# Patient Record
Sex: FEMALE | Race: WHITE | NOT HISPANIC OR LATINO
[De-identification: names, ages, dates, MRNs, and addresses within clinical notes are randomized per-mention and may not be internally consistent; named-entity substitution may affect disease eponyms.]

---

## 2017-03-20 ENCOUNTER — APPOINTMENT (OUTPATIENT)
Dept: RHEUMATOLOGY | Facility: CLINIC | Age: 72
End: 2017-03-20

## 2017-03-20 VITALS
SYSTOLIC BLOOD PRESSURE: 145 MMHG | HEART RATE: 90 BPM | TEMPERATURE: 98 F | OXYGEN SATURATION: 95 % | DIASTOLIC BLOOD PRESSURE: 72 MMHG | HEIGHT: 66 IN | WEIGHT: 161 LBS | BODY MASS INDEX: 25.88 KG/M2

## 2017-03-20 DIAGNOSIS — S22.089A UNSPECIFIED FRACTURE OF T11-T12 VERTEBRA, INITIAL ENCOUNTER FOR CLOSED FRACTURE: ICD-10-CM

## 2017-03-20 RX ORDER — BIOTIN 10 MG
TABLET ORAL
Refills: 0 | Status: ACTIVE | COMMUNITY

## 2017-03-20 RX ORDER — DENOSUMAB 60 MG/ML
60 INJECTION SUBCUTANEOUS
Qty: 1 | Refills: 0 | Status: COMPLETED | OUTPATIENT
Start: 2017-03-20

## 2017-03-20 RX ADMIN — DENOSUMAB 0 MG/ML: 60 INJECTION SUBCUTANEOUS at 00:00

## 2017-05-13 ENCOUNTER — INPATIENT (INPATIENT)
Facility: HOSPITAL | Age: 72
LOS: 3 days | Discharge: NURSING, REHAB OR LONG-TERM CARE | DRG: 493 | End: 2017-05-17
Attending: ORTHOPAEDIC SURGERY | Admitting: ORTHOPAEDIC SURGERY
Payer: MEDICARE

## 2017-05-13 VITALS
HEART RATE: 70 BPM | RESPIRATION RATE: 15 BRPM | TEMPERATURE: 99 F | DIASTOLIC BLOOD PRESSURE: 50 MMHG | OXYGEN SATURATION: 98 % | SYSTOLIC BLOOD PRESSURE: 137 MMHG | WEIGHT: 160.06 LBS | HEIGHT: 67 IN

## 2017-05-13 DIAGNOSIS — R19.7 DIARRHEA, UNSPECIFIED: ICD-10-CM

## 2017-05-13 DIAGNOSIS — I77.6 ARTERITIS, UNSPECIFIED: ICD-10-CM

## 2017-05-13 DIAGNOSIS — S82.891A OTHER FRACTURE OF RIGHT LOWER LEG, INITIAL ENCOUNTER FOR CLOSED FRACTURE: ICD-10-CM

## 2017-05-13 DIAGNOSIS — R55 SYNCOPE AND COLLAPSE: ICD-10-CM

## 2017-05-13 LAB
ALBUMIN SERPL ELPH-MCNC: 3.7 G/DL — SIGNIFICANT CHANGE UP (ref 3.3–5)
ALP SERPL-CCNC: 48 U/L — SIGNIFICANT CHANGE UP (ref 30–120)
ALT FLD-CCNC: 28 U/L DA — SIGNIFICANT CHANGE UP (ref 10–60)
ANION GAP SERPL CALC-SCNC: 8 MMOL/L — SIGNIFICANT CHANGE UP (ref 5–17)
APPEARANCE UR: CLEAR — SIGNIFICANT CHANGE UP
APTT BLD: 24.6 SEC — LOW (ref 27.5–37.4)
AST SERPL-CCNC: 24 U/L — SIGNIFICANT CHANGE UP (ref 10–40)
BASOPHILS # BLD AUTO: 0.2 K/UL — SIGNIFICANT CHANGE UP (ref 0–0.2)
BASOPHILS NFR BLD AUTO: 1.6 % — SIGNIFICANT CHANGE UP (ref 0–2)
BILIRUB SERPL-MCNC: 1 MG/DL — SIGNIFICANT CHANGE UP (ref 0.2–1.2)
BILIRUB UR-MCNC: NEGATIVE — SIGNIFICANT CHANGE UP
BUN SERPL-MCNC: 18 MG/DL — SIGNIFICANT CHANGE UP (ref 7–23)
CALCIUM SERPL-MCNC: 8.6 MG/DL — SIGNIFICANT CHANGE UP (ref 8.4–10.5)
CHLORIDE SERPL-SCNC: 102 MMOL/L — SIGNIFICANT CHANGE UP (ref 96–108)
CO2 SERPL-SCNC: 28 MMOL/L — SIGNIFICANT CHANGE UP (ref 22–31)
COLOR SPEC: YELLOW — SIGNIFICANT CHANGE UP
CREAT SERPL-MCNC: 0.85 MG/DL — SIGNIFICANT CHANGE UP (ref 0.5–1.3)
DIFF PNL FLD: ABNORMAL
EOSINOPHIL # BLD AUTO: 0 K/UL — SIGNIFICANT CHANGE UP (ref 0–0.5)
EOSINOPHIL NFR BLD AUTO: 0.4 % — SIGNIFICANT CHANGE UP (ref 0–6)
GLUCOSE SERPL-MCNC: 100 MG/DL — HIGH (ref 70–99)
GLUCOSE UR QL: NEGATIVE MG/DL — SIGNIFICANT CHANGE UP
HCT VFR BLD CALC: 43 % — SIGNIFICANT CHANGE UP (ref 34.5–45)
HGB BLD-MCNC: 13.9 G/DL — SIGNIFICANT CHANGE UP (ref 11.5–15.5)
INR BLD: 1.08 RATIO — SIGNIFICANT CHANGE UP (ref 0.88–1.16)
KETONES UR-MCNC: ABNORMAL
LEUKOCYTE ESTERASE UR-ACNC: ABNORMAL
LYMPHOCYTES # BLD AUTO: 0.7 K/UL — LOW (ref 1–3.3)
LYMPHOCYTES # BLD AUTO: 6.4 % — LOW (ref 13–44)
MCHC RBC-ENTMCNC: 28.5 PG — SIGNIFICANT CHANGE UP (ref 27–34)
MCHC RBC-ENTMCNC: 32.3 GM/DL — SIGNIFICANT CHANGE UP (ref 32–36)
MCV RBC AUTO: 88 FL — SIGNIFICANT CHANGE UP (ref 80–100)
MONOCYTES # BLD AUTO: 1.5 K/UL — HIGH (ref 0–0.9)
MONOCYTES NFR BLD AUTO: 13.1 % — SIGNIFICANT CHANGE UP (ref 2–14)
NEUTROPHILS # BLD AUTO: 8.8 K/UL — HIGH (ref 1.8–7.4)
NEUTROPHILS NFR BLD AUTO: 78.6 % — HIGH (ref 43–77)
NITRITE UR-MCNC: POSITIVE
PH UR: 6 — SIGNIFICANT CHANGE UP (ref 5–8)
PLATELET # BLD AUTO: 189 K/UL — SIGNIFICANT CHANGE UP (ref 150–400)
POTASSIUM SERPL-MCNC: 3.7 MMOL/L — SIGNIFICANT CHANGE UP (ref 3.5–5.3)
POTASSIUM SERPL-SCNC: 3.7 MMOL/L — SIGNIFICANT CHANGE UP (ref 3.5–5.3)
PROT SERPL-MCNC: 7.1 G/DL — SIGNIFICANT CHANGE UP (ref 6–8.3)
PROT UR-MCNC: 30 MG/DL
PROTHROM AB SERPL-ACNC: 11.8 SEC — SIGNIFICANT CHANGE UP (ref 9.8–12.7)
RBC # BLD: 4.89 M/UL — SIGNIFICANT CHANGE UP (ref 3.8–5.2)
RBC # FLD: 15.2 % — HIGH (ref 10.3–14.5)
SODIUM SERPL-SCNC: 138 MMOL/L — SIGNIFICANT CHANGE UP (ref 135–145)
SP GR SPEC: 1.02 — SIGNIFICANT CHANGE UP (ref 1.01–1.02)
TROPONIN I SERPL-MCNC: 0.01 NG/ML — LOW (ref 0.02–0.06)
TROPONIN I SERPL-MCNC: 0.01 NG/ML — LOW (ref 0.02–0.06)
UROBILINOGEN FLD QL: NEGATIVE MG/DL — SIGNIFICANT CHANGE UP
WBC # BLD: 11.2 K/UL — HIGH (ref 3.8–10.5)
WBC # FLD AUTO: 11.2 K/UL — HIGH (ref 3.8–10.5)

## 2017-05-13 PROCEDURE — 70450 CT HEAD/BRAIN W/O DYE: CPT | Mod: 26

## 2017-05-13 PROCEDURE — 73562 X-RAY EXAM OF KNEE 3: CPT | Mod: 26,RT

## 2017-05-13 PROCEDURE — 99284 EMERGENCY DEPT VISIT MOD MDM: CPT

## 2017-05-13 PROCEDURE — 73610 X-RAY EXAM OF ANKLE: CPT | Mod: 26,RT

## 2017-05-13 PROCEDURE — 71010: CPT | Mod: 26

## 2017-05-13 PROCEDURE — 73590 X-RAY EXAM OF LOWER LEG: CPT | Mod: 26,RT

## 2017-05-13 PROCEDURE — 93970 EXTREMITY STUDY: CPT | Mod: 26

## 2017-05-13 PROCEDURE — 99222 1ST HOSP IP/OBS MODERATE 55: CPT

## 2017-05-13 PROCEDURE — 72170 X-RAY EXAM OF PELVIS: CPT | Mod: 26

## 2017-05-13 PROCEDURE — 93306 TTE W/DOPPLER COMPLETE: CPT | Mod: 26

## 2017-05-13 PROCEDURE — 93010 ELECTROCARDIOGRAM REPORT: CPT

## 2017-05-13 RX ORDER — HYDROMORPHONE HYDROCHLORIDE 2 MG/ML
2 INJECTION INTRAMUSCULAR; INTRAVENOUS; SUBCUTANEOUS ONCE
Qty: 0 | Refills: 0 | Status: DISCONTINUED | OUTPATIENT
Start: 2017-05-13 | End: 2017-05-13

## 2017-05-13 RX ORDER — ONDANSETRON 8 MG/1
4 TABLET, FILM COATED ORAL EVERY 6 HOURS
Qty: 0 | Refills: 0 | Status: DISCONTINUED | OUTPATIENT
Start: 2017-05-13 | End: 2017-05-14

## 2017-05-13 RX ORDER — SENNA PLUS 8.6 MG/1
2 TABLET ORAL AT BEDTIME
Qty: 0 | Refills: 0 | Status: DISCONTINUED | OUTPATIENT
Start: 2017-05-13 | End: 2017-05-14

## 2017-05-13 RX ORDER — ATORVASTATIN CALCIUM 80 MG/1
20 TABLET, FILM COATED ORAL AT BEDTIME
Qty: 0 | Refills: 0 | Status: DISCONTINUED | OUTPATIENT
Start: 2017-05-13 | End: 2017-05-14

## 2017-05-13 RX ORDER — OXYCODONE HYDROCHLORIDE 5 MG/1
5 TABLET ORAL EVERY 4 HOURS
Qty: 0 | Refills: 0 | Status: DISCONTINUED | OUTPATIENT
Start: 2017-05-13 | End: 2017-05-14

## 2017-05-13 RX ORDER — SODIUM CHLORIDE 9 MG/ML
3 INJECTION INTRAMUSCULAR; INTRAVENOUS; SUBCUTANEOUS ONCE
Qty: 0 | Refills: 0 | Status: COMPLETED | OUTPATIENT
Start: 2017-05-13 | End: 2017-05-13

## 2017-05-13 RX ORDER — HYDROMORPHONE HYDROCHLORIDE 2 MG/ML
0.5 INJECTION INTRAMUSCULAR; INTRAVENOUS; SUBCUTANEOUS EVERY 6 HOURS
Qty: 0 | Refills: 0 | Status: DISCONTINUED | OUTPATIENT
Start: 2017-05-13 | End: 2017-05-14

## 2017-05-13 RX ORDER — ACETAMINOPHEN 500 MG
650 TABLET ORAL EVERY 6 HOURS
Qty: 0 | Refills: 0 | Status: DISCONTINUED | OUTPATIENT
Start: 2017-05-13 | End: 2017-05-14

## 2017-05-13 RX ORDER — MORPHINE SULFATE 50 MG/1
2 CAPSULE, EXTENDED RELEASE ORAL ONCE
Qty: 0 | Refills: 0 | Status: DISCONTINUED | OUTPATIENT
Start: 2017-05-13 | End: 2017-05-13

## 2017-05-13 RX ORDER — SODIUM CHLORIDE 9 MG/ML
1000 INJECTION INTRAMUSCULAR; INTRAVENOUS; SUBCUTANEOUS
Qty: 0 | Refills: 0 | Status: DISCONTINUED | OUTPATIENT
Start: 2017-05-13 | End: 2017-05-14

## 2017-05-13 RX ORDER — PANTOPRAZOLE SODIUM 20 MG/1
40 TABLET, DELAYED RELEASE ORAL
Qty: 0 | Refills: 0 | Status: DISCONTINUED | OUTPATIENT
Start: 2017-05-13 | End: 2017-05-14

## 2017-05-13 RX ORDER — OXYBUTYNIN CHLORIDE 5 MG
5 TABLET ORAL
Qty: 0 | Refills: 0 | Status: DISCONTINUED | OUTPATIENT
Start: 2017-05-13 | End: 2017-05-14

## 2017-05-13 RX ORDER — DOCUSATE SODIUM 100 MG
100 CAPSULE ORAL THREE TIMES A DAY
Qty: 0 | Refills: 0 | Status: DISCONTINUED | OUTPATIENT
Start: 2017-05-13 | End: 2017-05-14

## 2017-05-13 RX ADMIN — OXYCODONE HYDROCHLORIDE 5 MILLIGRAM(S): 5 TABLET ORAL at 21:55

## 2017-05-13 RX ADMIN — MORPHINE SULFATE 2 MILLIGRAM(S): 50 CAPSULE, EXTENDED RELEASE ORAL at 13:38

## 2017-05-13 RX ADMIN — HYDROMORPHONE HYDROCHLORIDE 2 MILLIGRAM(S): 2 INJECTION INTRAMUSCULAR; INTRAVENOUS; SUBCUTANEOUS at 14:20

## 2017-05-13 RX ADMIN — OXYCODONE HYDROCHLORIDE 5 MILLIGRAM(S): 5 TABLET ORAL at 22:25

## 2017-05-13 RX ADMIN — MORPHINE SULFATE 2 MILLIGRAM(S): 50 CAPSULE, EXTENDED RELEASE ORAL at 14:20

## 2017-05-13 RX ADMIN — SODIUM CHLORIDE 3 MILLILITER(S): 9 INJECTION INTRAMUSCULAR; INTRAVENOUS; SUBCUTANEOUS at 17:00

## 2017-05-13 RX ADMIN — Medication 5 MILLIGRAM(S): at 20:24

## 2017-05-13 RX ADMIN — OXYCODONE HYDROCHLORIDE 5 MILLIGRAM(S): 5 TABLET ORAL at 17:47

## 2017-05-13 RX ADMIN — HYDROMORPHONE HYDROCHLORIDE 2 MILLIGRAM(S): 2 INJECTION INTRAMUSCULAR; INTRAVENOUS; SUBCUTANEOUS at 14:32

## 2017-05-13 RX ADMIN — ATORVASTATIN CALCIUM 20 MILLIGRAM(S): 80 TABLET, FILM COATED ORAL at 21:55

## 2017-05-13 RX ADMIN — OXYCODONE HYDROCHLORIDE 5 MILLIGRAM(S): 5 TABLET ORAL at 18:20

## 2017-05-13 NOTE — H&P ADULT - ASSESSMENT
71 year old female with  right ankle fracture dislocation in the setting of PMH of seizure, CVA and recent GI illness.

## 2017-05-13 NOTE — H&P ADULT - HISTORY OF PRESENT ILLNESS
71 year old female fell in the shower but does not know how.  Denies dizziness, headache or LOC.  Denies CP or SOB.   PMH of seizure, CVA.   Immediately after fall she was incontinent of stool.  Transported to [place] ED where xray showed fracture dislocation of the right ankle.  This was reduced in the ED and a slpint was applied.

## 2017-05-13 NOTE — ED PROVIDER NOTE - OBJECTIVE STATEMENT
70 y/o F pt with hx of osteoporosis, lymphoma, CVA  presents to ED c/o right ankle pain s/p slip and fall in the shower. Per EMS positive deformity and placed in  splint. She normally ambulates on her own prior to this fall. Pt states she's on a small dose of prednisone everyday. No blood thinners. Denies LOC. Denies back pain, neck pain, headache, blurry vision. no further complaints at this time.  Orthopedic: Dr. Novoa

## 2017-05-13 NOTE — CONSULT NOTE ADULT - PROBLEM SELECTOR RECOMMENDATION 2
???dehydration.  EKG shows NSR, trop negative X1.  Will obtain TTE, carotid doppler US, Head CT scan, serial trop, and neurology consult.  Continue with IVF for hydration ???dehydration.  EKG shows NSR, trop negative X1.  Will obtain TTE, Head CT scan, serial trop, and neurology consult.  Continue with IVF for hydration

## 2017-05-13 NOTE — H&P ADULT - COMMENTS
Constitutional-no headache, fatigue or dizziness, no night sweats or loss of appetite  HEENT-no neck pain, no change in vision or hearing, no nose bleed, no sore throat  C-V- no chest pain, palpitations or difficulty breathing  Pulmonary-no difficulty breathing, cough, or wheeze  GI- No heartburn, no constipation, no dark/tarry stool, no vomitting, no diarrhea  G/U- No urinary frequency, no dysuria  Skin- No rash, no new hair loss   Neurologic-No double vision, no headache,no dizziness, no new weakness,  no new numbness  Psych- No halucinations, no recurrent bad thoughts no excess irritability  Endocrine- No frequent urination/ extreme thirsrt, no new intolerance to heat and cold  Hemato;ogic-No excess bruising or bleeding, no new unexplained lumps/swollen areas  Musculoskeletal-See HPI Constitutional-no headache,  or dizziness, no night sweats or loss of appetite  HEENT-no neck pain, no change in vision or hearing, no nose bleed, no sore throat  C-V- no chest pain, palpitations or difficulty breathing  Pulmonary-no difficulty breathing, cough, or wheeze  GI- Recent GI symptoms after travel to [place], Diarrhea starting on Wednesday.  Took imodium with some relief.  G/U- Normally has some urinary frequency, no dysuria  Skin- No rash, no new hair loss   Neurologic-Bilateral finger weakness , bilateral foot numbness (s/p chemo/CVA) No double vision, no headache, no dizziness, no new weakness,  no new numbness  Psych- No hallucinations, no recurrent bad thoughts no excess irritability  Endocrine- No extreme thirst, no new intolerance to heat and cold  Hemato;ogic-No excess bruising or bleeding, no new unexplained lumps/swollen areas  Musculoskeletal-See HPI

## 2017-05-13 NOTE — ED ADULT NURSE NOTE - INTEGUMENTARY WDL
Color consistent with ethnicity/race, warm, dry intact, resilient. Swelling noted over the right ankle area

## 2017-05-13 NOTE — PATIENT PROFILE ADULT. - PMH
CVA (cerebral vascular accident)    Fracture  lumbar spine,  healed with conservative treatment  Lymphoma    Neuropathy  bilateral feet  Osteoporosis    Seizure

## 2017-05-13 NOTE — ED PROVIDER NOTE - SKIN, MLM
Skin normal color for race, warm, dry and intact. No evidence of rash. ecchymosis noted to right ankle

## 2017-05-13 NOTE — PROGRESS NOTE ADULT - PROBLEM SELECTOR PLAN 1
-S/P reduction in ER, ankle currently in splint  -frequent neurovascular checks  -pain control  -ORIF when "medically stable," with orthopedics  -follow up LE doppler and TTE results

## 2017-05-13 NOTE — PROGRESS NOTE ADULT - SUBJECTIVE AND OBJECTIVE BOX
Patient is a 71y old  Female who presents with a chief complaint of Right Ankle Pain after fall (13 May 2017 18:51)      BRIEF HOSPITAL COURSE:   71F w/ PMH of lymphoma, "ministrokes/seizures secondary to possible vasculitis.",  on prednisone presented to Er after slip/fall in shower leading to right ankle fracture. Fracture was reduced and splinted in ER, evaluated by ortho, and ORIF planned when "patient medically stable."  -patient noted to have recent travel to [place] and upon return had perssitent diarrhea.  -Lower ext. dopplers were performed, resutls pending  -Patient currently having bedside TTE performed.         PAST MEDICAL & SURGICAL HISTORY:  Neuropathy: bilateral feet  Fracture: lumbar spine,  healed with conservative treatment  Seizure  Osteoporosis  Lymphoma  CVA (cerebral vascular accident)  No significant past surgical history  No significant past surgical history      Review of Systems:  CONSTITUTIONAL: No fever, chills, or fatigue  EYES: No eye pain, visual disturbances, or discharge  ENMT:  No difficulty hearing, tinnitus, vertigo; No sinus or throat pain  NECK: No pain or stiffness  RESPIRATORY: No cough, wheezing, chills or hemoptysis; No shortness of breath  CARDIOVASCULAR: No chest pain, palpitations, dizziness, or leg swelling  GASTROINTESTINAL: No abdominal or epigastric pain. No nausea, vomiting, or hematemesis; No diarrhea or constipation. No melena or hematochezia.  GENITOURINARY: No dysuria, frequency, hematuria, or incontinence  NEUROLOGICAL: No headaches, memory loss, loss of strength, numbness, or tremors  SKIN: No itching, burning, rashes, or lesions   MUSCULOSKELETAL: (+) right ankle joint pain andswelling (post reduction for Fx); No muscle, back, or extremity pain  PSYCHIATRIC: No depression, anxiety, mood swings, or difficulty sleeping      Medications:        acetaminophen   Tablet. 650milliGRAM(s) Oral every 6 hours PRN  ondansetron Injectable 4milliGRAM(s) IV Push every 6 hours PRN  oxyCODONE IR 5milliGRAM(s) Oral every 4 hours PRN  HYDROmorphone  Injectable 0.5milliGRAM(s) IV Push every 6 hours PRN        senna 2Tablet(s) Oral at bedtime PRN  docusate sodium 100milliGRAM(s) Oral three times a day  pantoprazole    Tablet 40milliGRAM(s) Oral before breakfast    oxybutynin 5milliGRAM(s) Oral two times a day    predniSONE   Tablet 5milliGRAM(s) Oral every other day  atorvastatin 20milliGRAM(s) Oral at bedtime    sodium chloride 0.9%. 1000milliLiter(s) IV Continuous <Continuous>                ICU Vital Signs Last 24 Hrs  T(C): 37.5, Max: 37.5 (05-13 @ 18:39)  T(F): 99.5, Max: 99.5 (05-13 @ 18:39)  HR: 91 (70 - 91)  BP: 131/56 (125/65 - 137/50)  BP(mean): 78 (78 - 78)  ABP: --  ABP(mean): --  RR: 18 (15 - 18)  SpO2: 93% (93% - 98%)          I&O's Detail    I & Os for current day (as of 13 May 2017 19:59)  =============================================  IN:    sodium chloride 0.9%.: 240 ml    Oral Fluid: 120 ml    Total IN: 360 ml  ---------------------------------------------  OUT:    Total OUT: 0 ml  ---------------------------------------------  Total NET: 360 ml        LABS:                        13.9   11.2  )-----------( 189      ( 13 May 2017 15:37 )             43.0     05-13    138  |  102  |  18  ----------------------------<  100<H>  3.7   |  28  |  0.85    Ca    8.6      13 May 2017 15:37    TPro  7.1  /  Alb  3.7  /  TBili  1.0  /  DBili  x   /  AST  24  /  ALT  28  /  AlkPhos  48  05-13      CARDIAC MARKERS ( 13 May 2017 19:19 )  .005 ng/mL / x     / x     / x     / x          CAPILLARY BLOOD GLUCOSE    PT/INR - ( 13 May 2017 15:37 )   PT: 11.8 sec;   INR: 1.08 ratio         PTT - ( 13 May 2017 15:37 )  PTT:24.6 sec    CULTURES:      Physical Examination:    General: No acute distress.  Alert, oriented, interactive, nonfocal    HEENT: Pupils equal, reactive to light.  Symmetric.    PULM: Clear to auscultation bilaterally, no significant sputum production    CVS: Regular rate and rhythm, no murmurs, rubs, or gallops    ABD: Soft, nondistended, nontender, normoactive bowel sounds, no masses    EXT: R ankle in splint, warm and well perfused, can move all toes, pulses palpable    SKIN: Warm and well perfused, no rashes noted.        CRITICAL CARE TIME SPENT: 45 minutes

## 2017-05-13 NOTE — ED ADULT NURSE NOTE - OBJECTIVE STATEMENT
Pt BIBA from home awake and oriented x4 compalining of pain right ankle. Pt reported was taking a shower and slipped/fell on the floor. Deformity, swelling and pain noted over the  right ankle. Immobilization/splint initiated by the EMS crew.

## 2017-05-13 NOTE — CONSULT NOTE ADULT - ATTENDING COMMENTS
Plan of care was discussed with patient in great details, All questions were answered to their satisfaction.  Seems to understand, and in agreement

## 2017-05-13 NOTE — ED PROVIDER NOTE - NS ED MD SCRIBE ATTENDING SCRIBE SECTIONS
HISTORY OF PRESENT ILLNESS/PHYSICAL EXAM/VITAL SIGNS( Pullset)/REVIEW OF SYSTEMS/DISPOSITION/PAST MEDICAL/SURGICAL/SOCIAL HISTORY

## 2017-05-13 NOTE — ED PROVIDER NOTE - MUSCULOSKELETAL, MLM
good pulse right lower extremity, positive deformity right ankle good DP  right lower extremity, positive deformity right ankle

## 2017-05-13 NOTE — H&P ADULT - PMH
CVA (cerebral vascular accident)    Lymphoma    Osteoporosis CVA (cerebral vascular accident)    Fracture  lumbar spine,  healed with conservative treatment  Lymphoma    Neuropathy  bilateral feet  Osteoporosis    Seizure

## 2017-05-14 DIAGNOSIS — R56.9 UNSPECIFIED CONVULSIONS: ICD-10-CM

## 2017-05-14 LAB
ANION GAP SERPL CALC-SCNC: 20 MMOL/L — HIGH (ref 5–17)
BUN SERPL-MCNC: 12 MG/DL — SIGNIFICANT CHANGE UP (ref 7–23)
CALCIUM SERPL-MCNC: 7.4 MG/DL — LOW (ref 8.4–10.5)
CHLORIDE SERPL-SCNC: 100 MMOL/L — SIGNIFICANT CHANGE UP (ref 96–108)
CO2 SERPL-SCNC: 15 MMOL/L — LOW (ref 22–31)
CREAT SERPL-MCNC: 0.8 MG/DL — SIGNIFICANT CHANGE UP (ref 0.5–1.3)
GLUCOSE SERPL-MCNC: 128 MG/DL — HIGH (ref 70–99)
HCT VFR BLD CALC: 36.7 % — SIGNIFICANT CHANGE UP (ref 34.5–45)
HGB BLD-MCNC: 11.5 G/DL — SIGNIFICANT CHANGE UP (ref 11.5–15.5)
MCHC RBC-ENTMCNC: 28.3 PG — SIGNIFICANT CHANGE UP (ref 27–34)
MCHC RBC-ENTMCNC: 31.4 GM/DL — LOW (ref 32–36)
MCV RBC AUTO: 90.2 FL — SIGNIFICANT CHANGE UP (ref 80–100)
PLATELET # BLD AUTO: 178 K/UL — SIGNIFICANT CHANGE UP (ref 150–400)
POTASSIUM SERPL-MCNC: 4 MMOL/L — SIGNIFICANT CHANGE UP (ref 3.5–5.3)
POTASSIUM SERPL-SCNC: 4 MMOL/L — SIGNIFICANT CHANGE UP (ref 3.5–5.3)
RBC # BLD: 4.07 M/UL — SIGNIFICANT CHANGE UP (ref 3.8–5.2)
RBC # FLD: 15 % — HIGH (ref 10.3–14.5)
SODIUM SERPL-SCNC: 135 MMOL/L — SIGNIFICANT CHANGE UP (ref 135–145)
TROPONIN I SERPL-MCNC: 0.04 NG/ML — SIGNIFICANT CHANGE UP (ref 0.02–0.06)
WBC # BLD: 8.4 K/UL — SIGNIFICANT CHANGE UP (ref 3.8–10.5)
WBC # FLD AUTO: 8.4 K/UL — SIGNIFICANT CHANGE UP (ref 3.8–10.5)

## 2017-05-14 PROCEDURE — 99232 SBSQ HOSP IP/OBS MODERATE 35: CPT

## 2017-05-14 RX ORDER — HYDROMORPHONE HYDROCHLORIDE 2 MG/ML
0.5 INJECTION INTRAMUSCULAR; INTRAVENOUS; SUBCUTANEOUS EVERY 6 HOURS
Qty: 0 | Refills: 0 | Status: DISCONTINUED | OUTPATIENT
Start: 2017-05-14 | End: 2017-05-17

## 2017-05-14 RX ORDER — CEFTRIAXONE 500 MG/1
INJECTION, POWDER, FOR SOLUTION INTRAMUSCULAR; INTRAVENOUS
Qty: 0 | Refills: 0 | Status: DISCONTINUED | OUTPATIENT
Start: 2017-05-14 | End: 2017-05-14

## 2017-05-14 RX ORDER — ATORVASTATIN CALCIUM 80 MG/1
20 TABLET, FILM COATED ORAL AT BEDTIME
Qty: 0 | Refills: 0 | Status: DISCONTINUED | OUTPATIENT
Start: 2017-05-14 | End: 2017-05-17

## 2017-05-14 RX ORDER — TRAMADOL HYDROCHLORIDE 50 MG/1
50 TABLET ORAL EVERY 4 HOURS
Qty: 0 | Refills: 0 | Status: DISCONTINUED | OUTPATIENT
Start: 2017-05-14 | End: 2017-05-14

## 2017-05-14 RX ORDER — CEFAZOLIN SODIUM 1 G
2000 VIAL (EA) INJECTION EVERY 8 HOURS
Qty: 0 | Refills: 0 | Status: COMPLETED | OUTPATIENT
Start: 2017-05-14 | End: 2017-05-15

## 2017-05-14 RX ORDER — SODIUM CHLORIDE 9 MG/ML
1000 INJECTION, SOLUTION INTRAVENOUS
Qty: 0 | Refills: 0 | Status: DISCONTINUED | OUTPATIENT
Start: 2017-05-14 | End: 2017-05-15

## 2017-05-14 RX ORDER — HYDROMORPHONE HYDROCHLORIDE 2 MG/ML
0.5 INJECTION INTRAMUSCULAR; INTRAVENOUS; SUBCUTANEOUS
Qty: 0 | Refills: 0 | Status: DISCONTINUED | OUTPATIENT
Start: 2017-05-14 | End: 2017-05-15

## 2017-05-14 RX ORDER — CEFTRIAXONE 500 MG/1
1 INJECTION, POWDER, FOR SOLUTION INTRAMUSCULAR; INTRAVENOUS EVERY 24 HOURS
Qty: 0 | Refills: 0 | Status: CANCELLED | OUTPATIENT
Start: 2017-05-15 | End: 2017-05-14

## 2017-05-14 RX ORDER — OXYCODONE HYDROCHLORIDE 5 MG/1
5 TABLET ORAL EVERY 4 HOURS
Qty: 0 | Refills: 0 | Status: DISCONTINUED | OUTPATIENT
Start: 2017-05-14 | End: 2017-05-17

## 2017-05-14 RX ORDER — HEPARIN SODIUM 5000 [USP'U]/ML
5000 INJECTION INTRAVENOUS; SUBCUTANEOUS EVERY 12 HOURS
Qty: 0 | Refills: 0 | Status: DISCONTINUED | OUTPATIENT
Start: 2017-05-14 | End: 2017-05-17

## 2017-05-14 RX ORDER — CEFAZOLIN SODIUM 1 G
2000 VIAL (EA) INJECTION ONCE
Qty: 0 | Refills: 0 | Status: DISCONTINUED | OUTPATIENT
Start: 2017-05-14 | End: 2017-05-14

## 2017-05-14 RX ORDER — OXYCODONE HYDROCHLORIDE 5 MG/1
10 TABLET ORAL EVERY 4 HOURS
Qty: 0 | Refills: 0 | Status: DISCONTINUED | OUTPATIENT
Start: 2017-05-14 | End: 2017-05-17

## 2017-05-14 RX ORDER — CEFTRIAXONE 500 MG/1
1 INJECTION, POWDER, FOR SOLUTION INTRAMUSCULAR; INTRAVENOUS ONCE
Qty: 0 | Refills: 0 | Status: COMPLETED | OUTPATIENT
Start: 2017-05-14 | End: 2017-05-14

## 2017-05-14 RX ORDER — SOLIFENACIN SUCCINATE 10 MG/1
10 TABLET ORAL DAILY
Qty: 0 | Refills: 0 | Status: DISCONTINUED | OUTPATIENT
Start: 2017-05-14 | End: 2017-05-14

## 2017-05-14 RX ORDER — SOLIFENACIN SUCCINATE 10 MG/1
10 TABLET ORAL DAILY
Qty: 0 | Refills: 0 | Status: DISCONTINUED | OUTPATIENT
Start: 2017-05-14 | End: 2017-05-17

## 2017-05-14 RX ORDER — SODIUM CHLORIDE 9 MG/ML
1000 INJECTION, SOLUTION INTRAVENOUS
Qty: 0 | Refills: 0 | Status: DISCONTINUED | OUTPATIENT
Start: 2017-05-14 | End: 2017-05-14

## 2017-05-14 RX ORDER — HYDROCORTISONE 20 MG
50 TABLET ORAL EVERY 8 HOURS
Qty: 0 | Refills: 0 | Status: DISCONTINUED | OUTPATIENT
Start: 2017-05-14 | End: 2017-05-14

## 2017-05-14 RX ORDER — OXYBUTYNIN CHLORIDE 5 MG
10 TABLET ORAL
Qty: 0 | Refills: 0 | Status: DISCONTINUED | OUTPATIENT
Start: 2017-05-14 | End: 2017-05-14

## 2017-05-14 RX ORDER — HYDROMORPHONE HYDROCHLORIDE 2 MG/ML
0.5 INJECTION INTRAMUSCULAR; INTRAVENOUS; SUBCUTANEOUS
Qty: 0 | Refills: 0 | Status: DISCONTINUED | OUTPATIENT
Start: 2017-05-14 | End: 2017-05-14

## 2017-05-14 RX ORDER — CEFTRIAXONE 500 MG/1
1 INJECTION, POWDER, FOR SOLUTION INTRAMUSCULAR; INTRAVENOUS ONCE
Qty: 0 | Refills: 0 | Status: DISCONTINUED | OUTPATIENT
Start: 2017-05-14 | End: 2017-05-14

## 2017-05-14 RX ORDER — SERTRALINE 25 MG/1
50 TABLET, FILM COATED ORAL DAILY
Qty: 0 | Refills: 0 | Status: DISCONTINUED | OUTPATIENT
Start: 2017-05-14 | End: 2017-05-17

## 2017-05-14 RX ADMIN — Medication 100 MILLIGRAM(S): at 22:05

## 2017-05-14 RX ADMIN — TRAMADOL HYDROCHLORIDE 50 MILLIGRAM(S): 50 TABLET ORAL at 09:50

## 2017-05-14 RX ADMIN — OXYCODONE HYDROCHLORIDE 5 MILLIGRAM(S): 5 TABLET ORAL at 07:10

## 2017-05-14 RX ADMIN — OXYCODONE HYDROCHLORIDE 5 MILLIGRAM(S): 5 TABLET ORAL at 06:36

## 2017-05-14 RX ADMIN — HYDROMORPHONE HYDROCHLORIDE 0.5 MILLIGRAM(S): 2 INJECTION INTRAMUSCULAR; INTRAVENOUS; SUBCUTANEOUS at 18:17

## 2017-05-14 RX ADMIN — CEFTRIAXONE 100 GRAM(S): 500 INJECTION, POWDER, FOR SOLUTION INTRAMUSCULAR; INTRAVENOUS at 09:29

## 2017-05-14 RX ADMIN — Medication 50 MILLIGRAM(S): at 12:32

## 2017-05-14 RX ADMIN — TRAMADOL HYDROCHLORIDE 50 MILLIGRAM(S): 50 TABLET ORAL at 09:29

## 2017-05-14 RX ADMIN — ATORVASTATIN CALCIUM 20 MILLIGRAM(S): 80 TABLET, FILM COATED ORAL at 22:05

## 2017-05-14 RX ADMIN — Medication 5 MILLIGRAM(S): at 06:36

## 2017-05-14 RX ADMIN — PANTOPRAZOLE SODIUM 40 MILLIGRAM(S): 20 TABLET, DELAYED RELEASE ORAL at 06:36

## 2017-05-14 RX ADMIN — HYDROMORPHONE HYDROCHLORIDE 0.5 MILLIGRAM(S): 2 INJECTION INTRAMUSCULAR; INTRAVENOUS; SUBCUTANEOUS at 17:54

## 2017-05-14 RX ADMIN — SODIUM CHLORIDE 75 MILLILITER(S): 9 INJECTION, SOLUTION INTRAVENOUS at 17:41

## 2017-05-14 RX ADMIN — Medication 200 MILLIGRAM(S): at 17:44

## 2017-05-14 RX ADMIN — SERTRALINE 50 MILLIGRAM(S): 25 TABLET, FILM COATED ORAL at 22:21

## 2017-05-14 RX ADMIN — SODIUM CHLORIDE 80 MILLILITER(S): 9 INJECTION INTRAMUSCULAR; INTRAVENOUS; SUBCUTANEOUS at 06:37

## 2017-05-14 NOTE — CONSULT NOTE ADULT - ASSESSMENT
71 year old female with h/o CVA, Vasulitis? h/o seizures - last seizure 4 yrs ago - Not on any anti seizure meds, fell in the shower has right ankle.  No signs of new CVA.  No seizure reported.  Didn't hit head.
Patient is 70 yo female presenting with
The patient is a 71 year old female with a history of lymphoma s/p chemotherapy, CVA, seizures, GERD who is admitted with right ankle fracture.    Plan:  - Intermittent PVCs on telemetry; no arrhythmias to suggest etiology of pre-syncope/syncope  - Echocardiogram reviewed; normal LV systolic function, mild/mod aortic stenosis, mild pulmonary hypertension  - Continue atorvastatin 20 mg daily  - Not on aspirin due to ?prior ICH  - The patient is at low/intermediate risk for cardiac events for an intermediate risk surgery. There are no active cardiac issues. The patient is optimized to proceed for surgery without additional cardiac testing.

## 2017-05-14 NOTE — PROGRESS NOTE ADULT - SUBJECTIVE AND OBJECTIVE BOX
Patient seen and examined.  Consult note dictated.  Right ankle fracture indicated for ORIF.      Pending medical and neuro clearance.  Surgery planned for early afternoon if cleared.    Will Keep NPO

## 2017-05-14 NOTE — CONSULT NOTE ADULT - PROBLEM SELECTOR RECOMMENDATION 2
Likely vasovagal.  Pt was having diarrhea for last few days.  Volume depletion likely caused transient cerebral hypoperfusion.

## 2017-05-14 NOTE — CONSULT NOTE ADULT - CONSULT REASON
H/o Vasculitis. H/o Seizures.  S/p Fall. Clearance for Right Ankle Fracture surgery.
Medical consult
Pre-operative evaluation

## 2017-05-14 NOTE — DIETITIAN INITIAL EVALUATION ADULT. - OTHER INFO
The patient is a 71 year old female with a history of lymphoma s/p chemotherapy, CVA, seizures, GERD who presents after a fall in shower.   In ED, found to have right ankle fracture. NPO for possible OR today, R ankle fracture indicated for ORIF.  Pt denies difficulty chewing or swallowing at this time (hx CVA).  NKA to food. Pt didn't offer any food preferences at this time.  Denies recent weight changes.

## 2017-05-14 NOTE — CONSULT NOTE ADULT - PROBLEM SELECTOR RECOMMENDATION 9
Type is unknown.  Being followed by a neurologist Dr. Verduzco.  On Prednisone 5 mg daily.  No changes in pt's neurological condition.
Continue with pain management, and Further care as per ortho

## 2017-05-14 NOTE — CONSULT NOTE ADULT - PROBLEM SELECTOR RECOMMENDATION 3
Last seizure was 4 yrs ago.  Not on anti seizure meds.
Will obtains stool study, and continue with hydration

## 2017-05-14 NOTE — CONSULT NOTE ADULT - SUBJECTIVE AND OBJECTIVE BOX
Patient is a 71y old  Female who presents with a chief complaint of Right Ankle Pain after fall (13 May 2017 18:51)      HPI:  71 year old female fell in the shower.  Denies dizziness, headache or LOC.  Didn't hit her head. Denies CP or SOB.   PMH of seizure, CVA.   Immediately after fall she was incontinent of stool.  Transported to [place] ED where x ray showed fracture dislocation of the right ankle.  This was reduced in the ED and a slpint was applied. (13 May 2017 15:50).  Last seizure was 4 yrs ago. Now, not on anti seizure meds.   is at bedside.    PAST MEDICAL & SURGICAL HISTORY:  Neuropathy: bilateral feet  Fracture: lumbar spine,  healed with conservative treatment  Seizure, not on any meds. Last seizure 4 yrs ago.  Osteoporosis  Lymphoma  CVA (cerebral vascular accident)  No significant past surgical history    MEDICATIONS  (STANDING):  docusate sodium 100milliGRAM(s) Oral three times a day  pantoprazole    Tablet 40milliGRAM(s) Oral before breakfast  predniSONE   Tablet 5milliGRAM(s) Oral every other day  atorvastatin 20milliGRAM(s) Oral at bedtime  sodium chloride 0.9%. 1000milliLiter(s) IV Continuous <Continuous>  hydrocortisone  Injectable 50milliGRAM(s) IV Push every 8 hours  cefTRIAXone   IVPB  IV Intermittent     MEDICATIONS  (PRN):  acetaminophen   Tablet. 650milliGRAM(s) Oral every 6 hours PRN Mild Pain (1 - 3)  ondansetron Injectable 4milliGRAM(s) IV Push every 6 hours PRN Nausea  senna 2Tablet(s) Oral at bedtime PRN Constipation  HYDROmorphone  Injectable 0.5milliGRAM(s) IV Push every 6 hours PRN Severe Pain (7 - 10)  traMADol 50milliGRAM(s) Oral every 4 hours PRN Moderate Pain (4 - 6)      Allergies - No Known Allergies    SOCIAL HISTORY: No h/o Smoking.     FAMILY HISTORY: Asked, N/C.    REVIEW OF SYSTEMS:    CONSTITUTIONAL: No fever  EYES: No eye pain,   ENMT:  No sinus or throat pain  NECK: No pain or stiffness  RESPIRATORY: No cough, No hemoptysis; No shortness of breath  CARDIOVASCULAR: No acute chest pain, palpitations,  or leg swelling  GASTROINTESTINAL: No abdominal pain. No nausea, vomiting, or hematemesis;  No melena or hematochezia.  GENITOURINARY: No  hematuria, or incontinence  MUSCULOSKELETAL: Right ankle Fracture.  SKIN: No itching, rashes, or lesions   LYMPH NODES: No enlarged glands  NEUROLOGICAL: NH/o CVA. Vasculitis? Seizures - last seizure 4 yrs ago. Not on Anti seizure meds.  PSYCHIATRIC: No depression, anxiety, mood swings, or difficulty sleeping  ENDOCRINE: No heat or cold intolerance;   HEME/LYMPH: No easy bruising, or bleeding gums  Allergy/Immunology. No medication allergy. No seasonal allergies.    PHYSICAL EXAM:  Vital Signs Last 24 Hrs  T(F): 99.6  HR: 79  BP: 128/50  RR: 23  Wt(kg): --    GENERAL: NAD, well-groomed, well-developed  HEAD:  Atraumatic, Normocephalic  EYES: EOMI, PERRLA, conjunctiva and sclera clear  NECK: Supple, No JVD, thyroid non-palpable    On Neurological Examination:    Mental Status - Pt is alert, awake, oriented X3. Higher functions are intact. Follows commands well and able to answer questions appropriately.    Speech -  Normal. Pt has no aphasia.    Cranial Nerves - Pupils 3 mm equal and reactive to light, extraocular eye movements intact. Pt has no visual field deficit.  Pt has no facial asymmetry. Tongue - is in midline.    Motor Exam - 4 plus/5 all over, No drift. No shaking or tremors.  Muscle tone - is normal all over. Moves all extremities equally. No asymmetry is seen.      Sensory Exam - Pin prick, temperature, joint position and vibration are intact on either side. Pt withdraws all extremities equally on stimulation. No asymmetry seen. No complaints of tingling, numbness.    Gait - Right ankle Fx. Gait is not tested.    Deep tendon Reflexes - 2 plus all over.    Coordination - Fine finger movements are normal on both sides. Finger to nose is also normal on both sides.       Romberg - Negative.    Neck Supple -  Yes.    LABS:                        11.5   8.4   )-----------( 178      ( 14 May 2017 05:46 )             36.7     05-14    135  |  100  |  12  ----------------------------<  128<H>  4.0   |  15<L>  |  0.80    Ca    7.4<L>      14 May 2017 05:46    TPro  7.1  /  Alb  3.7  /  TBili  1.0  /  DBili  x   /  AST  24  /  ALT  28  /  AlkPhos  48  05-13    PT/INR - ( 13 May 2017 15:37 )   PT: 11.8 sec;   INR: 1.08 ratio         PTT - ( 13 May 2017 15:37 )  PTT:24.6 sec  Urinalysis Basic - ( 13 May 2017 20:41 )    Color: Yellow / Appearance: Clear / S.020 / pH: x  Gluc: x / Ketone: Small  / Bili: Negative / Urobili: Negative mg/dL   Blood: x / Protein: 30 mg/dL / Nitrite: Positive   Leuk Esterase: Moderate / RBC: 6-10 /HPF / WBC 11-25   Sq Epi: x / Non Sq Epi: Few / Bacteria: Many      RADIOLOGY & ADDITIONAL STUDIES:    EXAM:  CT BRAIN                            PROCEDURE DATE:  2017        INTERPRETATION:  Brain CT    Indication: Fall with headache    Technique: Axial images were obtained, along with reformatted coronal and   sagittal images.    Comparison:  CT of 2010    FINDINGS:    There is no intracranial hemorrhage, abnormal extra-axial fluid   collection, mass effect, midline shift or large acute cortical infarct.    There is an old right frontal focal infarct. There is an old lacunar   infarct in the left basal ganglia. There are age appropriate involutional   changes with commensurate dilatation of the CSF spaces.  There are   subcortical and periventricular white matter lucencies likely   representing microvascular ischemicdisease.    The mastoid air cells and visualized paranasal sinuses are well aerated.  There is a right anterior frontal craniotomy.    IMPRESSION:    No intracranial hemorrhage, mass effect or large acute cortical infarct.        KHALIF WILLAMS M.D, ATTENDING RADIOLOGIST  This document has been electronically signed. May 13 2017  6:33PM      EXAM:  ANKLE RIGHT (MINIMUM 3 VIEWS)                            PROCEDURE DATE:  2017        INTERPRETATION:  Clinical information: Post reduction right ankle   fracture.    3 views of the right ankle.    Comparison: 2017, 1:34 PM.    There is an overlying cast obstructing fine bone detail.    There are fractures of the distal fibula, medial malleolus and posterior   malleolus.  There has been interval reduction at the tibiotalar articulation.    Impression:    Findings as discussed above.      YUDELKA SANTOS M.D., ATTENDING RADIOLOGIST  This document has been electronically signed. May 13 2017  4:27PM
History of Present Illness: The patient is a 71 year old female with a history of lymphoma s/p chemotherapy, CVA, seizures, GERD who presents after a fall in shower. She states her right leg gave out. She felt dizzy at the time. Unclear if she lost consciousness. She lost her stool. She has been feeling weak with diarrhea since recent trip to [place]. Denies recent chest pain or shortness of breath. In ED, found to have right ankle fracture.    Past Medical/Surgical History:  lymphoma s/p chemotherapy, CVA, seizures, GERD     Medications:  MEDICATIONS  (STANDING):  docusate sodium 100milliGRAM(s) Oral three times a day  pantoprazole    Tablet 40milliGRAM(s) Oral before breakfast  predniSONE   Tablet 5milliGRAM(s) Oral every other day  atorvastatin 20milliGRAM(s) Oral at bedtime  sodium chloride 0.9%. 1000milliLiter(s) IV Continuous <Continuous>  hydrocortisone  Injectable 50milliGRAM(s) IV Push every 8 hours  cefTRIAXone   IVPB  IV Intermittent     MEDICATIONS  (PRN):  acetaminophen   Tablet. 650milliGRAM(s) Oral every 6 hours PRN Mild Pain (1 - 3)  ondansetron Injectable 4milliGRAM(s) IV Push every 6 hours PRN Nausea  senna 2Tablet(s) Oral at bedtime PRN Constipation  HYDROmorphone  Injectable 0.5milliGRAM(s) IV Push every 6 hours PRN Severe Pain (7 - 10)  traMADol 50milliGRAM(s) Oral every 4 hours PRN Moderate Pain (4 - 6)      Family History: Non-contributory family history of premature cardiovascular atherosclerotic disease    Social History: No tobacco, alcohol or drug use    Review of Systems:  General: No fevers, chills, weight loss or gain  Skin: No rashes, color changes  Cardiovascular: No chest pain, orthopnea  Respiratory: No shortness of breath, cough  Gastrointestinal: No nausea, abdominal pain  Genitourinary: No incontinence, pain with urination  Musculoskeletal: No pain, swelling, decreased range of motion  Neurological: No headache, weakness  Psychiatric: No depression, anxiety  Endocrine: No weight loss or gain, increased thirst  All other systems are comprehensively negative.    Physical Exam:  Vitals:        Vital Signs Last 24 Hrs  T(C): 37.6, Max: 37.6 (05-14 @ 08:00)  T(F): 99.6, Max: 99.6 (05-14 @ 08:00)  HR: 89 (70 - 91)  BP: 124/50 (118/39 - 137/50)  BP(mean): 70 (61 - 78)  RR: 21 (15 - 24)  SpO2: 91% (91% - 98%)  General: NAD  Neck: No JVD, no carotid bruit  Lungs: CTAB  CV: RRR, nl S1/S2, 1/6 systolic murmur  Abdomen: S/NT/ND, +BS  Extremities: No LE edema, no cyanosis    Labs:                        11.5   8.4   )-----------( 178      ( 14 May 2017 05:46 )             36.7     05-14    135  |  100  |  12  ----------------------------<  128<H>  4.0   |  15<L>  |  0.80    Ca    7.4<L>      14 May 2017 05:46    TPro  7.1  /  Alb  3.7  /  TBili  1.0  /  DBili  x   /  AST  24  /  ALT  28  /  AlkPhos  48  05-13    CARDIAC MARKERS ( 14 May 2017 05:46 )  .041 ng/mL / x     / x     / x     / x      CARDIAC MARKERS ( 13 May 2017 20:13 )  .013 ng/mL / x     / x     / x     / x      CARDIAC MARKERS ( 13 May 2017 19:19 )  .005 ng/mL / x     / x     / x     / x          PT/INR - ( 13 May 2017 15:37 )   PT: 11.8 sec;   INR: 1.08 ratio         PTT - ( 13 May 2017 15:37 )  PTT:24.6 sec    ECG: NSR, normal axis, no ST abnormality
Patient is 72 yo female with hx of CVA, Seizure, and possible vasculitis presenting with near syncopal episodes.  patient reports that she was taking a shower, when she felt weak, and fell to the ground.  +involuntary BM.  Patient reports that came back from [place] on Tuesday and has been having diarrhea since then.  Patient denies any headache, dizziness, weakness, chest pain, SOB, palpitation, abdominal pain, N/V, fever, chills, involuntary limb movement, or LOC            Constitutional: No fever, fatigue or weight loss.  Skin: No rash.  Eyes: No recent vision problems or eye pain.  ENT: No congestion, ear pain, or sore throat.  Endocrine: No thyroid problems.  Cardiovascular: No chest pain or palpation.  Respiratory: No cough, shortness of breath, congestion, or wheezing.  Gastrointestinal: No abdominal pain, nausea, vomiting, or diarrhea.  Genitourinary: No dysuria.  Musculoskeletal: No joint swelling.  Neurologic: No headache.        PMHX/PSHX  HLD  seizure  lymphoma  GERD  ?Vasculitis with brain lesions  Depression/anxiety  Compression fracture of T1    Medication  Lipitor PO daily  Zoloft 50 mg po daily  prednisone 5 mg po Q48hrs  Vesicare 5mg po daily  dexilant po daily      Allergy NKDA    SHX lives with , denies any ETOH/Tob/illicit drug usage        T 98.7  p70  /50  RR 15   SPO2 98 on RA    PHYSICAL EXAM-  GENERAL: NAD, well-groomed, well-developed  HEAD:  Atraumatic, Normocephalic  EYES: EOMI, PERRLA, conjunctiva and sclera clear  NECK: Supple, No JVD, Normal thyroid  NERVOUS SYSTEM:  Alert & Oriented X3, Motor Strength 5/5 B/L upper and lower extremities; DTRs 2+ intact and symmetric  CHEST/LUNG: Clear to percussion bilaterally; No rales, rhonchi, wheezing, or rubs  HEART: Regular rate and rhythm; No murmurs, rubs, or gallops  ABDOMEN: Soft, Nontender, Nondistended; hyperactive BS  EXTREMITIES:  2+ Peripheral Pulses, No clubbing, cyanosis, or edema  SKIN: No rashes or lesions                                13.9   11.2  )-----------( 189      ( 13 May 2017 15:37 )             43.0     05-13    138  |  102  |  18  ----------------------------<  100<H>  3.7   |  28  |  0.85    Ca    8.6      13 May 2017 15:37    TPro  7.1  /  Alb  3.7  /  TBili  1.0  /  DBili  x   /  AST  24  /  ALT  28  /  AlkPhos  48  05-13            PT/INR - ( 13 May 2017 15:37 )   PT: 11.8 sec;   INR: 1.08 ratio         PTT - ( 13 May 2017 15:37 )  PTT:24.6 sec

## 2017-05-14 NOTE — PROGRESS NOTE ADULT - SUBJECTIVE AND OBJECTIVE BOX
Patient reports diarrhea resolved.  Feels better.  Offers no other complaints      Constitutional: No fever, fatigue or weight loss.  Skin: No rash.  Eyes: No recent vision problems or eye pain.  ENT: No congestion, ear pain, or sore throat.  Endocrine: No thyroid problems.  Cardiovascular: No chest pain or palpation.  Respiratory: No cough, shortness of breath, congestion, or wheezing.  Gastrointestinal: No abdominal pain, nausea, vomiting, or diarrhea.  Genitourinary: No dysuria.  Musculoskeletal: No joint swelling.  Neurologic: No headache.        MEDICATIONS  (STANDING):  docusate sodium 100milliGRAM(s) Oral three times a day  pantoprazole    Tablet 40milliGRAM(s) Oral before breakfast  predniSONE   Tablet 5milliGRAM(s) Oral every other day  oxybutynin 5milliGRAM(s) Oral two times a day  atorvastatin 20milliGRAM(s) Oral at bedtime  sodium chloride 0.9%. 1000milliLiter(s) IV Continuous <Continuous>  cefTRIAXone   IVPB 1Gram(s) IV Intermittent once  cefTRIAXone   IVPB  IV Intermittent     MEDICATIONS  (PRN):  acetaminophen   Tablet. 650milliGRAM(s) Oral every 6 hours PRN Mild Pain (1 - 3)  ondansetron Injectable 4milliGRAM(s) IV Push every 6 hours PRN Nausea  senna 2Tablet(s) Oral at bedtime PRN Constipation  HYDROmorphone  Injectable 0.5milliGRAM(s) IV Push every 6 hours PRN Severe Pain (7 - 10)  traMADol 50milliGRAM(s) Oral every 4 hours PRN Moderate Pain (4 - 6)      T 99.6  P 89  /50  RR 21  SPO2 91 % RA    PHYSICAL EXAM-  GENERAL: NAD, well-groomed, well-developed  HEAD:  Atraumatic, Normocephalic  EYES: EOMI, PERRLA, conjunctiva and sclera clear  NECK: Supple, No JVD, Normal thyroid  NERVOUS SYSTEM:  Alert & Oriented X3, Motor Strength 5/5 B/L upper and lower extremities  CHEST/LUNG: Clear to percussion bilaterally; No rales, rhonchi, wheezing, or rubs  HEART: Regular rate and rhythm; No murmurs, rubs, or gallops  ABDOMEN: Soft, Nontender, Nondistended; Bowel sounds present  SKIN: No rashes or lesions

## 2017-05-14 NOTE — CONSULT NOTE ADULT - PROBLEM SELECTOR RECOMMENDATION 4
Pt is Cleared for Right ankle surgery, neurologically.  Pt is high risk secondary to her multiple PMHx, but doesn't have any immediate contraindications.  I d/w Ortho  - Dr. Tellez.

## 2017-05-14 NOTE — PROGRESS NOTE ADULT - PROBLEM SELECTOR PLAN 1
Continue with pain management, and further care as per ortho.  Needs cardiology, and neurology clearance

## 2017-05-15 ENCOUNTER — TRANSCRIPTION ENCOUNTER (OUTPATIENT)
Age: 72
End: 2017-05-15

## 2017-05-15 LAB
ANION GAP SERPL CALC-SCNC: 3 MMOL/L — LOW (ref 5–17)
BASOPHILS # BLD AUTO: 0.1 K/UL — SIGNIFICANT CHANGE UP (ref 0–0.2)
BASOPHILS NFR BLD AUTO: 0.7 % — SIGNIFICANT CHANGE UP (ref 0–2)
BUN SERPL-MCNC: 8 MG/DL — SIGNIFICANT CHANGE UP (ref 7–23)
CALCIUM SERPL-MCNC: 7.7 MG/DL — LOW (ref 8.4–10.5)
CHLORIDE SERPL-SCNC: 106 MMOL/L — SIGNIFICANT CHANGE UP (ref 96–108)
CO2 SERPL-SCNC: 30 MMOL/L — SIGNIFICANT CHANGE UP (ref 22–31)
CREAT SERPL-MCNC: 0.76 MG/DL — SIGNIFICANT CHANGE UP (ref 0.5–1.3)
EOSINOPHIL # BLD AUTO: 0 K/UL — SIGNIFICANT CHANGE UP (ref 0–0.5)
EOSINOPHIL NFR BLD AUTO: 0 % — SIGNIFICANT CHANGE UP (ref 0–6)
GLUCOSE SERPL-MCNC: 146 MG/DL — HIGH (ref 70–99)
HCT VFR BLD CALC: 31.9 % — LOW (ref 34.5–45)
HGB BLD-MCNC: 10.9 G/DL — LOW (ref 11.5–15.5)
LYMPHOCYTES # BLD AUTO: 0.6 K/UL — LOW (ref 1–3.3)
LYMPHOCYTES # BLD AUTO: 7.2 % — LOW (ref 13–44)
MCHC RBC-ENTMCNC: 30.3 PG — SIGNIFICANT CHANGE UP (ref 27–34)
MCHC RBC-ENTMCNC: 34.3 GM/DL — SIGNIFICANT CHANGE UP (ref 32–36)
MCV RBC AUTO: 88.3 FL — SIGNIFICANT CHANGE UP (ref 80–100)
MONOCYTES # BLD AUTO: 1.1 K/UL — HIGH (ref 0–0.9)
MONOCYTES NFR BLD AUTO: 14.1 % — HIGH (ref 2–14)
NEUTROPHILS # BLD AUTO: 6 K/UL — SIGNIFICANT CHANGE UP (ref 1.8–7.4)
NEUTROPHILS NFR BLD AUTO: 78 % — HIGH (ref 43–77)
PLATELET # BLD AUTO: 176 K/UL — SIGNIFICANT CHANGE UP (ref 150–400)
POTASSIUM SERPL-MCNC: 3.7 MMOL/L — SIGNIFICANT CHANGE UP (ref 3.5–5.3)
POTASSIUM SERPL-SCNC: 3.7 MMOL/L — SIGNIFICANT CHANGE UP (ref 3.5–5.3)
RBC # BLD: 3.61 M/UL — LOW (ref 3.8–5.2)
RBC # FLD: 15.2 % — HIGH (ref 10.3–14.5)
SODIUM SERPL-SCNC: 139 MMOL/L — SIGNIFICANT CHANGE UP (ref 135–145)
WBC # BLD: 7.7 K/UL — SIGNIFICANT CHANGE UP (ref 3.8–10.5)
WBC # FLD AUTO: 7.7 K/UL — SIGNIFICANT CHANGE UP (ref 3.8–10.5)

## 2017-05-15 PROCEDURE — 99232 SBSQ HOSP IP/OBS MODERATE 35: CPT

## 2017-05-15 RX ORDER — CEFTRIAXONE 500 MG/1
1 INJECTION, POWDER, FOR SOLUTION INTRAMUSCULAR; INTRAVENOUS EVERY 24 HOURS
Qty: 0 | Refills: 0 | Status: DISCONTINUED | OUTPATIENT
Start: 2017-05-15 | End: 2017-05-17

## 2017-05-15 RX ORDER — LACTOBACILLUS ACIDOPHILUS 100MM CELL
1 CAPSULE ORAL
Qty: 0 | Refills: 0 | Status: DISCONTINUED | OUTPATIENT
Start: 2017-05-15 | End: 2017-05-17

## 2017-05-15 RX ORDER — PANTOPRAZOLE SODIUM 20 MG/1
40 TABLET, DELAYED RELEASE ORAL
Qty: 0 | Refills: 0 | Status: DISCONTINUED | OUTPATIENT
Start: 2017-05-15 | End: 2017-05-17

## 2017-05-15 RX ADMIN — OXYCODONE HYDROCHLORIDE 5 MILLIGRAM(S): 5 TABLET ORAL at 06:48

## 2017-05-15 RX ADMIN — OXYCODONE HYDROCHLORIDE 5 MILLIGRAM(S): 5 TABLET ORAL at 11:14

## 2017-05-15 RX ADMIN — SOLIFENACIN SUCCINATE 10 MILLIGRAM(S): 10 TABLET ORAL at 11:15

## 2017-05-15 RX ADMIN — ATORVASTATIN CALCIUM 20 MILLIGRAM(S): 80 TABLET, FILM COATED ORAL at 21:37

## 2017-05-15 RX ADMIN — SERTRALINE 50 MILLIGRAM(S): 25 TABLET, FILM COATED ORAL at 11:09

## 2017-05-15 RX ADMIN — CEFTRIAXONE 100 GRAM(S): 500 INJECTION, POWDER, FOR SOLUTION INTRAMUSCULAR; INTRAVENOUS at 21:37

## 2017-05-15 RX ADMIN — OXYCODONE HYDROCHLORIDE 5 MILLIGRAM(S): 5 TABLET ORAL at 16:43

## 2017-05-15 RX ADMIN — Medication 30 MILLILITER(S): at 18:17

## 2017-05-15 RX ADMIN — Medication 100 MILLIGRAM(S): at 05:56

## 2017-05-15 RX ADMIN — OXYCODONE HYDROCHLORIDE 5 MILLIGRAM(S): 5 TABLET ORAL at 17:13

## 2017-05-15 RX ADMIN — HEPARIN SODIUM 5000 UNIT(S): 5000 INJECTION INTRAVENOUS; SUBCUTANEOUS at 05:56

## 2017-05-15 RX ADMIN — Medication 5 MILLIGRAM(S): at 11:09

## 2017-05-15 RX ADMIN — PANTOPRAZOLE SODIUM 40 MILLIGRAM(S): 20 TABLET, DELAYED RELEASE ORAL at 13:42

## 2017-05-15 RX ADMIN — OXYCODONE HYDROCHLORIDE 5 MILLIGRAM(S): 5 TABLET ORAL at 11:44

## 2017-05-15 RX ADMIN — OXYCODONE HYDROCHLORIDE 5 MILLIGRAM(S): 5 TABLET ORAL at 07:18

## 2017-05-15 RX ADMIN — HEPARIN SODIUM 5000 UNIT(S): 5000 INJECTION INTRAVENOUS; SUBCUTANEOUS at 17:16

## 2017-05-15 NOTE — OCCUPATIONAL THERAPY INITIAL EVALUATION ADULT - TRANSFER SAFETY CONCERNS NOTED: BED/CHAIR, REHAB EVAL
losing balance/decreased safety awareness/decreased balance during turns/inability to maintain weight-bearing restrictions w/o assist

## 2017-05-15 NOTE — PROGRESS NOTE ADULT - SUBJECTIVE AND OBJECTIVE BOX
Pain well controlled.  Offers no other complaints      Constitutional: No fever, fatigue or weight loss.  Skin: No rash.  Eyes: No recent vision problems or eye pain.  ENT: No congestion, ear pain, or sore throat.  Endocrine: No thyroid problems.  Cardiovascular: No chest pain or palpation.  Respiratory: No cough, shortness of breath, congestion, or wheezing.  Gastrointestinal: No abdominal pain, nausea, vomiting, or diarrhea.  Genitourinary: No dysuria.  Musculoskeletal: No joint swelling.  Neurologic: No headache.    MEDICATIONS  (STANDING):  heparin  Injectable 5000Unit(s) SubCutaneous every 12 hours  atorvastatin 20milliGRAM(s) Oral at bedtime  sertraline 50milliGRAM(s) Oral daily  predniSONE   Tablet 5milliGRAM(s) Oral every other day  solifenacin 10milliGRAM(s) Oral daily  pantoprazole    Tablet 40milliGRAM(s) Oral before breakfast    MEDICATIONS  (PRN):  oxyCODONE IR 10milliGRAM(s) Oral every 4 hours PRN Moderate Pain  oxyCODONE IR 5milliGRAM(s) Oral every 4 hours PRN Mild Pain  HYDROmorphone  IVPB 0.5milliGRAM(s) IV Intermittent every 6 hours PRN Moderate Pain      ICU Vital Signs Last 24 Hrs  T(C): 36.8, Max: 37.7 (05-14 @ 12:00)  T(F): 98.3, Max: 99.8 (05-14 @ 12:00)  HR: 79 (71 - 95)  BP: 131/68 (106/66 - 147/49)  BP(mean): 68 (68 - 68)  ABP: --  ABP(mean): --  RR: 16 (16 - 22)  SpO2: 95% (91% - 98%)    PHYSICAL EXAM-  GENERAL: NAD  HEAD:  Atraumatic, Normocephalic  EYES: EOMI, PERRLA, conjunctiva and sclera clear  NECK: Supple, No JVD, Normal thyroid  NERVOUS SYSTEM:  Alert & Oriented X3, Motor Strength 5/5 B/L upper and lower extremities  CHEST/LUNG: Clear to percussion bilaterally; No rales, rhonchi, wheezing, or rubs  HEART: Regular rate and rhythm; No murmurs, rubs, or gallops  ABDOMEN: Soft, Nontender, Nondistended; Bowel sounds present  SKIN: No rashes or lesions                            10.9   7.7   )-----------( 176      ( 15 May 2017 06:54 )             31.9     05-15    139  |  106  |  8   ----------------------------<  146<H>  3.7   |  30  |  0.76    Ca    7.7<L>      15 May 2017 06:54    TPro  7.1  /  Alb  3.7  /  TBili  1.0  /  DBili  x   /  AST  24  /  ALT  28  /  AlkPhos  48  05-13    CARDIAC MARKERS ( 14 May 2017 05:46 )  .041 ng/mL / x     / x     / x     / x      CARDIAC MARKERS ( 13 May 2017 20:13 )  .013 ng/mL / x     / x     / x     / x      CARDIAC MARKERS ( 13 May 2017 19:19 )  .005 ng/mL / x     / x     / x     / x            Urinalysis Basic - ( 13 May 2017 20:41 )    Color: Yellow / Appearance: Clear / S.020 / pH: x  Gluc: x / Ketone: Small  / Bili: Negative / Urobili: Negative mg/dL   Blood: x / Protein: 30 mg/dL / Nitrite: Positive   Leuk Esterase: Moderate / RBC: 6-10 /HPF / WBC 11-25   Sq Epi: x / Non Sq Epi: Few / Bacteria: Many      PT/INR - ( 13 May 2017 15:37 )   PT: 11.8 sec;   INR: 1.08 ratio         PTT - ( 13 May 2017 15:37 )  PTT:24.6 sec

## 2017-05-15 NOTE — PROGRESS NOTE ADULT - SUBJECTIVE AND OBJECTIVE BOX
Chief Complaint: Ankle fracture    Interval Events: Underwent ankle surgery yesterday.    Review of Systems:  General: No fevers, chills, weight loss or gain  Skin: No rashes, color changes  Cardiovascular: No chest pain, orthopnea  Respiratory: No shortness of breath, cough  Gastrointestinal: No nausea, abdominal pain  Genitourinary: No incontinence, pain with urination  Musculoskeletal: No pain, swelling, decreased range of motion  Neurological: No headache, weakness  Psychiatric: No depression, anxiety  Endocrine: No weight loss or gain, increased thirst  All other systems are comprehensively negative.    Physical Exam:  Vital Signs Last 24 Hrs  T(C): 36.8, Max: 37.7 (05-14 @ 12:00)  T(F): 98.3, Max: 99.8 (05-14 @ 12:00)  HR: 79 (71 - 95)  BP: 131/68 (106/66 - 147/49)  BP(mean): 68 (68 - 74)  RR: 16 (16 - 23)  SpO2: 95% (91% - 98%)  General: NAD  Neck: No JVD, no carotid bruit  Lungs: CTAB  CV: RRR, nl S1/S2, no M/R/G  Abdomen: S/NT/ND, +BS  Extremities: No LE edema, no cyanosis    Labs:                        10.9   7.7   )-----------( 176      ( 15 May 2017 06:54 )             31.9     05-15    139  |  106  |  8   ----------------------------<  146<H>  3.7   |  30  |  0.76    Ca    7.7<L>      15 May 2017 06:54    TPro  7.1  /  Alb  3.7  /  TBili  1.0  /  DBili  x   /  AST  24  /  ALT  28  /  AlkPhos  48  05-13    CARDIAC MARKERS ( 14 May 2017 05:46 )  .041 ng/mL / x     / x     / x     / x      CARDIAC MARKERS ( 13 May 2017 20:13 )  .013 ng/mL / x     / x     / x     / x      CARDIAC MARKERS ( 13 May 2017 19:19 )  .005 ng/mL / x     / x     / x     / x          PT/INR - ( 13 May 2017 15:37 )   PT: 11.8 sec;   INR: 1.08 ratio         PTT - ( 13 May 2017 15:37 )  PTT:24.6 sec

## 2017-05-15 NOTE — OCCUPATIONAL THERAPY INITIAL EVALUATION ADULT - ADDITIONAL COMMENTS
Pt lives in a private house with 3 platform steps to enter and a flight to bedroom and full bathroom. + stall shower. Pt owns a RTS, SAC, RW, shower chair

## 2017-05-15 NOTE — ANESTHESIA FOLLOW-UP NOTE - NSEVALATION_GEN_ALL_CORE
No apparent complications or complaints reguarding anesthesia care at this time/All questions were answered

## 2017-05-15 NOTE — PHYSICAL THERAPY INITIAL EVALUATION ADULT - ADDITIONAL COMMENTS
Lives with spouse private house 3 platform steps to enter, 1 flight stairs inside with HR up to bedroom. Owns 2 x RW, raised toilet seat, shower chair.

## 2017-05-15 NOTE — PROGRESS NOTE ADULT - SUBJECTIVE AND OBJECTIVE BOX
Post Op Day # _1_    SUBJECTIVE    71 y.o. Female s/p _right ankle open reduction internal fixation _________  Patient is alert and comfortable.  Pain well controlled on current pain regimen.  Denies chest pain, shortness of breath, nausea, vomiting.  No complaints at this time.    OBJECTIVE    Vital Signs Last 24 Hrs  T(C): 36.8, Max: 37.7 (05-14 @ 12:00)  T(F): 98.3, Max: 99.8 (05-14 @ 12:00)  HR: 79 (71 - 95)  BP: 131/68 (106/66 - 147/49)  BP(mean): 68 (68 - 74)  RR: 16 (16 - 23)  SpO2: 95% (91% - 98%)  I&O's Summary    I & Os for current day (as of 15 May 2017 09:09)  =============================================  IN: 2312 ml / OUT: 150 ml / NET: 2162 ml      PHYSICAL EXAM    Right  lower extremity splint/dressing C/D/I  Toes warm, pink, and mobile  Capillary refill less than 3 sec.  Sensation grossly intact to light touch distally  2 finger breathes able to fit globally in cast/splint  wiggle toes , DP pulse appreciated  no holmon no cord on left  LABS                          10.9<L>  7.7   )-----------( 176      ( 15 May 2017 06:54 )             31.9<L>  15 May 2017 06:54                        11.5   8.4   )-----------( 178      ( 14 May 2017 05:46 )             36.7   14 May 2017 05:46    15 May 2017 06:54    139    |  106    |  8      ----------------------------<  146<H>  3.7     |  30     |  0.76   14 May 2017 05:46    135    |  100    |  12     ----------------------------<  128<H>  4.0     |  15<L>  |  0.80     Ca    7.7<L>      15 May 2017 06:54  Ca    7.4<L>      14 May 2017 05:46    TPro  7.1    /  Alb  3.7    /  TBili  1.0    /  DBili  x      /  AST  24     /  ALT  28     /  AlkPhos  48     13 May 2017 15:37      ASSESSMENT AND PLAN  - Orthopedically stable  - Pain management as needed  - DVT prophylaxis: Heparin  - Ice/ Elevation  - PT/OT: Non weight bearing on operative leg  - Encourage incentive spirometry  - Disposition: Home      Subacute Rehabilitation       Acute Rehabilition

## 2017-05-15 NOTE — PHYSICAL THERAPY INITIAL EVALUATION ADULT - ACTIVE RANGE OF MOTION EXAMINATION, REHAB EVAL
Right LE ankle immobilized, hip and knee WNL/LLE Active ROM was WNL (within normal limits)/machelle. upper extremity Active ROM was WNL (within normal limits)/deficits as listed below

## 2017-05-15 NOTE — OCCUPATIONAL THERAPY INITIAL EVALUATION ADULT - NS ASR FOLLOW COMMAND OT EVAL
100% of the time/Pt educated regarding fall prevention in hospital and recommendation to use call bell/ask for assistance with all ADL's/transfers etc.

## 2017-05-15 NOTE — PHYSICAL THERAPY INITIAL EVALUATION ADULT - GAIT TRAINING, PT EVAL
Ambulates 25 feet with RW NWB right LE with min asssist x 2 in 2-3 days Ambulates 25 feet with RW NWB right LE with min assist x 2 in 2-3 days

## 2017-05-15 NOTE — OCCUPATIONAL THERAPY INITIAL EVALUATION ADULT - TRANSFER SAFETY CONCERNS NOTED: SIT/STAND, REHAB EVAL
decreased safety awareness/decreased balance during turns/inability to maintain weight-bearing restrictions w/o assist/losing balance

## 2017-05-15 NOTE — PROGRESS NOTE ADULT - SUBJECTIVE AND OBJECTIVE BOX
Neurology Follow up note    VINCENT ENGLAND 71y Female    HPI:  71 year old female fell in the shower but does not know how.  Denies dizziness, headache or LOC.  Denies CP or SOB.   PMH of seizure, CVA.   Immediately after fall she was incontinent of stool.  Transported to [facility] ED where xray showed fracture dislocation of the right ankle.  This was reduced in the ED and a slpint was applied. (13 May 2017 15:50).      Interval History -    Patient is seen, chart was reviewed and case was discussed with the treatment team.  Sitting on chair bed comfortably.   S/p Right ankle sx yesterday.    Vital Signs Last 24 Hrs  T(C): 36.8, Max: 37.6 (05-14 @ 17:25)  T(F): 98.2, Max: 99.6 (05-14 @ 17:25)  HR: 63 (63 - 95)  BP: 114/66 (106/66 - 147/49)  BP(mean): --  RR: 16 (16 - 20)  SpO2: 91% (91% - 98%)    REVIEW OF SYSTEMS:    CONSTITUTIONAL: No fever  EYES: No eye pain,   ENMT:  No sinus or throat pain  NECK: No pain or stiffness  RESPIRATORY: No cough, No hemoptysis; No shortness of breath  CARDIOVASCULAR: No acute chest pain, palpitations,  or leg swelling  GASTROINTESTINAL: No abdominal pain. No nausea, vomiting, or hematemesis;  No melena or hematochezia.  GENITOURINARY: No  hematuria, or incontinence  MUSCULOSKELETAL: Right ankle Fracture.  SKIN: No itching, rashes, or lesions   LYMPH NODES: No enlarged glands  NEUROLOGICAL: NH/o CVA. Vasculitis? Seizures - last seizure 4 yrs ago. Not on Anti seizure meds.  PSYCHIATRIC: No depression, anxiety, mood swings, or difficulty sleeping  ENDOCRINE: No heat or cold intolerance;   HEME/LYMPH: No easy bruising, or bleeding gums  Allergy/Immunology. No medication allergy. No seasonal allergies.    PHYSICAL EXAM:  Vital Signs Last 24 Hrs  T(F): 99.6  HR: 79  BP: 128/50  RR: 23  Wt(kg): --    GENERAL: NAD, well-groomed, well-developed  HEAD:  Atraumatic, Normocephalic  EYES: EOMI, PERRLA, conjunctiva and sclera clear  NECK: Supple, No JVD, thyroid non-palpable    On Neurological Examination:    Mental Status - Pt is alert, awake, oriented X3. Higher functions are intact. Follows commands well and able to answer questions appropriately.    Speech -  Normal. Pt has no aphasia.    Cranial Nerves - Pupils 3 mm equal and reactive to light, extraocular eye movements intact. Pt has no visual field deficit.  Pt has no facial asymmetry. Tongue - is in midline.    Motor Exam - 4 plus/5 all over, No drift. No shaking or tremors.  Muscle tone - is normal all over. Moves all extremities equally. No asymmetry is seen.      Sensory Exam - Pin prick, temperature, joint position and vibration are intact on either side. Pt withdraws all extremities equally on stimulation. No asymmetry seen. No complaints of tingling, numbness.    Gait - Right ankle Fx s/p Sx. Plaster is in place. Gait is not tested.    Deep tendon Reflexes - 2 plus all over.    Coordination - Fine finger movements are normal on both sides. Finger to nose is also normal on both sides.       Romberg - Negative.    Neck Supple -  Yes.    MEDICATIONS    heparin  Injectable 5000Unit(s) SubCutaneous every 12 hours  oxyCODONE IR 10milliGRAM(s) Oral every 4 hours PRN  oxyCODONE IR 5milliGRAM(s) Oral every 4 hours PRN  HYDROmorphone  IVPB 0.5milliGRAM(s) IV Intermittent every 6 hours PRN  atorvastatin 20milliGRAM(s) Oral at bedtime  sertraline 50milliGRAM(s) Oral daily  predniSONE   Tablet 5milliGRAM(s) Oral every other day  solifenacin 10milliGRAM(s) Oral daily  pantoprazole    Tablet 40milliGRAM(s) Oral before breakfast      Allergies    No Known Allergies          LABS:    CBC Full  -  ( 15 May 2017 06:54 )  WBC Count : 7.7 K/uL  Hemoglobin : 10.9 g/dL  Hematocrit : 31.9 %  Platelet Count - Automated : 176 K/uL  Mean Cell Volume : 88.3 fl  Mean Cell Hemoglobin : 30.3 pg  Mean Cell Hemoglobin Concentration : 34.3 gm/dL  Auto Neutrophil # : 6.0 K/uL  Auto Lymphocyte # : 0.6 K/uL  Auto Monocyte # : 1.1 K/uL  Auto Eosinophil # : 0.0 K/uL  Auto Basophil # : 0.1 K/uL  Auto Neutrophil % : 78.0 %  Auto Lymphocyte % : 7.2 %  Auto Monocyte % : 14.1 %  Auto Eosinophil % : 0.0 %  Auto Basophil % : 0.7 %    Urinalysis Basic - ( 13 May 2017 20:41 )    Color: Yellow / Appearance: Clear / S.020 / pH: x  Gluc: x / Ketone: Small  / Bili: Negative / Urobili: Negative mg/dL   Blood: x / Protein: 30 mg/dL / Nitrite: Positive   Leuk Esterase: Moderate / RBC: 6-10 /HPF / WBC 11-25   Sq Epi: x / Non Sq Epi: Few / Bacteria: Many      05-15    139  |  106  |  8   ----------------------------<  146<H>  3.7   |  30  |  0.76    Ca    7.7<L>      15 May 2017 06:54    TPro  7.1  /  Alb  3.7  /  TBili  1.0  /  DBili  x   /  AST  24  /  ALT  28  /  AlkPhos  48  -      ASSESSMENT AND PLAN:          Advanced care planning was discussed with family. Pt is full code.  Pain is accessed and addressed. Pt has no pain currently.  Pt was screened for signs of clinical depression. No signs of depression.  Risk of falls accessed. Fall prevention and plan of care is in place.  Pt is screened for tobacco and alcohol use. Counseling was done for smoking and alcohol cessation. Pt doesn't smoke or drink.  Use of narcotic pain meds was discussed. Pt is advised to use narcotic meds wisely and to refrain from over using them.

## 2017-05-15 NOTE — PROGRESS NOTE ADULT - SUBJECTIVE AND OBJECTIVE BOX
PAtient seen and examined. pain in control.  Planning for rehab tomorrow.    NAD  RLE: cast in place  Able to wiggle toes  SILT      A/P 71F s/p trimalleolar ankle fracture ORIF  -non weight bearing RLE  -PT/OT  -pain control  -Discharge planning

## 2017-05-16 ENCOUNTER — TRANSCRIPTION ENCOUNTER (OUTPATIENT)
Age: 72
End: 2017-05-16

## 2017-05-16 DIAGNOSIS — N39.0 URINARY TRACT INFECTION, SITE NOT SPECIFIED: ICD-10-CM

## 2017-05-16 LAB
-  AMIKACIN: SIGNIFICANT CHANGE UP
-  AMPICILLIN/SULBACTAM: SIGNIFICANT CHANGE UP
-  AMPICILLIN: SIGNIFICANT CHANGE UP
-  AZTREONAM: SIGNIFICANT CHANGE UP
-  CEFAZOLIN: SIGNIFICANT CHANGE UP
-  CEFEPIME: SIGNIFICANT CHANGE UP
-  CEFOXITIN: SIGNIFICANT CHANGE UP
-  CEFTAZIDIME: SIGNIFICANT CHANGE UP
-  CEFTRIAXONE: SIGNIFICANT CHANGE UP
-  CIPROFLOXACIN: SIGNIFICANT CHANGE UP
-  ERTAPENEM: SIGNIFICANT CHANGE UP
-  GENTAMICIN: SIGNIFICANT CHANGE UP
-  IMIPENEM: SIGNIFICANT CHANGE UP
-  LEVOFLOXACIN: SIGNIFICANT CHANGE UP
-  MEROPENEM: SIGNIFICANT CHANGE UP
-  NITROFURANTOIN: SIGNIFICANT CHANGE UP
-  PIPERACILLIN/TAZOBACTAM: SIGNIFICANT CHANGE UP
-  TOBRAMYCIN: SIGNIFICANT CHANGE UP
-  TRIMETHOPRIM/SULFAMETHOXAZOLE: SIGNIFICANT CHANGE UP
ANION GAP SERPL CALC-SCNC: 6 MMOL/L — SIGNIFICANT CHANGE UP (ref 5–17)
BUN SERPL-MCNC: 14 MG/DL — SIGNIFICANT CHANGE UP (ref 7–23)
C DIFF BY PCR RESULT: SIGNIFICANT CHANGE UP
C DIFF TOX GENS STL QL NAA+PROBE: SIGNIFICANT CHANGE UP
CALCIUM SERPL-MCNC: 8 MG/DL — LOW (ref 8.4–10.5)
CHLORIDE SERPL-SCNC: 103 MMOL/L — SIGNIFICANT CHANGE UP (ref 96–108)
CO2 SERPL-SCNC: 32 MMOL/L — HIGH (ref 22–31)
CREAT SERPL-MCNC: 0.67 MG/DL — SIGNIFICANT CHANGE UP (ref 0.5–1.3)
CULTURE RESULTS: SIGNIFICANT CHANGE UP
CULTURE RESULTS: SIGNIFICANT CHANGE UP
GLUCOSE SERPL-MCNC: 99 MG/DL — SIGNIFICANT CHANGE UP (ref 70–99)
HCT VFR BLD CALC: 35.3 % — SIGNIFICANT CHANGE UP (ref 34.5–45)
HGB BLD-MCNC: 11.3 G/DL — LOW (ref 11.5–15.5)
MCHC RBC-ENTMCNC: 28.7 PG — SIGNIFICANT CHANGE UP (ref 27–34)
MCHC RBC-ENTMCNC: 32 GM/DL — SIGNIFICANT CHANGE UP (ref 32–36)
MCV RBC AUTO: 89.9 FL — SIGNIFICANT CHANGE UP (ref 80–100)
METHOD TYPE: SIGNIFICANT CHANGE UP
ORGANISM # SPEC MICROSCOPIC CNT: SIGNIFICANT CHANGE UP
ORGANISM # SPEC MICROSCOPIC CNT: SIGNIFICANT CHANGE UP
PLATELET # BLD AUTO: 225 K/UL — SIGNIFICANT CHANGE UP (ref 150–400)
POTASSIUM SERPL-MCNC: 3.3 MMOL/L — LOW (ref 3.5–5.3)
POTASSIUM SERPL-SCNC: 3.3 MMOL/L — LOW (ref 3.5–5.3)
RBC # BLD: 3.93 M/UL — SIGNIFICANT CHANGE UP (ref 3.8–5.2)
RBC # FLD: 14.4 % — SIGNIFICANT CHANGE UP (ref 10.3–14.5)
SODIUM SERPL-SCNC: 141 MMOL/L — SIGNIFICANT CHANGE UP (ref 135–145)
SPECIMEN SOURCE: SIGNIFICANT CHANGE UP
SPECIMEN SOURCE: SIGNIFICANT CHANGE UP
WBC # BLD: 7.8 K/UL — SIGNIFICANT CHANGE UP (ref 3.8–10.5)
WBC # FLD AUTO: 7.8 K/UL — SIGNIFICANT CHANGE UP (ref 3.8–10.5)

## 2017-05-16 PROCEDURE — 99232 SBSQ HOSP IP/OBS MODERATE 35: CPT

## 2017-05-16 RX ORDER — LOPERAMIDE HCL 2 MG
1 TABLET ORAL EVERY 8 HOURS
Qty: 0 | Refills: 0 | Status: DISCONTINUED | OUTPATIENT
Start: 2017-05-16 | End: 2017-05-16

## 2017-05-16 RX ORDER — LACTOBACILLUS ACIDOPHILUS 100MM CELL
1 CAPSULE ORAL
Qty: 0 | Refills: 0 | COMMUNITY
Start: 2017-05-16

## 2017-05-16 RX ORDER — OXYCODONE HYDROCHLORIDE 5 MG/1
1 TABLET ORAL
Qty: 0 | Refills: 0 | COMMUNITY
Start: 2017-05-16

## 2017-05-16 RX ORDER — HEPARIN SODIUM 5000 [USP'U]/ML
5000 INJECTION INTRAVENOUS; SUBCUTANEOUS
Qty: 0 | Refills: 0 | COMMUNITY
Start: 2017-05-16

## 2017-05-16 RX ORDER — POTASSIUM CHLORIDE 20 MEQ
20 PACKET (EA) ORAL EVERY 4 HOURS
Qty: 0 | Refills: 0 | Status: DISCONTINUED | OUTPATIENT
Start: 2017-05-16 | End: 2017-05-16

## 2017-05-16 RX ORDER — POTASSIUM CHLORIDE 20 MEQ
40 PACKET (EA) ORAL ONCE
Qty: 0 | Refills: 0 | Status: COMPLETED | OUTPATIENT
Start: 2017-05-16 | End: 2017-05-16

## 2017-05-16 RX ADMIN — OXYCODONE HYDROCHLORIDE 5 MILLIGRAM(S): 5 TABLET ORAL at 19:15

## 2017-05-16 RX ADMIN — SERTRALINE 50 MILLIGRAM(S): 25 TABLET, FILM COATED ORAL at 11:52

## 2017-05-16 RX ADMIN — Medication 40 MILLIEQUIVALENT(S): at 11:53

## 2017-05-16 RX ADMIN — SOLIFENACIN SUCCINATE 10 MILLIGRAM(S): 10 TABLET ORAL at 11:53

## 2017-05-16 RX ADMIN — Medication 1 TABLET(S): at 08:40

## 2017-05-16 RX ADMIN — PANTOPRAZOLE SODIUM 40 MILLIGRAM(S): 20 TABLET, DELAYED RELEASE ORAL at 06:44

## 2017-05-16 RX ADMIN — HEPARIN SODIUM 5000 UNIT(S): 5000 INJECTION INTRAVENOUS; SUBCUTANEOUS at 17:54

## 2017-05-16 RX ADMIN — ATORVASTATIN CALCIUM 20 MILLIGRAM(S): 80 TABLET, FILM COATED ORAL at 21:18

## 2017-05-16 RX ADMIN — CEFTRIAXONE 100 GRAM(S): 500 INJECTION, POWDER, FOR SOLUTION INTRAMUSCULAR; INTRAVENOUS at 21:18

## 2017-05-16 RX ADMIN — OXYCODONE HYDROCHLORIDE 5 MILLIGRAM(S): 5 TABLET ORAL at 14:20

## 2017-05-16 RX ADMIN — HEPARIN SODIUM 5000 UNIT(S): 5000 INJECTION INTRAVENOUS; SUBCUTANEOUS at 06:44

## 2017-05-16 RX ADMIN — OXYCODONE HYDROCHLORIDE 5 MILLIGRAM(S): 5 TABLET ORAL at 13:50

## 2017-05-16 RX ADMIN — Medication 1 TABLET(S): at 11:52

## 2017-05-16 RX ADMIN — OXYCODONE HYDROCHLORIDE 5 MILLIGRAM(S): 5 TABLET ORAL at 18:41

## 2017-05-16 RX ADMIN — Medication 1 TABLET(S): at 17:54

## 2017-05-16 RX ADMIN — OXYCODONE HYDROCHLORIDE 5 MILLIGRAM(S): 5 TABLET ORAL at 06:53

## 2017-05-16 RX ADMIN — OXYCODONE HYDROCHLORIDE 5 MILLIGRAM(S): 5 TABLET ORAL at 07:23

## 2017-05-16 NOTE — DISCHARGE NOTE ADULT - PATIENT PORTAL LINK FT
“You can access the Follow[employer] Patient Portal, offered by [facility], by registering with the following website: [email or web address]”

## 2017-05-16 NOTE — DISCHARGE NOTE ADULT - MEDICATION SUMMARY - MEDICATIONS TO TAKE
I will START or STAY ON the medications listed below when I get home from the hospital:    predniSONE 5 mg oral delayed release tablet  -- 1 tab(s) by mouth every other day  -- Indication: For Vasculitis    oxyCODONE 5 mg oral tablet  -- 1 tab(s) by mouth every 4 hours, As needed, Mild Pain  -- Indication: For Ankle fracture, right, closed, initial encounter    oxyCODONE 10 mg oral tablet  -- 1 tab(s) by mouth every 4 hours, As needed, Moderate Pain  -- Indication: For Ankle fracture, right, closed, initial encounter    heparin  -- 5000 milligram(s) subcutaneous 2 times a day  -- Indication: For Dvt proph    Zoloft 50 mg oral tablet  -- 1 tab(s) by mouth once a day (at bedtime)  -- Indication: For Anxiety    Lipitor 20 mg oral tablet  -- 1 tab(s) by mouth once a day  -- Indication: For hld    Ceftin 500 mg oral tablet  -- 1 tab(s) by mouth every 12 hours  -- Indication: For UTI (urinary tract infection)    lactobacillus acidophilus oral capsule  -- 1 tab(s) by mouth 3 times a day  -- Indication: For UTI (urinary tract infection)    Dexilant 30 mg oral delayed release capsule  -- 1 cap(s) by mouth once a day  -- Indication: For gerd    VESIcare 10 mg oral tablet  -- 1 tab(s) by mouth once a day  -- Indication: For Overactive bladder    biotin  -- 1 tab(s) by mouth once a day  -- Indication: For Vit    Vitamin D3 5000 intl units oral capsule  -- 1 cap(s) by mouth once a day  -- Indication: For Vit

## 2017-05-16 NOTE — DISCHARGE NOTE ADULT - CARE PROVIDER_API CALL
Michael Tellez (MD), Surgery  [address] [place] [address]  Phone: [phone]  Fax: [phone]

## 2017-05-16 NOTE — DISCHARGE NOTE ADULT - PLAN OF CARE
continue with pain medications, and PT dash diet continue with po antibiotic Right ankle ORIF NON weight bearing RLE until seen in the office  Keep Cast clean and dry  Elevation   Wigle toes throughout the day

## 2017-05-16 NOTE — PROGRESS NOTE ADULT - SUBJECTIVE AND OBJECTIVE BOX
Pain well controlled.  Diarrhea noticed X1 episode with loose stool afebrile.  Offers no other complaints      Constitutional: No fever, fatigue or weight loss.  Skin: No rash.  Eyes: No recent vision problems or eye pain.  ENT: No congestion, ear pain, or sore throat.  Endocrine: No thyroid problems.  Cardiovascular: No chest pain or palpation.  Respiratory: No cough, shortness of breath, congestion, or wheezing.  Gastrointestinal: No abdominal pain, nausea, vomiting  Genitourinary: No dysuria.  Musculoskeletal: No joint swelling.  Neurologic: No headache.      Vital Signs Last 24 Hrs  T(C): 36.8, Max: 37 (05-15 @ 17:47)  T(F): 98.3, Max: 98.6 (05-15 @ 17:47)  HR: 74 (72 - 82)  BP: 122/62 (105/70 - 130/68)  BP(mean): --  RR: 16 (16 - 17)  SpO2: 97% (90% - 97%)        PHYSICAL EXAM-  GENERAL: NAD, well-groomed, well-developed  HEAD:  Atraumatic, Normocephalic  NECK: Supple, No JVD, Normal thyroid  NERVOUS SYSTEM:  Alert & Oriented X3, Motor Strength 5/5 B/L upper and lower extremities; DTRs 2+ intact and symmetric  CHEST/LUNG: Clear to percussion bilaterally; No rales, rhonchi, wheezing, or rubs  HEART: Regular rate and rhythm; No murmurs, rubs, or gallops  ABDOMEN: Soft, Nontender, Nondistended; Bowel sounds present                              10.9   7.7   )-----------( 176      ( 15 May 2017 06:54 )             31.9     05-16    141  |  103  |  14  ----------------------------<  99  3.3<L>   |  32<H>  |  0.67    Ca    8.0<L>      16 May 2017 07:08              MEDICATIONS  (STANDING):  heparin  Injectable 5000Unit(s) SubCutaneous every 12 hours  atorvastatin 20milliGRAM(s) Oral at bedtime  sertraline 50milliGRAM(s) Oral daily  predniSONE   Tablet 5milliGRAM(s) Oral every other day  solifenacin 10milliGRAM(s) Oral daily  pantoprazole    Tablet 40milliGRAM(s) Oral before breakfast  cefTRIAXone   IVPB 1Gram(s) IV Intermittent every 24 hours  lactobacillus acidophilus 1Tablet(s) Oral three times a day with meals  potassium chloride   Solution 40milliEquivalent(s) Oral once    MEDICATIONS  (PRN):  oxyCODONE IR 10milliGRAM(s) Oral every 4 hours PRN Moderate Pain  oxyCODONE IR 5milliGRAM(s) Oral every 4 hours PRN Mild Pain  HYDROmorphone  IVPB 0.5milliGRAM(s) IV Intermittent every 6 hours PRN Moderate Pain  aluminum hydroxide/magnesium hydroxide/simethicone Suspension 30milliLiter(s) Oral every 4 hours PRN Dyspepsia

## 2017-05-16 NOTE — PROGRESS NOTE ADULT - SUBJECTIVE AND OBJECTIVE BOX
05-16    141  |  103  |  14  ----------------------------<  99  3.3<L>   |  32<H>  |  0.67    Ca    8.0<L>      16 May 2017 07:08    Chief Complaint: Ankle fracture    Interval Events: Diarrhea overnight.    Review of Systems:  General: No fevers, chills, weight loss or gain  Skin: No rashes, color changes  Cardiovascular: No chest pain, orthopnea  Respiratory: No shortness of breath, cough  Gastrointestinal: No nausea, abdominal pain  Genitourinary: No incontinence, pain with urination  Musculoskeletal: No pain, swelling, decreased range of motion  Neurological: No headache, weakness  Psychiatric: No depression, anxiety  Endocrine: No weight loss or gain, increased thirst  All other systems are comprehensively negative.    Physical Exam:  Vital Signs Last 24 Hrs  T(C): 36.7, Max: 37 (05-15 @ 17:47)  T(F): 98.1, Max: 98.6 (05-15 @ 17:47)  HR: 72 (63 - 82)  BP: 130/68 (105/70 - 130/68)  BP(mean): --  RR: 16 (16 - 17)  SpO2: 91% (90% - 95%)  General: NAD  Neck: No JVD, no carotid bruit  Lungs: CTAB  CV: RRR, nl S1/S2, no M/R/G  Abdomen: S/NT/ND, +BS  Extremities: No LE edema, no cyanosis    Labs:

## 2017-05-16 NOTE — DISCHARGE NOTE ADULT - HOSPITAL COURSE
Patient is 72 yo female with hx of Seizure, CVA, and vasculitis presenting with   1. Ankle fracture, right, closed, initial encounter.  Continue with pain management, DVT proph, and wound care as per ortho.  PT/OT.   2. Near syncope.  Plan: Probably due to vasovagal, and dehydration.  Resolved with hydration.  3. Diarrhea.  Plan: Stool culture negative.  C-diff pending.   4. UTI (urinary tract infection).  UC shows Ecoli.  Continue with Rocephin pending final culture results.     Hospital course and discharge planning were discussed with patient in great details.  All questions were answered.  Seems to understand, and in agreement Patient is 72 yo female with hx of Seizure, CVA, and vasculitis presenting with   1. Ankle fracture, right, closed, initial encounter.  Continue with pain management, DVT proph, and wound care as per ortho.  PT/OT.   2. Near syncope.  Plan: Probably due to vasovagal, and dehydration.  Resolved with hydration.  3. Diarrhea.  Plan: Stool culture negative.  C-diff pending.   4. UTI (urinary tract infection).  UC shows Ecoli.  Will switch patient to ceftin    Hospital course and discharge planning were discussed with patient in great details.  All questions were answered.  Seems to understand, and in agreement

## 2017-05-16 NOTE — PROGRESS NOTE ADULT - SUBJECTIVE AND OBJECTIVE BOX
POST OPERATIVE DAY #: 2    71y Female  s/p    Right    ankle fracture ORIF    SUBJECTIVE: Patient seen and examined at bedside.     Pain:  well controlled        Pain scale:2   /10  Denies CP, SOB, N/V, weakness, numbness   c/o having watery stools couple of time last night and this morning, no severe abdominal pain, passing gas    OBJECTIVE:     Vital Signs Last 24 Hrs  T(C): 36.8, Max: 37 (05-15 @ 17:47)  T(F): 98.3, Max: 98.6 (05-15 @ 17:47)  HR: 74 (72 - 82)  BP: 122/62 (105/70 - 130/68)  BP(mean): --  RR: 16 (16 - 17)  SpO2: 97% (90% - 97%)    Affected extremity: RLE         Cast : clean/dry/intact                         Sensation: intact to light touch          Motor exam:  moving toes well         warm, well-perfused; capillary refill < 3 seconds         negative calf tenderness B/L LE  LABS:                        10.9   7.7   )-----------( 176      ( 15 May 2017 06:54 )             31.9     05-16    141  |  103  |  14  ----------------------------<  99  3.3<L>   |  32<H>  |  0.67    Ca    8.0<L>      16 May 2017 07:08          MEDICATIONS:  Infection prophylaxis:  cefTRIAXone   IVPB 1Gram(s) IV Intermittent every 24 hours     Pain management:  oxyCODONE IR 10milliGRAM(s) Oral every 4 hours PRN  oxyCODONE IR 5milliGRAM(s) Oral every 4 hours PRN  HYDROmorphone  IVPB 0.5milliGRAM(s) IV Intermittent every 6 hours PRN  sertraline 50milliGRAM(s) Oral daily    DVT prophylaxis:   heparin  Injectable 5000Unit(s) SubCutaneous every 12 hours      RADIOLOGY & ADDITIONAL STUDIES:    ASSESSMENT AND PLAN:     - Analgesic pain control  - DVT prophylaxis: SQ Heparin SCDs       - Antibiotics:  CTX for UTI  - PT/OT: Weight Bearing Status:  Non weght bearing, OOBTC       -  Incentive spirometry  - RICE diet/ drink a lot of fluids   - Hospitalist is following  -  Follow up labs  - Hypokalemia: PO supplement   -  Disposition:  Subacute Rehab

## 2017-05-16 NOTE — DISCHARGE NOTE ADULT - CARE PLAN
Principal Discharge DX:	Ankle fracture, right, closed, initial encounter  Goal:	continue with pain medications, and PT  Instructions for follow-up, activity and diet:	dash diet  Secondary Diagnosis:	UTI (urinary tract infection)  Goal:	continue with po antibiotic Principal Discharge DX:	Ankle fracture, right, closed, initial encounter  Goal:	continue with pain medications, and PT  Instructions for follow-up, activity and diet:	dash diet  Secondary Diagnosis:	UTI (urinary tract infection)  Goal:	continue with po antibiotic  Goal:	Right ankle ORIF  Instructions for follow-up, activity and diet:	NON weight bearing RLE until seen in the office  Keep Cast clean and dry  Elevation   Wigle toes throughout the day

## 2017-05-16 NOTE — PROGRESS NOTE ADULT - SUBJECTIVE AND OBJECTIVE BOX
Neurology Follow up note    VINCENT ENGLAND 71y Female    HPI:  71 year old female fell in the shower but does not know how.  Denies dizziness, headache or LOC.  Denies CP or SOB.   PMH of seizure, CVA.   Immediately after fall she was incontinent of stool.  Transported to [facility] ED where xray showed fracture dislocation of the right ankle.  This was reduced in the ED and a slpint was applied. (13 May 2017 15:50)    Interval History -    Patient is seen, chart was reviewed and case was discussed with the treatment team.  Pt is not in any distress.   Lying on bed comfortably.     is at bedside.    Vital Signs Last 24 Hrs  T(C): 36.9, Max: 37 (05-15 @ 17:47)  T(F): 98.4, Max: 98.6 (05-15 @ 17:47)  HR: 90 (72 - 90)  BP: 128/58 (105/70 - 130/68)  BP(mean): --  RR: 16 (16 - 17)  SpO2: 98% (90% - 98%)    REVIEW OF SYSTEMS:    CONSTITUTIONAL: No fever  EYES: No eye pain,   ENMT:  No sinus or throat pain  NECK: No pain or stiffness  RESPIRATORY: No cough, No hemoptysis; No shortness of breath  CARDIOVASCULAR: No acute chest pain, palpitations,  or leg swelling  GASTROINTESTINAL: No abdominal pain. No nausea, vomiting, or hematemesis;  No melena or hematochezia.  GENITOURINARY: No  hematuria, or incontinence  MUSCULOSKELETAL: Right ankle Fracture.  SKIN: No itching, rashes, or lesions   LYMPH NODES: No enlarged glands  NEUROLOGICAL: NH/o CVA. Vasculitis? Seizures - last seizure 4 yrs ago. Not on Anti seizure meds.  PSYCHIATRIC: No depression, anxiety, mood swings, or difficulty sleeping  ENDOCRINE: No heat or cold intolerance;   HEME/LYMPH: No easy bruising, or bleeding gums  Allergy/Immunology. No medication allergy. No seasonal allergies.    PHYSICAL EXAM:  Vital Signs Last 24 Hrs  T(F): 99.6  HR: 79  BP: 128/50  RR: 23  Wt(kg): --    GENERAL: NAD, well-groomed, well-developed  HEAD:  Atraumatic, Normocephalic  EYES: EOMI, PERRLA, conjunctiva and sclera clear  NECK: Supple, No JVD, thyroid non-palpable    On Neurological Examination:    Mental Status - Pt is alert, awake, oriented X3. Higher functions are intact. Follows commands well and able to answer questions appropriately.    Speech -  Normal. Pt has no aphasia.    Cranial Nerves - Pupils 3 mm equal and reactive to light, extraocular eye movements intact. Pt has no visual field deficit.  Pt has no facial asymmetry. Tongue - is in midline.    Motor Exam - 4 plus/5 all over, No drift. No shaking or tremors.  Muscle tone - is normal all over. Moves all extremities equally. No asymmetry is seen.      Sensory Exam - Pin prick, temperature, joint position and vibration are intact on either side. Pt withdraws all extremities equally on stimulation. No asymmetry seen. No complaints of tingling, numbness.    Gait - Right ankle Fx s/p Sx. Plaster is in place. Gait is not tested.    Deep tendon Reflexes - 2 plus all over.    Coordination - Fine finger movements are normal on both sides. Finger to nose is also normal on both sides.       Neck Supple -  Yes.    MEDICATIONS    heparin  Injectable 5000Unit(s) SubCutaneous every 12 hours  oxyCODONE IR 10milliGRAM(s) Oral every 4 hours PRN  oxyCODONE IR 5milliGRAM(s) Oral every 4 hours PRN  HYDROmorphone  IVPB 0.5milliGRAM(s) IV Intermittent every 6 hours PRN  atorvastatin 20milliGRAM(s) Oral at bedtime  sertraline 50milliGRAM(s) Oral daily  predniSONE   Tablet 5milliGRAM(s) Oral every other day  solifenacin 10milliGRAM(s) Oral daily  pantoprazole    Tablet 40milliGRAM(s) Oral before breakfast  aluminum hydroxide/magnesium hydroxide/simethicone Suspension 30milliLiter(s) Oral every 4 hours PRN  cefTRIAXone   IVPB 1Gram(s) IV Intermittent every 24 hours  lactobacillus acidophilus 1Tablet(s) Oral three times a day with meals    Allergies    No Known Allergies      LABS:    CBC Full  -  ( 15 May 2017 06:54 )  WBC Count : 7.7 K/uL  Hemoglobin : 10.9 g/dL  Hematocrit : 31.9 %  Platelet Count - Automated : 176 K/uL  Mean Cell Volume : 88.3 fl  Mean Cell Hemoglobin : 30.3 pg  Mean Cell Hemoglobin Concentration : 34.3 gm/dL  Auto Neutrophil # : 6.0 K/uL  Auto Lymphocyte # : 0.6 K/uL  Auto Monocyte # : 1.1 K/uL  Auto Eosinophil # : 0.0 K/uL  Auto Basophil # : 0.1 K/uL  Auto Neutrophil % : 78.0 %  Auto Lymphocyte % : 7.2 %  Auto Monocyte % : 14.1 %  Auto Eosinophil % : 0.0 %  Auto Basophil % : 0.7 %      05-16    141  |  103  |  14  ----------------------------<  99  3.3<L>   |  32<H>  |  0.67    Ca    8.0<L>      16 May 2017 07:08

## 2017-05-17 VITALS
RESPIRATION RATE: 18 BRPM | DIASTOLIC BLOOD PRESSURE: 63 MMHG | TEMPERATURE: 98 F | OXYGEN SATURATION: 97 % | HEART RATE: 75 BPM | SYSTOLIC BLOOD PRESSURE: 104 MMHG

## 2017-05-17 LAB
ANION GAP SERPL CALC-SCNC: 8 MMOL/L — SIGNIFICANT CHANGE UP (ref 5–17)
BUN SERPL-MCNC: 13 MG/DL — SIGNIFICANT CHANGE UP (ref 7–23)
CALCIUM SERPL-MCNC: 7.9 MG/DL — LOW (ref 8.4–10.5)
CHLORIDE SERPL-SCNC: 100 MMOL/L — SIGNIFICANT CHANGE UP (ref 96–108)
CO2 SERPL-SCNC: 30 MMOL/L — SIGNIFICANT CHANGE UP (ref 22–31)
CREAT SERPL-MCNC: 0.68 MG/DL — SIGNIFICANT CHANGE UP (ref 0.5–1.3)
CULTURE RESULTS: SIGNIFICANT CHANGE UP
GLUCOSE SERPL-MCNC: 99 MG/DL — SIGNIFICANT CHANGE UP (ref 70–99)
HCT VFR BLD CALC: 35.8 % — SIGNIFICANT CHANGE UP (ref 34.5–45)
HGB BLD-MCNC: 11.9 G/DL — SIGNIFICANT CHANGE UP (ref 11.5–15.5)
MCHC RBC-ENTMCNC: 29.2 PG — SIGNIFICANT CHANGE UP (ref 27–34)
MCHC RBC-ENTMCNC: 33.4 GM/DL — SIGNIFICANT CHANGE UP (ref 32–36)
MCV RBC AUTO: 87.6 FL — SIGNIFICANT CHANGE UP (ref 80–100)
PLATELET # BLD AUTO: 255 K/UL — SIGNIFICANT CHANGE UP (ref 150–400)
POTASSIUM SERPL-MCNC: 3.7 MMOL/L — SIGNIFICANT CHANGE UP (ref 3.5–5.3)
POTASSIUM SERPL-SCNC: 3.7 MMOL/L — SIGNIFICANT CHANGE UP (ref 3.5–5.3)
RBC # BLD: 4.08 M/UL — SIGNIFICANT CHANGE UP (ref 3.8–5.2)
RBC # FLD: 14.9 % — HIGH (ref 10.3–14.5)
SODIUM SERPL-SCNC: 138 MMOL/L — SIGNIFICANT CHANGE UP (ref 135–145)
SPECIMEN SOURCE: SIGNIFICANT CHANGE UP
WBC # BLD: 9.3 K/UL — SIGNIFICANT CHANGE UP (ref 3.8–10.5)
WBC # FLD AUTO: 9.3 K/UL — SIGNIFICANT CHANGE UP (ref 3.8–10.5)

## 2017-05-17 PROCEDURE — C1713: CPT

## 2017-05-17 PROCEDURE — 93306 TTE W/DOPPLER COMPLETE: CPT

## 2017-05-17 PROCEDURE — 84484 ASSAY OF TROPONIN QUANT: CPT

## 2017-05-17 PROCEDURE — 97110 THERAPEUTIC EXERCISES: CPT

## 2017-05-17 PROCEDURE — 73590 X-RAY EXAM OF LOWER LEG: CPT

## 2017-05-17 PROCEDURE — 87086 URINE CULTURE/COLONY COUNT: CPT

## 2017-05-17 PROCEDURE — 85730 THROMBOPLASTIN TIME PARTIAL: CPT

## 2017-05-17 PROCEDURE — 86901 BLOOD TYPING SEROLOGIC RH(D): CPT

## 2017-05-17 PROCEDURE — 87046 STOOL CULTR AEROBIC BACT EA: CPT

## 2017-05-17 PROCEDURE — 70450 CT HEAD/BRAIN W/O DYE: CPT

## 2017-05-17 PROCEDURE — 85027 COMPLETE CBC AUTOMATED: CPT

## 2017-05-17 PROCEDURE — 99285 EMERGENCY DEPT VISIT HI MDM: CPT | Mod: 25

## 2017-05-17 PROCEDURE — 97535 SELF CARE MNGMENT TRAINING: CPT

## 2017-05-17 PROCEDURE — 85610 PROTHROMBIN TIME: CPT

## 2017-05-17 PROCEDURE — 93970 EXTREMITY STUDY: CPT

## 2017-05-17 PROCEDURE — 97161 PT EVAL LOW COMPLEX 20 MIN: CPT

## 2017-05-17 PROCEDURE — 76000 FLUOROSCOPY <1 HR PHYS/QHP: CPT

## 2017-05-17 PROCEDURE — 87186 SC STD MICRODIL/AGAR DIL: CPT

## 2017-05-17 PROCEDURE — 93005 ELECTROCARDIOGRAM TRACING: CPT

## 2017-05-17 PROCEDURE — 94664 DEMO&/EVAL PT USE INHALER: CPT

## 2017-05-17 PROCEDURE — 96374 THER/PROPH/DIAG INJ IV PUSH: CPT

## 2017-05-17 PROCEDURE — 73562 X-RAY EXAM OF KNEE 3: CPT

## 2017-05-17 PROCEDURE — 86850 RBC ANTIBODY SCREEN: CPT

## 2017-05-17 PROCEDURE — 81001 URINALYSIS AUTO W/SCOPE: CPT

## 2017-05-17 PROCEDURE — 87045 FECES CULTURE AEROBIC BACT: CPT

## 2017-05-17 PROCEDURE — 73610 X-RAY EXAM OF ANKLE: CPT

## 2017-05-17 PROCEDURE — 87177 OVA AND PARASITES SMEARS: CPT

## 2017-05-17 PROCEDURE — 97116 GAIT TRAINING THERAPY: CPT

## 2017-05-17 PROCEDURE — 96375 TX/PRO/DX INJ NEW DRUG ADDON: CPT

## 2017-05-17 PROCEDURE — 80048 BASIC METABOLIC PNL TOTAL CA: CPT

## 2017-05-17 PROCEDURE — 97166 OT EVAL MOD COMPLEX 45 MIN: CPT

## 2017-05-17 PROCEDURE — 99239 HOSP IP/OBS DSCHRG MGMT >30: CPT

## 2017-05-17 PROCEDURE — 87493 C DIFF AMPLIFIED PROBE: CPT

## 2017-05-17 PROCEDURE — C1889: CPT

## 2017-05-17 PROCEDURE — 71045 X-RAY EXAM CHEST 1 VIEW: CPT

## 2017-05-17 PROCEDURE — 86900 BLOOD TYPING SEROLOGIC ABO: CPT

## 2017-05-17 PROCEDURE — 80053 COMPREHEN METABOLIC PANEL: CPT

## 2017-05-17 PROCEDURE — 72170 X-RAY EXAM OF PELVIS: CPT

## 2017-05-17 RX ADMIN — HEPARIN SODIUM 5000 UNIT(S): 5000 INJECTION INTRAVENOUS; SUBCUTANEOUS at 05:50

## 2017-05-17 RX ADMIN — PANTOPRAZOLE SODIUM 40 MILLIGRAM(S): 20 TABLET, DELAYED RELEASE ORAL at 05:50

## 2017-05-17 RX ADMIN — OXYCODONE HYDROCHLORIDE 5 MILLIGRAM(S): 5 TABLET ORAL at 05:50

## 2017-05-17 RX ADMIN — OXYCODONE HYDROCHLORIDE 5 MILLIGRAM(S): 5 TABLET ORAL at 05:20

## 2017-05-17 RX ADMIN — Medication 30 MILLILITER(S): at 15:40

## 2017-05-17 RX ADMIN — SERTRALINE 50 MILLIGRAM(S): 25 TABLET, FILM COATED ORAL at 12:10

## 2017-05-17 RX ADMIN — Medication 1 TABLET(S): at 12:10

## 2017-05-17 RX ADMIN — Medication 5 MILLIGRAM(S): at 12:10

## 2017-05-17 RX ADMIN — SOLIFENACIN SUCCINATE 10 MILLIGRAM(S): 10 TABLET ORAL at 12:11

## 2017-05-17 RX ADMIN — Medication 1 TABLET(S): at 08:52

## 2017-05-17 NOTE — PROGRESS NOTE ADULT - PROBLEM SELECTOR PLAN 4
UC shows Ecoli.  Will switch to ceftin
UC shows Ecoli.  Continue with Rocephin pending final culture results

## 2017-05-17 NOTE — PROGRESS NOTE ADULT - PROBLEM SELECTOR PROBLEM 2
Seizure
Seizure
Near syncope
Seizure
Diarrhea, unspecified type

## 2017-05-17 NOTE — PROGRESS NOTE ADULT - PROBLEM SELECTOR PLAN 2
Last seizure was 4 yrs ago.  Not on anti seizure meds.  No seizure reported.  Observation.
Last seizure was 4 yrs ago.  Not on anti seizure meds.  No seizure reported.  Observation.
Last seizure was 4 yrs ago.  Not on anti seizure meds.  Observation.
Probably due to vasovagal, and dehydration.  Resolved with hydration
Probably due to vasovagal, and dehydration.  Resolved with hydration
-no abdominal pain, no vomitting  -slightly elevated WBC but is on prednisone  -stool study/cultures  -is tolerating PO diet, empty dinner tray at bedside, will continue with low rate IVF to compensate for diarrhea loss  -GI consult
Probably due to vasovagal, and dehydration.  Resolved with hydration
probably due to vasovagal, and dehydration in the setting of diarrhea.   TTE Shows NL EF.  Doppler US of LE negative.  EKG shows NSR.  Trop negative X3.  Continue with IVF

## 2017-05-17 NOTE — PROGRESS NOTE ADULT - PROBLEM SELECTOR PLAN 3
s/p Surgery.  Pain is in control with meds.  Continue PT, as per ortho.  Fall precautions.
s/p Surgery.  Pain is in control with meds.  Pt is NON weight wearing on right leg for 6 week.  Fall precautions.  Neurologically pt could be DC to rehab.
Stool culture negative.  C-diff negative.  Resolved
Stool culture negative.  C-diff pending.
s/p Surgery.  Ortho is following.  Pain meds as per ortho and primary team.
per patient  she had "ministrokes and seziures" was diagnosed with possible vasculitis and is on low dose prednisone "for the for seeable future."  -will continue home dose prednisone
Resolved
resolved

## 2017-05-17 NOTE — PROGRESS NOTE ADULT - ASSESSMENT
71 year old female with h/o PMH of seizure, CVA. came to ED s/p Fal, found to have fracture of the right ankle.  S/p Right Ankle Surgery.
71F w/ R ankle fracture S/P slip/fall in shower. Reduced in ER, pending ORIF with orthopedics
Patient is 70 yo female presenting with
Patient is 70 yo female presenting with
Patient is 72 yo female presenting with
Patient is 72 yo female presenting with
The patient is a 71 year old female with a history of lymphoma s/p chemotherapy, CVA, seizures, GERD who is admitted with right ankle fracture.    Plan:  - Echocardiogram reviewed; normal LV systolic function, mild/mod aortic stenosis, mild pulmonary hypertension  - Continue atorvastatin 20 mg daily  - Not on aspirin due to ?prior ICH  - On ceftriaxone for UTI  - PT  - Discharge planning
The patient is a 71 year old female with a history of lymphoma s/p chemotherapy, CVA, seizures, GERD who is admitted with right ankle fracture.    Plan:  - Echocardiogram reviewed; normal LV systolic function, mild/mod aortic stenosis, mild pulmonary hypertension  - Continue atorvastatin 20 mg daily  - Not on aspirin due to ?prior ICH  - Pain control  - PT  - Discharge planning
The patient is a 71 year old female with a history of lymphoma s/p chemotherapy, CVA, seizures, GERD who is admitted with right ankle fracture.    Plan:  - Intermittent PVCs on telemetry; no arrhythmias to suggest etiology of pre-syncope/syncope  - Echocardiogram reviewed; normal LV systolic function, mild/mod aortic stenosis, mild pulmonary hypertension  - Continue atorvastatin 20 mg daily  - Not on aspirin due to ?prior ICH  - Pain control  - PT
71 year old female with h/o PMH of seizure, CVA. came to ED s/p Fal, found to have fracture of the right ankle.  S/p Right Ankle Surgery.

## 2017-05-17 NOTE — PROGRESS NOTE ADULT - SUBJECTIVE AND OBJECTIVE BOX
Neurology Follow up note    VINCENT ENGLAND 71y Female    HPI:  71 year old female fell in the shower but does not know how.  Denies dizziness, headache or LOC.  Denies CP or SOB.   PMH of seizure, CVA.   Immediately after fall she was incontinent of stool.  Transported to [place] ED where xray showed fracture dislocation of the right ankle.  This was reduced in the ED and a slpint was applied. (13 May 2017 15:50)    Interval History -    Patient is seen, chart was reviewed and case was discussed with the treatment team.  Pt is not in any distress.   Pt is s/p Surgery.   is at bedside.    Vital Signs Last 24 Hrs  T(C): 36.8, Max: 37.1 (05-17 @ 05:22)  T(F): 98.3, Max: 98.8 (05-17 @ 05:22)  HR: 75 (71 - 92)  BP: 104/63 (104/63 - 136/75)  BP(mean): 124 (124 - 124)  RR: 18 (16 - 18)  SpO2: 97% (92% - 97%)    Exam    Pt is AAOX3.  Higher functions are intact.  No facial asymmetry.  Moves all extremities well. Right leg Cast is in place.  Non weight wearing hence Gait couldn't be tested.     MEDICATIONS    heparin  Injectable 5000Unit(s) SubCutaneous every 12 hours  oxyCODONE IR 10milliGRAM(s) Oral every 4 hours PRN  oxyCODONE IR 5milliGRAM(s) Oral every 4 hours PRN  HYDROmorphone  IVPB 0.5milliGRAM(s) IV Intermittent every 6 hours PRN  atorvastatin 20milliGRAM(s) Oral at bedtime  sertraline 50milliGRAM(s) Oral daily  predniSONE   Tablet 5milliGRAM(s) Oral every other day  solifenacin 10milliGRAM(s) Oral daily  pantoprazole    Tablet 40milliGRAM(s) Oral before breakfast  aluminum hydroxide/magnesium hydroxide/simethicone Suspension 30milliLiter(s) Oral every 4 hours PRN  cefTRIAXone   IVPB 1Gram(s) IV Intermittent every 24 hours  lactobacillus acidophilus 1Tablet(s) Oral three times a day with meals      Allergies    No Known Allergies    LABS:    CBC Full  -  ( 17 May 2017 07:41 )  WBC Count : 9.3 K/uL  Hemoglobin : 11.9 g/dL  Hematocrit : 35.8 %  Platelet Count - Automated : 255 K/uL  Mean Cell Volume : 87.6 fl  Mean Cell Hemoglobin : 29.2 pg  Mean Cell Hemoglobin Concentration : 33.4 gm/dL  Auto Neutrophil # : x  Auto Lymphocyte # : x  Auto Monocyte # : x  Auto Eosinophil # : x  Auto Basophil # : x  Auto Neutrophil % : x  Auto Lymphocyte % : x  Auto Monocyte % : x  Auto Eosinophil % : x  Auto Basophil % : x      05-17    138  |  100  |  13  ----------------------------<  99  3.7   |  30  |  0.68    Ca    7.9<L>      17 May 2017 07:41

## 2017-05-17 NOTE — PROGRESS NOTE ADULT - SUBJECTIVE AND OBJECTIVE BOX
Chief Complaint: Ankle fracture    Interval Events: No events overnight.    Review of Systems:  General: No fevers, chills, weight loss or gain  Skin: No rashes, color changes  Cardiovascular: No chest pain, orthopnea  Respiratory: No shortness of breath, cough  Gastrointestinal: No nausea, abdominal pain  Genitourinary: No incontinence, pain with urination  Musculoskeletal: No pain, swelling, decreased range of motion  Neurological: No headache, weakness  Psychiatric: No depression, anxiety  Endocrine: No weight loss or gain, increased thirst  All other systems are comprehensively negative.    Physical Exam:  Vital Signs Last 24 Hrs  T(C): 36.3, Max: 37.1 (05-17 @ 05:22)  T(F): 97.3, Max: 98.8 (05-17 @ 05:22)  HR: 92 (71 - 92)  BP: 136/75 (107/46 - 136/75)  BP(mean): 124 (124 - 124)  RR: 18 (16 - 18)  SpO2: 97% (92% - 98%)  General: NAD  Neck: No JVD, no carotid bruit  Lungs: CTAB  CV: RRR, nl S1/S2, no M/R/G  Abdomen: S/NT/ND, +BS  Extremities: No LE edema, no cyanosis    Labs:             05-17    138  |  100  |  13  ----------------------------<  99  3.7   |  30  |  0.68    Ca    7.9<L>      17 May 2017 07:41                          11.9   9.3   )-----------( 255      ( 17 May 2017 07:41 )             35.8

## 2017-05-17 NOTE — PROGRESS NOTE ADULT - PROVIDER SPECIALTY LIST ADULT
Cardiology
Critical Care
Hospitalist
Neurology
Orthopedics
Hospitalist
Orthopedics

## 2017-05-17 NOTE — PROGRESS NOTE ADULT - SUBJECTIVE AND OBJECTIVE BOX
Feels ok.  Pain well controlled.  Diarrhea resolved.  Offers no other complaints      Constitutional: No fever, fatigue or weight loss.  Skin: No rash.  Eyes: No recent vision problems or eye pain.  ENT: No congestion, ear pain, or sore throat.  Endocrine: No thyroid problems.  Cardiovascular: No chest pain or palpation.  Respiratory: No cough, shortness of breath, congestion, or wheezing.  Gastrointestinal: No abdominal pain, nausea, vomiting, or diarrhea.  Genitourinary: No dysuria.  Musculoskeletal: No joint swelling.  Neurologic: No headache.      Vital Signs Last 24 Hrs  T(C): 36.3, Max: 37.1 (05-17 @ 05:22)  T(F): 97.3, Max: 98.8 (05-17 @ 05:22)  HR: 92 (71 - 92)  BP: 136/75 (107/46 - 136/75)  BP(mean): 124 (124 - 124)  RR: 18 (16 - 18)  SpO2: 97% (92% - 98%)      PHYSICAL EXAM-  GENERAL: NAD  HEAD:  Atraumatic, Normocephalic  EYES: EOMI, PERRLA, conjunctiva and sclera clear  NECK: Supple, No JVD, Normal thyroid  NERVOUS SYSTEM:  Alert & Oriented X3, Motor Strength 5/5 B/L upper and lower extremities  CHEST/LUNG: Clear to percussion bilaterally; No rales, rhonchi, wheezing, or rubs  HEART: Regular rate and rhythm; No murmurs, rubs, or gallops  ABDOMEN: Soft, Nontender, Nondistended; Bowel sounds present                              11.9   9.3   )-----------( 255      ( 17 May 2017 07:41 )             35.8     05-17    138  |  100  |  13  ----------------------------<  99  3.7   |  30  |  0.68    Ca    7.9<L>      17 May 2017 07:41              MEDICATIONS  (STANDING):  heparin  Injectable 5000Unit(s) SubCutaneous every 12 hours  atorvastatin 20milliGRAM(s) Oral at bedtime  sertraline 50milliGRAM(s) Oral daily  predniSONE   Tablet 5milliGRAM(s) Oral every other day  solifenacin 10milliGRAM(s) Oral daily  pantoprazole    Tablet 40milliGRAM(s) Oral before breakfast  cefTRIAXone   IVPB 1Gram(s) IV Intermittent every 24 hours  lactobacillus acidophilus 1Tablet(s) Oral three times a day with meals    MEDICATIONS  (PRN):  oxyCODONE IR 10milliGRAM(s) Oral every 4 hours PRN Moderate Pain  oxyCODONE IR 5milliGRAM(s) Oral every 4 hours PRN Mild Pain  HYDROmorphone  IVPB 0.5milliGRAM(s) IV Intermittent every 6 hours PRN Moderate Pain  aluminum hydroxide/magnesium hydroxide/simethicone Suspension 30milliLiter(s) Oral every 4 hours PRN Dyspepsia

## 2017-05-17 NOTE — PROGRESS NOTE ADULT - PROBLEM SELECTOR PROBLEM 1
Vasculitis
Vasculitis
Ankle fracture, right, closed, initial encounter
Ankle fracture, right, closed, initial encounter
Vasculitis
Ankle fracture, right, closed, initial encounter

## 2017-05-17 NOTE — PROGRESS NOTE ADULT - SUBJECTIVE AND OBJECTIVE BOX
POST OPERATIVE DAY #: 3    71y Female  s/p   Right  ankle ORIF                     SUBJECTIVE: Patient seen and examined at bedside.     Pain:  well controlled     Pain scale:  1 /10  Denies CP, SOB, N/V/D, weakness, numbness   No new complains     OBJECTIVE:     Vital Signs Last 24 Hrs  T(C): 36.3, Max: 37.1 (05-17 @ 05:22)  T(F): 97.3, Max: 98.8 (05-17 @ 05:22)  HR: 92 (71 - 92)  BP: 136/75 (107/46 - 136/75)  BP(mean): 124 (124 - 124)  RR: 18 (16 - 18)  SpO2: 97% (92% - 97%)    Affected extremity: RLE         Dressing: cast intact, C/D                 Sensation: intact to light touch          Motor exam: 5  / 5 Tibialis Anterior/Gastrocnemius-Soleus, EHL/FHL         warm, well-perfused; capillary refill < 3 seconds         negative calf tenderness B/L LE    LABS:                        11.9   9.3   )-----------( 255      ( 17 May 2017 07:41 )             35.8     05-17    138  |  100  |  13  ----------------------------<  99  3.7   |  30  |  0.68    Ca    7.9<L>      17 May 2017 07:41          MEDICATIONS:  Infection prophylaxis:  cefTRIAXone   IVPB 1Gram(s) IV Intermittent every 24 hours    Pain management:  oxyCODONE IR 10milliGRAM(s) Oral every 4 hours PRN  oxyCODONE IR 5milliGRAM(s) Oral every 4 hours PRN  HYDROmorphone  IVPB 0.5milliGRAM(s) IV Intermittent every 6 hours PRN  sertraline 50milliGRAM(s) Oral daily    DVT prophylaxis:   heparin  Injectable 5000Unit(s) SubCutaneous every 12 hours      RADIOLOGY & ADDITIONAL STUDIES:    ASSESSMENT AND PLAN:     - Analgesic pain control  - DVT prophylaxis: SQ heparin, SCDs       - Antibiotics:  CTX 1g -UTI  - HO prophylaxis: Celebrex 200mg twice a day x 21 days   - PT/OT: Weight Bearing Status:  Non Weight bearing RLE  -  Incentive spirometry  - Advance diet as tolerated  - Hospitalist is following  -  Follow up labs  -  Disposition:   Subacute Rehab POST OPERATIVE DAY #: 3    71y Female  s/p   Right  ankle ORIF                     SUBJECTIVE: Patient seen and examined at bedside.     Pain:  well controlled     Pain scale:  1 /10  Denies CP, SOB, N/V/D, weakness, numbness   No new complains     OBJECTIVE:     Vital Signs Last 24 Hrs  T(C): 36.3, Max: 37.1 (05-17 @ 05:22)  T(F): 97.3, Max: 98.8 (05-17 @ 05:22)  HR: 92 (71 - 92)  BP: 136/75 (107/46 - 136/75)  BP(mean): 124 (124 - 124)  RR: 18 (16 - 18)  SpO2: 97% (92% - 97%)    Affected extremity: RLE         Dressing: cast intact, C/D                 Sensation: intact to light touch          Motor exam: 5  / 5 Tibialis Anterior/Gastrocnemius-Soleus, EHL/FHL         warm, well-perfused; capillary refill < 3 seconds         negative calf tenderness B/L LE    LABS:                        11.9   9.3   )-----------( 255      ( 17 May 2017 07:41 )             35.8     05-17    138  |  100  |  13  ----------------------------<  99  3.7   |  30  |  0.68    Ca    7.9<L>      17 May 2017 07:41          MEDICATIONS:  Infection prophylaxis:  cefTRIAXone   IVPB 1Gram(s) IV Intermittent every 24 hours    Pain management:  oxyCODONE IR 10milliGRAM(s) Oral every 4 hours PRN  oxyCODONE IR 5milliGRAM(s) Oral every 4 hours PRN  HYDROmorphone  IVPB 0.5milliGRAM(s) IV Intermittent every 6 hours PRN  sertraline 50milliGRAM(s) Oral daily    DVT prophylaxis:   heparin  Injectable 5000Unit(s) SubCutaneous every 12 hours      RADIOLOGY & ADDITIONAL STUDIES:    ASSESSMENT AND PLAN:     - Analgesic pain control  - DVT prophylaxis: SQ heparin, SCDs       - Antibiotics:  CTX 1g -UTI  - HO prophylaxis: Celebrex 200mg twice a day x 21 days   - PT/OT: Weight Bearing Status:  Non Weight bearing RLE  -  Incentive spirometry  - Advance diet as tolerated  - Hospitalist is following  -  Follow up labs  -  Disposition:   Subacute Rehab   today with orthopedic follow up

## 2017-05-22 ENCOUNTER — APPOINTMENT (OUTPATIENT)
Dept: RHEUMATOLOGY | Facility: CLINIC | Age: 72
End: 2017-05-22

## 2017-06-01 ENCOUNTER — OTHER (OUTPATIENT)
Age: 72
End: 2017-06-01

## 2017-09-05 PROBLEM — C85.90 NON-HODGKIN LYMPHOMA, UNSPECIFIED, UNSPECIFIED SITE: Chronic | Status: ACTIVE | Noted: 2017-05-13

## 2017-09-05 PROBLEM — I63.9 CEREBRAL INFARCTION, UNSPECIFIED: Chronic | Status: ACTIVE | Noted: 2017-05-13

## 2017-09-05 PROBLEM — M81.0 AGE-RELATED OSTEOPOROSIS WITHOUT CURRENT PATHOLOGICAL FRACTURE: Chronic | Status: ACTIVE | Noted: 2017-05-13

## 2017-09-07 ENCOUNTER — APPOINTMENT (OUTPATIENT)
Dept: RHEUMATOLOGY | Facility: CLINIC | Age: 72
End: 2017-09-07
Payer: MEDICARE

## 2017-09-07 ENCOUNTER — APPOINTMENT (OUTPATIENT)
Dept: ENDOCRINOLOGY | Facility: CLINIC | Age: 72
End: 2017-09-07
Payer: MEDICARE

## 2017-09-07 VITALS
DIASTOLIC BLOOD PRESSURE: 77 MMHG | BODY MASS INDEX: 25.55 KG/M2 | SYSTOLIC BLOOD PRESSURE: 130 MMHG | RESPIRATION RATE: 16 BRPM | OXYGEN SATURATION: 98 % | HEART RATE: 88 BPM | HEIGHT: 66 IN | WEIGHT: 159 LBS | TEMPERATURE: 98 F

## 2017-09-07 DIAGNOSIS — M89.8X9 OTHER SPECIFIED DISORDERS OF BONE, UNSPECIFIED SITE: ICD-10-CM

## 2017-09-07 PROCEDURE — 77080 DXA BONE DENSITY AXIAL: CPT

## 2017-09-07 PROCEDURE — 96372 THER/PROPH/DIAG INJ SC/IM: CPT

## 2017-09-07 PROCEDURE — 99213 OFFICE O/P EST LOW 20 MIN: CPT | Mod: 25

## 2017-09-07 RX ORDER — DENOSUMAB 60 MG/ML
60 INJECTION SUBCUTANEOUS
Qty: 0 | Refills: 0 | Status: COMPLETED | OUTPATIENT
Start: 2017-09-07

## 2017-09-07 RX ADMIN — DENOSUMAB 0 MG/ML: 60 INJECTION SUBCUTANEOUS at 00:00

## 2017-09-14 ENCOUNTER — OTHER (OUTPATIENT)
Age: 72
End: 2017-09-14

## 2017-09-28 ENCOUNTER — OTHER (OUTPATIENT)
Age: 72
End: 2017-09-28

## 2017-10-03 ENCOUNTER — OTHER (OUTPATIENT)
Age: 72
End: 2017-10-03

## 2018-01-17 ENCOUNTER — INPATIENT (INPATIENT)
Facility: HOSPITAL | Age: 73
LOS: 4 days | Discharge: HOME | DRG: 176 | End: 2018-01-22
Attending: HOSPITALIST | Admitting: HOSPITALIST
Payer: MEDICARE

## 2018-01-17 VITALS
SYSTOLIC BLOOD PRESSURE: 116 MMHG | RESPIRATION RATE: 22 BRPM | DIASTOLIC BLOOD PRESSURE: 80 MMHG | HEART RATE: 80 BPM | OXYGEN SATURATION: 97 %

## 2018-01-17 DIAGNOSIS — I26.99 OTHER PULMONARY EMBOLISM WITHOUT ACUTE COR PULMONALE: ICD-10-CM

## 2018-01-17 LAB
ALBUMIN SERPL ELPH-MCNC: 4.1 G/DL — SIGNIFICANT CHANGE UP (ref 3.3–5)
ALP SERPL-CCNC: 64 U/L — SIGNIFICANT CHANGE UP (ref 40–120)
ALT FLD-CCNC: 25 U/L RC — SIGNIFICANT CHANGE UP (ref 10–45)
ANION GAP SERPL CALC-SCNC: 16 MMOL/L — SIGNIFICANT CHANGE UP (ref 5–17)
AST SERPL-CCNC: 30 U/L — SIGNIFICANT CHANGE UP (ref 10–40)
BILIRUB SERPL-MCNC: 0.9 MG/DL — SIGNIFICANT CHANGE UP (ref 0.2–1.2)
BUN SERPL-MCNC: 18 MG/DL — SIGNIFICANT CHANGE UP (ref 7–23)
CALCIUM SERPL-MCNC: 9.6 MG/DL — SIGNIFICANT CHANGE UP (ref 8.4–10.5)
CHLORIDE SERPL-SCNC: 98 MMOL/L — SIGNIFICANT CHANGE UP (ref 96–108)
CK MB CFR SERPL CALC: 1.5 NG/ML — SIGNIFICANT CHANGE UP (ref 0–3.8)
CK SERPL-CCNC: 99 U/L — SIGNIFICANT CHANGE UP (ref 25–170)
CO2 SERPL-SCNC: 23 MMOL/L — SIGNIFICANT CHANGE UP (ref 22–31)
CREAT SERPL-MCNC: 0.87 MG/DL — SIGNIFICANT CHANGE UP (ref 0.5–1.3)
D DIMER BLD IA.RAPID-MCNC: 510 NG/ML DDU — HIGH
GAS PNL BLDV: SIGNIFICANT CHANGE UP
GLUCOSE SERPL-MCNC: 116 MG/DL — HIGH (ref 70–99)
HCT VFR BLD CALC: 37.4 % — SIGNIFICANT CHANGE UP (ref 34.5–45)
HGB BLD-MCNC: 13 G/DL — SIGNIFICANT CHANGE UP (ref 11.5–15.5)
MAGNESIUM SERPL-MCNC: 1.9 MG/DL — SIGNIFICANT CHANGE UP (ref 1.6–2.6)
MCHC RBC-ENTMCNC: 29.5 PG — SIGNIFICANT CHANGE UP (ref 27–34)
MCHC RBC-ENTMCNC: 34.6 GM/DL — SIGNIFICANT CHANGE UP (ref 32–36)
MCV RBC AUTO: 85.3 FL — SIGNIFICANT CHANGE UP (ref 80–100)
PHOSPHATE SERPL-MCNC: 3 MG/DL — SIGNIFICANT CHANGE UP (ref 2.5–4.5)
PLATELET # BLD AUTO: 187 K/UL — SIGNIFICANT CHANGE UP (ref 150–400)
POTASSIUM SERPL-MCNC: 4.7 MMOL/L — SIGNIFICANT CHANGE UP (ref 3.5–5.3)
POTASSIUM SERPL-SCNC: 4.7 MMOL/L — SIGNIFICANT CHANGE UP (ref 3.5–5.3)
PROT SERPL-MCNC: 7.3 G/DL — SIGNIFICANT CHANGE UP (ref 6–8.3)
RAPID RVP RESULT: SIGNIFICANT CHANGE UP
RBC # BLD: 4.39 M/UL — SIGNIFICANT CHANGE UP (ref 3.8–5.2)
RBC # FLD: 15.3 % — HIGH (ref 10.3–14.5)
SODIUM SERPL-SCNC: 137 MMOL/L — SIGNIFICANT CHANGE UP (ref 135–145)
TROPONIN T SERPL-MCNC: <0.01 NG/ML — SIGNIFICANT CHANGE UP (ref 0–0.06)
WBC # BLD: 17.5 K/UL — HIGH (ref 3.8–10.5)
WBC # FLD AUTO: 17.5 K/UL — HIGH (ref 3.8–10.5)

## 2018-01-17 PROCEDURE — 99291 CRITICAL CARE FIRST HOUR: CPT | Mod: 25

## 2018-01-17 PROCEDURE — 93010 ELECTROCARDIOGRAM REPORT: CPT

## 2018-01-17 PROCEDURE — 99223 1ST HOSP IP/OBS HIGH 75: CPT | Mod: GC

## 2018-01-17 PROCEDURE — 71275 CT ANGIOGRAPHY CHEST: CPT | Mod: 26

## 2018-01-17 RX ORDER — HEPARIN SODIUM 5000 [USP'U]/ML
6000 INJECTION INTRAVENOUS; SUBCUTANEOUS EVERY 6 HOURS
Qty: 0 | Refills: 0 | Status: DISCONTINUED | OUTPATIENT
Start: 2018-01-17 | End: 2018-01-18

## 2018-01-17 RX ORDER — HEPARIN SODIUM 5000 [USP'U]/ML
6000 INJECTION INTRAVENOUS; SUBCUTANEOUS EVERY 6 HOURS
Qty: 0 | Refills: 0 | Status: DISCONTINUED | OUTPATIENT
Start: 2018-01-17 | End: 2018-01-17

## 2018-01-17 RX ORDER — HEPARIN SODIUM 5000 [USP'U]/ML
INJECTION INTRAVENOUS; SUBCUTANEOUS
Qty: 25000 | Refills: 0 | Status: DISCONTINUED | OUTPATIENT
Start: 2018-01-17 | End: 2018-01-18

## 2018-01-17 RX ORDER — HEPARIN SODIUM 5000 [USP'U]/ML
6000 INJECTION INTRAVENOUS; SUBCUTANEOUS ONCE
Qty: 0 | Refills: 0 | Status: DISCONTINUED | OUTPATIENT
Start: 2018-01-17 | End: 2018-01-17

## 2018-01-17 RX ORDER — HEPARIN SODIUM 5000 [USP'U]/ML
3000 INJECTION INTRAVENOUS; SUBCUTANEOUS EVERY 6 HOURS
Qty: 0 | Refills: 0 | Status: DISCONTINUED | OUTPATIENT
Start: 2018-01-17 | End: 2018-01-17

## 2018-01-17 RX ORDER — HEPARIN SODIUM 5000 [USP'U]/ML
6000 INJECTION INTRAVENOUS; SUBCUTANEOUS ONCE
Qty: 0 | Refills: 0 | Status: COMPLETED | OUTPATIENT
Start: 2018-01-17 | End: 2018-01-17

## 2018-01-17 RX ORDER — HEPARIN SODIUM 5000 [USP'U]/ML
3000 INJECTION INTRAVENOUS; SUBCUTANEOUS EVERY 6 HOURS
Qty: 0 | Refills: 0 | Status: DISCONTINUED | OUTPATIENT
Start: 2018-01-17 | End: 2018-01-18

## 2018-01-17 RX ORDER — ACETAMINOPHEN 500 MG
1000 TABLET ORAL ONCE
Qty: 0 | Refills: 0 | Status: COMPLETED | OUTPATIENT
Start: 2018-01-17 | End: 2018-01-17

## 2018-01-17 RX ADMIN — Medication 1000 MILLIGRAM(S): at 21:22

## 2018-01-17 RX ADMIN — HEPARIN SODIUM 1300 UNIT(S)/HR: 5000 INJECTION INTRAVENOUS; SUBCUTANEOUS at 21:13

## 2018-01-17 RX ADMIN — Medication 400 MILLIGRAM(S): at 20:52

## 2018-01-17 RX ADMIN — HEPARIN SODIUM 6000 UNIT(S): 5000 INJECTION INTRAVENOUS; SUBCUTANEOUS at 21:13

## 2018-01-17 NOTE — ED PROVIDER NOTE - OBJECTIVE STATEMENT
73yo F PMH hodgkins lymphoma in remission, HLD, overactive bladder, GERD, prior CVA a/w bleed and seizure, prior DVT in setting of lymphoma who presents with 4 hours of band-like b/l chest pain now transitioned into localized sharp left sided chest pain worse with inspiration. Pain not associated with exertion. Patient initially thought pain was GERD and took nexium without relief. Patient went to urgent care, had EKG done and was told to come to ED. She reports pain is currently better s/p 4 baby aspirin but not completely resolved. No headache, fever, SOB, palpitations, nausea, vomiting, abdominal pain, dysuria, weakness or numbness. No recent immobilization, no LE swelling.

## 2018-01-17 NOTE — CONSULT NOTE ADULT - SUBJECTIVE AND OBJECTIVE BOX
73 yo F with h/o Hodgkin's lymphoma s/p chemo (in remission), DVT in setting of lymphom(no longer on A/C), CVA c/b ICH (in 2012) and seizure d/o, ?vasculitis (on chronic Prednisone), HLD, GERD, and R ankle fracture s/p ORIF (5/2017) presents with shooting L-sided chest pain that started earlier today. CP worsens with inspiration but nonpositional and nonexertional. Not associated with F/C or N/V. Pt reports that she was having bad reflux-type symptoms throughout the day and took several anti-reflux medications without any relief. Pt then developed the L-sided CP and sought treatment for it at an urgent care center but was referred to the ED instead. Pt also endorses 2 weeks of SOB with exertion. Denies any headaches, lightheadedness/dizziness, URI symptoms, diarrhea, constipation, melena, hematochezia, urinary symptoms, sick contacts, or recent travel.    In the ED, VS: T 97.9, -136/80s, HR 80-86, RR 22, O2 Sat 95-97% RA. Pt got a CTA chest that showed b/l PEs: left main pulmonary artery embolus extending into the left lower lobar   and lingular branches and additional right-sided pulmonary emboli within the right lower and right middle lobar pulmonary arteries. CTA chest also showed possible R heart strain with dilated RV and flattening of interventricular septum. 1st set of Arvin negative and pro-BNP elevated to 409. CHIEF COMPLAINT: PERC consult for b/l PEs    HPI:  71 yo F with h/o Hodgkin's lymphoma s/p chemo (in remission), DVT in setting of lymphom(no longer on A/C), CVA c/b ICH (in 2012) and seizure d/o, ?vasculitis (on chronic Prednisone), HLD, GERD, and R ankle fracture s/p ORIF (5/2017) presents with shooting L-sided chest pain that started earlier today. CP worsens with inspiration but nonpositional and nonexertional. Not associated with F/C or N/V. Pt reports that she was having bad reflux-type symptoms throughout the day and took several anti-reflux medications, including Dexilant, without any relief. Pt then developed the L-sided CP and sought treatment for it at an urgent care center but was referred to the ED instead. Pt also endorses 2 weeks of SOB with exertion. Denies any headaches, lightheadedness/dizziness, URI symptoms, diarrhea, constipation, melena, hematochezia, urinary symptoms, sick contacts, or recent travel.    In the ED, VS: T 97.9, -136/80s, HR 80-86, RR 22, O2 Sat 95-97% RA. Pt got a CTA chest that showed b/l PEs: left main pulmonary artery embolus extending into the left lower lobar   and lingular branches and additional right-sided pulmonary emboli within the right lower and right middle lobar pulmonary arteries. CTA chest also showed possible R heart strain with dilated RV and flattening of interventricular septum. 1st set of Arvin negative and pro-BNP elevated to 409. Pt started on heparin gtt. MICU consulted for PERC.      PAST MEDICAL & SURGICAL HISTORY:  Neuropathy: bilateral feet  Fracture: lumbar spine,  healed with conservative treatment  Seizure  Osteoporosis  Lymphoma  CVA (cerebral vascular accident)  No significant past surgical history      FAMILY HISTORY:      SOCIAL HISTORY:  Smoking: [X] Never Smoked [ ] Former Smoker (__ packs x ___ years) [ ] Current Smoker  (__ packs x ___ years)  Substance Use: [X] Never Used [ ] Used ____  EtOH Use: Denies  Marital Status: [ ] Single [X]  [ ]  [ ]   Sexual History:   Occupation:  Recent Travel:  Country of Birth:  Advance Directives:    Allergies    No Known Allergies    Intolerances        HOME MEDICATIONS:  Prednisone 5mg daily  Vesicare  Dexilant  Zoloft  Atorvastatin  Vitamin D    REVIEW OF SYSTEMS:  Constitutional: [ ] fevers [ ] chills [ ] weight loss [ ] weight gain  HEENT: [ ] dry eyes [ ] eye irritation [ ] postnasal drip [ ] nasal congestion  CV: [X] chest pain [ ] orthopnea [ ] palpitations [ ] murmur  Resp: [ ] cough [X] shortness of breath [X] dyspnea [ ] wheezing [ ] sputum [ ] hemoptysis  GI: [ ] nausea [ ] vomiting [ ] diarrhea [ ] constipation [ ] abd pain [ ] dysphagia   : [ ] dysuria [ ] nocturia [ ] hematuria [ ] increased urinary frequency  Musculoskeletal: [ ] back pain [ ] myalgias [ ] arthralgias [ ] fracture  Skin: [ ] rash [ ] itch  Neurological: [ ] headache [ ] dizziness [ ] syncope [ ] weakness [ ] numbness  Psychiatric: [ ] anxiety [ ] depression  Endocrine: [ ] diabetes [ ] thyroid problem  Hematologic/Lymphatic: [ ] anemia [ ] bleeding problem  Allergic/Immunologic: [ ] itchy eyes [ ] nasal discharge [ ] hives [ ] angioedema  [X] All other systems negative  [ ] Unable to assess ROS because ________    OBJECTIVE:  ICU Vital Signs Last 24 Hrs  T(C): 36.6 (17 Jan 2018 18:06), Max: 36.6 (17 Jan 2018 18:06)  T(F): 97.9 (17 Jan 2018 18:06), Max: 97.9 (17 Jan 2018 18:06)  HR: 86 (17 Jan 2018 18:06) (80 - 86)  BP: 136/80 (17 Jan 2018 18:06) (116/80 - 136/80)  BP(mean): --  ABP: --  ABP(mean): --  RR: 22 (17 Jan 2018 17:53) (22 - 22)  SpO2: 95% (17 Jan 2018 18:06) (95% - 97%)        CAPILLARY BLOOD GLUCOSE        PHYSICAL EXAM:  General: NAD, on room air  HEENT: NCAT, PERRL, EOMI, throat clear  Lymph Nodes: No LAD  Neck: Supple, No JVD  Respiratory: CTAB, good inspiratory effort  Cardiovascular: RRR, Nl S1 and S2, No murmurs or rubs  Abdomen: BS+, soft, NT/ND  Extremities: No LE edema b/l  Skin: Warm and dry, No rashes  Neurological: A&Ox3, sensation and motor strength intact in all extremities  Psychiatry: Calm, cooperative    LINES:     HOSPITAL MEDICATIONS:  MEDICATIONS  (STANDING):  heparin  Infusion.  Unit(s)/Hr (13 mL/Hr) IV Continuous <Continuous>    MEDICATIONS  (PRN):  heparin  Injectable 6000 Unit(s) IV Push every 6 hours PRN For aPTT less than 40  heparin  Injectable 3000 Unit(s) IV Push every 6 hours PRN For aPTT between 40 - 57      LABS:                        13.0   17.5  )-----------( 187      ( 17 Jan 2018 18:40 )             37.4     Hgb Trend: 13.0<--  01-17    137  |  98  |  18  ----------------------------<  116<H>  4.7   |  23  |  0.87    Ca    9.6      17 Jan 2018 18:40  Phos  3.0     01-17  Mg     1.9     01-17    TPro  7.3  /  Alb  4.1  /  TBili  0.9  /  DBili  x   /  AST  30  /  ALT  25  /  AlkPhos  64  01-17    Creatinine Trend: 0.87<--  PTT - ( 17 Jan 2018 18:40 )  PTT:20.5 sec          MICROBIOLOGY:     RADIOLOGY:  [X] Reviewed and interpreted by me  CT Angio Chest w/ IV Cont (01.17.18 @ 20:08)   IMPRESSION:  Left main pulmonary artery embolus extending into the left lower lobar   and lingular branches. Additional right-sided pulmonary emboli within the   right lower and right middle lobar pulmonary arteries.    Dilated right ventricle with straightening of the intraventricular septum   which can be seen in setting of right heart strain, further evaluation   with echocardiogram is recommended.      EKG: Sinus rhythm at 84 bpm, no acute changes CHIEF COMPLAINT: PERC consult for b/l PEs    HPI:  73 yo F with h/o Hodgkin's lymphoma s/p chemo (in remission), DVT in setting of lymphom(no longer on A/C), CVA c/b ICH (in 2012) and seizure d/o, ?vasculitis (on chronic Prednisone), HLD, GERD, and R ankle fracture s/p ORIF (5/2017) presents with shooting L-sided chest pain that started earlier today. CP worsens with inspiration but nonpositional and nonexertional. Not associated with F/C or N/V. Pt reports that she was having bad reflux-type symptoms throughout the day and took several anti-reflux medications, including Dexilant, without any relief. Pt then developed the L-sided CP and sought treatment for it at an urgent care center but was referred to the ED instead. Pt also endorses 2 weeks of SOB with exertion. Denies any headaches, lightheadedness/dizziness, URI symptoms, diarrhea, constipation, melena, hematochezia, urinary symptoms, sick contacts, recent travel, or prolonged immobilization. Has been working out with a physical therapy .    In the ED, VS: T 97.9, -136/80s, HR 80-86, RR 22, O2 Sat 95-97% RA. Pt got a CTA chest that showed b/l PEs: left main pulmonary artery embolus extending into the left lower lobar   and lingular branches and additional right-sided pulmonary emboli within the right lower and right middle lobar pulmonary arteries. CTA chest also showed possible R heart strain with dilated RV and flattening of interventricular septum. 1st set of Arvin negative and pro-BNP elevated to 409. Pt started on heparin gtt. MICU consulted for PERC.      PAST MEDICAL & SURGICAL HISTORY:  Neuropathy: bilateral feet  Fracture: lumbar spine,  healed with conservative treatment  Seizure  Osteoporosis  Lymphoma  CVA (cerebral vascular accident)  No significant past surgical history      FAMILY HISTORY:      SOCIAL HISTORY:  Smoking: [X] Never Smoked [ ] Former Smoker (__ packs x ___ years) [ ] Current Smoker  (__ packs x ___ years)  Substance Use: [X] Never Used [ ] Used ____  EtOH Use: Denies  Marital Status: [ ] Single [X]  [ ]  [ ]   Sexual History:   Occupation:  Recent Travel:  Country of Birth:  Advance Directives:    Allergies    No Known Allergies    Intolerances        HOME MEDICATIONS:  Prednisone 5mg daily  Vesicare  Dexilant  Zoloft  Atorvastatin  Vitamin D    REVIEW OF SYSTEMS:  Constitutional: [ ] fevers [ ] chills [ ] weight loss [ ] weight gain  HEENT: [ ] dry eyes [ ] eye irritation [ ] postnasal drip [ ] nasal congestion  CV: [X] chest pain [ ] orthopnea [ ] palpitations [ ] murmur  Resp: [ ] cough [X] shortness of breath [X] dyspnea [ ] wheezing [ ] sputum [ ] hemoptysis  GI: [ ] nausea [ ] vomiting [ ] diarrhea [ ] constipation [ ] abd pain [ ] dysphagia   : [ ] dysuria [ ] nocturia [ ] hematuria [ ] increased urinary frequency  Musculoskeletal: [ ] back pain [ ] myalgias [ ] arthralgias [ ] fracture  Skin: [ ] rash [ ] itch  Neurological: [ ] headache [ ] dizziness [ ] syncope [ ] weakness [ ] numbness  Psychiatric: [ ] anxiety [ ] depression  Endocrine: [ ] diabetes [ ] thyroid problem  Hematologic/Lymphatic: [ ] anemia [ ] bleeding problem  Allergic/Immunologic: [ ] itchy eyes [ ] nasal discharge [ ] hives [ ] angioedema  [X] All other systems negative  [ ] Unable to assess ROS because ________    OBJECTIVE:  ICU Vital Signs Last 24 Hrs  T(C): 36.6 (17 Jan 2018 18:06), Max: 36.6 (17 Jan 2018 18:06)  T(F): 97.9 (17 Jan 2018 18:06), Max: 97.9 (17 Jan 2018 18:06)  HR: 86 (17 Jan 2018 18:06) (80 - 86)  BP: 136/80 (17 Jan 2018 18:06) (116/80 - 136/80)  BP(mean): --  ABP: --  ABP(mean): --  RR: 22 (17 Jan 2018 17:53) (22 - 22)  SpO2: 95% (17 Jan 2018 18:06) (95% - 97%)        CAPILLARY BLOOD GLUCOSE        PHYSICAL EXAM:  General: NAD, on room air  HEENT: NCAT, PERRL, EOMI, throat clear  Lymph Nodes: No LAD  Neck: Supple, No JVD  Respiratory: CTAB, good inspiratory effort  Cardiovascular: RRR, Nl S1 and S2, No murmurs or rubs  Abdomen: BS+, soft, NT/ND  Extremities: No LE edema b/l  Skin: Warm and dry, No rashes  Neurological: A&Ox3, sensation and motor strength intact in all extremities  Psychiatry: Calm, cooperative    LINES:     HOSPITAL MEDICATIONS:  MEDICATIONS  (STANDING):  heparin  Infusion.  Unit(s)/Hr (13 mL/Hr) IV Continuous <Continuous>    MEDICATIONS  (PRN):  heparin  Injectable 6000 Unit(s) IV Push every 6 hours PRN For aPTT less than 40  heparin  Injectable 3000 Unit(s) IV Push every 6 hours PRN For aPTT between 40 - 57      LABS:                        13.0   17.5  )-----------( 187      ( 17 Jan 2018 18:40 )             37.4     Hgb Trend: 13.0<--  01-17    137  |  98  |  18  ----------------------------<  116<H>  4.7   |  23  |  0.87    Ca    9.6      17 Jan 2018 18:40  Phos  3.0     01-17  Mg     1.9     01-17    TPro  7.3  /  Alb  4.1  /  TBili  0.9  /  DBili  x   /  AST  30  /  ALT  25  /  AlkPhos  64  01-17    Creatinine Trend: 0.87<--  PTT - ( 17 Jan 2018 18:40 )  PTT:20.5 sec          MICROBIOLOGY:     RADIOLOGY:  [X] Reviewed and interpreted by me  CT Angio Chest w/ IV Cont (01.17.18 @ 20:08)   IMPRESSION:  Left main pulmonary artery embolus extending into the left lower lobar   and lingular branches. Additional right-sided pulmonary emboli within the   right lower and right middle lobar pulmonary arteries.    Dilated right ventricle with straightening of the intraventricular septum   which can be seen in setting of right heart strain, further evaluation   with echocardiogram is recommended.      EKG: Sinus rhythm at 84 bpm, no acute changes

## 2018-01-17 NOTE — ED PROVIDER NOTE - NS ED ROS FT
REVIEW OF SYSTEMS:  CONSTITUTIONAL: No fever, weight loss, or fatigue  EYES: No eye pain, visual disturbances, or discharge  ENMT:  No difficulty hearing, tinnitus, vertigo; No sinus or throat pain  NECK: No pain or stiffness  BREASTS: No pain, masses, or nipple discharge  RESPIRATORY: No cough, wheezing, chills or hemoptysis; No shortness of breath  CARDIOVASCULAR: +Chest pain; No palpitations, dizziness, or leg swelling  GASTROINTESTINAL: No abdominal or epigastric pain. No nausea, vomiting, or hematemesis; No diarrhea or constipation. No melena or hematochezia.  GENITOURINARY: No dysuria, frequency, hematuria, or incontinence  NEUROLOGICAL: No headaches, memory loss, loss of strength, numbness, or tremors  SKIN: No itching, burning, rashes, or lesions   LYMPH NODES: No enlarged glands  ENDOCRINE: No heat or cold intolerance; No hair loss  MUSCULOSKELETAL: No joint pain or swelling; No muscle, back, or extremity pain  PSYCHIATRIC: No depression, anxiety, mood swings, or difficulty sleeping  HEME/LYMPH: No easy bruising, or bleeding gums  ALLERY AND IMMUNOLOGIC: No hives or eczema

## 2018-01-17 NOTE — ED PROVIDER NOTE - MEDICAL DECISION MAKING DETAILS
Attending MD Manzo: 72F with h/o lymphoma in past presenting with chest pain, mostly pleuritic. Ddx includes PE, pna, viral pleurisy, less likely ACS. Will obtain CXR, d-dimer (mod risk for PE by capri), cardiac biomarkers and re-eval

## 2018-01-17 NOTE — CONSULT NOTE ADULT - ASSESSMENT
71 yo F with h/o Hodgkin's lymphoma s/p chemo (in remission), DVT in setting of lymphom(no longer on A/C), CVA c/b ICH (in 2012) and seizure d/o, ?cerebral vasculitis (on chronic Prednisone), HLD, GERD, and R ankle fracture s/p ORIF (5/2017) presented with shooting L-sided chest pain x 1 day, found to have b/l PEs, including in left main pulmonary artery, with possible R heart strain. MICU consulted for PERC.    #Neuro  -AAOx3. No active issues.  -History of seizure d/o and ?cerebral vasculitis. Not on any AEDs currently for seizures. On Prednisone 5mg daily for ?vasculitis.   -Seizure precautions. C/w Prednisone 5mg daily.  -Monitor for acute changes in mental status.    #CV  -EKG showing normal sinus rhythm w/ no acute changes or evidence of RBBB.  -Possible RV strain noted on POCUS and CTA chest, unclear if acute or chronic. Pt with h/o DVT and ?vasculitis. Pt might have chronic thromboembolism causing RV strain  -Cardiac enzymes negative x1. Pro-BNP mildly elevated.  -Trend cardiac enzymes/pro-BNP and obtain formal TTE.  -Obtain LE dopplers.    #Pulm  -B/l PEs, including in left main pulmonary artery, seen on CTA chest w/ possible RV strain noted on POCUS and CTA chest  -Pt started on Heparin gtt, c/w nomogram.   -Pt not in any respiratory distress and breathing comfortably - satting well on RA. Supplemental O2 PRN.  -Pt not a candidate for tPA at this time given negative cardiac enzymes, mild elevation in pro-BNP, hemodynamic stability, and history of ICH.   -Recommend trending cardiac enzymes and pro-BNP, and monitor for signs and symptoms of RV failure.  -Obtain LE dopplers. Consider vascular surgery consult for IVC filter if dopplers positive for LE DVT.  -Obtain formal TTE.    #GI  -No active issues. Pt denies recent history of melena or hematochezia   -Trend CBC and coags.  -Monitor for signs of active bleeding.    #Renal  -No active issues  -Monitor Cr and UOP    #Endo  -No active issues    #Heme/Onc  -History of Hodgkin's lymphoma s/p chemo (in remission)  -Previous DVT in setting of lymphoma (no longer on A/C)  -C/w Heparin gtt for b/l PEs    #DVT ppx:  -Heparin gtt    #Dispo  -Pt not a MICU candidate at this time as pt is hemodyanmically stable, breathing comfortably on RA w/ no signs of   respiratory distress. Cardiac enzymes x1 negative and pro-BNP mildly elevated.   -Pt is also not a candidate for tPA at this time. Recommend c/w heparin gtt and consider vascular consult for IVC filter if with LE DVT.    Dinah Lawson PGY-3 71 yo F with h/o Hodgkin's lymphoma s/p chemo (in remission), DVT in setting of lymphom(no longer on A/C), CVA c/b ICH (in 2012) and seizure d/o, ?cerebral vasculitis (on chronic Prednisone), HLD, GERD, and R ankle fracture s/p ORIF (5/2017) presented with shooting L-sided chest pain x 1 day, found to have b/l PEs, including in left main pulmonary artery, with possible R heart strain. MICU consulted for PERC.    #Neuro  -AAOx3. No active issues.  -History of seizure d/o and ?cerebral vasculitis. Not on any AEDs currently for seizures. On Prednisone 5mg daily for ?vasculitis.   -Seizure precautions. C/w Prednisone 5mg daily.  -Monitor for acute changes in mental status.    #CV  -EKG showing normal sinus rhythm w/ no acute changes or evidence of RBBB.  -Possible RV strain noted on POCUS and CTA chest, unclear if acute or chronic. Pt with h/o DVT and ?vasculitis. Pt might have chronic thromboembolism causing RV strain  -Cardiac enzymes negative x1. Pro-BNP mildly elevated.  -Trend cardiac enzymes/pro-BNP and obtain formal TTE.  -Obtain LE dopplers.    #Pulm  -B/l PEs, including in left main pulmonary artery, seen on CTA chest w/ possible RV strain noted on POCUS and CTA chest  -Pt started on Heparin gtt, c/w nomogram.   -Pt not in any respiratory distress and breathing comfortably - satting well on RA. Supplemental O2 PRN.  -Pt not a candidate for tPA at this time given negative cardiac enzymes, mild elevation in pro-BNP, hemodynamic stability, and history of ICH.   -Recommend trending cardiac enzymes and pro-BNP, and monitor for signs and symptoms of RV failure.  -Obtain LE dopplers. Consider vascular surgery consult for IVC filter.  -Obtain formal TTE.    #GI  -No active issues. Pt denies recent history of melena or hematochezia   -Trend CBC and coags.  -Monitor for signs of active bleeding.    #Renal  -No active issues  -Monitor Cr and UOP    #Endo  -No active issues    #Heme/Onc  -History of Hodgkin's lymphoma s/p chemo (in remission)  -Previous DVT in setting of lymphoma (no longer on A/C)  -C/w Heparin gtt for b/l PEs, will likely need to be on lifelong A/C pending risks/benefits/alternatives discussion with pt.  -Will need age-appropriate cancer screening and additional imaging to evaluate for cancer recurrence.    #DVT ppx:  -Heparin gtt    #Dispo  -Pt not a MICU candidate at this time as pt is hemodyanmically stable, breathing comfortably on RA w/ no signs of   respiratory distress. Cardiac enzymes x1 negative and pro-BNP mildly elevated.   -Pt is also not a candidate for tPA at this time. Recommend c/w heparin gtt and consider vascular consult for IVC filter.    Dinah Lawson PGY-3  [phone]

## 2018-01-17 NOTE — ED ADULT NURSE NOTE - OBJECTIVE STATEMENT
72 year old female a/ox3 ambulatory family at bedside presenting to ed with sudden onset of non radiating left sided chest wall pain today.  patient with pmh of lymphoma since 2011, in remission, verbalizing dyspnea on exertion, generalized malaise and overall weakness x couple of days "I just didn't feel right inside". today at 12pm she noted left sided chest wall pain that became severe after a couple of hours, along with worsening dyspnea on exertion today. denies fever/chills pulse ox on room air >95% respirations even unlabored no sob/dyspnea no change in bowel/bladder skin dry warm intact lopez equally

## 2018-01-17 NOTE — ED PROVIDER NOTE - ATTENDING CONTRIBUTION TO CARE
Attending MD Manzo:  I personally have seen and examined this patient.  Resident note reviewed and agree on plan of care and except where noted.  See HPI, PE, and MDM for details.

## 2018-01-18 ENCOUNTER — TRANSCRIPTION ENCOUNTER (OUTPATIENT)
Age: 73
End: 2018-01-18

## 2018-01-18 DIAGNOSIS — D89.89 OTHER SPECIFIED DISORDERS INVOLVING THE IMMUNE MECHANISM, NOT ELSEWHERE CLASSIFIED: ICD-10-CM

## 2018-01-18 DIAGNOSIS — N32.81 OVERACTIVE BLADDER: ICD-10-CM

## 2018-01-18 DIAGNOSIS — R56.9 UNSPECIFIED CONVULSIONS: ICD-10-CM

## 2018-01-18 DIAGNOSIS — Z29.9 ENCOUNTER FOR PROPHYLACTIC MEASURES, UNSPECIFIED: ICD-10-CM

## 2018-01-18 DIAGNOSIS — F32.9 MAJOR DEPRESSIVE DISORDER, SINGLE EPISODE, UNSPECIFIED: ICD-10-CM

## 2018-01-18 DIAGNOSIS — S82.899A OTHER FRACTURE OF UNSPECIFIED LOWER LEG, INITIAL ENCOUNTER FOR CLOSED FRACTURE: Chronic | ICD-10-CM

## 2018-01-18 DIAGNOSIS — I26.99 OTHER PULMONARY EMBOLISM WITHOUT ACUTE COR PULMONALE: ICD-10-CM

## 2018-01-18 DIAGNOSIS — I63.9 CEREBRAL INFARCTION, UNSPECIFIED: ICD-10-CM

## 2018-01-18 DIAGNOSIS — R63.8 OTHER SYMPTOMS AND SIGNS CONCERNING FOOD AND FLUID INTAKE: ICD-10-CM

## 2018-01-18 DIAGNOSIS — I77.6 ARTERITIS, UNSPECIFIED: ICD-10-CM

## 2018-01-18 DIAGNOSIS — D72.829 ELEVATED WHITE BLOOD CELL COUNT, UNSPECIFIED: ICD-10-CM

## 2018-01-18 DIAGNOSIS — K21.9 GASTRO-ESOPHAGEAL REFLUX DISEASE WITHOUT ESOPHAGITIS: ICD-10-CM

## 2018-01-18 LAB
ANION GAP SERPL CALC-SCNC: 15 MMOL/L — SIGNIFICANT CHANGE UP (ref 5–17)
APTT BLD: 154.5 SEC — CRITICAL HIGH (ref 27.5–37.4)
APTT BLD: 68.5 SEC — HIGH (ref 27.5–37.4)
APTT BLD: 89.4 SEC — HIGH (ref 27.5–37.4)
BASOPHILS # BLD AUTO: 0.01 K/UL — SIGNIFICANT CHANGE UP (ref 0–0.2)
BASOPHILS NFR BLD AUTO: 0.1 % — SIGNIFICANT CHANGE UP (ref 0–2)
BUN SERPL-MCNC: 16 MG/DL — SIGNIFICANT CHANGE UP (ref 7–23)
CALCIUM SERPL-MCNC: 8.6 MG/DL — SIGNIFICANT CHANGE UP (ref 8.4–10.5)
CHLORIDE SERPL-SCNC: 102 MMOL/L — SIGNIFICANT CHANGE UP (ref 96–108)
CK MB CFR SERPL CALC: 1.4 NG/ML — SIGNIFICANT CHANGE UP (ref 0–3.8)
CK SERPL-CCNC: 52 U/L — SIGNIFICANT CHANGE UP (ref 25–170)
CO2 SERPL-SCNC: 24 MMOL/L — SIGNIFICANT CHANGE UP (ref 22–31)
CREAT SERPL-MCNC: 0.79 MG/DL — SIGNIFICANT CHANGE UP (ref 0.5–1.3)
EOSINOPHIL # BLD AUTO: 0.1 K/UL — SIGNIFICANT CHANGE UP (ref 0–0.5)
EOSINOPHIL NFR BLD AUTO: 1 % — SIGNIFICANT CHANGE UP (ref 0–6)
GLUCOSE SERPL-MCNC: 112 MG/DL — HIGH (ref 70–99)
HCT VFR BLD CALC: 32 % — LOW (ref 34.5–45)
HCT VFR BLD CALC: 33.8 % — LOW (ref 34.5–45)
HCT VFR BLD CALC: 36 % — SIGNIFICANT CHANGE UP (ref 34.5–45)
HGB BLD-MCNC: 10.2 G/DL — LOW (ref 11.5–15.5)
HGB BLD-MCNC: 11.8 G/DL — SIGNIFICANT CHANGE UP (ref 11.5–15.5)
HGB BLD-MCNC: 12.1 G/DL — SIGNIFICANT CHANGE UP (ref 11.5–15.5)
IMM GRANULOCYTES NFR BLD AUTO: 0.5 % — SIGNIFICANT CHANGE UP (ref 0–1.5)
INR BLD: 1.3 RATIO — HIGH (ref 0.88–1.16)
LYMPHOCYTES # BLD AUTO: 1.29 K/UL — SIGNIFICANT CHANGE UP (ref 1–3.3)
LYMPHOCYTES # BLD AUTO: 13.3 % — SIGNIFICANT CHANGE UP (ref 13–44)
MAGNESIUM SERPL-MCNC: 1.8 MG/DL — SIGNIFICANT CHANGE UP (ref 1.6–2.6)
MCHC RBC-ENTMCNC: 27.3 PG — SIGNIFICANT CHANGE UP (ref 27–34)
MCHC RBC-ENTMCNC: 29.1 PG — SIGNIFICANT CHANGE UP (ref 27–34)
MCHC RBC-ENTMCNC: 29.9 PG — SIGNIFICANT CHANGE UP (ref 27–34)
MCHC RBC-ENTMCNC: 31.9 GM/DL — LOW (ref 32–36)
MCHC RBC-ENTMCNC: 33.6 GM/DL — SIGNIFICANT CHANGE UP (ref 32–36)
MCHC RBC-ENTMCNC: 34.9 GM/DL — SIGNIFICANT CHANGE UP (ref 32–36)
MCV RBC AUTO: 85.7 FL — SIGNIFICANT CHANGE UP (ref 80–100)
MCV RBC AUTO: 85.8 FL — SIGNIFICANT CHANGE UP (ref 80–100)
MCV RBC AUTO: 86.7 FL — SIGNIFICANT CHANGE UP (ref 80–100)
MONOCYTES # BLD AUTO: 1.3 K/UL — HIGH (ref 0–0.9)
MONOCYTES NFR BLD AUTO: 13.4 % — SIGNIFICANT CHANGE UP (ref 2–14)
NEUTROPHILS # BLD AUTO: 6.96 K/UL — SIGNIFICANT CHANGE UP (ref 1.8–7.4)
NEUTROPHILS NFR BLD AUTO: 71.7 % — SIGNIFICANT CHANGE UP (ref 43–77)
PHOSPHATE SERPL-MCNC: 3.4 MG/DL — SIGNIFICANT CHANGE UP (ref 2.5–4.5)
PLATELET # BLD AUTO: 174 K/UL — SIGNIFICANT CHANGE UP (ref 150–400)
PLATELET # BLD AUTO: 175 K/UL — SIGNIFICANT CHANGE UP (ref 150–400)
PLATELET # BLD AUTO: 195 K/UL — SIGNIFICANT CHANGE UP (ref 150–400)
POTASSIUM SERPL-MCNC: 3.6 MMOL/L — SIGNIFICANT CHANGE UP (ref 3.5–5.3)
POTASSIUM SERPL-SCNC: 3.6 MMOL/L — SIGNIFICANT CHANGE UP (ref 3.5–5.3)
PROTHROM AB SERPL-ACNC: 14.8 SEC — HIGH (ref 10–13.1)
RBC # BLD: 3.73 M/UL — LOW (ref 3.8–5.2)
RBC # BLD: 3.94 M/UL — SIGNIFICANT CHANGE UP (ref 3.8–5.2)
RBC # BLD: 4.15 M/UL — SIGNIFICANT CHANGE UP (ref 3.8–5.2)
RBC # FLD: 15.2 % — HIGH (ref 10.3–14.5)
RBC # FLD: 15.2 % — HIGH (ref 10.3–14.5)
RBC # FLD: 16.7 % — HIGH (ref 10.3–14.5)
SODIUM SERPL-SCNC: 141 MMOL/L — SIGNIFICANT CHANGE UP (ref 135–145)
TROPONIN T SERPL-MCNC: <0.01 NG/ML — SIGNIFICANT CHANGE UP (ref 0–0.06)
WBC # BLD: 12.7 K/UL — HIGH (ref 3.8–10.5)
WBC # BLD: 13.2 K/UL — HIGH (ref 3.8–10.5)
WBC # BLD: 9.71 K/UL — SIGNIFICANT CHANGE UP (ref 3.8–10.5)
WBC # FLD AUTO: 12.7 K/UL — HIGH (ref 3.8–10.5)
WBC # FLD AUTO: 13.2 K/UL — HIGH (ref 3.8–10.5)
WBC # FLD AUTO: 9.71 K/UL — SIGNIFICANT CHANGE UP (ref 3.8–10.5)

## 2018-01-18 PROCEDURE — 93970 EXTREMITY STUDY: CPT | Mod: 26

## 2018-01-18 PROCEDURE — 99223 1ST HOSP IP/OBS HIGH 75: CPT | Mod: GC

## 2018-01-18 PROCEDURE — 12345: CPT | Mod: GC,NC

## 2018-01-18 RX ORDER — CHOLECALCIFEROL (VITAMIN D3) 125 MCG
1000 CAPSULE ORAL DAILY
Qty: 0 | Refills: 0 | Status: DISCONTINUED | OUTPATIENT
Start: 2018-01-18 | End: 2018-01-22

## 2018-01-18 RX ORDER — SENNA PLUS 8.6 MG/1
2 TABLET ORAL AT BEDTIME
Qty: 0 | Refills: 0 | Status: DISCONTINUED | OUTPATIENT
Start: 2018-01-18 | End: 2018-01-22

## 2018-01-18 RX ORDER — ATORVASTATIN CALCIUM 80 MG/1
20 TABLET, FILM COATED ORAL AT BEDTIME
Qty: 0 | Refills: 0 | Status: DISCONTINUED | OUTPATIENT
Start: 2018-01-18 | End: 2018-01-22

## 2018-01-18 RX ORDER — ENOXAPARIN SODIUM 100 MG/ML
75 INJECTION SUBCUTANEOUS EVERY 12 HOURS
Qty: 0 | Refills: 0 | Status: DISCONTINUED | OUTPATIENT
Start: 2018-01-18 | End: 2018-01-18

## 2018-01-18 RX ORDER — DEXLANSOPRAZOLE 30 MG/1
1 CAPSULE, DELAYED RELEASE ORAL
Qty: 0 | Refills: 0 | COMMUNITY

## 2018-01-18 RX ORDER — DEXLANSOPRAZOLE 30 MG/1
30 CAPSULE, DELAYED RELEASE ORAL DAILY
Qty: 0 | Refills: 0 | Status: DISCONTINUED | OUTPATIENT
Start: 2018-01-18 | End: 2018-01-22

## 2018-01-18 RX ORDER — ENOXAPARIN SODIUM 100 MG/ML
70 INJECTION SUBCUTANEOUS EVERY 12 HOURS
Qty: 0 | Refills: 0 | Status: DISCONTINUED | OUTPATIENT
Start: 2018-01-18 | End: 2018-01-22

## 2018-01-18 RX ORDER — FLUTICASONE PROPIONATE 50 MCG
1 SPRAY, SUSPENSION NASAL DAILY
Qty: 0 | Refills: 0 | Status: DISCONTINUED | OUTPATIENT
Start: 2018-01-18 | End: 2018-01-22

## 2018-01-18 RX ORDER — OXYCODONE HYDROCHLORIDE 5 MG/1
5 TABLET ORAL EVERY 6 HOURS
Qty: 0 | Refills: 0 | Status: DISCONTINUED | OUTPATIENT
Start: 2018-01-18 | End: 2018-01-22

## 2018-01-18 RX ORDER — SODIUM CHLORIDE 9 MG/ML
500 INJECTION INTRAMUSCULAR; INTRAVENOUS; SUBCUTANEOUS ONCE
Qty: 0 | Refills: 0 | Status: COMPLETED | OUTPATIENT
Start: 2018-01-18 | End: 2018-01-18

## 2018-01-18 RX ORDER — ACETAMINOPHEN 500 MG
650 TABLET ORAL EVERY 6 HOURS
Qty: 0 | Refills: 0 | Status: DISCONTINUED | OUTPATIENT
Start: 2018-01-18 | End: 2018-01-22

## 2018-01-18 RX ORDER — CEFOXITIN 1 G/1
1 INJECTION, POWDER, FOR SOLUTION INTRAVENOUS
Qty: 0 | Refills: 0 | COMMUNITY

## 2018-01-18 RX ORDER — OXYCODONE HYDROCHLORIDE 5 MG/1
10 TABLET ORAL EVERY 6 HOURS
Qty: 0 | Refills: 0 | Status: DISCONTINUED | OUTPATIENT
Start: 2018-01-18 | End: 2018-01-22

## 2018-01-18 RX ORDER — SERTRALINE 25 MG/1
1 TABLET, FILM COATED ORAL
Qty: 0 | Refills: 0 | COMMUNITY

## 2018-01-18 RX ORDER — SERTRALINE 25 MG/1
50 TABLET, FILM COATED ORAL DAILY
Qty: 0 | Refills: 0 | Status: DISCONTINUED | OUTPATIENT
Start: 2018-01-18 | End: 2018-01-22

## 2018-01-18 RX ORDER — ATORVASTATIN CALCIUM 80 MG/1
1 TABLET, FILM COATED ORAL
Qty: 0 | Refills: 0 | COMMUNITY

## 2018-01-18 RX ORDER — DOCUSATE SODIUM 100 MG
100 CAPSULE ORAL THREE TIMES A DAY
Qty: 0 | Refills: 0 | Status: DISCONTINUED | OUTPATIENT
Start: 2018-01-18 | End: 2018-01-20

## 2018-01-18 RX ORDER — SOLIFENACIN SUCCINATE 10 MG/1
10 TABLET ORAL DAILY
Qty: 0 | Refills: 0 | Status: DISCONTINUED | OUTPATIENT
Start: 2018-01-18 | End: 2018-01-22

## 2018-01-18 RX ADMIN — Medication 100 MILLIGRAM(S): at 16:45

## 2018-01-18 RX ADMIN — Medication 100 MILLIGRAM(S): at 21:06

## 2018-01-18 RX ADMIN — ENOXAPARIN SODIUM 70 MILLIGRAM(S): 100 INJECTION SUBCUTANEOUS at 16:45

## 2018-01-18 RX ADMIN — SERTRALINE 50 MILLIGRAM(S): 25 TABLET, FILM COATED ORAL at 21:06

## 2018-01-18 RX ADMIN — OXYCODONE HYDROCHLORIDE 5 MILLIGRAM(S): 5 TABLET ORAL at 16:59

## 2018-01-18 RX ADMIN — SODIUM CHLORIDE 100 MILLILITER(S): 9 INJECTION INTRAMUSCULAR; INTRAVENOUS; SUBCUTANEOUS at 05:38

## 2018-01-18 RX ADMIN — HEPARIN SODIUM 0 UNIT(S)/HR: 5000 INJECTION INTRAVENOUS; SUBCUTANEOUS at 12:50

## 2018-01-18 RX ADMIN — HEPARIN SODIUM 0 UNIT(S)/HR: 5000 INJECTION INTRAVENOUS; SUBCUTANEOUS at 03:54

## 2018-01-18 RX ADMIN — OXYCODONE HYDROCHLORIDE 5 MILLIGRAM(S): 5 TABLET ORAL at 03:55

## 2018-01-18 RX ADMIN — OXYCODONE HYDROCHLORIDE 5 MILLIGRAM(S): 5 TABLET ORAL at 17:30

## 2018-01-18 RX ADMIN — Medication 650 MILLIGRAM(S): at 01:28

## 2018-01-18 RX ADMIN — Medication 1000 UNIT(S): at 16:45

## 2018-01-18 RX ADMIN — OXYCODONE HYDROCHLORIDE 5 MILLIGRAM(S): 5 TABLET ORAL at 02:59

## 2018-01-18 RX ADMIN — Medication 1 SPRAY(S): at 16:46

## 2018-01-18 RX ADMIN — OXYCODONE HYDROCHLORIDE 5 MILLIGRAM(S): 5 TABLET ORAL at 13:24

## 2018-01-18 RX ADMIN — ATORVASTATIN CALCIUM 20 MILLIGRAM(S): 80 TABLET, FILM COATED ORAL at 21:06

## 2018-01-18 RX ADMIN — OXYCODONE HYDROCHLORIDE 5 MILLIGRAM(S): 5 TABLET ORAL at 08:56

## 2018-01-18 RX ADMIN — Medication 100 MILLIGRAM(S): at 05:38

## 2018-01-18 RX ADMIN — Medication 5 MILLIGRAM(S): at 16:45

## 2018-01-18 RX ADMIN — Medication 650 MILLIGRAM(S): at 00:21

## 2018-01-18 NOTE — H&P ADULT - NSHPREVIEWOFSYSTEMS_GEN_ALL_CORE
General: recent fatigue, no chills, fevers  HEENT: denies changes in vision, hearing, rhinorrhea, tussis. mild dysphagia from CVA but not currently on any diet  CV: no palpitations  Pulm: mild SOB at rest, worsened after talking a lot  GI: denies abdominal pain, N/V, diarrhea. Has some constipation, last BM yesterday  : denies dysuria/hematuria  Skin: denies new lesions, rashes, swelling  Neuro/psych: chronic loss of sensation in dorsum of hands, loss of proprioception  Heme/lymph: denies new bruises/bleeds, lymphadenopathy

## 2018-01-18 NOTE — DISCHARGE NOTE ADULT - PATIENT PORTAL LINK FT
“You can access the [facility] Patient Portal, offered by [facility], by registering with the following website: [email or web address]”

## 2018-01-18 NOTE — DISCHARGE NOTE ADULT - CARE PROVIDERS DIRECT ADDRESSES
,DirectAddress_Unknown,DirectAddress_Unknown,DirectAddress_Unknown ,DirectAddress_Unknown,DirectAddress_Unknown,DirectAddress_Unknown,[email or web address] ,DirectAddress_Unknown,DirectAddress_Unknown,DirectAddress_Unknown,[email or web address]

## 2018-01-18 NOTE — PROVIDER CONTACT NOTE (CRITICAL VALUE NOTIFICATION) - ASSESSMENT
Pt A+Ox4, VSS, denies CP; no c/o pain, discomfort, or distress. IVL in place & WDL. Pt reoriented to unit, instructed to call for all needs, stretcher in lowest position. Will continue to monitor.

## 2018-01-18 NOTE — PROGRESS NOTE ADULT - PROBLEM SELECTOR PLAN 3
- Suspect 2/2 chronic steroid use.  - Check CBC with diff in AM.  - Patient without localizing signs of infection, continue to monitor clinical status. - Suspect 2/2 chronic steroid use improving.  - Check CBC with diff in AM.  - Patient without localizing signs of infection, continue to monitor clinical status.

## 2018-01-18 NOTE — H&P ADULT - PROBLEM SELECTOR PROBLEM 4
Nutrition, metabolism, and development symptoms Overactive bladder GERD (gastroesophageal reflux disease) Seizure

## 2018-01-18 NOTE — PROGRESS NOTE ADULT - PROBLEM SELECTOR PLAN 1
- Submassive PE in the setting of known malignancy.  - Continue heparin gtt.  - TTE to evaluate R heart strain.  - LE duplex.  - Arvin negative x2.  Mildly elevated BNP  - Monitor vitals carefully.  Vitals q4h.  - Patient will require outpatient w/u / appropriate cancer screening.  - Consider Vascular consult for IVC filter.  - Consider further imaging to evaluate for malignancy.  - Cardiology called by ED, appreciate involvement. - Submassive PE in the setting of known malignancy.  - Continue heparin gtt.  - TTE to evaluate R heart strain.  - LE duplex.  - Arvin negative x2.  Mildly elevated BNP  - Monitor vitals carefully.  Vitals q4h.  - Patient will require outpatient w/u / appropriate cancer screening.  - Consider further imaging to evaluate for malignancy.  - Cardiology called by ED, appreciate involvement. - Submassive PE in the setting of h/o malignancy  - Will switch to lovenox in the context of possible malignancy  - TTE to evaluate R heart strain.  - LE duplex.  - Arvin negative x2.  Mildly elevated BNP  - Monitor vitals carefully.  Vitals q4h.  - Patient will require outpatient w/u / appropriate cancer screening.  - Consider further imaging to evaluate for malignancy.  - Cardiology called by ED, appreciate involvement.

## 2018-01-18 NOTE — H&P ADULT - PROBLEM SELECTOR PLAN 5
- On therapeutic AC.    Dispo: pending transition to PO / injectable AC.    Pat Bruno MD  PGY-2 | Internal Medicine  [phone] / [phone] - Regular diet. - Patient will ask  to bring home vesicare. - Patient will ask  to bring home dexlansoprazole.

## 2018-01-18 NOTE — H&P ADULT - NSHPPHYSICALEXAM_GEN_ALL_CORE
T(C): 36.6 (01-17-18 @ 18:06), Max: 36.6 (01-17-18 @ 18:06)  HR: 77 (01-17-18 @ 23:15) (77 - 86)  BP: 110/66 (01-17-18 @ 23:15) (110/66 - 136/80)  RR: 18 (01-17-18 @ 23:15) (18 - 22)  SpO2: 96% (01-17-18 @ 23:15) (95% - 97%)    Gen:  HENT:  Lymph:  Eye:  CV:  Pulm:  Abd:  Skin:  Ext:  Neuro:  Psych: T(C): 36.6 (01-17-18 @ 18:06), Max: 36.6 (01-17-18 @ 18:06)  HR: 77 (01-17-18 @ 23:15) (77 - 86)  BP: 110/66 (01-17-18 @ 23:15) (110/66 - 136/80)  RR: 18 (01-17-18 @ 23:15) (18 - 22)  SpO2: 96% (01-17-18 @ 23:15) (95% - 97%)    Gen: awake, pleasant  HENT: neck soft / supple, MMM  Lymph: no LAD noted in neck  Eye: pupils dilated but reactive  CV: normal rate, regular rhythm  Pulm: CTAB no w/r/r auscultated  Abd: +BS, soft, NT, ND  Skin: warm, dry  Ext: no LE edema  Neuro: answering questions appropriately, following commands appropriately, recent and remote memory intact  Psych: normal mood / affect

## 2018-01-18 NOTE — H&P ADULT - PROBLEM SELECTOR PLAN 4
- Regular diet. - Patient will ask  to bring home vesicare. - Patient will ask  to bring home dexlansoprazole. - Pt with CVA in 2012 c/b microhemorrhages and seizure d/o.  - Patient reports being taken off AEDs "a long time ago."  - Seizure precautions.

## 2018-01-18 NOTE — DISCHARGE NOTE ADULT - PROVIDER TOKENS
FREE:[LAST:[Hermelinda],FIRST:[Naif],PHONE:[phone]],FAX:[(   )    -],ADDRESS:[[address] [place] [address]]],FREE:[LAST:[Eyal],FIRST:[Ha],PHONE:[phone]],FAX:[(   )    -],ADDRESS:[[address] [place] [address]]],TOKEN:'[id number] TOKEN:'[id number]',FREE:[LAST:[Hermelinda],FIRST:[Naif],PHONE:[phone]:[(   )    -],ADDRESS:[[address] [place] [address]]],FREE:[LAST:[Eyal],FIRST:[Ha],PHONE:[phone]],FAX:[(   )    -],ADDRESS:[[address] [place] [address]]],TOKEN:'[id number] TOKEN:'[id number]',FREE:[LAST:[Hermelinda],FIRST:[Naif],PHONE:[phone]],FAX:[(   )    -],ADDRESS:[[address] [place] [address]]],FREE:[LAST:[Eyal],FIRST:[Ha],PHONE:[phone]],FAX:[(   )    -],ADDRESS:[[address] [place] [address]]],TOKEN:'[id number]'

## 2018-01-18 NOTE — PROGRESS NOTE ADULT - ATTENDING COMMENTS
After conversation with outpatient neurologist (unclear whether patient had suffered from hemorrhagic stroke or infectious disease with hemorrhagic component, extensive work-up in the past, he does not feel there's a need to avoid anticoagulation given acute diagnoses and is in agreement with choice of anticoagulant in the setting of history of malignancy, will also follow up with pt's outpatient oncologist, and will continue home Prednisone), will transition patient to therapeutic dose of Lovenox for treatment of acute DVT and PE; pending TTE, patient remains hemodynamically stable, though with persistent pleuritic chest pain for which we will continue to provide analgesia.

## 2018-01-18 NOTE — DISCHARGE NOTE ADULT - OTHER SIGNIFICANT FINDINGS
CT ANGIO 1/17/18  IMPRESSION:  Left main pulmonary artery embolus extending into the left lower lobar   and lingular branches. Additional right-sided pulmonary emboli within the   right lower and right middle lobar pulmonary arteries.    Dilated right ventricle with straightening of the intraventricular septum   which can be seen in setting of right heart strain, further evaluation   with echocardiogram is recommended.    VA DUPLEX 1/18/18  IMPRESSION:   Acute nonocclusive DVT of the left common femoral vein.    CT ABDOMEN/PELVIS WITH IV CONTRAST 1/20/18  IMPRESSION: Mild endometrium thickening. Pelvic ultrasound is recommended   in this postmenopausal female for further evaluation. CT ANGIO 1/17/18  IMPRESSION:  Left main pulmonary artery embolus extending into the left lower lobar   and lingular branches. Additional right-sided pulmonary emboli within the   right lower and right middle lobar pulmonary arteries.    Dilated right ventricle with straightening of the intraventricular septum   which can be seen in setting of right heart strain, further evaluation   with echocardiogram is recommended.    VA DUPLEX 1/18/18  IMPRESSION:   Acute nonocclusive DVT of the left common femoral vein.    CT ABDOMEN/PELVIS WITH IV CONTRAST 1/20/18  IMPRESSION: Mild endometrium thickening. Pelvic ultrasound is recommended   in this postmenopausal female for further evaluation.    Echo:  Conclusions:  1. Mild mitral regurgitation.  2. Peak transaortic valve gradient equals 23 mm Hg, mean  transaortic valve gradient equals 14 mm Hg, aortic valve  velocity time integral equals 53 cm, consistent with mild  aortic stenosis. Mild aortic regurgitation.  3. Increased relative wall thickness with normal left  ventricular mass index, consistent with concentric left  ventricular remodeling.  4. Normal left ventricular systolic function. No segmental  wall motion abnormalities.  5. Reversal of the E-A  waves of the mitral inflow pattern  is consistent with diastolic LV dysfunction.  6. Normal right ventricular size and function.  7. Estimated pulmonary artery systolic pressure equals 46  mm Hg, assuming right atrial pressure equals 8 mm Hg,  consistent with mild pulmonary pressures.

## 2018-01-18 NOTE — H&P ADULT - NSHPLABSRESULTS_GEN_ALL_CORE
.  Labs reviewed personally.                          13.0   17.5  )-----------( 187      ( 17 Jan 2018 18:40 )             37.4     Hgb Trend: 13.0<--  01-17    137  |  98  |  18  ----------------------------<  116<H>  4.7   |  23  |  0.87    Ca    9.6      17 Jan 2018 18:40  Phos  3.0     01-17  Mg     1.9     01-17    TPro  7.3  /  Alb  4.1  /  TBili  0.9  /  DBili  x   /  AST  30  /  ALT  25  /  AlkPhos  64  01-17    Creatinine Trend: 0.87<--  PTT - ( 17 Jan 2018 18:40 )  PTT:20.5 sec      Imaging reviewed personally.  CTA chest L main PE extending into the L lower lobar and lingular branches, R-sided PEs, and a dilated RV with straightening of the IV septum concerning for R heart strain. .  Labs reviewed personally.                          13.0   17.5  )-----------( 187      ( 17 Jan 2018 18:40 )             37.4     Hgb Trend: 13.0<--  01-17    137  |  98  |  18  ----------------------------<  116<H>  4.7   |  23  |  0.87    Ca    9.6      17 Jan 2018 18:40  Phos  3.0     01-17  Mg     1.9     01-17    TPro  7.3  /  Alb  4.1  /  TBili  0.9  /  DBili  x   /  AST  30  /  ALT  25  /  AlkPhos  64  01-17    Creatinine Trend: 0.87<--  PTT - ( 17 Jan 2018 18:40 )  PTT:20.5 sec      Imaging reviewed personally.  CTA chest L main PE extending into the L lower lobar and lingular branches, R-sided PEs, and a dilated RV with straightening of the IV septum concerning for R heart strain.        EKG reviewed personally.  NSR, no S1Q3T3, HR 84, QTc 427, no ARIELLA/D noted Labs, tracing, imaging reviewed personally.                        13.0   17.5  )-----------( 187      ( 17 Jan 2018 18:40 )             37.4     Hgb Trend: 13.0<--  01-17    137  |  98  |  18  ----------------------------<  116<H>  4.7   |  23  |  0.87    Ca    9.6      17 Jan 2018 18:40  Phos  3.0     01-17  Mg     1.9     01-17    TPro  7.3  /  Alb  4.1  /  TBili  0.9  /  DBili  x   /  AST  30  /  ALT  25  /  AlkPhos  64  01-17    Creatinine Trend: 0.87<--  PTT - ( 17 Jan 2018 18:40 )  PTT:20.5 sec    Imaging reviewed personally.  CTA chest L main PE extending into the L lower lobar and lingular branches, R-sided PEs, and a dilated RV with straightening of the IV septum concerning for R heart strain.    EKG reviewed personally.  NSR, no S1Q3T3, HR 84, QTc 427, no ARIELLA/D noted

## 2018-01-18 NOTE — DISCHARGE NOTE ADULT - CARE PROVIDER_API CALL
Naif Shen  [address] [place] [address]  Phone: [phone]  Fax: (   )    -    Ha Birch  [address] [place] [address]  Phone: [phone]  Fax: (   )    -    Ubaldo Navarro (DO), Internal Medicine  [address] [place] [address]  Phone: [phone]  Fax: [phone] Last, Ubaldo RODRIGUEZ), Internal Medicine  [address] [place] [address]  Phone: [phone]  Fax: [phone]    Naif Shen  [address]  [place] [address]  Phone: [phone]  Fax: (   )    -    Ha Birch  [address] [place] [address]  Phone: [phone]  Fax: (   )    -    Yumi Thompson), Internal Medicine; Rheumatology  [address] [address]  Phone: [phone]  Fax: [phone] Last, Ubaldo SMITH (DO), Internal Medicine  [address] [place] [address]  Phone: [phone]  Fax: [phone]    Naif Shen  [address] [place] [address]  Phone: [phone]  Fax: (   )    -    Ha Birch  [address] [place] [address]  Phone: [phone]  Fax: (   )    -    Al Sparks (DO), Internal Medicine; Nuclear Cardiology  [address] [place] [address]  Phone: [phone]  Fax: [phone]

## 2018-01-18 NOTE — H&P ADULT - PROBLEM SELECTOR PLAN 9
- On therapeutic AC.    Dispo: pending transition to PO / injectable AC.    Pat Bruno MD  PGY-2 | Internal Medicine  [phone] / [phone]

## 2018-01-18 NOTE — H&P ADULT - PROBLEM SELECTOR PROBLEM 5
Need for prophylactic measure Nutrition, metabolism, and development symptoms Overactive bladder GERD (gastroesophageal reflux disease)

## 2018-01-18 NOTE — PROGRESS NOTE ADULT - PROBLEM SELECTOR PLAN 8
- DVT ppx- active therapeutic AC  - Diet- regular  - Dispo- pending transition to PO / injectable AC.    Pat Bruno MD  PGY-2 | Internal Medicine  [phone] / [phone] - DVT ppx- active therapeutic AC  - Diet- regular  - Dispo- pending transition to PO / injectable AC. - Continue sertraline.

## 2018-01-18 NOTE — H&P ADULT - PROBLEM SELECTOR PLAN 3
- Pantoprazole in place of home dexlansoprazole. - Patient will ask  to bring home dexlansoprazole. - Pt with CVA in 2012 c/b microhemorrhages and seizure d/o.  - Patient reports being taken off AEDs "a long time ago."  - Seizure precautions. - Suspect 2/2 chronic steroid use.  - Check CBC with diff in AM.  - Patient without localizing signs of infection, continue to monitor clinical status.

## 2018-01-18 NOTE — PROGRESS NOTE ADULT - PROBLEM SELECTOR PLAN 9
- DVT ppx- active therapeutic AC  - Diet- regular  - Dispo- pending transition to PO / injectable AC.

## 2018-01-18 NOTE — DISCHARGE NOTE ADULT - SECONDARY DIAGNOSIS.
CVA (cerebral vascular accident) Depression Overactive bladder GERD (gastroesophageal reflux disease) Conjunctivitis, acute

## 2018-01-18 NOTE — H&P ADULT - ASSESSMENT
72F h/o Hodgkin's lymphoma s/p chemo (in remission), pelvic DVT in the setting of lymphoma, CVA c/b microhemorrhagic infarcts (2012) while on AC, seizure d/o, ?vasculitis (on chronic prednisone), HLD, GERD here for chest pain found to have bilateral PEs. 72F h/o Hodgkin's lymphoma s/p chemo (in remission), pelvic DVT in the setting of lymphoma, CVA c/b microhemorrhagic infarcts (2012) while on AC, seizure d/o, unspecified autoimmune condition (on chronic prednisone), HLD, GERD here for chest pain found to have bilateral PEs. 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

## 2018-01-18 NOTE — DISCHARGE NOTE ADULT - ADDITIONAL INSTRUCTIONS
Given your last visit to gyn was over a year ago. It is recommended that you follow up with your GYN or a GYN within our health system. You have a known thickening of your endometrium in your uterus. Our house GYN physicians have recommended endometrial sampling and pelvic ultrasound to be preformed performed as an outpatient. Call [phone] to set up an appointment with a gynecologist within our health system.

## 2018-01-18 NOTE — PROGRESS NOTE ADULT - PROBLEM SELECTOR PLAN 5
- Patient will ask  to bring home dexlansoprazole. - Pt with CVA in 2012 c/b microhemorrhages and seizure d/o.  - Patient reports being taken off AEDs "a long time ago."  - Seizure precautions.

## 2018-01-18 NOTE — DISCHARGE NOTE ADULT - PLAN OF CARE
Please follow up with your primary care physician within 1-2 weeks You came in for chest pain and difficulty breathing. You were found to have blood clots in your lungs as well as your left leg. You were treated with a blood thinner called lovenox, which you had been on before for a clot in your pelvis. It is very important that you continue to take this medication as prescribed so the clot does not get bigger. Please follow up with your primary care doctor as well as your oncologist for cancer screening. Please let your doctors know that you had a scan of your lungs and belly while hospitalized You came in for chest pain and difficulty breathing. You were found to have blood clots in your lungs as well as your left leg. You were treated with a blood thinner called lovenox, which you had been on before for a clot in your pelvis. It is very important that you continue to take this medication as prescribed so the clot does not get bigger. Please follow up with your primary care doctor as well as your oncologist for cancer screening. Please let your doctors know that you had a scan of your lungs and belly while hospitalized. You were discharged on the blood thinner Eliquis. Please take this medication 10mG twice daily for 7 days. Afterwards, continue with 5mG twice a day. Also please follow up with the cardiology clinic. Contact information to set up an appointment is listed below. Please continue to take your antibiotic eye drops for the next 3 days to complete Please continue to take your antibiotic eye drops for the next 3 days to complete a 5 day course Continue with your atorvastatin   Please follow up with your neurologist within 1 week of discharge. You came in for chest pain and difficulty breathing. You were found to have blood clots in your lungs as well as your left leg. You were treated with a blood thinner called lovenox, which you had been on before for a clot in your pelvis. It is very important that you continue to take this medication as prescribed so the clot does not get bigger. Please follow up with your primary care doctor as well as your oncologist for cancer screening. Please let your doctors know that you had a scan of your lungs and belly while hospitalized. You were discharged on the blood thinner Eliquis. Please take this medication 10mG twice daily for 7 days. Afterwards, continue with 5mG twice a day. Also please follow up with the cardiology clinic. Contact information for cardiologist Dr. Al Sparks is listed below. Please continue with your home medication. Follow up with your primary care physician within 1-2 weeks of discharge. Please continue with your vesicare. Follow up with your primary care physician within 1-2 weeks of discharge. Please follow up with cardiology within 1 week of discharge

## 2018-01-18 NOTE — H&P ADULT - HISTORY OF PRESENT ILLNESS
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    The patient reports __    In the ED, Tmax 97.9F, HR 80-86, -136/80-80, SpO2 95-97% RA.  WBC 17.5, Hgb 13.0, plt 187, Na 137, K 4.7, Cr 0.87, Arvin negative x1, .  D-dimer 510.  RVP negative.  CTA chest performed, which showed L main PE extending into the L lower lobar and lingular branches, R-sided PEs, and a dilated RV with straightening of the IV septum concerning for R heart strain.  Patient given acetaminophen IV x1, heparin gtt.  Patient evaluated by MICU, not accepted. 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    The patient reports __    In the ED, Tmax 97.9F, HR 80-86, -136/80-80, SpO2 95-97% RA.  WBC 17.5, Hgb 13.0, plt 187, Na 137, K 4.7, Cr 0.87, Arvin negative x1, .  D-dimer 510.  RVP negative.  CTA chest performed, which showed L main PE extending into the L lower lobar and lingular branches, R-sided PEs, and a dilated RV with straightening of the IV septum concerning for R heart strain.  Patient given acetaminophen IV x1, heparin gtt.  Patient evaluated by MICU, not accepted. 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    For the past two weeks, the patient has been feeling "off" and for the past 5 days she has felt increasingly short of breath to where going up a flight of stairs makes her feel SOB.  Today, she felt a band of chest pain similar to when her GERD acts up.  She took dexlansoprazole, ranitidine and rolaid but it did not get better.  Then, the pain migrated to the left side of her chest and became sharp, 8-9/10, non-radiating pain when she inspires.  She went to urgent care where the pain increased and an ambulance was called to take her to the ED.  The patient denies palpitations, nausea, vomiting, fever, chills, runny nose or recent sick contacts. She denies recent travel, plane rides or car rides.     In the ED, Tmax 97.9F, HR 80-86, -136/80-80, SpO2 95-97% RA.  WBC 17.5, Hgb 13.0, plt 187, Na 137, K 4.7, Cr 0.87, Arvin negative x1, .  D-dimer 510.  RVP negative.  CTA chest performed, which showed L main PE extending into the L lower lobar and lingular branches, R-sided PEs, and a dilated RV with straightening of the IV septum concerning for R heart strain.  Patient given acetaminophen IV x1, heparin gtt.  Patient evaluated by MICU, not accepted.    Currently, the patient feels occasional pleuritic chest pain and mild SOB. 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    For the past two weeks, the patient has been feeling "off" and for the past 5 days she has felt increasingly short of breath to where going up a flight of stairs makes her feel SOB.  Today, she felt a band of chest pain similar to when her GERD acts up.  She took dexlansoprazole, ranitidine and rolaid but it did not get better.  Then, the pain migrated to the left side of her chest and became sharp, 8-9/10, non-radiating pain when she inspires.  She went to urgent care where the pain increased and an ambulance was called to take her to the ED.  The patient denies palpitations, nausea, vomiting, fever, chills, runny nose, or recent sick contacts. She denies recent travel, plane rides, or car rides.     In the ED, Tmax 97.9F, HR 80-86, -136/80-80, SpO2 95-97% RA.  WBC 17.5, Hgb 13.0, plt 187, Na 137, K 4.7, Cr 0.87, Arvin negative x1, .  D-dimer 510.  RVP negative.  CTA chest performed, which showed L main PE extending into the L lower lobar and lingular branches, R-sided PEs, and a dilated RV with straightening of the IV septum concerning for R heart strain.  Patient given acetaminophen IV x1, started on heparin gtt.  Patient evaluated by MICU, not accepted.    Currently, the patient feels occasional pleuritic chest pain and mild SOB. 72F h/o Hodgkin's lymphoma s/p chemo (in remission), pelvic DVT in the setting of lymphoma, microhemorrhagic CVA (2012) 2/2 unspecified autoimmune condition c/b seizure d/o, osteoporosis 2/2 chronic prednisone use, HLD, GERD here for chest pain.    For the past two weeks, the patient has been feeling "off" and for the past 5 days she has felt increasingly short of breath to where going up a flight of stairs makes her feel SOB.  Today, she felt a band of chest pain similar to when her GERD acts up.  She took dexlansoprazole, ranitidine and rolaid but it did not get better.  Then, the pain migrated to the left side of her chest and became sharp, 8-9/10, non-radiating pain when she inspires.  She went to urgent care where the pain increased and an ambulance was called to take her to the ED.  The patient denies palpitations, nausea, vomiting, fever, chills, runny nose, or recent sick contacts. She denies recent travel, plane rides, or car rides.     In the ED, Tmax 97.9F, HR 80-86, -136/80-80, SpO2 95-97% RA.  WBC 17.5, Hgb 13.0, plt 187, Na 137, K 4.7, Cr 0.87, Arvin negative x1, .  D-dimer 510.  RVP negative.  CTA chest performed, which showed L main PE extending into the L lower lobar and lingular branches, R-sided PEs, and a dilated RV with straightening of the IV septum concerning for R heart strain.  Patient given acetaminophen IV x1, started on heparin gtt.  Patient evaluated by MICU, not accepted.    Currently, the patient feels occasional pleuritic chest pain and mild SOB.

## 2018-01-18 NOTE — PROGRESS NOTE ADULT - PROBLEM SELECTOR PLAN 4
- Pt with CVA in 2012 c/b microhemorrhages and seizure d/o.  - Patient reports being taken off AEDs "a long time ago."  - Seizure precautions. - physical exam c/w cerebellar lesion. unchanged from baseline per patient  - f/u CTH non con.

## 2018-01-18 NOTE — PROGRESS NOTE ADULT - SUBJECTIVE AND OBJECTIVE BOX
Patient is a 72y old  Female who presents with a chief complaint of chest pain (18 Jan 2018 01:43)      SUBJECTIVE / OVERNIGHT EVENTS:      ROS:  Gen: No fever, chills, pain  Resp: No cough, SOB  CV: No chest pain, palpitations  GI: No nausea, vomiting, abdominal pain, diarrhea, constipation, melena, hematcohezia  MSK: No arthralgia, weakness, parasthesia  Skin: No rash  ROS negative unless otherwise noted    MEDICATIONS  (STANDING):  atorvastatin 20 milliGRAM(s) Oral at bedtime  cholecalciferol 1000 Unit(s) Oral daily  docusate sodium 100 milliGRAM(s) Oral three times a day  fluticasone propionate 50 MICROgram(s)/spray Nasal Spray 1 Spray(s) Both Nostrils daily  heparin  Infusion.  Unit(s)/Hr (13 mL/Hr) IV Continuous <Continuous>  predniSONE   Tablet 5 milliGRAM(s) Oral every other day  senna 2 Tablet(s) Oral at bedtime  sertraline 50 milliGRAM(s) Oral daily    MEDICATIONS  (PRN):  acetaminophen   Tablet. 650 milliGRAM(s) Oral every 6 hours PRN Mild Pain (1 - 3)  heparin  Injectable 6000 Unit(s) IV Push every 6 hours PRN For aPTT less than 40  heparin  Injectable 3000 Unit(s) IV Push every 6 hours PRN For aPTT between 40 - 57  oxyCODONE    IR 5 milliGRAM(s) Oral every 6 hours PRN Moderate Pain (4 - 6)  oxyCODONE    IR 10 milliGRAM(s) Oral every 6 hours PRN Severe Pain (7 - 10)      CAPILLARY BLOOD GLUCOSE          PHYSICAL EXAM:  T(C): 36.6 (01-18-18 @ 07:09), Max: 36.6 (01-17-18 @ 18:06)  HR: 64 (01-18-18 @ 07:09) (64 - 86)  BP: 119/42 (01-18-18 @ 07:09) (110/66 - 136/80)  RR: 18 (01-18-18 @ 07:09) (18 - 22)  SpO2: 100% (01-18-18 @ 07:09) (95% - 100%)  Wt(kg): --  Daily     Daily   I&O's Summary      GENERAL: NAD, well-developed  HEAD:  Atraumatic, Normocephalic  EYES: EOMI, PERRLA, conjunctiva and sclera clear  NECK: Supple, No JVD  CHEST/LUNG: Clear to auscultation bilaterally; No wheezes, rales or rhonchi   HEART: Regular rate and rhythm; No murmurs, rubs, or gallops  ABDOMEN: Soft, Nontender, Nondistended; Bowel sounds present  EXTREMITIES:  2+ Peripheral Pulses, No clubbing, cyanosis, or edema  PSYCH: AAOx3  NEUROLOGY: non-focal  SKIN: No rashes or lesions    LABS:                        11.8   12.7  )-----------( 175      ( 18 Jan 2018 03:17 )             33.8     01-18    141  |  102  |  16  ----------------------------<  112<H>  3.6   |  24  |  0.79    Ca    8.6      18 Jan 2018 05:38  Phos  3.4     01-18  Mg     1.8     01-18    TPro  7.3  /  Alb  4.1  /  TBili  0.9  /  DBili  x   /  AST  30  /  ALT  25  /  AlkPhos  64  01-17    PT/INR - ( 18 Jan 2018 03:17 )   PT: 15.5 sec;   INR: 1.42 ratio         PTT - ( 18 Jan 2018 03:17 )  PTT:154.5 sec  CARDIAC MARKERS ( 18 Jan 2018 05:38 )  x     / <0.01 ng/mL / 52 U/L / x     / 1.4 ng/mL  CARDIAC MARKERS ( 17 Jan 2018 18:40 )  x     / <0.01 ng/mL / 99 U/L / x     / 1.5 ng/mL            RADIOLOGY & ADDITIONAL TESTS:  < from: CT Angio Chest w/ IV Cont (01.17.18 @ 20:08) >  IMPRESSION:  Left main pulmonary artery embolus extending into the left lower lobar   and lingular branches. Additional right-sided pulmonary emboli within the   right lower and right middle lobar pulmonary arteries.    Dilated right ventricle with straightening of the intraventricular septum   which can be seen in setting of right heart strain, further evaluation   with echocardiogram is recommended.    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    For the past two weeks, the patient has been feeling "off" and for the past 5 days she has felt increasingly short of breath to where going up a flight of stairs makes her feel SOB.  Today, she felt a band of chest pain similar to when her GERD acts up.  She took dexlansoprazole, ranitidine and rolaid but it did not get better.  Then, the pain migrated to the left side of her chest and became sharp, 8-9/10, non-radiating pain when she inspires.  She went to urgent care where the pain increased and an ambulance was called to take her to the ED.  The patient denies palpitations, nausea, vomiting, fever, chills, runny nose, or recent sick contacts. She denies recent travel, plane rides, or car rides.     In the ED, Tmax 97.9F, HR 80-86, -136/80-80, SpO2 95-97% RA.  WBC 17.5, Hgb 13.0, plt 187, Na 137, K 4.7, Cr 0.87, Arvin negative x2, .  D-dimer 510.  RVP negative.  CTA chest performed, which showed L main PE extending into the L lower lobar and lingular branches, R-sided PEs, and a dilated RV with straightening of the IV septum concerning for R heart strain.  Patient given acetaminophen IV x1, started on heparin gtt.  Patient evaluated by MICU, not accepted.    Spoke to neurologist over the phone who stated that patient was worked up extensively for CVA in 2012. It was thought to be an encephalitis with a hemorrhagic component. However, patient had workup which included- CSF studies, vasculitis, autoimmune diseases, brain biopsy. He would prefer to keep her on prednisone and does not have an objection to lovenox.     Currently, the patient feels occasional pleuritic chest pain. Also complaining of dry mouth. States that this dryness is new. Denies any eye dryness or difficulty swallowing.      ROS:  Gen: No fever, chills, pain  Resp: No cough, SOB  CV: No chest pain, palpitations  GI: No nausea, vomiting, abdominal pain, diarrhea, constipation, melena, hematochezia  : hematuria, h/o miscarriages.   MSK: No arthralgia, weakness, paresthesia, morning stiffness   Skin: No rash  ROS negative unless otherwise noted    MEDICATIONS  (STANDING):  atorvastatin 20 milliGRAM(s) Oral at bedtime  cholecalciferol 1000 Unit(s) Oral daily  docusate sodium 100 milliGRAM(s) Oral three times a day  fluticasone propionate 50 MICROgram(s)/spray Nasal Spray 1 Spray(s) Both Nostrils daily  heparin  Infusion.  Unit(s)/Hr (13 mL/Hr) IV Continuous <Continuous>  predniSONE   Tablet 5 milliGRAM(s) Oral every other day  senna 2 Tablet(s) Oral at bedtime  sertraline 50 milliGRAM(s) Oral daily    MEDICATIONS  (PRN):  acetaminophen   Tablet. 650 milliGRAM(s) Oral every 6 hours PRN Mild Pain (1 - 3)  heparin  Injectable 6000 Unit(s) IV Push every 6 hours PRN For aPTT less than 40  heparin  Injectable 3000 Unit(s) IV Push every 6 hours PRN For aPTT between 40 - 57  oxyCODONE    IR 5 milliGRAM(s) Oral every 6 hours PRN Moderate Pain (4 - 6)  oxyCODONE    IR 10 milliGRAM(s) Oral every 6 hours PRN Severe Pain (7 - 10)      CAPILLARY BLOOD GLUCOSE          PHYSICAL EXAM:  T(C): 36.6 (01-18-18 @ 07:09), Max: 36.6 (01-17-18 @ 18:06)  HR: 64 (01-18-18 @ 07:09) (64 - 86)  BP: 119/42 (01-18-18 @ 07:09) (110/66 - 136/80)  RR: 18 (01-18-18 @ 07:09) (18 - 22)  SpO2: 100% (01-18-18 @ 07:09) (95% - 100%)  Wt(kg): --  Daily     Daily   I&O's Summary      GENERAL: NAD, well-developed  HEAD:  Atraumatic, Normocephalic  EYES: EOMI, PERRLA, conjunctiva and sclera clear  NECK: Supple, No JVD  CHEST/LUNG: Clear to auscultation bilaterally; No wheezes, rales or rhonchi   HEART: Regular rate and rhythm; No murmurs, rubs, or gallops  ABDOMEN: Soft, Nontender, Nondistended; Bowel sounds present  EXTREMITIES:  2+ Peripheral Pulses, No clubbing, cyanosis, or edema  PSYCH: AAOx3  NEUROLOGY:   - Motor: CN2-12 intact, 5/5 strength in UE and LE  - Sensation: grossly intact  - Mild dysdiadokinesia of left hand, mild difficulty with FNF with left hand, able to perform heel knee shin   - Observed patient ambulating.   SKIN: No rashes or lesions    LABS:                        11.8   12.7  )-----------( 175      ( 18 Jan 2018 03:17 )             33.8     01-18    141  |  102  |  16  ----------------------------<  112<H>  3.6   |  24  |  0.79    Ca    8.6      18 Jan 2018 05:38  Phos  3.4     01-18  Mg     1.8     01-18    TPro  7.3  /  Alb  4.1  /  TBili  0.9  /  DBili  x   /  AST  30  /  ALT  25  /  AlkPhos  64  01-17    PT/INR - ( 18 Jan 2018 03:17 )   PT: 15.5 sec;   INR: 1.42 ratio         PTT - ( 18 Jan 2018 03:17 )  PTT:154.5 sec  CARDIAC MARKERS ( 18 Jan 2018 05:38 )  x     / <0.01 ng/mL / 52 U/L / x     / 1.4 ng/mL  CARDIAC MARKERS ( 17 Jan 2018 18:40 )  x     / <0.01 ng/mL / 99 U/L / x     / 1.5 ng/mL            RADIOLOGY & ADDITIONAL TESTS:  < from: CT Angio Chest w/ IV Cont (01.17.18 @ 20:08) >  IMPRESSION:  Left main pulmonary artery embolus extending into the left lower lobar   and lingular branches. Additional right-sided pulmonary emboli within the   right lower and right middle lobar pulmonary arteries.    Dilated right ventricle with straightening of the intraventricular septum   which can be seen in setting of right heart strain, further evaluation   with echocardiogram is recommended.    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    For the past two weeks, the patient has been feeling "off" and for the past 5 days she has felt increasingly short of breath to where going up a flight of stairs makes her feel SOB.  Today, she felt a band of chest pain similar to when her GERD acts up.  She took dexlansoprazole, ranitidine and rolaid but it did not get better.  Then, the pain migrated to the left side of her chest and became sharp, 8-9/10, non-radiating pain when she inspires.  She went to urgent care where the pain increased and an ambulance was called to take her to the ED.  The patient denies palpitations, nausea, vomiting, fever, chills, runny nose, or recent sick contacts. She denies recent travel, plane rides, or car rides.     In the ED, Tmax 97.9F, HR 80-86, -136/80-80, SpO2 95-97% RA.  WBC 17.5, Hgb 13.0, plt 187, Na 137, K 4.7, Cr 0.87, Arvin negative x2, .  D-dimer 510.  RVP negative.  CTA chest performed, which showed L main PE extending into the L lower lobar and lingular branches, R-sided PEs, and a dilated RV with straightening of the IV septum concerning for R heart strain.  Patient given acetaminophen IV x1, started on heparin gtt.  Patient evaluated by MICU, not accepted.    Spoke to neurologist over the phone who stated that patient was worked up extensively for CVA in 2012. It was thought to be an encephalitis with a hemorrhagic component. However, patient had workup which included- CSF studies, vasculitis, autoimmune diseases, brain biopsy. He would prefer to keep her on prednisone and does not have an objection to lovenox.     Currently, the patient feels occasional pleuritic chest pain. Also complaining of dry mouth. States that this dryness is new. Denies any eye dryness or difficulty swallowing.      ROS:  Gen: No fever, chills, pain  Resp: No cough, SOB  CV: No chest pain, palpitations  GI: No nausea, vomiting, abdominal pain, diarrhea, constipation, melena, hematochezia  : hematuria, h/o miscarriages.   MSK: No arthralgia, weakness, paresthesia, morning stiffness   Skin: No rash  ROS negative unless otherwise noted    MEDICATIONS  (STANDING):  atorvastatin 20 milliGRAM(s) Oral at bedtime  cholecalciferol 1000 Unit(s) Oral daily  docusate sodium 100 milliGRAM(s) Oral three times a day  fluticasone propionate 50 MICROgram(s)/spray Nasal Spray 1 Spray(s) Both Nostrils daily  heparin  Infusion.  Unit(s)/Hr (13 mL/Hr) IV Continuous <Continuous>  predniSONE   Tablet 5 milliGRAM(s) Oral every other day  senna 2 Tablet(s) Oral at bedtime  sertraline 50 milliGRAM(s) Oral daily    MEDICATIONS  (PRN):  acetaminophen   Tablet. 650 milliGRAM(s) Oral every 6 hours PRN Mild Pain (1 - 3)  heparin  Injectable 6000 Unit(s) IV Push every 6 hours PRN For aPTT less than 40  heparin  Injectable 3000 Unit(s) IV Push every 6 hours PRN For aPTT between 40 - 57  oxyCODONE    IR 5 milliGRAM(s) Oral every 6 hours PRN Moderate Pain (4 - 6)  oxyCODONE    IR 10 milliGRAM(s) Oral every 6 hours PRN Severe Pain (7 - 10)      CAPILLARY BLOOD GLUCOSE          PHYSICAL EXAM:  T(C): 36.6 (01-18-18 @ 07:09), Max: 36.6 (01-17-18 @ 18:06)  HR: 64 (01-18-18 @ 07:09) (64 - 86)  BP: 119/42 (01-18-18 @ 07:09) (110/66 - 136/80)  RR: 18 (01-18-18 @ 07:09) (18 - 22)  SpO2: 100% (01-18-18 @ 07:09) (95% - 100%)  Wt(kg): --  Daily     Daily   I&O's Summary      GENERAL: NAD, well-developed  HEAD:  Atraumatic, Normocephalic  EYES: EOMI, PERRLA, conjunctiva and sclera clear  NECK: Supple, No JVD  CHEST/LUNG: Clear to auscultation bilaterally; No wheezes, rales or rhonchi   HEART: Regular rate and rhythm; No murmurs, rubs, or gallops  ABDOMEN: Soft, Nontender, Nondistended; Bowel sounds present  EXTREMITIES:  2+ Peripheral Pulses, No clubbing, cyanosis, or edema  PSYCH: AAOx3  NEUROLOGY:   - Motor: CN2-12 intact, 5/5 strength in UE and LE  - Sensation: grossly intact  - Mild dysdiadokinesia of left hand, mild dymetria left hand, able to perform heel knee shin   - Observed patient ambulating.   SKIN: No rashes or lesions    LABS:                        11.8   12.7  )-----------( 175      ( 18 Jan 2018 03:17 )             33.8     01-18    141  |  102  |  16  ----------------------------<  112<H>  3.6   |  24  |  0.79    Ca    8.6      18 Jan 2018 05:38  Phos  3.4     01-18  Mg     1.8     01-18    TPro  7.3  /  Alb  4.1  /  TBili  0.9  /  DBili  x   /  AST  30  /  ALT  25  /  AlkPhos  64  01-17    PT/INR - ( 18 Jan 2018 03:17 )   PT: 15.5 sec;   INR: 1.42 ratio         PTT - ( 18 Jan 2018 03:17 )  PTT:154.5 sec  CARDIAC MARKERS ( 18 Jan 2018 05:38 )  x     / <0.01 ng/mL / 52 U/L / x     / 1.4 ng/mL  CARDIAC MARKERS ( 17 Jan 2018 18:40 )  x     / <0.01 ng/mL / 99 U/L / x     / 1.5 ng/mL            RADIOLOGY & ADDITIONAL TESTS:  < from: CT Angio Chest w/ IV Cont (01.17.18 @ 20:08) >  IMPRESSION:  Left main pulmonary artery embolus extending into the left lower lobar   and lingular branches. Additional right-sided pulmonary emboli within the   right lower and right middle lobar pulmonary arteries.    Dilated right ventricle with straightening of the intraventricular septum   which can be seen in setting of right heart strain, further evaluation   with echocardiogram is recommended.    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    For the past two weeks, the patient has been feeling "off" and for the past 5 days she has felt increasingly short of breath to where going up a flight of stairs makes her feel SOB.  Today, she felt a band of chest pain similar to when her GERD acts up.  She took dexlansoprazole, ranitidine and rolaid but it did not get better.  Then, the pain migrated to the left side of her chest and became sharp, 8-9/10, non-radiating pain when she inspires.  She went to urgent care where the pain increased and an ambulance was called to take her to the ED.  The patient denies palpitations, nausea, vomiting, fever, chills, runny nose, or recent sick contacts. She denies recent travel, plane rides, or car rides.     In the ED, Tmax 97.9F, HR 80-86, -136/80-80, SpO2 95-97% RA.  WBC 17.5, Hgb 13.0, plt 187, Na 137, K 4.7, Cr 0.87, Arvin negative x2, .  D-dimer 510.  RVP negative.  CTA chest performed, which showed L main PE extending into the L lower lobar and lingular branches, R-sided PEs, and a dilated RV with straightening of the IV septum concerning for R heart strain.  Patient given acetaminophen IV x1, started on heparin gtt.  Patient evaluated by MICU, not accepted.    Spoke to neurologist over the phone who stated that patient was worked up extensively for CVA in 2012. It was thought to be an encephalitis with a hemorrhagic component. However, patient had workup which included- CSF studies, vasculitis, autoimmune diseases, brain biopsy. He would prefer to keep her on prednisone and does not have an objection to lovenox.     Currently, the patient feels occasional pleuritic chest pain. Also complaining of dry mouth. States that this dryness is new. Denies any eye dryness or difficulty swallowing.    ROS:  Gen: No fever, chills, pain  Resp: No cough, SOB  CV: No chest pain, palpitations  GI: No nausea, vomiting, abdominal pain, diarrhea, constipation, melena, hematochezia  : hematuria, h/o miscarriages.   MSK: No arthralgia, weakness, paresthesia, morning stiffness   Skin: No rash  ROS negative unless otherwise noted    MEDICATIONS  (STANDING):  atorvastatin 20 milliGRAM(s) Oral at bedtime  cholecalciferol 1000 Unit(s) Oral daily  docusate sodium 100 milliGRAM(s) Oral three times a day  fluticasone propionate 50 MICROgram(s)/spray Nasal Spray 1 Spray(s) Both Nostrils daily  heparin  Infusion.  Unit(s)/Hr (13 mL/Hr) IV Continuous <Continuous>  predniSONE   Tablet 5 milliGRAM(s) Oral every other day  senna 2 Tablet(s) Oral at bedtime  sertraline 50 milliGRAM(s) Oral daily    MEDICATIONS  (PRN):  acetaminophen   Tablet. 650 milliGRAM(s) Oral every 6 hours PRN Mild Pain (1 - 3)  heparin  Injectable 6000 Unit(s) IV Push every 6 hours PRN For aPTT less than 40  heparin  Injectable 3000 Unit(s) IV Push every 6 hours PRN For aPTT between 40 - 57  oxyCODONE    IR 5 milliGRAM(s) Oral every 6 hours PRN Moderate Pain (4 - 6)  oxyCODONE    IR 10 milliGRAM(s) Oral every 6 hours PRN Severe Pain (7 - 10)    PHYSICAL EXAM:  T(C): 36.6 (01-18-18 @ 07:09), Max: 36.6 (01-17-18 @ 18:06)  HR: 64 (01-18-18 @ 07:09) (64 - 86)  BP: 119/42 (01-18-18 @ 07:09) (110/66 - 136/80)  RR: 18 (01-18-18 @ 07:09) (18 - 22)  SpO2: 100% (01-18-18 @ 07:09) (95% - 100%)    GENERAL: NAD, well-developed  HEAD:  Atraumatic, Normocephalic  EYES: EOMI, PERRLA, conjunctiva and sclera clear  NECK: Supple, No JVD  CHEST/LUNG: Clear to auscultation bilaterally; No wheezes, rales or rhonchi   HEART: Regular rate and rhythm; No murmurs, rubs, or gallops  ABDOMEN: Soft, Nontender, Nondistended; Bowel sounds present  EXTREMITIES:  2+ Peripheral Pulses, No clubbing, cyanosis, or edema  PSYCH: AAOx3  NEUROLOGY:   - Motor: CN2-12 intact, 5/5 strength in UE and LE  - Sensation: grossly intact  - Mild dysdiadokinesia of left hand, mild dymetria left hand, able to perform heel knee shin   - Observed patient ambulating.   SKIN: No rashes or lesions    LABS:                      11.8   12.7  )-----------( 175      ( 18 Jan 2018 03:17 )             33.8     01-18    141  |  102  |  16  ----------------------------<  112<H>  3.6   |  24  |  0.79    Ca    8.6      18 Jan 2018 05:38  Phos  3.4     01-18  Mg     1.8     01-18    TPro  7.3  /  Alb  4.1  /  TBili  0.9  /  DBili  x   /  AST  30  /  ALT  25  /  AlkPhos  64  01-17    PT/INR - ( 18 Jan 2018 03:17 )   PT: 15.5 sec;   INR: 1.42 ratio    PTT - ( 18 Jan 2018 03:17 )  PTT:154.5 sec    CARDIAC MARKERS ( 18 Jan 2018 05:38 )  x     / <0.01 ng/mL / 52 U/L / x     / 1.4 ng/mL  CARDIAC MARKERS ( 17 Jan 2018 18:40 )  x     / <0.01 ng/mL / 99 U/L / x     / 1.5 ng/mL    RADIOLOGY & ADDITIONAL TESTS (Imaging Personally Reviewed):  < from: CT Angio Chest w/ IV Cont (01.17.18 @ 20:08) >  IMPRESSION:  Left main pulmonary artery embolus extending into the left lower lobar   and lingular branches. Additional right-sided pulmonary emboli within the   right lower and right middle lobar pulmonary arteries.    Dilated right ventricle with straightening of the intraventricular septum   which can be seen in setting of right heart strain, further evaluation   with echocardiogram is recommended.    < end of copied text >    Consultant(s) Notes Reviewed: MICU  Care Discussed with Consultants/Other Providers: Outpatient neurologist, and outpatient oncologist    Oumou Sahni MD  PGY1 - Internal Medicine  [facility] [phone] /[place]ELI [id number]

## 2018-01-18 NOTE — DISCHARGE NOTE ADULT - MEDICATION SUMMARY - MEDICATIONS TO TAKE
I will START or STAY ON the medications listed below when I get home from the hospital:    predniSONE 5 mg oral delayed release tablet  -- 1 tab(s) by mouth every other day  -- Indication: For Autoimmune disease    acetaminophen 325 mg oral tablet  -- 2 tab(s) by mouth every 6 hours, As needed, Mild Pain (1 - 3)  -- Indication: For Pain    Eliquis 5 mg oral tablet  -- Take 2 tabs by mouth 2 times daily for 7 days, then take 1 tab(s) by mouth 2 times a day thereafter.   -- Check with your doctor before becoming pregnant.  It is very important that you take or use this exactly as directed.  Do not skip doses or discontinue unless directed by your doctor.  Obtain medical advice before taking any non-prescription drugs as some may affect the action of this medication.    -- Indication: For Other pulmonary embolism without acute cor pulmonale    sertraline 50 mg oral tablet  -- 1 tab(s) by mouth once a day  -- Indication: For Depression    atorvastatin 20 mg oral tablet  -- 1 tab(s) by mouth once a day  -- Indication: For CVA (cerebral vascular accident)    ferrous gluconate 325 mg (36 mg elemental iron) oral tablet  -- 1 tab(s) by mouth once a day   -- May discolor urine or feces.    -- Indication: For Need for prophylactic measure    MiraLax oral powder for reconstitution  -- 17 gram(s) by mouth once a day   -- Dilute this medication with liquid before administration.  It is very important that you take or use this exactly as directed.  Do not skip doses or discontinue unless directed by your doctor.    -- Indication: For Need for prophylactic measure    fluticasone 50 mcg/inh nasal spray  -- 1 spray(s) into nose once a day  -- Indication: For Need for prophylactic measure    polymyxin B-trimethoprim 10,000 units-1 mg/mL ophthalmic solution  -- 2 drop(s) to each affected eye 4 times a day for 3 more days  -- Indication: For Conjunctivitis, acute    dexlansoprazole 30 mg oral delayed release capsule  -- 1 cap(s) by mouth once a day  -- Indication: For GERD (gastroesophageal reflux disease)    VESIcare 10 mg oral tablet  -- 1 tab(s) by mouth once a day  -- Indication: For Overactive bladder    Vitamin D3 5000 intl units oral capsule  -- 1 cap(s) by mouth once a day  -- Indication: For Need for prophylactic measure

## 2018-01-18 NOTE — H&P ADULT - NSHPSOCIALHISTORY_GEN_ALL_CORE
never smoker, denies EtOH use, denies illicit drug use  lives with , 1 flight of stairs in house, independent of all ADLs, balance issues, retired schoolteacher

## 2018-01-18 NOTE — DISCHARGE NOTE ADULT - HOSPITAL COURSE
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 For the past two weeks, the patient has been feeling "off" and for the past 5 days she has felt increasingly short of breath to where going up a flight of stairs makes her feel SOB.  Day of admission, she felt a band of chest pain similar to when her GERD acts up.  She took dexlansoprazole, ranitidine and rolaid but it did not get better.  Then, the pain migrated to the left side of her chest and became sharp, 8-9/10, non-radiating pain when she inspires.  She went to urgent care where the pain increased and an ambulance was called to take her to the ED.  The patient denies palpitations, nausea, vomiting, fever, chills, runny nose, or recent sick contacts. She denies recent travel, plane rides, or car rides.     In the ED, afebrile and hemodynamically stable.  WBC 17.5, Hgb 13.0, plt 187, Na 137, K 4.7, Cr 0.87, Arvin negative x1, .  D-dimer 510.  RVP negative.  CTA chest performed, which showed L main PE extending into the L lower lobar and lingular branches, R-sided PEs, and a dilated RV with straightening of the IV septum concerning for R heart strain.  Patient given acetaminophen IV x1, started on heparin gtt.  Patient evaluated by MICU, not accepted. Outpatient neurologist was contacted, and patient was switched to therapeutic lovenox. Of note, neurologist stated that the patient had unspecified encephalitis with hemorrhagic component s/p extensive workup for autoimmune diseases which included CSF studies, and brain bx.     During the hospitalization, patient was also found to have a LLE femoral DVT. This prompted imaging evaluation for malignancy. Patient underwent CT abd/pelv with IV contrast which showed mild endometrial thickening. Ms. 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 MICU rejected given HD stability. Cardiology consulted. On floor CT ab/p pursued w/ hx of malignancy, demonstrated endometrial thickening; this is chronic since MRI 2013. Patient knows she needs sampling, refused TVUS in house: risks discussed with her. Gynecology evaluated and also discussed their impression that she needs TVUS, patient prefers to evaluate this as an outpatient. Highly adherent, has outpatient gyn and onc who she will follow with. Transitioned heparin drip to lovenox initially, then to eliquis: had been on lovenox in the past and found it terrible, felt strongly about taking oral anticoagulants. All questions re: AC, further evaluation were addressed prior to d/c; she will follow up w/ cardiology, gyn and primary oncologist. D/liliam in stable condition with resolution of pleuritic CP with instructions as above.     40 minutes spent on discharge planning.

## 2018-01-18 NOTE — PROGRESS NOTE ADULT - PROBLEM SELECTOR PLAN 2
- Continue prednisone 5mg q other day.  - Due to known hemorrhagic strokes 2/2 this unspecified condition, will continue neuro checks q6h. - Continue prednisone 5mg q other day.  - Due to known hemorrhagic strokes 2/2 this unspecified condition, will continue neuro checks q6h.  - rheumatic ROS neg.   - follows with Dr. Olsen

## 2018-01-18 NOTE — H&P ADULT - PROBLEM SELECTOR PLAN 8
- On therapeutic AC.    Dispo: pending transition to PO / injectable AC.    Pat Bruno MD  PGY-2 | Internal Medicine  [phone] / [phone] - Regular diet.

## 2018-01-18 NOTE — H&P ADULT - PROBLEM SELECTOR PLAN 2
- Continue prednisone 5mg q day. - Continue prednisone 5mg q other day. - Continue prednisone 5mg q other day.  - Due to known hemorrhagic strokes 2/2 this unspecified condition, will continue neuro checks q6h.

## 2018-01-18 NOTE — H&P ADULT - PROBLEM SELECTOR PLAN 7
- On therapeutic AC.    Dispo: pending transition to PO / injectable AC.    Pat Bruno MD  PGY-2 | Internal Medicine  [phone] / [phone] - Regular diet. - Continue sertraline.

## 2018-01-18 NOTE — DISCHARGE NOTE ADULT - CARE PLAN
Principal Discharge DX:	Pulmonary emboli  Goal:	Please follow up with your primary care physician within 1-2 weeks  Assessment and plan of treatment:	You came in for chest pain and difficulty breathing. You were found to have blood clots in your lungs as well as your left leg. You were treated with a blood thinner called lovenox, which you had been on before for a clot in your pelvis. It is very important that you continue to take this medication as prescribed so the clot does not get bigger. Please follow up with your primary care doctor as well as your oncologist for cancer screening. Please let your doctors know that you had a scan of your lungs and belly while hospitalized Principal Discharge DX:	Pulmonary emboli  Goal:	Please follow up with your primary care physician within 1-2 weeks  Assessment and plan of treatment:	You came in for chest pain and difficulty breathing. You were found to have blood clots in your lungs as well as your left leg. You were treated with a blood thinner called lovenox, which you had been on before for a clot in your pelvis. It is very important that you continue to take this medication as prescribed so the clot does not get bigger. Please follow up with your primary care doctor as well as your oncologist for cancer screening. Please let your doctors know that you had a scan of your lungs and belly while hospitalized. You were discharged on the blood thinner Eliquis. Please take this medication 10mG twice daily for 7 days. Afterwards, continue with 5mG twice a day. Also please follow up with the cardiology clinic. Contact information to set up an appointment is listed below.  Secondary Diagnosis:	Conjunctivitis, acute  Assessment and plan of treatment:	Please continue to take your antibiotic eye drops for the next 3 days to complete  Secondary Diagnosis:	CVA (cerebral vascular accident)  Secondary Diagnosis:	Depression  Secondary Diagnosis:	Overactive bladder  Secondary Diagnosis:	GERD (gastroesophageal reflux disease) Principal Discharge DX:	Pulmonary emboli  Goal:	Please follow up with your primary care physician within 1-2 weeks  Assessment and plan of treatment:	You came in for chest pain and difficulty breathing. You were found to have blood clots in your lungs as well as your left leg. You were treated with a blood thinner called lovenox, which you had been on before for a clot in your pelvis. It is very important that you continue to take this medication as prescribed so the clot does not get bigger. Please follow up with your primary care doctor as well as your oncologist for cancer screening. Please let your doctors know that you had a scan of your lungs and belly while hospitalized. You were discharged on the blood thinner Eliquis. Please take this medication 10mG twice daily for 7 days. Afterwards, continue with 5mG twice a day. Also please follow up with the cardiology clinic. Contact information to set up an appointment is listed below.  Secondary Diagnosis:	Conjunctivitis, acute  Assessment and plan of treatment:	Please continue to take your antibiotic eye drops for the next 3 days to complete a 5 day course  Secondary Diagnosis:	CVA (cerebral vascular accident)  Assessment and plan of treatment:	Continue with your atorvastatin   Please follow up with your neurologist within 1 week of discharge.  Secondary Diagnosis:	Depression  Secondary Diagnosis:	Overactive bladder  Secondary Diagnosis:	GERD (gastroesophageal reflux disease) Principal Discharge DX:	Pulmonary emboli  Goal:	Please follow up with your primary care physician within 1-2 weeks  Assessment and plan of treatment:	You came in for chest pain and difficulty breathing. You were found to have blood clots in your lungs as well as your left leg. You were treated with a blood thinner called lovenox, which you had been on before for a clot in your pelvis. It is very important that you continue to take this medication as prescribed so the clot does not get bigger. Please follow up with your primary care doctor as well as your oncologist for cancer screening. Please let your doctors know that you had a scan of your lungs and belly while hospitalized. You were discharged on the blood thinner Eliquis. Please take this medication 10mG twice daily for 7 days. Afterwards, continue with 5mG twice a day. Also please follow up with the cardiology clinic. Contact information for cardiologist Dr. Al Sparks is listed below.  Secondary Diagnosis:	Conjunctivitis, acute  Assessment and plan of treatment:	Please continue to take your antibiotic eye drops for the next 3 days to complete a 5 day course  Secondary Diagnosis:	CVA (cerebral vascular accident)  Assessment and plan of treatment:	Continue with your atorvastatin   Please follow up with your neurologist within 1 week of discharge.  Secondary Diagnosis:	Depression  Assessment and plan of treatment:	Please continue with your home medication. Follow up with your primary care physician within 1-2 weeks of discharge.  Secondary Diagnosis:	Overactive bladder  Assessment and plan of treatment:	Please continue with your vesicare. Follow up with your primary care physician within 1-2 weeks of discharge.  Secondary Diagnosis:	GERD (gastroesophageal reflux disease)  Assessment and plan of treatment:	Please continue with your home medication. Follow up with your primary care physician within 1-2 weeks of discharge. Principal Discharge DX:	Pulmonary emboli  Goal:	Please follow up with cardiology within 1 week of discharge  Assessment and plan of treatment:	You came in for chest pain and difficulty breathing. You were found to have blood clots in your lungs as well as your left leg. You were treated with a blood thinner called lovenox, which you had been on before for a clot in your pelvis. It is very important that you continue to take this medication as prescribed so the clot does not get bigger. Please follow up with your primary care doctor as well as your oncologist for cancer screening. Please let your doctors know that you had a scan of your lungs and belly while hospitalized. You were discharged on the blood thinner Eliquis. Please take this medication 10mG twice daily for 7 days. Afterwards, continue with 5mG twice a day. Also please follow up with the cardiology clinic. Contact information for cardiologist Dr. Al Sparks is listed below.  Secondary Diagnosis:	Conjunctivitis, acute  Assessment and plan of treatment:	Please continue to take your antibiotic eye drops for the next 3 days to complete a 5 day course  Secondary Diagnosis:	CVA (cerebral vascular accident)  Assessment and plan of treatment:	Continue with your atorvastatin   Please follow up with your neurologist within 1 week of discharge.  Secondary Diagnosis:	Depression  Assessment and plan of treatment:	Please continue with your home medication. Follow up with your primary care physician within 1-2 weeks of discharge.  Secondary Diagnosis:	Overactive bladder  Assessment and plan of treatment:	Please continue with your vesicare. Follow up with your primary care physician within 1-2 weeks of discharge.  Secondary Diagnosis:	GERD (gastroesophageal reflux disease)  Assessment and plan of treatment:	Please continue with your home medication. Follow up with your primary care physician within 1-2 weeks of discharge.

## 2018-01-18 NOTE — PROGRESS NOTE ADULT - ASSESSMENT
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 Clinically stable. 72F h/o Hodgkin's lymphoma (dx 2011 s/p RCHOP, in remission), pelvic DVT ( 2/2 lymphoma, tx w/6 months lovenox), microhemorrhagic CVA (2012) 2/2 possible autoimmune condition c/b seizure d/o, osteoporosis 2/2 chronic prednisone use, HLD, GERD presents with L lateral chest pain. Found to have bilateral submassive PEs with concern for R heart strain on CTA. Clinically stable.

## 2018-01-18 NOTE — H&P ADULT - FAMILY HISTORY
Father  Still living? Unknown  Family history of coronary artery disease in father, Age at diagnosis: Age Unknown     Mother  Still living? Unknown  Family history of coronary artery disease in father, Age at diagnosis: Age Unknown

## 2018-01-18 NOTE — H&P ADULT - PROBLEM SELECTOR PLAN 1
- Submassive PE.  ?provoked  - Continue heparin gtt.  - TTE to evaluate R heart strain.  - Arvin negative x1.  - Monitor BP carefully. - Submassive PE in the setting of known malignancy.  - Continue heparin gtt.  - TTE to evaluate R heart strain.  - Arvin negative x1.  - Monitor vitals carefully.  Vitals q4h. - Submassive PE in the setting of known malignancy.  - Continue heparin gtt.  - TTE to evaluate R heart strain.  - LE duplex.  - Arvin negative x1.  Continue to trend.  - Monitor vitals carefully.  Vitals q4h.  - Patient will require outpatient w/u / appropriate cancer screening.  - Consider Vascular consult for IVC filter.  - Consider further imaging to evaluate for malignancy. - Submassive PE in the setting of known malignancy.  - Continue heparin gtt.  - TTE to evaluate R heart strain.  - LE duplex.  - Arvin negative x1.  Continue to trend.  - Monitor vitals carefully.  Vitals q4h.  - Patient will require outpatient w/u / appropriate cancer screening.  - Consider Vascular consult for IVC filter.  - Consider further imaging to evaluate for malignancy.  - Cardiology called by ED, appreciate involvement.

## 2018-01-18 NOTE — H&P ADULT - PROBLEM SELECTOR PLAN 6
- On therapeutic AC.    Dispo: pending transition to PO / injectable AC.    Pat Bruno MD  PGY-2 | Internal Medicine  [phone] / [phone] - Regular diet. - Patient will ask  to bring home vesicare.

## 2018-01-19 LAB
HCT VFR BLD CALC: 33.9 % — LOW (ref 34.5–45)
HGB BLD-MCNC: 10.7 G/DL — LOW (ref 11.5–15.5)
MCHC RBC-ENTMCNC: 27.2 PG — SIGNIFICANT CHANGE UP (ref 27–34)
MCHC RBC-ENTMCNC: 31.6 GM/DL — LOW (ref 32–36)
MCV RBC AUTO: 86.3 FL — SIGNIFICANT CHANGE UP (ref 80–100)
PLATELET # BLD AUTO: 197 K/UL — SIGNIFICANT CHANGE UP (ref 150–400)
RBC # BLD: 3.93 M/UL — SIGNIFICANT CHANGE UP (ref 3.8–5.2)
RBC # FLD: 16.9 % — HIGH (ref 10.3–14.5)
WBC # BLD: 12.45 K/UL — HIGH (ref 3.8–10.5)
WBC # FLD AUTO: 12.45 K/UL — HIGH (ref 3.8–10.5)

## 2018-01-19 PROCEDURE — 99233 SBSQ HOSP IP/OBS HIGH 50: CPT | Mod: GC

## 2018-01-19 RX ADMIN — Medication 1000 UNIT(S): at 12:08

## 2018-01-19 RX ADMIN — Medication 100 MILLIGRAM(S): at 21:05

## 2018-01-19 RX ADMIN — ATORVASTATIN CALCIUM 20 MILLIGRAM(S): 80 TABLET, FILM COATED ORAL at 21:05

## 2018-01-19 RX ADMIN — ENOXAPARIN SODIUM 70 MILLIGRAM(S): 100 INJECTION SUBCUTANEOUS at 15:57

## 2018-01-19 RX ADMIN — Medication 100 MILLIGRAM(S): at 04:40

## 2018-01-19 RX ADMIN — SERTRALINE 50 MILLIGRAM(S): 25 TABLET, FILM COATED ORAL at 21:05

## 2018-01-19 RX ADMIN — ENOXAPARIN SODIUM 70 MILLIGRAM(S): 100 INJECTION SUBCUTANEOUS at 04:40

## 2018-01-19 RX ADMIN — OXYCODONE HYDROCHLORIDE 5 MILLIGRAM(S): 5 TABLET ORAL at 12:09

## 2018-01-19 RX ADMIN — Medication 1 DROP(S): at 23:26

## 2018-01-19 RX ADMIN — Medication 100 MILLIGRAM(S): at 15:57

## 2018-01-19 RX ADMIN — OXYCODONE HYDROCHLORIDE 5 MILLIGRAM(S): 5 TABLET ORAL at 12:40

## 2018-01-19 RX ADMIN — SENNA PLUS 2 TABLET(S): 8.6 TABLET ORAL at 21:05

## 2018-01-19 RX ADMIN — Medication 1 DROP(S): at 00:14

## 2018-01-19 RX ADMIN — Medication 1 SPRAY(S): at 12:08

## 2018-01-19 NOTE — PROGRESS NOTE ADULT - SUBJECTIVE AND OBJECTIVE BOX
Patient is a 72y old  Female who presents with a chief complaint of chest pain (18 Jan 2018 13:08)      SUBJECTIVE / OVERNIGHT EVENTS:      ROS:  Gen: No fever, chills, pain  Resp: No cough, SOB  CV: No chest pain, palpitations  GI: No nausea, vomiting, abdominal pain, diarrhea, constipation, melena, hematcohezia  MSK: No arthralgia, weakness, parasthesia  Skin: No rash  ROS negative unless otherwise noted    MEDICATIONS  (STANDING):  atorvastatin 20 milliGRAM(s) Oral at bedtime  cholecalciferol 1000 Unit(s) Oral daily  dexlansoprazole DR 30 milliGRAM(s) Oral daily  docusate sodium 100 milliGRAM(s) Oral three times a day  enoxaparin Injectable 70 milliGRAM(s) SubCutaneous every 12 hours  fluticasone propionate 50 MICROgram(s)/spray Nasal Spray 1 Spray(s) Both Nostrils daily  predniSONE   Tablet 5 milliGRAM(s) Oral every other day  senna 2 Tablet(s) Oral at bedtime  sertraline 50 milliGRAM(s) Oral daily  solifenacin 10 milliGRAM(s) Oral daily    MEDICATIONS  (PRN):  acetaminophen   Tablet. 650 milliGRAM(s) Oral every 6 hours PRN Mild Pain (1 - 3)  artificial  tears Solution 1 Drop(s) Right EYE daily PRN Dry Eyes  oxyCODONE    IR 5 milliGRAM(s) Oral every 6 hours PRN Moderate Pain (4 - 6)  oxyCODONE    IR 10 milliGRAM(s) Oral every 6 hours PRN Severe Pain (7 - 10)      CAPILLARY BLOOD GLUCOSE          PHYSICAL EXAM:  T(C): 36.9 (01-19-18 @ 14:25), Max: 37.6 (01-19-18 @ 00:16)  HR: 75 (01-19-18 @ 14:25) (74 - 85)  BP: 121/76 (01-19-18 @ 14:25) (110/67 - 126/60)  RR: 17 (01-19-18 @ 14:25) (17 - 19)  SpO2: 98% (01-19-18 @ 14:25) (96% - 99%)  Wt(kg): --  Daily     Daily   I&O's Summary      GENERAL: NAD, well-developed  HEAD:  Atraumatic, Normocephalic  EYES: EOMI, PERRLA, conjunctiva and sclera clear  NECK: Supple, No JVD  CHEST/LUNG: Clear to auscultation bilaterally; No wheezes, rales or rhonchi   HEART: Regular rate and rhythm; No murmurs, rubs, or gallops  ABDOMEN: Soft, Nontender, Nondistended; Bowel sounds present  EXTREMITIES:  2+ Peripheral Pulses, No clubbing, cyanosis, or edema  PSYCH: AAOx3  NEUROLOGY: non-focal  SKIN: No rashes or lesions    LABS:                        10.7   12.45 )-----------( 197      ( 19 Jan 2018 07:33 )             33.9     01-18    141  |  102  |  16  ----------------------------<  112<H>  3.6   |  24  |  0.79    Ca    8.6      18 Jan 2018 05:38  Phos  3.4     01-18  Mg     1.8     01-18    TPro  7.3  /  Alb  4.1  /  TBili  0.9  /  DBili  x   /  AST  30  /  ALT  25  /  AlkPhos  64  01-17    PT/INR - ( 18 Jan 2018 07:56 )   PT: 14.8 sec;   INR: 1.30 ratio    PTT - ( 18 Jan 2018 12:19 )  PTT:89.4 sec    CARDIAC MARKERS ( 18 Jan 2018 05:38 )  x     / <0.01 ng/mL / 52 U/L / x     / 1.4 ng/mL  CARDIAC MARKERS ( 17 Jan 2018 18:40 )  x     / <0.01 ng/mL / 99 U/L / x     / 1.5 ng/mL            RADIOLOGY & ADDITIONAL TESTS:    Imaging Personally Reviewed:  Consultant(s) Notes Reviewed  Care Discussed with Consultants/Other Providers    Oumou Sahni MD  PGY1 - Internal Medicine  [facility] [phone] /ARELI [id number] Patient is a 72y old  Female who presents with a chief complaint of chest pain (18 Jan 2018 13:08)      SUBJECTIVE / OVERNIGHT EVENTS: No overnight events. No new weakness or parasthesia . Able to ambulate to the bathroom and perform self care.      ROS:  Gen: No fever, chills, pain  Resp: No cough, SOB  CV: No chest pain, palpitations  GI: No nausea, vomiting, abdominal pain, diarrhea, constipation, melena, hematcohezia  MSK: No arthralgia  Skin: No rash  ROS negative unless otherwise noted    MEDICATIONS  (STANDING):  atorvastatin 20 milliGRAM(s) Oral at bedtime  cholecalciferol 1000 Unit(s) Oral daily  dexlansoprazole DR 30 milliGRAM(s) Oral daily  docusate sodium 100 milliGRAM(s) Oral three times a day  enoxaparin Injectable 70 milliGRAM(s) SubCutaneous every 12 hours  fluticasone propionate 50 MICROgram(s)/spray Nasal Spray 1 Spray(s) Both Nostrils daily  predniSONE   Tablet 5 milliGRAM(s) Oral every other day  senna 2 Tablet(s) Oral at bedtime  sertraline 50 milliGRAM(s) Oral daily  solifenacin 10 milliGRAM(s) Oral daily    MEDICATIONS  (PRN):  acetaminophen   Tablet. 650 milliGRAM(s) Oral every 6 hours PRN Mild Pain (1 - 3)  artificial  tears Solution 1 Drop(s) Right EYE daily PRN Dry Eyes  oxyCODONE    IR 5 milliGRAM(s) Oral every 6 hours PRN Moderate Pain (4 - 6)  oxyCODONE    IR 10 milliGRAM(s) Oral every 6 hours PRN Severe Pain (7 - 10)      CAPILLARY BLOOD GLUCOSE          PHYSICAL EXAM:  T(C): 36.9 (01-19-18 @ 14:25), Max: 37.6 (01-19-18 @ 00:16)  HR: 75 (01-19-18 @ 14:25) (74 - 85)  BP: 121/76 (01-19-18 @ 14:25) (110/67 - 126/60)  RR: 17 (01-19-18 @ 14:25) (17 - 19)  SpO2: 98% (01-19-18 @ 14:25) (96% - 99%)  Wt(kg): --  Daily     Daily   I&O's Summary      GENERAL: NAD, well-developed  HEAD:  Atraumatic, Normocephalic  EYES: EOMI, PERRLA, conjunctiva and sclera clear  NECK: Supple, No JVD  CHEST/LUNG: Clear to auscultation bilaterally; No wheezes, rales or rhonchi   HEART: Regular rate and rhythm; No murmurs, rubs, or gallops  ABDOMEN: Soft, Nontender, Nondistended; Bowel sounds present  EXTREMITIES:  2+ Peripheral Pulses, No clubbing, cyanosis, or edema  PSYCH: AAOx3  NEUROLOGY: non-focal  SKIN: No rashes or lesions    LABS:                        10.7   12.45 )-----------( 197      ( 19 Jan 2018 07:33 )             33.9     WBC Count: 12.45 K/uL (01-19 @ 07:33)  WBC Count: 13.2 K/uL (01-18 @ 12:19)  WBC Count: 9.71 K/uL (01-18 @ 07:57)  WBC Count: 12.7 K/uL (01-18 @ 03:17)  WBC Count: 17.5 K/uL (01-17 @ 18:40)    01-18    141  |  102  |  16  ----------------------------<  112<H>  3.6   |  24  |  0.79    Ca    8.6      18 Jan 2018 05:38  Phos  3.4     01-18  Mg     1.8     01-18    TPro  7.3  /  Alb  4.1  /  TBili  0.9  /  DBili  x   /  AST  30  /  ALT  25  /  AlkPhos  64  01-17    PT/INR - ( 18 Jan 2018 07:56 )   PT: 14.8 sec;   INR: 1.30 ratio    PTT - ( 18 Jan 2018 12:19 )  PTT:89.4 sec    CARDIAC MARKERS ( 18 Jan 2018 05:38 )  x     / <0.01 ng/mL / 52 U/L / x     / 1.4 ng/mL  CARDIAC MARKERS ( 17 Jan 2018 18:40 )  x     / <0.01 ng/mL / 99 U/L / x     / 1.5 ng/mL            RADIOLOGY & ADDITIONAL TESTS:  < from: [facility] Duplex Lower Ext Vein Scan, Bilat (01.18.18 @ 10:32) >  IMPRESSION:     Acute nonocclusive DVT of the left common femoral vein.    < end of copied text >    Imaging Personally Reviewed:  Consultant(s) Notes Reviewed  Care Discussed with Consultants/Other Providers    Oumou Sahni MD  PGY1 - Internal Medicine  [facility] [phone] /ARELI [id number]

## 2018-01-19 NOTE — PROGRESS NOTE ADULT - PROBLEM SELECTOR PLAN 4
- physical exam c/w cerebellar lesion. unchanged from baseline per patient  - f/u CTH non con. - physical exam c/w cerebellar lesion. unchanged from baseline per patient  - no clinical indication for CTH at this time.

## 2018-01-19 NOTE — PROGRESS NOTE ADULT - PROBLEM SELECTOR PLAN 1
- Submassive PE in the setting of h/o malignancy  - Will switch to lovenox in the context of possible malignancy  - TTE to evaluate R heart strain.  - LE duplex.  - Arvin negative x2.  Mildly elevated BNP  - Monitor vitals carefully.  Vitals q4h.  - Patient will require outpatient w/u / appropriate cancer screening.  - Consider further imaging to evaluate for malignancy.  - Cardiology called by ED, appreciate involvement. - Submassive PE in the setting of h/o malignancy  - c/w lovenox 70 SQ q12 in the context of possible malignancy  - TTE to evaluate R heart strain.  - LE duplex.  - Arvin negative x2.  Mildly elevated BNP  - Monitor vitals carefully.  Vitals q4h.  - Patient will require outpatient w/u / appropriate cancer screening.  - Consider further imaging to evaluate for malignancy.  - Cardiology called by ED, appreciate involvement.  - Patient also found to have new DVT in L femoral vein. - Submassive PE in the setting of h/o malignancy  - c/w lovenox 70 SQ q12 in the context of possible malignancy  - TTE to evaluate R heart strain.  - Arvin negative x2.  Mildly elevated BNP  - Monitor vitals carefully.  Vitals q4h.  - Patient will require outpatient w/u / appropriate cancer screening.  - Consider further imaging to evaluate for malignancy.  - Cardiology called by ED, appreciate involvement.  - Patient also found to have new DVT in L femoral vein.

## 2018-01-19 NOTE — PROGRESS NOTE ADULT - ASSESSMENT
72F h/o Hodgkin's lymphoma (dx 2011 s/p RCHOP, in remission), pelvic DVT ( 2/2 lymphoma, tx w/6 months lovenox), microhemorrhagic CVA (2012) 2/2 possible autoimmune condition c/b seizure d/o, osteoporosis 2/2 chronic prednisone use, HLD, GERD presents with L lateral chest pain. Found to have bilateral submassive PEs with concern for R heart strain on CTA. Clinically stable.

## 2018-01-19 NOTE — PROGRESS NOTE ADULT - PROBLEM SELECTOR PLAN 9
- DVT ppx- active therapeutic AC  - Diet- regular  - Dispo- pending transition to PO / injectable AC. - DVT ppx- active therapeutic AC  - Diet- regular  - Dispo- pending TTE

## 2018-01-19 NOTE — PROGRESS NOTE ADULT - PROBLEM SELECTOR PLAN 5
- Pt with CVA in 2012 c/b microhemorrhages and seizure d/o.  - Patient reports being taken off AEDs "a long time ago."  - Seizure precautions.

## 2018-01-19 NOTE — PROGRESS NOTE ADULT - PROBLEM SELECTOR PLAN 2
- Continue prednisone 5mg q other day.  - Due to known hemorrhagic strokes 2/2 this unspecified condition, will continue neuro checks q6h.  - rheumatic ROS neg.   - follows with Dr. Olsen - Continue prednisone 5mg q other day.  - c/b hemorrhagic stroke 2/2 this unspecified condition, will continue neuro checks q6h.  - rheumatic ROS neg.   - follows with Dr. Olsen

## 2018-01-19 NOTE — PROGRESS NOTE ADULT - PROBLEM SELECTOR PLAN 3
- Suspect 2/2 chronic steroid use improving.  - Check CBC with diff in AM.  - Patient without localizing signs of infection, continue to monitor clinical status. - Suspect 2/2 chronic steroid use improving.  - Patient without localizing signs of infection, continue to monitor clinical status. - Suspect 2/2 chronic steroid use. Stable  - Patient without localizing signs of infection, continue to monitor clinical status.

## 2018-01-20 LAB
FERRITIN SERPL-MCNC: 170 NG/ML — HIGH (ref 15–150)
HCT VFR BLD CALC: 30.6 % — LOW (ref 34.5–45)
HGB BLD-MCNC: 10.3 G/DL — LOW (ref 11.5–15.5)
IRON SATN MFR SERPL: 15 UG/DL — LOW (ref 30–160)
IRON SATN MFR SERPL: 6 % — LOW (ref 14–50)
MCHC RBC-ENTMCNC: 29.4 PG — SIGNIFICANT CHANGE UP (ref 27–34)
MCHC RBC-ENTMCNC: 33.8 GM/DL — SIGNIFICANT CHANGE UP (ref 32–36)
MCV RBC AUTO: 87 FL — SIGNIFICANT CHANGE UP (ref 80–100)
PLATELET # BLD AUTO: 203 K/UL — SIGNIFICANT CHANGE UP (ref 150–400)
RBC # BLD: 3.51 M/UL — LOW (ref 3.8–5.2)
RBC # BLD: 3.71 M/UL — LOW (ref 3.8–5.2)
RBC # FLD: 15.2 % — HIGH (ref 10.3–14.5)
RETICS #: 44.4 K/UL — SIGNIFICANT CHANGE UP (ref 25–125)
RETICS/RBC NFR: 1.2 % — SIGNIFICANT CHANGE UP (ref 0.5–2.5)
TIBC SERPL-MCNC: 253 UG/DL — SIGNIFICANT CHANGE UP (ref 220–430)
UIBC SERPL-MCNC: 238 UG/DL — SIGNIFICANT CHANGE UP (ref 110–370)
WBC # BLD: 10.9 K/UL — HIGH (ref 3.8–10.5)
WBC # FLD AUTO: 10.9 K/UL — HIGH (ref 3.8–10.5)

## 2018-01-20 PROCEDURE — 99223 1ST HOSP IP/OBS HIGH 75: CPT

## 2018-01-20 PROCEDURE — 99233 SBSQ HOSP IP/OBS HIGH 50: CPT | Mod: GC

## 2018-01-20 PROCEDURE — 74177 CT ABD & PELVIS W/CONTRAST: CPT | Mod: 26

## 2018-01-20 RX ORDER — POLYMYXIN B SULF/TRIMETHOPRIM 10000-1/ML
2 DROPS OPHTHALMIC (EYE)
Qty: 0 | Refills: 0 | Status: DISCONTINUED | OUTPATIENT
Start: 2018-01-20 | End: 2018-01-22

## 2018-01-20 RX ORDER — POLYETHYLENE GLYCOL 3350 17 G/17G
17 POWDER, FOR SOLUTION ORAL ONCE
Qty: 0 | Refills: 0 | Status: COMPLETED | OUTPATIENT
Start: 2018-01-20 | End: 2018-01-20

## 2018-01-20 RX ORDER — DOCUSATE SODIUM 100 MG
300 CAPSULE ORAL DAILY
Qty: 0 | Refills: 0 | Status: DISCONTINUED | OUTPATIENT
Start: 2018-01-20 | End: 2018-01-22

## 2018-01-20 RX ADMIN — Medication 2 DROP(S): at 23:21

## 2018-01-20 RX ADMIN — DEXLANSOPRAZOLE 30 MILLIGRAM(S): 30 CAPSULE, DELAYED RELEASE ORAL at 12:22

## 2018-01-20 RX ADMIN — ENOXAPARIN SODIUM 70 MILLIGRAM(S): 100 INJECTION SUBCUTANEOUS at 05:14

## 2018-01-20 RX ADMIN — SENNA PLUS 2 TABLET(S): 8.6 TABLET ORAL at 21:10

## 2018-01-20 RX ADMIN — ENOXAPARIN SODIUM 70 MILLIGRAM(S): 100 INJECTION SUBCUTANEOUS at 17:09

## 2018-01-20 RX ADMIN — SERTRALINE 50 MILLIGRAM(S): 25 TABLET, FILM COATED ORAL at 12:15

## 2018-01-20 RX ADMIN — Medication 5 MILLIGRAM(S): at 12:22

## 2018-01-20 RX ADMIN — ATORVASTATIN CALCIUM 20 MILLIGRAM(S): 80 TABLET, FILM COATED ORAL at 21:10

## 2018-01-20 RX ADMIN — Medication 1 DROP(S): at 05:35

## 2018-01-20 RX ADMIN — Medication 1 SPRAY(S): at 12:19

## 2018-01-20 RX ADMIN — Medication 300 MILLIGRAM(S): at 12:16

## 2018-01-20 RX ADMIN — POLYETHYLENE GLYCOL 3350 17 GRAM(S): 17 POWDER, FOR SOLUTION ORAL at 12:16

## 2018-01-20 RX ADMIN — Medication 1000 UNIT(S): at 12:16

## 2018-01-20 RX ADMIN — SOLIFENACIN SUCCINATE 10 MILLIGRAM(S): 10 TABLET ORAL at 12:22

## 2018-01-20 RX ADMIN — Medication 100 MILLIGRAM(S): at 05:14

## 2018-01-20 NOTE — PROGRESS NOTE ADULT - PROBLEM SELECTOR PLAN 3
- Suspect 2/2 chronic steroid use. Downtrending  - Patient without localizing signs of infection, continue to monitor clinical status.

## 2018-01-20 NOTE — PROGRESS NOTE ADULT - ASSESSMENT
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 Found to have bilateral submassive PEs with concern for R heart strain on CTA. Clinically stable.

## 2018-01-20 NOTE — PROGRESS NOTE ADULT - PROBLEM SELECTOR PLAN 4
- physical exam c/w cerebellar lesion. unchanged from baseline per patient  - no clinical indication for CTH at this time.

## 2018-01-20 NOTE — PROGRESS NOTE ADULT - SUBJECTIVE AND OBJECTIVE BOX
Patient is a 72y old  Female who presents with a chief complaint of chest pain (2018 13:08)      SUBJECTIVE / OVERNIGHT EVENTS: No overnight events. Patient continues to have intermitted pleuritic chest pain. Patient complaining of constipation.    Tele: sinus 60- 80.    ROS:  Gen: No fever, chills  Resp: No cough, SOB  CV: No palpitations  GI: No nausea, vomiting, abdominal pain, diarrhea,  melena, hematochezia  MSK: No arthralgia, weakness, paresthesia  Skin: No rash  ROS negative unless otherwise noted    MEDICATIONS  (STANDING):  atorvastatin 20 milliGRAM(s) Oral at bedtime  cholecalciferol 1000 Unit(s) Oral daily  dexlansoprazole DR 30 milliGRAM(s) Oral daily  docusate sodium 300 milliGRAM(s) Oral daily  enoxaparin Injectable 70 milliGRAM(s) SubCutaneous every 12 hours  fluticasone propionate 50 MICROgram(s)/spray Nasal Spray 1 Spray(s) Both Nostrils daily  predniSONE   Tablet 5 milliGRAM(s) Oral every other day  senna 2 Tablet(s) Oral at bedtime  sertraline 50 milliGRAM(s) Oral daily  solifenacin 10 milliGRAM(s) Oral daily    MEDICATIONS  (PRN):  acetaminophen   Tablet. 650 milliGRAM(s) Oral every 6 hours PRN Mild Pain (1 - 3)  artificial  tears Solution 1 Drop(s) Both EYES two times a day PRN Dry Eyes  oxyCODONE    IR 5 milliGRAM(s) Oral every 6 hours PRN Moderate Pain (4 - 6)  oxyCODONE    IR 10 milliGRAM(s) Oral every 6 hours PRN Severe Pain (7 - 10)  polyethylene glycol 3350 17 Gram(s) Oral once PRN Constipation      CAPILLARY BLOOD GLUCOSE          PHYSICAL EXAM:  T(C): 37.1 (18 @ 08:25), Max: 37.6 (18 @ 00:10)  HR: 73 (18 @ 08:25) (70 - 80)  BP: 99/61 (18 @ 08:25) (99/61 - 121/76)  RR: 18 (18 @ 08:25) (17 - 18)  SpO2: 91% (18 @ 08:25) (91% - 98%)  Wt(kg): --  Daily Height in cm: 170.18 (2018 15:40)    Daily Weight in k.8 (2018 04:38)  I&O's Summary    2018 07:01  -  2018 07:00  --------------------------------------------------------  IN: 720 mL / OUT: 0 mL / NET: 720 mL        GENERAL: NAD, well-developed  HEAD:  Atraumatic, Normocephalic  EYES: EOMI, PERRLA, conjunctiva and sclera clear  NECK: Supple, No JVD  CHEST/LUNG: Clear to auscultation bilaterally; No wheezes, rales or rhonchi   HEART: Regular rate and rhythm; No murmurs, rubs, or gallops  ABDOMEN: Soft, Nontender, Nondistended; Bowel sounds present  EXTREMITIES:  2+ Peripheral Pulses, No clubbing, cyanosis, or edema  PSYCH: AAOx3  NEUROLOGY: non-focal  SKIN: No rashes or lesions    LABS:                        10.3   10.9  )-----------( 203      ( 2018 07:28 )             30.6     WBC Count: 10.9 K/uL ( @ 07:28)  WBC Count: 12.45 K/uL ( @ 07:33)  WBC Count: 13.2 K/uL ( @ 12:19)  WBC Count: 9.71 K/uL ( @ 07:57)  WBC Count: 12.7 K/uL ( @ 03:17)        PTT - ( 2018 12:19 )  PTT:89.4 sec            RADIOLOGY & ADDITIONAL TESTS:    Imaging Personally Reviewed:  Consultant(s) Notes Reviewed  Care Discussed with Consultants/Other Providers    Oumou Sahni MD  PGY1 - Internal Medicine  [facility] [phone] /[place] [id number]

## 2018-01-20 NOTE — CONSULT NOTE ADULT - SUBJECTIVE AND OBJECTIVE BOX
Date of Admission: 1/20/18     Patient is a 72y old  Female who presents with a chief complaint of chest pain (18 Jan 2018 13:08)      HISTORY OF PRESENT ILLNESS:     73 yo woman w/Hodgkin's lymphoma s/p chemo (in remission), pelvic DVT in the setting of lymphoma, microhemorrhagic CVA (2012) 2/2 unspecified autoimmune condition c/b seizure d/o, osteoporosis 2/2 chronic prednisone use, HLD, GERD presented with CP/SOB on 1/17 found to have bilateral PE prompting cardiology consult.             Allergies    No Known Allergies    MEDICATIONS:    Home Meds:   Atorvastatin 20mg qd  Dexlasoprazole  Prednisone 5mg qd  Sertaline 50mg qd  Vesicare 10mg qd  Vitamin D     Inpatient Meds:  enoxaparin Injectable 70 milliGRAM(s) SubCutaneous every 12 hours  acetaminophen   Tablet. 650 milliGRAM(s) Oral every 6 hours PRN  oxyCODONE    IR 5 milliGRAM(s) Oral every 6 hours PRN  oxyCODONE    IR 10 milliGRAM(s) Oral every 6 hours PRN  sertraline 50 milliGRAM(s) Oral daily    dexlansoprazole DR 30 milliGRAM(s) Oral daily  docusate sodium 300 milliGRAM(s) Oral daily  senna 2 Tablet(s) Oral at bedtime    atorvastatin 20 milliGRAM(s) Oral at bedtime  predniSONE   Tablet 5 milliGRAM(s) Oral every other day    artificial  tears Solution 1 Drop(s) Both EYES two times a day PRN  cholecalciferol 1000 Unit(s) Oral daily  fluticasone propionate 50 MICROgram(s)/spray Nasal Spray 1 Spray(s) Both Nostrils daily  solifenacin 10 milliGRAM(s) Oral daily      PAST MEDICAL & SURGICAL HISTORY:  Neuropathy: bilateral feet  Fracture: lumbar spine,  healed with conservative treatment  Seizure  Osteoporosis  Lymphoma  CVA (cerebral vascular accident)  Fracture, ankle: Right, s/p metal plate      FAMILY HISTORY:  Family history of coronary artery disease in father (Father, Mother): both parents s/p CABG      SOCIAL HISTORY:    [ ] Non-smoker      REVIEW OF SYSTEMS:    CONSTITUTIONAL: No weakness, fevers or chills  EYES/ENT: No visual changes;  No dysphagia  NECK: No pain or stiffness  RESPIRATORY: No cough, wheezing, hemoptysis; No shortness of breath  CARDIOVASCULAR: No chest pain or palpitations; No lower extremity edema  GASTROINTESTINAL: No abdominal or epigastric pain. No nausea, vomiting, or hematemesis; No diarrhea or constipation. No melena or hematochezia.  BACK: No back pain  GENITOURINARY: No dysuria, frequency or hematuria  NEUROLOGICAL: No numbness or weakness  SKIN: No itching, burning, rashes, or lesions   All other review of systems is negative unless indicated above.  PHYSICAL EXAM:  T(C): 37 (01-20-18 @ 12:25), Max: 37.6 (01-20-18 @ 00:10)  HR: 81 (01-20-18 @ 12:25) (70 - 81)  BP: 118/66 (01-20-18 @ 12:25) (99/61 - 118/66)  RR: 18 (01-20-18 @ 12:25) (18 - 18)  SpO2: 91% (01-20-18 @ 12:25) (91% - 98%)  Wt(kg): --  I&O's Summary    19 Jan 2018 07:01  -  20 Jan 2018 07:00  --------------------------------------------------------  IN: 720 mL / OUT: 0 mL / NET: 720 mL    20 Jan 2018 07:01  -  20 Jan 2018 15:04  --------------------------------------------------------  IN: 420 mL / OUT: 0 mL / NET: 420 mL        Appearance: Normal	  HEENT:   Normal oral mucosa, PERRL, EOMI	  Lymphatic: No lymphadenopathy  Cardiovascular: Normal S1 S2, No JVD, No murmurs, No edema  Respiratory: Lungs clear to auscultation	  Psychiatry: A & O x 3, Mood & affect appropriate  Gastrointestinal:  Soft, Non-tender, + BS	  Skin: No rashes, No ecchymoses, No cyanosis	  Neurologic: Non-focal  Extremities: Normal range of motion, No clubbing, cyanosis or edema  Vascular: Peripheral pulses palpable 2+ bilaterally      LABS:	 	  CBC Full  -  ( 20 Jan 2018 07:28 )  WBC Count : 10.9 K/uL  Hemoglobin : 10.3 g/dL  Hematocrit : 30.6 %  Platelet Count - Automated : 203 K/uL  Mean Cell Volume : 87.0 fl  Mean Cell Hemoglobin : 29.4 pg  Mean Cell Hemoglobin Concentration : 33.8 gm/dL    proBNP: 409     CARDIAC MARKERS: Trop negative x2; BNP     TELEMETRY: 	      ECG:  	    RADIOLOGY:    CT Angio Chest w/ IV Cont (01.17.18 @ 20:08) >IMPRESSION:  Left main pulmonary artery embolus extending into the left lower lobar   and lingular branches. Additional right-sided pulmonary emboli within the   right lower and right middle lobar pulmonary arteries.    Dilated right ventricle with straightening of the intraventricular septum   which can be seen in setting of right heart strain, further evaluation   with echocardiogram is recommended.    1/18/18 Bilateral LE Doppler-- Acute nonocclusive DVT of the left common femoral vein.  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 She reports feeling slightly improved from presentation still with pleuritic chest pain ranging from 3 to 6/10. No SOB at rest. She hasn't been very mobile since hospitalization but reports that minimal activity prior to presentation elicited dyspnea. No recent travel or immobilization. She last took Lovenox SC while in rehab in May post ankle surgery for 8wks.     Allergies    No Known Allergies    MEDICATIONS:    Home Meds:   Atorvastatin 20mg qd  Dexlasoprazole  Prednisone 5mg qd  Sertaline 50mg qd  Vesicare 10mg qd  Vitamin D     Inpatient Meds:  enoxaparin Injectable 70 milliGRAM(s) SubCutaneous every 12 hours  acetaminophen   Tablet. 650 milliGRAM(s) Oral every 6 hours PRN  oxyCODONE    IR 5 milliGRAM(s) Oral every 6 hours PRN  oxyCODONE    IR 10 milliGRAM(s) Oral every 6 hours PRN  sertraline 50 milliGRAM(s) Oral daily    dexlansoprazole DR 30 milliGRAM(s) Oral daily  docusate sodium 300 milliGRAM(s) Oral daily  senna 2 Tablet(s) Oral at bedtime    atorvastatin 20 milliGRAM(s) Oral at bedtime  predniSONE   Tablet 5 milliGRAM(s) Oral every other day    artificial  tears Solution 1 Drop(s) Both EYES two times a day PRN  cholecalciferol 1000 Unit(s) Oral daily  fluticasone propionate 50 MICROgram(s)/spray Nasal Spray 1 Spray(s) Both Nostrils daily  solifenacin 10 milliGRAM(s) Oral daily      PAST MEDICAL & SURGICAL HISTORY:  Neuropathy: bilateral feet  Fracture: lumbar spine,  healed with conservative treatment  Seizure  Osteoporosis  Lymphoma  CVA (cerebral vascular accident)  Fracture, ankle: Right, s/p metal plate      FAMILY HISTORY:  Family history of coronary artery disease in father (Father, Mother): both parents s/p CABG      SOCIAL HISTORY:    [ ] Non-smoker      REVIEW OF SYSTEMS:    CONSTITUTIONAL: No weakness, fevers or chills  EYES/ENT: No visual changes;  No dysphagia  NECK: No pain or stiffness  RESPIRATORY: No cough, wheezing, hemoptysis; No shortness of breath  CARDIOVASCULAR: No chest pain or palpitations; No lower extremity edema  GASTROINTESTINAL: No abdominal or epigastric pain. No nausea, vomiting, or hematemesis; No diarrhea or constipation. No melena or hematochezia.  BACK: No back pain  GENITOURINARY: No dysuria, frequency or hematuria  NEUROLOGICAL: No numbness or weakness  SKIN: No itching, burning, rashes, or lesions   All other review of systems is negative unless indicated above.      PHYSICAL EXAM:  T(C): 37 (01-20-18 @ 12:25), Max: 37.6 (01-20-18 @ 00:10)  HR: 81 (01-20-18 @ 12:25) (70 - 81)  BP: 118/66 (01-20-18 @ 12:25) (99/61 - 118/66)  RR: 18 (01-20-18 @ 12:25) (18 - 18)  SpO2: 91% (01-20-18 @ 12:25) (91% - 98%)  Wt(kg): --  I&O's Summary    19 Jan 2018 07:01  -  20 Jan 2018 07:00  --------------------------------------------------------  IN: 720 mL / OUT: 0 mL / NET: 720 mL    20 Jan 2018 07:01  -  20 Jan 2018 15:04  --------------------------------------------------------  IN: 420 mL / OUT: 0 mL / NET: 420 mL        Appearance: Normal, well appearing sitting up comfortably on RA, in no acute distress, conversant pleasant 	  HEENT:   Normal oral mucosa, PERRL, EOMI	  Lymphatic: No lymphadenopathy  Cardiovascular: Normal S1 S2, No JVD, No murmurs, No edema  Respiratory: Lungs clear to auscultation	  Psychiatry: A & O x 3, Mood & affect appropriate  Gastrointestinal:  Soft, Non-tender, + BS	  Skin: No rashes, No ecchymoses, No cyanosis	  Neurologic: Non-focal  Extremities: Normal range of motion, No clubbing, cyanosis or edema  Vascular: Peripheral pulses palpable 2+ bilaterally    LABS:	 	  CBC Full  -  ( 20 Jan 2018 07:28 )  WBC Count : 10.9 K/uL  Hemoglobin : 10.3 g/dL  Hematocrit : 30.6 %  Platelet Count - Automated : 203 K/uL  Mean Cell Volume : 87.0 fl  Mean Cell Hemoglobin : 29.4 pg  Mean Cell Hemoglobin Concentration : 33.8 gm/dL    proBNP: 409     CARDIAC MARKERS: Trop negative x2; BNP     TELEMETRY: 	SR       ECG:  	    RADIOLOGY:    CT Angio Chest w/ IV Cont (01.17.18 @ 20:08) >IMPRESSION:  Left main pulmonary artery embolus extending into the left lower lobar   and lingular branches. Additional right-sided pulmonary emboli within the   right lower and right middle lobar pulmonary arteries.    Dilated right ventricle with straightening of the intraventricular septum   which can be seen in setting of right heart strain, further evaluation   with echocardiogram is recommended.    1/18/18 Bilateral LE Doppler-- Acute nonocclusive DVT of the left common femoral vein.  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 She reports feeling slightly improved from presentation still with pleuritic chest pain ranging from 3 to 6/10. No SOB at rest. She hasn't been very mobile since hospitalization but reports that minimal activity prior to presentation elicited dyspnea. No recent travel or immobilization. She last took Lovenox SC while in rehab in May post ankle surgery for 8wks.     Allergies    No Known Allergies    MEDICATIONS:    Home Meds:   Atorvastatin 20mg qd  Dexlasoprazole  Prednisone 5mg qd  Sertaline 50mg qd  Vesicare 10mg qd  Vitamin D     Inpatient Meds:  enoxaparin Injectable 70 milliGRAM(s) SubCutaneous every 12 hours  acetaminophen   Tablet. 650 milliGRAM(s) Oral every 6 hours PRN  oxyCODONE    IR 5 milliGRAM(s) Oral every 6 hours PRN  oxyCODONE    IR 10 milliGRAM(s) Oral every 6 hours PRN  sertraline 50 milliGRAM(s) Oral daily    dexlansoprazole DR 30 milliGRAM(s) Oral daily  docusate sodium 300 milliGRAM(s) Oral daily  senna 2 Tablet(s) Oral at bedtime    atorvastatin 20 milliGRAM(s) Oral at bedtime  predniSONE   Tablet 5 milliGRAM(s) Oral every other day    artificial  tears Solution 1 Drop(s) Both EYES two times a day PRN  cholecalciferol 1000 Unit(s) Oral daily  fluticasone propionate 50 MICROgram(s)/spray Nasal Spray 1 Spray(s) Both Nostrils daily  solifenacin 10 milliGRAM(s) Oral daily      PAST MEDICAL & SURGICAL HISTORY:  Neuropathy: bilateral feet  Fracture: lumbar spine,  healed with conservative treatment  Seizure  Osteoporosis  Lymphoma  CVA (cerebral vascular accident)  Fracture, ankle: Right, s/p metal plate      FAMILY HISTORY:  Family history of coronary artery disease in father (Father, Mother): both parents s/p CABG      SOCIAL HISTORY:    [ ] Non-smoker      REVIEW OF SYSTEMS:    CONSTITUTIONAL: No weakness, fevers or chills  EYES/ENT: No visual changes;  No dysphagia  NECK: No pain or stiffness  RESPIRATORY: No cough, wheezing, hemoptysis; No shortness of breath  CARDIOVASCULAR: No chest pain or palpitations; No lower extremity edema  GASTROINTESTINAL: No abdominal or epigastric pain. No nausea, vomiting, or hematemesis; No diarrhea or constipation. No melena or hematochezia.  BACK: No back pain  GENITOURINARY: No dysuria, frequency or hematuria  NEUROLOGICAL: No numbness or weakness  SKIN: No itching, burning, rashes, or lesions   All other review of systems is negative unless indicated above.      PHYSICAL EXAM:  T(C): 37 (01-20-18 @ 12:25), Max: 37.6 (01-20-18 @ 00:10)  HR: 81 (01-20-18 @ 12:25) (70 - 81)  BP: 118/66 (01-20-18 @ 12:25) (99/61 - 118/66)  RR: 18 (01-20-18 @ 12:25) (18 - 18)  SpO2: 91% (01-20-18 @ 12:25) (91% - 98%)  Wt(kg): --  I&O's Summary    19 Jan 2018 07:01  -  20 Jan 2018 07:00  --------------------------------------------------------  IN: 720 mL / OUT: 0 mL / NET: 720 mL    20 Jan 2018 07:01  -  20 Jan 2018 15:04  --------------------------------------------------------  IN: 420 mL / OUT: 0 mL / NET: 420 mL        Appearance: Normal, well appearing sitting up comfortably on RA, in no acute distress, conversant pleasant 	  HEENT:   Normal oral mucosa, PERRL, EOMI	  Lymphatic: No lymphadenopathy  Cardiovascular: Normal S1 S2, No JVD, No murmurs, No edema  Respiratory: Lungs clear to auscultation	  Psychiatry: A & O x 3, Mood & affect appropriate  Gastrointestinal:  Soft, Non-tender, + BS	  Skin: No rashes, No ecchymoses, No cyanosis	  Neurologic: Non-focal  Extremities: Normal range of motion, No clubbing, cyanosis or edema  Vascular: Peripheral pulses palpable 2+ bilaterally    LABS:	 	  CBC Full  -  ( 20 Jan 2018 07:28 )  WBC Count : 10.9 K/uL  Hemoglobin : 10.3 g/dL  Hematocrit : 30.6 %  Platelet Count - Automated : 203 K/uL  Mean Cell Volume : 87.0 fl  Mean Cell Hemoglobin : 29.4 pg  Mean Cell Hemoglobin Concentration : 33.8 gm/dL    proBNP: 409     CARDIAC MARKERS: Trop negative x2; BNP     TELEMETRY: 	SR 70-90s      ECG:  	NSR HR 84 no acute ischemic changes     RADIOLOGY:    CT Angio Chest w/ IV Cont (01.17.18 @ 20:08) >IMPRESSION:  Left main pulmonary artery embolus extending into the left lower lobar   and lingular branches. Additional right-sided pulmonary emboli within the   right lower and right middle lobar pulmonary arteries.    Dilated right ventricle with straightening of the intraventricular septum   which can be seen in setting of right heart strain, further evaluation   with echocardiogram is recommended.    1/18/18 Bilateral LE Doppler-- Acute nonocclusive DVT of the left common femoral vein.      	  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     #Bilateral Pulmonary Embolism; PESI Score 101 Intermediate Risk PE (Trops negative; BNP mildly elevated 404) Hemodynamically stable on RA; No recent travel, immobilization or med changes. Patient likely will require life long AC given unprovoked DVT/PE.   - c/w Lovenox BID dosing  - Recommend stool guiac and non urgent CT Abd/Pelvis to eval for underlying malignancy as potential etiology; Pt reports lost colo approx 5 yrs ago with polyps.   - LE doppler with non occlusive DVT L. common femoral vein.     Will d/w Dr. Sparks and cardiology to follow     ANDRE Harrison   [id number]

## 2018-01-20 NOTE — PROGRESS NOTE ADULT - ATTENDING COMMENTS
Agree w/ above with the following additions: discussed case w/ Dr. Sparks from cardiology: we're pending TTE to eval RV strain w/ PE given CT findings and slight BNP elevation. Called echo lab myself without answer. On lovenox though hopefully transitioning to oral AC prior to d/c. HD stable, well appearing w/ no more SOB and improved pleuritic CP. Will evaluate anemia (10s from 13 on admission) and order CT ab/p to r/o recurrence of malignancy w/ proximal clot and hx of lymphoma. Agree w/ above with the following additions: discussed case w/ Dr. Sparks from cardiology: we're pending TTE to eval RV strain w/ PE given CT findings and slight BNP elevation. Called echo lab myself without answer. On lovenox though hopefully transitioning to oral AC prior to d/c. HD stable, well appearing w/ no more SOB and improved pleuritic CP- no evidence of MSK pain on exam. Will evaluate anemia (10s from 13 on admission); no e/o GIB, HD stable- suspect hgb 13 on admission was hemoconcentrated though will continue to monitor w/ daily CBC. Will order CT ch/ab/p to r/o recurrence of malignancy w/ proximal clot and hx of lymphoma: per pt she was set to get surveillance CT scan this spring.

## 2018-01-20 NOTE — CHART NOTE - NSCHARTNOTEFT_GEN_A_CORE
Called to bedside by RN to evaluate pts eye.       pt states that starting this morning, she noticed her eyes where "sticky" and "difficult" to open. Also endorses "gunk" on both eye lids. Denies any night sweats/chills or fevers. No changes in vision. +slight pain in right eye. Denies any trauma to eyes, photophobia, nausea or headache.     As I examined patient I noticed yellow globular discharge from bilateral eyes right >left. Both dried and wet discharge noted around eye lids. PERRLA, EOMI. Conjunctiva slightly inflamed right >left. ?stye on right lower eye lid.       I have suspicion for bacterial conjunctivitis and will treat as so. Will initiate Trimethoprim/polymyxin ophthalmic solution 2x drops into b/l eyes, four times a day for 5 days. Will also recommend warm compress to right eye stye.     Leigh ANNE   PGY-1   Night Float pager [phone]

## 2018-01-20 NOTE — PROGRESS NOTE ADULT - PROBLEM SELECTOR PLAN 2
- Continue prednisone 5mg q other day.  - c/b hemorrhagic stroke 2/2 this unspecified condition, will continue neuro checks q6h.  - rheumatic ROS neg.   - follows with Dr. Olsen

## 2018-01-20 NOTE — PROGRESS NOTE ADULT - PROBLEM SELECTOR PLAN 1
- Submassive PE in the setting of h/o malignancy  - c/w lovenox 70 SQ q12 in the context of possible malignancy  - TTE to evaluate R heart strain.  - Arvin negative x2.  Mildly elevated BNP  - Monitor vitals carefully.  Vitals q4h.  - Patient will require outpatient w/u / appropriate cancer screening.  - Consider further imaging to evaluate for malignancy.  - Cardiology called by ED, appreciate involvement.  - Patient also found to have new DVT in L femoral vein.

## 2018-01-21 LAB
HCT VFR BLD CALC: 32 % — LOW (ref 34.5–45)
HGB BLD-MCNC: 10.8 G/DL — LOW (ref 11.5–15.5)
MCHC RBC-ENTMCNC: 29.4 PG — SIGNIFICANT CHANGE UP (ref 27–34)
MCHC RBC-ENTMCNC: 33.7 GM/DL — SIGNIFICANT CHANGE UP (ref 32–36)
MCV RBC AUTO: 87.2 FL — SIGNIFICANT CHANGE UP (ref 80–100)
PLATELET # BLD AUTO: 230 K/UL — SIGNIFICANT CHANGE UP (ref 150–400)
RBC # BLD: 3.67 M/UL — LOW (ref 3.8–5.2)
RBC # FLD: 15.4 % — HIGH (ref 10.3–14.5)
WBC # BLD: 10 K/UL — SIGNIFICANT CHANGE UP (ref 3.8–10.5)
WBC # FLD AUTO: 10 K/UL — SIGNIFICANT CHANGE UP (ref 3.8–10.5)

## 2018-01-21 PROCEDURE — 99233 SBSQ HOSP IP/OBS HIGH 50: CPT | Mod: GC

## 2018-01-21 PROCEDURE — 99233 SBSQ HOSP IP/OBS HIGH 50: CPT

## 2018-01-21 RX ORDER — FERROUS SULFATE 325(65) MG
325 TABLET ORAL DAILY
Qty: 0 | Refills: 0 | Status: DISCONTINUED | OUTPATIENT
Start: 2018-01-21 | End: 2018-01-22

## 2018-01-21 RX ADMIN — DEXLANSOPRAZOLE 30 MILLIGRAM(S): 30 CAPSULE, DELAYED RELEASE ORAL at 12:09

## 2018-01-21 RX ADMIN — ENOXAPARIN SODIUM 70 MILLIGRAM(S): 100 INJECTION SUBCUTANEOUS at 17:26

## 2018-01-21 RX ADMIN — Medication 2 DROP(S): at 12:08

## 2018-01-21 RX ADMIN — Medication 2 DROP(S): at 17:26

## 2018-01-21 RX ADMIN — Medication 1000 UNIT(S): at 12:08

## 2018-01-21 RX ADMIN — Medication 2 DROP(S): at 06:30

## 2018-01-21 RX ADMIN — Medication 1 SPRAY(S): at 12:08

## 2018-01-21 RX ADMIN — Medication 2 DROP(S): at 23:06

## 2018-01-21 RX ADMIN — ENOXAPARIN SODIUM 70 MILLIGRAM(S): 100 INJECTION SUBCUTANEOUS at 06:31

## 2018-01-21 RX ADMIN — ATORVASTATIN CALCIUM 20 MILLIGRAM(S): 80 TABLET, FILM COATED ORAL at 21:28

## 2018-01-21 RX ADMIN — SOLIFENACIN SUCCINATE 10 MILLIGRAM(S): 10 TABLET ORAL at 12:09

## 2018-01-21 RX ADMIN — Medication 300 MILLIGRAM(S): at 12:08

## 2018-01-21 RX ADMIN — SERTRALINE 50 MILLIGRAM(S): 25 TABLET, FILM COATED ORAL at 12:08

## 2018-01-21 NOTE — PROGRESS NOTE ADULT - PROBLEM SELECTOR PLAN 2
- Continue prednisone 5mg q other day.  - per neurology, patient had episode of encephalitis with conversion to hemorrhage 2/2 this unspecified condition, will continue neuro checks q6h.  - rheumatic ROS neg.   - follows with Dr. Olsen

## 2018-01-21 NOTE — PROVIDER CONTACT NOTE (OTHER) - ASSESSMENT
Patient A/O X 4 both eyelids  redness an whitish discharge noted attributing it from the eye mask cover that she used while in ER

## 2018-01-21 NOTE — PROGRESS NOTE ADULT - SUBJECTIVE AND OBJECTIVE BOX
24H hour events:   No acute events overnight. No tachycardia, dyspnea. Appears comfortable in no distress  MEDICATIONS:  enoxaparin Injectable 70 milliGRAM(s) SubCutaneous every 12 hours        acetaminophen   Tablet. 650 milliGRAM(s) Oral every 6 hours PRN  oxyCODONE    IR 5 milliGRAM(s) Oral every 6 hours PRN  oxyCODONE    IR 10 milliGRAM(s) Oral every 6 hours PRN  sertraline 50 milliGRAM(s) Oral daily    dexlansoprazole DR 30 milliGRAM(s) Oral daily  docusate sodium 300 milliGRAM(s) Oral daily  senna 2 Tablet(s) Oral at bedtime    atorvastatin 20 milliGRAM(s) Oral at bedtime  predniSONE   Tablet 5 milliGRAM(s) Oral every other day    artificial  tears Solution 1 Drop(s) Both EYES two times a day PRN  cholecalciferol 1000 Unit(s) Oral daily  fluticasone propionate 50 MICROgram(s)/spray Nasal Spray 1 Spray(s) Both Nostrils daily  solifenacin 10 milliGRAM(s) Oral daily  trimethoprim/polymyxin Solution 2 Drop(s) Both EYES four times a day      REVIEW OF SYSTEMS:  CONSTITUTIONAL: no fevers or chills  EYES/ENT: No visual changes; No vertigo or throat pain  NECK: No pain or stiffness  RESPIRATORY: No cough, wheezing  CARDIOVASCULAR: No chest pain or palpitations  GASTOINTESTINAL: No abdominal or epigastric pain. No nausea, vomiting. No diarrhea. No melena  NEUROLOGICAL: No numbness or weakness  SKIN: No itching, burning, rashes or lesions  All other review of systems is negative unless indicated above    PHYSICAL EXAM:  T(C): 36.9 (01-21-18 @ 04:26), Max: 37.8 (01-20-18 @ 16:30)  HR: 70 (01-21-18 @ 04:26) (70 - 81)  BP: 101/58 (01-21-18 @ 04:26) (99/61 - 118/66)  RR: 18 (01-21-18 @ 04:26) (18 - 18)  SpO2: 92% (01-21-18 @ 04:26) (91% - 92%)  Wt(kg): --  I&O's Summary    20 Jan 2018 07:01  -  21 Jan 2018 07:00  --------------------------------------------------------  IN: 1020 mL / OUT: 0 mL / NET: 1020 mL        Appearance: Normal	  HEENT:   Normal oral mucosa, PERRL, EOMI	  Lymphatic: No lymphadenopathy  Cardiovascular: Normal S1 S2, No JVD, No murmurs, No edema  Respiratory: Lungs clear to auscultation	  Psychiatry: A & O x 3, Mood & affect appropriate  Gastrointestinal:  Soft, Non-tender, + BS	  Skin: No rashes, No ecchymoses, No cyanosis	  Neurologic: Non-focal  Extremities: Normal range of motion, No clubbing, cyanosis or edema  Vascular: Peripheral pulses palpable 2+ bilaterally        LABS:	 	    CBC Full  -  ( 21 Jan 2018 06:37 )  WBC Count : 10.0 K/uL  Hemoglobin : 10.8 g/dL  Hematocrit : 32.0 %  Platelet Count - Automated : 230 K/uL  Mean Cell Volume : 87.2 fl  Mean Cell Hemoglobin : 29.4 pg  Mean Cell Hemoglobin Concentration : 33.7 gm/dL  Auto Neutrophil # : x  Auto Lymphocyte # : x  Auto Monocyte # : x  Auto Eosinophil # : x  Auto Basophil # : x  Auto Neutrophil % : x  Auto Lymphocyte % : x  Auto Monocyte % : x  Auto Eosinophil % : x  Auto Basophil % : x            proBNP: Serum Pro-Brain Natriuretic Peptide: 409 pg/mL (01-17 @ 18:40)    TELEMETRY: 	    	    PREVIOUS DIAGNOSTIC TESTING:    [ ] Echocardiogram:  < from: US Transthoracic Echocardiogram w/Doppler Complete (05.13.17 @ 20:21) >  1. hyperdynamic left ventricular systolic function with diastolic   dysfunction  2. mild calcific aortic stenosis. Mild aortic regurgitation  3. mild mitral regurgitation.  4. Mild to moderate tricuspid regurgitation with moderate pulmonary   hypertension 48 mmHg.    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     #Bilateral Pulmonary Embolism;  - (Trops negative; BNP mildly elevated 404) Hemodynamically stable on RA- c/w Lovenox BID dosing  - Recommend stool guiac and non urgent CT Abd/Pelvis to eval for underlying malignancy as potential etiology; Pt reports lost colo approx 5 yrs ago with polyps.   - LE doppler with non occlusive DVT L. common femoral vein.     John Stephen [id number]

## 2018-01-21 NOTE — PROGRESS NOTE ADULT - ATTENDING COMMENTS
Await results of CT A/P  Continue with anticoagulation for now with Lovenox  2d Echo  Guaiac    If no malignancy then consider changing to Eliquis 10mg BID x 7 days, then 5mg BID thereafter.    Thanks    Al Sparks  [id number]

## 2018-01-21 NOTE — PROGRESS NOTE ADULT - PROBLEM SELECTOR PLAN 5
- Pt with CVA in 2012 c/b microhemorrhages (2/2 encephalitis per neurologist) and seizure d/o.  - Patient reports being taken off AEDs "a long time ago."  - Seizure precautions.

## 2018-01-21 NOTE — PROVIDER CONTACT NOTE (OTHER) - ACTION/TREATMENT ORDERED:
Patient was seen and evaluated @ bedside ordered Polymixin eyedrops for both eyes 4 x day , warm compress to R side of face

## 2018-01-21 NOTE — PROGRESS NOTE ADULT - PROBLEM SELECTOR PLAN 1
- Submassive PE in the setting of h/o malignancy with patient also found to have new DVT in L femoral vein.  - c/w lovenox 70 SQ q12 in the context of possible malignancy  - TTE pending to evaluate R heart strain.  - Arvin negative x2.  Mildly elevated BNP  - Monitor vitals carefully.  Vitals q4h.  - Cardiology consult appreciated - awaiting echo and CT A/P   - Patient with CT A/P done to evaluate for reoccurrence of malignancy, awaiting official read. If negative, may consider change to Eliquis.   - f/u stool guaiac

## 2018-01-21 NOTE — PROGRESS NOTE ADULT - ASSESSMENT
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

## 2018-01-21 NOTE — PROGRESS NOTE ADULT - ATTENDING COMMENTS
Agree w/ above w/ following additions: endometrial thickening on CT ab/p, will get TVUS and ask GYN for further input re: inpatient evaluation of suspected malignancy w/ DVT. Minimal data on NOACs for malignancy though patient used lovenox in the past and found it awful: in the interest of adherence and QOL, irrespective of malignancy, would consider d/c on eliquis. Cont lovenox for now, will d/w patient risk and benefits of AC after we have more data re: malignancy. Awaiting echo. Agree w/ above w/ following additions: endometrial thickening on CT ab/p w/ hx of tamoxifen, will ask GYN for further input re: inpatient evaluation of suspected malignancy w/ DVT. Minimal data on NOACs for malignancy though patient used lovenox in the past and found it awful: in the interest of adherence and QOL, irrespective of malignancy, would consider d/c on eliquis. Cont lovenox for now, will d/w patient risk and benefits of AC after we have more data re: malignancy. Awaiting echo.    Addendum: Patient refusing TVUS, says she has multiple images of abdomen/pelvis of late. She will give primary team phone numbers of her outpatient docs tmrw, 1/22, so we could get outside records and speak with them regarding further eval. She doesn't want any further eval without their blessing. If this finding is chronic and outpatient providers are aware and she has close follow up this could presumably be done as outpatient if she prefers. Still appreciate gyn input.

## 2018-01-22 VITALS
DIASTOLIC BLOOD PRESSURE: 67 MMHG | TEMPERATURE: 98 F | HEART RATE: 70 BPM | RESPIRATION RATE: 17 BRPM | OXYGEN SATURATION: 92 % | SYSTOLIC BLOOD PRESSURE: 107 MMHG

## 2018-01-22 DIAGNOSIS — H10.30 UNSPECIFIED ACUTE CONJUNCTIVITIS, UNSPECIFIED EYE: ICD-10-CM

## 2018-01-22 LAB
ANION GAP SERPL CALC-SCNC: 16 MMOL/L — SIGNIFICANT CHANGE UP (ref 5–17)
BUN SERPL-MCNC: 16 MG/DL — SIGNIFICANT CHANGE UP (ref 7–23)
CALCIUM SERPL-MCNC: 8.8 MG/DL — SIGNIFICANT CHANGE UP (ref 8.4–10.5)
CHLORIDE SERPL-SCNC: 98 MMOL/L — SIGNIFICANT CHANGE UP (ref 96–108)
CO2 SERPL-SCNC: 22 MMOL/L — SIGNIFICANT CHANGE UP (ref 22–31)
CREAT SERPL-MCNC: 0.82 MG/DL — SIGNIFICANT CHANGE UP (ref 0.5–1.3)
GLUCOSE SERPL-MCNC: 90 MG/DL — SIGNIFICANT CHANGE UP (ref 70–99)
HCT VFR BLD CALC: 33.1 % — LOW (ref 34.5–45)
HGB BLD-MCNC: 10.9 G/DL — LOW (ref 11.5–15.5)
MCHC RBC-ENTMCNC: 28.7 PG — SIGNIFICANT CHANGE UP (ref 27–34)
MCHC RBC-ENTMCNC: 32.9 GM/DL — SIGNIFICANT CHANGE UP (ref 32–36)
MCV RBC AUTO: 87.2 FL — SIGNIFICANT CHANGE UP (ref 80–100)
PLATELET # BLD AUTO: 282 K/UL — SIGNIFICANT CHANGE UP (ref 150–400)
POTASSIUM SERPL-MCNC: 4 MMOL/L — SIGNIFICANT CHANGE UP (ref 3.5–5.3)
POTASSIUM SERPL-SCNC: 4 MMOL/L — SIGNIFICANT CHANGE UP (ref 3.5–5.3)
RBC # BLD: 3.8 M/UL — SIGNIFICANT CHANGE UP (ref 3.8–5.2)
RBC # FLD: 15.1 % — HIGH (ref 10.3–14.5)
SODIUM SERPL-SCNC: 136 MMOL/L — SIGNIFICANT CHANGE UP (ref 135–145)
WBC # BLD: 8.6 K/UL — SIGNIFICANT CHANGE UP (ref 3.8–10.5)
WBC # FLD AUTO: 8.6 K/UL — SIGNIFICANT CHANGE UP (ref 3.8–10.5)

## 2018-01-22 PROCEDURE — 83550 IRON BINDING TEST: CPT

## 2018-01-22 PROCEDURE — 93970 EXTREMITY STUDY: CPT

## 2018-01-22 PROCEDURE — 85014 HEMATOCRIT: CPT

## 2018-01-22 PROCEDURE — 82330 ASSAY OF CALCIUM: CPT

## 2018-01-22 PROCEDURE — 93306 TTE W/DOPPLER COMPLETE: CPT | Mod: 26

## 2018-01-22 PROCEDURE — 94640 AIRWAY INHALATION TREATMENT: CPT

## 2018-01-22 PROCEDURE — 85027 COMPLETE CBC AUTOMATED: CPT

## 2018-01-22 PROCEDURE — 83605 ASSAY OF LACTIC ACID: CPT

## 2018-01-22 PROCEDURE — 82565 ASSAY OF CREATININE: CPT

## 2018-01-22 PROCEDURE — 87581 M.PNEUMON DNA AMP PROBE: CPT

## 2018-01-22 PROCEDURE — 85610 PROTHROMBIN TIME: CPT

## 2018-01-22 PROCEDURE — 82550 ASSAY OF CK (CPK): CPT

## 2018-01-22 PROCEDURE — 87633 RESP VIRUS 12-25 TARGETS: CPT

## 2018-01-22 PROCEDURE — 82947 ASSAY GLUCOSE BLOOD QUANT: CPT

## 2018-01-22 PROCEDURE — 85045 AUTOMATED RETICULOCYTE COUNT: CPT

## 2018-01-22 PROCEDURE — 82553 CREATINE MB FRACTION: CPT

## 2018-01-22 PROCEDURE — 82803 BLOOD GASES ANY COMBINATION: CPT

## 2018-01-22 PROCEDURE — 87798 DETECT AGENT NOS DNA AMP: CPT

## 2018-01-22 PROCEDURE — 80053 COMPREHEN METABOLIC PANEL: CPT

## 2018-01-22 PROCEDURE — 84484 ASSAY OF TROPONIN QUANT: CPT

## 2018-01-22 PROCEDURE — 93005 ELECTROCARDIOGRAM TRACING: CPT

## 2018-01-22 PROCEDURE — 85379 FIBRIN DEGRADATION QUANT: CPT

## 2018-01-22 PROCEDURE — 93306 TTE W/DOPPLER COMPLETE: CPT

## 2018-01-22 PROCEDURE — 83880 ASSAY OF NATRIURETIC PEPTIDE: CPT

## 2018-01-22 PROCEDURE — 96375 TX/PRO/DX INJ NEW DRUG ADDON: CPT | Mod: XU

## 2018-01-22 PROCEDURE — 99285 EMERGENCY DEPT VISIT HI MDM: CPT | Mod: 25

## 2018-01-22 PROCEDURE — 87486 CHLMYD PNEUM DNA AMP PROBE: CPT

## 2018-01-22 PROCEDURE — 71275 CT ANGIOGRAPHY CHEST: CPT

## 2018-01-22 PROCEDURE — 99232 SBSQ HOSP IP/OBS MODERATE 35: CPT

## 2018-01-22 PROCEDURE — 82435 ASSAY OF BLOOD CHLORIDE: CPT

## 2018-01-22 PROCEDURE — 84295 ASSAY OF SERUM SODIUM: CPT

## 2018-01-22 PROCEDURE — 80048 BASIC METABOLIC PNL TOTAL CA: CPT

## 2018-01-22 PROCEDURE — 84100 ASSAY OF PHOSPHORUS: CPT

## 2018-01-22 PROCEDURE — 85730 THROMBOPLASTIN TIME PARTIAL: CPT

## 2018-01-22 PROCEDURE — 99239 HOSP IP/OBS DSCHRG MGMT >30: CPT

## 2018-01-22 PROCEDURE — 96374 THER/PROPH/DIAG INJ IV PUSH: CPT | Mod: XU

## 2018-01-22 PROCEDURE — 83735 ASSAY OF MAGNESIUM: CPT

## 2018-01-22 PROCEDURE — 84132 ASSAY OF SERUM POTASSIUM: CPT

## 2018-01-22 PROCEDURE — 82728 ASSAY OF FERRITIN: CPT

## 2018-01-22 PROCEDURE — 74177 CT ABD & PELVIS W/CONTRAST: CPT

## 2018-01-22 RX ORDER — APIXABAN 2.5 MG/1
10 TABLET, FILM COATED ORAL ONCE
Qty: 0 | Refills: 0 | Status: COMPLETED | OUTPATIENT
Start: 2018-01-22 | End: 2018-01-22

## 2018-01-22 RX ORDER — APIXABAN 2.5 MG/1
1 TABLET, FILM COATED ORAL
Qty: 74 | Refills: 0
Start: 2018-01-22 | End: 2018-02-20

## 2018-01-22 RX ORDER — ACETAMINOPHEN 500 MG
2 TABLET ORAL
Qty: 0 | Refills: 0 | DISCHARGE
Start: 2018-01-22

## 2018-01-22 RX ORDER — FERROUS GLUCONATE 100 %
1 POWDER (GRAM) MISCELLANEOUS
Qty: 30 | Refills: 0
Start: 2018-01-22 | End: 2018-02-20

## 2018-01-22 RX ORDER — POLYMYXIN B SULF/TRIMETHOPRIM 10000-1/ML
2 DROPS OPHTHALMIC (EYE)
Qty: 5 | Refills: 0
Start: 2018-01-22 | End: 2018-01-24

## 2018-01-22 RX ORDER — METOPROLOL TARTRATE 50 MG
25 TABLET ORAL DAILY
Qty: 0 | Refills: 0 | Status: DISCONTINUED | OUTPATIENT
Start: 2018-01-22 | End: 2018-01-22

## 2018-01-22 RX ORDER — POLYETHYLENE GLYCOL 3350 17 G/17G
17 POWDER, FOR SOLUTION ORAL
Qty: 510 | Refills: 0
Start: 2018-01-22 | End: 2018-02-20

## 2018-01-22 RX ORDER — PANTOPRAZOLE SODIUM 20 MG/1
40 TABLET, DELAYED RELEASE ORAL ONCE
Qty: 0 | Refills: 0 | Status: COMPLETED | OUTPATIENT
Start: 2018-01-22 | End: 2018-01-22

## 2018-01-22 RX ADMIN — Medication 1 SPRAY(S): at 09:47

## 2018-01-22 RX ADMIN — Medication 2 DROP(S): at 17:42

## 2018-01-22 RX ADMIN — Medication 300 MILLIGRAM(S): at 09:47

## 2018-01-22 RX ADMIN — Medication 2 DROP(S): at 13:28

## 2018-01-22 RX ADMIN — APIXABAN 10 MILLIGRAM(S): 2.5 TABLET, FILM COATED ORAL at 17:42

## 2018-01-22 RX ADMIN — ENOXAPARIN SODIUM 70 MILLIGRAM(S): 100 INJECTION SUBCUTANEOUS at 06:13

## 2018-01-22 RX ADMIN — Medication 2 DROP(S): at 06:13

## 2018-01-22 RX ADMIN — SOLIFENACIN SUCCINATE 10 MILLIGRAM(S): 10 TABLET ORAL at 09:49

## 2018-01-22 RX ADMIN — Medication 5 MILLIGRAM(S): at 09:47

## 2018-01-22 RX ADMIN — Medication 1000 UNIT(S): at 09:47

## 2018-01-22 RX ADMIN — DEXLANSOPRAZOLE 30 MILLIGRAM(S): 30 CAPSULE, DELAYED RELEASE ORAL at 09:47

## 2018-01-22 RX ADMIN — PANTOPRAZOLE SODIUM 40 MILLIGRAM(S): 20 TABLET, DELAYED RELEASE ORAL at 04:23

## 2018-01-22 NOTE — PROGRESS NOTE ADULT - PROVIDER SPECIALTY LIST ADULT
Cardiology
Cardiology
Internal Medicine

## 2018-01-22 NOTE — PROGRESS NOTE ADULT - SUBJECTIVE AND OBJECTIVE BOX
Patient is a 72y old  Female who presents with a chief complaint of chest pain (18 Jan 2018 13:08)      SUBJECTIVE / OVERNIGHT EVENTS:  No events overnight.   Denies Nausea, vomiting, chest pain, cough, dyspena, abdomen pain, constipation, diarrhea,rash, fatigue, headache      MEDICATIONS  (STANDING):  atorvastatin 20 milliGRAM(s) Oral at bedtime  cholecalciferol 1000 Unit(s) Oral daily  dexlansoprazole DR 30 milliGRAM(s) Oral daily  docusate sodium 300 milliGRAM(s) Oral daily  enoxaparin Injectable 70 milliGRAM(s) SubCutaneous every 12 hours  ferrous    sulfate 325 milliGRAM(s) Oral daily  fluticasone propionate 50 MICROgram(s)/spray Nasal Spray 1 Spray(s) Both Nostrils daily  predniSONE   Tablet 5 milliGRAM(s) Oral every other day  senna 2 Tablet(s) Oral at bedtime  sertraline 50 milliGRAM(s) Oral daily  solifenacin 10 milliGRAM(s) Oral daily  trimethoprim/polymyxin Solution 2 Drop(s) Both EYES four times a day    MEDICATIONS  (PRN):  acetaminophen   Tablet. 650 milliGRAM(s) Oral every 6 hours PRN Mild Pain (1 - 3)  artificial  tears Solution 1 Drop(s) Both EYES two times a day PRN Dry Eyes  oxyCODONE    IR 5 milliGRAM(s) Oral every 6 hours PRN Moderate Pain (4 - 6)  oxyCODONE    IR 10 milliGRAM(s) Oral every 6 hours PRN Severe Pain (7 - 10)      T(C): 37.4 (01-22-18 @ 03:59), Max: 37.4 (01-22-18 @ 03:59)  HR: 68 (01-22-18 @ 03:59) (68 - 71)  BP: 104/64 (01-22-18 @ 03:59) (104/64 - 114/75)  RR: 18 (01-22-18 @ 03:59) (18 - 18)  SpO2: 96% (01-22-18 @ 03:59) (94% - 96%)    CAPILLARY BLOOD GLUCOSE        I&O's Summary    21 Jan 2018 07:01  -  22 Jan 2018 07:00  --------------------------------------------------------  IN: 960 mL / OUT: 0 mL / NET: 960 mL        PHYSICAL EXAM:  GENERAL: NAD, well-developed  HEAD:  Atraumatic, Normocephalic  EYES: EOMI,  conjunctiva and sclera clear  NECK: Supple  CHEST/LUNG: Clear to auscultation bilaterally; No wheeze  HEART: Regular rate and rhythm; No murmurs, rubs, or gallops  ABDOMEN: Soft, Nontender, Nondistended; Bowel sounds present  EXTREMITIES:  No clubbing, cyanosis, or edema  PSYCH: AAOx3  NEUROLOGY: non-focal  SKIN: No rashes or lesions    LABS:                        10.9   8.6   )-----------( 282      ( 22 Jan 2018 07:06 )             33.1     01-22    136  |  98  |  16  ----------------------------<  90  4.0   |  22  |  0.82    Ca    8.8      22 Jan 2018 07:06                RADIOLOGY & ADDITIONAL TESTS:    Imaging Personally Reviewed:     Consultant(s) Notes Reviewed:     Care Discussed with Consultants/Other Providers:     BRADY WAGNER MD  PGY 1  INTERNAL MEDICINE   Long pager:[phone]  Short pager: [phone]

## 2018-01-22 NOTE — PROGRESS NOTE ADULT - ASSESSMENT
Assessment    1.	Bilateral pulmonary emboli, with right heart strain on CT scan  2.	Acute DVT in L common femoral vein  3.	Mild endometrial thickening  4.	Hodgkin's lymphoma, s/p chemotherapy, presently in remission  5.	Hx of prior pelvic DVT in the setting of lymphoma  6.	Hx of microhemorrhagic CVA    Plan    1.	F/u TTE to further assess RV strain and RV hemodynamics.  2.	Endometrial thickening is chronic according to patient. She has had mild endometrial thickening secondary to prior Tamoxifen use. Await Ob/Gyn evaluation.  3.	Continue Lovenox 70mg SQ BID for now. If no further procedures planned for this hospitalization, transition to Eliquis starting tonight as follows:  ·	Eliquis 10mg BID x 7 days  ·	Eliquis 5mg BID thereafter  4.	At present, patient has normal BP, no hypoxia and no tachycardia. Tolerating A/C without bleeding. No indication for IVC filter or catheter-directed thrombolysis at this time.  5.	OOB/ambulate. Monitor sats and HR while ambulating.      Naveed Humphries MD  [phone]

## 2018-01-22 NOTE — PROGRESS NOTE ADULT - ATTENDING COMMENTS
Await TTE results. Will discuss case with outpatient oncologist to see if endometrial thickening is chronic finding that's being followed and worked up as outpatient. If not we're happy to do w/u here. Hgb stable in 10s, pt w/ known KARISSA, initial reading likely hemoconcentrated: HD stable w/ no clinical or laboratory evidence of bleeding, retics are down w/ stable KARISSA: transition from ferrous sulfate to ferrous gluconate. Cont eye ointment for conjunctivitis. Dispo pending discussion w/ outpatient providers and echo results.

## 2018-01-22 NOTE — CONSULT NOTE ADULT - ASSESSMENT
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 CT a/p significant for endometrial thickening.

## 2018-01-22 NOTE — PROGRESS NOTE ADULT - PROBLEM SELECTOR PLAN 1
- Submassive PE in the setting of h/o malignancy with patient also found to have new DVT in L femoral vein.  - c/w lovenox 70 SQ q12 in the context of possible malignancy  - TTE pending to evaluate R heart strain.  - Arvin negative x2.  Mildly elevated BNP  - Monitor vitals carefully.  Vitals q4h.  - Cardiology consult appreciated - awaiting echo and CT A/P   - Patient with CT A/P done to evaluate for reoccurrence of malignancy shows Mild endometrium thickening. Pelvic ultrasound is recommended   in this postmenopausal female for further evaluation.  - f/u stool guaiac

## 2018-01-22 NOTE — PROGRESS NOTE ADULT - PROBLEM SELECTOR PROBLEM 9
Need for prophylactic measure
Depression
Need for prophylactic measure

## 2018-01-22 NOTE — CONSULT NOTE ADULT - SUBJECTIVE AND OBJECTIVE BOX
Gyn Consult note    72yoF  h/o Hodgkin's lymphoma (dx  s/p RCHOP, now in remission) c/b pelvic DVT in the setting of lymphoma, microhemorrhagic CVA () 2/2 unspecified autoimmune condition c/b seizure d/o, osteoporosis 2/2 chronic prednisone use, HLD, GERD presented with chest pain and SOB, found to have endometrial thickening on CT scan.     For the past two weeks, the patient has been feeling "off" and for the past 5 days she has felt increasingly short of breath to where going up a flight of stairs makes her feel SOB.  Today, she felt a band of chest pain similar to when her GERD acts up.  She took dexlansoprazole, ranitidine and rolaid but it did not get better.  Then, the pain migrated to the left side of her chest and became sharp, 8-9/10, non-radiating pain when she inspires.  She went to urgent care where the pain increased and an ambulance was called to take her to the ED.  The patient denies palpitations, nausea, vomiting, fever, chills, runny nose, or recent sick contacts. She denies recent travel, plane rides, or car rides.     In the ED, Tmax 97.9F, HR 80-86, -136/80-80, SpO2 95-97% RA.  WBC 17.5, Hgb 13.0, plt 187, Na 137, K 4.7, Cr 0.87, Arvin negative x1, .  D-dimer 510.  RVP negative.  CTA chest performed, which showed L main PE extending into the L lower lobar and lingular branches, R-sided PEs, and a dilated RV with straightening of the IV septum concerning for R heart strain.  Patient given acetaminophen IV x1, started on heparin gtt.  Patient evaluated by MICU, not accepted. Ppatient feels occasional pleuritic chest pain and mild SOB. CTAP done to look for recurrence of malignancy, only remarkable for thickened endometrium, negative for lymphadenopathy.     She denies vaginal bleeding, pelvic pain, abnormal discharge, abdominal bloating, recent weight gain or loss. +constipation. +leak of urine. Denies blood in urine or stool. Reports h/o tamoxifen use from [id number] for "atypical cells on breast biopsy" but denies diagnosis of breast cancer. She reports a long standing history of thickened endometrium, was told before probably from tamoxifen use. Never had endometrial biopsy. Last gyn visit 1-2 yrs ago, prior Gyn Dr Vasyl Meier retired. Denies h/o ovarian, endometrial, or uterine cancers. Follows with Dr Birch ([facility])      OB/GYN HISTORY:   Doyle:  Parity:  Term:  :  MAB/TAB/Ectopic:  Living:    Last Menstrual Period    Name of GYN Physician:  Date of Last Pap:  History of Abnormal Pap:  Date of Last Mammogram:  Date of Last Colonoscopy:  Current OCP use:     PAST MEDICAL & SURGICAL HISTORY:  Neuropathy: bilateral feet  Fracture: lumbar spine,  healed with conservative treatment  Seizure  Osteoporosis  Lymphoma  CVA (cerebral vascular accident)  Fracture, ankle: Right, s/p metal plate      REVIEW OF SYSTEMS      General:	    Skin/Breast:  	  Ophthalmologic:  	  ENMT:	    Respiratory and Thorax:  	  Cardiovascular:	    Gastrointestinal:	    Genitourinary:	    Musculoskeletal:	    Neurological:	    Psychiatric:	    Hematology/Lymphatics:	    Endocrine:	    Allergic/Immunologic:	    MEDICATIONS  (STANDING):  atorvastatin 20 milliGRAM(s) Oral at bedtime  cholecalciferol 1000 Unit(s) Oral daily  dexlansoprazole DR 30 milliGRAM(s) Oral daily  docusate sodium 300 milliGRAM(s) Oral daily  enoxaparin Injectable 70 milliGRAM(s) SubCutaneous every 12 hours  ferrous    sulfate 325 milliGRAM(s) Oral daily  fluticasone propionate 50 MICROgram(s)/spray Nasal Spray 1 Spray(s) Both Nostrils daily  predniSONE   Tablet 5 milliGRAM(s) Oral every other day  senna 2 Tablet(s) Oral at bedtime  sertraline 50 milliGRAM(s) Oral daily  solifenacin 10 milliGRAM(s) Oral daily  trimethoprim/polymyxin Solution 2 Drop(s) Both EYES four times a day    MEDICATIONS  (PRN):  acetaminophen   Tablet. 650 milliGRAM(s) Oral every 6 hours PRN Mild Pain (1 - 3)  artificial  tears Solution 1 Drop(s) Both EYES two times a day PRN Dry Eyes  oxyCODONE    IR 5 milliGRAM(s) Oral every 6 hours PRN Moderate Pain (4 - 6)  oxyCODONE    IR 10 milliGRAM(s) Oral every 6 hours PRN Severe Pain (7 - 10)      Allergies    No Known Allergies    Intolerances        SOCIAL HISTORY:    FAMILY HISTORY:  Family history of coronary artery disease in father (Father, Mother): both parents s/p CABG      Vital Signs Last 24 Hrs  T(C): 36.8 (2018 13:59), Max: 37.4 (2018 03:59)  T(F): 98.3 (2018 13:59), Max: 99.3 (2018 03:59)  HR: 70 (2018 13:59) (68 - 71)  BP: 107/67 (2018 13:59) (104/64 - 107/67)  BP(mean): --  RR: 17 (2018 13:59) (17 - 18)  SpO2: 92% (2018 13:59) (92% - 96%)    PHYSICAL EXAM:      Constitutional: alert and oriented x 3    Breasts: no tenderness, no nodules    Respiratory: clear    Cardiovascular: regular rate and rhythm    Gastrointestinal: soft, non tender, + bowel sounds. No hepatosplenomegaly, no palpable masses    Genitourinary: NEFG  Cervix: closed/ long, no CMT  Uterus: normal size, non tender  Adnexa: non tender, no palpable masses    Rectal:     Extremities:    Neurological:    Skin:    Lymph Nodes:        LABS:                        10.9   8.6   )-----------( 282      ( 2018 07:06 )             33.1     -    136  |  98  |  16  ----------------------------<  90  4.0   |  22  |  0.82    Ca    8.8      2018 07:06            RADIOLOGY & ADDITIONAL STUDIES: Gyn Consult note    72yoF  h/o Hodgkin's lymphoma (dx  s/p RCHOP, now in remission) c/b pelvic DVT in the setting of lymphoma, microhemorrhagic CVA () 2/2 unspecified autoimmune condition c/b seizure d/o, osteoporosis 2/2 chronic prednisone use, HLD, GERD presented with chest pain and SOB, found to have endometrial thickening on CT scan.     For the past two weeks, the patient has been feeling "off" and for the past 5 days she has felt increasingly short of breath to where going up a flight of stairs makes her feel SOB.  Today, she felt a band of chest pain similar to when her GERD acts up.  She took dexlansoprazole, ranitidine and rolaid but it did not get better.  Then, the pain migrated to the left side of her chest and became sharp, 8-9/10, non-radiating pain when she inspires.  She went to urgent care where the pain increased and an ambulance was called to take her to the ED.  The patient denies palpitations, nausea, vomiting, fever, chills, runny nose, or recent sick contacts. She denies recent travel, plane rides, or car rides.     In the ED, Tmax 97.9F, HR 80-86, -136/80-80, SpO2 95-97% RA.  WBC 17.5, Hgb 13.0, plt 187, Na 137, K 4.7, Cr 0.87, Arvin negative x1, .  D-dimer 510.  RVP negative.  CTA chest performed, which showed L main PE extending into the L lower lobar and lingular branches, R-sided PEs, and a dilated RV with straightening of the IV septum concerning for R heart strain.  Patient given acetaminophen IV x1, started on heparin gtt.  Patient evaluated by MICU, not accepted. Ppatient feels occasional pleuritic chest pain and mild SOB. CTAP done to look for recurrence of malignancy, only remarkable for thickened endometrium, negative for lymphadenopathy.     She denies any postmenopausal vaginal bleeding, pelvic pain, abnormal discharge, abdominal bloating, recent weight gain or loss. +constipation. +leak of urine. Denies blood in urine or stool. Reports h/o tamoxifen use from [id number] for "atypical cells on breast biopsy" but denies diagnosis of breast cancer. She reports a long standing history of thickened endometrium, was told before probably from tamoxifen use. Never had endometrial biopsy. Last gyn visit 1-2 yrs ago, prior Gyn Dr Vasyl Meier retired. Denies h/o ovarian, endometrial, or uterine cancers. Follows with Dr Birch ([facility])      OB/GYN HISTORY:   Last Menstrual Period: in 50s    full term, uncomplicated    full term, uncomplicated     Name of GYN Physician: Dr Vasyl Meier (retired), no one since  Date of Last Pap:   History of Abnormal Pap:  Date of Last Mammogram:  Date of Last Colonoscopy:  Current OCP use:      PAST MEDICAL & SURGICAL HISTORY:  Neuropathy: bilateral feet  Fracture: lumbar spine,  healed with conservative treatment  Seizure  Osteoporosis  Lymphoma  CVA (cerebral vascular accident)  Fracture, ankle: Right, s/p metal plate      REVIEW OF SYSTEMS      General:	    Skin/Breast:  	  Ophthalmologic:  	  ENMT:	    Respiratory and Thorax:  	  Cardiovascular:	    Gastrointestinal:	    Genitourinary:	    Musculoskeletal:	    Neurological:	    Psychiatric:	    Hematology/Lymphatics:	    Endocrine:	    Allergic/Immunologic:	    MEDICATIONS  (STANDING):  atorvastatin 20 milliGRAM(s) Oral at bedtime  cholecalciferol 1000 Unit(s) Oral daily  dexlansoprazole DR 30 milliGRAM(s) Oral daily  docusate sodium 300 milliGRAM(s) Oral daily  enoxaparin Injectable 70 milliGRAM(s) SubCutaneous every 12 hours  ferrous    sulfate 325 milliGRAM(s) Oral daily  fluticasone propionate 50 MICROgram(s)/spray Nasal Spray 1 Spray(s) Both Nostrils daily  predniSONE   Tablet 5 milliGRAM(s) Oral every other day  senna 2 Tablet(s) Oral at bedtime  sertraline 50 milliGRAM(s) Oral daily  solifenacin 10 milliGRAM(s) Oral daily  trimethoprim/polymyxin Solution 2 Drop(s) Both EYES four times a day    MEDICATIONS  (PRN):  acetaminophen   Tablet. 650 milliGRAM(s) Oral every 6 hours PRN Mild Pain (1 - 3)  artificial  tears Solution 1 Drop(s) Both EYES two times a day PRN Dry Eyes  oxyCODONE    IR 5 milliGRAM(s) Oral every 6 hours PRN Moderate Pain (4 - 6)  oxyCODONE    IR 10 milliGRAM(s) Oral every 6 hours PRN Severe Pain (7 - 10)      Allergies    No Known Allergies    Intolerances        SOCIAL HISTORY:    FAMILY HISTORY:  Family history of coronary artery disease in father (Father, Mother): both parents s/p CABG      Vital Signs Last 24 Hrs  T(C): 36.8 (2018 13:59), Max: 37.4 (2018 03:59)  T(F): 98.3 (2018 13:59), Max: 99.3 (2018 03:59)  HR: 70 (2018 13:59) (68 - 71)  BP: 107/67 (2018 13:59) (104/64 - 107/67)  BP(mean): --  RR: 17 (2018 13:59) (17 - 18)  SpO2: 92% (2018 13:59) (92% - 96%)    PHYSICAL EXAM:      Constitutional: alert and oriented x 3    Breasts: no tenderness, no nodules    Respiratory: clear    Cardiovascular: regular rate and rhythm    Gastrointestinal: soft, non tender, + bowel sounds. No hepatosplenomegaly, no palpable masses    Genitourinary: NEFG  Cervix: closed/ long, no CMT  Uterus: normal size, non tender  Adnexa: non tender, no palpable masses    Rectal:     Extremities:    Neurological:    Skin:    Lymph Nodes:        LABS:                        10.9   8.6   )-----------( 282      ( 2018 07:06 )             33.1     -    136  |  98  |  16  ----------------------------<  90  4.0   |  22  |  0.82    Ca    8.8      2018 07:06            RADIOLOGY & ADDITIONAL STUDIES:

## 2018-01-22 NOTE — PROGRESS NOTE ADULT - ASSESSMENT
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 CT a/p significant for endometrial thickening.

## 2018-01-22 NOTE — CONSULT NOTE ADULT - ATTENDING COMMENTS
Agree with above  Cross sectional imaging r/o malignancy CT a/p with IV and PO contrast  Continue Lovenox for now  Stool guaiac    Thanks    Bridger   [id number]
Pt seen and findings on imaging discussed with her. According to the patient she was on tamoxifen from 2003 to 2008. She has been aware of her endometrial thickening since 2011. She reports that her gynecologist was following it, but never did a biopsy. She was told that since she was on tamoxifen, her endometrium would be thick. Pt was counseled regarding recommendations for endometrial sampling and that tamoxifen can increase  atypical changes in the endometrium. The patient previously declined pelvic ultrasound. Discussed importance of obtaining pelvic ultrasound to better evaluate the endometrium.     Pt is being discharged by the primary team today. She can have endometrial sampling and pelvic ultrasound performed as an outpatient. Last visit to gyn was over a year ago. Pt provided with names and numbers for nearby GYN. Pt also has her own list at home of GYN providers nearby.     Recommendations were reviewed at the end of the conversation, and pt voiced understanding.     PACHECO Loyd
This is a 73 yo F with history as detailed prior including   Hodgkin's lymphoma s/p chemo (in remission), DVT in setting of lymphoma(no longer on A/C), CVA c/b ICH (in 2012) and seizure d/o, ?vasculitis (on chronic Prednisone), HLD, GERD, and R ankle fracture s/p ORIF (5/2017)  who presents with shooting L-sided chest pain that started earlier today. CP worsens with inspiration but non positional and nonexertional. Work now significant for  b/l PEs: left main pulmonary artery embolus extending into the left lower lobar and lingular branches and additional right-sided pulmonary emboli within the right lower and right middle lobar pulmonary arteries. CTA chest also showed possible R heart strain with dilated RV and flattening of interventricular septum. 1st set of Arvin negative and pro-BNP elevated to 409.   1. Chest pain/ chest pressure- possible due to clot burden/ pulmonary infarct or pleuritic-pain management, incentive spirometer   EKG with normal sinus rhythm w/ no acute changes or evidence of RBBB, continue tele monitoring and serial CE   -Possible RV strain noted on POCUS and CTA chest, unclear if acute or chronic. with h/o  prior DVT and ?vasculitis as well as lymphoma   -Obtain LE dopplers. TTE   2. B/L PE with large clot burden- Patient on no FIO2 support with sats> 95 %, no increase WOB, hemodynamically stable, seizure disorder, prior hemorrhagic CVA, CE negative, mildly elevated pro-BNP- no benefit for tPA at this juncture, IV heparin, type and screen , all age appropriate cancer screening, f/u with patient's oncologist regarding status of malignancy +/- need for any surveillance imaging . LE duplex if positive IR for possible IVC filter  No need for MICU at this juncture care and treatment as detailed above unless otherwise stated. Will continue to follow, Tele monitoring. Seizure d/o, fall and seizure precaution, neuro checks Q 3 hours. Case discussed  extensively with patient and  at the bedside, staff, team and specialist on board. Feel free to reconsult if any change in patient's clinical status

## 2018-01-22 NOTE — PROGRESS NOTE ADULT - SUBJECTIVE AND OBJECTIVE BOX
PAGER:  [phone]               [facility] [id number]              EMAIL [email or web address]   OFFICE [phone]                              ********VASCULAR MEDICINE & CARDIOLOGY PROGRESS NOTE********                          INTERVAL HISTORY:  No acute events overnight.      MEDICATIONS:  enoxaparin Injectable 70 milliGRAM(s) SubCutaneous every 12 hours  acetaminophen   Tablet. 650 milliGRAM(s) Oral every 6 hours PRN  oxyCODONE    IR 5 milliGRAM(s) Oral every 6 hours PRN  oxyCODONE    IR 10 milliGRAM(s) Oral every 6 hours PRN  sertraline 50 milliGRAM(s) Oral daily  dexlansoprazole DR 30 milliGRAM(s) Oral daily  docusate sodium 300 milliGRAM(s) Oral daily  senna 2 Tablet(s) Oral at bedtime  atorvastatin 20 milliGRAM(s) Oral at bedtime  predniSONE   Tablet 5 milliGRAM(s) Oral every other day  artificial  tears Solution 1 Drop(s) Both EYES two times a day PRN  cholecalciferol 1000 Unit(s) Oral daily  ferrous    sulfate 325 milliGRAM(s) Oral daily  fluticasone propionate 50 MICROgram(s)/spray Nasal Spray 1 Spray(s) Both Nostrils daily  solifenacin 10 milliGRAM(s) Oral daily  trimethoprim/polymyxin Solution 2 Drop(s) Both EYES four times a day      REVIEW OF SYSTEMS:  CONSTITUTIONAL: No fever, weight loss, or fatigue  EYES: No eye pain, visual disturbances, or discharge  ENMT:  No difficulty hearing, tinnitus, vertigo; No sinus or throat pain  NECK: No pain or stiffness  RESPIRATORY: No cough, wheezing, chills or hemoptysis; No Shortness of Breath  CARDIOVASCULAR: No chest pain, palpitations, passing out, dizziness, or leg swelling  GASTROINTESTINAL: No abdominal or epigastric pain. No nausea, vomiting, or hematemesis; No diarrhea or constipation. No melena or hematochezia.  GENITOURINARY: No dysuria, frequency, hematuria, or incontinence  NEUROLOGICAL: No headaches, memory loss, loss of strength, numbness, or tremors  SKIN: No itching, burning, rashes, or lesions   LYMPH Nodes: No enlarged glands  ENDOCRINE: No heat or cold intolerance; No hair loss  MUSCULOSKELETAL: No joint pain or swelling; No muscle, back, or extremity pain  PSYCHIATRIC: No depression, anxiety, mood swings, or difficulty sleeping  HEME/LYMPH: No easy bruising, or bleeding gums  ALLERY AND IMMUNOLOGIC: No hives or eczema	    [X] All others negative	  [ ] Unable to obtain    PHYSICAL EXAM:  T(C): 37.4 (01-22-18 @ 03:59), Max: 37.4 (01-22-18 @ 03:59)  HR: 68 (01-22-18 @ 03:59) (68 - 71)  BP: 104/64 (01-22-18 @ 03:59) (104/64 - 114/75)  RR: 18 (01-22-18 @ 03:59) (18 - 18)  SpO2: 96% (01-22-18 @ 03:59) (94% - 96%)  Wt(kg): --  I&O's Summary    21 Jan 2018 07:01  -  22 Jan 2018 07:00  --------------------------------------------------------  IN: 960 mL / OUT: 0 mL / NET: 960 mL    22 Jan 2018 07:01  -  22 Jan 2018 11:32  --------------------------------------------------------  IN: 240 mL / OUT: 0 mL / NET: 240 mL        Appearance: Normal	  HEENT:   Normal oral mucosa, PERRL, EOMI	  Lymphatic: No lymphadenopathy  Cardiovascular: Normal S1 S2, No JVD, No murmurs, No edema  Respiratory: Lungs clear to auscultation	  Psychiatry: A & O x 3, Mood & affect appropriate  Gastrointestinal:  Soft, Non-tender, + BS	  Skin: No rashes, No ecchymoses, No cyanosis	  Neurologic: Non-focal  Extremities: Normal range of motion, No clubbing, cyanosis or edema  Vascular: Peripheral pulses palpable 2+ bilaterally      LABS:	 	    CBC Full  -  ( 22 Jan 2018 07:06 )  WBC Count : 8.6 K/uL  Hemoglobin : 10.9 g/dL  Hematocrit : 33.1 %  Platelet Count - Automated : 282 K/uL  Mean Cell Volume : 87.2 fl  Mean Cell Hemoglobin : 28.7 pg  Mean Cell Hemoglobin Concentration : 32.9 gm/dL  Auto Neutrophil # : x  Auto Lymphocyte # : x  Auto Monocyte # : x  Auto Eosinophil # : x  Auto Basophil # : x  Auto Neutrophil % : x  Auto Lymphocyte % : x  Auto Monocyte % : x  Auto Eosinophil % : x  Auto Basophil % : x    01-22    136  |  98  |  16  ----------------------------<  90  4.0   |  22  |  0.82    Ca    8.8      22 Jan 2018 07:06    CT Abdomen/Pelvis:  IMPRESSION: Mild endometrium thickening. Pelvic ultrasound is recommended   in this postmenopausal female for further evaluation.

## 2018-01-29 ENCOUNTER — TRANSCRIPTION ENCOUNTER (OUTPATIENT)
Age: 73
End: 2018-01-29

## 2018-01-31 ENCOUNTER — APPOINTMENT (OUTPATIENT)
Dept: CARDIOLOGY | Facility: CLINIC | Age: 73
End: 2018-01-31
Payer: MEDICARE

## 2018-01-31 VITALS
SYSTOLIC BLOOD PRESSURE: 120 MMHG | DIASTOLIC BLOOD PRESSURE: 70 MMHG | HEIGHT: 66 IN | HEART RATE: 72 BPM | WEIGHT: 159 LBS | OXYGEN SATURATION: 98 % | BODY MASS INDEX: 25.55 KG/M2

## 2018-01-31 DIAGNOSIS — R09.89 OTHER SPECIFIED SYMPTOMS AND SIGNS INVOLVING THE CIRCULATORY AND RESPIRATORY SYSTEMS: ICD-10-CM

## 2018-01-31 PROCEDURE — 99215 OFFICE O/P EST HI 40 MIN: CPT

## 2018-02-03 ENCOUNTER — APPOINTMENT (OUTPATIENT)
Dept: ULTRASOUND IMAGING | Facility: CLINIC | Age: 73
End: 2018-02-03
Payer: MEDICARE

## 2018-02-03 ENCOUNTER — OUTPATIENT (OUTPATIENT)
Dept: OUTPATIENT SERVICES | Facility: HOSPITAL | Age: 73
LOS: 1 days | End: 2018-02-03
Payer: MEDICARE

## 2018-02-03 DIAGNOSIS — S82.899A OTHER FRACTURE OF UNSPECIFIED LOWER LEG, INITIAL ENCOUNTER FOR CLOSED FRACTURE: Chronic | ICD-10-CM

## 2018-02-03 DIAGNOSIS — Z00.8 ENCOUNTER FOR OTHER GENERAL EXAMINATION: ICD-10-CM

## 2018-02-03 PROCEDURE — 93880 EXTRACRANIAL BILAT STUDY: CPT

## 2018-02-03 PROCEDURE — 93880 EXTRACRANIAL BILAT STUDY: CPT | Mod: 26

## 2018-02-05 LAB
25(OH)D3 SERPL-MCNC: 44 NG/ML
ALBUMIN SERPL ELPH-MCNC: 4.1 G/DL
ALP BLD-CCNC: 72 U/L
ALT SERPL-CCNC: 21 U/L
ANION GAP SERPL CALC-SCNC: 14 MMOL/L
APPEARANCE: CLEAR
AST SERPL-CCNC: 24 U/L
BASOPHILS # BLD AUTO: 0.03 K/UL
BASOPHILS NFR BLD AUTO: 0.3 %
BILIRUB SERPL-MCNC: 0.3 MG/DL
BILIRUBIN URINE: NEGATIVE
BLOOD URINE: ABNORMAL
BUN SERPL-MCNC: 23 MG/DL
CALCIUM SERPL-MCNC: 9.7 MG/DL
CHLORIDE SERPL-SCNC: 101 MMOL/L
CHOLEST SERPL-MCNC: 159 MG/DL
CHOLEST/HDLC SERPL: 4.2 RATIO
CO2 SERPL-SCNC: 25 MMOL/L
COLOR: YELLOW
CREAT SERPL-MCNC: 0.83 MG/DL
EOSINOPHIL # BLD AUTO: 0.17 K/UL
EOSINOPHIL NFR BLD AUTO: 1.8 %
GLUCOSE QUALITATIVE U: NEGATIVE MG/DL
GLUCOSE SERPL-MCNC: 94 MG/DL
HBA1C MFR BLD HPLC: 5.8 %
HCT VFR BLD CALC: 40.6 %
HDLC SERPL-MCNC: 38 MG/DL
HGB BLD-MCNC: 12 G/DL
IMM GRANULOCYTES NFR BLD AUTO: 0.6 %
KETONES URINE: NEGATIVE
LDLC SERPL CALC-MCNC: 94 MG/DL
LEUKOCYTE ESTERASE URINE: ABNORMAL
LYMPHOCYTES # BLD AUTO: 1.48 K/UL
LYMPHOCYTES NFR BLD AUTO: 15.9 %
MAN DIFF?: NORMAL
MCHC RBC-ENTMCNC: 26.8 PG
MCHC RBC-ENTMCNC: 29.6 GM/DL
MCV RBC AUTO: 90.8 FL
MONOCYTES # BLD AUTO: 0.7 K/UL
MONOCYTES NFR BLD AUTO: 7.5 %
NEUTROPHILS # BLD AUTO: 6.84 K/UL
NEUTROPHILS NFR BLD AUTO: 73.9 %
NITRITE URINE: POSITIVE
PH URINE: 5
PLATELET # BLD AUTO: 387 K/UL
POTASSIUM SERPL-SCNC: 4.8 MMOL/L
PROT SERPL-MCNC: 7.3 G/DL
PROTEIN URINE: NEGATIVE MG/DL
RBC # BLD: 4.47 M/UL
RBC # FLD: 17.5 %
SODIUM SERPL-SCNC: 140 MMOL/L
SPECIFIC GRAVITY URINE: 1.02
TRIGL SERPL-MCNC: 135 MG/DL
UROBILINOGEN URINE: NEGATIVE MG/DL
WBC # FLD AUTO: 9.28 K/UL

## 2018-02-21 ENCOUNTER — RESULT REVIEW (OUTPATIENT)
Age: 73
End: 2018-02-21

## 2018-02-28 ENCOUNTER — APPOINTMENT (OUTPATIENT)
Dept: CARDIOLOGY | Facility: CLINIC | Age: 73
End: 2018-02-28
Payer: MEDICARE

## 2018-02-28 VITALS
BODY MASS INDEX: 25.39 KG/M2 | WEIGHT: 158 LBS | SYSTOLIC BLOOD PRESSURE: 110 MMHG | OXYGEN SATURATION: 98 % | HEIGHT: 66 IN | DIASTOLIC BLOOD PRESSURE: 70 MMHG | HEART RATE: 70 BPM

## 2018-02-28 PROCEDURE — 99215 OFFICE O/P EST HI 40 MIN: CPT

## 2018-03-05 ENCOUNTER — APPOINTMENT (OUTPATIENT)
Dept: RHEUMATOLOGY | Facility: CLINIC | Age: 73
End: 2018-03-05
Payer: MEDICARE

## 2018-03-05 VITALS
DIASTOLIC BLOOD PRESSURE: 77 MMHG | BODY MASS INDEX: 25.71 KG/M2 | OXYGEN SATURATION: 98 % | TEMPERATURE: 98.3 F | WEIGHT: 160 LBS | HEIGHT: 66 IN | HEART RATE: 77 BPM | SYSTOLIC BLOOD PRESSURE: 144 MMHG

## 2018-03-05 PROCEDURE — 96372 THER/PROPH/DIAG INJ SC/IM: CPT

## 2018-03-05 PROCEDURE — 99213 OFFICE O/P EST LOW 20 MIN: CPT | Mod: 25

## 2018-03-05 RX ORDER — DENOSUMAB 60 MG/ML
60 INJECTION SUBCUTANEOUS
Qty: 0 | Refills: 0 | Status: COMPLETED | OUTPATIENT
Start: 2018-03-05

## 2018-03-05 RX ADMIN — DENOSUMAB 0 MG/ML: 60 INJECTION SUBCUTANEOUS at 00:00

## 2018-05-23 ENCOUNTER — APPOINTMENT (OUTPATIENT)
Dept: CARDIOLOGY | Facility: CLINIC | Age: 73
End: 2018-05-23
Payer: MEDICARE

## 2018-05-23 VITALS
HEIGHT: 66 IN | DIASTOLIC BLOOD PRESSURE: 80 MMHG | OXYGEN SATURATION: 98 % | WEIGHT: 158.8 LBS | HEART RATE: 73 BPM | SYSTOLIC BLOOD PRESSURE: 120 MMHG | BODY MASS INDEX: 25.52 KG/M2

## 2018-05-23 PROCEDURE — 99215 OFFICE O/P EST HI 40 MIN: CPT

## 2018-07-16 ENCOUNTER — OUTPATIENT (OUTPATIENT)
Dept: OUTPATIENT SERVICES | Facility: HOSPITAL | Age: 73
LOS: 1 days | End: 2018-07-16
Payer: MEDICARE

## 2018-07-16 ENCOUNTER — APPOINTMENT (OUTPATIENT)
Dept: ULTRASOUND IMAGING | Facility: CLINIC | Age: 73
End: 2018-07-16
Payer: MEDICARE

## 2018-07-16 DIAGNOSIS — S82.899A OTHER FRACTURE OF UNSPECIFIED LOWER LEG, INITIAL ENCOUNTER FOR CLOSED FRACTURE: Chronic | ICD-10-CM

## 2018-07-16 DIAGNOSIS — Z00.8 ENCOUNTER FOR OTHER GENERAL EXAMINATION: ICD-10-CM

## 2018-07-16 PROBLEM — T14.8 OTHER INJURY OF UNSPECIFIED BODY REGION: Chronic | Status: ACTIVE | Noted: 2017-05-13

## 2018-07-16 PROBLEM — R56.9 UNSPECIFIED CONVULSIONS: Chronic | Status: ACTIVE | Noted: 2017-05-13

## 2018-07-16 PROBLEM — G62.9 POLYNEUROPATHY, UNSPECIFIED: Chronic | Status: ACTIVE | Noted: 2017-05-13

## 2018-07-16 PROCEDURE — 93970 EXTREMITY STUDY: CPT

## 2018-07-16 PROCEDURE — 93970 EXTREMITY STUDY: CPT | Mod: 26

## 2018-07-25 ENCOUNTER — APPOINTMENT (OUTPATIENT)
Dept: CARDIOLOGY | Facility: CLINIC | Age: 73
End: 2018-07-25
Payer: MEDICARE

## 2018-07-25 VITALS
HEART RATE: 71 BPM | DIASTOLIC BLOOD PRESSURE: 60 MMHG | SYSTOLIC BLOOD PRESSURE: 125 MMHG | HEIGHT: 66 IN | OXYGEN SATURATION: 93 % | WEIGHT: 162 LBS | BODY MASS INDEX: 26.03 KG/M2

## 2018-07-25 PROCEDURE — 99214 OFFICE O/P EST MOD 30 MIN: CPT

## 2018-07-25 RX ORDER — APIXABAN 5 MG/1
5 TABLET, FILM COATED ORAL
Qty: 180 | Refills: 3 | Status: DISCONTINUED | COMMUNITY
Start: 2018-01-31 | End: 2018-07-25

## 2018-09-17 ENCOUNTER — APPOINTMENT (OUTPATIENT)
Dept: RHEUMATOLOGY | Facility: CLINIC | Age: 73
End: 2018-09-17
Payer: MEDICARE

## 2018-09-17 VITALS
HEIGHT: 66 IN | BODY MASS INDEX: 25.71 KG/M2 | TEMPERATURE: 98.2 F | OXYGEN SATURATION: 98 % | DIASTOLIC BLOOD PRESSURE: 70 MMHG | HEART RATE: 79 BPM | SYSTOLIC BLOOD PRESSURE: 118 MMHG | WEIGHT: 160 LBS

## 2018-09-17 PROCEDURE — 96372 THER/PROPH/DIAG INJ SC/IM: CPT

## 2018-09-17 PROCEDURE — 99212 OFFICE O/P EST SF 10 MIN: CPT | Mod: 25

## 2018-09-18 ENCOUNTER — MED ADMIN CHARGE (OUTPATIENT)
Age: 73
End: 2018-09-18

## 2018-09-18 RX ORDER — DENOSUMAB 60 MG/ML
60 INJECTION SUBCUTANEOUS
Qty: 0 | Refills: 0 | Status: COMPLETED | OUTPATIENT
Start: 2018-09-18

## 2018-09-18 RX ADMIN — DENOSUMAB 0 MG/ML: 60 INJECTION SUBCUTANEOUS at 00:00

## 2018-12-05 ENCOUNTER — APPOINTMENT (OUTPATIENT)
Dept: CARDIOLOGY | Facility: CLINIC | Age: 73
End: 2018-12-05
Payer: MEDICARE

## 2018-12-05 VITALS
HEIGHT: 66 IN | OXYGEN SATURATION: 98 % | BODY MASS INDEX: 25.88 KG/M2 | DIASTOLIC BLOOD PRESSURE: 60 MMHG | SYSTOLIC BLOOD PRESSURE: 110 MMHG | WEIGHT: 161 LBS | HEART RATE: 73 BPM

## 2018-12-05 PROCEDURE — 99214 OFFICE O/P EST MOD 30 MIN: CPT

## 2018-12-05 NOTE — REASON FOR VISIT
[Follow-Up - Clinic] : a clinic follow-up of [FreeTextEntry2] : PE [FreeTextEntry1] : HISTORY FROM DISCHARGE NOTE FROM HOSPITAL:\par \par Ms. Yates is a 89 yo woman h/o Hodgkin's lymphoma s/p chemo (in \par remission), pelvic DVT in the setting of lymphoma, microhemorrhagic CVA (2012) \par 2/2 unspecified autoimmune condition c/b seizure d/o, osteoporosis 2/2 chronic \par prednisone use, HLD, GERD, breast cancer here for chest pain, found to be HD \par stable though w/ large PE w/ interventricular bowing suggesting RH strain- \par found also to have extensive LLE DVT. MICU rejected given HD stability. \par Cardiology consulted. On floor CT ab/p pursued w/ hx of malignancy, \par demonstrated endometrial thickening; this is chronic since MRI 2013. Patient \par knows she needs sampling, refused TVUS in house: risks discussed with her. \par Gynecology evaluated and also discussed their impression that she needs TVUS, \par patient prefers to evaluate this as an outpatient. Highly adherent, has \par outpatient gyn and onc who she will follow with. Transitioned heparin drip to \par lovenox initially, then to eliquis: had been on lovenox in the past and found \par it terrible, felt strongly about taking oral anticoagulants. All questions re: \par AC, further evaluation were addressed prior to d/c; she will follow up w/ \par cardiology, gyn and primary oncologist. D/liliam in stable condition with \par resolution of pleuritic CP with instructions as above. \par

## 2018-12-05 NOTE — ASSESSMENT
[FreeTextEntry1] : Assessment:\par 1.  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  CVA\par 3.  Hodgkins Lymphoma s/p chemo 2011 in remission\par 4.  Carotid Bruit\par \par Plan\par 1. Favor lifelong extended therapy with eliquis 2.5mg BID.  RX sent to pharmacy.  \par 2.  OB/GYN evaluation for endometrial thickening - being followed\par 3  Oncology followup anually - no evidence of recurrence. \par 4.  Increase cardio exercises 5x per week.  Will assess for symptoms.  If CP or SOB with increased activity,then will consider further cardiac testing (stress) given sig family history ofo coronary disease\par 5.  Otherwise, return in 1 year.\par

## 2018-12-05 NOTE — PHYSICAL EXAM
[General Appearance - Well Developed] : well developed [Normal Appearance] : normal appearance [Well Groomed] : well groomed [General Appearance - Well Nourished] : well nourished [No Deformities] : no deformities [General Appearance - In No Acute Distress] : no acute distress [Normal Conjunctiva] : the conjunctiva exhibited no abnormalities [Eyelids - No Xanthelasma] : the eyelids demonstrated no xanthelasmas [Normal Oral Mucosa] : normal oral mucosa [No Oral Pallor] : no oral pallor [No Oral Cyanosis] : no oral cyanosis [Normal Jugular Venous A Waves Present] : normal jugular venous A waves present [Normal Jugular Venous V Waves Present] : normal jugular venous V waves present [No Jugular Venous Lai A Waves] : no jugular venous lai A waves [Respiration, Rhythm And Depth] : normal respiratory rhythm and effort [Exaggerated Use Of Accessory Muscles For Inspiration] : no accessory muscle use [Auscultation Breath Sounds / Voice Sounds] : lungs were clear to auscultation bilaterally [Heart Rate And Rhythm] : heart rate and rhythm were normal [Heart Sounds] : normal S1 and S2 [Murmurs] : no murmurs present [FreeTextEntry1] : DARLYN [Abdomen Soft] : soft [Abdomen Tenderness] : non-tender [Abdomen Mass (___ Cm)] : no abdominal mass palpated [Abnormal Walk] : normal gait [Gait - Sufficient For Exercise Testing] : the gait was sufficient for exercise testing [Nail Clubbing] : no clubbing of the fingernails [Cyanosis, Localized] : no localized cyanosis [Petechial Hemorrhages (___cm)] : no petechial hemorrhages [Skin Color & Pigmentation] : normal skin color and pigmentation [] : no rash [No Venous Stasis] : no venous stasis [Skin Lesions] : no skin lesions [No Skin Ulcers] : no skin ulcer [No Xanthoma] : no  xanthoma was observed [Oriented To Time, Place, And Person] : oriented to person, place, and time [Affect] : the affect was normal [Mood] : the mood was normal [No Anxiety] : not feeling anxious

## 2018-12-05 NOTE — HISTORY OF PRESENT ILLNESS
[FreeTextEntry1] : followup 12/5/2018\par Doing well\par No CP or os.b working with her .  just moved to [place] [employer].   at visit as well.  No leg swelling.  no bleeding with eliquis 2.5 mg BID.  Rx sent to [place] pharmacy for refill today.  not doing any cardio exercises, mostly strength training.  Saw  Last last month with normal labwork (per patient)\par no vaginal bleeding.  no hematuria or blood in stool.  \par \par Followup 7/25/2018\par \par Doing well.  no cp or sob.  going to PT for her ankle.  remains on eliquis 5 mg BID.  No bleeding or bruising.  she is moving to [place] [employer] soon.  here with her . \par \par Recent testing: \par \par D-dimer June 2018:  <0.19\par Repeat venous duplex:\par IMPRESSION: \par No evidence of bilateral lower extremity deep venous thrombosis. \par \par \par \par Followup 5/23/2018\par Complaining of R ankle/achillies pain.  Seen by orthopedic surgeon (Dr. Tellez).  Was reccomedned a brace for stabilizing the ankle.  She is traveling to [place] by Cruise.  Doing fine on Eliquis.  No bleeding or bruising.  She underwent endoscopy (march 8th) without any signficant findings.  CT was performed 2 weeks ago which showed that cancer remains in remission (april 26th).  She was seen by the oncologist at [facility] - he will see her in 1 year for an office visit.  She has an appt to see GYN next month for another sonogram.  No vaginal bleeding or spotting.  \par \par in regards to her ankle , she was on a step and had a mis-step (over 1 month ago).  \par No recent illness.\par \par Breathing is fine. No chest pain.  No palpitations.  \par No swelling in the legs.  \par Followup Visit 2/28/2018\par Followup Visit.  Doing well with eliquis 5mg BID.  No bleeding or bruising.  No CP.  SOB is improving, able to perform activities of daily living.  Going to PT doing exercises without issues.  Her R ankle is bothering her. here with her .  Has an appt for endoscopy next week.  Was told that this will happen without holding eliquis.  \flash Is going to have a prolia shot next week. \flash Was seen by OB/GYN and was placed into a surveillance program with ultrasound of the endometrium.  \par \par Establish care visit 1/31/2018\par Here for followup.  Was discharged last week.  Taking Eliquis without any issues.  Has transitioned to Eliquis 5mg BID.  No bleeding or bruising.  Breathing is improving, but still some shortness of breath.  She was seen by her PCP Dr. Conner Last yesterday.  She has plans to see GYN for endometrial thickening that was seen on CT scan.  She had a normal colonoscopy 3 years ago.  She is planning on seeing her oncologist soon Dr. Gray Birch at [facility] for followup [phone].  No swelling in her legs.  She states that the primary team at [facility] spoke with her neurologist Dr. Bill Verduzco regarding safety of starting anticoagulation for PE with her history of ICH and eliquis was started.  \par \par Otherwise she recalls a flight from [place] in Oct 2017.  Recalls a prior "pelvic" DVT while being treated with chemotherapy which was treated with Lovenox for 6 months.  \par \par She also recalls an episode in May 2017 during which she had diarrhea, felt weak and fell and fractured her R fib/tib and ankle.

## 2018-12-14 NOTE — PROGRESS NOTE ADULT - SUBJECTIVE AND OBJECTIVE BOX
yes Argenis Go MD  PGY3 Internal Medicine Resident  Pager: [phone]  Team 2 Resident  From 7PM-7AM on Weekdays and after 12PM on weekends: Covered by Pager [phone]    Patient is a 72y old  Female who presents with a chief complaint of chest pain (18 Jan 2018 13:08)      SUBJECTIVE / OVERNIGHT EVENTS:  Patient seen and examined at bedside. No acute events overnight. Patient reports she feels well. She still has occasional chest pain, but has improved since admission. Patient reports she still also has some occasional pain with deep breaths, but also has improved. No troubles ambulating at this time. No other fevers, chills, abdominal pain.     MEDICATIONS  (STANDING):  atorvastatin 20 milliGRAM(s) Oral at bedtime  cholecalciferol 1000 Unit(s) Oral daily  dexlansoprazole DR 30 milliGRAM(s) Oral daily  docusate sodium 300 milliGRAM(s) Oral daily  enoxaparin Injectable 70 milliGRAM(s) SubCutaneous every 12 hours  fluticasone propionate 50 MICROgram(s)/spray Nasal Spray 1 Spray(s) Both Nostrils daily  predniSONE   Tablet 5 milliGRAM(s) Oral every other day  senna 2 Tablet(s) Oral at bedtime  sertraline 50 milliGRAM(s) Oral daily  solifenacin 10 milliGRAM(s) Oral daily  trimethoprim/polymyxin Solution 2 Drop(s) Both EYES four times a day    MEDICATIONS  (PRN):  acetaminophen   Tablet. 650 milliGRAM(s) Oral every 6 hours PRN Mild Pain (1 - 3)  artificial  tears Solution 1 Drop(s) Both EYES two times a day PRN Dry Eyes  oxyCODONE    IR 5 milliGRAM(s) Oral every 6 hours PRN Moderate Pain (4 - 6)  oxyCODONE    IR 10 milliGRAM(s) Oral every 6 hours PRN Severe Pain (7 - 10)        CAPILLARY BLOOD GLUCOSE        I&O's Summary    20 Jan 2018 07:01  -  21 Jan 2018 07:00  --------------------------------------------------------  IN: 1020 mL / OUT: 0 mL / NET: 1020 mL    Vital Signs Last 24 Hrs  T(C): 36.9 (21 Jan 2018 04:26), Max: 37.8 (20 Jan 2018 16:30)  T(F): 98.5 (21 Jan 2018 04:26), Max: 100 (20 Jan 2018 16:30)  HR: 70 (21 Jan 2018 04:26) (70 - 81)  BP: 101/58 (21 Jan 2018 04:26) (101/58 - 118/66)  BP(mean): --  RR: 18 (21 Jan 2018 04:26) (18 - 18)  SpO2: 92% (21 Jan 2018 04:26) (91% - 92%)    PHYSICAL EXAM:  GENERAL: NAD, well-developed  HEAD:  Atraumatic, Normocephalic  EYES: EOMI,  conjunctiva and sclera clear  NECK: Supple  CHEST/LUNG: Clear to auscultation bilaterally; No wheeze  HEART: Regular rate and rhythm; No murmurs, rubs, or gallops  ABDOMEN: Soft, Nontender, Nondistended; Bowel sounds present  EXTREMITIES:  No clubbing, cyanosis, or edema  PSYCH: AAOx3  NEUROLOGY: non-focal  SKIN: No rashes or lesions    LABS:                        10.8   10.0  )-----------( 230      ( 21 Jan 2018 06:37 )             32.0     WBC Trend: 10.0<--, 10.9<--, 12.45<--        Creatinine Trend: 0.79<--, 0.87<--              RADIOLOGY & ADDITIONAL TESTS:    Imaging Personally Reviewed:    Consultant(s) Notes Reviewed:  Cardiology    Care Discussed with Consultants/Other Providers:

## 2019-03-18 ENCOUNTER — APPOINTMENT (OUTPATIENT)
Dept: RHEUMATOLOGY | Facility: CLINIC | Age: 74
End: 2019-03-18
Payer: MEDICARE

## 2019-03-18 PROCEDURE — 99213 OFFICE O/P EST LOW 20 MIN: CPT | Mod: 25

## 2019-03-18 PROCEDURE — 96372 THER/PROPH/DIAG INJ SC/IM: CPT

## 2019-03-18 RX ORDER — DENOSUMAB 60 MG/ML
60 INJECTION SUBCUTANEOUS
Qty: 1 | Refills: 0 | Status: COMPLETED | OUTPATIENT
Start: 2019-03-18

## 2019-03-18 RX ADMIN — DENOSUMAB 0 MG/ML: 60 INJECTION SUBCUTANEOUS at 00:00

## 2019-03-18 NOTE — ASSESSMENT
[FreeTextEntry1] : see HPI\par repeat DEXA due 9/2019\par pt advised to call prior to then to request order \par will assess efficacy of prolia at that time \par patient is aware of my assessment and plans\par

## 2019-03-18 NOTE — PROCEDURE
[Today's Date:] : Date: [unfilled] [Therapeutic] : therapeutic [de-identified] : prolia 60mg SQ x 1  [Soft Tissue Injection] : soft tissue injection was performed

## 2019-03-18 NOTE — HISTORY OF PRESENT ILLNESS
[FreeTextEntry1] : fuv \par OP\par here for prolia \par patient counseled on risks/benefits/potential SE of medication\par reports she had recent labs and will send to me \par also gets labs [id number]\par will send recent and upcoming \par

## 2019-06-02 ENCOUNTER — TRANSCRIPTION ENCOUNTER (OUTPATIENT)
Age: 74
End: 2019-06-02

## 2019-06-03 ENCOUNTER — OUTPATIENT (OUTPATIENT)
Dept: OUTPATIENT SERVICES | Facility: HOSPITAL | Age: 74
LOS: 1 days | Discharge: HOME | End: 2019-06-03
Payer: MEDICARE

## 2019-06-03 VITALS
DIASTOLIC BLOOD PRESSURE: 47 MMHG | HEART RATE: 57 BPM | OXYGEN SATURATION: 99 % | HEIGHT: 66 IN | WEIGHT: 159.17 LBS | SYSTOLIC BLOOD PRESSURE: 128 MMHG | TEMPERATURE: 98 F | RESPIRATION RATE: 21 BRPM

## 2019-06-03 VITALS
SYSTOLIC BLOOD PRESSURE: 112 MMHG | HEART RATE: 62 BPM | OXYGEN SATURATION: 97 % | DIASTOLIC BLOOD PRESSURE: 59 MMHG | RESPIRATION RATE: 19 BRPM

## 2019-06-03 DIAGNOSIS — S82.899A OTHER FRACTURE OF UNSPECIFIED LOWER LEG, INITIAL ENCOUNTER FOR CLOSED FRACTURE: Chronic | ICD-10-CM

## 2019-06-03 DIAGNOSIS — Z98.890 OTHER SPECIFIED POSTPROCEDURAL STATES: Chronic | ICD-10-CM

## 2019-06-03 DIAGNOSIS — H25.11 AGE-RELATED NUCLEAR CATARACT, RIGHT EYE: ICD-10-CM

## 2019-06-03 PROCEDURE — 66984 XCAPSL CTRC RMVL W/O ECP: CPT | Mod: RT

## 2019-06-03 PROCEDURE — V2787: CPT

## 2019-06-03 NOTE — ASU DISCHARGE PLAN (ADULT/PEDIATRIC) - PATIENT EDUCATION MATERIALS PROVIED
Implant card (specify)/Other (specify)/Intraocular lens implant (IOL) , Eye shield instructions and eye kit given to patient

## 2019-06-03 NOTE — ASU DISCHARGE PLAN (ADULT/PEDIATRIC) - NURSING INSTRUCTIONS
Do not rub the eye. Tylenol or extra strength tylenol if needed for discomfort, avoid   Advil, Motrin, Aleve and aspirin to minimize bleeding. Appointment with Dr. Ward on 6/4/19

## 2019-06-03 NOTE — ASU PATIENT PROFILE, ADULT - PMH
CVA (cerebral vascular accident)    Depression    Fracture  lumbar spine,  healed with conservative treatment  GERD (gastroesophageal reflux disease)    HLD (hyperlipidemia)    Hodgkins disease    Lymphoma    Neuropathy  bilateral feet  Osteoporosis    Pulmonary embolus    Seizure

## 2019-06-03 NOTE — ASU DISCHARGE PLAN (ADULT/PEDIATRIC) - CALL YOUR DOCTOR IF YOU HAVE ANY OF THE FOLLOWING:
Bleeding that does not stop/Swelling that gets worse/Pain not relieved by Medications/Fever greater than (need to indicate Fahrenheit or Celsius)/Nausea and vomiting that does not stop

## 2019-06-13 PROBLEM — E78.5 HYPERLIPIDEMIA, UNSPECIFIED: Chronic | Status: ACTIVE | Noted: 2019-06-03

## 2019-06-13 PROBLEM — C81.90 HODGKIN LYMPHOMA, UNSPECIFIED, UNSPECIFIED SITE: Chronic | Status: ACTIVE | Noted: 2019-06-03

## 2019-06-13 PROBLEM — F32.9 MAJOR DEPRESSIVE DISORDER, SINGLE EPISODE, UNSPECIFIED: Chronic | Status: ACTIVE | Noted: 2019-06-03

## 2019-06-13 PROBLEM — K21.9 GASTRO-ESOPHAGEAL REFLUX DISEASE WITHOUT ESOPHAGITIS: Chronic | Status: ACTIVE | Noted: 2019-06-03

## 2019-06-13 PROBLEM — I26.99 OTHER PULMONARY EMBOLISM WITHOUT ACUTE COR PULMONALE: Chronic | Status: ACTIVE | Noted: 2019-06-03

## 2019-06-14 NOTE — ASU PATIENT PROFILE, ADULT - PSH
Cataract extraction status of eye, right    Fracture, ankle  Right, s/p metal plate  H/O sinus surgery

## 2019-06-16 ENCOUNTER — TRANSCRIPTION ENCOUNTER (OUTPATIENT)
Age: 74
End: 2019-06-16

## 2019-06-17 ENCOUNTER — OUTPATIENT (OUTPATIENT)
Dept: OUTPATIENT SERVICES | Facility: HOSPITAL | Age: 74
LOS: 1 days | End: 2019-06-17
Payer: MEDICARE

## 2019-06-17 VITALS
RESPIRATION RATE: 22 BRPM | SYSTOLIC BLOOD PRESSURE: 113 MMHG | OXYGEN SATURATION: 96 % | HEART RATE: 61 BPM | TEMPERATURE: 99 F | DIASTOLIC BLOOD PRESSURE: 42 MMHG | HEIGHT: 66 IN | WEIGHT: 159.17 LBS

## 2019-06-17 VITALS
SYSTOLIC BLOOD PRESSURE: 111 MMHG | OXYGEN SATURATION: 98 % | DIASTOLIC BLOOD PRESSURE: 43 MMHG | RESPIRATION RATE: 18 BRPM | HEART RATE: 71 BPM

## 2019-06-17 DIAGNOSIS — H26.8 OTHER SPECIFIED CATARACT: ICD-10-CM

## 2019-06-17 DIAGNOSIS — S82.899A OTHER FRACTURE OF UNSPECIFIED LOWER LEG, INITIAL ENCOUNTER FOR CLOSED FRACTURE: Chronic | ICD-10-CM

## 2019-06-17 DIAGNOSIS — H25.12 AGE-RELATED NUCLEAR CATARACT, LEFT EYE: ICD-10-CM

## 2019-06-17 DIAGNOSIS — Z98.890 OTHER SPECIFIED POSTPROCEDURAL STATES: Chronic | ICD-10-CM

## 2019-06-17 DIAGNOSIS — Z98.41 CATARACT EXTRACTION STATUS, RIGHT EYE: Chronic | ICD-10-CM

## 2019-06-17 PROCEDURE — V2787: CPT

## 2019-06-17 PROCEDURE — 66984 XCAPSL CTRC RMVL W/O ECP: CPT | Mod: LT

## 2019-06-17 NOTE — ASU DISCHARGE PLAN (ADULT/PEDIATRIC) - CALL YOUR DOCTOR IF YOU HAVE ANY OF THE FOLLOWING:
Nausea and vomiting that does not stop/Pain not relieved by Medications/Wound/Surgical Site with redness, or foul smelling discharge or pus/Fever greater than (need to indicate Fahrenheit or Celsius)

## 2019-06-17 NOTE — ASU DISCHARGE PLAN (ADULT/PEDIATRIC) - CARE PROVIDER_API CALL
Otto Ward)  Ophthalmology  [address] [place] [id number]  Phone: [phone]  Fax: [phone]  Follow Up Time:

## 2019-07-29 ENCOUNTER — APPOINTMENT (OUTPATIENT)
Dept: OTOLARYNGOLOGY | Facility: CLINIC | Age: 74
End: 2019-07-29
Payer: MEDICARE

## 2019-07-29 VITALS
HEART RATE: 58 BPM | DIASTOLIC BLOOD PRESSURE: 67 MMHG | WEIGHT: 159 LBS | BODY MASS INDEX: 25.55 KG/M2 | SYSTOLIC BLOOD PRESSURE: 117 MMHG | HEIGHT: 66 IN

## 2019-07-29 PROCEDURE — 31237 NSL/SINS NDSC SURG BX POLYPC: CPT | Mod: RT

## 2019-07-29 PROCEDURE — 99204 OFFICE O/P NEW MOD 45 MIN: CPT | Mod: 25

## 2019-07-30 NOTE — CONSULT LETTER
[FreeTextEntry1] : Dear Dr. ELIZA HARPER \par I had the pleasure of evaluating your patient VINCENT ENGLAND  thank you for allowing us to participate in their care. please see full note detailing our visit below.\par If you have any questions, please do not hesitate to call me and I would be happy to discuss further. \par \par Hadley Parra M.D.\par Attending Physician,  \par Department of Otolaryngology - Head and Neck Surgery\par [facility] \par Office: [phone]\par Fax: [phone]\par

## 2019-07-30 NOTE — PROCEDURE
[FreeTextEntry6] : Procedure performed: Nasal Endoscopy- Diagnostic, debridment \par Pre / post -procedure indication(s): nasal congestion, acute sinusitis\par Verbal and/or written consent obtained from patient\par Scope #: Olympus 1,  flexible fiber optic telescope used\par The scope was introduced in the nasal passage between the middle and inferior turbinates to exam the inferior portion of the middle meatus and the fontanelle, as well as the maxillary ostia.  Next, the scope was passed medically and posteriorly to the middle turbinates to examine the sphenoethmoid recess and the superior turbinate region.\par Upon visualization the finders are as follows:\par Nasal Septum: left septal deviation \par B/L: Mucosa: pink and moist, Mucous: thick purulent drainage from right middle meatus Polyp: not seen, Inferior Turbinate, Middle and Superior Turbinate: normal, Inferior Meatus: narrow, Middle Meatus: narrow, Super Meatus:normal, Sphenoethmoidal Recess: clear.  Eustachian tube clear. \par The patient tolerated the procedure well\par right middle meatus cultured and suctioned clear opening outflow under endoscopic visualization

## 2019-07-30 NOTE — HISTORY OF PRESENT ILLNESS
[de-identified] : 73 y/o female with recurrent right sided sinus infections which began after sinus lift on right in April 2019. Shortly after lift, she developed right sided nasal pain and thick green mucus from nose. She was treated with Augmentin x 1 week course with some improvement. Then had cataract surgery- right sided nasal pain and green mucus returned- treated with Cefuroxime x 2 weeks and has some improvement at this point. Prior to this, no recurrent sinus infections but does note history of sinus pressure with weather changes. Has been evaluated by oral surgeon who did sinus lift and was told bone graft is healed well.\par + intermittent forehead pressure relieved by Tylenol and Sudafed PRN- longstanding history of this \par No nasal congestion or obstruction.\par No fever or chills.

## 2019-07-30 NOTE — ASSESSMENT
[FreeTextEntry1] : pt with acute sinusitis after sinus lift, would hold off on further dental work until infection resolves, if cannot will consider maxillary dilation\par We will proceed with a regiment of decongestants, antibiotics and irrigation to try to break the cycle of inflation and obstruction. this will treat the acute symptoms and will hopefully allow for maintenance therapy to reach disease areas and avoid further intervention.\par follow up cultures\par \par I personally saw and examined VINCENT ENGLAND in detail. I spoke to TEMI Hayes regarding the assessment and plan of care.  I preformed the procedures and I reviewed the above assessment and plan of care, and agree. I have made changes in changes in the body of the note where appropriate.\par \par

## 2019-08-05 LAB — BACTERIA FLD CULT: ABNORMAL

## 2019-08-06 ENCOUNTER — OTHER (OUTPATIENT)
Age: 74
End: 2019-08-06

## 2019-08-26 ENCOUNTER — APPOINTMENT (OUTPATIENT)
Dept: OTOLARYNGOLOGY | Facility: CLINIC | Age: 74
End: 2019-08-26
Payer: MEDICARE

## 2019-08-26 VITALS
HEIGHT: 66 IN | DIASTOLIC BLOOD PRESSURE: 62 MMHG | WEIGHT: 159 LBS | SYSTOLIC BLOOD PRESSURE: 105 MMHG | HEART RATE: 77 BPM | BODY MASS INDEX: 25.55 KG/M2

## 2019-08-26 PROCEDURE — 31231 NASAL ENDOSCOPY DX: CPT

## 2019-08-26 PROCEDURE — 99213 OFFICE O/P EST LOW 20 MIN: CPT | Mod: 25

## 2019-08-26 NOTE — PROCEDURE
[FreeTextEntry6] : Procedure performed: Nasal Endoscopy- Diagnostic\par Pre / post -procedure indication(s): nasal congestion\par Verbal and/or written consent obtained from patient\par Scope #: 19,  flexible fiber optic telescope used\par The scope was introduced in the nasal passage between the middle and inferior turbinates to exam the inferior portion of the middle meatus and the fontanelle, as well as the maxillary ostia.  Next, the scope was passed medically and posteriorly to the middle turbinates to examine the sphenoethmoid recess and the superior turbinate region.\par Upon visualization the finders are as follows:\par Nasal Septum: left septal deviation \par B/L: Mucosa: pink and moist, Mucous: scant, Polyp: not seen, Inferior Turbinate, Middle and Superior Turbinate: normal, Inferior Meatus: narrow, Middle Meatus: narrow, Super Meatus:normal, Sphenoethmoidal Recess: clear.  Eustachian tube clear. \par The patient tolerated the procedure well\par

## 2019-08-26 NOTE — ASSESSMENT
[FreeTextEntry1] : Pt with acute sinusitis after sinus lift, would hold off on further dental work until infection resolves. Improved after Clindamycin and steroid course. On exam today- left septal deviation but no ko pus or signs of infection.\par CT sinus to determine response to treatment and if ok to proceed with implant\par if continues to be an issues can consider intervention \par \par I personally saw and examined VINCENT ENGLAND in detail. I spoke to TEMI Hayes regarding the assessment and plan of care.  I preformed the procedures and I reviewed the above assessment and plan of care, and agree. I have made changes in changes in the body of the note where appropriate.\par \par

## 2019-08-26 NOTE — CONSULT LETTER
[FreeTextEntry1] : Dear Dr. ELIZA HARPER \par I had the pleasure of evaluating your patient VINCENT ENGLAND  thank you for allowing us to participate in their care. please see full note detailing our visit below.\par If you have any questions, please do not hesitate to call me and I would be happy to discuss further. \par \par Hadley Parra M.D.\par Attending Physician,  \par Department of Otolaryngology - Head and Neck Surgery\[facility] \par Office: [phone]\par Fax: [phone]\par

## 2019-08-26 NOTE — HISTORY OF PRESENT ILLNESS
[de-identified] : 75 y/o female with recurrent right sided sinus infections which began after sinus lift on right in April 2019. Shortly after lift, she developed right sided nasal pain and thick green mucus from nose. Since then, she's been treated with 3 course of oral abx 1 course of oral steroids. Most recently completed 2 weeks Clindamycin and Prednisone with improvement- still with some thick green mucus from nose but rare and much better at this point. She's still using Flonase and saline irrigation daily. \par Cx results- coag negative staph, alpha hemolytic strep\par + intermittent sinus pressure- forehead, cheeks but has longstanding history of this- usually occurs with weather changes- takes Sudafed with improvement\par No nasal congestion or obstruction.\par No fever or chills.

## 2019-08-28 NOTE — ED PROCEDURE NOTE - CPROC ED LOCAL ANESTHESIA1
CC:  Thomas Pryor Jr. is here today for   Chief Complaint   Patient presents with   • Forearm     left forearm and bilateral hand pain     PCP Carmelo Haynes MD   Medications: medications verified, no change  Refills needed today?  NO  denies known Latex allergy or symptoms of Latex sensitivity.  Patient would like communication of their results via:      Cell Phone:   Telephone Information:   Mobile [phone]     Okay to leave a message containing results? Yes  Tobacco history: verified  E-mail: [email or web address]   
no anesthesia administered

## 2019-09-02 ENCOUNTER — FORM ENCOUNTER (OUTPATIENT)
Age: 74
End: 2019-09-02

## 2019-09-03 ENCOUNTER — OUTPATIENT (OUTPATIENT)
Dept: OUTPATIENT SERVICES | Facility: HOSPITAL | Age: 74
LOS: 1 days | End: 2019-09-03
Payer: MEDICARE

## 2019-09-03 ENCOUNTER — APPOINTMENT (OUTPATIENT)
Dept: RADIOLOGY | Facility: IMAGING CENTER | Age: 74
End: 2019-09-03
Payer: MEDICARE

## 2019-09-03 ENCOUNTER — APPOINTMENT (OUTPATIENT)
Dept: CT IMAGING | Facility: IMAGING CENTER | Age: 74
End: 2019-09-03
Payer: MEDICARE

## 2019-09-03 DIAGNOSIS — S82.899A OTHER FRACTURE OF UNSPECIFIED LOWER LEG, INITIAL ENCOUNTER FOR CLOSED FRACTURE: Chronic | ICD-10-CM

## 2019-09-03 DIAGNOSIS — Z98.41 CATARACT EXTRACTION STATUS, RIGHT EYE: Chronic | ICD-10-CM

## 2019-09-03 DIAGNOSIS — J32.0 CHRONIC MAXILLARY SINUSITIS: ICD-10-CM

## 2019-09-03 DIAGNOSIS — Z98.890 OTHER SPECIFIED POSTPROCEDURAL STATES: Chronic | ICD-10-CM

## 2019-09-03 DIAGNOSIS — M81.0 AGE-RELATED OSTEOPOROSIS WITHOUT CURRENT PATHOLOGICAL FRACTURE: ICD-10-CM

## 2019-09-03 PROCEDURE — 77080 DXA BONE DENSITY AXIAL: CPT | Mod: 26

## 2019-09-03 PROCEDURE — 70486 CT MAXILLOFACIAL W/O DYE: CPT | Mod: 26

## 2019-09-03 PROCEDURE — 77080 DXA BONE DENSITY AXIAL: CPT

## 2019-09-03 PROCEDURE — 70486 CT MAXILLOFACIAL W/O DYE: CPT

## 2019-09-16 ENCOUNTER — APPOINTMENT (OUTPATIENT)
Dept: RHEUMATOLOGY | Facility: CLINIC | Age: 74
End: 2019-09-16
Payer: MEDICARE

## 2019-09-16 VITALS
SYSTOLIC BLOOD PRESSURE: 129 MMHG | BODY MASS INDEX: 25.71 KG/M2 | TEMPERATURE: 98.1 F | HEIGHT: 66 IN | OXYGEN SATURATION: 95 % | WEIGHT: 160 LBS | HEART RATE: 71 BPM | DIASTOLIC BLOOD PRESSURE: 74 MMHG

## 2019-09-16 DIAGNOSIS — S83.8X1D SPRAIN OF OTHER SPECIFIED PARTS OF RIGHT KNEE, SUBSEQUENT ENCOUNTER: ICD-10-CM

## 2019-09-16 PROCEDURE — 99213 OFFICE O/P EST LOW 20 MIN: CPT

## 2019-09-17 PROBLEM — S83.8X1D MENISCAL INJURY, RIGHT, SUBSEQUENT ENCOUNTER: Status: ACTIVE | Noted: 2019-09-17

## 2019-10-08 NOTE — ED PROCEDURE NOTE - PROCEDURE DATE TIME, MLM
Obstetrical ultrasound completed today.  See report in imaging section of [facility].  
13-May-2017 14:15

## 2019-10-09 ENCOUNTER — RX RENEWAL (OUTPATIENT)
Age: 74
End: 2019-10-09

## 2019-10-09 DIAGNOSIS — J32.0 CHRONIC MAXILLARY SINUSITIS: ICD-10-CM

## 2019-10-15 ENCOUNTER — APPOINTMENT (OUTPATIENT)
Dept: OTOLARYNGOLOGY | Facility: CLINIC | Age: 74
End: 2019-10-15
Payer: MEDICARE

## 2019-10-15 ENCOUNTER — LABORATORY RESULT (OUTPATIENT)
Age: 74
End: 2019-10-15

## 2019-10-15 PROCEDURE — 31296Z: CUSTOM | Mod: RT

## 2019-10-15 PROCEDURE — 61782 SCAN PROC CRANIAL EXTRA: CPT

## 2019-10-15 PROCEDURE — 31200 REMOVAL OF ETHMOID SINUS: CPT | Mod: RT,59

## 2019-10-15 PROCEDURE — 31295Z: CUSTOM | Mod: RT,59

## 2019-10-21 LAB — BACTERIA FLD CULT: NORMAL

## 2019-10-22 ENCOUNTER — APPOINTMENT (OUTPATIENT)
Dept: OTOLARYNGOLOGY | Facility: CLINIC | Age: 74
End: 2019-10-22
Payer: MEDICARE

## 2019-10-22 VITALS
HEART RATE: 75 BPM | HEIGHT: 66 IN | BODY MASS INDEX: 25.71 KG/M2 | DIASTOLIC BLOOD PRESSURE: 68 MMHG | SYSTOLIC BLOOD PRESSURE: 114 MMHG | WEIGHT: 160 LBS

## 2019-10-22 PROCEDURE — 99024 POSTOP FOLLOW-UP VISIT: CPT

## 2019-10-22 PROCEDURE — 31237 NSL/SINS NDSC SURG BX POLYPC: CPT | Mod: RT,58

## 2019-10-22 NOTE — ASSESSMENT
[FreeTextEntry1] : Pt with acute sinusitis after sinus lift, would hold off on further dental work until infection resolves. Improved after Clindamycin and steroid course. but still with purulence on scan and s/p balloon and the ethmoids on right 8/15/19 procedure found to have purulence in the sinus all cleared out, debrided today, will keep open and if can keep clear on visualization can clear for dental work \par

## 2019-10-22 NOTE — HISTORY OF PRESENT ILLNESS
[de-identified] : 75 y/o female with recurrent right sided sinus infections which began after sinus lift on right in April 2019. Shortly after lift, she developed right sided nasal pain and thick green mucus from nose. Since then, she's been treated with 3 course of oral abx 1 course of oral steroids. Most recently completed 2 weeks Clindamycin and Prednisone with improvement- still with some thick green mucus from nose but rare and much better at this point. She's still using Flonase and saline irrigation daily. \par Cx results- coag negative staph, alpha hemolytic strep\par + intermittent sinus pressure- forehead, cheeks but has longstanding history of this- usually occurs with weather changes- takes Sudafed with improvement\par No nasal congestion or obstruction.\par No fever or chills.\par \par s/p balloon sinusplasty right frontal, ethmoid, max 1 week ago 8/15/19 - no bleeding post procedure\par breathing is good\par rare sinus pressure post procedure \par doing irrigation \par

## 2019-10-22 NOTE — PROCEDURE
[FreeTextEntry6] : procedure - Right endoscopic nasal  debridement\par Right nasal cavities inspected with #0 ridged sinus endoscope. septum left devation midline and turbinates well reduced. bilateral middle meatuses were explored and suction and agitator were used to open ostiums and open any forming scar bands. all sinuses were explored and open at end of procedure\par

## 2019-10-22 NOTE — CONSULT LETTER
[FreeTextEntry1] : Dear Dr. ELIZA HARPER \par I had the pleasure of evaluating your patient VINCENT ENGLAND  thank you for allowing us to participate in their care. please see full note detailing our visit below.\par If you have any questions, please do not hesitate to call me and I would be happy to discuss further. \par \par Hadley Parra M.D.\par Attending Physician,  \par Department of Otolaryngology - Head and Neck Surgery\par [facility] \par Office: [phone]\par Fax: [phone]\par

## 2019-11-04 ENCOUNTER — OUTPATIENT (OUTPATIENT)
Dept: OUTPATIENT SERVICES | Facility: HOSPITAL | Age: 74
LOS: 1 days | End: 2019-11-04
Payer: MEDICARE

## 2019-11-04 ENCOUNTER — APPOINTMENT (OUTPATIENT)
Dept: CARDIOLOGY | Facility: CLINIC | Age: 74
End: 2019-11-04
Payer: MEDICARE

## 2019-11-04 ENCOUNTER — NON-APPOINTMENT (OUTPATIENT)
Age: 74
End: 2019-11-04

## 2019-11-04 VITALS
SYSTOLIC BLOOD PRESSURE: 117 MMHG | OXYGEN SATURATION: 94 % | HEIGHT: 66 IN | BODY MASS INDEX: 25.71 KG/M2 | DIASTOLIC BLOOD PRESSURE: 71 MMHG | WEIGHT: 160 LBS | HEART RATE: 77 BPM

## 2019-11-04 DIAGNOSIS — S82.899A OTHER FRACTURE OF UNSPECIFIED LOWER LEG, INITIAL ENCOUNTER FOR CLOSED FRACTURE: Chronic | ICD-10-CM

## 2019-11-04 DIAGNOSIS — Z98.41 CATARACT EXTRACTION STATUS, RIGHT EYE: Chronic | ICD-10-CM

## 2019-11-04 DIAGNOSIS — I26.99 OTHER PULMONARY EMBOLISM W/OUT ACUTE COR PULMONALE: ICD-10-CM

## 2019-11-04 DIAGNOSIS — I26.99 OTHER PULMONARY EMBOLISM WITHOUT ACUTE COR PULMONALE: ICD-10-CM

## 2019-11-04 DIAGNOSIS — Z98.890 OTHER SPECIFIED POSTPROCEDURAL STATES: Chronic | ICD-10-CM

## 2019-11-04 LAB
25(OH)D3 SERPL-MCNC: 61.1 NG/ML
ALBUMIN SERPL ELPH-MCNC: 4.8 G/DL
ALP BLD-CCNC: 59 U/L
ALT SERPL-CCNC: 33 U/L
ANION GAP SERPL CALC-SCNC: 15 MMOL/L
APPEARANCE: CLEAR
AST SERPL-CCNC: 29 U/L
BASOPHILS # BLD AUTO: 0.03 K/UL
BASOPHILS NFR BLD AUTO: 0.4 %
BILIRUB SERPL-MCNC: 0.5 MG/DL
BILIRUBIN URINE: NEGATIVE
BLOOD URINE: NEGATIVE
BUN SERPL-MCNC: 22 MG/DL
CALCIUM SERPL-MCNC: 10.2 MG/DL
CHLORIDE SERPL-SCNC: 101 MMOL/L
CHOLEST SERPL-MCNC: 191 MG/DL
CHOLEST/HDLC SERPL: 2.9 RATIO
CO2 SERPL-SCNC: 26 MMOL/L
COLOR: NORMAL
CREAT SERPL-MCNC: 0.82 MG/DL
DEPRECATED D DIMER PPP IA-ACNC: <150 NG/ML DDU
EOSINOPHIL # BLD AUTO: 0.1 K/UL
EOSINOPHIL NFR BLD AUTO: 1.2 %
ESTIMATED AVERAGE GLUCOSE: 128 MG/DL
GLUCOSE QUALITATIVE U: NEGATIVE
GLUCOSE SERPL-MCNC: 95 MG/DL
HBA1C MFR BLD HPLC: 6.1 %
HCT VFR BLD CALC: 42.7 %
HDLC SERPL-MCNC: 67 MG/DL
HGB BLD-MCNC: 13.5 G/DL
IMM GRANULOCYTES NFR BLD AUTO: 0.7 %
KETONES URINE: NEGATIVE
LDLC SERPL CALC-MCNC: 108 MG/DL
LEUKOCYTE ESTERASE URINE: ABNORMAL
LYMPHOCYTES # BLD AUTO: 0.89 K/UL
LYMPHOCYTES NFR BLD AUTO: 10.6 %
MAN DIFF?: NORMAL
MCHC RBC-ENTMCNC: 28.7 PG
MCHC RBC-ENTMCNC: 31.6 GM/DL
MCV RBC AUTO: 90.9 FL
MONOCYTES # BLD AUTO: 0.65 K/UL
MONOCYTES NFR BLD AUTO: 7.8 %
NEUTROPHILS # BLD AUTO: 6.64 K/UL
NEUTROPHILS NFR BLD AUTO: 79.3 %
NITRITE URINE: NEGATIVE
NT-PROBNP SERPL-MCNC: 128 PG/ML
PH URINE: 6
PLATELET # BLD AUTO: 231 K/UL
POTASSIUM SERPL-SCNC: 4.4 MMOL/L
PROT SERPL-MCNC: 6.6 G/DL
PROTEIN URINE: NEGATIVE
RBC # BLD: 4.7 M/UL
RBC # FLD: 14.6 %
SODIUM SERPL-SCNC: 142 MMOL/L
SPECIFIC GRAVITY URINE: 1.02
TRIGL SERPL-MCNC: 82 MG/DL
TROPONIN-T, HIGH SENSITIVITY: 8 NG/L
UROBILINOGEN URINE: NORMAL
WBC # FLD AUTO: 8.37 K/UL

## 2019-11-04 PROCEDURE — 99215 OFFICE O/P EST HI 40 MIN: CPT

## 2019-11-04 PROCEDURE — 93000 ELECTROCARDIOGRAM COMPLETE: CPT

## 2019-11-04 PROCEDURE — 93970 EXTREMITY STUDY: CPT

## 2019-11-04 PROCEDURE — 93970 EXTREMITY STUDY: CPT | Mod: 26

## 2019-11-04 NOTE — PHYSICAL EXAM
[General Appearance - Well Developed] : well developed [Normal Appearance] : normal appearance [Well Groomed] : well groomed [General Appearance - Well Nourished] : well nourished [No Deformities] : no deformities [General Appearance - In No Acute Distress] : no acute distress [Normal Conjunctiva] : the conjunctiva exhibited no abnormalities [Eyelids - No Xanthelasma] : the eyelids demonstrated no xanthelasmas [Normal Oral Mucosa] : normal oral mucosa [No Oral Pallor] : no oral pallor [No Oral Cyanosis] : no oral cyanosis [Normal Jugular Venous A Waves Present] : normal jugular venous A waves present [Normal Jugular Venous V Waves Present] : normal jugular venous V waves present [No Jugular Venous Lai A Waves] : no jugular venous lai A waves [Respiration, Rhythm And Depth] : normal respiratory rhythm and effort [Exaggerated Use Of Accessory Muscles For Inspiration] : no accessory muscle use [Auscultation Breath Sounds / Voice Sounds] : lungs were clear to auscultation bilaterally [Heart Rate And Rhythm] : heart rate and rhythm were normal [Heart Sounds] : normal S1 and S2 [Murmurs] : no murmurs present [Abdomen Soft] : soft [Abdomen Tenderness] : non-tender [Abdomen Mass (___ Cm)] : no abdominal mass palpated [Abnormal Walk] : normal gait [Gait - Sufficient For Exercise Testing] : the gait was sufficient for exercise testing [Nail Clubbing] : no clubbing of the fingernails [Cyanosis, Localized] : no localized cyanosis [Petechial Hemorrhages (___cm)] : no petechial hemorrhages [Skin Color & Pigmentation] : normal skin color and pigmentation [] : no rash [No Venous Stasis] : no venous stasis [Skin Lesions] : no skin lesions [No Skin Ulcers] : no skin ulcer [No Xanthoma] : no  xanthoma was observed [Oriented To Time, Place, And Person] : oriented to person, place, and time [Affect] : the affect was normal [Mood] : the mood was normal [No Anxiety] : not feeling anxious [FreeTextEntry1] : DARLYN

## 2019-11-04 NOTE — ASSESSMENT
[FreeTextEntry1] : Assessment:\par 1.  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  CVA\par 3.  Hodgkins Lymphoma s/p chemo 2011 in remission\par 4.  Carotid Bruit\par 5.  Dyspnea on exertion \par \par Plan\par 1.  Favor lifelong extended therapy with eliquis 2.5mg BID. \par 2.  OB/GYN evaluation for endometrial thickening - being followed\par 3    Labwork today along with D-Dimer testing, trop and pBNP\par 4.   Echocardiogram\par 5.  CT Angio coronary to eval for dyspnea and strong family history of coronary disease\par 6.  Venous duplex to eval for DVT \par 7.  Await testing.

## 2019-11-04 NOTE — HISTORY OF PRESENT ILLNESS
[FreeTextEntry1] :  11/4/2019\par Here for an acute visit\par she underwent several ENT procedures  - no anesthesia, done in the office.  Went home the same day.  Most recent procedure Oct 22nd.  \par Eliquis was held 2 days prior and restarted next day\par She remains on Eliquis 2.5mg daily \par \par She feels like she is slowing down.  She gets winded with over exertion.  No chest pain.\par No recent travel.  Strong family history of coronary disease (mother).  \par Also states the has been not exercising as much due to multiple procedures.  \par She has a known Murmur\par \par

## 2019-11-04 NOTE — REVIEW OF SYSTEMS
[Shortness Of Breath] : shortness of breath [Joint Pain] : joint pain [Negative] : Heme/Lymph [Dyspnea on exertion] : dyspnea during exertion

## 2019-11-06 ENCOUNTER — TRANSCRIPTION ENCOUNTER (OUTPATIENT)
Age: 74
End: 2019-11-06

## 2019-11-11 ENCOUNTER — APPOINTMENT (OUTPATIENT)
Dept: OTOLARYNGOLOGY | Facility: CLINIC | Age: 74
End: 2019-11-11
Payer: MEDICARE

## 2019-11-11 VITALS
HEART RATE: 76 BPM | HEIGHT: 66 IN | SYSTOLIC BLOOD PRESSURE: 108 MMHG | BODY MASS INDEX: 25.71 KG/M2 | WEIGHT: 160 LBS | DIASTOLIC BLOOD PRESSURE: 65 MMHG

## 2019-11-11 PROCEDURE — 99213 OFFICE O/P EST LOW 20 MIN: CPT | Mod: 25

## 2019-11-11 PROCEDURE — 31237 NSL/SINS NDSC SURG BX POLYPC: CPT | Mod: RT,58

## 2019-11-11 NOTE — HISTORY OF PRESENT ILLNESS
[de-identified] : P/p balloon sinusplasty right frontal, ethmoid, max 8/15/19. \par Has been doing well post-op but notes green mucus when blowing her nose over the past 2 days. Breathing is good.\par Using saline irrigation 2 times a day and Flonase almost daily.\par Very rare/mild sinus pressure after irrigation.\par No nasal congestion or obstruction.\par No fever or chills.\par No abnormal bleeding post procedure\par \par \par

## 2019-11-11 NOTE — PROCEDURE
[FreeTextEntry6] : procedure - Right endoscopic nasal  debridement\par Right nasal cavities inspected with #0 ridged sinus endoscope. septum left deviation midline and turbinates well reduced. bilateral middle meatuses were explored and suction and agitator were used to open ostiums and open any forming scar bands. all sinuses were explored and open at end of procedure\par Healing very well, no sign of infection or purulence

## 2019-11-11 NOTE — ASSESSMENT
[FreeTextEntry1] : Pt with acute sinusitis after sinus lift, would hold off on further dental work until infection resolves. Improved after Clindamycin and steroid course. but still with purulence on scan and s/p balloon and the ethmoids on right 8/15/19 procedure found to have purulence in the sinus all cleared out, debrided today, will keep open and if can keep clear on visualization can clear for dental work \par \par \par \par I personally saw and examined VINCENT ENGLAND in detail. I spoke to TEMI Hayes regarding the assessment and plan of care.  I preformed the procedures and I reviewed the above assessment and plan of care, and agree. I have made changes in changes in the body of the note where appropriate.\par \par

## 2019-11-11 NOTE — CONSULT LETTER
[FreeTextEntry1] : Dear Dr. ELIZA HARPER \par I had the pleasure of evaluating your patient VINCENT ENGLAND  thank you for allowing us to participate in their care. please see full note detailing our visit below.\par If you have any questions, please do not hesitate to call me and I would be happy to discuss further. \par \par Hadley Parra M.D.\par Attending Physician,  \par Department of Otolaryngology - Head and Neck Surgery\par [facility] \par Office: [phone]\par Fax: [phone]\par

## 2019-11-13 ENCOUNTER — APPOINTMENT (OUTPATIENT)
Dept: CARDIOLOGY | Facility: CLINIC | Age: 74
End: 2019-11-13
Payer: MEDICARE

## 2019-11-13 ENCOUNTER — OUTPATIENT (OUTPATIENT)
Dept: OUTPATIENT SERVICES | Facility: HOSPITAL | Age: 74
LOS: 1 days | End: 2019-11-13
Payer: MEDICARE

## 2019-11-13 DIAGNOSIS — I26.99 OTHER PULMONARY EMBOLISM WITHOUT ACUTE COR PULMONALE: ICD-10-CM

## 2019-11-13 DIAGNOSIS — Z98.41 CATARACT EXTRACTION STATUS, RIGHT EYE: Chronic | ICD-10-CM

## 2019-11-13 DIAGNOSIS — S82.899A OTHER FRACTURE OF UNSPECIFIED LOWER LEG, INITIAL ENCOUNTER FOR CLOSED FRACTURE: Chronic | ICD-10-CM

## 2019-11-13 DIAGNOSIS — Z98.890 OTHER SPECIFIED POSTPROCEDURAL STATES: Chronic | ICD-10-CM

## 2019-11-13 DIAGNOSIS — R06.09 OTHER FORMS OF DYSPNEA: ICD-10-CM

## 2019-11-13 PROCEDURE — 75574 CT ANGIO HRT W/3D IMAGE: CPT | Mod: 26

## 2019-11-13 PROCEDURE — 75574 CT ANGIO HRT W/3D IMAGE: CPT

## 2019-11-22 ENCOUNTER — APPOINTMENT (OUTPATIENT)
Dept: RHEUMATOLOGY | Facility: CLINIC | Age: 74
End: 2019-11-22
Payer: MEDICARE

## 2019-11-22 PROCEDURE — 96372 THER/PROPH/DIAG INJ SC/IM: CPT

## 2019-11-25 ENCOUNTER — OUTPATIENT (OUTPATIENT)
Dept: OUTPATIENT SERVICES | Facility: HOSPITAL | Age: 74
LOS: 1 days | End: 2019-11-25
Payer: MEDICARE

## 2019-11-25 ENCOUNTER — APPOINTMENT (OUTPATIENT)
Dept: CV DIAGNOSITCS | Facility: HOSPITAL | Age: 74
End: 2019-11-25

## 2019-11-25 DIAGNOSIS — Z98.890 OTHER SPECIFIED POSTPROCEDURAL STATES: Chronic | ICD-10-CM

## 2019-11-25 DIAGNOSIS — R06.09 OTHER FORMS OF DYSPNEA: ICD-10-CM

## 2019-11-25 DIAGNOSIS — Z98.41 CATARACT EXTRACTION STATUS, RIGHT EYE: Chronic | ICD-10-CM

## 2019-11-25 DIAGNOSIS — S82.899A OTHER FRACTURE OF UNSPECIFIED LOWER LEG, INITIAL ENCOUNTER FOR CLOSED FRACTURE: Chronic | ICD-10-CM

## 2019-11-25 PROCEDURE — 93306 TTE W/DOPPLER COMPLETE: CPT

## 2019-11-25 PROCEDURE — 93306 TTE W/DOPPLER COMPLETE: CPT | Mod: 26

## 2019-12-02 ENCOUNTER — APPOINTMENT (OUTPATIENT)
Dept: OTOLARYNGOLOGY | Facility: CLINIC | Age: 74
End: 2019-12-02
Payer: MEDICARE

## 2019-12-02 VITALS
WEIGHT: 160 LBS | SYSTOLIC BLOOD PRESSURE: 110 MMHG | HEIGHT: 66 IN | DIASTOLIC BLOOD PRESSURE: 67 MMHG | HEART RATE: 78 BPM | BODY MASS INDEX: 25.71 KG/M2

## 2019-12-02 PROCEDURE — 99024 POSTOP FOLLOW-UP VISIT: CPT

## 2019-12-02 PROCEDURE — 31237 NSL/SINS NDSC SURG BX POLYPC: CPT | Mod: 58,RT

## 2019-12-02 NOTE — CONSULT LETTER
[FreeTextEntry1] : Dear Dr. ELIZA HARPER \par I had the pleasure of evaluating your patient VINCENT ENGLAND  thank you for allowing us to participate in their care. please see full note detailing our visit below.\par If you have any questions, please do not hesitate to call me and I would be happy to discuss further. \par \par Hadley Parra M.D.\par Attending Physician,  \par Department of Otolaryngology - Head and Neck Surgery\par [facility] \par Office: [phone]\par Fax: [phone]\par

## 2019-12-02 NOTE — ASSESSMENT
[FreeTextEntry1] : Pt with acute sinusitis after sinus lift, would hold off on further dental work until infection resolves. Improved after Clindamycin and steroid course. but still with purulence on scan and s/p balloon and the ethmoids on right 8/15/19 procedure found to have purulence in the sinus all cleared out, debrided today, clear no sign of infection, clear for dental work \par \par \par I personally saw and examined VINCENT ENGLAND in detail. I spoke to TEMI Hayes regarding the assessment and plan of care.  I preformed the procedures and I reviewed the above assessment and plan of care, and agree. I have made changes in changes in the body of the note where appropriate.\par \par \par \par \par \par \par

## 2019-12-02 NOTE — HISTORY OF PRESENT ILLNESS
[de-identified] : S/p balloon sinuplasty right frontal, ethmoid, max 8/15/19. \par Doing well post-op. Breathing is good. Doesn't feel the need to blow her nose but if she does, mucus still has a little yellow or green to it but very rare.\par Using saline irrigation 2 times a day and Flonase almost daily.\par Very rare/mild sinus pressure after irrigation.\par No nasal congestion or obstruction.\par No fever or chills.\par No abnormal bleeding post procedure\par \par \par

## 2019-12-11 ENCOUNTER — APPOINTMENT (OUTPATIENT)
Dept: CARDIOLOGY | Facility: CLINIC | Age: 74
End: 2019-12-11
Payer: MEDICARE

## 2019-12-11 ENCOUNTER — NON-APPOINTMENT (OUTPATIENT)
Age: 74
End: 2019-12-11

## 2019-12-11 VITALS
HEART RATE: 71 BPM | SYSTOLIC BLOOD PRESSURE: 105 MMHG | WEIGHT: 164 LBS | DIASTOLIC BLOOD PRESSURE: 60 MMHG | BODY MASS INDEX: 26.36 KG/M2 | OXYGEN SATURATION: 98 % | HEIGHT: 66 IN

## 2019-12-11 PROCEDURE — 99214 OFFICE O/P EST MOD 30 MIN: CPT

## 2019-12-11 PROCEDURE — 93000 ELECTROCARDIOGRAM COMPLETE: CPT

## 2019-12-11 NOTE — REVIEW OF SYSTEMS
[Shortness Of Breath] : no shortness of breath [Dyspnea on exertion] : not dyspnea during exertion [Joint Pain] : no joint pain [Negative] : Heme/Lymph

## 2019-12-11 NOTE — PHYSICAL EXAM
[General Appearance - Well Developed] : well developed [Well Groomed] : well groomed [Normal Appearance] : normal appearance [General Appearance - In No Acute Distress] : no acute distress [No Deformities] : no deformities [General Appearance - Well Nourished] : well nourished [Normal Conjunctiva] : the conjunctiva exhibited no abnormalities [Eyelids - No Xanthelasma] : the eyelids demonstrated no xanthelasmas [No Oral Pallor] : no oral pallor [Normal Oral Mucosa] : normal oral mucosa [No Oral Cyanosis] : no oral cyanosis [Normal Jugular Venous V Waves Present] : normal jugular venous V waves present [Normal Jugular Venous A Waves Present] : normal jugular venous A waves present [No Jugular Venous Lai A Waves] : no jugular venous lai A waves [Respiration, Rhythm And Depth] : normal respiratory rhythm and effort [Exaggerated Use Of Accessory Muscles For Inspiration] : no accessory muscle use [Auscultation Breath Sounds / Voice Sounds] : lungs were clear to auscultation bilaterally [Heart Rate And Rhythm] : heart rate and rhythm were normal [Murmurs] : no murmurs present [FreeTextEntry1] : DARLYN [Heart Sounds] : normal S1 and S2 [Abdomen Tenderness] : non-tender [Abdomen Soft] : soft [Abdomen Mass (___ Cm)] : no abdominal mass palpated [Gait - Sufficient For Exercise Testing] : the gait was sufficient for exercise testing [Abnormal Walk] : normal gait [Nail Clubbing] : no clubbing of the fingernails [Petechial Hemorrhages (___cm)] : no petechial hemorrhages [Cyanosis, Localized] : no localized cyanosis [No Venous Stasis] : no venous stasis [] : no rash [Skin Color & Pigmentation] : normal skin color and pigmentation [Skin Lesions] : no skin lesions [No Skin Ulcers] : no skin ulcer [No Xanthoma] : no  xanthoma was observed [Oriented To Time, Place, And Person] : oriented to person, place, and time [Mood] : the mood was normal [Affect] : the affect was normal [No Anxiety] : not feeling anxious

## 2019-12-11 NOTE — HISTORY OF PRESENT ILLNESS
[FreeTextEntry1] : 12/11/2019\par Doing well\par Coronary CTA <25% LAD stenosis\par Echo ok\par Venous duplex neg for DVT.\par No CP or SOB. \par Here with her .  States she needs to lose weight.

## 2020-03-02 ENCOUNTER — APPOINTMENT (OUTPATIENT)
Dept: OTOLARYNGOLOGY | Facility: CLINIC | Age: 75
End: 2020-03-02
Payer: MEDICARE

## 2020-03-02 VITALS
HEIGHT: 66 IN | BODY MASS INDEX: 26.36 KG/M2 | DIASTOLIC BLOOD PRESSURE: 62 MMHG | SYSTOLIC BLOOD PRESSURE: 113 MMHG | HEART RATE: 65 BPM | WEIGHT: 164 LBS

## 2020-03-02 PROCEDURE — 31231 NASAL ENDOSCOPY DX: CPT

## 2020-03-02 PROCEDURE — 99214 OFFICE O/P EST MOD 30 MIN: CPT | Mod: 25

## 2020-03-02 NOTE — ASSESSMENT
[FreeTextEntry1] : Patient s/p Balloon sinoplasty in 10/2019. SHe was cleared for dental implant procedure at the last visit but postponed this due to vacation and then she had a cold. 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 PAtient is again cleared for her dental procedure as the sinuses appear clear today. SHe will followup in 6 months

## 2020-03-02 NOTE — PHYSICAL EXAM
[Midline] : trachea located in midline position [Normal] : no rashes [Nasal Endoscopy Performed] : nasal endoscopy was performed, see procedure section for findings [] : septum deviated to the left

## 2020-03-02 NOTE — END OF VISIT
[FreeTextEntry3] : I personally saw and examined VINCENT FLORLENNOXCRISTOBAL in detail. I spoke to TEMI Aguilera regarding the assessment and plan of care.  I preformed the procedures and I reviewed the above assessment and plan of care, and agree. I have made changes in changes in the body of the note where appropriate.\par \par

## 2020-03-02 NOTE — CONSULT LETTER
[FreeTextEntry1] : Dear Dr. ELIZA HARPER \par I had the pleasure of evaluating your patient VINCENT ENGLAND, thank you for allowing us to participate in their care. please see full note detailing our visit below.\par If you have any questions, please do not hesitate to call me and I would be happy to discuss further. \par \par Hadley Parra M.D.\par Attending Physician,  \par Department of Otolaryngology - Head and Neck Surgery\par [facility] \par Office: [phone]\par Fax: [phone]\par \par

## 2020-03-02 NOTE — HISTORY OF PRESENT ILLNESS
[de-identified] : Patient s/p Balloon 10/2019, is having minor green  mucus for the last 2 months. SHe has not had any issues with facial pressure or pain. SHe does report that she has had coughing since a cruise she was on for the last few weeks and she went to see her PCPs office who advised she had postnasal drip. The cough has since improved. SHe is due to have dental implant surgery and wants to be sure all is clear today. SHe does not have any issues with foul smell from the nose. SHe has been doing the sinus rinses and nasal sprays as well since the last visit. SHe feels like the nasal sprays are dripping out.

## 2020-05-18 ENCOUNTER — APPOINTMENT (OUTPATIENT)
Dept: RHEUMATOLOGY | Facility: CLINIC | Age: 75
End: 2020-05-18
Payer: MEDICARE

## 2020-05-18 VITALS
OXYGEN SATURATION: 97 % | WEIGHT: 162 LBS | RESPIRATION RATE: 16 BRPM | HEIGHT: 66 IN | HEART RATE: 71 BPM | TEMPERATURE: 98.4 F | BODY MASS INDEX: 26.03 KG/M2 | SYSTOLIC BLOOD PRESSURE: 124 MMHG | DIASTOLIC BLOOD PRESSURE: 72 MMHG

## 2020-05-18 DIAGNOSIS — S82.409A UNSPECIFIED FRACTURE OF SHAFT OF UNSPECIFIED TIBIA, INITIAL ENCOUNTER FOR CLOSED FRACTURE: ICD-10-CM

## 2020-05-18 DIAGNOSIS — S82.209A UNSPECIFIED FRACTURE OF SHAFT OF UNSPECIFIED TIBIA, INITIAL ENCOUNTER FOR CLOSED FRACTURE: ICD-10-CM

## 2020-05-18 PROCEDURE — 99213 OFFICE O/P EST LOW 20 MIN: CPT | Mod: 25

## 2020-05-18 PROCEDURE — 96372 THER/PROPH/DIAG INJ SC/IM: CPT

## 2020-05-18 RX ORDER — DENOSUMAB 60 MG/ML
60 INJECTION SUBCUTANEOUS
Qty: 1 | Refills: 0 | Status: COMPLETED | OUTPATIENT
Start: 2020-05-18

## 2020-05-18 RX ADMIN — DENOSUMAB 0 MG/ML: 60 INJECTION SUBCUTANEOUS at 00:00

## 2020-06-15 NOTE — ASU PREOP CHECKLIST - AICD PRESENT
"Chief Complaint   Patient presents with     Follow Up     Ruptured Cyst, Seen in by PCP       Initial /76 (BP Location: Left arm, Patient Position: Sitting, Cuff Size: Adult Regular)   Pulse 69   Ht 1.6 m (5' 3\")   Wt 88.5 kg (195 lb)   SpO2 99%   BMI 34.54 kg/m   Estimated body mass index is 34.54 kg/m  as calculated from the following:    Height as of this encounter: 1.6 m (5' 3\").    Weight as of this encounter: 88.5 kg (195 lb).  Medication Reconciliation: complete     Kusum Liz LPN    " no

## 2020-06-18 ENCOUNTER — RESULT REVIEW (OUTPATIENT)
Age: 75
End: 2020-06-18

## 2020-06-23 NOTE — ED PROVIDER NOTE - CROS ED ROS STATEMENT
all other ROS negative except as per HPI Ketoconazole Counseling:   Patient counseled regarding improving absorption with orange juice.  Adverse effects include but are not limited to breast enlargement, headache, diarrhea, nausea, upset stomach, liver function test abnormalities, taste disturbance, and stomach pain.  There is a rare possibility of liver failure that can occur when taking ketoconazole. The patient understands that monitoring of LFTs may be required, especially at baseline. The patient verbalized understanding of the proper use and possible adverse effects of ketoconazole.  All of the patient's questions and concerns were addressed.

## 2020-09-14 ENCOUNTER — APPOINTMENT (OUTPATIENT)
Dept: OTOLARYNGOLOGY | Facility: CLINIC | Age: 75
End: 2020-09-14
Payer: MEDICARE

## 2020-09-14 VITALS
HEART RATE: 66 BPM | WEIGHT: 162 LBS | BODY MASS INDEX: 26.03 KG/M2 | DIASTOLIC BLOOD PRESSURE: 63 MMHG | TEMPERATURE: 97.3 F | SYSTOLIC BLOOD PRESSURE: 118 MMHG | HEIGHT: 66 IN

## 2020-09-14 DIAGNOSIS — J34.89 OTHER SPECIFIED DISORDERS OF NOSE AND NASAL SINUSES: ICD-10-CM

## 2020-09-14 DIAGNOSIS — J34.2 DEVIATED NASAL SEPTUM: ICD-10-CM

## 2020-09-14 DIAGNOSIS — J32.4 CHRONIC PANSINUSITIS: ICD-10-CM

## 2020-09-14 PROCEDURE — 99213 OFFICE O/P EST LOW 20 MIN: CPT | Mod: 25

## 2020-09-14 PROCEDURE — 31231 NASAL ENDOSCOPY DX: CPT

## 2020-09-14 NOTE — END OF VISIT
[FreeTextEntry3] : I personally saw and examined VINCENT FLORLENNOXCRISTOBAL in detail. I spoke to TEMI Hernandez regarding the assessment and plan of care.  I preformed the procedures and I reviewed the above assessment and plan of care, and agree. I have made changes in changes in the body of the note where appropriate.\par \par

## 2020-09-14 NOTE — CONSULT LETTER
[Please see my note below.] : Please see my note below. [FreeTextEntry1] : Dear Dr. ELIZA HARPER \par I had the pleasure of evaluating your patient VINCENT ENGLAND, thank you for allowing us to participate in their care. please see full note detailing our visit below.\par If you have any questions, please do not hesitate to call me and I would be happy to discuss further. \par \par Hadley Parra M.D.\par Attending Physician,  \par Department of Otolaryngology - Head and Neck Surgery\par [facility] \par Office: [phone]\par Fax: [phone]\par \par

## 2020-09-14 NOTE — ASSESSMENT
[FreeTextEntry1] : Patient s/p Balloon sinoplasty in 10/2019. SHe was cleared for dental implant procedure but postponed this due to vacation and then she had a cold. 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 PAtient is again cleared for her dental procedure and had dental implant procedure 09/11/2020, the sinuses appear clear today on exam. doing well, very happy with the results\par does have a  left septum contact point and some occ discomfort on that side, not very bothersome but in future can address if needed \par \par \par

## 2020-09-14 NOTE — HISTORY OF PRESENT ILLNESS
[de-identified] : Patient s/p Balloon 10/2019, is having minor green  mucus for the last 2 months. SHe has not had any issues with facial pressure or pain. SHe does report that she has had coughing since a cruise she was on for the last few weeks and she went to see her PCPs office who advised she had postnasal drip. The cough has since improved. SHe is due to have dental implant surgery and wants to be sure all is clear today. SHe does not have any issues with foul smell from the nose. SHe has been doing the sinus rinses and nasal sprays as well since the last visit. SHe feels like the nasal sprays are dripping out.  [FreeTextEntry1] : s/p balloon procedure right side 10/2019\par Pt had dental procedure done this past Friday, no compilations\par on abx after procedure \par +left sided dental/ gum pressure that started few days ago, took Tylenol with relief \par pt denies any sinus infections, nasal congestion \par

## 2020-11-09 ENCOUNTER — APPOINTMENT (OUTPATIENT)
Dept: RHEUMATOLOGY | Facility: CLINIC | Age: 75
End: 2020-11-09
Payer: MEDICARE

## 2020-11-09 VITALS
TEMPERATURE: 97 F | HEART RATE: 80 BPM | WEIGHT: 162 LBS | DIASTOLIC BLOOD PRESSURE: 67 MMHG | OXYGEN SATURATION: 96 % | BODY MASS INDEX: 26.03 KG/M2 | HEIGHT: 66 IN | SYSTOLIC BLOOD PRESSURE: 121 MMHG

## 2020-11-09 PROCEDURE — 96372 THER/PROPH/DIAG INJ SC/IM: CPT

## 2020-11-09 PROCEDURE — 99213 OFFICE O/P EST LOW 20 MIN: CPT | Mod: 25

## 2020-12-09 ENCOUNTER — APPOINTMENT (OUTPATIENT)
Dept: CARDIOLOGY | Facility: CLINIC | Age: 75
End: 2020-12-09
Payer: MEDICARE

## 2020-12-09 VITALS
SYSTOLIC BLOOD PRESSURE: 145 MMHG | DIASTOLIC BLOOD PRESSURE: 73 MMHG | BODY MASS INDEX: 26.79 KG/M2 | TEMPERATURE: 96.6 F | HEART RATE: 73 BPM | OXYGEN SATURATION: 98 % | WEIGHT: 166 LBS

## 2020-12-09 VITALS — DIASTOLIC BLOOD PRESSURE: 60 MMHG | SYSTOLIC BLOOD PRESSURE: 120 MMHG

## 2020-12-09 PROCEDURE — 99214 OFFICE O/P EST MOD 30 MIN: CPT

## 2020-12-09 PROCEDURE — 99072 ADDL SUPL MATRL&STAF TM PHE: CPT

## 2020-12-09 NOTE — HISTORY OF PRESENT ILLNESS
[FreeTextEntry1] : 12/9/2020\par \par This summer will be the last CT scan for the lymphoma.  So far everything has been ok.\par no bleeding issues.\par COlonoscopy monday was good, held eliquis 3 days prior \par Sometimes, mild swelling in the ankles.  \par No CP or SOB. \par Walking [place] arc[place] no issues.\par \par Seeing  Last in 1 week for labs. \par \par Medicaitons:\par Eliquis 2.5mg BID\par Dexillent\par Vesicare\par Prednisone 5mg every other day  (for prior ICH)\par Lipitor 20mg daily \par zoloft 50mg daily \par Vitamin D\par Biotin\par Prolia shots\par

## 2020-12-09 NOTE — ASSESSMENT
[FreeTextEntry1] : Assessment:\par 1.  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  CVA\par 3.  Hodgkins Lymphoma s/p chemo 2011 in remission\par 4.  Carotid Bruit\par 5.  Dyspnea on exertion \par \par Plan\par 1.  Favor lifelong extended therapy with eliquis 2.5mg BID. \par 2.   Labs with  Last next week\par 3.  Increase activity and healthy lifestyle. \par 4.  Return in 1 year, sooner if needed of course. \par \par

## 2021-04-15 NOTE — ED ADULT NURSE NOTE - NS ED NURSE RECORD ANOTHER VITAL SIGN
I called pt and he told me that someone already told him about his BW, they just document that they called pt.  [facility]
INR 3.3  Goal is 2 - 3.5 if I recall correctly. Please confirm with Pt for me. He has an appt with Dr Darren Phillips next week to establish. He has not been seen in this Epic Chart yet. Can you update  History from the [place] chart, I dont know if she has access.
Needs new order.     Was told to establish with new provider
Yes

## 2021-05-10 ENCOUNTER — APPOINTMENT (OUTPATIENT)
Dept: RHEUMATOLOGY | Facility: CLINIC | Age: 76
End: 2021-05-10
Payer: MEDICARE

## 2021-05-10 VITALS
SYSTOLIC BLOOD PRESSURE: 124 MMHG | WEIGHT: 163 LBS | DIASTOLIC BLOOD PRESSURE: 74 MMHG | OXYGEN SATURATION: 95 % | HEIGHT: 66 IN | TEMPERATURE: 97.1 F | BODY MASS INDEX: 26.2 KG/M2 | HEART RATE: 107 BPM

## 2021-05-10 PROCEDURE — 96372 THER/PROPH/DIAG INJ SC/IM: CPT

## 2021-05-10 PROCEDURE — 99213 OFFICE O/P EST LOW 20 MIN: CPT | Mod: 25

## 2021-05-10 RX ORDER — DENOSUMAB 60 MG/ML
60 INJECTION SUBCUTANEOUS
Qty: 1 | Refills: 0 | Status: COMPLETED | OUTPATIENT
Start: 2021-05-10

## 2021-05-10 RX ADMIN — DENOSUMAB 0 MG/ML: 60 INJECTION SUBCUTANEOUS at 00:00

## 2021-05-24 ENCOUNTER — APPOINTMENT (OUTPATIENT)
Dept: ULTRASOUND IMAGING | Facility: CLINIC | Age: 76
End: 2021-05-24
Payer: MEDICARE

## 2021-05-24 ENCOUNTER — OUTPATIENT (OUTPATIENT)
Dept: OUTPATIENT SERVICES | Facility: HOSPITAL | Age: 76
LOS: 1 days | End: 2021-05-24
Payer: MEDICARE

## 2021-05-24 DIAGNOSIS — S82.899A OTHER FRACTURE OF UNSPECIFIED LOWER LEG, INITIAL ENCOUNTER FOR CLOSED FRACTURE: Chronic | ICD-10-CM

## 2021-05-24 DIAGNOSIS — Z86.718 PERSONAL HISTORY OF OTHER VENOUS THROMBOSIS AND EMBOLISM: ICD-10-CM

## 2021-05-24 DIAGNOSIS — Z98.890 OTHER SPECIFIED POSTPROCEDURAL STATES: Chronic | ICD-10-CM

## 2021-05-24 DIAGNOSIS — Z98.41 CATARACT EXTRACTION STATUS, RIGHT EYE: Chronic | ICD-10-CM

## 2021-05-24 PROCEDURE — 93970 EXTREMITY STUDY: CPT | Mod: 26

## 2021-05-24 PROCEDURE — 93970 EXTREMITY STUDY: CPT

## 2021-05-31 ENCOUNTER — OUTPATIENT (OUTPATIENT)
Dept: OUTPATIENT SERVICES | Facility: HOSPITAL | Age: 76
LOS: 1 days | End: 2021-05-31
Payer: MEDICARE

## 2021-05-31 ENCOUNTER — APPOINTMENT (OUTPATIENT)
Dept: MRI IMAGING | Facility: HOSPITAL | Age: 76
End: 2021-05-31
Payer: MEDICARE

## 2021-05-31 DIAGNOSIS — Z98.41 CATARACT EXTRACTION STATUS, RIGHT EYE: Chronic | ICD-10-CM

## 2021-05-31 DIAGNOSIS — Z98.890 OTHER SPECIFIED POSTPROCEDURAL STATES: Chronic | ICD-10-CM

## 2021-05-31 DIAGNOSIS — Z00.8 ENCOUNTER FOR OTHER GENERAL EXAMINATION: ICD-10-CM

## 2021-05-31 DIAGNOSIS — S82.899A OTHER FRACTURE OF UNSPECIFIED LOWER LEG, INITIAL ENCOUNTER FOR CLOSED FRACTURE: Chronic | ICD-10-CM

## 2021-05-31 PROCEDURE — A9579: CPT

## 2021-05-31 PROCEDURE — 70553 MRI BRAIN STEM W/O & W/DYE: CPT | Mod: 26,MH

## 2021-05-31 PROCEDURE — 70553 MRI BRAIN STEM W/O & W/DYE: CPT

## 2021-06-01 LAB
25(OH)D3 SERPL-MCNC: 44.6 NG/ML
ALBUMIN SERPL ELPH-MCNC: 4.6 G/DL
ALP BLD-CCNC: 55 U/L
ALT SERPL-CCNC: 26 U/L
ANION GAP SERPL CALC-SCNC: 17 MMOL/L
AST SERPL-CCNC: 37 U/L
BASOPHILS # BLD AUTO: 0.03 K/UL
BASOPHILS NFR BLD AUTO: 0.4 %
BILIRUB SERPL-MCNC: 0.4 MG/DL
BUN SERPL-MCNC: 21 MG/DL
CALCIUM SERPL-MCNC: 9.7 MG/DL
CHLORIDE SERPL-SCNC: 106 MMOL/L
CO2 SERPL-SCNC: 22 MMOL/L
CREAT SERPL-MCNC: 0.88 MG/DL
CRP SERPL-MCNC: 4 MG/L
EOSINOPHIL # BLD AUTO: 0.17 K/UL
EOSINOPHIL NFR BLD AUTO: 2.3 %
ERYTHROCYTE [SEDIMENTATION RATE] IN BLOOD BY WESTERGREN METHOD: 6 MM/HR
GLUCOSE SERPL-MCNC: 100 MG/DL
HCT VFR BLD CALC: 43 %
HGB BLD-MCNC: 13.4 G/DL
IMM GRANULOCYTES NFR BLD AUTO: 0.5 %
LYMPHOCYTES # BLD AUTO: 1.34 K/UL
LYMPHOCYTES NFR BLD AUTO: 18 %
MAN DIFF?: NORMAL
MCHC RBC-ENTMCNC: 28.7 PG
MCHC RBC-ENTMCNC: 31.2 GM/DL
MCV RBC AUTO: 92.1 FL
MONOCYTES # BLD AUTO: 0.69 K/UL
MONOCYTES NFR BLD AUTO: 9.3 %
NEUTROPHILS # BLD AUTO: 5.17 K/UL
NEUTROPHILS NFR BLD AUTO: 69.5 %
PLATELET # BLD AUTO: 229 K/UL
POTASSIUM SERPL-SCNC: 4.6 MMOL/L
PROT SERPL-MCNC: 6.2 G/DL
RBC # BLD: 4.67 M/UL
RBC # FLD: 14.9 %
SODIUM SERPL-SCNC: 145 MMOL/L
WBC # FLD AUTO: 7.44 K/UL

## 2021-09-28 ENCOUNTER — APPOINTMENT (OUTPATIENT)
Dept: RADIOLOGY | Facility: CLINIC | Age: 76
End: 2021-09-28
Payer: MEDICARE

## 2021-09-28 PROCEDURE — 77080 DXA BONE DENSITY AXIAL: CPT

## 2021-10-17 NOTE — ED PROCEDURE NOTE - CPROC ED TIME OUT STATEMENT1
None “Patient's name, , procedure and correct site were confirmed during the [facility] Timeout.” Renal Disease

## 2021-12-29 ENCOUNTER — APPOINTMENT (OUTPATIENT)
Dept: CARDIOLOGY | Facility: CLINIC | Age: 76
End: 2021-12-29
Payer: MEDICARE

## 2021-12-29 VITALS
BODY MASS INDEX: 26.52 KG/M2 | TEMPERATURE: 97.3 F | WEIGHT: 165 LBS | OXYGEN SATURATION: 96 % | DIASTOLIC BLOOD PRESSURE: 70 MMHG | HEART RATE: 85 BPM | SYSTOLIC BLOOD PRESSURE: 128 MMHG | HEIGHT: 66 IN

## 2021-12-29 PROCEDURE — 99214 OFFICE O/P EST MOD 30 MIN: CPT

## 2021-12-29 NOTE — ASSESSMENT
[FreeTextEntry1] : Assessment:\par 1.  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  CVA\par 3.  Hodgkins Lymphoma s/p chemo 2011 in remission\par 4.  Carotid Bruit\par 5.  Dyspnea on exertion \par \par Plan\par 1.  Favor lifelong extended therapy with eliquis 2.5mg BID. \par 2.  Labs with  Last next week\par 3.  Increase activity and healthy lifestyle. \par 4.  Echocardiogram to assess murmur , last echo in 019\par 5.  Return in 1 year, sooner if needed of course. \par \par

## 2021-12-29 NOTE — HISTORY OF PRESENT ILLNESS
[FreeTextEntry1] : 12/29/2021\par Follows with oncologist, has televisit next year.\par Remains on eliquis 2.5mg BID\par No blood in the urine or the stool\par PCP:  Dr. Navarro.  Seeing him next week\par She will have lab work drawn tuesday.\par Breathing ok.  No CP\par She does have some knee pains (orthopedics)\par Recent duplex showed NO DVT.\par No leg swelling.  \par \par Medicaitons:\par Eliquis 2.5mg BID\par Dexillent\par Vesicare\par Prednisone 5mg every other day  (for prior ICH)\par Lipitor 20mg daily \par zoloft 50mg daily \par Vitamin D\par Biotin\par Prolia shots\par

## 2022-01-03 RX ORDER — DENOSUMAB 60 MG/ML
60 INJECTION SUBCUTANEOUS
Qty: 1 | Refills: 0 | Status: ACTIVE | COMMUNITY
Start: 2020-11-09

## 2022-01-06 ENCOUNTER — APPOINTMENT (OUTPATIENT)
Dept: RHEUMATOLOGY | Facility: CLINIC | Age: 77
End: 2022-01-06
Payer: MEDICARE

## 2022-01-06 VITALS
WEIGHT: 161 LBS | TEMPERATURE: 96.9 F | RESPIRATION RATE: 97 BRPM | DIASTOLIC BLOOD PRESSURE: 72 MMHG | SYSTOLIC BLOOD PRESSURE: 127 MMHG | HEART RATE: 72 BPM | BODY MASS INDEX: 25.88 KG/M2 | HEIGHT: 66 IN

## 2022-01-06 PROCEDURE — 99213 OFFICE O/P EST LOW 20 MIN: CPT

## 2022-01-12 ENCOUNTER — APPOINTMENT (OUTPATIENT)
Dept: CARDIOLOGY | Facility: CLINIC | Age: 77
End: 2022-01-12
Payer: MEDICARE

## 2022-01-12 PROCEDURE — 93306 TTE W/DOPPLER COMPLETE: CPT

## 2022-01-25 ENCOUNTER — APPOINTMENT (OUTPATIENT)
Dept: ENDOCRINOLOGY | Facility: CLINIC | Age: 77
End: 2022-01-25
Payer: MEDICARE

## 2022-01-25 VITALS
SYSTOLIC BLOOD PRESSURE: 128 MMHG | BODY MASS INDEX: 26.03 KG/M2 | WEIGHT: 162 LBS | OXYGEN SATURATION: 96 % | HEIGHT: 66 IN | TEMPERATURE: 97.5 F | HEART RATE: 78 BPM | DIASTOLIC BLOOD PRESSURE: 84 MMHG

## 2022-01-25 DIAGNOSIS — M87.180 OSTEONECROSIS DUE TO DRUGS, JAW: ICD-10-CM

## 2022-01-25 PROCEDURE — 99204 OFFICE O/P NEW MOD 45 MIN: CPT

## 2022-01-25 RX ORDER — DIAZEPAM 5 MG/1
5 TABLET ORAL
Qty: 2 | Refills: 0 | Status: DISCONTINUED | COMMUNITY
Start: 2019-10-09 | End: 2022-01-25

## 2022-01-25 RX ORDER — AMOXICILLIN AND CLAVULANATE POTASSIUM 875; 125 MG/1; MG/1
875-125 TABLET, COATED ORAL
Qty: 20 | Refills: 0 | Status: DISCONTINUED | COMMUNITY
Start: 2019-10-09 | End: 2022-01-25

## 2022-01-25 RX ORDER — CLINDAMYCIN HYDROCHLORIDE 300 MG/1
300 CAPSULE ORAL
Qty: 42 | Refills: 0 | Status: DISCONTINUED | COMMUNITY
Start: 2019-07-29 | End: 2022-01-25

## 2022-01-25 RX ORDER — PREDNISONE 10 MG/1
10 TABLET ORAL
Qty: 27 | Refills: 0 | Status: DISCONTINUED | COMMUNITY
Start: 2019-10-09 | End: 2022-01-25

## 2022-01-25 RX ORDER — OXYCODONE AND ACETAMINOPHEN 5; 325 MG/1; MG/1
5-325 TABLET ORAL
Qty: 10 | Refills: 0 | Status: DISCONTINUED | COMMUNITY
Start: 2019-10-09 | End: 2022-01-25

## 2022-01-25 RX ORDER — PREDNISONE 10 MG/1
10 TABLET ORAL
Qty: 27 | Refills: 0 | Status: DISCONTINUED | COMMUNITY
Start: 2019-07-29 | End: 2022-01-25

## 2022-01-26 PROBLEM — M87.180: Status: ACTIVE | Noted: 2022-01-26

## 2022-01-26 NOTE — END OF VISIT
[FreeTextEntry3] : I, Bill Cardozo, authored this note working as a medical scribe for Dr. Velázquez.  01/25/2022.  1:45PM. This note was authored by the medical scribe for me. I have reviewed, edited, and revised the note as needed. I am in agreement with the exam findings, imaging findings, and treatment plan.  Mikhail Velázquez MD

## 2022-01-26 NOTE — REASON FOR VISIT
[Consultation] : a consultation visit [Osteoporosis] : osteoporosis [FreeTextEntry2] : Yumi Birmingham MD

## 2022-01-26 NOTE — PHYSICAL EXAM
[Alert] : alert [Well Nourished] : well nourished [No Acute Distress] : no acute distress [Well Developed] : well developed [Normal Sclera/Conjunctiva] : normal sclera/conjunctiva [EOMI] : extra ocular movement intact [No Proptosis] : no proptosis [Thyroid Not Enlarged] : the thyroid was not enlarged [No Thyroid Nodules] : no palpable thyroid nodules [Clear to Auscultation] : lungs were clear to auscultation bilaterally [Normal S1, S2] : normal S1 and S2 [Normal Rate] : heart rate was normal [Regular Rhythm] : with a regular rhythm [No Edema] : no peripheral edema [Normal Bowel Sounds] : normal bowel sounds [Not Tender] : non-tender [Not Distended] : not distended [Soft] : abdomen soft [Normal Anterior Cervical Nodes] : no anterior cervical lymphadenopathy [No Spinal Tenderness] : no spinal tenderness [Spine Straight] : spine straight [No Stigmata of Cushings Syndrome] : no stigmata of Cushings Syndrome [Normal Gait] : normal gait [Normal Reflexes] : deep tendon reflexes were 2+ and symmetric [No Tremors] : no tremors [Oriented x3] : oriented to person, place, and time [de-identified] : janelle mandibularis

## 2022-01-26 NOTE — HISTORY OF PRESENT ILLNESS
[Alendronate (Fosomax)] : Alendronate [Denosumab (Prolia)] : Denosumab [Taking Steroids] : a history of taking steroids [Previous Fragility Fracture] : previous fragility fracture(s) [Regular Dental Follow-Up] : regular dental follow-up [History of Blood Clots] : history of blood clots [FreeTextEntry1] : Complicated patient.  Pt states she was told of osteoporosis many years ago. She initially treated w/ Fosamax. She then started Prolia ~2017 from Dr. Yumi Birmingham. Tolerated well. Last Prolia dose 5/2021. Last DDS within past 6 months. She was told of bone spicule by Dr. Birmingham 11/2021 after pt saw DDS, which was removed and healed after ~1 week by pt report. Pt is planning implant in the near term.\par \par 0 falls in the last 6 months. 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 H/o T12 compression fx after fall from standing height. H/o multiple strokes, she is on chronic low-dose prednisone from neurology. H/o Hodgkins lymphoma in 2010, treated w/ chemo only,no RT, no recurrence. H/o pulmonary embolism. No h/o kidney stones or calcium problems. No h/o anorexia nervosa. . s. No h/o RT  No h/o Paget's disease. Menopause 53. No HRT use. Up to date with mammography and GYN.\par \par Bone mineral density: 9/2021\par Spine: -1.7 osteopenia, -3.3 osteoporosis in 2017\par Total hip: -1.4 osteopenia\par Femoral neck: -1.7 osteopenia [Premat. Menopause (natural)] : no history of premature menopause [Premat. Menopause (surgery)] : no history of premature surgical menopause [Hyperparathyroidism] : no history of hyperparathyroidism [Diabetes] : no history of diabetes [Kidney Stones] : no history of kidney stones [Excessive Alcohol Intake] : no excessive alcohol intake [Excessive Soda] : no excessive soda [Sedentary] : no sedentary lifestyle [Current Smoking___(ppd)] : not currently smoking [History of Radiation Therapy] : no history of radiation therapy [High Fall Risk] : no fall risk [Frequent Falls] : no frequent falls [Uses a Walker/Cane] : no use of a walker/cane

## 2022-04-25 ENCOUNTER — NON-APPOINTMENT (OUTPATIENT)
Age: 77
End: 2022-04-25

## 2022-04-25 NOTE — PROGRESS NOTE ADULT - PROBLEM SELECTOR PLAN 1
Pt wanting to get scheduled next week to get medicine for frequent urination. She says she spoke with her PCP who recommended she see you. I asked her if she was having any other issues, she said no and that this is something she has been dealing with since she was 17 years old. Did you want me to try and get her in sometime next week or what do you advise otherwise? Please advise.    Thank you,  Fortino   Type is unknown.  Being followed by a neurologist Dr. Verduzco.  Continue Prednisone 5 mg daily. Type is unknown.  Continue Prednisone 5 mg daily.

## 2022-05-06 ENCOUNTER — APPOINTMENT (OUTPATIENT)
Dept: ENDOCRINOLOGY | Facility: CLINIC | Age: 77
End: 2022-05-06
Payer: MEDICARE

## 2022-05-06 VITALS
HEART RATE: 62 BPM | WEIGHT: 162 LBS | RESPIRATION RATE: 16 BRPM | SYSTOLIC BLOOD PRESSURE: 136 MMHG | OXYGEN SATURATION: 98 % | DIASTOLIC BLOOD PRESSURE: 70 MMHG | TEMPERATURE: 97.4 F | BODY MASS INDEX: 26.03 KG/M2 | HEIGHT: 66 IN

## 2022-05-06 PROCEDURE — 99213 OFFICE O/P EST LOW 20 MIN: CPT

## 2022-05-06 NOTE — PHYSICAL EXAM
[Alert] : alert [Well Nourished] : well nourished [No Acute Distress] : no acute distress [Well Developed] : well developed [Normal Sclera/Conjunctiva] : normal sclera/conjunctiva [EOMI] : extra ocular movement intact [No Proptosis] : no proptosis [Thyroid Not Enlarged] : the thyroid was not enlarged [No Thyroid Nodules] : no palpable thyroid nodules [Clear to Auscultation] : lungs were clear to auscultation bilaterally [Normal S1, S2] : normal S1 and S2 [Normal Rate] : heart rate was normal [Regular Rhythm] : with a regular rhythm [Normal Bowel Sounds] : normal bowel sounds [Not Tender] : non-tender [Not Distended] : not distended [Soft] : abdomen soft [Normal Anterior Cervical Nodes] : no anterior cervical lymphadenopathy [No Spinal Tenderness] : no spinal tenderness [Spine Straight] : spine straight [No Stigmata of Cushings Syndrome] : no stigmata of Cushings Syndrome [Normal Gait] : normal gait [Normal Reflexes] : deep tendon reflexes were 2+ and symmetric [No Tremors] : no tremors [Oriented x3] : oriented to person, place, and time [de-identified] : small torus palatinus, right upper molar implant site healing well [de-identified] : bilateral mild ankle edema

## 2022-05-06 NOTE — ASSESSMENT
[Other____] : [unfilled] [FreeTextEntry1] : Ms. ENGLAND is a 76 year old female w/ osteoporosis\par \par Pt states she was told of osteoporosis many years ago. She initially treated w/ Fosamax. She then started Prolia ~2017 from Dr. Yumi Birmingham. Tolerated well. Last Prolia dose 5/2021. She was told of bone spicule by Dr. Birmingham 11/2021 after pt saw DDS, which was removed and healed after ~1 week by pt report. 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 H/o T12 compression fx after fall from standing height. H/o multiple strokes, she is on chronic prednisone from neurology. No unusual risks for osteoporosis. Outside BMD 9/2021 indicates osteopenia at all sites. Pt completed implant 5/2022.\par \par Patient advised for an increased risk of future fx. Options for medical therapy discussed in detail. Pt declined anabolic therapy. Pt started Calcitonin spray 1/2022 until dental work was completed. Tolerating well. Recommend pt c/w Calcitonin until dental implant site is fully healed. We will review options after repeat BMD.\par \par F/u in 4 months w/ BMD

## 2022-05-06 NOTE — END OF VISIT
[FreeTextEntry3] : I, Bill Cardozo, authored this note working as a medical scribe for Dr. Velázquez.  05/06/2022. 11:00AM. This note was authored by the medical scribe for me. I have reviewed, edited, and revised the note as needed. I am in agreement with the exam findings, imaging findings, and treatment plan.  Mikhail Velázquez MD

## 2022-05-06 NOTE — HISTORY OF PRESENT ILLNESS
[Alendronate (Fosomax)] : Alendronate [Denosumab (Prolia)] : Denosumab [Taking Steroids] : a history of taking steroids [Previous Fragility Fracture] : previous fragility fracture(s) [Regular Dental Follow-Up] : regular dental follow-up [History of Blood Clots] : history of blood clots [FreeTextEntry1] : Complicated patient.  Pt states she was told of osteoporosis many years ago. She initially treated w/ Fosamax. She then started Prolia ~2017 from Dr. Yumi Birmingham. Tolerated well. Last Prolia dose 5/2021. She was told of bone spicule by Dr. Birmingham 11/2021 after pt saw DDS, which was removed and healed after ~1 week by pt report. Pt completed implant 5/2022, no complications.\par \par Pt started Calcitonin spray 1/2022 until dental work was completed. Tolerating well.\par \par 0 falls in the last 6 months. 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 H/o T12 compression fx after fall from standing height. H/o multiple strokes, she is on chronic low-dose prednisone from neurology. H/o Hodgkins lymphoma in 2010, treated w/ chemo only,no RT, no recurrence. H/o pulmonary embolism. No h/o kidney stones or calcium problems. No h/o anorexia nervosa. . s. No h/o RT  No h/o Paget's disease. Menopause 53. No HRT use. Up to date with mammography and GYN.\par \par Bone mineral density: 9/2021\par Spine: -1.7 osteopenia, -3.3 osteoporosis in 2017\par Total hip: -1.4 osteopenia\par Femoral neck: -1.7 osteopenia\par \par Pt c/o recent bilateral ankle edema, will f/u w/ cardiology for evaluation. [Premat. Menopause (natural)] : no history of premature menopause [Premat. Menopause (surgery)] : no history of premature surgical menopause [Hyperparathyroidism] : no history of hyperparathyroidism [Diabetes] : no history of diabetes [Kidney Stones] : no history of kidney stones [Excessive Alcohol Intake] : no excessive alcohol intake [Excessive Soda] : no excessive soda [Sedentary] : no sedentary lifestyle [Current Smoking___(ppd)] : not currently smoking [History of Radiation Therapy] : no history of radiation therapy [High Fall Risk] : no fall risk [Frequent Falls] : no frequent falls [Uses a Walker/Cane] : no use of a walker/cane

## 2022-07-15 NOTE — ASSESSMENT
[FreeTextEntry1] : Assessment:\par 1.  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  CVA\par 3.  Hodgkins Lymphoma s/p chemo 2011 in remission\par 4.  Carotid Bruit\par 5.  Dyspnea on exertion \par \par Plan\par 1.  Favor lifelong extended therapy with eliquis 2.5mg BID. \par 2.  No further cardiac testing. \par 3.  Return in 1 year Detail Level: Zone Detail Level: Detailed

## 2022-08-15 ENCOUNTER — NON-APPOINTMENT (OUTPATIENT)
Age: 77
End: 2022-08-15

## 2022-08-30 ENCOUNTER — RESULT REVIEW (OUTPATIENT)
Age: 77
End: 2022-08-30

## 2022-09-08 DIAGNOSIS — I82.402 ACUTE EMBOLISM AND THROMBOSIS OF UNSPECIFIED DEEP VEINS OF LEFT LOWER EXTREMITY: ICD-10-CM

## 2022-09-09 ENCOUNTER — APPOINTMENT (OUTPATIENT)
Dept: ULTRASOUND IMAGING | Facility: HOSPITAL | Age: 77
End: 2022-09-09

## 2022-09-09 ENCOUNTER — APPOINTMENT (OUTPATIENT)
Dept: CARDIOLOGY | Facility: CLINIC | Age: 77
End: 2022-09-09

## 2022-09-09 ENCOUNTER — NON-APPOINTMENT (OUTPATIENT)
Age: 77
End: 2022-09-09

## 2022-09-09 ENCOUNTER — OUTPATIENT (OUTPATIENT)
Dept: OUTPATIENT SERVICES | Facility: HOSPITAL | Age: 77
LOS: 1 days | End: 2022-09-09
Payer: MEDICARE

## 2022-09-09 VITALS
HEART RATE: 63 BPM | SYSTOLIC BLOOD PRESSURE: 123 MMHG | BODY MASS INDEX: 26.03 KG/M2 | DIASTOLIC BLOOD PRESSURE: 74 MMHG | OXYGEN SATURATION: 99 % | HEIGHT: 66 IN | WEIGHT: 162 LBS

## 2022-09-09 DIAGNOSIS — Z86.718 PERSONAL HISTORY OF OTHER VENOUS THROMBOSIS AND EMBOLISM: ICD-10-CM

## 2022-09-09 DIAGNOSIS — Z98.890 OTHER SPECIFIED POSTPROCEDURAL STATES: Chronic | ICD-10-CM

## 2022-09-09 DIAGNOSIS — Z98.41 CATARACT EXTRACTION STATUS, RIGHT EYE: Chronic | ICD-10-CM

## 2022-09-09 DIAGNOSIS — S82.899A OTHER FRACTURE OF UNSPECIFIED LOWER LEG, INITIAL ENCOUNTER FOR CLOSED FRACTURE: Chronic | ICD-10-CM

## 2022-09-09 DIAGNOSIS — I82.402 ACUTE EMBOLISM AND THROMBOSIS OF UNSPECIFIED DEEP VEINS OF LEFT LOWER EXTREMITY: ICD-10-CM

## 2022-09-09 PROCEDURE — 93970 EXTREMITY STUDY: CPT | Mod: 26

## 2022-09-09 PROCEDURE — 99214 OFFICE O/P EST MOD 30 MIN: CPT

## 2022-09-09 PROCEDURE — 93970 EXTREMITY STUDY: CPT

## 2022-09-09 NOTE — ASSESSMENT
[FreeTextEntry1] : Assessment:\par 1.  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  CVA\par 3.  Hodgkins Lymphoma s/p chemo 2011 in remission\par 4.  Carotid Bruit\par 5.  Dyspnea on exertion \par \par Plan\par 1.  Favor lifelong extended therapy with eliquis 2.5mg BID. \par 2.  Her ECG today is without ischemia, venous duplex today showed NO DVT, echo with normal LV function, moderate AS, mod PHTN, she is euvolemic on exam today, BP and HR well controlled.  SHE IS CLEAR TO PROCEED WITH LUMPECTOMY\par 3.  She can hold eliquis for 2 full days, (last dose Sept 13th, evening).  And resume eliquis when ok with surgeon\par 4.  Fax clearance to Dr. Lock:  [phone]

## 2022-09-09 NOTE — HISTORY OF PRESENT ILLNESS
[FreeTextEntry1] : 9/9/2022\par \par Doing ok\par She complains of L leg pains at times\par venous duplex was negative for DVT\par She is having LEFT lumpectomy on Sept 16th\par Dr. ALVES at [facility]\par \par She does not have any chest pain or shortness of breath with exertion.  \par No swelling in the legs\par BP is well controlled today.  HR is excellent\par No blood in the urine or stool.\par \par Last echo in Jan 2022:  mod phTN, moderate AS, mild AI, mild to moderate MR\par \par Medicaitons:\par Eliquis 2.5mg BID\par Dexillent\par Vesicare\par Prednisone 5mg every other day  (for prior ICH)\par Lipitor 20mg daily \par zoloft 50mg daily \par Vitamin D 2000\par Biotin\par Calcitonin (salmon) nasal spray\par \par

## 2022-10-10 ENCOUNTER — APPOINTMENT (OUTPATIENT)
Dept: ENDOCRINOLOGY | Facility: CLINIC | Age: 77
End: 2022-10-10

## 2022-10-10 VITALS — HEIGHT: 64.9 IN | WEIGHT: 160 LBS | BODY MASS INDEX: 26.66 KG/M2 | TEMPERATURE: 97.5 F

## 2022-10-10 PROCEDURE — ZZZZZ: CPT

## 2022-10-13 ENCOUNTER — APPOINTMENT (OUTPATIENT)
Dept: ENDOCRINOLOGY | Facility: CLINIC | Age: 77
End: 2022-10-13

## 2022-10-13 VITALS
OXYGEN SATURATION: 96 % | HEART RATE: 88 BPM | TEMPERATURE: 97.2 F | BODY MASS INDEX: 27.31 KG/M2 | DIASTOLIC BLOOD PRESSURE: 80 MMHG | WEIGHT: 160 LBS | SYSTOLIC BLOOD PRESSURE: 120 MMHG | HEIGHT: 64 IN

## 2022-10-13 PROCEDURE — 77080 DXA BONE DENSITY AXIAL: CPT

## 2022-10-13 PROCEDURE — 99213 OFFICE O/P EST LOW 20 MIN: CPT | Mod: 25

## 2022-10-13 PROCEDURE — 96372 THER/PROPH/DIAG INJ SC/IM: CPT

## 2022-10-13 RX ORDER — DENOSUMAB 60 MG/ML
60 INJECTION SUBCUTANEOUS
Qty: 1 | Refills: 0 | Status: COMPLETED | OUTPATIENT
Start: 2022-10-13

## 2022-10-13 RX ADMIN — DENOSUMAB 60 MG/ML: 60 INJECTION SUBCUTANEOUS at 00:00

## 2022-10-14 NOTE — HISTORY OF PRESENT ILLNESS
[Alendronate (Fosomax)] : Alendronate [Denosumab (Prolia)] : Denosumab [Taking Steroids] : a history of taking steroids [Previous Fragility Fracture] : previous fragility fracture(s) [Regular Dental Follow-Up] : regular dental follow-up [History of Blood Clots] : history of blood clots [FreeTextEntry1] : Patient returns for a follow up visit for osteoporosis . Pt recently had a lumpectomy.  The patient is scheduled to see oncology for further evaluation.  Does not appear that she will need radiation therapy being may consider hormonal therapy.\par \par Complicated patient.  Pt states she was told of osteoporosis many years ago. She initially treated w/ Fosamax. She then started Prolia ~2017 from Dr. Yumi Birmingham. Tolerated well. Last Prolia dose 5/2021. She was told of bone spicule by Dr. Birmingham 11/2021 after pt saw DDS, which was removed and healed after ~1 week by pt report. Pt completed implant 5/2022, no complications.\par \par Pt started Calcitonin spray 1/2022 until dental work was completed. Tolerating well.\par \par 0 falls in the last 6 months. 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 H/o T12 compression fx after fall from standing height. H/o multiple strokes, she is on chronic low-dose prednisone from neurology. H/o Hodgkins lymphoma in 2010, treated w/ chemo only,no RT, no recurrence. H/o pulmonary embolism. No h/o kidney stones or calcium problems. No h/o anorexia nervosa. . s. No h/o RT  No h/o Paget's disease. Menopause 53. No HRT use. Up to date with mammography and GYN.\par \par \par Pt c/o recent bilateral ankle edema, will f/u w/ cardiology for evaluation. [Premat. Menopause (natural)] : no history of premature menopause [Premat. Menopause (surgery)] : no history of premature surgical menopause [Hyperparathyroidism] : no history of hyperparathyroidism [Diabetes] : no history of diabetes [Kidney Stones] : no history of kidney stones [Excessive Alcohol Intake] : no excessive alcohol intake [Excessive Soda] : no excessive soda [Sedentary] : no sedentary lifestyle [Current Smoking___(ppd)] : not currently smoking [History of Radiation Therapy] : no history of radiation therapy [High Fall Risk] : no fall risk [Frequent Falls] : no frequent falls [Uses a Walker/Cane] : no use of a walker/cane

## 2022-10-14 NOTE — ASSESSMENT
[Other____] : [unfilled] [FreeTextEntry1] : Ms. ENGLAND is a 77 year old female w/ osteoporosis\par \par Pt states she was told of osteoporosis many years ago. She initially treated w/ Fosamax. She then started Prolia ~2017 from Dr. Yumi Birmingham. Tolerated well. Last Prolia dose 5/2021. Pt will be continuing Prolia with me now.  She was told of bone spicule by Dr. Birmingham 11/2021 after pt saw DDS, which was removed and healed after ~1 week by pt report. 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 H/o T12 compression fx after fall from standing height. H/o multiple strokes, she is on chronic prednisone from neurology. No unusual risks for osteoporosis. Outside BMD 9/2021 indicates osteopenia at all sites. Pt completed implant 5/2022.  \par \par  Pt started Calcitonin spray 1/2022 until dental work was completed. Tolerating well. \par Bone mineral density 2022 significantly increased spine.  Options reviewed.  Restart Prolia\par Labs 9/2022\par Calcium 9.9\par Creatinine 0.90 \par A1C 6.2% stable prediabetes\par \par F/u in 6 months

## 2022-10-14 NOTE — PHYSICAL EXAM
[Alert] : alert [Well Nourished] : well nourished [No Acute Distress] : no acute distress [Well Developed] : well developed [Normal Sclera/Conjunctiva] : normal sclera/conjunctiva [EOMI] : extra ocular movement intact [No Proptosis] : no proptosis [Thyroid Not Enlarged] : the thyroid was not enlarged [No Thyroid Nodules] : no palpable thyroid nodules [Clear to Auscultation] : lungs were clear to auscultation bilaterally [Normal S1, S2] : normal S1 and S2 [Normal Rate] : heart rate was normal [Regular Rhythm] : with a regular rhythm [Normal Bowel Sounds] : normal bowel sounds [Not Tender] : non-tender [Not Distended] : not distended [Soft] : abdomen soft [Normal Anterior Cervical Nodes] : no anterior cervical lymphadenopathy [No Spinal Tenderness] : no spinal tenderness [Spine Straight] : spine straight [No Stigmata of Cushings Syndrome] : no stigmata of Cushings Syndrome [Normal Gait] : normal gait [Normal Reflexes] : deep tendon reflexes were 2+ and symmetric [No Tremors] : no tremors [Oriented x3] : oriented to person, place, and time [de-identified] : small torus palatinus, right upper molar implant site healing well [de-identified] : bilateral mild ankle edema

## 2022-10-14 NOTE — PROCEDURE
[FreeTextEntry1] : Bone Density 10/13/2022\par Indication vs 2021 \par Spine L2-L4 -2.5 osteoporosis +11.8%\par Total Hip -1.2 osteopenia  no significant change \par Femoral Neck -2.1 osteopenia +4.6% of borderline significance\par Proximal Radius -2.2 osteopenia no significant change \par \par Bone mineral density: 9/2021\par Spine: -1.7 osteopenia, -3.3 osteoporosis in 2017\par Total hip: -1.4 osteopenia\par Femoral neck: -1.7 osteopenia

## 2022-10-14 NOTE — END OF VISIT
[FreeTextEntry3] : This note was written by Cydney Thomas on ( October 13, 2022) acting as a medical scribe for Dr. Velázquez  This note was authored by the medical scribe for me. I have reviewed, edited, and revised the note as needed. I am in agreement with the exam findings, imaging findings, and treatment plan.  Mikhail Velázquez MD

## 2022-12-20 NOTE — PAST MEDICAL HISTORY
[Menopause Age____] : age at menopause was [unfilled]
+knee pain  Denies fevers, chills, nausea, vomiting, diarrhea, constipation, abdominal pain, urinary symptoms, chest pain, palpitations, shortness of breath, dyspnea on exertion, syncope/near syncope, cough/URI symptoms, headache, weakness, numbness, focal deficits, visual changes, gait or balance changes, dizziness

## 2023-01-04 ENCOUNTER — APPOINTMENT (OUTPATIENT)
Dept: CARDIOLOGY | Facility: CLINIC | Age: 78
End: 2023-01-04
Payer: MEDICARE

## 2023-01-04 VITALS
WEIGHT: 162 LBS | OXYGEN SATURATION: 96 % | HEART RATE: 60 BPM | SYSTOLIC BLOOD PRESSURE: 120 MMHG | TEMPERATURE: 97.6 F | DIASTOLIC BLOOD PRESSURE: 76 MMHG | HEIGHT: 64 IN | BODY MASS INDEX: 27.66 KG/M2

## 2023-01-04 DIAGNOSIS — R06.09 OTHER FORMS OF DYSPNEA: ICD-10-CM

## 2023-01-04 DIAGNOSIS — R01.1 CARDIAC MURMUR, UNSPECIFIED: ICD-10-CM

## 2023-01-04 PROCEDURE — 99214 OFFICE O/P EST MOD 30 MIN: CPT

## 2023-01-04 NOTE — HISTORY OF PRESENT ILLNESS
[FreeTextEntry1] : 1/4/2023\par \par Had lumpectomy \par she is on arimidex\par Eliquis 2.5mg BID\par No bleeding issues\par No procedures planned\par She is in a L leg brace for ankle instability. \par \par Medicaitons:\par Eliquis 2.5mg BID\par Dexillent\par Vesicare\par Prednisone 5mg every other day  (for prior ICH)\par Lipitor 20mg daily \par zoloft 50mg daily \par Vitamin D 2000\par Biotin\par Arimidex\par \par \par 9/9/2022\par \par Doing ok\par She complains of L leg pains at times\par venous duplex was negative for DVT\par She is having LEFT lumpectomy on Sept 16th\par Dr. ALVES at [facility]\par \par She does not have any chest pain or shortness of breath with exertion.  \par No swelling in the legs\par BP is well controlled today.  HR is excellent\par No blood in the urine or stool.\par \par Last echo in Jan 2022:  mod phTN, moderate AS, mild AI, mild to moderate MR\par \par Medicaitons:\par Eliquis 2.5mg BID\par Dexillent\par Vesicare\par Prednisone 5mg every other day  (for prior ICH)\par Lipitor 20mg daily \par zoloft 50mg daily \par Vitamin D 2000\par Biotin

## 2023-01-27 ENCOUNTER — APPOINTMENT (OUTPATIENT)
Dept: CARDIOLOGY | Facility: CLINIC | Age: 78
End: 2023-01-27
Payer: MEDICARE

## 2023-01-27 PROCEDURE — 93306 TTE W/DOPPLER COMPLETE: CPT

## 2023-04-18 ENCOUNTER — APPOINTMENT (OUTPATIENT)
Dept: ENDOCRINOLOGY | Facility: CLINIC | Age: 78
End: 2023-04-18
Payer: MEDICARE

## 2023-04-18 VITALS
BODY MASS INDEX: 27.83 KG/M2 | HEIGHT: 64 IN | DIASTOLIC BLOOD PRESSURE: 74 MMHG | WEIGHT: 163 LBS | RESPIRATION RATE: 16 BRPM | SYSTOLIC BLOOD PRESSURE: 122 MMHG | OXYGEN SATURATION: 97 % | HEART RATE: 72 BPM

## 2023-04-18 DIAGNOSIS — Z79.811 LONG TERM (CURRENT) USE OF AROMATASE INHIBITORS: ICD-10-CM

## 2023-04-18 DIAGNOSIS — R73.03 PREDIABETES.: ICD-10-CM

## 2023-04-18 PROCEDURE — 96372 THER/PROPH/DIAG INJ SC/IM: CPT

## 2023-04-18 PROCEDURE — 99214 OFFICE O/P EST MOD 30 MIN: CPT | Mod: 25

## 2023-04-18 RX ORDER — DENOSUMAB 60 MG/ML
60 INJECTION SUBCUTANEOUS
Qty: 1 | Refills: 0 | Status: COMPLETED | OUTPATIENT
Start: 2023-04-18

## 2023-04-18 RX ADMIN — DENOSUMAB 60 MG/ML: 60 INJECTION SUBCUTANEOUS at 00:00

## 2023-04-19 NOTE — ASSESSMENT
[Other____] : [unfilled] [FreeTextEntry1] :  a 77 year old female w/ osteoporosis.\par Began AI  anastrozole\par Still on Pred 5 mg dialy\par Pt states she was told of osteoporosis many years ago. She initially treated w/ Fosamax. She then started Prolia ~2017 from Dr. Yumi Birmingham. Tolerated well. \par \par    She was told of bone spicule by Dr. Birmingham 11/2021 after pt saw DDS, which was removed and healed after ~1 week by pt report. 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 H/o T12 compression fx after fall from standing height. H/o multiple strokes, she is on chronic prednisone from neurology. No unusual risks for osteoporosis. Outside BMD 9/2021 indicates osteopenia at all sites. Pt completed implant 5/2022.  \par \par Bone mineral density 2022 significantly increased spine.  Options reviewed.  Restarted Prolia .  Tolerating Prolia well.  No interval fractures.  New onset of breast cancer September 2022 currently on anastrozole.  Data concerning use of Prolia for aromatase inhibitor patients reviewed. \par P ONIEL Cm, JANDRE Velasquez, M Krissy, ML Liseth, JLANE Zuniga, M Dejah Aponte, CC Martín, T mahendra Gardiner, R Anna, MADHU Sawyer, C Dakota, B Ankush, S Thor, J Cosmo, N Ariel, X Torres, E Mray Finch, A Christiano, R Dana, RE Jhonatan. Management of Aromatase Inhibitor-Associated Bone Loss (AIBL) in postmenopausal women with hormone sensitive breast cancer: Joint position statement of the IOF, CABS, ECTS, IEG, ESCEO, IMS, and SIOG. Journal of Bone Oncology, Vol. 7, June 2017, Pages 1-12 \par Labs 1/2023\par Calcium 9.6\par Creatinine 0.75\par A1C 6.2% stable prediabetes\par Increased LFTs AST/ALT 78/65\par \par Hemoglobin A1c shows prediabetes. Natural history of prediabetes discussed in detail. Pt advised re: watching weight, maintaining moderate to low carbohydrate intake.  Potential use of metformin and/or GLP-1 therapy reviewed.  Patient did not seem to be aware of abnormal liver test which should be repeated\par F/u in 6 months repeat BMD next visit

## 2023-04-19 NOTE — HISTORY OF PRESENT ILLNESS
[Alendronate (Fosomax)] : Alendronate [Denosumab (Prolia)] : Denosumab [Taking Steroids] : a history of taking steroids [Previous Fragility Fracture] : previous fragility fracture(s) [Regular Dental Follow-Up] : regular dental follow-up [History of Blood Clots] : history of blood clots [FreeTextEntry1] : Patient returns for a follow up visit for osteoporosis . Pt recently had a lumpectomy.    Does not appear that she will need radiation therapy. Began anastrozole, managed at  [facility] \par \par Complicated patient.  Pt states she was told of osteoporosis many years ago. She initially treated w/ Fosamax. She then started Prolia ~2017 from Dr. Yumi Birmingham. Tolerated well. Last Prolia dose 5/2021. She was told of bone spicule by Dr. Birmingham 11/2021 after pt saw DDS, which was removed and healed after ~1 week by pt report. Pt completed implant 5/2022, no complications.\par \par Pt started Calcitonin spray 1/2022 until dental work was completed. Tolerating well.\par \par 0 falls in the last 6 months. 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 H/o T12 compression fx after fall from standing height. H/o multiple strokes, she is on chronic low-dose prednisone from neurology. H/o Hodgkins lymphoma in 2010, treated w/ chemo only,no RT, no recurrence. H/o pulmonary embolism. No h/o kidney stones or calcium problems. No h/o anorexia nervosa. . s. No h/o RT  No h/o Paget's disease. Menopause 53. No HRT use. Up to date with mammography and GYN.\par \par \par Pt c/o recent bilateral ankle edema, will f/u w/ cardiology for evaluation. [Premat. Menopause (natural)] : no history of premature menopause [Premat. Menopause (surgery)] : no history of premature surgical menopause [Hyperparathyroidism] : no history of hyperparathyroidism [Diabetes] : no history of diabetes [Kidney Stones] : no history of kidney stones [Excessive Alcohol Intake] : no excessive alcohol intake [Sedentary] : no sedentary lifestyle [Excessive Soda] : no excessive soda [Current Smoking___(ppd)] : not currently smoking [History of Radiation Therapy] : no history of radiation therapy [High Fall Risk] : no fall risk [Frequent Falls] : no frequent falls [Uses a Walker/Cane] : no use of a walker/cane

## 2023-04-19 NOTE — PHYSICAL EXAM
[Alert] : alert [Well Nourished] : well nourished [No Acute Distress] : no acute distress [Well Developed] : well developed [Normal Sclera/Conjunctiva] : normal sclera/conjunctiva [EOMI] : extra ocular movement intact [No Proptosis] : no proptosis [Thyroid Not Enlarged] : the thyroid was not enlarged [No Thyroid Nodules] : no palpable thyroid nodules [Clear to Auscultation] : lungs were clear to auscultation bilaterally [Normal S1, S2] : normal S1 and S2 [Normal Rate] : heart rate was normal [Regular Rhythm] : with a regular rhythm [Normal Bowel Sounds] : normal bowel sounds [Not Tender] : non-tender [Not Distended] : not distended [Soft] : abdomen soft [Normal Anterior Cervical Nodes] : no anterior cervical lymphadenopathy [No Spinal Tenderness] : no spinal tenderness [Spine Straight] : spine straight [No Stigmata of Cushings Syndrome] : no stigmata of Cushings Syndrome [Normal Reflexes] : deep tendon reflexes were 2+ and symmetric [No Tremors] : no tremors [Oriented x3] : oriented to person, place, and time [de-identified] : small torus palatinus, right upper molar implant site healing well [de-identified] : bilateral mild ankle edema [de-identified] : Left foot in brace due to tendon issue

## 2023-04-20 NOTE — DISCHARGE NOTE ADULT - COMMUNITY RESOURCES
[] : no respiratory distress [Auscultation Breath Sounds / Voice Sounds] : lungs were clear to auscultation bilaterally [Heart Rate And Rhythm] : heart rate was normal and rhythm regular [Heart Sounds] : normal S1 and S2 [Heart Sounds Gallop] : no gallops [Murmurs] : no murmurs [Heart Sounds Pericardial Friction Rub] : no pericardial rub [Examination Of The Chest] : the chest was normal in appearance [Chest Visual Inspection Thoracic Asymmetry] : no chest asymmetry [Diminished Respiratory Excursion] : normal chest expansion 36.8 [facility]

## 2023-08-15 ENCOUNTER — INPATIENT (INPATIENT)
Facility: HOSPITAL | Age: 78
LOS: 7 days | Discharge: HOME HEALTH | DRG: 872 | End: 2023-08-23
Attending: INTERNAL MEDICINE | Admitting: INTERNAL MEDICINE
Payer: MEDICARE

## 2023-08-15 VITALS
SYSTOLIC BLOOD PRESSURE: 92 MMHG | OXYGEN SATURATION: 94 % | HEART RATE: 75 BPM | HEIGHT: 65 IN | TEMPERATURE: 100 F | WEIGHT: 160.06 LBS | DIASTOLIC BLOOD PRESSURE: 50 MMHG | RESPIRATION RATE: 20 BRPM

## 2023-08-15 DIAGNOSIS — Z98.890 OTHER SPECIFIED POSTPROCEDURAL STATES: Chronic | ICD-10-CM

## 2023-08-15 DIAGNOSIS — S82.899A OTHER FRACTURE OF UNSPECIFIED LOWER LEG, INITIAL ENCOUNTER FOR CLOSED FRACTURE: Chronic | ICD-10-CM

## 2023-08-15 DIAGNOSIS — Z98.41 CATARACT EXTRACTION STATUS, RIGHT EYE: Chronic | ICD-10-CM

## 2023-08-15 DIAGNOSIS — A41.9 SEPSIS, UNSPECIFIED ORGANISM: ICD-10-CM

## 2023-08-15 LAB
ALBUMIN SERPL ELPH-MCNC: 3.7 G/DL — SIGNIFICANT CHANGE UP (ref 3.3–5)
ALP SERPL-CCNC: 61 U/L — SIGNIFICANT CHANGE UP (ref 40–120)
ALT FLD-CCNC: 29 U/L — SIGNIFICANT CHANGE UP (ref 10–45)
ANION GAP SERPL CALC-SCNC: 16 MMOL/L — SIGNIFICANT CHANGE UP (ref 5–17)
ANISOCYTOSIS BLD QL: SLIGHT — SIGNIFICANT CHANGE UP
APTT BLD: 31.9 SEC — SIGNIFICANT CHANGE UP (ref 24.5–35.6)
AST SERPL-CCNC: 32 U/L — SIGNIFICANT CHANGE UP (ref 10–40)
BASE EXCESS BLDV CALC-SCNC: -0.6 MMOL/L — SIGNIFICANT CHANGE UP (ref -2–3)
BASOPHILS # BLD AUTO: 0 K/UL — SIGNIFICANT CHANGE UP (ref 0–0.2)
BASOPHILS NFR BLD AUTO: 0 % — SIGNIFICANT CHANGE UP (ref 0–2)
BILIRUB SERPL-MCNC: 1 MG/DL — SIGNIFICANT CHANGE UP (ref 0.2–1.2)
BUN SERPL-MCNC: 18 MG/DL — SIGNIFICANT CHANGE UP (ref 7–23)
BURR CELLS BLD QL SMEAR: PRESENT — SIGNIFICANT CHANGE UP
CA-I SERPL-SCNC: 1.1 MMOL/L — LOW (ref 1.15–1.33)
CALCIUM SERPL-MCNC: 8.6 MG/DL — SIGNIFICANT CHANGE UP (ref 8.4–10.5)
CHLORIDE BLDV-SCNC: 101 MMOL/L — SIGNIFICANT CHANGE UP (ref 96–108)
CHLORIDE SERPL-SCNC: 100 MMOL/L — SIGNIFICANT CHANGE UP (ref 96–108)
CO2 BLDV-SCNC: 27 MMOL/L — HIGH (ref 22–26)
CO2 SERPL-SCNC: 22 MMOL/L — SIGNIFICANT CHANGE UP (ref 22–31)
CREAT SERPL-MCNC: 0.94 MG/DL — SIGNIFICANT CHANGE UP (ref 0.5–1.3)
DACRYOCYTES BLD QL SMEAR: SLIGHT — SIGNIFICANT CHANGE UP
EGFR: 62 ML/MIN/1.73M2 — SIGNIFICANT CHANGE UP
EOSINOPHIL # BLD AUTO: 0 K/UL — SIGNIFICANT CHANGE UP (ref 0–0.5)
EOSINOPHIL NFR BLD AUTO: 0 % — SIGNIFICANT CHANGE UP (ref 0–6)
GAS PNL BLDV: 134 MMOL/L — LOW (ref 136–145)
GAS PNL BLDV: SIGNIFICANT CHANGE UP
GLUCOSE BLDV-MCNC: 115 MG/DL — HIGH (ref 70–99)
GLUCOSE SERPL-MCNC: 118 MG/DL — HIGH (ref 70–99)
HCO3 BLDV-SCNC: 25 MMOL/L — SIGNIFICANT CHANGE UP (ref 22–29)
HCT VFR BLD CALC: 38.3 % — SIGNIFICANT CHANGE UP (ref 34.5–45)
HCT VFR BLDA CALC: 38 % — SIGNIFICANT CHANGE UP (ref 34.5–46.5)
HGB BLD CALC-MCNC: 12.7 G/DL — SIGNIFICANT CHANGE UP (ref 11.7–16.1)
HGB BLD-MCNC: 12.3 G/DL — SIGNIFICANT CHANGE UP (ref 11.5–15.5)
INR BLD: 1.53 RATIO — HIGH (ref 0.85–1.18)
LACTATE BLDV-MCNC: 2.2 MMOL/L — HIGH (ref 0.5–2)
LYMPHOCYTES # BLD AUTO: 1.06 K/UL — SIGNIFICANT CHANGE UP (ref 1–3.3)
LYMPHOCYTES # BLD AUTO: 6.1 % — LOW (ref 13–44)
MACROCYTES BLD QL: SLIGHT — SIGNIFICANT CHANGE UP
MANUAL SMEAR VERIFICATION: SIGNIFICANT CHANGE UP
MCHC RBC-ENTMCNC: 29.6 PG — SIGNIFICANT CHANGE UP (ref 27–34)
MCHC RBC-ENTMCNC: 32.1 GM/DL — SIGNIFICANT CHANGE UP (ref 32–36)
MCV RBC AUTO: 92.3 FL — SIGNIFICANT CHANGE UP (ref 80–100)
MONOCYTES # BLD AUTO: 1.8 K/UL — HIGH (ref 0–0.9)
MONOCYTES NFR BLD AUTO: 10.4 % — SIGNIFICANT CHANGE UP (ref 2–14)
NEUTROPHILS # BLD AUTO: 14.32 K/UL — HIGH (ref 1.8–7.4)
NEUTROPHILS NFR BLD AUTO: 80 % — HIGH (ref 43–77)
NEUTS BAND # BLD: 2.6 % — SIGNIFICANT CHANGE UP (ref 0–8)
OVALOCYTES BLD QL SMEAR: SLIGHT — SIGNIFICANT CHANGE UP
PCO2 BLDV: 46 MMHG — HIGH (ref 39–42)
PH BLDV: 7.35 — SIGNIFICANT CHANGE UP (ref 7.32–7.43)
PLAT MORPH BLD: NORMAL — SIGNIFICANT CHANGE UP
PLATELET # BLD AUTO: 146 K/UL — LOW (ref 150–400)
PO2 BLDV: 20 MMHG — LOW (ref 25–45)
POIKILOCYTOSIS BLD QL AUTO: SLIGHT — SIGNIFICANT CHANGE UP
POLYCHROMASIA BLD QL SMEAR: SLIGHT — SIGNIFICANT CHANGE UP
POTASSIUM BLDV-SCNC: 4.1 MMOL/L — SIGNIFICANT CHANGE UP (ref 3.5–5.1)
POTASSIUM SERPL-MCNC: 4 MMOL/L — SIGNIFICANT CHANGE UP (ref 3.5–5.3)
POTASSIUM SERPL-SCNC: 4 MMOL/L — SIGNIFICANT CHANGE UP (ref 3.5–5.3)
PROT SERPL-MCNC: 6.3 G/DL — SIGNIFICANT CHANGE UP (ref 6–8.3)
PROTHROM AB SERPL-ACNC: 16.6 SEC — HIGH (ref 9.5–13)
RAPID RVP RESULT: SIGNIFICANT CHANGE UP
RBC # BLD: 4.15 M/UL — SIGNIFICANT CHANGE UP (ref 3.8–5.2)
RBC # FLD: 14.1 % — SIGNIFICANT CHANGE UP (ref 10.3–14.5)
RBC BLD AUTO: ABNORMAL
SAO2 % BLDV: 34.5 % — LOW (ref 67–88)
SARS-COV-2 RNA SPEC QL NAA+PROBE: SIGNIFICANT CHANGE UP
SODIUM SERPL-SCNC: 138 MMOL/L — SIGNIFICANT CHANGE UP (ref 135–145)
VARIANT LYMPHS # BLD: 0.9 % — SIGNIFICANT CHANGE UP (ref 0–6)
WBC # BLD: 17.34 K/UL — HIGH (ref 3.8–10.5)
WBC # FLD AUTO: 17.34 K/UL — HIGH (ref 3.8–10.5)

## 2023-08-15 PROCEDURE — 99285 EMERGENCY DEPT VISIT HI MDM: CPT

## 2023-08-15 PROCEDURE — 71045 X-RAY EXAM CHEST 1 VIEW: CPT | Mod: 26

## 2023-08-15 RX ORDER — SODIUM CHLORIDE 9 MG/ML
1000 INJECTION, SOLUTION INTRAVENOUS ONCE
Refills: 0 | Status: COMPLETED | OUTPATIENT
Start: 2023-08-15 | End: 2023-08-15

## 2023-08-15 RX ORDER — ACETAMINOPHEN 500 MG
1000 TABLET ORAL ONCE
Refills: 0 | Status: COMPLETED | OUTPATIENT
Start: 2023-08-15 | End: 2023-08-15

## 2023-08-15 RX ADMIN — SODIUM CHLORIDE 1000 MILLILITER(S): 9 INJECTION, SOLUTION INTRAVENOUS at 20:53

## 2023-08-15 RX ADMIN — Medication 400 MILLIGRAM(S): at 21:59

## 2023-08-15 NOTE — ED PROVIDER NOTE - CLINICAL SUMMARY MEDICAL DECISION MAKING FREE TEXT BOX
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  Will follow sepsis protocol, get blood cultures, chest xray, monitor O2, give fluids.  Patient will be admitted.

## 2023-08-15 NOTE — ED PROVIDER NOTE - ATTENDING CONTRIBUTION TO CARE
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 pt w/ no cp, no sob, no cough, no runny nose, pt w/ nausea, no indweelling lines, pt w/ no rashes, On exam, pt is awake and alert in no distress, has soft abdomen, ambulatory, clear lungs, no lower leg edema to have labs imaging and admission for pyelonprehitsis, pt already received antibiotics, and IVF. TBA for further management

## 2023-08-15 NOTE — ED ADULT NURSE NOTE - NSFALLHARMRISKINTERV_ED_ALL_ED
Assistance OOB with selected safe patient handling equipment if applicable/Assistance with ambulation/Communicate risk of Fall with Harm to all staff, patient, and family/Monitor gait and stability/Provide visual cue: red socks, yellow wristband, yellow gown, etc/Reinforce activity limits and safety measures with patient and family/Bed in lowest position, wheels locked, appropriate side rails in place/Call bell, personal items and telephone in reach/Instruct patient to call for assistance before getting out of bed/chair/stretcher/Non-slip footwear applied when patient is off stretcher/[place] to call system/Physically safe environment - no spills, clutter or unnecessary equipment/Purposeful Proactive Rounding/Room/bathroom lighting operational, light cord in reach

## 2023-08-15 NOTE — ED ADULT NURSE NOTE - CHPI ED NUR HIGHEST TEMPERATURE
----- Message from Jessica Peng sent at 8/23/2021  9:43 AM CDT -----  Regarding: FW: Regarding MRI  Good morning,    Patient is scheduled for a MRI with anesthesia on 09/09/2021. Per department protocol, patient is required to have a H&P within 30 days prior to the exam. Please advise.    Thank you,  Jessica Peng  Pre-  ----- Message -----  From: Fatoumata Land RN  Sent: 8/23/2021   9:16 AM CDT  To: Jessica Peng  Subject: RE: Regarding MRI                                Morning Jessica!    Got it-thank you for that info!    H&P would be with PCP office (Melissa Dodd NP).     ----- Message -----  From: Jessica Peng  Sent: 8/23/2021   9:12 AM CDT  To: Fatoumata Land RN  Subject: RE: Regarding MRI                                Good morning Fatoumata,    Yes, patient is aware to have the covid test and the H&P. Would she schedule H&P with your office or would that be with her PCP?    Jessica Peng  Pre-  ----- Message -----  From: Fatoumata Land RN  Sent: 8/20/2021   4:06 PM CDT  To: Jessica Peng  Subject: RE: Regarding MRI                                Hi Jessica!    I placed the covid testing order for patient.     Just want to verify-Is patient aware that she needs the covid test and H&P?    Please let me know!  Thanks!  HAYLEY Ham    ----- Message -----  From: Jessica Peng  Sent: 8/20/2021   4:00 PM CDT  To: JOSE [facility] Nurse Msg Pool  Subject: Regarding MRI                                    Good afternoon,    Patient is currently scheduled for a MRI Shoulder Left W/O Contrast with anesthesia on 09/09/2021 at [facility]. Patient will need to have a pre-procedure Covid test 48-72 hours prior. She is currently scheduled for that test 09/06/2021. Could you please place an order for the Covid test?    Also, per department protocol, patient is required to have a History and Physical within 30 days prior to the exam. Please  advise.    Thank you,  Jessica Peng  Pre-             101/fahrenheit

## 2023-08-15 NOTE — ED PROVIDER NOTE - CONSTITUTIONAL, MLM
normal... Well appearing, nontoxic, awake, alert, oriented to person, place, time/situation and in no apparent distress.

## 2023-08-15 NOTE — ED PROVIDER NOTE - NSICDXFAMILYHX_GEN_ALL_CORE_FT
FAMILY HISTORY:  Father  Still living? Unknown  Family history of coronary artery disease in father, Age at diagnosis: Age Unknown    Mother  Still living? Unknown  Family history of coronary artery disease in father, Age at diagnosis: Age Unknown

## 2023-08-15 NOTE — ED ADULT NURSE NOTE - NSFALLASSESSNEED_ED_ALL_ED
Patient's BP 96/52. Patient asymptomatic. Dr. Juliet Kumar notified. Will continue to monitor BP. Received accucheck and sliding scale orders from Dr. Juliet Kumar. To start SS with 2100 accucheck. Will discuss with oncoming RN. yes

## 2023-08-15 NOTE — ED PROVIDER NOTE - NSICDXPASTSURGICALHX_GEN_ALL_CORE_FT
PAST SURGICAL HISTORY:  Cataract extraction status of eye, right     Fracture, ankle Right, s/p metal plate    H/O sinus surgery

## 2023-08-15 NOTE — ED PROVIDER NOTE - OBJECTIVE STATEMENT
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  At [facility] she was given Zosyn 4.5 gm IV, zofran, and pepcid. She currently does not endorse any pain. She states she started feeling week on saturday with intermittent shivers throughout the days. She also had fevers and felt nauseous but did not vomit. She denies chest pain, sob, diarrhea, headache or dizziness. 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  At [facility] she was given Zosyn 4.5 gm IV, zofran, and pepcid. She currently does not endorse any pain. She states she started feeling week on Saturday with intermittent shivers throughout the days. She also had fevers and felt nauseous but did not vomit. She denies chest pain, sob, dysuria, hematuria, diarrhea, headache or dizziness.

## 2023-08-15 NOTE — ED PROVIDER NOTE - PROGRESS NOTE DETAILS
Joselyn Gonzalez pgy1:Lactate elevated at 2.2, will finish running her fluids and repeat vbg-lactate. Repeat vitals on patient show febrile at 101. ordered ofirmev 1grm. Joselyn Dudley pgy1: Patient is to be admitted to the hospital and the case was discussed with the admitting physician.  Any changes in plan, additional imaging/labs, and further work up will be at the discretion of the admitting physician.

## 2023-08-15 NOTE — ED PROVIDER NOTE - NSICDXPASTMEDICALHX_GEN_ALL_CORE_FT
PAST MEDICAL HISTORY:  CVA (cerebral vascular accident)     Depression     Fracture lumbar spine,  healed with conservative treatment    GERD (gastroesophageal reflux disease)     HLD (hyperlipidemia)     Hodgkins disease     Lymphoma     Neuropathy bilateral feet    Osteoporosis     Pulmonary embolus     Seizure

## 2023-08-15 NOTE — ED ADULT NURSE NOTE - CAS TRG GENERAL NORM CIRC DET
Called patient to schedule a screening mammogram PATIENTPHONEMESSAGE: left message.-       
Strong peripheral pulses

## 2023-08-15 NOTE — ED ADULT NURSE NOTE - OBJECTIVE STATEMENT
pt OSCAR from [facility] and as per EMS, pt "had a fever of 101 and was dx with a right kidney infection" pt states, "I have been feeling well since saturday, I started shaking about 3 times but it would pass and come back. I also have been having aches all over my body and mild nausea" pt AOx3 speaking in full complete sentences at present. pt received 1L bolus, pepcid, zofran, Zosyn 3.375, and tylenol PTA. pt has 20 gauge in place from [place]. pt denies any lightheadedness, dizziness, chest pain, SOB, abd. pain, n/v/d, numbness/tingling at present

## 2023-08-16 DIAGNOSIS — I63.9 CEREBRAL INFARCTION, UNSPECIFIED: ICD-10-CM

## 2023-08-16 DIAGNOSIS — A41.9 SEPSIS, UNSPECIFIED ORGANISM: ICD-10-CM

## 2023-08-16 DIAGNOSIS — N39.0 URINARY TRACT INFECTION, SITE NOT SPECIFIED: ICD-10-CM

## 2023-08-16 DIAGNOSIS — C50.919 MALIGNANT NEOPLASM OF UNSPECIFIED SITE OF UNSPECIFIED FEMALE BREAST: ICD-10-CM

## 2023-08-16 DIAGNOSIS — F41.9 ANXIETY DISORDER, UNSPECIFIED: ICD-10-CM

## 2023-08-16 DIAGNOSIS — M81.0 AGE-RELATED OSTEOPOROSIS WITHOUT CURRENT PATHOLOGICAL FRACTURE: ICD-10-CM

## 2023-08-16 DIAGNOSIS — N32.81 OVERACTIVE BLADDER: ICD-10-CM

## 2023-08-16 DIAGNOSIS — K21.9 GASTRO-ESOPHAGEAL REFLUX DISEASE WITHOUT ESOPHAGITIS: ICD-10-CM

## 2023-08-16 DIAGNOSIS — Z86.718 PERSONAL HISTORY OF OTHER VENOUS THROMBOSIS AND EMBOLISM: ICD-10-CM

## 2023-08-16 DIAGNOSIS — E78.5 HYPERLIPIDEMIA, UNSPECIFIED: ICD-10-CM

## 2023-08-16 DIAGNOSIS — R93.89 ABNORMAL FINDINGS ON DIAGNOSTIC IMAGING OF OTHER SPECIFIED BODY STRUCTURES: ICD-10-CM

## 2023-08-16 LAB
A1C WITH ESTIMATED AVERAGE GLUCOSE RESULT: 6.2 % — HIGH (ref 4–5.6)
ALBUMIN SERPL ELPH-MCNC: 3.3 G/DL — SIGNIFICANT CHANGE UP (ref 3.3–5)
ALP SERPL-CCNC: 64 U/L — SIGNIFICANT CHANGE UP (ref 40–120)
ALT FLD-CCNC: 30 U/L — SIGNIFICANT CHANGE UP (ref 10–45)
ANION GAP SERPL CALC-SCNC: 13 MMOL/L — SIGNIFICANT CHANGE UP (ref 5–17)
APTT BLD: 29.8 SEC — SIGNIFICANT CHANGE UP (ref 24.5–35.6)
AST SERPL-CCNC: 32 U/L — SIGNIFICANT CHANGE UP (ref 10–40)
BASE EXCESS BLDV CALC-SCNC: -1.4 MMOL/L — SIGNIFICANT CHANGE UP (ref -2–3)
BASOPHILS # BLD AUTO: 0.02 K/UL — SIGNIFICANT CHANGE UP (ref 0–0.2)
BASOPHILS NFR BLD AUTO: 0.1 % — SIGNIFICANT CHANGE UP (ref 0–2)
BILIRUB SERPL-MCNC: 1 MG/DL — SIGNIFICANT CHANGE UP (ref 0.2–1.2)
BUN SERPL-MCNC: 14 MG/DL — SIGNIFICANT CHANGE UP (ref 7–23)
CA-I SERPL-SCNC: 1.1 MMOL/L — LOW (ref 1.15–1.33)
CALCIUM SERPL-MCNC: 8.5 MG/DL — SIGNIFICANT CHANGE UP (ref 8.4–10.5)
CHLORIDE BLDV-SCNC: 100 MMOL/L — SIGNIFICANT CHANGE UP (ref 96–108)
CHLORIDE SERPL-SCNC: 102 MMOL/L — SIGNIFICANT CHANGE UP (ref 96–108)
CHOLEST SERPL-MCNC: 109 MG/DL — SIGNIFICANT CHANGE UP
CO2 BLDV-SCNC: 26 MMOL/L — SIGNIFICANT CHANGE UP (ref 22–26)
CO2 SERPL-SCNC: 21 MMOL/L — LOW (ref 22–31)
CREAT SERPL-MCNC: 0.92 MG/DL — SIGNIFICANT CHANGE UP (ref 0.5–1.3)
E COLI DNA BLD POS QL NAA+NON-PROBE: SIGNIFICANT CHANGE UP
EGFR: 64 ML/MIN/1.73M2 — SIGNIFICANT CHANGE UP
EOSINOPHIL # BLD AUTO: 0 K/UL — SIGNIFICANT CHANGE UP (ref 0–0.5)
EOSINOPHIL NFR BLD AUTO: 0 % — SIGNIFICANT CHANGE UP (ref 0–6)
ESTIMATED AVERAGE GLUCOSE: 131 MG/DL — HIGH (ref 68–114)
GAS PNL BLDV: 133 MMOL/L — LOW (ref 136–145)
GAS PNL BLDV: SIGNIFICANT CHANGE UP
GAS PNL BLDV: SIGNIFICANT CHANGE UP
GLUCOSE BLDV-MCNC: 115 MG/DL — HIGH (ref 70–99)
GLUCOSE SERPL-MCNC: 124 MG/DL — HIGH (ref 70–99)
GRAM STN FLD: SIGNIFICANT CHANGE UP
HCO3 BLDV-SCNC: 24 MMOL/L — SIGNIFICANT CHANGE UP (ref 22–29)
HCT VFR BLD CALC: 34.2 % — LOW (ref 34.5–45)
HCT VFR BLDA CALC: 36 % — SIGNIFICANT CHANGE UP (ref 34.5–46.5)
HDLC SERPL-MCNC: 26 MG/DL — LOW
HGB BLD CALC-MCNC: 11.9 G/DL — SIGNIFICANT CHANGE UP (ref 11.7–16.1)
HGB BLD-MCNC: 11.3 G/DL — LOW (ref 11.5–15.5)
HOROWITZ INDEX BLDV+IHG-RTO: SIGNIFICANT CHANGE UP
IMM GRANULOCYTES NFR BLD AUTO: 1 % — HIGH (ref 0–0.9)
INR BLD: 1.49 RATIO — HIGH (ref 0.85–1.18)
LACTATE BLDV-MCNC: 3.4 MMOL/L — HIGH (ref 0.5–2)
LACTATE SERPL-SCNC: 1.7 MMOL/L — SIGNIFICANT CHANGE UP (ref 0.5–2)
LIPID PNL WITH DIRECT LDL SERPL: 60 MG/DL — SIGNIFICANT CHANGE UP
LYMPHOCYTES # BLD AUTO: 0.69 K/UL — LOW (ref 1–3.3)
LYMPHOCYTES # BLD AUTO: 4.5 % — LOW (ref 13–44)
MCHC RBC-ENTMCNC: 30 PG — SIGNIFICANT CHANGE UP (ref 27–34)
MCHC RBC-ENTMCNC: 33 GM/DL — SIGNIFICANT CHANGE UP (ref 32–36)
MCV RBC AUTO: 90.7 FL — SIGNIFICANT CHANGE UP (ref 80–100)
METHOD TYPE: SIGNIFICANT CHANGE UP
MONOCYTES # BLD AUTO: 1.76 K/UL — HIGH (ref 0–0.9)
MONOCYTES NFR BLD AUTO: 11.6 % — SIGNIFICANT CHANGE UP (ref 2–14)
NEUTROPHILS # BLD AUTO: 12.55 K/UL — HIGH (ref 1.8–7.4)
NEUTROPHILS NFR BLD AUTO: 82.8 % — HIGH (ref 43–77)
NON HDL CHOLESTEROL: 83 MG/DL — SIGNIFICANT CHANGE UP
NRBC # BLD: 0 /100 WBCS — SIGNIFICANT CHANGE UP (ref 0–0)
PCO2 BLDV: 44 MMHG — HIGH (ref 39–42)
PH BLDV: 7.35 — SIGNIFICANT CHANGE UP (ref 7.32–7.43)
PLATELET # BLD AUTO: 128 K/UL — LOW (ref 150–400)
PO2 BLDV: 19 MMHG — LOW (ref 25–45)
POTASSIUM BLDV-SCNC: 3.8 MMOL/L — SIGNIFICANT CHANGE UP (ref 3.5–5.1)
POTASSIUM SERPL-MCNC: 3.7 MMOL/L — SIGNIFICANT CHANGE UP (ref 3.5–5.3)
POTASSIUM SERPL-SCNC: 3.7 MMOL/L — SIGNIFICANT CHANGE UP (ref 3.5–5.3)
PROT SERPL-MCNC: 5.5 G/DL — LOW (ref 6–8.3)
PROTHROM AB SERPL-ACNC: 16.2 SEC — HIGH (ref 9.5–13)
RBC # BLD: 3.77 M/UL — LOW (ref 3.8–5.2)
RBC # FLD: 14.3 % — SIGNIFICANT CHANGE UP (ref 10.3–14.5)
SAO2 % BLDV: 35.7 % — LOW (ref 67–88)
SODIUM SERPL-SCNC: 136 MMOL/L — SIGNIFICANT CHANGE UP (ref 135–145)
SPECIMEN SOURCE: SIGNIFICANT CHANGE UP
TRIGL SERPL-MCNC: 124 MG/DL — SIGNIFICANT CHANGE UP
WBC # BLD: 15.17 K/UL — HIGH (ref 3.8–10.5)
WBC # FLD AUTO: 15.17 K/UL — HIGH (ref 3.8–10.5)

## 2023-08-16 PROCEDURE — 99223 1ST HOSP IP/OBS HIGH 75: CPT

## 2023-08-16 PROCEDURE — 99222 1ST HOSP IP/OBS MODERATE 55: CPT

## 2023-08-16 PROCEDURE — 76856 US EXAM PELVIC COMPLETE: CPT | Mod: 26

## 2023-08-16 PROCEDURE — 76830 TRANSVAGINAL US NON-OB: CPT | Mod: 26

## 2023-08-16 PROCEDURE — 76770 US EXAM ABDO BACK WALL COMP: CPT | Mod: 26

## 2023-08-16 RX ORDER — ATORVASTATIN CALCIUM 80 MG/1
20 TABLET, FILM COATED ORAL AT BEDTIME
Refills: 0 | Status: DISCONTINUED | OUTPATIENT
Start: 2023-08-16 | End: 2023-08-23

## 2023-08-16 RX ORDER — ANASTROZOLE 1 MG/1
1 TABLET ORAL DAILY
Refills: 0 | Status: DISCONTINUED | OUTPATIENT
Start: 2023-08-16 | End: 2023-08-23

## 2023-08-16 RX ORDER — OXYBUTYNIN CHLORIDE 5 MG
10 TABLET ORAL
Refills: 0 | Status: DISCONTINUED | OUTPATIENT
Start: 2023-08-16 | End: 2023-08-16

## 2023-08-16 RX ORDER — LANOLIN ALCOHOL/MO/W.PET/CERES
3 CREAM (GRAM) TOPICAL AT BEDTIME
Refills: 0 | Status: DISCONTINUED | OUTPATIENT
Start: 2023-08-16 | End: 2023-08-23

## 2023-08-16 RX ORDER — PANTOPRAZOLE SODIUM 20 MG/1
40 TABLET, DELAYED RELEASE ORAL
Refills: 0 | Status: DISCONTINUED | OUTPATIENT
Start: 2023-08-16 | End: 2023-08-23

## 2023-08-16 RX ORDER — ONDANSETRON 8 MG/1
4 TABLET, FILM COATED ORAL EVERY 8 HOURS
Refills: 0 | Status: DISCONTINUED | OUTPATIENT
Start: 2023-08-16 | End: 2023-08-23

## 2023-08-16 RX ORDER — APIXABAN 2.5 MG/1
2.5 TABLET, FILM COATED ORAL EVERY 12 HOURS
Refills: 0 | Status: DISCONTINUED | OUTPATIENT
Start: 2023-08-16 | End: 2023-08-23

## 2023-08-16 RX ORDER — SERTRALINE 25 MG/1
50 TABLET, FILM COATED ORAL DAILY
Refills: 0 | Status: DISCONTINUED | OUTPATIENT
Start: 2023-08-16 | End: 2023-08-23

## 2023-08-16 RX ORDER — SOLIFENACIN SUCCINATE 10 MG/1
10 TABLET ORAL DAILY
Refills: 0 | Status: DISCONTINUED | OUTPATIENT
Start: 2023-08-16 | End: 2023-08-23

## 2023-08-16 RX ORDER — SODIUM CHLORIDE 9 MG/ML
1000 INJECTION INTRAMUSCULAR; INTRAVENOUS; SUBCUTANEOUS
Refills: 0 | Status: DISCONTINUED | OUTPATIENT
Start: 2023-08-16 | End: 2023-08-23

## 2023-08-16 RX ORDER — FLUTICASONE PROPIONATE 50 MCG
1 SPRAY, SUSPENSION NASAL
Qty: 0 | Refills: 0 | DISCHARGE

## 2023-08-16 RX ORDER — PIPERACILLIN AND TAZOBACTAM 4; .5 G/20ML; G/20ML
3.38 INJECTION, POWDER, LYOPHILIZED, FOR SOLUTION INTRAVENOUS EVERY 8 HOURS
Refills: 0 | Status: COMPLETED | OUTPATIENT
Start: 2023-08-16 | End: 2023-08-22

## 2023-08-16 RX ORDER — ACETAMINOPHEN 500 MG
650 TABLET ORAL EVERY 6 HOURS
Refills: 0 | Status: DISCONTINUED | OUTPATIENT
Start: 2023-08-16 | End: 2023-08-23

## 2023-08-16 RX ORDER — APIXABAN 2.5 MG/1
5 TABLET, FILM COATED ORAL EVERY 12 HOURS
Refills: 0 | Status: DISCONTINUED | OUTPATIENT
Start: 2023-08-16 | End: 2023-08-16

## 2023-08-16 RX ADMIN — PIPERACILLIN AND TAZOBACTAM 25 GRAM(S): 4; .5 INJECTION, POWDER, LYOPHILIZED, FOR SOLUTION INTRAVENOUS at 23:27

## 2023-08-16 RX ADMIN — SOLIFENACIN SUCCINATE 10 MILLIGRAM(S): 10 TABLET ORAL at 13:45

## 2023-08-16 RX ADMIN — APIXABAN 5 MILLIGRAM(S): 2.5 TABLET, FILM COATED ORAL at 07:55

## 2023-08-16 RX ADMIN — Medication 650 MILLIGRAM(S): at 18:25

## 2023-08-16 RX ADMIN — APIXABAN 2.5 MILLIGRAM(S): 2.5 TABLET, FILM COATED ORAL at 17:44

## 2023-08-16 RX ADMIN — Medication 650 MILLIGRAM(S): at 23:27

## 2023-08-16 RX ADMIN — PIPERACILLIN AND TAZOBACTAM 25 GRAM(S): 4; .5 INJECTION, POWDER, LYOPHILIZED, FOR SOLUTION INTRAVENOUS at 16:12

## 2023-08-16 RX ADMIN — PANTOPRAZOLE SODIUM 40 MILLIGRAM(S): 20 TABLET, DELAYED RELEASE ORAL at 07:55

## 2023-08-16 RX ADMIN — SODIUM CHLORIDE 100 MILLILITER(S): 9 INJECTION INTRAMUSCULAR; INTRAVENOUS; SUBCUTANEOUS at 13:45

## 2023-08-16 RX ADMIN — ATORVASTATIN CALCIUM 20 MILLIGRAM(S): 80 TABLET, FILM COATED ORAL at 23:27

## 2023-08-16 RX ADMIN — Medication 3 MILLIGRAM(S): at 23:27

## 2023-08-16 RX ADMIN — SERTRALINE 50 MILLIGRAM(S): 25 TABLET, FILM COATED ORAL at 13:44

## 2023-08-16 RX ADMIN — PIPERACILLIN AND TAZOBACTAM 25 GRAM(S): 4; .5 INJECTION, POWDER, LYOPHILIZED, FOR SOLUTION INTRAVENOUS at 07:55

## 2023-08-16 RX ADMIN — Medication 650 MILLIGRAM(S): at 17:44

## 2023-08-16 RX ADMIN — Medication 5 MILLIGRAM(S): at 13:45

## 2023-08-16 NOTE — H&P ADULT - PROBLEM SELECTOR PLAN 2
ct showing. "A few new geographic foci of parenchymal hypoenhancement in the right kidney suspicious for pyelonephritis. Mild stranding in the right perinephric fat. Mild hyperenhancement of the urothelium in the right renal pelvis and right ureter suspicious for ascending urinary tract infection. No obstructing ureteral calculus. No hydronephrosis."  s/p 1 L lr bolus in er + zosyn at [facility]  follow up ua; blood and urine cultures; renal/bladder us; freq bladder scan  monitor for fever, changes in white count  cont broad spec empirical abx therapy w zosyn; deescalate/adjust according to final c+s/clin improvement  antipyretics, antiemetics, analgesics as needed  encourage po intake; judicious ivf + lytes as needed in the mean time

## 2023-08-16 NOTE — PROGRESS NOTE ADULT - ASSESSMENT
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   - Febrile, leukocytosis, + lactic acidosis   - BC from [facility] per heme/onc with Gram Neg rods; Repeat BCx2 in lab; F/u results   - S/P IVF in ER and Zosyn at [facility]   - Lactate now down-trended to WNL   - F/u repeat UCx and BCx2  - C/w Zosyn for ABX for now   - Monitor CBC, temp curve, VS and patient closely   - ID eval consulted; F/u recs     Complicated UTI (urinary tract infection) / Pyelonephritis   - Outpatient CT with  "A few new geographic foci of parenchymal hypoenhancement in the right kidney suspicious for pyelonephritis. Mild stranding in the right perinephric fat. Mild hyperenhancement of the urothelium in the right renal pelvis and right ureter suspicious for ascending urinary tract infection. No obstructing ureteral calculus. No hydronephrosis."  - BCx from [facility] with Gram Neg rods  - F/u repeat BCx2 and UCx  - C/w Zosyn for now   - ID eval consulted; F/u recs   - F/u Kidney/ bladder US, Pelvic and TVUS   - Check bladder scan to R/O retention   - Monitor CBC, temp curve, VS and patient closely    Increased endometrial stripe thickness.   - CT with reported increased endometrial thickness measuring 1.3 cm, previously 0.5 cm  - Pending TVUS and Pelvic US  - Heme/Onc follow up     H/O venous thrombosis and embolism.   - H/O DVT/ Bilat PE in the past  - C/w home Eliquis  - Check LE Duplex  - Check TTE  - Monitor on tele  - Vascular cardio eval     CVA (cerebrovascular accident).   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  - Fall, Aspiration precautions     HLD (hyperlipidemia).   - Cont home lipitor.  - Check lipid panel     GERD (gastroesophageal reflux disease).   - Cont home dexilant (switched to protonix while inhouse).    Anxiety and depression.   - Cont home zoloft.    Overactive bladder.   - Cont home vesicare  - Outpatient urogyn follow up upon discharge.    Osteoporosis.   - H/O c/b r tib-fib fracture s/p orif + c/b t12 vcf  - CT with Unchanged severe T12 and mild T5 compression deformities.  - Cont home prolia upon discharge  - Cont home vit d supplementation  - Outpatient endo follow up upon discharge.    Breast cancer.   - H/O L breast ca s/p l lumpectomy + anastrazole, r breast adh s/p excision + tamoxifen x5yrs  - Cont home anastrazole  - Heme/Onc consulted; F/u recs      PPX    Discussed with Attending and ACP. Discussed with ID and Heme/Onc. Discussed with patient and her  at the bedside.

## 2023-08-16 NOTE — H&P ADULT - MLM HIDDEN
I am seeing Ananda Sandoval - 69 year old male -  for an acute evaluation with a history of diabetes, hypertension, hyperlipidemia, coronary artery disease.      Historian: Self.  He reports.  Type 2 diabetes mellitus: He is borderline diabetic. Not taking medication for diabetes. His sugars were 224 mg/dl two weeks ago. His last HbA1c is 7.0%  in June. Urinates off and on.     Hypertension: Currently, on blood pressure medication. Taking lisinopril 2.5 mg. His blood pressure is well controlled below 120. Reports light-headed periodically. Does not check blood pressure.     Hyperlipidemia LDL goal <100: His last LDL is 180 mg/dl. Not on medication at this time.    Coronary artery disease involving native coronary artery of native heart without angina pectoris:  Evidence of  coronary artery calcifications at multiple sites in heart scan previously. Not on blood thinners.    Shortness of breath on exertion: Has Shortness of breath. Started more recently. He used inhalers for bronchitis but not helped him much. Declines using this time. Has no chest pain and chest pressure.    Gastroesophageal reflux disease without esophagitis: Taking PPI medication.    Fatigue, unspecified type: Reports fatigue.    History of colonic polyps: Has history of colonic polys. Had colonoscopy 2016.     Screen for colon cancer: Due.    Drug-induced polyneuropathy: Not discussed.    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 He is getting Botox injection    Non-Hodgkin's lymphoma, unspecified body region, unspecified non-Hodgkin lymphoma type (CMS/HCC): Received chemotherapy for mantle cell lymphoma. Some of his throat periodically removed. Has trouble in swallowing. He used to get bronchitis and flu all the time. He gets IVIG immunoglobulins every three months. He will see Neurologist on Thursday Regarding internal bleeding that shut his system down causing problems and migraine. They found something in the jaw on colonoscopy.  States it is back again. The doctor has options, but he chose not to do anything. Now he is not on chemotherapy.     Screening for ischemic heart disease: Due.  Additional comments  Migraine: Has history of migraines, but it is less severe. He is following with neurologist. He is been taking injection. He is unsure about improvement.   He was in insomnia for years until he got on sleep medication. Reports taking zolpidem half sometimes both half.   Immunization: Had COVID-19 -19 vaccine few weeks back.   Eye examination: Due.  PROBLEM LIST  Patient Active Problem List   Diagnosis   • Other and unspecified hyperlipidemia   • Attention deficit disorder without mention of hyperactivity   • Lateral epicondylitis  of elbow   • Lumbago   • Pain in joint, lower leg   • Other diseases of pharynx, not elsewhere classified(478.29)   • Unspecified sleep apnea   • Other diseases of pharynx, not elsewhere classified(478.29)   • Other malignant lymphomas, unspecified site, extranodal and solid organ sites   • Insomnia, unspecified   • Esophageal reflux   • Essential hypertension   • Adhesive capsulitis of shoulder   • Rotator cuff (capsule) sprain   • Numbness of feet   • Paroxysmal SVT (supraventricular tachycardia) (CMS/HCC)   • Chest pain   • Peripheral neuropathy   • Migraine   • Type 2 diabetes mellitus without complication, without long-term current use of insulin (CMS/HCC)   • Lab test positive for detection of COVID-19 virus     MEDICATIONS  Current Outpatient Medications   Medication Sig Dispense Refill   • Immune Globulin, Human, (PRIVIGEN IV) Inject 35 g into the vein every 3 months.     • galcanezumab-gnlm (Emgality) 120 MG/ML injection Inject 120 mg into the skin every 30 days.     • rizatriptan (MAXALT-MLT) 5 MG disintegrating tablet 5 mg PO at the onset of headache. May repeat after 2 hours. Max 10mg/24hr, 2 days/week.     • zolpidem (AMBIEN) 10 MG tablet Take 1 tablet by mouth nightly as needed for Sleep. 90  tablet 3   • propRANolol (INDERAL) 40 MG tablet TAKE 1 TABLET BY MOUTH TWICE DAILY 180 tablet 0   • pantoprazole (PROTONIX) 40 MG tablet Take 1 tablet by mouth 2 times daily. 180 tablet 3   • fenofibrate (TRICOR) 54 MG tablet Take 1 tablet by mouth daily. 90 tablet 3   • Cyanocobalamin (B-12) 1000 MCG Tab Take 1 tablet by mouth daily. 30 tablet    • atorvastatin (LIPITOR) 10 MG tablet Take 1 tablet by mouth daily. 90 tablet 3   • sildenafil (REVATIO) 20 MG tablet Take 3-5 tablets once daily as needed 15 tablet 3   • ibuprofen 200 MG capsule Take 400 mg by mouth 4 times daily as needed.     • ONAbotulinumtoxinA (BOTOX) 100 units Recon Soln Inject into the muscle once. Every 3 months       No current facility-administered medications for this visit.     PAST MEDICAL HISTORY  Past Medical History:   Diagnosis Date   • Adhesive capsulitis of shoulder 03/18/2010   • Attention deficit disorder without mention of hyperactivity 05/24/2004   • Coronary artery disease    • Esophageal reflux 03/10/2008     status post laparoscopic implantation of a magnetic sphincter augmentation device at [facility]   • History of migraine headaches 07/08/2013   • Hypertension 12/16/2009   • Insomnia 03/10/2008   • Lateral epicondylitis  of elbow 05/24/2004   • Lumbago 01/18/2005   • Mantle cell lymphoma (CMS/HCC)    • Other diseases of pharynx, not elsewhere classified(478.29) 11/09/2006   • Other malignant lymphomas, unspecified site, extranodal and solid organ sites 01/02/2007    Initially diagnosed at base of tongue. Proceed chemotherapy at [facility]. Mantle lymphoma also identified in a colon polyp. Workup for metastases at [facility] unremarkable. Being followed.   • Peripheral neuropathy 07/08/2013   • Sleep apnea 11/07/2006    CPAP IS NOT NOTED   • Spastic dysphonia     Follwed at [facility], receiving Botox injections.   • Type 2 diabetes mellitus (CMS/HCC) 03/14/2018    A1 c of 6.5.  Underwent diabetes  education     PAST SURGICAL HISTORY  Past Surgical History:   Procedure Laterality Date   • Colonoscopy  2016    brittaney ileitis and a tubular adenoma. Follow-up colonoscopy in 5 years   • Colonoscopy for bleeding thru stoma      By Dr. Gutierrez per pt   • Colonoscopy w biopsy  2006    Dr. Dominguez, Ileitis, F/ U 7-10 years   • Esophagogastroduodenoscopy transoral flex w/bx single or mult  2006    Dr. Dominguez, Dysphagia/Gastritis/Negative Hpylori   • Esophagogastroduodenoscopy transoral flex w/bx single or mult  2014    Dr. Dominguez, Esophagitis/Hx of Reflux - Awt Bx's- Benign tissue   • Laparoscopy, cholecystectomy  2008    Dr. ERIK Phelan ([facility] [place]   • Laparoscopy,rp ini ing hernia  2019   • Myocardial imaging pet metabolic evaluation study single study  2005    LAD 54.5; total score 54.5   • Past surgical history  2006    bx oropharynx    Rick Clark   • Past surgical history  2007    bx oropharynx  [facility]   • Removal gallbladder       SOCIAL HISTORY  Social History     Socioeconomic History   • Marital status: /Civil Union     Spouse name: Not on file   • Number of children: Not on file   • Years of education: Not on file   • Highest education level: Not on file   Occupational History   • Not on file   Tobacco Use   • Smoking status: Former Smoker     Packs/day: 0.50     Years: 10.00     Pack years: 5.00     Types: Cigarettes, Pipe     Quit date: 1990     Years since quittin.8   • Smokeless tobacco: Never Used   Vaping Use   • Vaping Use: never used   Substance and Sexual Activity   • Alcohol use: Yes     Alcohol/week: 1.7 standard drinks     Types: 2 Standard drinks or equivalent per week     Comment: occasionally,0-4 drinks/mo maybe   • Drug use: No   • Sexual activity: Yes     Partners: Female   Other Topics Concern   • Not on file   Social History Narrative   • Not on file     Social Determinants of Health     Financial Resource Strain: Not on file    Food Insecurity: Not on file   Transportation Needs: Not on file   Physical Activity: Not on file   Stress: Not on file   Social Connections: Not on file   Intimate Partner Violence: Not At Risk   • Social Determinants: Intimate Partner Violence Past Fear: No   • Social Determinants: Intimate Partner Violence Current Fear: No     ALLERGIES  ALLERGIES:   Allergen Reactions   • Amantadine Hcl VISUAL DISTURBANCE     hallucinations   • Amoxicillin Other (See Comments)     rash on arms   • Benzyl Alcohol Other (See Comments)      Vistaril-- same as demerol/ nausea and altered mentation   • Hydromorphone Hallucinations     Patient seeing dinosaurs after dilaudid       • Hydroxyzine Other (See Comments)      Made him feel loopy   • Imitrex [Sumatriptan Base] NAUSEA and MYALGIA   • Iv Immune Globulin (Zhou) Other (See Comments)     Back pain, chest pain, violent shaking   • Meperidine Hcl Other (See Comments)     Demerol--nausea, altered mentation   • Nortriptyline MYALGIA   • Pravastatin Other (See Comments)     Muscle pains   • Simvastatin MYALGIA   • Zolmitriptan Other (See Comments)     Loss of speech, difficulty reading, confusion       REVIEW OF SYSTEMS  GENERAL:  Denies weight loss, weight gain. Has fatigue. Denies fever or chills.  SKIN:  Denies rashes, itching and lumps or bumps or moles that are changing.  EYES:  Denies vision loss, pain, redness and blurry or double vision.  EARS:  Denies decreased hearing, ringing in ears (tinnitus), earache and drainage.  NOSE:  Denies stuffiness, discharge, nasal pain, itching and nose bleeds.  MOUTH:  Denies teeth pain, gum pain, bleeding of gums, dry mouth, sore throat and hoarseness or voice change.  NECK:  Denies lumps or bumps, swollen glands, pain, stiffness  and limited range of motion in the neck.  PULMONARY:  Denies cough or production of sputum.  No coughing up blood.  Has shortness of breath. Denies wheezing or pain with breathing or coughing.  CARDIOVASCULAR:   Denies chest pain or discomfort, tightness, palpitations, shortness of breath with activity, difficulty breathing lying down, swelling, cyanosis and sudden awakening from sleep with shortness of breath.  Denies leg edema.  VASCULAR:  Denies claudication with walking, blanching of fingers with cold exposure, leg cramping, discoloration of feet  and change in nails.  GASTROINTESTINAL:  Denies abdominal pain, nausea, vomiting, diarrhea, constipation.  No black stool or blood in the stool.  No swallowing difficulties, heartburn, or acid stomach.  No change in appetite.  No hematemesis, change in bowel habits,  flatulence or hemorrhoids.  GENITOURINARY/MALE:  Denies dysuria and hematuria.  NEUROLOGICAL:  Denies dizziness, fainting, seizures, confusion, weakness, numbness, tingling, tremor and trouble with balance or coordination.  Denies memory loss.  MUSCULOSKELETAL:  Denies joint stiffness, joint pain, joint swelling, muscle pain, back pain and redness of joints.  HEMATOLOGIC:  Denies ease of bruising and ease of bleeding  ENDOCRINE:  Denies heat or cold intolerance, sweating, frequent urination, thirst and change in appetite.  PSYCHIATRIC:  Denies nervousness, depression, hopelessness and helplessness.   All other Review of Systems negative.      PHYSICAL EXAMINATION  Vitals:  Blood pressure 114/60, pulse 60, height 6' 2\", weight 99.6 kg (219 lb 9.6 oz), SpO2 98 %. Body mass index is 28.19 kg/m².  HEAD:  Normocephalic.  Atraumatic.  No masses, lesions, tenderness or abnormalities noted  EYES:  PERRLA (Pupils equal, round, reactive to light and accommodation), EOMI, (extraocular movements intact) sclerae clear.  EARS:  TM's (Tympanic membranes) clear bilaterally, NL (normal) light reflex, normal external canals.  NOSE:  Nares normal.  Septum midline.  Mucosa normal.  No erythema.  No drainage.  THROAT:  Mmm (Moist mucous membranes), no tonsillar hypertrophy, no erythema, exudates or petechiae noted.  SINUS:  Maxillary  and frontal sinuses are non tender to palpation.  NECK:  Supple, no JVD (jugular venous distention), no carotid bruits, no lymphadenopathy.  LUNGS:  Lungs are clear to auscultation and percussion.  No wheezes, rales or rhonchi.   HEART:  S1, S2, regular rate and rhythm, no murmur, S3 or S4 auscultated.  CHEST:  Symmetrical, non tender, no retractions, no bony abnormality.  ABDOMEN:  Soft, non tender, no masses, no hepatosplenomegaly, no distention, bowel sounds are normoactive.   EXTREMITIES:  No erythema, no edema.  NEUROLOGICAL:  CN (Cranial nerves) 2-12 grossly intact, DTR's (deep tendon reflexes) 2+/4+ bilaterally and symmetrical, normal strength.  SKIN:  Warm, moist, without erythema, rash or lesions.   MENTAL STATUS:  A&O (Alert and oriented) x3, normal thought, mood and affect, no hallucinations noted.    LAB:   Labs ordered.     ASSESSMENT / PLAN:   1. Controlled type 2 diabetes mellitus with stage 2 chronic kidney disease, unspecified whether long term insulin use (CMS/[facility])  Currently, the disease is not well controlled.  Reviewed and discussed previous HbA1c.  Ordered GLYCOHEMOGLOBIN; Future.  Will treat according to the results.    2. Essential hypertension  Currently, the blood pressure is well controlled.  Recommended discontinuing the medication and see what happens.  Informs the blood pressure would be 120-130 if he stops the medication.  Informed to let me know if he checks blood pressure himself and it is too high.  Advice visiting after two weeks for blood pressure check-up.     3. Hyperlipidemia LDL goal <100  Reviewed and discussed previous LDL.  Will check LDL this time.  Recommended to control Cholesterol.      4. Coronary artery disease involving native coronary artery of native heart without angina pectoris  Reviewed and discussed previous heart scan.  Will evaluate with heart scan and treat accordingly to the findings after quantification calcium seen.      5. Shortness of breath on  exertion  Ordered PFT; Future.  Will treat according to the result.    6. Gastroesophageal reflux disease without esophagitis  Stable on medication.  Continue PPI.    7. Fatigue, unspecified type  Could be due to higher sugars see in testing.  Ordered THYROID STIMULATING HORMONE REFLEX; Future.  Will treat according to the results.    8. History of colonic polyps  Ordered OPEN ACCESS COLONOSCOPY.    9. Screen for colon cancer  Ordered OPEN ACCESS COLONOSCOPY.    10. Drug-induced polyneuropathy (CMS/HCC)  Patient is stable.   Continue monitoring.    11. Adductor spasmodic dysphonia  Patient is stable.  Follow-up with ENT.      12. Non-Hodgkin's lymphoma, unspecified body region, unspecified non-Hodgkin lymphoma type (CMS/HCC)  Continue follow-up.    13. Screening for ischemic heart disease  Ordered CT CARDIAC CALCIUM SCORING - CASH SERVICE; Future.    Additional comments:  Educated on zolpidem medication. Benefits and side effects explained.  Immunization:   Recommended second dose of shingles vaccine.  Eye examination:   Recommended follow-up with eye doctor.    Follow-up in six months.      Return in about 7 months (around 6/10/2023) for Subsequent Medicare Wellness Visit, Hypertension.  Call if problems occur.  Patient in verbal agreement with plans as noted above.   I,  Dr. Ramón Cooley, have created a visit summary document based on the audio recording between Dr. Ananda Olmos MD and this patient for the physician to review, edit as needed, and authenticate.  Creation Date: 11/9/2022 Time: 3:14 AM      I have reviewed and edited the visit summary above and attest that it is accurate.    yes

## 2023-08-16 NOTE — H&P ADULT - NSHPPHYSICALEXAM_GEN_ALL_CORE
SW checked pt's hold paperwork was filled out correctly. [facility] has been set up for transport, ETA 2200. Paperwork and transport ready for discharge.     Electronically signed by BROWN Cary, LSW on 5/7/2019 at 6:34 PM
T(C): 38 (08-16-23 @ 01:52), Max: 38.2 (08-15-23 @ 20:30)  HR: 89 (08-16-23 @ 01:52) (75 - 89)  BP: 131/74 (08-16-23 @ 01:52) (92/50 - 131/74)  RR: 18 (08-16-23 @ 01:52) (18 - 20)  SpO2: 94% (08-16-23 @ 01:52) (93% - 95%)  GENERAL: NAD, lying in bed comfortably  EYES: EOMI, PERRLA; conjunctiva and sclera clear  ENMT: Moist oral mucosa, no pharyngeal injection or exudates   NECK: Supple, no palpable masses; no JVD  RESPIRATORY: Normal respiratory effort; lungs are clear to auscultation bilaterally  CARDIOVASCULAR: Regular rate and rhythm, normal S1 and S2, no murmur/rub/gallop; No lower extremity edema; Peripheral pulses are 2+ bilaterally  ABDOMEN: r cvat, normoactive bowel sounds, no rebound/guarding   MUSCULOSKELETAL:    no joint swelling or tenderness to palpation  PSYCH: A+O to person, place, and time; affect appropriate  NEUROLOGY: CN 2-12 are intact and symmetric; no gross motor or sensory deficits   SKIN: No rashes; no palpable lesions

## 2023-08-16 NOTE — H&P ADULT - ASSESSMENT
77 yo 73 kg f w pmh l breast ca s/p l lumpectomy + anastrazole, r breast adh s/p excision + tamoxifen x5yrs, lymphoma (DLBCL vs. 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, hld, gerd, anx/dep, overactive bladder, osteoporosis c/b r tib-fib fracture s/p orif + c/b t12 vcf, p/w fever + generalized weakness/fatigue/malaise; found to have right sided ascending uti; admit to medicine for further mgmt    severe sepsis  leukocytosis w left shift (however, patient was just recently started on course of prednisone for empirical mgmt of sob by outpatient provider; may be contributing leukocytosis), tachypneic, lactic acidosis; meets severe sirs/sepsis criteria  otherwise, afebrile and hds  unclear source; based on work up thus far, suspecting xx, r/o infectious etiology  s/p 2 L LR + ceftriaxone and azithromycin in ER  repeat lactate now; trend lactate q4-6h until wnl  obtain ua, procal, ct chest, blood and sputum cultures, atypical pna urine ag, mrsa screen  Monitor for fever, changes in white count  broad spec empirical abx therapy as described below  ivf resusci + lytes as needed.    pyelonephritis  Febrile, leukocytosis, + lactic acidosis; meets severe sepsis criteria  Ua with nitrites + bacteriuria + pyuria with leukocyte esterase; ct showing b/l pyelonephritis, possible small renal abscess  s/p 1 L lr bolus + vanc, zosyn  follow up blood and urine cultures; renal/bladder us; freq bladder scan  monitor for fever, changes in white count  start empirical ceftriaxone; adjust according to final c+s  antipyretics, antiemetics, analgesics as needed  encourage po intake; judicious ivf + lytes as needed in the mean time       endometrial thickening     79 yo 73 kg f w pmh l breast ca s/p l lumpectomy + anastrazole, r breast adh s/p excision + tamoxifen x5yrs, lymphoma (DLBCL vs. 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

## 2023-08-16 NOTE — CONSULT NOTE ADULT - ASSESSMENT
Patient is a 78y old  Female who presents with a chief complaint of fever + generalized weakness/fatigue/malaise     breast ca  --under the care of Ch[facility]ale [facility] [place] [facility] [place]  --pT1aN0 %, CT 70% well differentiated  invasive ductal carcinoma of L breast  --s/p L lumpectomy 9/16/22  --on anastrazole, continue while inpatient  --ongoing care after discharge    fever  --outpatient urinalysis +UTI (see above)  --outpatient blood cultures positive for gram negative rods  --outpatient CT a/p done 8/15 w/ suspected right pyelonephritis (see above)  --on IV zosyn, ID consulted    will follow and coordinate care    Africa Baker NP  Hematology/ Oncology  [facility]  [phone] (office)  [phone] (alt office)  Evenings and weekends please call MD on call or office  
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 5, went to [facility] as she had fever, rigors, generalized weakness, there blood cx was done which showed GNR  CT 8/15 s/o R pyleo  here febrile, WBC: 17, no focal symptoms    fever, leukocytosis, sepsis with GNR bacteremia likely pyelo as the CT 8/15 was s/o pyelo  h/o lymphoma, breast ca and CNS vasculitis  * f/u the blood and urine cx  * c/w zosyn for now and will adjust as per the cultures  * monitor CBC/diff and renal function    The above assessment and plan was discussed with the primary team    Kelly Freedman MD  contact on teams  After 5pm and on weekends call [phone]

## 2023-08-16 NOTE — H&P ADULT - PROBLEM SELECTOR PLAN 1
Febrile, leukocytosis, + lactic acidosis; meets severe sepsis criteria  otherwise, hds   source based on outpatient work up thus far, complicated uti  s/p 1 L LR in er + [place] [facility]  trend lactate q4-6h until wnl  follow up urine and blood cultures   Monitor for fever, changes in white count  broad spec empirical abx therapy as described below  ivf resusci + lytes as needed.

## 2023-08-16 NOTE — CONSULT NOTE ADULT - SUBJECTIVE AND OBJECTIVE BOX
Vascular Cardiology Consult Note    DIRECT SERVICE NUMBER:  [phone]                 CC:  hsitory of VTE    HPI:   HPI:  79 yo 73 kg f w pmh l breast ca s/p l lumpectomy + anastrazole, r breast adh s/p excision + tamoxifen x5yrs, lymphoma (DLBCL vs. 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 reportedly, patient Had rigors this morning with temperature of 102.9. Reports "not feeling well for the past 2 days", a/w Nausea and vague generalized abdominal discomfort, denies vomiting and diarrhea. Denies cough, sore throat, rhinitis, diarrhea, or dysuria. Denies exposure to sick contacts. Denies chest pain, sob, palpitations, lightheadedness, or dizziness; ct a+p performed at [facility], found to have right sided ascending uti; admit to medicine for further mgmt         Allergies    No Known Allergies    Intolerances    	    MEDICATIONS:  apixaban 2.5 milliGRAM(s) Oral every 12 hours    piperacillin/tazobactam IVPB.. 3.375 Gram(s) IV Intermittent every 8 hours      acetaminophen     Tablet .. 650 milliGRAM(s) Oral every 6 hours PRN  melatonin 3 milliGRAM(s) Oral at bedtime PRN  ondansetron Injectable 4 milliGRAM(s) IV Push every 8 hours PRN  sertraline 50 milliGRAM(s) Oral daily    aluminum hydroxide/magnesium hydroxide/simethicone Suspension 30 milliLiter(s) Oral every 4 hours PRN  pantoprazole    Tablet 40 milliGRAM(s) Oral before breakfast    atorvastatin 20 milliGRAM(s) Oral at bedtime  predniSONE   Tablet 5 milliGRAM(s) Oral every other day    anastrozole 1 milliGRAM(s) Oral daily  sodium chloride 0.9%. 1000 milliLiter(s) IV Continuous <Continuous>  solifenacin 10 milliGRAM(s) Oral daily      PAST MEDICAL & SURGICAL HISTORY:  CVA (cerebral vascular accident)      Lymphoma      Osteoporosis      Seizure      Fracture  lumbar spine,  healed with conservative treatment      Neuropathy  bilateral feet      HLD (hyperlipidemia)      Depression      GERD (gastroesophageal reflux disease)      Hodgkins disease      Pulmonary embolus      Fracture, ankle  Right, s/p metal plate      H/O sinus surgery      Cataract extraction status of eye, right          FAMILY HISTORY:  Family history of coronary artery disease in father (Father, Mother)  both parents s/p CABG        SOCIAL HISTORY:  unchanged    REVIEW OF SYSTEMS:    CONSTITUTIONAL: No fever, weight loss, or fatigue  EYES: No eye pain, visual disturbances, or discharge  ENT:  No difficulty hearing, tinnitus, vertigo; No sinus or throat pain  NECK: No pain or stiffness  RESPIRATORY:  No shortness of breath   CARDIOVASCULAR:  No Chest pain   GASTROINTESTINAL: No abdominal or epigastric pain. No nausea, vomiting, or hematemesis; No diarrhea or constipation. No melena or hematochezia.  GENITOURINARY: No dysuria, frequency, hematuria, or incontinence  NEUROLOGICAL: No headaches, memory loss, loss of strength, numbness, or tremors  LYMPH Nodes: No enlarged glands  ENDOCRINE: No heat or cold intolerance; No hair loss  MUSCULOSKELETAL: No joint pain or swelling; No muscle, back, or extremity pain  PSYCHIATRIC: No depression, anxiety, mood swings, or difficulty sleeping  HEME/LYMPH: No easy bruising, or bleeding gums  ALLERGY AND IMMUNOLOGIC: No hives or eczema	    [ x] All others negative	  [ ] Unable to obtain    PHYSICAL EXAM:  T(C): 37.2 (08-16-23 @ 11:01), Max: 38.2 (08-15-23 @ 20:30)  HR: 81 (08-16-23 @ 11:01) (72 - 89)  BP: 125/71 (08-16-23 @ 11:01) (92/50 - 131/74)  RR: 18 (08-16-23 @ 11:01) (18 - 20)  SpO2: 97% (08-16-23 @ 11:01) (93% - 97%)  Wt(kg): --  I&O's Summary      Appearance:  	  HEENT:   Normal oral mucosa, PERRL, EOMI	  Lymphatic: No lymphadenopathy  Cardiovascular:  S1S2 RRR   Respiratory:  	Clear  Psychiatry:  Alert and oriented  Gastrointestinal:  Soft, Non-tender, + BS	  Skin: No rashes, No ecchymoses, No cyanosis	  Extremities:    Foot Exam:        LABS:	 	    CBC Full  -  ( 16 Aug 2023 07:08 )  WBC Count : 15.17 K/uL  Hemoglobin : 11.3 g/dL  Hematocrit : 34.2 %  Platelet Count - Automated : 128 K/uL  Mean Cell Volume : 90.7 fl  Mean Cell Hemoglobin : 30.0 pg  Mean Cell Hemoglobin Concentration : 33.0 gm/dL  Auto Neutrophil # : 12.55 K/uL  Auto Lymphocyte # : 0.69 K/uL  Auto Monocyte # : 1.76 K/uL  Auto Eosinophil # : 0.00 K/uL  Auto Basophil # : 0.02 K/uL  Auto Neutrophil % : 82.8 %  Auto Lymphocyte % : 4.5 %  Auto Monocyte % : 11.6 %  Auto Eosinophil % : 0.0 %  Auto Basophil % : 0.1 %    08-16    136  |  102  |  14  ----------------------------<  124<H>  3.7   |  21<L>  |  0.92  08-15    138  |  100  |  18  ----------------------------<  118<H>  4.0   |  22  |  0.94    Ca    8.5      16 Aug 2023 07:08  Ca    8.6      15 Aug 2023 21:01    TPro  5.5<L>  /  Alb  3.3  /  TBili  1.0  /  DBili  x   /  AST  32  /  ALT  30  /  AlkPhos  64  08-16  TPro  6.3  /  Alb  3.7  /  TBili  1.0  /  DBili  x   /  AST  32  /  ALT  29  /  AlkPhos  61  08-15          Assessment:  1. History of VTE  2.  Fever  3.  UTI           Plan:  1. Antibiotics per ID  2.  She is on extended therapy for VTE prophylaxis with apixaban 2.5 mg BID - continue this dose (she was given 5mg earlier today)  3.  Would get echo and venous duplex while she is here  4.  Appreciate Dr. Crockett excellent care         Thank you      Vascular Cardiology Service    Please call with any questions:   DIRECT SERVICE NUMBER:  [phone]         
Reason for consult: breast ca    HPI:  77 yo 73 kg f w pmh l breast ca s/p l lumpectomy + anastrazole, r breast adh s/p excision + tamoxifen x5yrs, lymphoma (DLBCL vs. 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 reportedly, patient Had rigors this morning with temperature of 102.9. Reports "not feeling well for the past 2 days", a/w Nausea and vague generalized abdominal discomfort, denies vomiting and diarrhea. Denies cough, sore throat, rhinitis, diarrhea, or dysuria. Denies exposure to sick contacts. Denies chest pain, sob, palpitations, lightheadedness, or dizziness; ct a+p performed at [facility], found to have right sided ascending uti; admit to medicine for further mgmt (16 Aug 2023 02:50)    Hematology oncology called to see patient who follows with Chantale Matthew of [facility] for the treatment of breast cancer.    PAST MEDICAL & SURGICAL HISTORY:  CVA (cerebral vascular accident)      Lymphoma      Osteoporosis      Seizure      Fracture  lumbar spine,  healed with conservative treatment      Neuropathy  bilateral feet      HLD (hyperlipidemia)      Depression      GERD (gastroesophageal reflux disease)      Hodgkins disease      Pulmonary embolus      Fracture, ankle  Right, s/p metal plate      H/O sinus surgery      Cataract extraction status of eye, right          FAMILY HISTORY:  Family history of coronary artery disease in father (Father, Mother)  both parents s/p CABG        Alochol: Denied  Smoking: Nonsmoker  Drug Use: Denied  Marital Status:         Allergies    No Known Allergies    Intolerances        MEDICATIONS  (STANDING):  anastrozole 1 milliGRAM(s) Oral daily  apixaban 2.5 milliGRAM(s) Oral every 12 hours  atorvastatin 20 milliGRAM(s) Oral at bedtime  pantoprazole    Tablet 40 milliGRAM(s) Oral before breakfast  piperacillin/tazobactam IVPB.. 3.375 Gram(s) IV Intermittent every 8 hours  predniSONE   Tablet 5 milliGRAM(s) Oral every other day  sertraline 50 milliGRAM(s) Oral daily  sodium chloride 0.9%. 1000 milliLiter(s) (100 mL/Hr) IV Continuous <Continuous>  solifenacin 10 milliGRAM(s) Oral daily    MEDICATIONS  (PRN):  acetaminophen     Tablet .. 650 milliGRAM(s) Oral every 6 hours PRN Temp greater or equal to 38C (100.4F), Mild Pain (1 - 3)  aluminum hydroxide/magnesium hydroxide/simethicone Suspension 30 milliLiter(s) Oral every 4 hours PRN Dyspepsia  melatonin 3 milliGRAM(s) Oral at bedtime PRN Insomnia  ondansetron Injectable 4 milliGRAM(s) IV Push every 8 hours PRN Nausea and/or Vomiting    ROS  No fever, sweats, chills  No epistaxis, HA, sore throat  No CP, SOB, cough, sputum  No n/v/d, abd pain, melena, hematochezia  No edema  No rash  No anxiety  No back pain, joint pain  No bleeding, bruising  No dysuria, hematuria    T(C): 37.2 (23 @ 11:01), Max: 38.2 (08-15-23 @ 20:30)  HR: 81 (23 @ 11:01) (72 - 89)  BP: 125/71 (23 @ 11:01) (92/50 - 131/74)  RR: 18 (23 @ 11:01) (18 - 20)  SpO2: 97% (23 @ 11:01) (93% - 97%)  Wt(kg): --    PE  NAD  Awake, alert  Anicteric, MMM  RRR  CTAB  Abd soft, NT, ND  No c/c/e  No rash grossly  FROM                          11.3   15.17 )-----------( 128      ( 16 Aug 2023 07:08 )               34.2         136  |  102  |  14  ----------------------------<  124<H>  3.7   |  21<L>  |  0.92    Ca    8.5      16 Aug 2023 07:08    TPro  5.5<L>  /  Alb  3.3  /  TBili  1.0  /  DBili  x   /  AST  32  /  ALT  30  /  AlkPhos  64        COMPUTED TOMOGRAPHY   Report of Consultation   Name: VINCENT ENGLAND Acc No: [id number]  MRN: [id number] Date of Service: 08/15/2023  : 1945 Sex: F Pt Loc: [facility]  Ordered by: JEFFRY NICOLE MD Date of Report: 08/15/2023 05:30 PM  Procedure: CT CH/ABD/PEL W/ CON Account: [id number]  PRID #: [id number]     FINAL REPORT   8/15/2023 CT of chest, abdomen, and pelvis   CLINICAL STATEMENT: Breast cancer. Fever.   TECHNIQUE:   * IV contrast: with contrast   * Oral Contrast: None   * Acquisition: Axial CT images of chest, abdomen and pelvis   * Postprocessing: routine     RADIATION DOSE (DLP): 1149 mGy-cm   COMPARISON: 2021.   CORRELATION: None.   FINDINGS:   LUNGS/AIRWAYS: Unremarkable.   PLEURA/PERICARDIUM: No effusion.   MEDIASTINUM/THORACIC NODES: No adenopathy.   HEPATOBILIARY: Unremarkable gallbladder. No biliary  dilatation. Patent hepatic and portal veins.   SPLEEN: Unremarkable.   PANCREAS: Unremarkable.   ADRENAL GLANDS: Unremarkable.   KIDNEYS: Unremarkable left kidney. Right lower  pole cyst and additional subcentimeter right cortical probable cysts. A  few new geographic foci of parenchymal hypoenhancement in the right kidney  suspicious for pyelonephritis. Mild stranding in the right perinephric  fat. Mild hyperenhancement of the urothelium in the right renal pelvis and  right ureter suspicious for ascending urinary tract infection. No  obstructing ureteral calculus. No hydronephrosis.     NODES: No adenopathy.   PELVIC ORGANS: Increased endometrial thickness measuring 1.3  cm, previously 0.5 cm.   PERITONEUM/   MESENTERY/BOWEL: No bowel obstruction. No ascites. Normal appendix.  BONES/SOFT TISSUES: No suspicious osseous lesion. Unchanged severe T12 and  mild T5 compression deformities.   OTHER: None.   IMPRESSION:   1. Since 2021, new suspected right pyelonephritis.  2. No metastatic disease.   3. Increased endometrial thickness. This cannot be accurately  characterized on this examination due to limitations of CT modality. A  follow-up pelvic ultrasound is recommended.         08/15/2023 15:48 Respiratory FREDY Panel with SARS-CoV-2 1 or More Final Results  Ancillary ID: [id number]  COVID-19 Patient Type Patient Final  COVID-19 Indication Symptomatic Final  COVID-19 Rapid Qualitative Nasopharynx Final  RNA Specimen Type  Not Detected Final  Reference Range: [Not Detected]  COVID-19 Qualitative RNA is a PCR test for COVID-19.  This test was granted Emergency Use Authorization only by the  Food and Drug Administration.  COVID-19 Rapid Qualitative  RNA Result  Not Detected Final  Reference Range: [Not Detected]  Coronavirus RNA (HKU1,  OC43, NL63, 229E)  Not Detected Final  Reference Range: [Not Detected]  RSV Type A RNA  Not Detected Final  Reference Range: [Not Detected]  RSV Type B RNA  Not Detected Final  Reference Range: [Not Detected]  Adenovirus DNA  Not Detected Final  Reference Range: [Not Detected]  Human Metapneumovirus  RNA  Not Detected Final  Reference Range: [Not Detected]  Human  Rhinovirus/Enterovirus RNA  Not Detected Final  Reference Range: [Not Detected]  Influenza Virus Type A RNA  Not Detected Final  Reference Range: [Not Detected]  Influenza Virus Type A H1  RNA  Not Detected Final  Reference Range: [Not Detected]  Novel Influenza Virus Type  A H1N1 RNA  Not Detected Final  Reference Range: [Not Detected]  Influenza Virus Type A H3  RNA  Not Detected Final  Reference Range: [Not Detected]  Influenza Virus Type B RNA  Not Detected Final  Reference Range: [Not Detected]  Parainfluenza Virus Type 1  RNA  Not Detected Final  Reference Range: [Not Detected]  Parainfluenza Virus Type 2  RNA  Not Detected Final  Reference Range: [Not Detected]  Parainfluenza Virus Type 3  RNA  Not Detected Final  Reference Range: [Not Detected]  Parainfluenza Virus Type 4  RNA  Not Detected Final  Reference Range: [Not Detected]  Chlamydophila pneumoniae  DNA  Not Detected Final  Reference Range: [Not Detected]  Mycoplasma pneumoniae  DNA      [facility]  Results for Selected Order  VINCENT ENGLAND  Age: 78y [place] [facility] [place]: Chantale Lechuga  Sex: F  : 1945  MRN: [id number]  08/15/2023 17:55 Urinalysis, Routine 1 or More Final Results  Ancillary ID: [id number]  Specific Gravity >1.030 H [1.003-1.030] Final  Color [Yellow] Final  Yellow  Turbidity A [Clear] Final  Turbid  Abnormal  pH, Urine. [5.0-7.0] Final  6.5  Protein, Urine A [Negative mg/dL] Final  30  Abnormal  Glucose, Urine. [Negative mg/dL] Final  Negative  Ketones, Urine [Negative mg/dL] Final  Negative  Bilirubin, Urine [Negative] Final  Negative  Blood, Urine A [Negative] Final  Moderate  Abnormal  Urobilinogen [0.2-1.0 EU/dL] Final  1.0  Leukocyte Esterase, Urine A [Negative] Final  Small  Abnormal  Nitrite, Urine A [Negative] Final  Positive  Abnormal  Bacteria Few Final  Hyaline Casts [0-2 /LPF] Final  0-2  Epithelial A [None Seen /HPF] Final  See Result  Abnormal  RBC, Urine [0-3 /HPF] Final  0-3    WBC, Urine A [0-5 /HPF] Final  10-25  Abnormal  Squamous Cell [0-5 /HPF] Final      
HPI:  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, went to [facility] [place] [place] as she had fever, rigors, generalized weakness, there blood cx was done which showed GNR  CT 8/15 s/o R pyleo  here febrile, WBC: 17, no focal symptoms      PAST MEDICAL & SURGICAL HISTORY:  CVA (cerebral vascular accident)      Lymphoma      Osteoporosis      Seizure      Fracture  lumbar spine,  healed with conservative treatment      Neuropathy  bilateral feet      HLD (hyperlipidemia)      Depression      GERD (gastroesophageal reflux disease)      Hodgkins disease      Pulmonary embolus      Fracture, ankle  Right, s/p metal plate      H/O sinus surgery      Cataract extraction status of eye, right          Allergies    No Known Allergies    Intolerances        ANTIMICROBIALS:  piperacillin/tazobactam IVPB.. 3.375 every 8 hours      OTHER MEDS:  acetaminophen     Tablet .. 650 milliGRAM(s) Oral every 6 hours PRN  aluminum hydroxide/magnesium hydroxide/simethicone Suspension 30 milliLiter(s) Oral every 4 hours PRN  anastrozole 1 milliGRAM(s) Oral daily  apixaban 2.5 milliGRAM(s) Oral every 12 hours  atorvastatin 20 milliGRAM(s) Oral at bedtime  melatonin 3 milliGRAM(s) Oral at bedtime PRN  ondansetron Injectable 4 milliGRAM(s) IV Push every 8 hours PRN  pantoprazole    Tablet 40 milliGRAM(s) Oral before breakfast  predniSONE   Tablet 5 milliGRAM(s) Oral every other day  sertraline 50 milliGRAM(s) Oral daily  sodium chloride 0.9%. 1000 milliLiter(s) IV Continuous <Continuous>  solifenacin 10 milliGRAM(s) Oral daily      SOCIAL HISTORY:  , lives with   no smoking, alcohol or drug abuse  no recent travel    FAMILY HISTORY:  Family history of coronary artery disease in father (Father, Mother)  both parents s/p CABG        ROS:    All other systems negative     Constitutional: + fever, + chills, +generalized weakness  Eye: no eye pain, no redness, no vision changes  ENT:  no sore throat, no rhinorrhea  Cardiovascular:  no chest pain, no palpitation  Respiratory:  no SOB, no cough  GI:  no abd pain, no vomiting, no diarrhea  urinary: no dysuria, no hematuria, no flank pain  : no vaginal discharge or bleeding  musculoskeletal:  no joint pain, no joint swelling  skin:  no rash  neurology:  no headache, no seizure  psych: no anxiety, no depression     Physical Exam:    General:    NAD, non toxic  Head: atraumatic, normocephalic  Eyes: normal sclera and conjunctiva  ENT:   no oropharyngeal lesions, no LAD, neck supple  Cardio:    regular S1,S  Respiratory:   clear b/l, no wheezing  abd:   soft, BS +, not tender  :     no CVAT, no suprapubic tenderness, no sandra  Musculoskeletal : no joint swelling, no edema  Skin:    no rash  vascular: no phlebitis  Neurologic:     no focal deficits  psych: normal affect      Drug Dosing Weight  Height (cm): 165.1 (15 Aug 2023 18:55)  Weight (kg): 72.6 (15 Aug 2023 18:55)  BMI (kg/m2): 26.6 (15 Aug 2023 18:55)  BSA (m2): 1.8 (15 Aug 2023 18:55)    Vital Signs Last 24 Hrs  T(F): 99 (08-16-23 @ 11:01), Max: 100.8 (08-15-23 @ 20:30)    Vital Signs Last 24 Hrs  HR: 81 (08-16-23 @ 11:01) (72 - 89)  BP: 125/71 (08-16-23 @ 11:01) (92/50 - 131/74)  RR: 18 (08-16-23 @ 11:01)  SpO2: 97% (08-16-23 @ 11:01) (93% - 97%)  Wt(kg): --                          11.3   15.17 )-----------( 128      ( 16 Aug 2023 07:08 )             34.2       08-16    136  |  102  |  14  ----------------------------<  124<H>  3.7   |  21<L>  |  0.92    Ca    8.5      16 Aug 2023 07:08    TPro  5.5<L>  /  Alb  3.3  /  TBili  1.0  /  DBili  x   /  AST  32  /  ALT  30  /  AlkPhos  64  08-16      Urinalysis Basic - ( 16 Aug 2023 07:08 )    Color: x / Appearance: x / SG: x / pH: x  Gluc: 124 mg/dL / Ketone: x  / Bili: x / Urobili: x   Blood: x / Protein: x / Nitrite: x   Leuk Esterase: x / RBC: x / WBC x   Sq Epi: x / Non Sq Epi: x / Bacteria: x        MICROBIOLOGY:  v      Rapid RVP Result: NotDetec (08-15 @ 21:00)          RADIOLOGY:    Images independently visualized and reviewed personally,  findings as below    < from: Xray Chest 1 View AP/PA (08.15.23 @ 23:01) >  IMPRESSION:  Clear lungs.

## 2023-08-16 NOTE — CONSULT NOTE ADULT - REASON FOR ADMISSION
fever + generalized weakness/fatigue/malaise

## 2023-08-16 NOTE — H&P ADULT - PROBLEM SELECTOR PLAN 3
ct showing Increased endometrial thickness measuring 1.3 cm, previously 0.5 cm  follow up tvus + taus  gyn consult in am

## 2023-08-16 NOTE — H&P ADULT - PROBLEM SELECTOR PLAN 10
h/o c/b r tib-fib fracture s/p orif + c/b t12 vcf  outpatient dexa scans and endo documentation reviewed  cont home prolia upon discharge  cont home vit d supplementation  outpatient endo follow up upon discharge h/o c/b r tib-fib fracture s/p orif + c/b t12 vcf  outpatient dexa scans and endo documentation reviewed  ct showing Unchanged severe T12 and mild T5 compression deformities.  cont home prolia upon discharge  cont home vit d supplementation  outpatient endo follow up upon discharge

## 2023-08-16 NOTE — H&P ADULT - PROBLEM SELECTOR PLAN 11
h/o l breast ca s/p l lumpectomy + anastrazole, r breast adh s/p excision + tamoxifen x5yrs  outpatient oncology documentation reviewed  cont home anastrazole  outpatient onc follow up upon discharge

## 2023-08-16 NOTE — H&P ADULT - NSHPREVIEWOFSYSTEMS_GEN_ALL_CORE
CONSTITUTIONAL: + fever. + weakness  ENMT:  No sinus or throat pain  RESPIRATORY: No cough, wheezing, chills or hemoptysis; No shortness of breath  CARDIOVASCULAR: No chest pain, palpitations, dizziness, or leg swelling  GASTROINTESTINAL: No abdominal or epigastric pain. No nausea, vomiting, or hematemesis; No diarrhea or constipation. No melena or hematochezia.  GENITOURINARY: + dysuria, + flank pain  NEUROLOGICAL: No headaches, memory loss, loss of strength, numbness, or tremors  SKIN: No rashes,  No hives or eczema  ENDOCRINE: No heat or cold intolerance; No hair loss  MUSCULOSKELETAL: No joint pain or swelling; No muscle, back, or extremity pain  PSYCHIATRIC: No depression, anxiety, mood swings, or difficulty sleeping  HEME/LYMPH: No easy bruising, or bleeding gums; no enlarged LN

## 2023-08-16 NOTE — H&P ADULT - HISTORY OF PRESENT ILLNESS
No 77 yo 73 kg f w pmh l breast ca s/p l lumpectomy + anastrazole, r breast adh s/p excision + tamoxifen x5yrs, lymphoma (DLBCL vs. 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 reportedly, patient Had rigors this morning with temperature of 102.9. Reports "not feeling well for the past 2 days", a/w Nausea and vague generalized abdominal discomfort, denies vomiting and diarrhea. Denies cough, sore throat, rhinitis, diarrhea, or dysuria. Denies exposure to sick contacts. Denies chest pain, sob, palpitations, lightheadedness, or dizziness; ct a+p performed at [facility], found to have right sided ascending uti; admit to medicine for further mgmt

## 2023-08-17 LAB
ALBUMIN SERPL ELPH-MCNC: 3 G/DL — LOW (ref 3.3–5)
ALP SERPL-CCNC: 65 U/L — SIGNIFICANT CHANGE UP (ref 40–120)
ALT FLD-CCNC: 46 U/L — HIGH (ref 10–45)
ANION GAP SERPL CALC-SCNC: 12 MMOL/L — SIGNIFICANT CHANGE UP (ref 5–17)
AST SERPL-CCNC: 53 U/L — HIGH (ref 10–40)
BILIRUB SERPL-MCNC: 0.9 MG/DL — SIGNIFICANT CHANGE UP (ref 0.2–1.2)
BUN SERPL-MCNC: 17 MG/DL — SIGNIFICANT CHANGE UP (ref 7–23)
CALCIUM SERPL-MCNC: 8.6 MG/DL — SIGNIFICANT CHANGE UP (ref 8.4–10.5)
CHLORIDE SERPL-SCNC: 103 MMOL/L — SIGNIFICANT CHANGE UP (ref 96–108)
CO2 SERPL-SCNC: 25 MMOL/L — SIGNIFICANT CHANGE UP (ref 22–31)
CREAT SERPL-MCNC: 1.03 MG/DL — SIGNIFICANT CHANGE UP (ref 0.5–1.3)
EGFR: 56 ML/MIN/1.73M2 — LOW
GLUCOSE SERPL-MCNC: 144 MG/DL — HIGH (ref 70–99)
GRAM STN FLD: SIGNIFICANT CHANGE UP
HCT VFR BLD CALC: 32.3 % — LOW (ref 34.5–45)
HCV AB S/CO SERPL IA: 0.03 S/CO — SIGNIFICANT CHANGE UP (ref 0–0.99)
HCV AB SERPL-IMP: SIGNIFICANT CHANGE UP
HGB BLD-MCNC: 10.4 G/DL — LOW (ref 11.5–15.5)
MCHC RBC-ENTMCNC: 29.5 PG — SIGNIFICANT CHANGE UP (ref 27–34)
MCHC RBC-ENTMCNC: 32.2 GM/DL — SIGNIFICANT CHANGE UP (ref 32–36)
MCV RBC AUTO: 91.5 FL — SIGNIFICANT CHANGE UP (ref 80–100)
NRBC # BLD: 0 /100 WBCS — SIGNIFICANT CHANGE UP (ref 0–0)
PLATELET # BLD AUTO: 125 K/UL — LOW (ref 150–400)
POTASSIUM SERPL-MCNC: 3.5 MMOL/L — SIGNIFICANT CHANGE UP (ref 3.5–5.3)
POTASSIUM SERPL-SCNC: 3.5 MMOL/L — SIGNIFICANT CHANGE UP (ref 3.5–5.3)
PROT SERPL-MCNC: 5.5 G/DL — LOW (ref 6–8.3)
RBC # BLD: 3.53 M/UL — LOW (ref 3.8–5.2)
RBC # FLD: 14.6 % — HIGH (ref 10.3–14.5)
SODIUM SERPL-SCNC: 140 MMOL/L — SIGNIFICANT CHANGE UP (ref 135–145)
WBC # BLD: 10.23 K/UL — SIGNIFICANT CHANGE UP (ref 3.8–10.5)
WBC # FLD AUTO: 10.23 K/UL — SIGNIFICANT CHANGE UP (ref 3.8–10.5)

## 2023-08-17 PROCEDURE — 93970 EXTREMITY STUDY: CPT | Mod: 26

## 2023-08-17 PROCEDURE — 99232 SBSQ HOSP IP/OBS MODERATE 35: CPT

## 2023-08-17 PROCEDURE — 93306 TTE W/DOPPLER COMPLETE: CPT | Mod: 26

## 2023-08-17 RX ADMIN — APIXABAN 2.5 MILLIGRAM(S): 2.5 TABLET, FILM COATED ORAL at 18:13

## 2023-08-17 RX ADMIN — PIPERACILLIN AND TAZOBACTAM 25 GRAM(S): 4; .5 INJECTION, POWDER, LYOPHILIZED, FOR SOLUTION INTRAVENOUS at 16:27

## 2023-08-17 RX ADMIN — SERTRALINE 50 MILLIGRAM(S): 25 TABLET, FILM COATED ORAL at 14:30

## 2023-08-17 RX ADMIN — Medication 650 MILLIGRAM(S): at 16:26

## 2023-08-17 RX ADMIN — ATORVASTATIN CALCIUM 20 MILLIGRAM(S): 80 TABLET, FILM COATED ORAL at 21:31

## 2023-08-17 RX ADMIN — PIPERACILLIN AND TAZOBACTAM 25 GRAM(S): 4; .5 INJECTION, POWDER, LYOPHILIZED, FOR SOLUTION INTRAVENOUS at 06:03

## 2023-08-17 RX ADMIN — Medication 650 MILLIGRAM(S): at 16:56

## 2023-08-17 RX ADMIN — Medication 650 MILLIGRAM(S): at 00:27

## 2023-08-17 RX ADMIN — APIXABAN 2.5 MILLIGRAM(S): 2.5 TABLET, FILM COATED ORAL at 06:03

## 2023-08-17 RX ADMIN — Medication 3 MILLIGRAM(S): at 21:31

## 2023-08-17 RX ADMIN — ONDANSETRON 4 MILLIGRAM(S): 8 TABLET, FILM COATED ORAL at 18:13

## 2023-08-17 RX ADMIN — PIPERACILLIN AND TAZOBACTAM 25 GRAM(S): 4; .5 INJECTION, POWDER, LYOPHILIZED, FOR SOLUTION INTRAVENOUS at 21:32

## 2023-08-17 RX ADMIN — PANTOPRAZOLE SODIUM 40 MILLIGRAM(S): 20 TABLET, DELAYED RELEASE ORAL at 06:03

## 2023-08-17 RX ADMIN — Medication 650 MILLIGRAM(S): at 09:07

## 2023-08-17 RX ADMIN — SOLIFENACIN SUCCINATE 10 MILLIGRAM(S): 10 TABLET ORAL at 14:30

## 2023-08-17 NOTE — PROGRESS NOTE ADULT - ASSESSMENT
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   - Febrile, leukocytosis, + lactic acidosis   - BC from [facility] [place]/onc with Gram Neg rods, E. Coli; Repeat BCx 1 of 2 + with E. Coli. Repeat Cx in AM   - S/P IVF in ER and Zosyn at [facility] [place]   - Lactate now down-trended to WNL   - F/u repeat 8/15 UCx and BCx2 --> 1 of 2 w/ E. coli. On Zosyn. Repeat Cx in AM 8/18  - C/w Zosyn for ABX for now   - Monitor CBC, temp curve, VS and patient closely   - ID eval appreciated; F/u recs   - AM labs pending.     Complicated UTI (urinary tract infection) / Pyelonephritis   - Outpatient CT with  "A few new geographic foci of parenchymal hypoenhancement in the right kidney suspicious for pyelonephritis. Mild stranding in the right perinephric fat. Mild hyperenhancement of the urothelium in the right renal pelvis and right ureter suspicious for ascending urinary tract infection. No obstructing ureteral calculus. No hydronephrosis."  - BCx from [facility] [place] with Gram Neg rods, E. Coli   - F/u repeat BCx2 and UCx --> + for E. Coli   - C/w Zosyn for now   - ID eval appreciated; F/u recs   - F/u Kidney/ bladder US -->  Normal renal US  - Check bladder scan to R/O retention   - Monitor CBC, temp curve, VS and patient closely    Increased endometrial stripe thickness.   - CT with reported increased endometrial thickness measuring 1.3 cm, previously 0.5 cm  - TVUS/pelvic US with WNL thickness of endometrium. Fluid in endometrial cavity/ canal --> F/u heme/onc recs and F/u with GYN outpatient    - Heme/Onc follow up     H/O venous thrombosis and embolism.   - H/O DVT/ Bilat PE in the past  - C/w home Eliquis  - Check LE Duplex -- Pending   - TTE -- EF of 63%, Mod grade II diastolic dysfunction    - Monitor on tele  - Vascular cardio eval     CVA (cerebrovascular accident).   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  - Fall, Aspiration precautions     HLD (hyperlipidemia).   - Cont home lipitor.  - Lipid panel noted     GERD (gastroesophageal reflux disease).   - Cont home dexilant (switched to protonix while inhouse).    Anxiety and depression.   - Cont home zoloft.    Overactive bladder.   - Cont home vesicare  - Outpatient urogyn follow up upon discharge.    Osteoporosis.   - H/O c/b r tib-fib fracture s/p orif + c/b t12 vcf  - CT with Unchanged severe T12 and mild T5 compression deformities.  - Cont home prolia upon discharge  - Cont home vit d supplementation  - Outpatient endo follow up upon discharge.    Breast cancer.   - H/O L breast ca s/p l lumpectomy + anastrazole, r breast adh s/p excision + tamoxifen x5yrs  - Cont home anastrazole  - Heme/Onc consulted; F/u recs      PPX    Discussed with Attending and ACP. Discussed with Heme/Onc. Discussed with patient and her  at the bedside.  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   - Febrile, leukocytosis, + lactic acidosis   - [place] [facility] [place]/onc with Gram Neg rods, E. Coli; Repeat BCx 1 of 2 + with E. Coli. Repeat Cx in AM   - S/P IVF in [place] and [place]yn at [facility] [place]   - Lactate now down-trended to WNL   - F/u repeat 8/15 UCx and BCx2 --> 1 of 2 w/ E. coli. On Zosyn. Repeat Cx in AM 8/18  - C/w Zosyn for ABX for now   - Monitor CBC, temp curve, VS and patient closely   - ID eval appreciated; F/u recs   - AM labs pending.     Complicated UTI (urinary tract infection) / Pyelonephritis   - Outpatient CT with  "A few new geographic foci of parenchymal hypoenhancement in the right kidney suspicious for pyelonephritis. Mild stranding in the right perinephric fat. Mild hyperenhancement of the urothelium in the right renal pelvis and right ureter suspicious for ascending urinary tract infection. No obstructing ureteral calculus. No hydronephrosis."  - BCx from [facility] [place] with Gram Neg rods, E. Coli   - F/u repeat BCx2 and UCx --> + for E. Coli   - C/w Zosyn for now   - ID eval appreciated; F/u recs   - F/u Kidney/ bladder US -->  Normal renal US  - Check bladder scan to R/O retention   - Monitor CBC, temp curve, VS and patient closely    Increased endometrial stripe thickness.   - CT with reported increased endometrial thickness measuring 1.3 cm, previously 0.5 cm  - TVUS/pelvic US with WNL thickness of endometrium. Fluid in endometrial cavity/ canal --> F/u heme/onc recs and F/u with GYN outpatient    - Heme/Onc follow up     H/O venous thrombosis and embolism.   - H/O DVT/ Bilat PE in the past  - C/w home Eliquis  - Check LE Duplex -- Pending   - TTE -- EF of 63%, Mod grade II diastolic dysfunction    - Monitor on tele  - Vascular cardio eval     CVA (cerebrovascular accident).   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  - Fall, Aspiration precautions     HLD (hyperlipidemia).   - Cont home lipitor.  - Lipid panel noted     GERD (gastroesophageal reflux disease).   - Cont home dexilant (switched to protonix while inhouse).    Anxiety and depression.   - Cont home zoloft.    Overactive bladder.   - Cont home vesicare  - Outpatient urogyn follow up upon discharge.    Osteoporosis.   - H/O c/b r tib-fib fracture s/p orif + c/b t12 vcf  - CT with Unchanged severe T12 and mild T5 compression deformities.  - Cont home prolia upon discharge  - Cont home vit d supplementation  - Outpatient endo follow up upon discharge.    Breast cancer.   - H/O L breast ca s/p l lumpectomy + anastrazole, r breast adh s/p excision + tamoxifen x5yrs  - Cont home anastrazole  - Heme/Onc consulted; F/u recs    Pre-DM  - A1C of 6.2  - Diet and lifestyle modifications  - RD consult    PPX    Discussed with Attending and ACP. Discussed with Heme/Onc. Discussed with patient and her  at the bedside.  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   - Febrile, leukocytosis, + lactic acidosis   - BC from [facility] [place]/onc with Gram Neg rods, E. Coli; Repeat BCx 1 of 2 + with E. Coli. Repeat Cx in AM   - S/P IVF in ER and Zosyn at [facility] [place]   - Lactate now down-trended to WNL   - F/u repeat 8/15 UCx and BCx2 --> 1 of 2 w/ E. coli. On Zosyn. Repeat Cx in AM 8/18  - C/w Zosyn for ABX for now   - Monitor CBC, temp curve, VS and patient closely   - ID eval appreciated; F/u recs   - AM labs pending.     Complicated UTI (urinary tract infection) / Pyelonephritis   - Outpatient CT with  "A few new geographic foci of parenchymal hypoenhancement in the right kidney suspicious for pyelonephritis. Mild stranding in the right perinephric fat. Mild hyperenhancement of the urothelium in the right renal pelvis and right ureter suspicious for ascending urinary tract infection. No obstructing ureteral calculus. No hydronephrosis."  - BCx from [facility] [place] with Gram Neg rods, E. Coli   - F/u repeat BCx2 and UCx --> + for E. Coli   - C/w Zosyn for now   - ID eval appreciated; F/u recs   - F/u Kidney/ bladder US -->  Normal renal US  - Check bladder scan to R/O retention   - Monitor CBC, temp curve, VS and patient closely    Increased endometrial stripe thickness.   - CT with reported increased endometrial thickness measuring 1.3 cm, previously 0.5 cm  - TVUS/pelvic US with WNL thickness of endometrium. Fluid in endometrial cavity/ canal --> F/u heme/onc recs and F/u with GYN outpatient    - Heme/Onc follow up     H/O venous thrombosis and embolism.   - H/O DVT/ Bilat PE in the past  - C/w home Eliquis  - Check LE Duplex -- Pending   - TTE -- EF of 63%, Mod grade II diastolic dysfunction , Moderate to Severe AS --> F/u Vascular cardio recs; Monitor Volume Status   - Monitor on tele  - Vascular cardio eval     Moderate to Severe AS  - TTE -- EF of 63%, Mod grade II diastolic dysfunction , Moderate to Severe AS --> F/u Vascular cardio recs  - Monitor Volume Status   - Diuretics PRN    CVA (cerebrovascular accident).   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  - Fall, Aspiration precautions     HLD (hyperlipidemia).   - Cont home lipitor.  - Lipid panel noted     GERD (gastroesophageal reflux disease).   - Cont home dexilant (switched to protonix while inhouse).    Anxiety and depression.   - Cont home zoloft.    Overactive bladder.   - Cont home vesicare  - Outpatient urogyn follow up upon discharge.    Osteoporosis.   - H/O c/b r tib-fib fracture s/p orif + c/b t12 vcf  - CT with Unchanged severe T12 and mild T5 compression deformities.  - Cont home prolia upon discharge  - Cont home vit d supplementation  - Outpatient endo follow up upon discharge.    Breast cancer.   - H/O L breast ca s/p l lumpectomy + anastrazole, r breast adh s/p excision + tamoxifen x5yrs  - Cont home anastrazole  - Heme/Onc consulted; F/u recs    Pre-DM  - A1C of 6.2  - Diet and lifestyle modifications  - RD consult    PPX    Discussed with Attending and ACP. Discussed with Heme/Onc. Discussed with patient and her  at the bedside.

## 2023-08-17 NOTE — PROGRESS NOTE ADULT - ASSESSMENT
Patient is a 78y old  Female who presents with a chief complaint of fever + generalized weakness/fatigue/malaise     Breast Cancer  --under the care of Chantale Matthew of [facility] [place]  --pT1aN0 %, [place] 70% well differentiated  invasive ductal carcinoma of L breast  --s/p L lumpectomy 9/16/22  --on anastrazole, continue while inpatient  --ongoing care after discharge  --F/u LE duplex    UTI, bacteremia  --outpatient urinalysis +UTI  --outpatient blood cultures positive for E. coli  --outpatient CT a/p done 8/15 w/ suspected right pyelonephritis  --on IV zosyn, ID following  --Currently afebrile    Endometrial thickening  --Pelvic, transvaginal US showing Full thickness of the endometrium is within normal limits measuring 3 to 4 mm.  --Recommend outpatient f/u    will follow and coordinate care    Rufino Mancuso PA-C  Hematology/Oncology  [facility]  [phone] (office)

## 2023-08-17 NOTE — PROGRESS NOTE ADULT - ASSESSMENT
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 5, went to [facility] [place] as she had fever, rigors, generalized weakness, there blood cx was done which showed GNR  CT 8/15 s/o R pyleo  here febrile, WBC: 17, no focal symptoms    fever, leukocytosis, sepsis with E-coli bacteremia, CT 8/15 showed R pyelonephritis and ureteritis with perinephric stranding, also a R renal cyst, u/s here with no abscess or hydro  h/o lymphoma, breast ca and CNS vasculitis  * pt feels better, WBC normalized  * f/u the final blood and urine cx  * c/w zosyn for now and will adjust as per the cultures  * monitor CBC/diff and renal function    The above assessment and plan was discussed with the primary team    Kelly Freedman MD  contact on teams  After 5pm and on weekends call [phone]

## 2023-08-18 LAB
-  AMIKACIN: SIGNIFICANT CHANGE UP
-  AMPICILLIN/SULBACTAM: SIGNIFICANT CHANGE UP
-  AMPICILLIN: SIGNIFICANT CHANGE UP
-  AZTREONAM: SIGNIFICANT CHANGE UP
-  CEFAZOLIN: SIGNIFICANT CHANGE UP
-  CEFEPIME: SIGNIFICANT CHANGE UP
-  CEFOXITIN: SIGNIFICANT CHANGE UP
-  CEFTRIAXONE: SIGNIFICANT CHANGE UP
-  CIPROFLOXACIN: SIGNIFICANT CHANGE UP
-  ERTAPENEM: SIGNIFICANT CHANGE UP
-  GENTAMICIN: SIGNIFICANT CHANGE UP
-  IMIPENEM: SIGNIFICANT CHANGE UP
-  LEVOFLOXACIN: SIGNIFICANT CHANGE UP
-  MEROPENEM: SIGNIFICANT CHANGE UP
-  PIPERACILLIN/TAZOBACTAM: SIGNIFICANT CHANGE UP
-  TOBRAMYCIN: SIGNIFICANT CHANGE UP
-  TRIMETHOPRIM/SULFAMETHOXAZOLE: SIGNIFICANT CHANGE UP
ALBUMIN SERPL ELPH-MCNC: 3 G/DL — LOW (ref 3.3–5)
ALP SERPL-CCNC: 75 U/L — SIGNIFICANT CHANGE UP (ref 40–120)
ALT FLD-CCNC: 48 U/L — HIGH (ref 10–45)
ANION GAP SERPL CALC-SCNC: 16 MMOL/L — SIGNIFICANT CHANGE UP (ref 5–17)
APPEARANCE UR: CLEAR — SIGNIFICANT CHANGE UP
AST SERPL-CCNC: 53 U/L — HIGH (ref 10–40)
BACTERIA # UR AUTO: NEGATIVE — SIGNIFICANT CHANGE UP
BILIRUB SERPL-MCNC: 0.9 MG/DL — SIGNIFICANT CHANGE UP (ref 0.2–1.2)
BILIRUB UR-MCNC: NEGATIVE — SIGNIFICANT CHANGE UP
BUN SERPL-MCNC: 16 MG/DL — SIGNIFICANT CHANGE UP (ref 7–23)
CALCIUM SERPL-MCNC: 8.4 MG/DL — SIGNIFICANT CHANGE UP (ref 8.4–10.5)
CHLORIDE SERPL-SCNC: 101 MMOL/L — SIGNIFICANT CHANGE UP (ref 96–108)
CO2 SERPL-SCNC: 23 MMOL/L — SIGNIFICANT CHANGE UP (ref 22–31)
COD CRY URNS QL: ABNORMAL
COLOR SPEC: YELLOW — SIGNIFICANT CHANGE UP
CREAT SERPL-MCNC: 0.89 MG/DL — SIGNIFICANT CHANGE UP (ref 0.5–1.3)
CULTURE RESULTS: SIGNIFICANT CHANGE UP
CULTURE RESULTS: SIGNIFICANT CHANGE UP
DIFF PNL FLD: ABNORMAL
EGFR: 67 ML/MIN/1.73M2 — SIGNIFICANT CHANGE UP
EPI CELLS # UR: 1 /HPF — SIGNIFICANT CHANGE UP
GLUCOSE SERPL-MCNC: 75 MG/DL — SIGNIFICANT CHANGE UP (ref 70–99)
GLUCOSE UR QL: NEGATIVE — SIGNIFICANT CHANGE UP
HCT VFR BLD CALC: 32.3 % — LOW (ref 34.5–45)
HGB BLD-MCNC: 10.4 G/DL — LOW (ref 11.5–15.5)
HYALINE CASTS # UR AUTO: 1 /LPF — SIGNIFICANT CHANGE UP (ref 0–2)
KETONES UR-MCNC: NEGATIVE — SIGNIFICANT CHANGE UP
LEUKOCYTE ESTERASE UR-ACNC: ABNORMAL
MCHC RBC-ENTMCNC: 29.7 PG — SIGNIFICANT CHANGE UP (ref 27–34)
MCHC RBC-ENTMCNC: 32.2 GM/DL — SIGNIFICANT CHANGE UP (ref 32–36)
MCV RBC AUTO: 92.3 FL — SIGNIFICANT CHANGE UP (ref 80–100)
METHOD TYPE: SIGNIFICANT CHANGE UP
NITRITE UR-MCNC: NEGATIVE — SIGNIFICANT CHANGE UP
NRBC # BLD: 0 /100 WBCS — SIGNIFICANT CHANGE UP (ref 0–0)
ORGANISM # SPEC MICROSCOPIC CNT: SIGNIFICANT CHANGE UP
PH UR: 6 — SIGNIFICANT CHANGE UP (ref 5–8)
PLATELET # BLD AUTO: 113 K/UL — LOW (ref 150–400)
POTASSIUM SERPL-MCNC: 3.6 MMOL/L — SIGNIFICANT CHANGE UP (ref 3.5–5.3)
POTASSIUM SERPL-SCNC: 3.6 MMOL/L — SIGNIFICANT CHANGE UP (ref 3.5–5.3)
PROT SERPL-MCNC: 5.6 G/DL — LOW (ref 6–8.3)
PROT UR-MCNC: ABNORMAL
RBC # BLD: 3.5 M/UL — LOW (ref 3.8–5.2)
RBC # FLD: 14.8 % — HIGH (ref 10.3–14.5)
RBC CASTS # UR COMP ASSIST: 1 /HPF — SIGNIFICANT CHANGE UP (ref 0–4)
SODIUM SERPL-SCNC: 140 MMOL/L — SIGNIFICANT CHANGE UP (ref 135–145)
SP GR SPEC: 1.03 — HIGH (ref 1.01–1.02)
SPECIMEN SOURCE: SIGNIFICANT CHANGE UP
SPECIMEN SOURCE: SIGNIFICANT CHANGE UP
UROBILINOGEN FLD QL: ABNORMAL
WBC # BLD: 12.21 K/UL — HIGH (ref 3.8–10.5)
WBC # FLD AUTO: 12.21 K/UL — HIGH (ref 3.8–10.5)
WBC UR QL: 9 /HPF — HIGH (ref 0–5)

## 2023-08-18 PROCEDURE — 99232 SBSQ HOSP IP/OBS MODERATE 35: CPT

## 2023-08-18 RX ADMIN — SOLIFENACIN SUCCINATE 10 MILLIGRAM(S): 10 TABLET ORAL at 13:08

## 2023-08-18 RX ADMIN — SERTRALINE 50 MILLIGRAM(S): 25 TABLET, FILM COATED ORAL at 13:08

## 2023-08-18 RX ADMIN — Medication 650 MILLIGRAM(S): at 22:30

## 2023-08-18 RX ADMIN — APIXABAN 2.5 MILLIGRAM(S): 2.5 TABLET, FILM COATED ORAL at 05:48

## 2023-08-18 RX ADMIN — Medication 650 MILLIGRAM(S): at 14:30

## 2023-08-18 RX ADMIN — APIXABAN 2.5 MILLIGRAM(S): 2.5 TABLET, FILM COATED ORAL at 17:37

## 2023-08-18 RX ADMIN — PIPERACILLIN AND TAZOBACTAM 25 GRAM(S): 4; .5 INJECTION, POWDER, LYOPHILIZED, FOR SOLUTION INTRAVENOUS at 21:51

## 2023-08-18 RX ADMIN — Medication 5 MILLIGRAM(S): at 13:08

## 2023-08-18 RX ADMIN — Medication 650 MILLIGRAM(S): at 06:01

## 2023-08-18 RX ADMIN — PANTOPRAZOLE SODIUM 40 MILLIGRAM(S): 20 TABLET, DELAYED RELEASE ORAL at 05:48

## 2023-08-18 RX ADMIN — PIPERACILLIN AND TAZOBACTAM 25 GRAM(S): 4; .5 INJECTION, POWDER, LYOPHILIZED, FOR SOLUTION INTRAVENOUS at 13:08

## 2023-08-18 RX ADMIN — Medication 650 MILLIGRAM(S): at 21:57

## 2023-08-18 RX ADMIN — ATORVASTATIN CALCIUM 20 MILLIGRAM(S): 80 TABLET, FILM COATED ORAL at 21:50

## 2023-08-18 RX ADMIN — PIPERACILLIN AND TAZOBACTAM 25 GRAM(S): 4; .5 INJECTION, POWDER, LYOPHILIZED, FOR SOLUTION INTRAVENOUS at 05:48

## 2023-08-18 RX ADMIN — Medication 650 MILLIGRAM(S): at 13:17

## 2023-08-18 RX ADMIN — Medication 30 MILLILITER(S): at 20:04

## 2023-08-18 NOTE — PROGRESS NOTE ADULT - ASSESSMENT
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 5, went [place] [facility] [place] as [place] had fever, rigors, generalized weakness, there blood cx was done which showed GNR  CT 8/15 s/o R pyleo  here febrile, WBC: 17, no focal symptoms    fever, leukocytosis, sepsis with E-coli bacteremia, CT 8/15 showed R pyelonephritis and ureteritis with perinephric stranding, also a R renal cyst, u/s here with no abscess or hydro  h/o lymphoma, breast ca and CNS vasculitis  now with urinary retention  * E-coli is R to amp, bactrim, genta, I to cefazolin  * please sent urinalysis and urine cx  * repeat blood cx  * c/w zosyn for now but for discharge will switch to levo 500 qd to complete a 10 day course  * monitor CBC/diff and renal function    The above assessment and plan was discussed with the primary team    Kelly Freedman MD  contact on teams  After 5pm and on weekends call [phone]

## 2023-08-18 NOTE — PROGRESS NOTE ADULT - ASSESSMENT
Patient is a 78y old  Female who presents with a chief complaint of fever + generalized weakness/fatigue/malaise     Breast Cancer  --under the care of Chantale Bailey of [facility] [place]  --pT1aN0 %, [place] 70% well differentiated  invasive ductal carcinoma of L breast  --s/p L lumpectomy 9/16/22  --History ADH s/p right breast excision + tamoxifen x 5 years  --on anastrazole, continue while inpatient  --ongoing care after discharge    UTI, bacteremia  --outpatient urinalysis +UTI  --outpatient blood cultures positive for E. 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

## 2023-08-18 NOTE — DIETITIAN INITIAL EVALUATION ADULT - PROBLEM SELECTOR PLAN 2
ct showing. "A few new geographic foci of parenchymal hypoenhancement in the right kidney suspicious for pyelonephritis. Mild stranding in the right perinephric fat. Mild hyperenhancement of the urothelium in the right renal pelvis and right ureter suspicious for ascending urinary tract infection. No obstructing ureteral calculus. No hydronephrosis."  s/p 1 L lr bolus in [place] [facility]  follow up ua; blood and urine cultures; renal/bladder us; freq bladder scan  monitor for fever, changes in white count  cont broad spec empirical abx therapy w zosyn; deescalate/adjust according to final c+s/clin improvement  antipyretics, antiemetics, analgesics as needed  encourage po intake; judicious ivf + lytes as needed in the mean time

## 2023-08-18 NOTE — DIETITIAN INITIAL EVALUATION ADULT - OTHER INFO
Weight: pt reports weight stable for ~ 4-5 years, reports it usually is around 160 +/- 2 lbs. Current dosing weight is 159.7lbs.

## 2023-08-18 NOTE — DIETITIAN INITIAL EVALUATION ADULT - ORAL INTAKE PTA/DIET HISTORY
Pt reports good PO intake and appetite PTA, consumes regular diet. NKFA. Pt denies chewing/swallowing difficulty, vomiting, diarrhea, constipation, noted with some nausea.

## 2023-08-18 NOTE — DIETITIAN INITIAL EVALUATION ADULT - PROBLEM SELECTOR PLAN 10
h/o c/b r tib-fib fracture s/p orif + c/b t12 vcf  outpatient dexa scans and endo documentation reviewed  ct showing Unchanged severe T12 and mild T5 compression deformities.  cont home prolia upon discharge  cont home vit d supplementation  outpatient endo follow up upon discharge

## 2023-08-18 NOTE — DIETITIAN INITIAL EVALUATION ADULT - PROBLEM SELECTOR PLAN 1
Febrile, leukocytosis, + lactic acidosis; meets severe sepsis criteria  otherwise, hds   source based on outpatient work up thus far, complicated uti  s/p 1 L LR in er + zosyn at [facility] [place] [facility]  trend lactate q4-6h until wnl  follow up urine and blood cultures   Monitor for fever, changes in white count  broad spec empirical abx therapy as described below  ivf resusci + lytes as needed.

## 2023-08-18 NOTE — DIETITIAN INITIAL EVALUATION ADULT - ADD RECOMMEND
1) Continue current duet as tolerated. Monitor need for addition diet modifiers. 2) Encourage PO intake of protein-rich foods. RD to add greek yogurt. 3) Diet education provided, reinforce as needed. 4) RD to remain available and follow-up as medically appropriate.

## 2023-08-18 NOTE — DIETITIAN INITIAL EVALUATION ADULT - REASON FOR ADMISSION
Sepsis    Chart reviewed, events noted. 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

## 2023-08-18 NOTE — DIETITIAN INITIAL EVALUATION ADULT - PERTINENT LABORATORY DATA
08-18    140  |  101  |  16  ----------------------------<  75  3.6   |  23  |  0.89    Ca    8.4      18 Aug 2023 09:45    TPro  5.6<L>  /  Alb  3.0<L>  /  TBili  0.9  /  DBili  x   /  AST  53<H>  /  ALT  48<H>  /  AlkPhos  75  08-18  A1C with Estimated Average Glucose Result: 6.2 % (08-16-23 @ 07:08)

## 2023-08-18 NOTE — PROGRESS NOTE ADULT - ASSESSMENT
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   - Febrile, leukocytosis, + lactic acidosis   - [place] from [facility] [place]/onc with Gram Neg rods, E. Coli; Repeat BCx w/ E. Coli.   - S/P IVF in ER and Zosyn at [facility] [place]   - Lactate now down-trended to WNL   - 8/15 BCx2 --> w/ E. coli. On Zosyn --> Sensitive. Repeat Cx 8/18 --> In lab   - F/u UCx --> in lab   - C/w Zosyn for ABX for now   - Monitor CBC, temp curve, VS and patient closely   - ID eval appreciated; F/u recs   - Up-trend in WBC, cont to monitor and trend     Complicated UTI (urinary tract infection) / Pyelonephritis   - Outpatient CT with  "A few new geographic foci of parenchymal hypoenhancement in the right kidney suspicious for pyelonephritis. Mild stranding in the right perinephric fat. Mild hyperenhancement of the urothelium in the right renal pelvis and right ureter suspicious for ascending urinary tract infection. No obstructing ureteral calculus. No hydronephrosis."  - BCx from [facility] [place] with Gram Neg rods, E. Coli   - UA +; F/u UCx --> In lab   - C/w Zosyn for now   - ID eval appreciated; F/u recs   - Kidney/ bladder US -->  Normal renal US  - Check bladder scan to R/O retention   - Monitor CBC, temp curve, VS and patient closely    Elevated LFTs  - Cont to monitor and trend CMP  - If up-trending, will consider ABD US at that time    Increased endometrial stripe thickness.   - CT with reported increased endometrial thickness measuring 1.3 cm, previously 0.5 cm  - TVUS/pelvic US with WNL thickness of endometrium. Fluid in endometrial cavity/ canal --> F/u heme/onc recs and F/u with GYN outpatient    - Heme/Onc follow up     H/O venous thrombosis and embolism.   - H/O DVT/ Bilat PE in the past  - C/w home Eliquis  - LE Duplex -- Neg  - TTE -- EF of 63%, Mod grade II diastolic dysfunction , Moderate to Severe AS --> F/u Vascular cardio recs; Monitor Volume Status   - Monitor on tele  - Vascular cardio eval     Moderate to Severe AS  - TTE -- EF of 63%, Mod grade II diastolic dysfunction , Moderate to Severe AS --> F/u Vascular cardio recs  - Monitor Volume Status   - Diuretics PRN    CVA (cerebrovascular accident).   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  - Fall, Aspiration precautions     HLD (hyperlipidemia).   - Cont home lipitor.  - Lipid panel noted     GERD (gastroesophageal reflux disease).   - Cont home dexilant (switched to protonix while inhouse).    Anxiety and depression.   - Cont home zoloft.    Overactive bladder.   - Cont home vesicare  - Outpatient urogyn follow up upon discharge.    Osteoporosis.   - H/O c/b r tib-fib fracture s/p orif + c/b t12 vcf  - CT with Unchanged severe T12 and mild T5 compression deformities.  - Cont home prolia upon discharge  - Cont home vit d supplementation  - Outpatient endo follow up upon discharge.    Breast cancer.   - H/O L breast ca s/p l lumpectomy + anastrazole, r breast adh s/p excision + tamoxifen x5yrs  - Cont home anastrazole  - Heme/Onc consulted; F/u recs    Pre-DM  - A1C of 6.2  - Diet and lifestyle modifications  - RD consult    PPX    Discussed with Attending and ACP. Discussed with Heme/Onc and ID. Discussed with patient and her  at the bedside.

## 2023-08-18 NOTE — DIETITIAN INITIAL EVALUATION ADULT - NSFNSADHERENCEPTAFT_GEN_A_CORE
Pt with pre-diabetes, pt aware, states HbA1c used to be ~ 6.4%, reports getting it to ~ 6% by reducing carbohydrates in the diet. Current HbA1c: 6.2%. Pt also on chronic prednisone.  Pt with pre-diabetes, pt aware, admits to carbohydrate heavy diet, states she was trying to reduce carbohydrates in the diet PTA. Current HbA1c: 6.2%. Pt also on chronic prednisone.

## 2023-08-18 NOTE — DIETITIAN INITIAL EVALUATION ADULT - ENERGY INTAKE
Fair (50-75%) In-house pt reports fair PO intake due to decreased appetite, states breakfast is her best meal. States she has been consuming smaller more frequent meals. Reports "cotton mouth" states water and other liquids haven't been helping but has found some relief with fruit (oranges). Denies any nausea currently, oordered for Zofran. Encourage PO intake of protein-rich foods. Pt with menu, reviewed menu ordering procedures. Diet education provided, reinforce as needed.

## 2023-08-18 NOTE — DIETITIAN INITIAL EVALUATION ADULT - PERTINENT MEDS FT
MEDICATIONS  (STANDING):  anastrozole 1 milliGRAM(s) Oral daily  apixaban 2.5 milliGRAM(s) Oral every 12 hours  atorvastatin 20 milliGRAM(s) Oral at bedtime  pantoprazole    Tablet 40 milliGRAM(s) Oral before breakfast  piperacillin/tazobactam IVPB.. 3.375 Gram(s) IV Intermittent every 8 hours  predniSONE   Tablet 5 milliGRAM(s) Oral every other day  sertraline 50 milliGRAM(s) Oral daily  sodium chloride 0.9%. 1000 milliLiter(s) (100 mL/Hr) IV Continuous <Continuous>  solifenacin 10 milliGRAM(s) Oral daily    MEDICATIONS  (PRN):  acetaminophen     Tablet .. 650 milliGRAM(s) Oral every 6 hours PRN Temp greater or equal to 38C (100.4F), Mild Pain (1 - 3)  aluminum hydroxide/magnesium hydroxide/simethicone Suspension 30 milliLiter(s) Oral every 4 hours PRN Dyspepsia  melatonin 3 milliGRAM(s) Oral at bedtime PRN Insomnia  ondansetron Injectable 4 milliGRAM(s) IV Push every 8 hours PRN Nausea and/or Vomiting

## 2023-08-18 NOTE — DIETITIAN INITIAL EVALUATION ADULT - PROBLEM/PLAN-6
Bedside and Verbal shift change report given to Vinita Jarvis and Alejandrina Huerta RN (oncoming nurse) by Sonja Spivey RN (offgoing nurse). Report included the following information SBAR, Kardex, Intake/Output, MAR and Recent Results. DISPLAY PLAN FREE TEXT

## 2023-08-19 LAB
ALBUMIN SERPL ELPH-MCNC: 2.6 G/DL — LOW (ref 3.3–5)
ALP SERPL-CCNC: 86 U/L — SIGNIFICANT CHANGE UP (ref 40–120)
ALT FLD-CCNC: 64 U/L — HIGH (ref 10–45)
ANION GAP SERPL CALC-SCNC: 14 MMOL/L — SIGNIFICANT CHANGE UP (ref 5–17)
AST SERPL-CCNC: 76 U/L — HIGH (ref 10–40)
BILIRUB SERPL-MCNC: 0.6 MG/DL — SIGNIFICANT CHANGE UP (ref 0.2–1.2)
BUN SERPL-MCNC: 18 MG/DL — SIGNIFICANT CHANGE UP (ref 7–23)
CALCIUM SERPL-MCNC: 8.6 MG/DL — SIGNIFICANT CHANGE UP (ref 8.4–10.5)
CHLORIDE SERPL-SCNC: 103 MMOL/L — SIGNIFICANT CHANGE UP (ref 96–108)
CO2 SERPL-SCNC: 23 MMOL/L — SIGNIFICANT CHANGE UP (ref 22–31)
CREAT SERPL-MCNC: 0.76 MG/DL — SIGNIFICANT CHANGE UP (ref 0.5–1.3)
CULTURE RESULTS: NO GROWTH — SIGNIFICANT CHANGE UP
CULTURE RESULTS: SIGNIFICANT CHANGE UP
EGFR: 81 ML/MIN/1.73M2 — SIGNIFICANT CHANGE UP
GLUCOSE SERPL-MCNC: 98 MG/DL — SIGNIFICANT CHANGE UP (ref 70–99)
GRAM STN FLD: SIGNIFICANT CHANGE UP
GRAM STN FLD: SIGNIFICANT CHANGE UP
HCT VFR BLD CALC: 31.7 % — LOW (ref 34.5–45)
HGB BLD-MCNC: 10.5 G/DL — LOW (ref 11.5–15.5)
MCHC RBC-ENTMCNC: 29.7 PG — SIGNIFICANT CHANGE UP (ref 27–34)
MCHC RBC-ENTMCNC: 33.1 GM/DL — SIGNIFICANT CHANGE UP (ref 32–36)
MCV RBC AUTO: 89.8 FL — SIGNIFICANT CHANGE UP (ref 80–100)
NRBC # BLD: 0 /100 WBCS — SIGNIFICANT CHANGE UP (ref 0–0)
ORGANISM # SPEC MICROSCOPIC CNT: SIGNIFICANT CHANGE UP
ORGANISM # SPEC MICROSCOPIC CNT: SIGNIFICANT CHANGE UP
PLATELET # BLD AUTO: 152 K/UL — SIGNIFICANT CHANGE UP (ref 150–400)
POTASSIUM SERPL-MCNC: 4.2 MMOL/L — SIGNIFICANT CHANGE UP (ref 3.5–5.3)
POTASSIUM SERPL-SCNC: 4.2 MMOL/L — SIGNIFICANT CHANGE UP (ref 3.5–5.3)
PROT SERPL-MCNC: 5.5 G/DL — LOW (ref 6–8.3)
RBC # BLD: 3.53 M/UL — LOW (ref 3.8–5.2)
RBC # FLD: 14.9 % — HIGH (ref 10.3–14.5)
SODIUM SERPL-SCNC: 140 MMOL/L — SIGNIFICANT CHANGE UP (ref 135–145)
SPECIMEN SOURCE: SIGNIFICANT CHANGE UP
WBC # BLD: 12.3 K/UL — HIGH (ref 3.8–10.5)
WBC # FLD AUTO: 12.3 K/UL — HIGH (ref 3.8–10.5)

## 2023-08-19 RX ORDER — CALCIUM CARBONATE 500(1250)
1 TABLET ORAL ONCE
Refills: 0 | Status: DISCONTINUED | OUTPATIENT
Start: 2023-08-19 | End: 2023-08-23

## 2023-08-19 RX ADMIN — ONDANSETRON 4 MILLIGRAM(S): 8 TABLET, FILM COATED ORAL at 01:13

## 2023-08-19 RX ADMIN — APIXABAN 2.5 MILLIGRAM(S): 2.5 TABLET, FILM COATED ORAL at 05:01

## 2023-08-19 RX ADMIN — Medication 30 MILLILITER(S): at 17:06

## 2023-08-19 RX ADMIN — PANTOPRAZOLE SODIUM 40 MILLIGRAM(S): 20 TABLET, DELAYED RELEASE ORAL at 05:01

## 2023-08-19 RX ADMIN — PIPERACILLIN AND TAZOBACTAM 25 GRAM(S): 4; .5 INJECTION, POWDER, LYOPHILIZED, FOR SOLUTION INTRAVENOUS at 05:02

## 2023-08-19 RX ADMIN — Medication 3 MILLIGRAM(S): at 01:13

## 2023-08-19 RX ADMIN — Medication 650 MILLIGRAM(S): at 21:37

## 2023-08-19 RX ADMIN — SOLIFENACIN SUCCINATE 10 MILLIGRAM(S): 10 TABLET ORAL at 11:20

## 2023-08-19 RX ADMIN — APIXABAN 2.5 MILLIGRAM(S): 2.5 TABLET, FILM COATED ORAL at 17:07

## 2023-08-19 RX ADMIN — SERTRALINE 50 MILLIGRAM(S): 25 TABLET, FILM COATED ORAL at 11:17

## 2023-08-19 RX ADMIN — ATORVASTATIN CALCIUM 20 MILLIGRAM(S): 80 TABLET, FILM COATED ORAL at 21:37

## 2023-08-19 RX ADMIN — PIPERACILLIN AND TAZOBACTAM 25 GRAM(S): 4; .5 INJECTION, POWDER, LYOPHILIZED, FOR SOLUTION INTRAVENOUS at 14:03

## 2023-08-19 RX ADMIN — PIPERACILLIN AND TAZOBACTAM 25 GRAM(S): 4; .5 INJECTION, POWDER, LYOPHILIZED, FOR SOLUTION INTRAVENOUS at 21:38

## 2023-08-19 NOTE — PROGRESS NOTE ADULT - ASSESSMENT
Patient is a 78y old  Female who presents with a chief complaint of fever + generalized weakness/fatigue/malaise     Breast Cancer  --under the care of Chantale Bailey of [facility] [place]  --pT1aN0 %, [place] 70% well differentiated  invasive ductal carcinoma of L breast  --s/p L lumpectomy 9/16/22  --History ADH s/p right breast excision + tamoxifen x 5 years  --on anastrazole, continue while inpatient  --ongoing care after discharge    UTI, bacteremia  --outpatient urinalysis +UTI  --outpatient blood cultures positive for E. 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

## 2023-08-19 NOTE — PROGRESS NOTE ADULT - ASSESSMENT
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   - Febrile, leukocytosis, + lactic acidosis   - BC from [facility] [place] per heme/onc with Gram Neg rods, E. Coli; Repeat BCx w/ E. Coli.   - S/P IVF in ER and Zosyn at [facility]   - Lactate now down-trended to WNL   - 8/15 BCx2 --> w/ E. coli. On Zosyn --> Sensitive. Repeat Cx 8/18 --> In lab   - F/u UCx --> in lab   - C/w Zosyn for ABX for now   - Monitor CBC, temp curve, VS and patient closely   - ID eval appreciated; F/u recs   - Up-trend in WBC, cont to monitor and trend     Complicated UTI (urinary tract infection) / Pyelonephritis   - Outpatient CT with  "A few new geographic foci of parenchymal hypoenhancement in the right kidney suspicious for pyelonephritis. Mild stranding in the right perinephric fat. Mild hyperenhancement of the urothelium in the right renal pelvis and right ureter suspicious for ascending urinary tract infection. No obstructing ureteral calculus. No hydronephrosis."  - BCx from [facility] with Gram Neg rods, E. Coli   - UA +; F/u UCx --> In lab   - C/w Zosyn for now   - ID eval appreciated; F/u recs   - Kidney/ bladder US -->  Normal renal US  - Check bladder scan to R/O retention   - Monitor CBC, temp curve, VS and patient closely    Elevated LFTs  - Cont to monitor and trend CMP  - If up-trending, will consider ABD US at that time    Increased endometrial stripe thickness.   - CT with reported increased endometrial thickness measuring 1.3 cm, previously 0.5 cm  - TVUS/pelvic US with WNL thickness of endometrium. Fluid in endometrial cavity/ canal --> F/u heme/onc recs and F/u with GYN outpatient    - Heme/Onc follow up     H/O venous thrombosis and embolism.   - H/O DVT/ Bilat PE in the past  - C/w home Eliquis  - LE Duplex -- Neg  - TTE -- EF of 63%, Mod grade II diastolic dysfunction , Moderate to Severe AS --> F/u Vascular cardio recs; Monitor Volume Status   - Monitor on tele  - Vascular cardio eval     Moderate to Severe AS  - TTE -- EF of 63%, Mod grade II diastolic dysfunction , Moderate to Severe AS --> F/u Vascular cardio recs  - Monitor Volume Status   - Diuretics PRN    CVA (cerebrovascular accident).   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  - Fall, Aspiration precautions     HLD (hyperlipidemia).   - Cont home lipitor.  - Lipid panel noted     GERD (gastroesophageal reflux disease).   - Cont home dexilant (switched to protonix while inhouse).    Anxiety and depression.   - Cont home zoloft.    Overactive bladder.   - Cont home vesicare  - Outpatient urogyn follow up upon discharge.    Osteoporosis.   - H/O c/b r tib-fib fracture s/p orif + c/b t12 vcf  - CT with Unchanged severe T12 and mild T5 compression deformities.  - Cont home prolia upon discharge  - Cont home vit d supplementation  - Outpatient endo follow up upon discharge.    Breast cancer.   - H/O L breast ca s/p l lumpectomy + anastrazole, r breast adh s/p excision + tamoxifen x5yrs  - Cont home anastrazole  - Heme/Onc consulted; F/u recs    Pre-DM  - A1C of 6.2  - Diet and lifestyle modifications  - RD consult

## 2023-08-19 NOTE — PROVIDER CONTACT NOTE (CRITICAL VALUE NOTIFICATION) - TEST AND RESULT REPORTED:
Blood culture ordered 08/15/23 Growth in Aerobic bottle Gram Neg. Rods
blood cultures from 8/15 with growth  in the aerobic bottle
BC prelim growth in aerobic bottle, gram (-) rods

## 2023-08-19 NOTE — PROVIDER CONTACT NOTE (CRITICAL VALUE NOTIFICATION) - SITUATION
Blood Culture ordered 8/15/23, Growth Aerobic bottle Gram neg Rods.
blood cultures from 8/15 with growth  in the aerobic bottle
BC prelim growth in aerobic bottle, gram (-) rods

## 2023-08-20 LAB
ANION GAP SERPL CALC-SCNC: 12 MMOL/L — SIGNIFICANT CHANGE UP (ref 5–17)
BUN SERPL-MCNC: 16 MG/DL — SIGNIFICANT CHANGE UP (ref 7–23)
CALCIUM SERPL-MCNC: 8.4 MG/DL — SIGNIFICANT CHANGE UP (ref 8.4–10.5)
CHLORIDE SERPL-SCNC: 104 MMOL/L — SIGNIFICANT CHANGE UP (ref 96–108)
CO2 SERPL-SCNC: 24 MMOL/L — SIGNIFICANT CHANGE UP (ref 22–31)
CREAT SERPL-MCNC: 0.82 MG/DL — SIGNIFICANT CHANGE UP (ref 0.5–1.3)
EGFR: 74 ML/MIN/1.73M2 — SIGNIFICANT CHANGE UP
GLUCOSE SERPL-MCNC: 94 MG/DL — SIGNIFICANT CHANGE UP (ref 70–99)
HCT VFR BLD CALC: 33.8 % — LOW (ref 34.5–45)
HGB BLD-MCNC: 11.1 G/DL — LOW (ref 11.5–15.5)
MCHC RBC-ENTMCNC: 29.4 PG — SIGNIFICANT CHANGE UP (ref 27–34)
MCHC RBC-ENTMCNC: 32.8 GM/DL — SIGNIFICANT CHANGE UP (ref 32–36)
MCV RBC AUTO: 89.7 FL — SIGNIFICANT CHANGE UP (ref 80–100)
NRBC # BLD: 0 /100 WBCS — SIGNIFICANT CHANGE UP (ref 0–0)
PLATELET # BLD AUTO: 205 K/UL — SIGNIFICANT CHANGE UP (ref 150–400)
POTASSIUM SERPL-MCNC: 3.9 MMOL/L — SIGNIFICANT CHANGE UP (ref 3.5–5.3)
POTASSIUM SERPL-SCNC: 3.9 MMOL/L — SIGNIFICANT CHANGE UP (ref 3.5–5.3)
RBC # BLD: 3.77 M/UL — LOW (ref 3.8–5.2)
RBC # FLD: 14.9 % — HIGH (ref 10.3–14.5)
SODIUM SERPL-SCNC: 140 MMOL/L — SIGNIFICANT CHANGE UP (ref 135–145)
WBC # BLD: 10.26 K/UL — SIGNIFICANT CHANGE UP (ref 3.8–10.5)
WBC # FLD AUTO: 10.26 K/UL — SIGNIFICANT CHANGE UP (ref 3.8–10.5)

## 2023-08-20 RX ORDER — FLUTICASONE PROPIONATE 50 MCG
1 SPRAY, SUSPENSION NASAL
Refills: 0 | Status: DISCONTINUED | OUTPATIENT
Start: 2023-08-20 | End: 2023-08-23

## 2023-08-20 RX ADMIN — APIXABAN 2.5 MILLIGRAM(S): 2.5 TABLET, FILM COATED ORAL at 17:53

## 2023-08-20 RX ADMIN — PIPERACILLIN AND TAZOBACTAM 25 GRAM(S): 4; .5 INJECTION, POWDER, LYOPHILIZED, FOR SOLUTION INTRAVENOUS at 05:35

## 2023-08-20 RX ADMIN — PANTOPRAZOLE SODIUM 40 MILLIGRAM(S): 20 TABLET, DELAYED RELEASE ORAL at 05:35

## 2023-08-20 RX ADMIN — PIPERACILLIN AND TAZOBACTAM 25 GRAM(S): 4; .5 INJECTION, POWDER, LYOPHILIZED, FOR SOLUTION INTRAVENOUS at 14:43

## 2023-08-20 RX ADMIN — APIXABAN 2.5 MILLIGRAM(S): 2.5 TABLET, FILM COATED ORAL at 05:35

## 2023-08-20 RX ADMIN — SERTRALINE 50 MILLIGRAM(S): 25 TABLET, FILM COATED ORAL at 11:22

## 2023-08-20 RX ADMIN — Medication 1 SPRAY(S): at 03:38

## 2023-08-20 RX ADMIN — PIPERACILLIN AND TAZOBACTAM 25 GRAM(S): 4; .5 INJECTION, POWDER, LYOPHILIZED, FOR SOLUTION INTRAVENOUS at 21:30

## 2023-08-20 RX ADMIN — Medication 5 MILLIGRAM(S): at 11:22

## 2023-08-20 RX ADMIN — ATORVASTATIN CALCIUM 20 MILLIGRAM(S): 80 TABLET, FILM COATED ORAL at 21:30

## 2023-08-20 RX ADMIN — SOLIFENACIN SUCCINATE 10 MILLIGRAM(S): 10 TABLET ORAL at 11:22

## 2023-08-20 RX ADMIN — Medication 650 MILLIGRAM(S): at 05:36

## 2023-08-20 NOTE — PROGRESS NOTE ADULT - ASSESSMENT
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   - Febrile, leukocytosis, + lactic acidosis   - BC from [facility] per heme/onc with Gram Neg rods, E. Coli; Repeat BCx w/ E. Coli.   - S/P IVF in ER and Zosyn at [facility]   - Lactate now down-trended to WNL   - 8/15 BCx2 --> w/ E. coli. On Zosyn --> Sensitive. Repeat Cx 8/18 --> 1 of 2 + for E. coli --> Repeat BCx2 ordered for 8/21 AM   - F/u UCx --> Neg   - C/w Zosyn for ABX for now; Switch to PO Levaquin as per ID on DC to complete course    - Monitor CBC, temp curve, VS and patient closely   - ID eval appreciated; F/u recs     Complicated UTI (urinary tract infection) / Pyelonephritis   - Outpatient CT with  "A few new geographic foci of parenchymal hypoenhancement in the right kidney suspicious for pyelonephritis. Mild stranding in the right perinephric fat. Mild hyperenhancement of the urothelium in the right renal pelvis and right ureter suspicious for ascending urinary tract infection. No obstructing ureteral calculus. No hydronephrosis."  - BCx from [facility] with Gram Neg rods, E. Coli   - UA +; F/u UCx --> Neg   - C/w Zosyn for now   - ID eval appreciated; F/u recs   - Kidney/ bladder US -->  Normal renal US  - Check bladder scan to R/O retention   - Monitor CBC, temp curve, VS and patient closely    Elevated LFTs  - Cont to monitor and trend CMP  - Check ABD US   - Continue to monitor and trend LFTs     Increased endometrial stripe thickness.   - CT with reported increased endometrial thickness measuring 1.3 cm, previously 0.5 cm  - TVUS/pelvic US with WNL thickness of endometrium. Fluid in endometrial cavity/ canal --> F/u heme/onc recs and F/u with GYN outpatient    - Heme/Onc follow up     H/O venous thrombosis and embolism.   - H/O DVT/ Bilat PE in the past  - C/w home Eliquis  - LE Duplex -- Neg  - TTE -- EF of 63%, Mod grade II diastolic dysfunction , Moderate to Severe AS --> F/u Vascular cardio recs; Monitor Volume Status   - Monitor on tele  - Vascular cardio eval     Moderate to Severe AS  - TTE -- EF of 63%, Mod grade II diastolic dysfunction , Moderate to Severe AS --> F/u Vascular cardio recs  - Monitor Volume Status   - Diuretics PRN    CVA (cerebrovascular accident).   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  - Fall, Aspiration precautions     HLD (hyperlipidemia).   - Cont home lipitor.  - Lipid panel noted     GERD (gastroesophageal reflux disease).   - Cont home dexilant (switched to protonix while inhouse).    Anxiety and depression.   - Cont home zoloft.    Overactive bladder.   - Cont home vesicare  - Outpatient urogyn follow up upon discharge.    Osteoporosis.   - H/O c/b r tib-fib fracture s/p orif + c/b t12 vcf  - CT with Unchanged severe T12 and mild T5 compression deformities.  - Cont home prolia upon discharge  - Cont home vit d supplementation  - Outpatient endo follow up upon discharge.    Breast cancer.   - H/O L breast ca s/p l lumpectomy + anastrazole, r breast adh s/p excision + tamoxifen x5yrs  - Cont home anastrazole  - Heme/Onc consulted; F/u recs    Pre-DM  - A1C of 6.2  - Diet and lifestyle modifications  - RD consult    Discussed with Attending and ACP.

## 2023-08-20 NOTE — PROGRESS NOTE ADULT - ASSESSMENT
Patient is a 78y old  Female who presents with a chief complaint of fever + generalized weakness/fatigue/malaise     Breast Cancer  --under the care of Chantale Bailey of [facility] [place]  --pT1aN0 %, [place] 70% well differentiated  invasive ductal carcinoma of L breast  --s/p L lumpectomy 9/16/22  --History ADH s/p right breast excision + tamoxifen x 5 years  --on anastrazole, continue while inpatient  --ongoing care after discharge    UTI, bacteremia  --outpatient urinalysis +UTI  --outpatient blood cultures positive for E. 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 updated on clincal status     Conrado Mariano MD  HematologyOncology   O:

## 2023-08-21 LAB
ALBUMIN SERPL ELPH-MCNC: 2.9 G/DL — LOW (ref 3.3–5)
ALP SERPL-CCNC: 94 U/L — SIGNIFICANT CHANGE UP (ref 40–120)
ALT FLD-CCNC: 49 U/L — HIGH (ref 10–45)
ANION GAP SERPL CALC-SCNC: 13 MMOL/L — SIGNIFICANT CHANGE UP (ref 5–17)
AST SERPL-CCNC: 42 U/L — HIGH (ref 10–40)
BILIRUB SERPL-MCNC: 0.6 MG/DL — SIGNIFICANT CHANGE UP (ref 0.2–1.2)
BUN SERPL-MCNC: 15 MG/DL — SIGNIFICANT CHANGE UP (ref 7–23)
CALCIUM SERPL-MCNC: 8.3 MG/DL — LOW (ref 8.4–10.5)
CHLORIDE SERPL-SCNC: 105 MMOL/L — SIGNIFICANT CHANGE UP (ref 96–108)
CO2 SERPL-SCNC: 20 MMOL/L — LOW (ref 22–31)
CREAT SERPL-MCNC: 0.86 MG/DL — SIGNIFICANT CHANGE UP (ref 0.5–1.3)
EGFR: 70 ML/MIN/1.73M2 — SIGNIFICANT CHANGE UP
GLUCOSE SERPL-MCNC: 82 MG/DL — SIGNIFICANT CHANGE UP (ref 70–99)
HCT VFR BLD CALC: 32.4 % — LOW (ref 34.5–45)
HGB BLD-MCNC: 10.6 G/DL — LOW (ref 11.5–15.5)
MCHC RBC-ENTMCNC: 29.7 PG — SIGNIFICANT CHANGE UP (ref 27–34)
MCHC RBC-ENTMCNC: 32.7 GM/DL — SIGNIFICANT CHANGE UP (ref 32–36)
MCV RBC AUTO: 90.8 FL — SIGNIFICANT CHANGE UP (ref 80–100)
NRBC # BLD: 0 /100 WBCS — SIGNIFICANT CHANGE UP (ref 0–0)
PLATELET # BLD AUTO: 238 K/UL — SIGNIFICANT CHANGE UP (ref 150–400)
POTASSIUM SERPL-MCNC: 4.1 MMOL/L — SIGNIFICANT CHANGE UP (ref 3.5–5.3)
POTASSIUM SERPL-SCNC: 4.1 MMOL/L — SIGNIFICANT CHANGE UP (ref 3.5–5.3)
PROT SERPL-MCNC: 5.9 G/DL — LOW (ref 6–8.3)
RBC # BLD: 3.57 M/UL — LOW (ref 3.8–5.2)
RBC # FLD: 15.4 % — HIGH (ref 10.3–14.5)
SODIUM SERPL-SCNC: 138 MMOL/L — SIGNIFICANT CHANGE UP (ref 135–145)
WBC # BLD: 14.65 K/UL — HIGH (ref 3.8–10.5)
WBC # FLD AUTO: 14.65 K/UL — HIGH (ref 3.8–10.5)

## 2023-08-21 PROCEDURE — 76700 US EXAM ABDOM COMPLETE: CPT | Mod: 26

## 2023-08-21 PROCEDURE — 99233 SBSQ HOSP IP/OBS HIGH 50: CPT

## 2023-08-21 RX ADMIN — Medication 650 MILLIGRAM(S): at 01:49

## 2023-08-21 RX ADMIN — Medication 650 MILLIGRAM(S): at 12:15

## 2023-08-21 RX ADMIN — Medication 650 MILLIGRAM(S): at 20:54

## 2023-08-21 RX ADMIN — APIXABAN 2.5 MILLIGRAM(S): 2.5 TABLET, FILM COATED ORAL at 17:16

## 2023-08-21 RX ADMIN — SOLIFENACIN SUCCINATE 10 MILLIGRAM(S): 10 TABLET ORAL at 12:14

## 2023-08-21 RX ADMIN — PANTOPRAZOLE SODIUM 40 MILLIGRAM(S): 20 TABLET, DELAYED RELEASE ORAL at 06:10

## 2023-08-21 RX ADMIN — ATORVASTATIN CALCIUM 20 MILLIGRAM(S): 80 TABLET, FILM COATED ORAL at 21:22

## 2023-08-21 RX ADMIN — Medication 3 MILLIGRAM(S): at 21:23

## 2023-08-21 RX ADMIN — PIPERACILLIN AND TAZOBACTAM 25 GRAM(S): 4; .5 INJECTION, POWDER, LYOPHILIZED, FOR SOLUTION INTRAVENOUS at 21:22

## 2023-08-21 RX ADMIN — SERTRALINE 50 MILLIGRAM(S): 25 TABLET, FILM COATED ORAL at 12:14

## 2023-08-21 RX ADMIN — Medication 650 MILLIGRAM(S): at 19:54

## 2023-08-21 RX ADMIN — APIXABAN 2.5 MILLIGRAM(S): 2.5 TABLET, FILM COATED ORAL at 06:10

## 2023-08-21 RX ADMIN — PIPERACILLIN AND TAZOBACTAM 25 GRAM(S): 4; .5 INJECTION, POWDER, LYOPHILIZED, FOR SOLUTION INTRAVENOUS at 06:10

## 2023-08-21 RX ADMIN — PIPERACILLIN AND TAZOBACTAM 25 GRAM(S): 4; .5 INJECTION, POWDER, LYOPHILIZED, FOR SOLUTION INTRAVENOUS at 14:16

## 2023-08-21 NOTE — PROGRESS NOTE ADULT - ASSESSMENT
Patient is a 78y old  Female who presents with a chief complaint of fever + generalized weakness/fatigue/malaise     Breast Cancer  [place] [facility] [place]  --pT1aN0 %, [place] 70% well differentiated  invasive ductal carcinoma of L breast  --s/p L lumpectomy 9/16/22  --History ADH s/p right breast excision + tamoxifen x 5 years  --on anastrazole, continue while inpatient  --ongoing care after discharge    UTI, bacteremia  --outpatient urinalysis +UTI  --outpatient blood cultures positive for E. 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, CSIII  - S/p RCHOP x 6 5/2011 with CR    History of b/l PE and DVT  - On eliquis since 2019, cont for  now  - LE duplex neg    Rufino Mancuso PA-C  Hematology/Oncology  [facility]  [phone] (office) Patient is a 78y old  Female who presents with a chief complaint of fever + generalized weakness/fatigue/malaise     Breast Cancer  --under the care of Chantale Bailey of [facility] [place]  --pT1aN0 %, [place] 70% well differentiated  invasive ductal carcinoma of L breast  --s/p L lumpectomy 9/16/22  --History ADH s/p right breast excision + tamoxifen x 5 years  --on anastrazole, continue while inpatient  --ongoing care after discharge    UTI, bacteremia  --outpatient urinalysis +UTI  --outpatient blood cultures positive for E. 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    - F/u further recs    Rufino Mancuso PA-C  Hematology/Oncology  [facility]  [phone] (office)

## 2023-08-21 NOTE — PROGRESS NOTE ADULT - ASSESSMENT
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   - Febrile, leukocytosis, + lactic acidosis   - BC from [facility] [place] [place]/onc with Gram Neg rods, E. Coli; Repeat BCx w/ E. Coli.   - S/P IVF in ER and Zosyn at [facility] [place]   - Lactate now down-trended to WNL   - 8/15 BCx2 --> w/ E. coli. On Zosyn --> Sensitive. Repeat Cx 8/18 --> 1 of 2 + for E. coli --> Repeat BCx2 8/21 AM --> In lab   - F/u UCx --> Neg   - C/w Zosyn for ABX for now; Switch to PO Levaquin as per ID on DC to complete course    - Monitor CBC, temp curve, VS and patient closely   - ID eval appreciated; F/u recs   - Up-trending WBC, cont to monitor and trend in AM     Complicated UTI (urinary tract infection) / Pyelonephritis   - Outpatient CT with  "A few new geographic foci of parenchymal hypoenhancement in the right kidney suspicious for pyelonephritis. Mild stranding in the right perinephric fat. Mild hyperenhancement of the urothelium in the right renal pelvis and right ureter suspicious for ascending urinary tract infection. No obstructing ureteral calculus. No hydronephrosis."  - BCx from [facility] with Gram Neg rods, E. Coli   - UA +; F/u UCx --> Neg   - C/w Zosyn for now   - ID eval appreciated; F/u recs   - Kidney/ bladder US -->  Normal renal US  - Check bladder scan to R/O retention   - Monitor CBC, temp curve, VS and patient closely    Elevated LFTs  - Cont to monitor and trend CMP  - ABD US --> W/ Hepatic steatosis, concern for renal neoplasm --> F/u MR for further assessment   - Continue to monitor and trend LFTs     Increased endometrial stripe thickness.   - CT with reported increased endometrial thickness measuring 1.3 cm, previously 0.5 cm  - TVUS/pelvic US with WNL thickness of endometrium. Fluid in endometrial cavity/ canal --> F/u heme/onc recs and F/u with GYN outpatient    - Heme/Onc follow up     H/O venous thrombosis and embolism.   - H/O DVT/ Bilat PE in the past  - C/w home Eliquis  - LE Duplex -- Neg  - TTE -- EF of 63%, Mod grade II diastolic dysfunction , Moderate to Severe AS --> F/u Vascular cardio recs; Monitor Volume Status   - Monitor on tele  - Vascular cardio eval     Moderate to Severe AS  - TTE -- EF of 63%, Mod grade II diastolic dysfunction , Moderate to Severe AS --> F/u Vascular cardio recs  - Monitor Volume Status   - Diuretics PRN    CVA (cerebrovascular accident).   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  - Fall, Aspiration precautions     HLD (hyperlipidemia).   - Cont home lipitor.  - Lipid panel noted     GERD (gastroesophageal reflux disease).   - Cont home dexilant (switched to protonix while inhouse).    Anxiety and depression.   - Cont home zoloft.    Overactive bladder.   - Cont home vesicare  - Outpatient urogyn follow up upon discharge.    Osteoporosis.   - H/O c/b r tib-fib fracture s/p orif + c/b t12 vcf  - CT with Unchanged severe T12 and mild T5 compression deformities.  - Cont home prolia upon discharge  - Cont home vit d supplementation  - Outpatient endo follow up upon discharge.    Breast cancer.   - H/O L breast ca s/p l lumpectomy + anastrazole, r breast adh s/p excision + tamoxifen x5yrs  - Cont home anastrazole  - Heme/Onc consulted; F/u recs    Pre-DM  - A1C of 6.2  - Diet and lifestyle modifications  - RD consult    Discussed with Attending and ACP. Discussed with  at the bedside.

## 2023-08-22 LAB
ALBUMIN SERPL ELPH-MCNC: 3.2 G/DL — LOW (ref 3.3–5)
ALP SERPL-CCNC: 101 U/L — SIGNIFICANT CHANGE UP (ref 40–120)
ALT FLD-CCNC: 45 U/L — SIGNIFICANT CHANGE UP (ref 10–45)
ANION GAP SERPL CALC-SCNC: 11 MMOL/L — SIGNIFICANT CHANGE UP (ref 5–17)
AST SERPL-CCNC: 38 U/L — SIGNIFICANT CHANGE UP (ref 10–40)
BILIRUB SERPL-MCNC: 0.8 MG/DL — SIGNIFICANT CHANGE UP (ref 0.2–1.2)
BUN SERPL-MCNC: 13 MG/DL — SIGNIFICANT CHANGE UP (ref 7–23)
CALCIUM SERPL-MCNC: 8.7 MG/DL — SIGNIFICANT CHANGE UP (ref 8.4–10.5)
CHLORIDE SERPL-SCNC: 106 MMOL/L — SIGNIFICANT CHANGE UP (ref 96–108)
CO2 SERPL-SCNC: 21 MMOL/L — LOW (ref 22–31)
CREAT SERPL-MCNC: 0.77 MG/DL — SIGNIFICANT CHANGE UP (ref 0.5–1.3)
EGFR: 79 ML/MIN/1.73M2 — SIGNIFICANT CHANGE UP
GLUCOSE SERPL-MCNC: 101 MG/DL — HIGH (ref 70–99)
HCT VFR BLD CALC: 34.3 % — LOW (ref 34.5–45)
HGB BLD-MCNC: 11.2 G/DL — LOW (ref 11.5–15.5)
MCHC RBC-ENTMCNC: 29.5 PG — SIGNIFICANT CHANGE UP (ref 27–34)
MCHC RBC-ENTMCNC: 32.7 GM/DL — SIGNIFICANT CHANGE UP (ref 32–36)
MCV RBC AUTO: 90.3 FL — SIGNIFICANT CHANGE UP (ref 80–100)
NRBC # BLD: 0 /100 WBCS — SIGNIFICANT CHANGE UP (ref 0–0)
PLATELET # BLD AUTO: 282 K/UL — SIGNIFICANT CHANGE UP (ref 150–400)
POTASSIUM SERPL-MCNC: 4.3 MMOL/L — SIGNIFICANT CHANGE UP (ref 3.5–5.3)
POTASSIUM SERPL-SCNC: 4.3 MMOL/L — SIGNIFICANT CHANGE UP (ref 3.5–5.3)
PROT SERPL-MCNC: 6.2 G/DL — SIGNIFICANT CHANGE UP (ref 6–8.3)
RBC # BLD: 3.8 M/UL — SIGNIFICANT CHANGE UP (ref 3.8–5.2)
RBC # FLD: 15.5 % — HIGH (ref 10.3–14.5)
SODIUM SERPL-SCNC: 138 MMOL/L — SIGNIFICANT CHANGE UP (ref 135–145)
WBC # BLD: 15.72 K/UL — HIGH (ref 3.8–10.5)
WBC # FLD AUTO: 15.72 K/UL — HIGH (ref 3.8–10.5)

## 2023-08-22 PROCEDURE — 99233 SBSQ HOSP IP/OBS HIGH 50: CPT | Mod: GC

## 2023-08-22 PROCEDURE — 74183 MRI ABD W/O CNTR FLWD CNTR: CPT | Mod: 26

## 2023-08-22 RX ADMIN — ATORVASTATIN CALCIUM 20 MILLIGRAM(S): 80 TABLET, FILM COATED ORAL at 21:35

## 2023-08-22 RX ADMIN — APIXABAN 2.5 MILLIGRAM(S): 2.5 TABLET, FILM COATED ORAL at 05:04

## 2023-08-22 RX ADMIN — SERTRALINE 50 MILLIGRAM(S): 25 TABLET, FILM COATED ORAL at 12:12

## 2023-08-22 RX ADMIN — Medication 1 MILLIGRAM(S): at 14:54

## 2023-08-22 RX ADMIN — ONDANSETRON 4 MILLIGRAM(S): 8 TABLET, FILM COATED ORAL at 13:41

## 2023-08-22 RX ADMIN — Medication 5 MILLIGRAM(S): at 12:13

## 2023-08-22 RX ADMIN — PANTOPRAZOLE SODIUM 40 MILLIGRAM(S): 20 TABLET, DELAYED RELEASE ORAL at 05:04

## 2023-08-22 RX ADMIN — PIPERACILLIN AND TAZOBACTAM 25 GRAM(S): 4; .5 INJECTION, POWDER, LYOPHILIZED, FOR SOLUTION INTRAVENOUS at 21:35

## 2023-08-22 RX ADMIN — PIPERACILLIN AND TAZOBACTAM 25 GRAM(S): 4; .5 INJECTION, POWDER, LYOPHILIZED, FOR SOLUTION INTRAVENOUS at 13:41

## 2023-08-22 RX ADMIN — PIPERACILLIN AND TAZOBACTAM 25 GRAM(S): 4; .5 INJECTION, POWDER, LYOPHILIZED, FOR SOLUTION INTRAVENOUS at 05:04

## 2023-08-22 RX ADMIN — APIXABAN 2.5 MILLIGRAM(S): 2.5 TABLET, FILM COATED ORAL at 17:18

## 2023-08-22 RX ADMIN — SOLIFENACIN SUCCINATE 10 MILLIGRAM(S): 10 TABLET ORAL at 12:12

## 2023-08-22 RX ADMIN — Medication 650 MILLIGRAM(S): at 21:34

## 2023-08-22 NOTE — PROGRESS NOTE ADULT - ASSESSMENT
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   - Febrile, leukocytosis, + lactic acidosis   - BC from [facility] per heme/onc w/ E. Coli; Repeat BCx w/ E. Coli.   - Lactate now down-trended to WNL   - 8/15 BCx2 --> w/ E. coli. On Zosyn --> Sensitive. Repeat Cx 8/18 --> 1 of 2 + E. coli --> Repeat BCx2 8/21 --> NGTD; F/u final   - F/u UCx --> Neg   - C/w Zosyn for ABX for now; Switch to PO Levaquin as per ID on DC to complete course    - Monitor CBC, temp curve, VS and patient closely   - ID eval appreciated; F/u recs   - Up-trending WBC, cont to monitor and trend in AM --> planned for MR to R/O infected cyst      Complicated UTI (urinary tract infection) / Pyelonephritis   - Outpatient CT with  "A few new geographic foci of parenchymal hypoenhancement in the right kidney suspicious for pyelonephritis. Mild stranding in the right perinephric fat. Mild hyperenhancement of the urothelium in the right renal pelvis and right ureter suspicious for ascending urinary tract infection. No obstructing ureteral calculus. No hydronephrosis."  - BCx from [facility] with Gram Neg rods, E. Coli   - UA +; F/u UCx --> Neg   - C/w Zosyn for now   - ID eval appreciated; F/u recs   - Kidney/ bladder US -->  Normal renal US  - Check bladder scan to R/O retention   - Monitor CBC, temp curve, VS and patient closely    Elevated LFTs  - Cont to monitor and trend CMP  - ABD US --> W/ Hepatic steatosis,   - Continue to monitor and trend LFTs     Renal Cyst  - ABD US as noted -- concern for renal neoplasm versus cyst--> F/u MR for further assessment  - R/O Infected cyst w/ MR     Increased endometrial stripe thickness.   - CT with reported increased endometrial thickness measuring 1.3 cm, previously 0.5 cm  - TVUS/pelvic US with WNL thickness of endometrium. Fluid in endometrial cavity/ canal --> F/u heme/onc recs and F/u with GYN outpatient    - Heme/Onc follow up     H/O venous thrombosis and embolism.   - H/O DVT/ Bilat PE in the past  - C/w home Eliquis  - LE Duplex -- Neg  - TTE -- EF of 63%, Mod grade II diastolic dysfunction , Moderate to Severe AS --> F/u Vascular cardio recs; Monitor Volume Status   - Monitor on tele  - Vascular cardio eval     Moderate to Severe AS  - TTE -- EF of 63%, Mod grade II diastolic dysfunction , Moderate to Severe AS --> F/u Vascular cardio recs  - Monitor Volume Status   - Diuretics PRN    CVA (cerebrovascular accident).   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  - Fall, Aspiration precautions     HLD (hyperlipidemia).   - Cont home lipitor.  - Lipid panel noted     GERD (gastroesophageal reflux disease).   - Cont home dexilant (switched to protonix while inhouse).    Anxiety and depression.   - Cont home zoloft.    Overactive bladder.   - Cont home vesicare  - Outpatient urogyn follow up upon discharge.    Osteoporosis.   - H/O c/b r tib-fib fracture s/p orif + c/b t12 vcf  - CT with Unchanged severe T12 and mild T5 compression deformities.  - Cont home prolia upon discharge  - Cont home vit d supplementation  - Outpatient endo follow up upon discharge.    Breast cancer.   - H/O L breast ca s/p l lumpectomy + anastrazole, r breast adh s/p excision + tamoxifen x5yrs  - Cont home anastrazole  - Heme/Onc consulted; F/u recs    Pre-DM  - A1C of 6.2  - Diet and lifestyle modifications  - RD consult    Discussed with Attending and ACP. Discussed with  at the bedside.

## 2023-08-22 NOTE — PROGRESS NOTE ADULT - ASSESSMENT
Patient is a 78y old  Female who presents with a chief complaint of fever + generalized weakness/fatigue/malaise     Breast Cancer  --under the care of Chantale Bailey [place] [facility] [place]  --pT1aN0 %, [place] 70% well differentiated  invasive ductal carcinoma of L breast  --s/p L lumpectomy 9/16/22  --History ADH s/p right breast excision + tamoxifen x 5 years  --on anastrazole, continue while inpatient  --ongoing care after discharge    UTI, bacteremia  --outpatient urinalysis +UTI  --outpatient blood cultures positive for E. 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    - F/u MRI abd    TIERA ArriazaC  Hematology/Oncology  [facility]  [phone] (office)

## 2023-08-22 NOTE — PROGRESS NOTE ADULT - ASSESSMENT
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 5, went [place] [facility] [place] as she had fever, rigors, generalized weakness, there blood cx was done which showed GNR  CT 8/15 s/o R pyleo  here febrile, WBC: 17, no focal symptoms    fever, leukocytosis, sepsis with E-coli bacteremia 8/15, CT 8/15 showed R pyelonephritis and ureteritis with perinephric stranding, also a R renal cyst, repeat blood cx 8/18 also positive and u/s 8/21 with a 4.1 cm complex cyst with debris which could be hemorrhagic or infected, urine cx on antibiotics was negative  h/o lymphoma, breast ca and CNS vasculitis    * E-coli is R to amp, bactrim, genta, I to cefazolin  * f/u the repeat blood cx 8/21  * abd MRI to evaluate the cyst but if it appears to be infected will need IR eval for aspiration  * c/w zosyn for now but for discharge will switch to levo 500 qd   * monitor CBC/diff and renal function    The above assessment and plan was discussed with the primary team    Kelly Freedman MD  contact on teams  After 5pm and on weekends call [phone]

## 2023-08-23 ENCOUNTER — TRANSCRIPTION ENCOUNTER (OUTPATIENT)
Age: 78
End: 2023-08-23

## 2023-08-23 VITALS
TEMPERATURE: 97 F | RESPIRATION RATE: 18 BRPM | SYSTOLIC BLOOD PRESSURE: 121 MMHG | DIASTOLIC BLOOD PRESSURE: 69 MMHG | HEART RATE: 81 BPM | OXYGEN SATURATION: 99 %

## 2023-08-23 LAB
ANION GAP SERPL CALC-SCNC: 13 MMOL/L — SIGNIFICANT CHANGE UP (ref 5–17)
BUN SERPL-MCNC: 17 MG/DL — SIGNIFICANT CHANGE UP (ref 7–23)
CALCIUM SERPL-MCNC: 8.5 MG/DL — SIGNIFICANT CHANGE UP (ref 8.4–10.5)
CHLORIDE SERPL-SCNC: 104 MMOL/L — SIGNIFICANT CHANGE UP (ref 96–108)
CO2 SERPL-SCNC: 21 MMOL/L — LOW (ref 22–31)
CREAT SERPL-MCNC: 0.82 MG/DL — SIGNIFICANT CHANGE UP (ref 0.5–1.3)
CULTURE RESULTS: SIGNIFICANT CHANGE UP
EGFR: 74 ML/MIN/1.73M2 — SIGNIFICANT CHANGE UP
GLUCOSE SERPL-MCNC: 109 MG/DL — HIGH (ref 70–99)
HCT VFR BLD CALC: 35.5 % — SIGNIFICANT CHANGE UP (ref 34.5–45)
HGB BLD-MCNC: 11.5 G/DL — SIGNIFICANT CHANGE UP (ref 11.5–15.5)
MCHC RBC-ENTMCNC: 29.5 PG — SIGNIFICANT CHANGE UP (ref 27–34)
MCHC RBC-ENTMCNC: 32.4 GM/DL — SIGNIFICANT CHANGE UP (ref 32–36)
MCV RBC AUTO: 91 FL — SIGNIFICANT CHANGE UP (ref 80–100)
NRBC # BLD: 0 /100 WBCS — SIGNIFICANT CHANGE UP (ref 0–0)
PLATELET # BLD AUTO: 331 K/UL — SIGNIFICANT CHANGE UP (ref 150–400)
POTASSIUM SERPL-MCNC: 4 MMOL/L — SIGNIFICANT CHANGE UP (ref 3.5–5.3)
POTASSIUM SERPL-SCNC: 4 MMOL/L — SIGNIFICANT CHANGE UP (ref 3.5–5.3)
RBC # BLD: 3.9 M/UL — SIGNIFICANT CHANGE UP (ref 3.8–5.2)
RBC # FLD: 15.7 % — HIGH (ref 10.3–14.5)
SODIUM SERPL-SCNC: 138 MMOL/L — SIGNIFICANT CHANGE UP (ref 135–145)
SPECIMEN SOURCE: SIGNIFICANT CHANGE UP
WBC # BLD: 15.17 K/UL — HIGH (ref 3.8–10.5)
WBC # FLD AUTO: 15.17 K/UL — HIGH (ref 3.8–10.5)

## 2023-08-23 PROCEDURE — 99232 SBSQ HOSP IP/OBS MODERATE 35: CPT

## 2023-08-23 RX ORDER — FLUTICASONE PROPIONATE 50 MCG
1 SPRAY, SUSPENSION NASAL
Qty: 1 | Refills: 0
Start: 2023-08-23 | End: 2023-09-21

## 2023-08-23 RX ORDER — LEVOFLOXACIN 5 MG/ML
1 INJECTION, SOLUTION INTRAVENOUS
Qty: 12 | Refills: 0
Start: 2023-08-23 | End: 2023-09-03

## 2023-08-23 RX ADMIN — APIXABAN 2.5 MILLIGRAM(S): 2.5 TABLET, FILM COATED ORAL at 05:15

## 2023-08-23 RX ADMIN — APIXABAN 2.5 MILLIGRAM(S): 2.5 TABLET, FILM COATED ORAL at 17:26

## 2023-08-23 RX ADMIN — SERTRALINE 50 MILLIGRAM(S): 25 TABLET, FILM COATED ORAL at 11:56

## 2023-08-23 RX ADMIN — ANASTROZOLE 1 MILLIGRAM(S): 1 TABLET ORAL at 11:56

## 2023-08-23 RX ADMIN — PANTOPRAZOLE SODIUM 40 MILLIGRAM(S): 20 TABLET, DELAYED RELEASE ORAL at 05:15

## 2023-08-23 RX ADMIN — SOLIFENACIN SUCCINATE 10 MILLIGRAM(S): 10 TABLET ORAL at 11:56

## 2023-08-23 NOTE — PROGRESS NOTE ADULT - NS ATTEND AMEND GEN_ALL_CORE FT
Pending MRI abdomen to r/o infected renal cyst  Otherwise on antibiotics for Pyelonephritis and bacteremia  appreciate ID input  patient wishes to dc anastrazole, can coordinate as outpatient
Agree with above
Agree with above
Briefly, 79 y/o with breast cancer, admitted with fever, generalized weakness. Continue anastrozole. Remains on IV antibiotics; follow-up infectious disease recommendations. Will have [facility] review imaging for abdominal ultrasound that showed right lower lobe renal cyst; previously was noted on CT imaging at [facility] [place]
Pt care and plan discussed and reviewed with PA. Plan as outlined above edited by me to reflect our discussion. Advanced care planning/advanced directives discussed with patient/family. DNR status including forceful chest compressions to attempt to restart the heart, ventilator support/artificial breathing, electric shock, artificial nutrition, health care proxy, Molst form all discussed with pt.
Patient with urosepsis/Ecoli bacteremia, on zosyn.  Follow up sensitivities of the blood cultures.

## 2023-08-23 NOTE — PROGRESS NOTE ADULT - PROVIDER SPECIALTY LIST ADULT
Heme/Onc
Heme/Onc
Infectious Disease
Infectious Disease
Internal Medicine
Heme/Onc
Infectious Disease
Infectious Disease
Internal Medicine
Internal Medicine
Heme/Onc
Heme/Onc
Infectious Disease
Internal Medicine

## 2023-08-23 NOTE — DISCHARGE NOTE PROVIDER - CARE PROVIDERS DIRECT ADDRESSES
,[email or web address],[email or web address],DirectAddress_Unknown

## 2023-08-23 NOTE — OCCUPATIONAL THERAPY INITIAL EVALUATION ADULT - PERTINENT HX OF CURRENT PROBLEM, REHAB EVAL
77 yo 73 kg f w pmh l breast ca s/p l lumpectomy + anastrazole, r breast adh s/p excision + tamoxifen x5yrs, lymphoma (DLBCL vs. 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 reportedly, patient Had rigors this morning with temperature of 102.9. Reports "not feeling well for the past 2 days", a/w Nausea and vague generalized abdominal discomfort, denies vomiting and diarrhea. Denies cough, sore throat, rhinitis, diarrhea, or dysuria. Denies exposure to sick contacts. Denies chest pain, sob, palpitations, lightheadedness, or dizziness; ct a+p performed at [facility], found to have right sided ascending uti; admit to medicine for further mgmt  US abdomen-Diffusely increased hepatic echogenicity, compatible with fatty liver disease. The gallbladder is moderately distended and otherwise unremarkable. A 3.2 x 3.0 x 4.1 cm right lower pole complex  renal cyst with septations and heterogeneous internal echogenic material without internal vascularity.  This may represent hemorrhagic material/debris. In the setting of fever, superinfection of the cyst could not be excluded.  This cyst has increased in size compared to prior CT. Further evaluation with contrast-enhanced renal  CT or MR is recommended for  more definitive characterization and to exclude renal neoplasm.  MR abdomen-Findings likely representing a ruptured right lower pole hemorrhagic renal cyst, given surrounding perirenal infiltration. Correlate for history of trauma. Underlying neoplasm is felt to be less likely but not excluded. Recommend follow-up contrast enhanced MR abdomen in 2-3 months   to assess for underlying tumor.

## 2023-08-23 NOTE — DISCHARGE NOTE PROVIDER - NSDCFUSCHEDAPPT_GEN_ALL_CORE_FT
[address]  Scheduled Appointment: 10/31/2023    Mikhail Velázquez  [address]  Scheduled Appointment: 10/31/2023

## 2023-08-23 NOTE — DISCHARGE NOTE PROVIDER - NSDCMRMEDTOKEN_GEN_ALL_CORE_FT
acetaminophen 325 mg oral tablet: 2 tab(s) orally every 6 hours, As needed, Mild Pain (1 - 3)  anastrozole 1 mg oral tablet: 1 tab(s) orally once a day  atorvastatin 20 mg oral tablet: 1 tab(s) orally once a day  Biotin 5000 mcg oral capsule: 1 cap(s) orally once a day  dexlansoprazole 30 mg oral delayed release capsule: 1 cap(s) orally once a day  Eliquis 2.5 mg oral tablet: 1 tab(s) orally 2 times a day  predniSONE 5 mg oral delayed release tablet: 1 tab(s) orally every other day  Prolia 60 mg/mL subcutaneous solution: 60 milligram(s) subcutaneously every 6 months  sertraline 50 mg oral tablet: 1 tab(s) orally once a day  VESIcare 10 mg oral tablet: 1 tab(s) orally once a day  Vitamin D3 5000 intl units oral capsule: 1 cap(s) orally once a day   acetaminophen 325 mg oral tablet: 2 tab(s) orally every 6 hours, As needed, Mild Pain (1 - 3)  anastrozole 1 mg oral tablet: 1 tab(s) orally once a day  atorvastatin 20 mg oral tablet: 1 tab(s) orally once a day  Biotin 5000 mcg oral capsule: 1 cap(s) orally once a day  dexlansoprazole 30 mg oral delayed release capsule: 1 cap(s) orally once a day  Eliquis 2.5 mg oral tablet: 1 tab(s) orally 2 times a day  fluticasone 50 mcg/inh nasal spray: 1 spray(s) nasal 2 times a day as needed for allergic rhinitis  levoFLOXacin 500 mg oral tablet: 1 tab(s) orally once a day  predniSONE 5 mg oral delayed release tablet: 1 tab(s) orally every other day  Prolia 60 mg/mL subcutaneous solution: 60 milligram(s) subcutaneously every 6 months  sertraline 50 mg oral tablet: 1 tab(s) orally once a day  VESIcare 10 mg oral tablet: 1 tab(s) orally once a day  Vitamin D3 5000 intl units oral capsule: 1 cap(s) orally once a day   acetaminophen 325 mg oral tablet: 2 tab(s) orally every 6 hours, As needed, Mild Pain (1 - 3)  anastrozole 1 mg oral tablet: 1 tab(s) orally once a day  atorvastatin 20 mg oral tablet: 1 tab(s) orally once a day  Biotin 5000 mcg oral capsule: 1 cap(s) orally once a day  dexlansoprazole 30 mg oral delayed release capsule: 1 cap(s) orally once a day  Eliquis 2.5 mg oral tablet: 1 tab(s) orally 2 times a day  fluticasone 50 mcg/inh nasal spray: 1 spray(s) nasal 2 times a day as needed for allergic rhinitis  levoFLOXacin 500 mg oral tablet: 1 tab(s) orally once a day  Physical Therapy: as directed  Physical Therapy: as directed  predniSONE 5 mg oral delayed release tablet: 1 tab(s) orally every other day  Prolia 60 mg/mL subcutaneous solution: 60 milligram(s) subcutaneously every 6 months  sertraline 50 mg oral tablet: 1 tab(s) orally once a day  VESIcare 10 mg oral tablet: 1 tab(s) orally once a day  Vitamin D3 5000 intl units oral capsule: 1 cap(s) orally once a day

## 2023-08-23 NOTE — DISCHARGE NOTE PROVIDER - NSDCACTIVITY_GEN_ALL_CORE
Patient Education     Folliculitis  Folliculitis is an inflammation of a hair follicle. A hair follicle is the little pocket where a hair grows out of the skin. Bacteria normally live on the skin. But sometimes bacteria can get trapped in a follicle and cause infection. This causes a bumpy rash. The area over the follicles is red and raised. It may itch or be painful. The bumps may have fluid (pus) inside. The pus may leak and then form crusts. Sores can spread to other areas of the body. Once it goes away, folliculitis can come back at any time. Severe cases may cause permanent hair loss and scarring.  Folliculitis can happen anywhere on the body where hair grows. It can be caused by rubbing from tight clothing. Ingrown hairs can cause it. Soaking in a hot tub or swimming pool that has bacteria in the water can cause it. It may also occur if a hair follicle is blocked by a bandage.  Sores often go away in a few days with no treatment. In some cases, medicine may be given. A small piece of skin or pus may be taken to find the type of bacteria causing the infection.  Home care  The healthcare provider may prescribe an antibiotic cream or ointment.  Oral antibiotics may also be prescribed. Or you may be told to use an over-the-counter antibiotic cream. Follow all instructions when using any of these medicines.  General care  · Apply warm, moist compresses to the sores for 20 minutes up to 3 times a day. You can make a compress by soaking a cloth in warm water. Squeeze out excess water.  · Don’t cut, poke, or squeeze the sores. This can be painful and spread infection.  · Don’t scratch the affected area. Scratching can delay healing.  · Don’t shave the areas affected by folliculitis.  · If the sores leak fluid, cover the area with a nonstick gauze bandage. Use as little tape as possible. Carefully discard all soiled bandages.  · Dress in loose cotton clothing.  · Change sheets and blankets if they are soiled by pus.  Wash all clothes, towels, sheets, and cloth diapers in soap and hot water. Do not share clothes, towels, or sheets with other family members.  · Do not soak the sores in bath water. This can spread infection. Instead, keep the area clean by gently washing sores with soap and warm water.  · Wash your hands or use antibacterial gels often to prevent spreading the bacteria.  Follow-up care  Follow up with your healthcare provider, or as advised.  When to seek medical advice  Call your healthcare provider right away if any of these occur:  · Fever of 100.4°F (38°C) or higher  · Spreading of the rash  · Rash does not get better with treatment  · Redness or swelling that gets worse  · Rash becomes more painful  · Foul-smelling fluid leaking from the skin  · Rash improves, but then comes back   Date Last Reviewed: 11/1/2016  © 4294-5134 [employer] [address] [place] [address], [place] [address]. All rights reserved. This information is not intended as a substitute for professional medical care. Always follow your healthcare professional's instructions.            No heavy lifting/straining

## 2023-08-23 NOTE — OCCUPATIONAL THERAPY INITIAL EVALUATION ADULT - LIVES WITH, PROFILE
Apt, 0 steps to enter, +elevator. (I) ADLs and functional transfers. +Stall with grab bars, with shower chair. (+) . +Shoe orthotic/spouse

## 2023-08-23 NOTE — PHYSICAL THERAPY INITIAL EVALUATION ADULT - ADDITIONAL COMMENTS
Patient reported that she resides with her spouse in an apartment in [place], [place]. There is elevator  access. Prior to admission pt reports being independent of all ADL's & functional mobility without AD. Patient reported that she resides with her spouse in an apartment ([facility]) in [place], [place]. There is elevator access. Prior to admission pt reports being independent of all ADL's & functional mobility without AD. Pt owns RW, cane and grab bars. Pt has walk in shower.

## 2023-08-23 NOTE — PHYSICAL THERAPY INITIAL EVALUATION ADULT - TRANSFER TRAINING, PT EVAL
107
GOAL: Pt will perform ALL transfers with independence, with out assistive device as needed, in 2 weeks.

## 2023-08-23 NOTE — PROGRESS NOTE ADULT - ASSESSMENT
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 5, went to [facility] as she had fever, rigors, generalized weakness, there blood cx was done which showed GNR  CT 8/15 s/o R pyleo  here febrile, WBC: 17, no focal symptoms    fever, leukocytosis, sepsis with E-coli bacteremia 8/15, CT 8/15 showed R pyelonephritis and ureteritis with perinephric stranding, also a R renal cyst, repeat blood cx 8/18 also positive, cleared 8/21 and u/s 8/21 with a 4.1 cm complex cyst with debris which could be hemorrhagic or infected, urine cx on antibiotics was negative  MRI showed a likely ruptured hemorrhagic renal cyst with perirenal infiltration  h/o lymphoma, breast ca and CNS vasculitis    * E-coli is R to amp, bactrim, genta, I to cefazolin  * c/w zosyn while in the hospital, will complete a 2 week course post negative blood cx through 9/4  * on discharge  switch to levo 500 qd   * will do surveillance blood cx after    The above assessment and plan was discussed with the primary team    Kelly Freedman MD  contact on teams  After 5pm and on weekends call [phone]

## 2023-08-23 NOTE — PHYSICAL THERAPY INITIAL EVALUATION ADULT - PERTINENT HX OF CURRENT PROBLEM, REHAB EVAL
77 yo 73 kg f w pmh l breast ca s/p l lumpectomy + anastrazole, r breast adh s/p excision + tamoxifen x5yrs, lymphoma (DLBCL vs. 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    MR Abdomen: Findings likely representing a ruptured right lower pole hemorrhagic renal cyst, given surrounding perirenal infiltration. US abdomen: Diffusely increased hepatic echogenicity, compatible with fatty liver disease. The gallbladder is moderately distended and otherwise unremarkable. A 3.2 x 3.0 x 4.1 cm right lower pole complex renal cyst with septations and heterogeneous internal echogenic material without internal vascularity. This may represent hemorrhagic material/debris. VA duplex BLE: No evidence of deep venous thrombosis in either lower extremity. Vinson cysts are visualized bilaterally. US kidney/bladder: Normal renal ultrasound. No hydronephrosis. No renal abscess. US pelvis, transvaginal: Full thickness of the endometrium is within normal limits measuring 3 to 4 mm. Fluid is noted to distend the endometrial cavity and the endocervical canal. CXR: Clear lungs.

## 2023-08-23 NOTE — DISCHARGE NOTE PROVIDER - PROVIDER TOKENS
PROVIDER:[TOKEN:[[id number]],FOLLOWUP:[2 weeks]],PROVIDER:[TOKEN:[[id number]],FOLLOWUP:[1-3 days]],FREE:[LAST:[Chantale Barrera of [facility]PHONE:[(   )    -],FAX:[(   )    -],ADDRESS:[Chantale Matthew of [facility]],FOLLOWUP:[1 week]]

## 2023-08-23 NOTE — PROGRESS NOTE ADULT - ASSESSMENT
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   - Febrile, leukocytosis, + lactic acidosis   - BC from [facility] per heme/onc w/ E. Coli; Repeat BCx w/ E. Coli.   - Lactate now down-trended to WNL   - 8/15 BCx2 --> w/ E. coli. On Zosyn --> Sensitive. Repeat Cx 8/18 --> 1 of 2 + E. coli --> Repeat BCx2 8/21 --> NGTD; F/u final   - F/u UCx --> Neg   - C/w Zosyn for ABX for now; Switch to PO Levaquin as per ID on DC to complete course    - Monitor CBC, temp curve, VS and patient closely   - ID eval appreciated; F/u recs   - Up-trending WBC, MRI noted, ? ruptured cyst  Complicated UTI (urinary tract infection) / Pyelonephritis   D/C planing on oral cipro with outpatient follow up for repeat MRI   discussed with patient and her  at the bedside     - Outpatient CT with  "A few new geographic foci of parenchymal hypoenhancement in the right kidney suspicious for pyelonephritis. Mild stranding in the right perinephric fat. Mild hyperenhancement of the urothelium in the right renal pelvis and right ureter suspicious for ascending urinary tract infection. No obstructing ureteral calculus. No hydronephrosis."  - BCx from [facility] with Gram Neg rods, E. Coli   - UA +; F/u UCx --> Neg   - C/w Zosyn for now   - ID eval appreciated; F/u recs   - Kidney/ bladder US -->  Normal renal US  - Check bladder scan to R/O retention   - Monitor CBC, temp curve, VS and patient closely    Elevated LFTs  - Cont to monitor and trend CMP  - ABD US --> W/ Hepatic steatosis,   - Continue to monitor and trend LFTs     Renal Cyst  - ABD US as noted -- concern for renal neoplasm versus cyst--> F/u MR for further assessment  - R/O Infected cyst w/ MR     Increased endometrial stripe thickness.   - CT with reported increased endometrial thickness measuring 1.3 cm, previously 0.5 cm  - TVUS/pelvic US with WNL thickness of endometrium. Fluid in endometrial cavity/ canal --> F/u heme/onc recs and F/u with GYN outpatient    - Heme/Onc follow up     H/O venous thrombosis and embolism.   - H/O DVT/ Bilat PE in the past  - C/w home Eliquis  - LE Duplex -- Neg  - TTE -- EF of 63%, Mod grade II diastolic dysfunction , Moderate to Severe AS --> F/u Vascular cardio recs; Monitor Volume Status   - Monitor on tele  - Vascular cardio eval     Moderate to Severe AS  - TTE -- EF of 63%, Mod grade II diastolic dysfunction , Moderate to Severe AS --> F/u Vascular cardio recs  - Monitor Volume Status   - Diuretics PRN    CVA (cerebrovascular accident).   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  - Fall, Aspiration precautions     HLD (hyperlipidemia).   - Cont home lipitor.  - Lipid panel noted     GERD (gastroesophageal reflux disease).   - Cont home dexilant (switched to protonix while inhouse).    Anxiety and depression.   - Cont home zoloft.    Overactive bladder.   - Cont home vesicare  - Outpatient urogyn follow up upon discharge.    Osteoporosis.   - H/O c/b r tib-fib fracture s/p orif + c/b t12 vcf  - CT with Unchanged severe T12 and mild T5 compression deformities.  - Cont home prolia upon discharge  - Cont home vit d supplementation  - Outpatient endo follow up upon discharge.    Breast cancer.   - H/O L breast ca s/p l lumpectomy + anastrazole, r breast adh s/p excision + tamoxifen x5yrs  - Cont home anastrazole  - Heme/Onc consulted; F/u recs    Pre-DM  - A1C of 6.2  - Diet and lifestyle modifications  - RD consult    Discussed with  at the bedside.   D/C planing

## 2023-08-23 NOTE — DISCHARGE NOTE PROVIDER - CARE PROVIDER_API CALL
Kelly Freedman)  Internal Medicine  [address] [facility], [place] [address]  Phone: [phone]  Fax: [phone]  Follow Up Time: 2 weeks    Last, Ubaldo Umanzor  Internal Medicine  [address] [place] [id number]  Phone: [phone]  Fax: [phone]  Follow Up Time: 1-3 days    Chantale Barrera [facility]   [facility]  Phone: (   )    -  Fax: (   )    -  Follow Up Time: 1 week

## 2023-08-23 NOTE — DISCHARGE NOTE PROVIDER - HOSPITAL COURSE
77 yo 73 kg f w pmh l breast ca s/p l lumpectomy + anastrazole, r breast adh s/p excision + tamoxifen x5yrs, lymphoma (DLBCL vs. 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 Patient was found to have severe sepsis seocndary to  E-coli bacteremia 8/15, CT 8/15 showed R pyelonephritis and ureteritis with perinephric stranding, also a R renal cyst, repeat blood cx 8/18 also positive, cleared 8/21 and u/s 8/21 with a 4.1 cm complex cyst with debris which could be hemorrhagic or infected, urine cx on antibiotics was negative, patient received intravenous zosyn while in the hospital, will complete a 2 week course post negative blood cx through 9/4 on discharge  switch to levo 500 qd . Patient will need surveillance blood cx after; please follow up with infectious disease and Oncology. Patient with history of  DLBCL vs. NLPHL, CSIII S/p RCHOP x 6 ; please follow with [facility] for ongoing care. Continue with Eliquis for history of Pulmonary embolism and DVT; recent duplex was negative for dvt. Patient medically cleared for discharge and please follow up with [facility] , gyn urology , gyn and infectious disease.

## 2023-08-23 NOTE — OCCUPATIONAL THERAPY INITIAL EVALUATION ADULT - NSOTDISCHREC_GEN_A_CORE
UROLOGY - followup      CHIEF COMPLAINT:    Office Visit, New Patient, Elevated Psa, and Urinary Retention         HISTORY OF PRESENT ILLNESS:    Brock Stallings is a 77 year old male seen today at the request of Dr. Mackay for consideration for holmium laser enucleation of prostate.  Patient has an enlarged prostate and currently with urinary retention.  He is being managed with an indwelling catheter.  Patient was last seen by Dr. Mackay in May of 2022.  He has a history of an enlarged prostate with lower urinary tract symptoms and intermittent urinary retention.  Previously was being managed with alpha-blocker therapy.  Patient developed recurrent urinary retention 3 weeks ago and has been maintained with an indwelling catheter since.    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   • Fracture ~2009    L knee fracture   • Glaucoma     Right Eye   • Hearing loss in right ear    • History of BPH    • Inner ear disease, right 4/2/2018    Loss hearing and tinnitus and balance vertigo sx abrupt Dec Deb 2018 poss small vessel ischemia vs viral insult nl MRI CTA no VB dz   • Lipoma of shoulder     LEFT shoulder   • Sensorineural hearing loss (SNHL) of right ear    • Trigeminal neuralgia    • Unsteadiness    • Urinary incontinence    • Urine frequency    • Wears glasses    • Wears hearing aid in both ears        Patient Active Problem List   Diagnosis   • Basal cell carcinoma of the right temple and right cheek removed by Mohs surgery by Dr. Iqbal in June 2014   • Benign neoplasm of colon   •  Family history of colon cancer   • Inner ear disease, right   • Carotid artery disorder (CMD)   • Stenosis of both external auditory canals   • Asymmetric SNHL (sensorineural hearing loss) Right   • Imbalance   • Tinnitus of right ear   • Vestibular hypofunction of right ear   • History of trigeminal neuralgia   • Atrial fibrillation with rapid ventricular response (CMD)           Summary of your Discharge Medications          Accurate as of February 24, 2023 11:59 PM. Always use your most recent med list.            Take these Medications      Details   * apixaBAN 5 MG Tab  Commonly known as: ELIQUIS   Take 2 tablets by mouth every 12 hours for 3 days.     * apixaBAN 5 MG Tab  Commonly known as: ELIQUIS   Take 1 tablet by mouth every 12 hours. Do not start before February 18, 2023.     aspirin 81 MG chewable tablet  Commonly known as: Aspirin 81   Chew 1 tablet by mouth daily. Do not start before July 10, 2020.     ciclopirox 1 % shampoo   Lather shampoo on scalp (beard or moustache if affected) twice weekly, allow 10 minutes of scalp contact with lather before rinsing.     Combigan 0.2-0.5 % ophthalmic solution   Generic drug: brimonidine-timolol  Place 1 drop into right eye 2 times daily.     digoxin 0.125 MG tablet  Commonly known as: LANOXIN   Take 1 tablet by mouth daily. Do not start before February 16, 2023.     furosemide 20 MG tablet  Commonly known as: Lasix   Take 1 tablet by mouth daily.     latanoprost 0.005 % ophthalmic solution  Commonly known as: XALATAN   Place 1 drop into right eye nightly.     metoPROLOL tartrate 50 MG tablet  Commonly known as: LOPRESSOR   Take 1 tablet by mouth every 12 hours.     tamsulosin 0.4 MG Cap  Commonly known as: FLOMAX   TAKE 1 CAPSULE BY MOUTH  DAILY AFTER A MEAL  Comment: Requesting 1 year supply         * This list has 2 medication(s) that are the same as other medications prescribed for you. Read the directions carefully, and ask your doctor or other care provider  to review them with you.                ALLERGIES:  No Known Allergies      Past Surgical History:   Procedure Laterality Date   • Brain surgery  06/30/2020   • Colon surgery  2014    resection   • Colonoscopy     • Colonoscopy N/A 03/05/2019    Colonoscopy preformed on 3/5/19 at [facility]. Follow up plan repeat a colonoscopy in 5 years.   • Colonoscopy w biopsy  July 16, 2014    Dr. Misael Rodriguez. carpet-like lesion cecum. Diverticulosis   • Eye surgery  2009    torn retina repair R eye   • Hernia repair      Bilateral   • Rotator cuff repair  ~ 2001    Left Rotator Cuff Repair   • Skin biopsy  6/2014   • Vasectomy  1977         Social History     Tobacco Use   • Smoking status: Never   • Smokeless tobacco: Never   Vaping Use   • Vaping Use: never used   Substance Use Topics   • Alcohol use: Yes     Comment: rare   • Drug use: No         FAMILY HISTORY:  No history of urological malignancies or stones.      REVIEW OF SYSTEMS:    Obtained by patient intake form and entered in nursing note.  I have reviewed the pertinent positives and negatives with the patient and agree with documentation entered.    PHYSICAL EXAM:    Vitals:  Blood pressure 117/71, pulse 60, SpO2 98 %.  General:  The patient is normally developed and in no acute distress.  Neurologic:  Alert and oriented x3.  Normal mood and affect.  Skin:  Warm and dry, no new rash or lesion.  HEENT:  Sclerae non-injected, pupils reactive, nose with normal external appearance, hearing is normal.  Respiratory:  Respiratory effort normal.  Cardiovascular:  Regular rate, no edema.  Lymphatics:  There is no neck or groin adenopathy.   Back:  No costovertebral angle tenderness.   Abdomen: Soft, nontender, nondistended.  There are no abdominal masses present.   Extremities:  No swelling or tenderness in bilateral lower extremities.    LABS:   None    IMAGING:    none    ASSESSMENT AND PLAN:  77-year-old gentleman with an enlarged prostate and recurrent urinary retention.  Failed  alpha-blocker therapy.  Discussed benefits of HoLEP including durable relief of outlet obstruction.  Risks including incontinence, hematuria, retrograde ejaculation, bladder perforation and bladder neck contracture were discussed with him.  He expressed understanding and wished to proceed.  We will obtain CT pelvis to assess prostate size.      John Salvador MD  [facility]   Home OT

## 2023-08-23 NOTE — PROGRESS NOTE ADULT - SUBJECTIVE AND OBJECTIVE BOX
Follow Up:  bacteremia    Interval History/ROS: no more fever or rigors, feels better today  no dysuria or vomiting, MRI was s/o ruptured hemorrhagic cyst          Allergies  No Known Allergies        ANTIMICROBIALS:      OTHER MEDS:  acetaminophen     Tablet .. 650 milliGRAM(s) Oral every 6 hours PRN  aluminum hydroxide/magnesium hydroxide/simethicone Suspension 30 milliLiter(s) Oral every 4 hours PRN  anastrozole 1 milliGRAM(s) Oral daily  apixaban 2.5 milliGRAM(s) Oral every 12 hours  atorvastatin 20 milliGRAM(s) Oral at bedtime  calcium carbonate    500 mG (Tums) Chewable 1 Tablet(s) Chew once  fluticasone propionate 50 MICROgram(s)/spray Nasal Spray 1 Spray(s) Both Nostrils two times a day PRN  melatonin 3 milliGRAM(s) Oral at bedtime PRN  ondansetron Injectable 4 milliGRAM(s) IV Push every 8 hours PRN  pantoprazole    Tablet 40 milliGRAM(s) Oral before breakfast  predniSONE   Tablet 5 milliGRAM(s) Oral every other day  sertraline 50 milliGRAM(s) Oral daily  sodium chloride 0.9%. 1000 milliLiter(s) IV Continuous <Continuous>  solifenacin 10 milliGRAM(s) Oral daily      Vital Signs Last 24 Hrs  T(C): 36.4 (23 Aug 2023 04:34), Max: 36.8 (22 Aug 2023 12:28)  T(F): 97.6 (23 Aug 2023 04:34), Max: 98.3 (22 Aug 2023 12:28)  HR: 80 (23 Aug 2023 11:21) (59 - 89)  BP: 118/- (23 Aug 2023 11:21) (102/64 - 127/75)  BP(mean): 70 (23 Aug 2023 11:21) (70 - 70)  RR: 18 (23 Aug 2023 04:34) (18 - 18)  SpO2: 98% (23 Aug 2023 11:21) (91% - 98%)    Parameters below as of 23 Aug 2023 11:21  Patient On (Oxygen Delivery Method): room air        Physical Exam:  General:    NAD,  non toxic  Respiratory:    comfortable on RA  abd:     soft,   BS +,   no tenderness  :   no CVAT,  no suprapubic tenderness,   no  sandra  Musculoskeletal:   no joint swelling  vascular: no phlebitis  Skin:    no rash                        11.5   15.17 )-----------( 331      ( 23 Aug 2023 06:42 )             35.5       08-23    138  |  104  |  17  ----------------------------<  109<H>  4.0   |  21<L>  |  0.82    Ca    8.5      23 Aug 2023 06:43    TPro  6.2  /  Alb  3.2<L>  /  TBili  0.8  /  DBili  x   /  AST  38  /  ALT  45  /  AlkPhos  101  08-22      Urinalysis Basic - ( 23 Aug 2023 06:43 )    Color: x / Appearance: x / SG: x / pH: x  Gluc: 109 mg/dL / Ketone: x  / Bili: x / Urobili: x   Blood: x / Protein: x / Nitrite: x   Leuk Esterase: x / RBC: x / WBC x   Sq Epi: x / Non Sq Epi: x / Bacteria: x        MICROBIOLOGY:  v  .Blood Blood  08-21-23   No growth at 48 Hours  --  --      Clean Catch Clean Catch (Midstream)  08-18-23   No growth  --  --      .Blood Blood  08-18-23   Growth in aerobic bottle: Escherichia coli  See previous culture [id number]  --    Growth in aerobic bottle: Gram Negative Rods      .Blood Blood-Peripheral  08-15-23   Growth in aerobic bottle: Escherichia coli  See previous culture [id number]  --    Growth in aerobic bottle: Gram Negative Rods      .Blood Blood-Peripheral  08-15-23   Growth in aerobic and anaerobic bottles: Escherichia coli  Direct identification is available within approximately 3-5  hours either by Blood Panel Multiplexed PCR or Direct  MALDI-TOF. Details: [email or web address]  --  Blood Culture PCR  Escherichia coli                RADIOLOGY:  Images independently visualized and reviewed personally, findings as below  < from: MR Abdomen w/wo IV Cont (08.22.23 @ 16:10) >  IMPRESSION:  Findings likely representing a ruptured right lower pole hemorrhagic   renal cyst, given surrounding perirenal infiltration. Correlate for   history of trauma. Underlying neoplasm is felt to be less likely but not   excluded. Recommend follow-up contrast enhanced MR abdomen in 2-3 months   to assess for underlying tumor.    < end of copied text >  < from: US Abdomen Complete (US Abdomen Complete .) (08.21.23 @ 10:26) >  IMPRESSION:  Diffusely increased hepatic echogenicity, compatible with fatty liver   disease.    The gallbladder is moderately distended and otherwise unremarkable.    A 3.2 x 3.0 x 4.1 cm right lower pole complex  renal cyst with septations   and heterogeneous internal echogenic material without internal   vascularity.  This may representhemorrhagic material/debris. In the   setting of fever, superinfection of the cyst could not be excluded.  This   cyst has increased in size compared to prior CT. Further evaluation with   contrast-enhanced renal  CT or MR is recommended for  more definitive   characterization and to exclude renal neoplasm..    < end of copied text >  
  Follow Up:  bacteremia    Interval History/ROS: pt still with chills and  body aches, no vomiting or dysuria, blood cx here also with E-coli          Allergies  No Known Allergies        ANTIMICROBIALS:  piperacillin/tazobactam IVPB.. 3.375 every 8 hours      OTHER MEDS:  acetaminophen     Tablet .. 650 milliGRAM(s) Oral every 6 hours PRN  aluminum hydroxide/magnesium hydroxide/simethicone Suspension 30 milliLiter(s) Oral every 4 hours PRN  anastrozole 1 milliGRAM(s) Oral daily  apixaban 2.5 milliGRAM(s) Oral every 12 hours  atorvastatin 20 milliGRAM(s) Oral at bedtime  melatonin 3 milliGRAM(s) Oral at bedtime PRN  ondansetron Injectable 4 milliGRAM(s) IV Push every 8 hours PRN  pantoprazole    Tablet 40 milliGRAM(s) Oral before breakfast  predniSONE   Tablet 5 milliGRAM(s) Oral every other day  sertraline 50 milliGRAM(s) Oral daily  sodium chloride 0.9%. 1000 milliLiter(s) IV Continuous <Continuous>  solifenacin 10 milliGRAM(s) Oral daily      Vital Signs Last 24 Hrs  T(C): 37.4 (17 Aug 2023 14:59), Max: 37.4 (17 Aug 2023 14:59)  T(F): 99.4 (17 Aug 2023 14:59), Max: 99.4 (17 Aug 2023 14:59)  HR: 72 (17 Aug 2023 14:59) (64 - 78)  BP: 124/59 (17 Aug 2023 14:59) (95/60 - 124/59)  BP(mean): --  RR: 18 (17 Aug 2023 14:59) (17 - 18)  SpO2: 97% (17 Aug 2023 14:59) (92% - 97%)    Parameters below as of 17 Aug 2023 14:59  Patient On (Oxygen Delivery Method): room air        Physical Exam:  General:    NAD,  non toxic  Respiratory:    comfortable on RA  abd:     soft,   BS +,   no tenderness  :   no CVAT,  no suprapubic tenderness,   no  sandra  Musculoskeletal:   no joint swelling  vascular: no phlebitis  Skin:    no rash                            10.4   10.23 )-----------( 125      ( 17 Aug 2023 13:44 )             32.3       08-17    140  |  103  |  17  ----------------------------<  144<H>  3.5   |  25  |  1.03    Ca    8.6      17 Aug 2023 13:44    TPro  5.5<L>  /  Alb  3.0<L>  /  TBili  0.9  /  DBili  x   /  AST  53<H>  /  ALT  46<H>  /  AlkPhos  65  08-17      Urinalysis Basic - ( 17 Aug 2023 13:44 )    Color: x / Appearance: x / SG: x / pH: x  Gluc: 144 mg/dL / Ketone: x  / Bili: x / Urobili: x   Blood: x / Protein: x / Nitrite: x   Leuk Esterase: x / RBC: x / WBC x   Sq Epi: x / Non Sq Epi: x / Bacteria: x        MICROBIOLOGY:  v  .Blood Blood-Peripheral  08-15-23   No growth at 24 hours  --  --      .Blood Blood-Peripheral  08-15-23   Growth in aerobic bottle: Escherichia coli  Direct identification is available within approximately 3-5  hours either by Blood Panel Multiplexed PCR or Direct  MALDI-TOF. Details: [email or web address]  --  Blood Culture PCR          Rapid RVP Result: NotDetec (08-15 @ 21:00)        RADIOLOGY:  Images independently visualized and reviewed personally, findings as below  < from: US Kidney and Bladder (08.16.23 @ 16:17) >  IMPRESSION:    Normal renal ultrasound. No hydronephrosis. No renal abscess.    < end of copied text >  < from: US Transvaginal (08.16.23 @ 16:14) >  IMPRESSION:    Full thickness of the endometrium is within normal limits measuring 3 to   4 mm.  Fluid is noted to distend the endometrial cavity and the endocervical   canal.    < end of copied text >  < from: Xray Chest 1 View AP/PA (08.15.23 @ 23:01) >  IMPRESSION:  Clear lungs.      < end of copied text >  
Name of Patient : VINCENT ENGLAND  MRN: [id number]  Date of visit: 08-19-23      Subjective: Patient seen and examined. No new events except as noted.     REVIEW OF SYSTEMS:    CONSTITUTIONAL: No weakness, fevers or chills  EYES/ENT: No visual changes;  No vertigo or throat pain   NECK: No pain or stiffness  RESPIRATORY: No cough, wheezing, hemoptysis; No shortness of breath  CARDIOVASCULAR: No chest pain or palpitations  GASTROINTESTINAL: No abdominal or epigastric pain. No nausea, vomiting, or hematemesis; No diarrhea or constipation. No melena or hematochezia.  GENITOURINARY: No dysuria, frequency or hematuria  NEUROLOGICAL: No numbness or weakness  SKIN: No itching, burning, rashes, or lesions   All other review of systems is negative unless indicated above.    MEDICATIONS:  MEDICATIONS  (STANDING):  anastrozole 1 milliGRAM(s) Oral daily  apixaban 2.5 milliGRAM(s) Oral every 12 hours  atorvastatin 20 milliGRAM(s) Oral at bedtime  calcium carbonate    500 mG (Tums) Chewable 1 Tablet(s) Chew once  pantoprazole    Tablet 40 milliGRAM(s) Oral before breakfast  piperacillin/tazobactam IVPB.. 3.375 Gram(s) IV Intermittent every 8 hours  predniSONE   Tablet 5 milliGRAM(s) Oral every other day  sertraline 50 milliGRAM(s) Oral daily  sodium chloride 0.9%. 1000 milliLiter(s) (100 mL/Hr) IV Continuous <Continuous>  solifenacin 10 milliGRAM(s) Oral daily      PHYSICAL EXAM:  T(C): 37.3 (08-19-23 @ 20:17), Max: 37.3 (08-19-23 @ 20:17)  HR: 80 (08-19-23 @ 20:17) (65 - 80)  BP: 112/73 (08-19-23 @ 20:17) (107/65 - 124/71)  RR: 18 (08-19-23 @ 20:17) (18 - 18)  SpO2: 92% (08-19-23 @ 20:17) (92% - 96%)  Wt(kg): --  I&O's Summary    18 Aug 2023 07:01  -  19 Aug 2023 07:00  --------------------------------------------------------  IN: 0 mL / OUT: 1250 mL / NET: -1250 mL    19 Aug 2023 07:01  -  19 Aug 2023 23:37  --------------------------------------------------------  IN: 0 mL / OUT: 1050 mL / NET: -1050 mL          Appearance: Normal	  HEENT:  PERRLA   Lymphatic: No lymphadenopathy   Cardiovascular: Normal S1 S2, no JVD  Respiratory: normal effort , clear  Gastrointestinal:  Soft, Non-tender  Skin: No rashes,  warm to touch  Psychiatry:  Mood & affect appropriate  Musculuskeletal: No edema    recent labs, Imaging and EKGs personally reviewed     08-18-23 @ 07:01  -  08-19-23 @ 07:00  --------------------------------------------------------  IN: 0 mL / OUT: 1250 mL / NET: -1250 mL    08-19-23 @ 07:01  -  08-19-23 @ 23:37  --------------------------------------------------------  IN: 0 mL / OUT: 1050 mL / NET: -1050 mL                          10.5   12.30 )-----------( 152      ( 19 Aug 2023 06:28 )             31.7               08-19    140  |  103  |  18  ----------------------------<  98  4.2   |  23  |  0.76    Ca    8.6      19 Aug 2023 06:28    TPro  5.5<L>  /  Alb  2.6<L>  /  TBili  0.6  /  DBili  x   /  AST  76<H>  /  ALT  64<H>  /  AlkPhos  86  08-19                       Urinalysis Basic - ( 19 Aug 2023 06:28 )    Color: x / Appearance: x / SG: x / pH: x  Gluc: 98 mg/dL / Ketone: x  / Bili: x / Urobili: x   Blood: x / Protein: x / Nitrite: x   Leuk Esterase: x / RBC: x / WBC x   Sq Epi: x / Non Sq Epi: x / Bacteria: x              
Name of Patient : VINCENT ENGLAND  MRN: [id number]  Date of visit: 08-21-23 @ 15:10      Subjective: Patient seen and examined. No new events except as noted.   Patient seen earlier this AM. Sitting up in bed. Denies chills or body aches. Reports feeling better.   Up-trending WBC count. Repeat cultures in lab.  For abdominal US today.  at the bedside.     REVIEW OF SYSTEMS:    CONSTITUTIONAL: No weakness, fevers or chills  EYES/ENT: No visual changes;  No vertigo or throat pain   NECK: No pain or stiffness  RESPIRATORY: No cough, wheezing, hemoptysis; No shortness of breath  CARDIOVASCULAR: No chest pain or palpitations  GASTROINTESTINAL: No abdominal or epigastric pain. No nausea, vomiting, or hematemesis; No diarrhea or constipation. No melena or hematochezia.  GENITOURINARY: No dysuria, frequency or hematuria  NEUROLOGICAL: No numbness or weakness  SKIN: No itching, burning, rashes, or lesions   All other review of systems is negative unless indicated above.    MEDICATIONS:  MEDICATIONS  (STANDING):  anastrozole 1 milliGRAM(s) Oral daily  apixaban 2.5 milliGRAM(s) Oral every 12 hours  atorvastatin 20 milliGRAM(s) Oral at bedtime  calcium carbonate    500 mG (Tums) Chewable 1 Tablet(s) Chew once  pantoprazole    Tablet 40 milliGRAM(s) Oral before breakfast  piperacillin/tazobactam IVPB.. 3.375 Gram(s) IV Intermittent every 8 hours  predniSONE   Tablet 5 milliGRAM(s) Oral every other day  sertraline 50 milliGRAM(s) Oral daily  sodium chloride 0.9%. 1000 milliLiter(s) (100 mL/Hr) IV Continuous <Continuous>  solifenacin 10 milliGRAM(s) Oral daily      PHYSICAL EXAM:  T(C): 36.7 (08-21-23 @ 11:40), Max: 37.6 (08-20-23 @ 21:04)  HR: 83 (08-21-23 @ 11:40) (68 - 83)  BP: 112/67 (08-21-23 @ 11:40) (111/75 - 117/51)  RR: 18 (08-21-23 @ 11:40) (18 - 18)  SpO2: 94% (08-21-23 @ 11:40) (92% - 96%)  Wt(kg): --  I&O's Summary    20 Aug 2023 07:01  -  21 Aug 2023 07:00  --------------------------------------------------------  IN: 480 mL / OUT: 1400 mL / NET: -920 mL          Appearance: Awake, Generalized weakness, Sitting up in bed   HEENT:  Eyes are open; Glasses    Lymphatic: No lymphadenopathy   Cardiovascular: Normal    Respiratory: normal effort , clear  Gastrointestinal:  Soft, non-tender   Skin: No rashes,  warm to touch  Psychiatry:  Mood & affect appropriate  Musculoskeletal: No edema          08-20-23 @ 07:01  -  08-21-23 @ 07:00  --------------------------------------------------------  IN: 480 mL / OUT: 1400 mL / NET: -920 mL                            10.6   14.65 )-----------( 238      ( 21 Aug 2023 06:58 )             32.4               08-21    138  |  105  |  15  ----------------------------<  82  4.1   |  20<L>  |  0.86    Ca    8.3<L>      21 Aug 2023 06:58    TPro  5.9<L>  /  Alb  2.9<L>  /  TBili  0.6  /  DBili  x   /  AST  42<H>  /  ALT  49<H>  /  AlkPhos  94  08-21                       Urinalysis Basic - ( 21 Aug 2023 06:58 )    Color: x / Appearance: x / SG: x / pH: x  Gluc: 82 mg/dL / Ketone: x  / Bili: x / Urobili: x   Blood: x / Protein: x / Nitrite: x   Leuk Esterase: x / RBC: x / WBC x   Sq Epi: x / Non Sq Epi: x / Bacteria: x      Culture - Urine (08.18.23 @ 13:23)   Specimen Source: Clean Catch Clean Catch (Midstream)  Culture Results: No growth    Culture - Blood in AM (08.18.23 @ 09:45)   Specimen Source: .Blood Blood  Culture Results: No growth at 72 Hours    Culture - Blood in AM (08.18.23 @ 09:45)   Gram Stain: Growth in aerobic bottle: Gram Negative Rods  Specimen Source: .Blood Blood  Culture Results: Growth in aerobic bottle: Escherichia coli     < from: US Abdomen Complete (US Abdomen Complete .) (08.21.23 @ 10:26) >  IMPRESSION:  Diffusely increased hepatic echogenicity, compatible with fatty liver   disease.    The gallbladder is moderately distended and otherwise unremarkable.    A 3.2 x 3.0 x 4.1 cm right lower pole complex  renal cyst with septations   and heterogeneous internal echogenic material without internal   vascularity.  This may representhemorrhagic material/debris. In the   setting of fever, superinfection of the cyst could not be excluded.  This   cyst has increased in size compared to prior CT. Further evaluation with   contrast-enhanced renal  CT or MR is recommended for  more definitive   characterization and to exclude renal neoplasm..    Findings were discussed by Dr. Laura with  TEMI Reed on 8/21/2023 11:13   AM with read back confirmation.    < end of copied text >  
Name of Patient : VINCENT ENGLAND  MRN: [id number]  Date of visit: 08-20-23 @ 15:46      Subjective: Patient seen and examined. No new events except as noted.   Patient seen earlier this AM. Lying down in bed. Afebrile. Offers no new complaints.     REVIEW OF SYSTEMS:    CONSTITUTIONAL: No weakness, fevers or chills  EYES/ENT: No visual changes;  No vertigo or throat pain   NECK: No pain or stiffness  RESPIRATORY: No cough, wheezing, hemoptysis; No shortness of breath  CARDIOVASCULAR: No chest pain or palpitations  GASTROINTESTINAL: No abdominal or epigastric pain. No nausea, vomiting, or hematemesis; No diarrhea or constipation. No melena or hematochezia.  GENITOURINARY: No dysuria, frequency or hematuria  NEUROLOGICAL: No numbness or weakness  SKIN: No itching, burning, rashes, or lesions   All other review of systems is negative unless indicated above.    MEDICATIONS:  MEDICATIONS  (STANDING):  anastrozole 1 milliGRAM(s) Oral daily  apixaban 2.5 milliGRAM(s) Oral every 12 hours  atorvastatin 20 milliGRAM(s) Oral at bedtime  calcium carbonate    500 mG (Tums) Chewable 1 Tablet(s) Chew once  pantoprazole    Tablet 40 milliGRAM(s) Oral before breakfast  piperacillin/tazobactam IVPB.. 3.375 Gram(s) IV Intermittent every 8 hours  predniSONE   Tablet 5 milliGRAM(s) Oral every other day  sertraline 50 milliGRAM(s) Oral daily  sodium chloride 0.9%. 1000 milliLiter(s) (100 mL/Hr) IV Continuous <Continuous>  solifenacin 10 milliGRAM(s) Oral daily      PHYSICAL EXAM:  T(C): 36.8 (08-20-23 @ 11:13), Max: 37.3 (08-19-23 @ 20:17)  HR: 71 (08-20-23 @ 11:13) (62 - 80)  BP: 121/66 (08-20-23 @ 11:13) (112/73 - 121/66)  RR: 18 (08-20-23 @ 11:13) (18 - 18)  SpO2: 94% (08-20-23 @ 11:13) (92% - 96%)  Wt(kg): --  I&O's Summary    19 Aug 2023 07:01  -  20 Aug 2023 07:00  --------------------------------------------------------  IN: 0 mL / OUT: 1050 mL / NET: -1050 mL    20 Aug 2023 07:01  -  20 Aug 2023 15:46  --------------------------------------------------------  IN: 480 mL / OUT: 600 mL / NET: -120 mL        Appearance: Awake, Generalized weakness, Sitting up in bed   HEENT:  Eyes are open   Lymphatic: No lymphadenopathy   Cardiovascular: Normal    Respiratory: normal effort , clear  Gastrointestinal:  Soft, non-tender   Skin: No rashes,  warm to touch  Psychiatry:  Mood & affect appropriate  Musculoskeletal: No edema            08-19-23 @ 07:01  -  08-20-23 @ 07:00  --------------------------------------------------------  IN: 0 mL / OUT: 1050 mL / NET: -1050 mL    08-20-23 @ 07:01  -  08-20-23 @ 15:46  --------------------------------------------------------  IN: 480 mL / OUT: 600 mL / NET: -120 mL                                  11.1   10.26 )-----------( 205      ( 20 Aug 2023 07:25 )             33.8               08-20    140  |  104  |  16  ----------------------------<  94  3.9   |  24  |  0.82    Ca    8.4      20 Aug 2023 07:26    TPro  5.5<L>  /  Alb  2.6<L>  /  TBili  0.6  /  DBili  x   /  AST  76<H>  /  ALT  64<H>  /  AlkPhos  86  08-19                       Urinalysis Basic - ( 20 Aug 2023 07:26 )    Color: x / Appearance: x / SG: x / pH: x  Gluc: 94 mg/dL / Ketone: x  / Bili: x / Urobili: x   Blood: x / Protein: x / Nitrite: x   Leuk Esterase: x / RBC: x / WBC x   Sq Epi: x / Non Sq Epi: x / Bacteria: x      Culture - Urine (08.18.23 @ 13:23)   Specimen Source: Clean Catch Clean Catch (Midstream)  Culture Results: No growth    Culture - Blood in AM (08.18.23 @ 09:45)   Specimen Source: .Blood Blood  Culture Results: No growth at 48 Hours    Culture - Blood in AM (08.18.23 @ 09:45)   Gram Stain: Growth in aerobic bottle: Gram Negative Rods  Specimen Source: .Blood Blood  Culture Results: Growth in aerobic bottle: Escherichia coli   See previous culture [id number]    Culture - Blood (08.15.23 @ 20:52)   Gram Stain: Growth in aerobic bottle: Gram Negative Rods  Specimen Source: .Blood Blood-Peripheral  Culture Results: Growth in aerobic bottle: Escherichia coli   See previous culture [id number]    
Patient is a 78y old  Female who presents with a chief complaint of fever + generalized weakness/fatigue/malaise (20 Aug 2023 15:46)    Pt seen and examined, feels well.    MEDICATIONS  (STANDING):  anastrozole 1 milliGRAM(s) Oral daily  apixaban 2.5 milliGRAM(s) Oral every 12 hours  atorvastatin 20 milliGRAM(s) Oral at bedtime  calcium carbonate    500 mG (Tums) Chewable 1 Tablet(s) Chew once  pantoprazole    Tablet 40 milliGRAM(s) Oral before breakfast  piperacillin/tazobactam IVPB.. 3.375 Gram(s) IV Intermittent every 8 hours  predniSONE   Tablet 5 milliGRAM(s) Oral every other day  sertraline 50 milliGRAM(s) Oral daily  sodium chloride 0.9%. 1000 milliLiter(s) (100 mL/Hr) IV Continuous <Continuous>  solifenacin 10 milliGRAM(s) Oral daily    MEDICATIONS  (PRN):  acetaminophen     Tablet .. 650 milliGRAM(s) Oral every 6 hours PRN Temp greater or equal to 38C (100.4F), Mild Pain (1 - 3)  aluminum hydroxide/magnesium hydroxide/simethicone Suspension 30 milliLiter(s) Oral every 4 hours PRN Dyspepsia  fluticasone propionate 50 MICROgram(s)/spray Nasal Spray 1 Spray(s) Both Nostrils two times a day PRN allergic rhinitis  melatonin 3 milliGRAM(s) Oral at bedtime PRN Insomnia  ondansetron Injectable 4 milliGRAM(s) IV Push every 8 hours PRN Nausea and/or Vomiting      Vital Signs Last 24 Hrs  T(C): 37 (21 Aug 2023 04:37), Max: 37.6 (20 Aug 2023 21:04)  T(F): 98.6 (21 Aug 2023 04:37), Max: 99.6 (20 Aug 2023 21:04)  HR: 81 (21 Aug 2023 04:37) (68 - 81)  BP: 117/51 (21 Aug 2023 04:37) (111/75 - 117/51)  BP(mean): --  RR: 18 (21 Aug 2023 04:37) (18 - 18)  SpO2: 94% (21 Aug 2023 04:37) (92% - 96%)    Parameters below as of 21 Aug 2023 04:37  Patient On (Oxygen Delivery Method): room air        PE  NAD  Awake, alert  Anicteric, MMM  No c/c/e  No rash grossly  FROM                          10.6   14.65 )-----------( 238      ( 21 Aug 2023 06:58 )             32.4       08-21    138  |  105  |  15  ----------------------------<  82  4.1   |  20<L>  |  0.86    Ca    8.3<L>      21 Aug 2023 06:58    TPro  5.9<L>  /  Alb  2.9<L>  /  TBili  0.6  /  DBili  x   /  AST  42<H>  /  ALT  49<H>  /  AlkPhos  94  08-21      
Pt seen, feeling fine, concerned about her infection    MEDICATIONS  (STANDING):  anastrozole 1 milliGRAM(s) Oral daily  apixaban 2.5 milliGRAM(s) Oral every 12 hours  atorvastatin 20 milliGRAM(s) Oral at bedtime  calcium carbonate    500 mG (Tums) Chewable 1 Tablet(s) Chew once  pantoprazole    Tablet 40 milliGRAM(s) Oral before breakfast  piperacillin/tazobactam IVPB.. 3.375 Gram(s) IV Intermittent every 8 hours  predniSONE   Tablet 5 milliGRAM(s) Oral every other day  sertraline 50 milliGRAM(s) Oral daily  sodium chloride 0.9%. 1000 milliLiter(s) (100 mL/Hr) IV Continuous <Continuous>  solifenacin 10 milliGRAM(s) Oral daily    MEDICATIONS  (PRN):  acetaminophen     Tablet .. 650 milliGRAM(s) Oral every 6 hours PRN Temp greater or equal to 38C (100.4F), Mild Pain (1 - 3)  aluminum hydroxide/magnesium hydroxide/simethicone Suspension 30 milliLiter(s) Oral every 4 hours PRN Dyspepsia  melatonin 3 milliGRAM(s) Oral at bedtime PRN Insomnia  ondansetron Injectable 4 milliGRAM(s) IV Push every 8 hours PRN Nausea and/or Vomiting      ROS  No fever, sweats, chills  No epistaxis, HA, sore throat  No CP, SOB, cough, sputum  No n/v/d, abd pain, melena, hematochezia  No edema  No rash  No anxiety  No back pain, joint pain  No bleeding, bruising  No dysuria, hematuria    Vital Signs Last 24 Hrs  T(C): 36.9 (19 Aug 2023 18:25), Max: 37.1 (18 Aug 2023 21:30)  T(F): 98.5 (19 Aug 2023 18:25), Max: 98.7 (18 Aug 2023 21:30)  HR: 67 (19 Aug 2023 18:25) (60 - 75)  BP: 118/63 (19 Aug 2023 18:25) (104/61 - 124/71)  BP(mean): --  RR: 18 (19 Aug 2023 18:25) (18 - 18)  SpO2: 96% (19 Aug 2023 18:25) (93% - 97%)    Parameters below as of 19 Aug 2023 18:25  Patient On (Oxygen Delivery Method): room air        PE  NAD  Awake, alert  Anicteric  limited 2/2 participation                          10.5   12.30 )-----------( 152      ( 19 Aug 2023 06:28 )             31.7       08-19    140  |  103  |  18  ----------------------------<  98  4.2   |  23  |  0.76    Ca    8.6      19 Aug 2023 06:28    TPro  5.5<L>  /  Alb  2.6<L>  /  TBili  0.6  /  DBili  x   /  AST  76<H>  /  ALT  64<H>  /  AlkPhos  86  08-19      
Name of Patient : VINCENT ENGLAND  MRN: [id number]  Date of visit: 23 @ 13:30      Subjective: Patient seen and examined. No new events except as noted.   Patient seen earlier this AM.  at the bedside. Patient afebrile, reports feeling better.   Up-trending LFTs, denies abdominal pain or discomfort.   Repeat blood cultures in lab.     REVIEW OF SYSTEMS:    CONSTITUTIONAL: Generalized weakness;  Fatigue   EYES/ENT: No visual changes;  No vertigo or throat pain   NECK: No pain or stiffness  RESPIRATORY: No cough, wheezing, hemoptysis; No shortness of breath  CARDIOVASCULAR: No chest pain or palpitations  GASTROINTESTINAL: No abdominal or epigastric pain. No nausea, vomiting, or hematemesis; No diarrhea or constipation. No melena or hematochezia.  GENITOURINARY: + Dysuria and Flank pain improving per patient    NEUROLOGICAL: No numbness or weakness  SKIN: No itching, burning, rashes, or lesions   All other review of systems is negative unless indicated above.    MEDICATIONS:  MEDICATIONS  (STANDING):  anastrozole 1 milliGRAM(s) Oral daily  apixaban 2.5 milliGRAM(s) Oral every 12 hours  atorvastatin 20 milliGRAM(s) Oral at bedtime  pantoprazole    Tablet 40 milliGRAM(s) Oral before breakfast  piperacillin/tazobactam IVPB.. 3.375 Gram(s) IV Intermittent every 8 hours  predniSONE   Tablet 5 milliGRAM(s) Oral every other day  sertraline 50 milliGRAM(s) Oral daily  sodium chloride 0.9%. 1000 milliLiter(s) (100 mL/Hr) IV Continuous <Continuous>  solifenacin 10 milliGRAM(s) Oral daily      PHYSICAL EXAM:  T(C): 36.6 (23 @ 12:30), Max: 37.6 (23 @ 22:11)  HR: 66 (23 @ 12:30) (58 - 100)  BP: 107/62 (23 @ 12:30) (95/60 - 124/59)  RR: 18 (23 @ 12:30) (18 - 18)  SpO2: 94% (23 @ 12:30) (91% - 97%)  Wt(kg): --  I&O's Summary    17 Aug 2023 07:01  -  18 Aug 2023 07:00  --------------------------------------------------------  IN: 0 mL / OUT: 100 mL / NET: -100 mL    18 Aug 2023 07:  -  18 Aug 2023 13:30  --------------------------------------------------------  IN: 0 mL / OUT: 400 mL / NET: -400 mL          Appearance: Awake, Generalized weakness, Sitting up in bed   HEENT:  Eyes are open   Lymphatic: No lymphadenopathy   Cardiovascular: Normal    Respiratory: normal effort , clear  Gastrointestinal:  Soft, non-tender   Skin: No rashes,  warm to touch  Psychiatry:  Mood & affect appropriate  Musculoskeletal: No edema      23 @ 07:01  -  23 @ 07:00  --------------------------------------------------------  IN: 0 mL / OUT: 100 mL / NET: -100 mL    23 @ 07:01  -  23 @ 13:30  --------------------------------------------------------  IN: 0 mL / OUT: 400 mL / NET: -400 mL                              10.4   12.21 )-----------( 113      ( 18 Aug 2023 09:45 )             32.3                   140  |  101  |  16  ----------------------------<  75  3.6   |  23  |  0.89    Ca    8.4      18 Aug 2023 09:45    TPro  5.6<L>  /  Alb  3.0<L>  /  TBili  0.9  /  DBili  x   /  AST  53<H>  /  ALT  48<H>  /  AlkPhos  75  08-18                   Urinalysis Basic - ( 18 Aug 2023 11:17 )    Color: Yellow / Appearance: Clear / S.030 / pH: x  Gluc: x / Ketone: Negative  / Bili: Negative / Urobili: 2 mg/dL   Blood: x / Protein: 30 mg/dL / Nitrite: Negative   Leuk Esterase: Moderate / RBC: 1 /hpf / WBC 9 /HPF   Sq Epi: x / Non Sq Epi: x / Bacteria: Negative        Culture - Blood (08.15.23 @ 20:52)   Gram Stain: Growth in aerobic bottle: Gram Negative Rods  Specimen Source: .Blood Blood-Peripheral  Culture Results: Growth in aerobic bottle: Gram Negative Rods   No growth at 24 hours    Culture - Blood (08.15.23 @ 20:50)   - Escherichia coli: Detec  Gram Stain:   Growth in aerobic bottle: Gram Negative Rods  - Cefoxitin: S <=8  - Cefazolin: I 4 Enterobacter, Klebsiella aerogenes, Citrobacter, and Serratia may develop resistance during prolonged therapy (3-4 days)  - Cefepime: S <=2  - Amikacin: S <=16  - Ampicillin: R >16 These ampicillin results predict results for amoxicillin  - Ampicillin/Sulbactam: I 16/8 Enterobacter, Klebsiella aerogenes, Citrobacter, and Serratia may develop resistance during prolonged therapy (3-4 days)  - Aztreonam: S <=4  - Meropenem: S <=1  - Trimethoprim/Sulfamethoxazole: R >2/38  - Ceftriaxone: S <=1 Enterobacter, Klebsiella aerogenes, Citrobacter, and Serratia may develop resistance during prolonged therapy  - Ciprofloxacin: S <=0.25  - Ertapenem: S <=0.5  - Gentamicin: R >8  - Imipenem: S <=1  - Levofloxacin: S <=0.5  - Piperacillin/Tazobactam: S <=8  - Tobramycin: I 8  Specimen Source: .Blood Blood-Peripheral  Organism: Blood Culture PCR  Organism: Escherichia coli  Culture Results:   Growth in aerobic bottle: Escherichia coli       < from: [facility] Duplex Lower Ext Vein Scan, Bilat (23 @ 20:35) >  IMPRESSION:  No evidence of deep venous thrombosis in either lower extremity.  Vinson cysts are visualized bilaterally.      < end of copied text >    
Name of Patient : VINCENT ENGLAND  MRN: [id number]  Date of visit: 08-17-23 @ 13:13      Subjective: Patient seen and examined. No new events except as noted.   Patient seen earlier this AM. Sitting up in bed.  at the bedside.   BCx returned with bacteremia. Am labs pending.   Patient denies chills or body aches.   Afebrile.     REVIEW OF SYSTEMS:    CONSTITUTIONAL: Generalized weakness;  Fatigue   EYES/ENT: No visual changes;  No vertigo or throat pain   NECK: No pain or stiffness  RESPIRATORY: No cough, wheezing, hemoptysis; No shortness of breath  CARDIOVASCULAR: No chest pain or palpitations  GASTROINTESTINAL: No abdominal or epigastric pain. No nausea, vomiting, or hematemesis; No diarrhea or constipation. No melena or hematochezia.  GENITOURINARY: + Dysuria and Flank pain improving per patient    NEUROLOGICAL: No numbness or weakness  SKIN: No itching, burning, rashes, or lesions   All other review of systems is negative unless indicated above.    MEDICATIONS:  MEDICATIONS  (STANDING):  anastrozole 1 milliGRAM(s) Oral daily  apixaban 2.5 milliGRAM(s) Oral every 12 hours  atorvastatin 20 milliGRAM(s) Oral at bedtime  pantoprazole    Tablet 40 milliGRAM(s) Oral before breakfast  piperacillin/tazobactam IVPB.. 3.375 Gram(s) IV Intermittent every 8 hours  predniSONE   Tablet 5 milliGRAM(s) Oral every other day  sertraline 50 milliGRAM(s) Oral daily  sodium chloride 0.9%. 1000 milliLiter(s) (100 mL/Hr) IV Continuous <Continuous>  solifenacin 10 milliGRAM(s) Oral daily      PHYSICAL EXAM:  T(C): 36.8 (08-17-23 @ 12:51), Max: 36.8 (08-17-23 @ 12:51)  HR: 64 (08-17-23 @ 12:51) (64 - 71)  BP: 107/69 (08-17-23 @ 12:51) (107/69 - 112/60)  RR: 17 (08-17-23 @ 12:51) (17 - 18)  SpO2: 92% (08-17-23 @ 12:51) (92% - 93%)  Wt(kg): --  I&O's Summary      Appearance: Awake, Generalized weakness, Sitting up in bed   HEENT:  Eyes are open   Lymphatic: No lymphadenopathy   Cardiovascular: Normal    Respiratory: normal effort , clear  Gastrointestinal:  Soft  : Flank Pain   Skin: No rashes,  warm to touch  Psychiatry:  Mood & affect appropriate  Musculoskeletal: LE edema                          11.3   15.17 )-----------( 128      ( 16 Aug 2023 07:08 )             34.2               08-16    136  |  102  |  14  ----------------------------<  124<H>  3.7   |  21<L>  |  0.92    Ca    8.5      16 Aug 2023 07:08    TPro  5.5<L>  /  Alb  3.3  /  TBili  1.0  /  DBili  x   /  AST  32  /  ALT  30  /  AlkPhos  64  08-16    PT/INR - ( 16 Aug 2023 07:08 )   PT: 16.2 sec;   INR: 1.49 ratio         PTT - ( 16 Aug 2023 07:08 )  PTT:29.8 sec                   Urinalysis Basic - ( 16 Aug 2023 07:08 )    Color: x / Appearance: x / SG: x / pH: x  Gluc: 124 mg/dL / Ketone: x  / Bili: x / Urobili: x   Blood: x / Protein: x / Nitrite: x   Leuk Esterase: x / RBC: x / WBC x   Sq Epi: x / Non Sq Epi: x / Bacteria: x          Culture - Blood (08.15.23 @ 20:52)   Specimen Source: .Blood Blood-Peripheral  Culture Results: No growth at 24 hours    Culture - Blood (08.15.23 @ 20:50)   - Escherichia coli: Detec  Gram Stain: Growth in aerobic bottle: Gram Negative Rods  Specimen Source: .Blood Blood-Peripheral  Organism: Blood Culture PCR  Culture Results: Growth in aerobic bottle: Escherichia coli     < from: US Kidney and Bladder (08.16.23 @ 16:17) >  IMPRESSION:    Normal renal ultrasound. No hydronephrosis. No renal abscess.      < end of copied text >    < from: US Transvaginal (08.16.23 @ 16:14) >  IMPRESSION:    Full thickness of the endometrium is within normal limits measuring 3 to   4 mm.  Fluid is noted to distend the endometrial cavity and the endocervical   canal.      < end of copied text >        < from: TTE W or WO Ultrasound Enhancing Agent (08.17.23 @ 07:12) >  CONCLUSIONS:      1. Normal left ventricular cavity size. Left ventricular wall thickness is normal. Left ventricular systolic function is normal with an ejection fraction of 63 % by Jones's method of disks. There are no regional wall motion abnormalities seen.   2. There is moderate (grade 2) left ventricular diastolic dysfunction, with normal filling pressure.   3. Moderately enlarged right ventricular cavity size and reduced systolic right ventricular function. The tricuspid annular plane systolic excursion (TAPSE) is 1.8 cm (normal >=1.7 cm).   4. Moderate-to-severe aortic stenosis.   5. Trace aortic regurgitation.   6. No pericardial effusion seen.   7. Estimated pulmonary artery systolic pressure is 35 mmHg.   8. No prior echocardiogram is available for comparison.    < end of copied text >  
Patient is a 78y old  Female who presents with a chief complaint of fever + generalized weakness/fatigue/malaise (17 Aug 2023 13:13)    Pt seen and examined at bedside, feels ok.    MEDICATIONS  (STANDING):  anastrozole 1 milliGRAM(s) Oral daily  apixaban 2.5 milliGRAM(s) Oral every 12 hours  atorvastatin 20 milliGRAM(s) Oral at bedtime  pantoprazole    Tablet 40 milliGRAM(s) Oral before breakfast  piperacillin/tazobactam IVPB.. 3.375 Gram(s) IV Intermittent every 8 hours  predniSONE   Tablet 5 milliGRAM(s) Oral every other day  sertraline 50 milliGRAM(s) Oral daily  sodium chloride 0.9%. 1000 milliLiter(s) (100 mL/Hr) IV Continuous <Continuous>  solifenacin 10 milliGRAM(s) Oral daily    MEDICATIONS  (PRN):  acetaminophen     Tablet .. 650 milliGRAM(s) Oral every 6 hours PRN Temp greater or equal to 38C (100.4F), Mild Pain (1 - 3)  aluminum hydroxide/magnesium hydroxide/simethicone Suspension 30 milliLiter(s) Oral every 4 hours PRN Dyspepsia  melatonin 3 milliGRAM(s) Oral at bedtime PRN Insomnia  ondansetron Injectable 4 milliGRAM(s) IV Push every 8 hours PRN Nausea and/or Vomiting      Vital Signs Last 24 Hrs  T(C): 36.8 (17 Aug 2023 12:51), Max: 36.8 (17 Aug 2023 12:51)  T(F): 98.2 (17 Aug 2023 12:51), Max: 98.2 (17 Aug 2023 12:51)  HR: 64 (17 Aug 2023 12:51) (64 - 71)  BP: 107/69 (17 Aug 2023 12:51) (107/69 - 112/60)  BP(mean): --  RR: 17 (17 Aug 2023 12:51) (17 - 18)  SpO2: 92% (17 Aug 2023 12:51) (92% - 93%)    Parameters below as of 17 Aug 2023 12:51  Patient On (Oxygen Delivery Method): room air        PE  NAD  Awake, alert  Anicteric, MMM  No c/c/e  No rash grossly  FROM                          11.3   15.17 )-----------( 128      ( 16 Aug 2023 07:08 )             34.2       08-16    136  |  102  |  14  ----------------------------<  124<H>  3.7   |  21<L>  |  0.92    Ca    8.5      16 Aug 2023 07:08    TPro  5.5<L>  /  Alb  3.3  /  TBili  1.0  /  DBili  x   /  AST  32  /  ALT  30  /  AlkPhos  64  08-16      
Patient is a 78y old  Female who presents with a chief complaint of fever + generalized weakness/fatigue/malaise (17 Aug 2023 15:30)    Pt seen and examined at bedside, feeling a bit worse this morning.    MEDICATIONS  (STANDING):  anastrozole 1 milliGRAM(s) Oral daily  apixaban 2.5 milliGRAM(s) Oral every 12 hours  atorvastatin 20 milliGRAM(s) Oral at bedtime  pantoprazole    Tablet 40 milliGRAM(s) Oral before breakfast  piperacillin/tazobactam IVPB.. 3.375 Gram(s) IV Intermittent every 8 hours  predniSONE   Tablet 5 milliGRAM(s) Oral every other day  sertraline 50 milliGRAM(s) Oral daily  sodium chloride 0.9%. 1000 milliLiter(s) (100 mL/Hr) IV Continuous <Continuous>  solifenacin 10 milliGRAM(s) Oral daily    MEDICATIONS  (PRN):  acetaminophen     Tablet .. 650 milliGRAM(s) Oral every 6 hours PRN Temp greater or equal to 38C (100.4F), Mild Pain (1 - 3)  aluminum hydroxide/magnesium hydroxide/simethicone Suspension 30 milliLiter(s) Oral every 4 hours PRN Dyspepsia  melatonin 3 milliGRAM(s) Oral at bedtime PRN Insomnia  ondansetron Injectable 4 milliGRAM(s) IV Push every 8 hours PRN Nausea and/or Vomiting    Vital Signs Last 24 Hrs  T(C): 36.7 (18 Aug 2023 09:11), Max: 37.6 (17 Aug 2023 22:11)  T(F): 98 (18 Aug 2023 09:11), Max: 99.7 (17 Aug 2023 22:11)  HR: 58 (18 Aug 2023 09:11) (58 - 100)  BP: 97/58 (18 Aug 2023 09:11) (95/60 - 124/59)  BP(mean): --  RR: 18 (18 Aug 2023 09:11) (17 - 18)  SpO2: 93% (18 Aug 2023 09:11) (91% - 97%)    Parameters below as of 18 Aug 2023 09:11  Patient On (Oxygen Delivery Method): room air        PE  NAD  Awake, alert  Anicteric, MMM  No c/c/e  No rash grossly  FROM                          10.4   12.21 )-----------( 113      ( 18 Aug 2023 09:45 )             32.3       08-18    140  |  101  |  16  ----------------------------<  75  3.6   |  23  |  0.89    Ca    8.4      18 Aug 2023 09:45    TPro  5.6<L>  /  Alb  3.0<L>  /  TBili  0.9  /  DBili  x   /  AST  53<H>  /  ALT  48<H>  /  AlkPhos  75  08-18      
  Follow Up:  bacteremia    Interval History/ROS: no more fever or rigors and  no dysuria or vomiting, repeat cx was positive and u/s is showing a renal cyst which is complex and could be hemorrhagic or infected        Allergies  No Known Allergies        ANTIMICROBIALS:  piperacillin/tazobactam IVPB.. 3.375 every 8 hours      OTHER MEDS:  acetaminophen     Tablet .. 650 milliGRAM(s) Oral every 6 hours PRN  aluminum hydroxide/magnesium hydroxide/simethicone Suspension 30 milliLiter(s) Oral every 4 hours PRN  anastrozole 1 milliGRAM(s) Oral daily  apixaban 2.5 milliGRAM(s) Oral every 12 hours  atorvastatin 20 milliGRAM(s) Oral at bedtime  calcium carbonate    500 mG (Tums) Chewable 1 Tablet(s) Chew once  fluticasone propionate 50 MICROgram(s)/spray Nasal Spray 1 Spray(s) Both Nostrils two times a day PRN  melatonin 3 milliGRAM(s) Oral at bedtime PRN  ondansetron Injectable 4 milliGRAM(s) IV Push every 8 hours PRN  pantoprazole    Tablet 40 milliGRAM(s) Oral before breakfast  predniSONE   Tablet 5 milliGRAM(s) Oral every other day  sertraline 50 milliGRAM(s) Oral daily  sodium chloride 0.9%. 1000 milliLiter(s) IV Continuous <Continuous>  solifenacin 10 milliGRAM(s) Oral daily      Vital Signs Last 24 Hrs  T(C): 36.9 (21 Aug 2023 16:00), Max: 37.6 (20 Aug 2023 21:04)  T(F): 98.5 (21 Aug 2023 16:00), Max: 99.6 (20 Aug 2023 21:04)  HR: 77 (21 Aug 2023 16:00) (68 - 83)  BP: 108/60 (21 Aug 2023 16:00) (108/60 - 117/51)  BP(mean): --  RR: 18 (21 Aug 2023 16:00) (18 - 18)  SpO2: 93% (21 Aug 2023 16:00) (93% - 96%)    Parameters below as of 21 Aug 2023 16:00  Patient On (Oxygen Delivery Method): room air        Physical Exam:  General:    NAD,  non toxic  Respiratory:    comfortable on RA  abd:     soft,   BS +,   no tenderness  :   no CVAT,  no suprapubic tenderness,   no  sandra  Musculoskeletal:   no joint swelling  vascular: no phlebitis  Skin:    no rash                        10.6   14.65 )-----------( 238      ( 21 Aug 2023 06:58 )             32.4       08-21    138  |  105  |  15  ----------------------------<  82  4.1   |  20<L>  |  0.86    Ca    8.3<L>      21 Aug 2023 06:58    TPro  5.9<L>  /  Alb  2.9<L>  /  TBili  0.6  /  DBili  x   /  AST  42<H>  /  ALT  49<H>  /  AlkPhos  94  08-21      Urinalysis Basic - ( 21 Aug 2023 06:58 )    Color: x / Appearance: x / SG: x / pH: x  Gluc: 82 mg/dL / Ketone: x  / Bili: x / Urobili: x   Blood: x / Protein: x / Nitrite: x   Leuk Esterase: x / RBC: x / WBC x   Sq Epi: x / Non Sq Epi: x / Bacteria: x        MICROBIOLOGY:  v  Clean Catch Clean Catch (Midstream)  08-18-23   No growth  --  --      .Blood Blood  08-18-23   Growth in aerobic bottle: Escherichia coli  See previous culture [id number]  --    Growth in aerobic bottle: Gram Negative Rods      .Blood Blood-Peripheral  08-15-23   Growth in aerobic bottle: Escherichia coli  See previous culture [id number]  --    Growth in aerobic bottle: Gram Negative Rods      .Blood Blood-Peripheral  08-15-23   Growth in aerobic and anaerobic bottles: Escherichia coli  Direct identification is available within approximately 3-5  hours either by Blood Panel Multiplexed PCR or Direct  MALDI-TOF. Details: [email or web address]  --  Blood Culture PCR  Escherichia coli          Rapid RVP Result: Zechariahtedaniela (08-15 @ 21:00)        RADIOLOGY:  Images independently visualized and reviewed personally, findings as below  < from: US Abdomen Complete (US Abdomen Complete .) (08.21.23 @ 10:26) >  IMPRESSION:  Diffusely increased hepatic echogenicity, compatible with fatty liver   disease.    The gallbladder is moderately distended and otherwise unremarkable.    A 3.2 x 3.0 x 4.1 cm right lower pole complex  renal cyst with septations   and heterogeneous internal echogenic material without internal   vascularity.  This may representhemorrhagic material/debris. In the   setting of fever, superinfection of the cyst could not be excluded.  This   cyst has increased in size compared to prior CT. Further evaluation with   contrast-enhanced renal  CT or MR is recommended for  more definitive   characterization and to exclude renal neoplasm..      < end of copied text >  
  Follow Up:  bacteremia    Interval History/ROS: pt still rigots but no fever, now  has some urinary retention, no dysuria or vomiting, some nausea         Allergies  No Known Allergies        ANTIMICROBIALS:  piperacillin/tazobactam IVPB.. 3.375 every 8 hours      OTHER MEDS:  acetaminophen     Tablet .. 650 milliGRAM(s) Oral every 6 hours PRN  aluminum hydroxide/magnesium hydroxide/simethicone Suspension 30 milliLiter(s) Oral every 4 hours PRN  anastrozole 1 milliGRAM(s) Oral daily  apixaban 2.5 milliGRAM(s) Oral every 12 hours  atorvastatin 20 milliGRAM(s) Oral at bedtime  melatonin 3 milliGRAM(s) Oral at bedtime PRN  ondansetron Injectable 4 milliGRAM(s) IV Push every 8 hours PRN  pantoprazole    Tablet 40 milliGRAM(s) Oral before breakfast  predniSONE   Tablet 5 milliGRAM(s) Oral every other day  sertraline 50 milliGRAM(s) Oral daily  sodium chloride 0.9%. 1000 milliLiter(s) IV Continuous <Continuous>  solifenacin 10 milliGRAM(s) Oral daily      Vital Signs Last 24 Hrs  T(C): 36.6 (18 Aug 2023 12:30), Max: 37.6 (17 Aug 2023 22:11)  T(F): 97.8 (18 Aug 2023 12:30), Max: 99.7 (17 Aug 2023 22:11)  HR: 66 (18 Aug 2023 12:30) (58 - 100)  BP: 107/62 (18 Aug 2023 12:30) (95/60 - 124/59)  BP(mean): --  RR: 18 (18 Aug 2023 12:30) (18 - 18)  SpO2: 94% (18 Aug 2023 12:30) (91% - 97%)    Parameters below as of 18 Aug 2023 12:30  Patient On (Oxygen Delivery Method): room air        Physical Exam:  General:    NAD,  non toxic  Respiratory:    comfortable on RA  abd:     soft,   BS +,   no tenderness  :   no CVAT,  no suprapubic tenderness,   no  sandra  Musculoskeletal:   no joint swelling  vascular: no phlebitis  Skin:    no rash                        10.4   12.21 )-----------( 113      ( 18 Aug 2023 09:45 )             32.3       08-18    140  |  101  |  16  ----------------------------<  75  3.6   |  23  |  0.89    Ca    8.4      18 Aug 2023 09:45    TPro  5.6<L>  /  Alb  3.0<L>  /  TBili  0.9  /  DBili  x   /  AST  53<H>  /  ALT  48<H>  /  AlkPhos  75  08      Urinalysis Basic - ( 18 Aug 2023 11:17 )    Color: Yellow / Appearance: Clear / S.030 / pH: x  Gluc: x / Ketone: Negative  / Bili: Negative / Urobili: 2 mg/dL   Blood: x / Protein: 30 mg/dL / Nitrite: Negative   Leuk Esterase: Moderate / RBC: 1 /hpf / WBC 9 /HPF   Sq Epi: x / Non Sq Epi: x / Bacteria: Negative        MICROBIOLOGY:  v  .Blood Blood-Peripheral  08-15-23   Growth in aerobic bottle: Gram Negative Rods  No growth at 24 hours  --    Growth in aerobic bottle: Gram Negative Rods      .Blood Blood-Peripheral  08-15-23   Growth in aerobic bottle: Escherichia coli  Direct identification is available within approximately 3-5  hours either by Blood Panel Multiplexed PCR or Direct  MALDI-TOF. Details: [email or web address]  --  Blood Culture PCR  Escherichia coli          Rapid RVP Result: NotDetec (08-15 @ 21:00)        RADIOLOGY:  Images independently visualized and reviewed personally, findings as below  < from: [facility] Duplex Lower Ext Vein Scan, Bilat (23 @ 20:35) >  IMPRESSION:  No evidence of deep venous thrombosis in either lower extremity.  Vinson cysts are visualized bilaterally.    < end of copied text >  < from: US Kidney and Bladder (23 @ 16:17) >  IMPRESSION:    Normal renal ultrasound. No hydronephrosis. No renal abscess.      < end of copied text >  < from: Xray Chest 1 View AP/PA (08.15.23 @ 23:01) >    IMPRESSION:  Clear lungs.    < end of copied text >  
Name of Patient : VINCENT ENGLAND  MRN: [id number]  Date of visit: 08-23-23       Subjective: Patient seen and examined. No new events except as noted.     REVIEW OF SYSTEMS:    CONSTITUTIONAL: No weakness, fevers or chills  EYES/ENT: No visual changes;  No vertigo or throat pain   NECK: No pain or stiffness  RESPIRATORY: No cough, wheezing, hemoptysis; No shortness of breath  CARDIOVASCULAR: No chest pain or palpitations  GASTROINTESTINAL: No abdominal or epigastric pain. No nausea, vomiting, or hematemesis; No diarrhea or constipation. No melena or hematochezia.  GENITOURINARY: No dysuria, frequency or hematuria  NEUROLOGICAL: No numbness or weakness  SKIN: No itching, burning, rashes, or lesions   All other review of systems is negative unless indicated above.    MEDICATIONS:  MEDICATIONS  (STANDING):  anastrozole 1 milliGRAM(s) Oral daily  apixaban 2.5 milliGRAM(s) Oral every 12 hours  atorvastatin 20 milliGRAM(s) Oral at bedtime  calcium carbonate    500 mG (Tums) Chewable 1 Tablet(s) Chew once  pantoprazole    Tablet 40 milliGRAM(s) Oral before breakfast  predniSONE   Tablet 5 milliGRAM(s) Oral every other day  sertraline 50 milliGRAM(s) Oral daily  sodium chloride 0.9%. 1000 milliLiter(s) (100 mL/Hr) IV Continuous <Continuous>  solifenacin 10 milliGRAM(s) Oral daily      PHYSICAL EXAM:  T(C): 36.3 (08-23-23 @ 14:07), Max: 36.8 (08-23-23 @ 00:48)  HR: 81 (08-23-23 @ 14:07) (59 - 89)  BP: 121/69 (08-23-23 @ 14:07) (102/64 - 127/75)  RR: 18 (08-23-23 @ 14:07) (18 - 18)  SpO2: 99% (08-23-23 @ 14:07) (93% - 99%)  Wt(kg): --  I&O's Summary    22 Aug 2023 07:01  -  23 Aug 2023 07:00  --------------------------------------------------------  IN: 0 mL / OUT: 800 mL / NET: -800 mL    Appearance: Normal	  HEENT:  PERRLA   Lymphatic: No lymphadenopathy   Cardiovascular: Normal S1 S2, no JVD  Respiratory: normal effort , clear  Gastrointestinal:  Soft, Non-tender  Skin: No rashes,  warm to touch  Psychiatry:  Mood & affect appropriate  Musculuskeletal: No edema    recent labs, Imaging and EKGs personally reviewed     08-22-23 @ 07:01  -  08-23-23 @ 07:00  --------------------------------------------------------  IN: 0 mL / OUT: 800 mL / NET: -800 mL                          11.5   15.17 )-----------( 331      ( 23 Aug 2023 06:42 )             35.5               08-23    138  |  104  |  17  ----------------------------<  109<H>  4.0   |  21<L>  |  0.82    Ca    8.5      23 Aug 2023 06:43    TPro  6.2  /  Alb  3.2<L>  /  TBili  0.8  /  DBili  x   /  AST  38  /  ALT  45  /  AlkPhos  101  08-22                       Urinalysis Basic - ( 23 Aug 2023 06:43 )    Color: x / Appearance: x / SG: x / pH: x  Gluc: 109 mg/dL / Ketone: x  / Bili: x / Urobili: x   Blood: x / Protein: x / Nitrite: x   Leuk Esterase: x / RBC: x / WBC x   Sq Epi: x / Non Sq Epi: x / Bacteria: x              
Name of Patient : VINCENT ENGLAND  MRN: [id number]  Date of visit: 08-16-23 @ 12:41      Subjective: Patient seen and examined. No new events except as noted.   Patient seen earlier this AM.  at the bedside. Patient states that she feels weak. Denies chest pain or palpitations.   Discussed with Oncology, Blood cultures from [facility] with Gram neg rods.   Patient febrile overnight.       REVIEW OF SYSTEMS:    CONSTITUTIONAL: Generalized weakness; Febrile overnight; Fatigue; Malaise   EYES/ENT: No visual changes;  No vertigo or throat pain   NECK: No pain or stiffness  RESPIRATORY: No cough, wheezing, hemoptysis; No shortness of breath  CARDIOVASCULAR: No chest pain or palpitations  GASTROINTESTINAL: No abdominal or epigastric pain. No nausea, vomiting, or hematemesis; No diarrhea or constipation. No melena or hematochezia.  GENITOURINARY: + Dysuria and Flank pain   NEUROLOGICAL: No numbness or weakness  SKIN: No itching, burning, rashes, or lesions   All other review of systems is negative unless indicated above.    MEDICATIONS:  MEDICATIONS  (STANDING):  anastrozole 1 milliGRAM(s) Oral daily  apixaban 2.5 milliGRAM(s) Oral every 12 hours  atorvastatin 20 milliGRAM(s) Oral at bedtime  pantoprazole    Tablet 40 milliGRAM(s) Oral before breakfast  piperacillin/tazobactam IVPB.. 3.375 Gram(s) IV Intermittent every 8 hours  predniSONE   Tablet 5 milliGRAM(s) Oral every other day  sertraline 50 milliGRAM(s) Oral daily  sodium chloride 0.9%. 1000 milliLiter(s) (100 mL/Hr) IV Continuous <Continuous>  solifenacin 10 milliGRAM(s) Oral daily      PHYSICAL EXAM:  T(C): 37.2 (08-16-23 @ 11:01), Max: 38.2 (08-15-23 @ 20:30)  HR: 81 (08-16-23 @ 11:01) (72 - 89)  BP: 125/71 (08-16-23 @ 11:01) (92/50 - 131/74)  RR: 18 (08-16-23 @ 11:01) (18 - 20)  SpO2: 97% (08-16-23 @ 11:01) (93% - 97%)  Wt(kg): --  I&O's Summary    Height (cm): 165.1 (08-15 @ 18:55)  Weight (kg): 72.6 (08-15 @ 18:55)  BMI (kg/m2): 26.6 (08-15 @ 18:55)  BSA (m2): 1.8 (08-15 @ 18:55)    Appearance: Awake, Generalized weakness, Sitting up in bed   HEENT:  Eyes are open   Lymphatic: No lymphadenopathy   Cardiovascular: Normal    Respiratory: normal effort , clear  Gastrointestinal:  Soft  : Flank Pain   Skin: No rashes,  warm to touch  Psychiatry:  Mood & affect appropriate  Musculoskeletal: LE edema        < from: Xray Chest 1 View AP/PA (08.15.23 @ 23:01) >    IMPRESSION:  Clear lungs.    < end of copied text >            
  Follow Up:  bacteremia    Interval History/ROS: no more fever or rigors but c/o generalized weakness no dysuria or vomiting, going for MRI today            Allergies  No Known Allergies        ANTIMICROBIALS:  piperacillin/tazobactam IVPB.. 3.375 every 8 hours      OTHER MEDS:  acetaminophen     Tablet .. 650 milliGRAM(s) Oral every 6 hours PRN  aluminum hydroxide/magnesium hydroxide/simethicone Suspension 30 milliLiter(s) Oral every 4 hours PRN  anastrozole 1 milliGRAM(s) Oral daily  apixaban 2.5 milliGRAM(s) Oral every 12 hours  atorvastatin 20 milliGRAM(s) Oral at bedtime  calcium carbonate    500 mG (Tums) Chewable 1 Tablet(s) Chew once  fluticasone propionate 50 MICROgram(s)/spray Nasal Spray 1 Spray(s) Both Nostrils two times a day PRN  melatonin 3 milliGRAM(s) Oral at bedtime PRN  ondansetron Injectable 4 milliGRAM(s) IV Push every 8 hours PRN  pantoprazole    Tablet 40 milliGRAM(s) Oral before breakfast  predniSONE   Tablet 5 milliGRAM(s) Oral every other day  sertraline 50 milliGRAM(s) Oral daily  sodium chloride 0.9%. 1000 milliLiter(s) IV Continuous <Continuous>  solifenacin 10 milliGRAM(s) Oral daily      Vital Signs Last 24 Hrs  T(C): 37.2 (22 Aug 2023 08:13), Max: 37.3 (22 Aug 2023 04:26)  T(F): 99 (22 Aug 2023 08:13), Max: 99.1 (22 Aug 2023 04:26)  HR: 76 (22 Aug 2023 08:13) (63 - 83)  BP: 119/77 (22 Aug 2023 08:13) (108/60 - 121/77)  BP(mean): --  RR: 18 (22 Aug 2023 08:13) (18 - 18)  SpO2: 94% (22 Aug 2023 08:13) (92% - 95%)    Parameters below as of 22 Aug 2023 08:13  Patient On (Oxygen Delivery Method): room air        Physical Exam:  General:    NAD,  non toxic  Respiratory:    comfortable on RA  abd:     soft,   BS +,   no tenderness  :   no CVAT,  no suprapubic tenderness,   no  sandra  Musculoskeletal:   no joint swelling  vascular: no phlebitis  Skin:    no rash                          11.2   15.72 )-----------( 282      ( 22 Aug 2023 06:01 )             34.3       08-22    138  |  106  |  13  ----------------------------<  101<H>  4.3   |  21<L>  |  0.77    Ca    8.7      22 Aug 2023 06:01    TPro  6.2  /  Alb  3.2<L>  /  TBili  0.8  /  DBili  x   /  AST  38  /  ALT  45  /  AlkPhos  101  08-22      Urinalysis Basic - ( 22 Aug 2023 06:01 )    Color: x / Appearance: x / SG: x / pH: x  Gluc: 101 mg/dL / Ketone: x  / Bili: x / Urobili: x   Blood: x / Protein: x / Nitrite: x   Leuk Esterase: x / RBC: x / WBC x   Sq Epi: x / Non Sq Epi: x / Bacteria: x        MICROBIOLOGY:  v  Clean Catch Clean Catch (Midstream)  08-18-23   No growth  --  --      .Blood Blood  08-18-23   Growth in aerobic bottle: Escherichia coli  See previous culture [id number]  --    Growth in aerobic bottle: Gram Negative Rods      .Blood Blood-Peripheral  08-15-23   Growth in aerobic bottle: Escherichia coli  See previous culture [id number]  --    Growth in aerobic bottle: Gram Negative Rods      .Blood Blood-Peripheral  08-15-23   Growth in aerobic and anaerobic bottles: Escherichia coli  Direct identification is available within approximately 3-5  hours either by Blood Panel Multiplexed PCR or Direct  MALDI-TOF. Details: [email or web address]  --  Blood Culture PCR  Escherichia coli          Rapid RVP Result: NotDetec (08-15 @ 21:00)        RADIOLOGY:  Images independently visualized and reviewed personally, findings as below  < from: US Abdomen Complete (US Abdomen Complete .) (08.21.23 @ 10:26) >  IMPRESSION:  Diffusely increased hepatic echogenicity, compatible with fatty liver   disease.    The gallbladder is moderately distended and otherwise unremarkable.    A 3.2 x 3.0 x 4.1 cm right lower pole complex  renal cyst with septations   and heterogeneous internal echogenic material without internal   vascularity.  This may representhemorrhagic material/debris. In the   setting of fever, superinfection of the cyst could not be excluded.  This   cyst has increased in size compared to prior CT. Further evaluation with   contrast-enhanced renal  CT or MR is recommended for  more definitive   characterization and to exclude renal neoplasm..      < end of copied text >  < from: Xray Chest 1 View AP/PA (08.15.23 @ 23:01) >  IMPRESSION:  Clear lungs.    < end of copied text >  
Name of Patient : VINCENT ENGLAND  MRN: [id number]  Date of visit: 08-22-23 @ 15:42      Subjective: Patient seen and examined. No new events except as noted.   Patient seen earlier this AM. Lying down in bed.  at the bedside. Patient reports feeling weaker and more fatigued this AM.   Up-trending WBC. Repeat BCx2 w/ NGTD.   Planned for MR Abdomen today to R/o infected renal cyst with Ativan prior to MRI.   Continues on IV ABX.     REVIEW OF SYSTEMS:    CONSTITUTIONAL: + Generalized weakness; Fatigue   EYES/ENT: No visual changes;  No vertigo or throat pain   NECK: No pain or stiffness  RESPIRATORY: No cough, wheezing, hemoptysis; No shortness of breath  CARDIOVASCULAR: No chest pain or palpitations  GASTROINTESTINAL: No abdominal or epigastric pain. No nausea, vomiting, or hematemesis; No diarrhea or constipation. No melena or hematochezia.  GENITOURINARY: No dysuria, frequency or hematuria  NEUROLOGICAL: No numbness or weakness  SKIN: No itching, burning, rashes, or lesions   All other review of systems is negative unless indicated above.    MEDICATIONS:  MEDICATIONS  (STANDING):  anastrozole 1 milliGRAM(s) Oral daily  apixaban 2.5 milliGRAM(s) Oral every 12 hours  atorvastatin 20 milliGRAM(s) Oral at bedtime  calcium carbonate    500 mG (Tums) Chewable 1 Tablet(s) Chew once  pantoprazole    Tablet 40 milliGRAM(s) Oral before breakfast  piperacillin/tazobactam IVPB.. 3.375 Gram(s) IV Intermittent every 8 hours  predniSONE   Tablet 5 milliGRAM(s) Oral every other day  sertraline 50 milliGRAM(s) Oral daily  sodium chloride 0.9%. 1000 milliLiter(s) (100 mL/Hr) IV Continuous <Continuous>  solifenacin 10 milliGRAM(s) Oral daily      PHYSICAL EXAM:  T(C): 36.8 (08-22-23 @ 12:28), Max: 37.3 (08-22-23 @ 04:26)  HR: 83 (08-22-23 @ 12:28) (63 - 83)  BP: 109/65 (08-22-23 @ 12:28) (108/60 - 121/77)  RR: 18 (08-22-23 @ 12:28) (18 - 18)  SpO2: 91% (08-22-23 @ 12:28) (91% - 95%)  Wt(kg): --  I&O's Summary    21 Aug 2023 07:01  -  22 Aug 2023 07:00  --------------------------------------------------------  IN: 0 mL / OUT: 800 mL / NET: -800 mL    22 Aug 2023 07:01  -  22 Aug 2023 15:42  --------------------------------------------------------  IN: 0 mL / OUT: 800 mL / NET: -800 mL        Appearance: Awake, Generalized weakness, Sitting up in bed   HEENT:  Eyes are open; Glasses    Lymphatic: No lymphadenopathy   Cardiovascular: Normal    Respiratory: normal effort , clear  Gastrointestinal:  Soft, non-tender   Skin: No rashes,  warm to touch  Psychiatry:  Mood & affect appropriate  Musculoskeletal: No edema        08-21-23 @ 07:01  -  08-22-23 @ 07:00  --------------------------------------------------------  IN: 0 mL / OUT: 800 mL / NET: -800 mL    08-22-23 @ 07:01  -  08-22-23 @ 15:42  --------------------------------------------------------  IN: 0 mL / OUT: 800 mL / NET: -800 mL                                  11.2   15.72 )-----------( 282      ( 22 Aug 2023 06:01 )             34.3               08-22    138  |  106  |  13  ----------------------------<  101<H>  4.3   |  21<L>  |  0.77    Ca    8.7      22 Aug 2023 06:01    TPro  6.2  /  Alb  3.2<L>  /  TBili  0.8  /  DBili  x   /  AST  38  /  ALT  45  /  AlkPhos  101  08-22                   Urinalysis Basic - ( 22 Aug 2023 06:01 )    Color: x / Appearance: x / SG: x / pH: x  Gluc: 101 mg/dL / Ketone: x  / Bili: x / Urobili: x   Blood: x / Protein: x / Nitrite: x   Leuk Esterase: x / RBC: x / WBC x   Sq Epi: x / Non Sq Epi: x / Bacteria: x      Culture - Blood in AM (08.21.23 @ 06:58)   Specimen Source: .Blood Blood  Culture Results: No growth at 24 hours    Culture - Blood in AM (08.21.23 @ 06:58)   Specimen Source: .Blood Blood  Culture Results: No growth at 24 hours    Culture - Urine (08.18.23 @ 13:23)   Specimen Source: Clean Catch Clean Catch (Midstream)  Culture Results: No growth    Culture - Blood in AM (08.18.23 @ 09:45)   Specimen Source: .Blood Blood  Culture Results: No growth at 4 days    Culture - Blood in AM (08.18.23 @ 09:45)   Gram Stain: Growth in aerobic bottle: Gram Negative Rods  Specimen Source: .Blood Blood  Culture Results: Growth in aerobic bottle: Escherichia coli     
Patient is a 78y old  Female who presents with a chief complaint of fever + generalized weakness/fatigue/malaise (22 Aug 2023 09:40)    Pt seen and examined at bedside, feels weaker today.    MEDICATIONS  (STANDING):  anastrozole 1 milliGRAM(s) Oral daily  apixaban 2.5 milliGRAM(s) Oral every 12 hours  atorvastatin 20 milliGRAM(s) Oral at bedtime  calcium carbonate    500 mG (Tums) Chewable 1 Tablet(s) Chew once  LORazepam   Injectable 1 milliGRAM(s) IV Push once  pantoprazole    Tablet 40 milliGRAM(s) Oral before breakfast  piperacillin/tazobactam IVPB.. 3.375 Gram(s) IV Intermittent every 8 hours  predniSONE   Tablet 5 milliGRAM(s) Oral every other day  sertraline 50 milliGRAM(s) Oral daily  sodium chloride 0.9%. 1000 milliLiter(s) (100 mL/Hr) IV Continuous <Continuous>  solifenacin 10 milliGRAM(s) Oral daily    MEDICATIONS  (PRN):  acetaminophen     Tablet .. 650 milliGRAM(s) Oral every 6 hours PRN Temp greater or equal to 38C (100.4F), Mild Pain (1 - 3)  aluminum hydroxide/magnesium hydroxide/simethicone Suspension 30 milliLiter(s) Oral every 4 hours PRN Dyspepsia  fluticasone propionate 50 MICROgram(s)/spray Nasal Spray 1 Spray(s) Both Nostrils two times a day PRN allergic rhinitis  melatonin 3 milliGRAM(s) Oral at bedtime PRN Insomnia  ondansetron Injectable 4 milliGRAM(s) IV Push every 8 hours PRN Nausea and/or Vomiting      Vital Signs Last 24 Hrs  T(C): 37.2 (22 Aug 2023 08:13), Max: 37.3 (22 Aug 2023 04:26)  T(F): 99 (22 Aug 2023 08:13), Max: 99.1 (22 Aug 2023 04:26)  HR: 76 (22 Aug 2023 08:13) (63 - 79)  BP: 119/77 (22 Aug 2023 08:13) (108/60 - 121/77)  BP(mean): --  RR: 18 (22 Aug 2023 08:13) (18 - 18)  SpO2: 94% (22 Aug 2023 08:13) (92% - 95%)    Parameters below as of 22 Aug 2023 08:13  Patient On (Oxygen Delivery Method): room air        PE  NAD  Awake, alert  Anicteric, MMM  No c/c/e  No rash grossly  FROM                          11.2   15.72 )-----------( 282      ( 22 Aug 2023 06:01 )             34.3       08-22    138  |  106  |  13  ----------------------------<  101<H>  4.3   |  21<L>  |  0.77    Ca    8.7      22 Aug 2023 06:01    TPro  6.2  /  Alb  3.2<L>  /  TBili  0.8  /  DBili  x   /  AST  38  /  ALT  45  /  AlkPhos  101  08-22      
Patient is a 78y old  Female who presents with a chief complaint of fever + generalized weakness/fatigue/malaise (23 Aug 2023 13:36)    Pt seen and examined, feels well. No abd pain.    MEDICATIONS  (STANDING):  anastrozole 1 milliGRAM(s) Oral daily  apixaban 2.5 milliGRAM(s) Oral every 12 hours  atorvastatin 20 milliGRAM(s) Oral at bedtime  calcium carbonate    500 mG (Tums) Chewable 1 Tablet(s) Chew once  pantoprazole    Tablet 40 milliGRAM(s) Oral before breakfast  predniSONE   Tablet 5 milliGRAM(s) Oral every other day  sertraline 50 milliGRAM(s) Oral daily  sodium chloride 0.9%. 1000 milliLiter(s) (100 mL/Hr) IV Continuous <Continuous>  solifenacin 10 milliGRAM(s) Oral daily    MEDICATIONS  (PRN):  acetaminophen     Tablet .. 650 milliGRAM(s) Oral every 6 hours PRN Temp greater or equal to 38C (100.4F), Mild Pain (1 - 3)  aluminum hydroxide/magnesium hydroxide/simethicone Suspension 30 milliLiter(s) Oral every 4 hours PRN Dyspepsia  fluticasone propionate 50 MICROgram(s)/spray Nasal Spray 1 Spray(s) Both Nostrils two times a day PRN allergic rhinitis  melatonin 3 milliGRAM(s) Oral at bedtime PRN Insomnia  ondansetron Injectable 4 milliGRAM(s) IV Push every 8 hours PRN Nausea and/or Vomiting      Vital Signs Last 24 Hrs  T(C): 36.3 (23 Aug 2023 14:07), Max: 36.8 (23 Aug 2023 00:48)  T(F): 97.4 (23 Aug 2023 14:07), Max: 98.3 (23 Aug 2023 00:48)  HR: 81 (23 Aug 2023 14:07) (59 - 89)  BP: 121/69 (23 Aug 2023 14:07) (102/64 - 127/75)  BP(mean): 70 (23 Aug 2023 11:21) (70 - 70)  RR: 18 (23 Aug 2023 14:07) (18 - 18)  SpO2: 99% (23 Aug 2023 14:07) (93% - 99%)    Parameters below as of 23 Aug 2023 14:07  Patient On (Oxygen Delivery Method): room air        PE  NAD  Awake, alert  Anicteric, MMM  No c/c/e  No rash grossly  FROM                          11.5   15.17 )-----------( 331      ( 23 Aug 2023 06:42 )             35.5       08-23    138  |  104  |  17  ----------------------------<  109<H>  4.0   |  21<L>  |  0.82    Ca    8.5      23 Aug 2023 06:43    TPro  6.2  /  Alb  3.2<L>  /  TBili  0.8  /  DBili  x   /  AST  38  /  ALT  45  /  AlkPhos  101  08-22      
Patient seen and examined at bedside. feels well. no active complaints     MEDICATIONS  (STANDING):  anastrozole 1 milliGRAM(s) Oral daily  apixaban 2.5 milliGRAM(s) Oral every 12 hours  atorvastatin 20 milliGRAM(s) Oral at bedtime  calcium carbonate    500 mG (Tums) Chewable 1 Tablet(s) Chew once  pantoprazole    Tablet 40 milliGRAM(s) Oral before breakfast  piperacillin/tazobactam IVPB.. 3.375 Gram(s) IV Intermittent every 8 hours  predniSONE   Tablet 5 milliGRAM(s) Oral every other day  sertraline 50 milliGRAM(s) Oral daily  sodium chloride 0.9%. 1000 milliLiter(s) (100 mL/Hr) IV Continuous <Continuous>  solifenacin 10 milliGRAM(s) Oral daily    MEDICATIONS  (PRN):  acetaminophen     Tablet .. 650 milliGRAM(s) Oral every 6 hours PRN Temp greater or equal to 38C (100.4F), Mild Pain (1 - 3)  aluminum hydroxide/magnesium hydroxide/simethicone Suspension 30 milliLiter(s) Oral every 4 hours PRN Dyspepsia  fluticasone propionate 50 MICROgram(s)/spray Nasal Spray 1 Spray(s) Both Nostrils two times a day PRN allergic rhinitis  melatonin 3 milliGRAM(s) Oral at bedtime PRN Insomnia  ondansetron Injectable 4 milliGRAM(s) IV Push every 8 hours PRN Nausea and/or Vomiting        Vital Signs Last 24 Hrs  T(C): 36.8 (20 Aug 2023 11:13), Max: 37.3 (19 Aug 2023 20:17)  T(F): 98.3 (20 Aug 2023 11:13), Max: 99.2 (19 Aug 2023 20:17)  HR: 71 (20 Aug 2023 11:13) (62 - 80)  BP: 121/66 (20 Aug 2023 11:13) (107/65 - 121/66)  BP(mean): --  RR: 18 (20 Aug 2023 11:13) (18 - 18)  SpO2: 94% (20 Aug 2023 11:13) (92% - 96%)    Parameters below as of 20 Aug 2023 11:13  Patient On (Oxygen Delivery Method): room air          PHYSICAL EXAM:     GENERAL:  Appears stated age, well-groomed  HEENT:  NC/AT,  conjunctivae clear and pink  CHEST:  CTA b/l  HEART:  S1 s2+   ABDOMEN:  Soft, non-tender, non-distended  EXTEREMITIES:  no cyanosis,clubbing or edema  NEURO:  Alert, oriented, no asterixis                            11.1   10.26 )-----------( 205      ( 20 Aug 2023 07:25 )             33.8       08-20    140  |  104  |  16  ----------------------------<  94  3.9   |  24  |  0.82    Ca    8.4      20 Aug 2023 07:26    TPro  5.5<L>  /  Alb  2.6<L>  /  TBili  0.6  /  DBili  x   /  AST  76<H>  /  ALT  64<H>  /  AlkPhos  86  08-19

## 2023-08-23 NOTE — PROGRESS NOTE ADULT - ASSESSMENT
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 patient wishes to dc anastrazole, can coordinate as outpatient.  --ongoing care after discharge    UTI, bacteremia  --outpatient urinalysis +UTI  --outpatient blood cultures positive for E. 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    - MRI abd w/ ruptured right lower pole hemorrhagic renal cyst, given surrounding perirenal infiltration.   - Consider urology belen Mancuso PA-C  Hematology/Oncology  [facility]  [phone] (office)

## 2023-08-23 NOTE — DISCHARGE NOTE PROVIDER - NSDCCPCAREPLAN_GEN_ALL_CORE_FT
PRINCIPAL DISCHARGE DIAGNOSIS  Diagnosis: Sepsis  Assessment and Plan of Treatment: Take all antibiotics as ordered.  Call you Health care provider upon arrival home to make a one week follow up appointment.  If you develop fever, chills, malaise, or change in mental status call your Health Care Provider or go to the Emergency Department.  Nutrition is important, eat small frequent meals to help ensure you get adequate calories.  Do not stay in bed all day!  Increase your activity daily as tolerated.        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  Call your healthcare provider for a follow-up appointment within one week      Diagnosis: Increased endometrial stripe thickness  Assessment and Plan of Treatment: follow up with gyn and oncology    Diagnosis: H/O venous thrombosis and embolism  Assessment and Plan of Treatment: Take your "blood thinners" as prescribed.  Walking is encouraged, increase activity as tolerated.  If you develop new leg pain, swelling, and/or redness contact your healthcare provider.  If you develop new chest pain with difficulty breathing, a rapid heart rate and/or a feeling of passing out call emergency medical services 911.      Diagnosis: Pyelonephritis  Assessment and Plan of Treatment: Patient received intravenous antibiotics, c/w antibiotics as prescribed and follow up with PCP and infectious disease for surveillance of blood cultures     PRINCIPAL DISCHARGE DIAGNOSIS  Diagnosis: Sepsis  Assessment and Plan of Treatment: Take all antibiotics as ordered.  Call you Health care provider upon arrival home to make a one week follow up appointment.  If you develop fever, chills, malaise, or change in mental status call your Health Care Provider or go to the Emergency Department.  Nutrition is important, eat small frequent meals to help ensure you get adequate calories.  Do not stay in bed all day!  Increase your activity daily as tolerated.        SECONDARY DISCHARGE DIAGNOSES  Diagnosis: Pyelonephritis  Assessment and Plan of Treatment: Patient received intravenous antibiotics, c/w antibiotics as prescribed and follow up with PCP and Infectious disease for surveillance of blood cultures    Diagnosis: H/O venous thrombosis and embolism  Assessment and Plan of Treatment: Take your "blood thinners" as prescribed.  Walking is encouraged, increase activity as tolerated.  If you develop new leg pain, swelling, and/or redness contact your healthcare provider.  If you develop new chest pain with difficulty breathing, a rapid heart rate and/or a feeling of passing out call emergency medical services 911.      Diagnosis: GERD (gastroesophageal reflux disease)  Assessment and Plan of Treatment: Call your healthcare provider:  If you have belly pain that gets worse or doesn't go away  If you vomit blood or have black bowel movements  If you are losing weight (without trying)  Avoid NSAIDs- (Aspirin, Ibuprofen, Advil, Motrin, Naproxen, Aleve)  Avoid alcoholic beverages  Take all medicaions as prescribed.  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 Correlate for   history of trauma. Underlying neoplasm is felt to be less likely but not   excluded. Recommend follow-up contrast enhanced MR abdomen in 2-3 months to assess for underlying tumor. Please follow up with your Oncologist or PCP for repeat MRI.

## 2023-08-23 NOTE — PHYSICAL THERAPY INITIAL EVALUATION ADULT - STRENGTHENING, PT EVAL
GOAL: Pt will improve bilateral LE strength to 5/5, for increased limb stability, to improve gait in 2 weeks.

## 2023-08-23 NOTE — PROGRESS NOTE ADULT - REASON FOR ADMISSION
fever + generalized weakness/fatigue/malaise

## 2023-08-23 NOTE — PROGRESS NOTE ADULT - NS ATTEND OPT1 GEN_ALL_CORE

## 2023-08-23 NOTE — DISCHARGE NOTE NURSING/CASE MANAGEMENT/SOCIAL WORK - PATIENT PORTAL LINK FT
You can access the FollowMyHealth Patient Portal offered by [facility] by registering at the following website: [email or web address]. By joining [employer]’s FollowMyHealth portal, you will also be able to view your health information using other applications (apps) compatible with our system. - - -

## 2023-08-26 LAB
CULTURE RESULTS: SIGNIFICANT CHANGE UP
CULTURE RESULTS: SIGNIFICANT CHANGE UP
SPECIMEN SOURCE: SIGNIFICANT CHANGE UP
SPECIMEN SOURCE: SIGNIFICANT CHANGE UP

## 2023-08-30 NOTE — CONSULT LETTER
[Dear  ___] : Dear  [unfilled], [Consult Letter:] : I had the pleasure of evaluating your patient, [unfilled]. [Please see my note below.] : Please see my note below. [Consult Closing:] : Thank you very much for allowing me to participate in the care of this patient.  If you have any questions, please do not hesitate to contact me. monitored anesthesia care (MAC) [FreeTextEntry2] : Yumi Birmingham MD

## 2023-09-11 ENCOUNTER — APPOINTMENT (OUTPATIENT)
Dept: ULTRASOUND IMAGING | Facility: CLINIC | Age: 78
End: 2023-09-11
Payer: MEDICARE

## 2023-09-11 ENCOUNTER — OUTPATIENT (OUTPATIENT)
Dept: OUTPATIENT SERVICES | Facility: HOSPITAL | Age: 78
LOS: 1 days | End: 2023-09-11
Payer: MEDICARE

## 2023-09-11 DIAGNOSIS — Z00.8 ENCOUNTER FOR OTHER GENERAL EXAMINATION: ICD-10-CM

## 2023-09-11 DIAGNOSIS — Z98.890 OTHER SPECIFIED POSTPROCEDURAL STATES: Chronic | ICD-10-CM

## 2023-09-11 DIAGNOSIS — S82.899A OTHER FRACTURE OF UNSPECIFIED LOWER LEG, INITIAL ENCOUNTER FOR CLOSED FRACTURE: Chronic | ICD-10-CM

## 2023-09-11 DIAGNOSIS — Z98.41 CATARACT EXTRACTION STATUS, RIGHT EYE: Chronic | ICD-10-CM

## 2023-09-11 PROCEDURE — 93880 EXTRACRANIAL BILAT STUDY: CPT

## 2023-09-11 PROCEDURE — 93880 EXTRACRANIAL BILAT STUDY: CPT | Mod: 26

## 2023-09-14 ENCOUNTER — APPOINTMENT (OUTPATIENT)
Dept: INFECTIOUS DISEASE | Facility: CLINIC | Age: 78
End: 2023-09-14
Payer: MEDICARE

## 2023-09-14 ENCOUNTER — LABORATORY RESULT (OUTPATIENT)
Age: 78
End: 2023-09-14

## 2023-09-14 ENCOUNTER — NON-APPOINTMENT (OUTPATIENT)
Age: 78
End: 2023-09-14

## 2023-09-14 VITALS
BODY MASS INDEX: 27.31 KG/M2 | DIASTOLIC BLOOD PRESSURE: 65 MMHG | OXYGEN SATURATION: 95 % | HEART RATE: 114 BPM | WEIGHT: 160 LBS | SYSTOLIC BLOOD PRESSURE: 106 MMHG | TEMPERATURE: 97.8 F | HEIGHT: 64 IN

## 2023-09-14 DIAGNOSIS — B96.20 BACTEREMIA: ICD-10-CM

## 2023-09-14 DIAGNOSIS — R78.81 BACTEREMIA: ICD-10-CM

## 2023-09-14 DIAGNOSIS — N28.1 CYST OF KIDNEY, ACQUIRED: ICD-10-CM

## 2023-09-14 PROCEDURE — 99214 OFFICE O/P EST MOD 30 MIN: CPT

## 2023-10-03 DIAGNOSIS — N12 TUBULO-INTERSTITIAL NEPHRITIS, NOT SPECIFIED AS ACUTE OR CHRONIC: ICD-10-CM

## 2023-10-06 LAB
APPEARANCE: ABNORMAL
BACTERIA UR CULT: ABNORMAL
BACTERIA: ABNORMAL /HPF
BILIRUBIN URINE: NEGATIVE
BLOOD URINE: ABNORMAL
CAST: 0 /LPF
COLOR: YELLOW
EPITHELIAL CELLS: 0 /HPF
GLUCOSE QUALITATIVE U: NEGATIVE MG/DL
KETONES URINE: NEGATIVE MG/DL
LEUKOCYTE ESTERASE URINE: ABNORMAL
MICROSCOPIC-UA: NORMAL
NITRITE URINE: POSITIVE
PH URINE: 6
PROTEIN URINE: NEGATIVE MG/DL
RED BLOOD CELLS URINE: 1 /HPF
SPECIFIC GRAVITY URINE: 1.02
UROBILINOGEN URINE: 0.2 MG/DL
WHITE BLOOD CELLS URINE: 125 /HPF

## 2023-10-31 ENCOUNTER — APPOINTMENT (OUTPATIENT)
Dept: ENDOCRINOLOGY | Facility: CLINIC | Age: 78
End: 2023-10-31
Payer: MEDICARE

## 2023-10-31 VITALS
OXYGEN SATURATION: 95 % | WEIGHT: 159 LBS | BODY MASS INDEX: 26.49 KG/M2 | HEIGHT: 64.9 IN | HEART RATE: 92 BPM | SYSTOLIC BLOOD PRESSURE: 110 MMHG | DIASTOLIC BLOOD PRESSURE: 70 MMHG

## 2023-10-31 DIAGNOSIS — Z79.52 LONG TERM (CURRENT) USE OF SYSTEMIC STEROIDS: ICD-10-CM

## 2023-10-31 PROCEDURE — 99214 OFFICE O/P EST MOD 30 MIN: CPT | Mod: 25

## 2023-10-31 PROCEDURE — 77080 DXA BONE DENSITY AXIAL: CPT | Mod: GA

## 2023-10-31 PROCEDURE — 96372 THER/PROPH/DIAG INJ SC/IM: CPT

## 2023-10-31 PROCEDURE — ZZZZZ: CPT

## 2023-10-31 RX ORDER — SULFAMETHOXAZOLE AND TRIMETHOPRIM 800; 160 MG/1; MG/1
800-160 TABLET ORAL TWICE DAILY
Qty: 20 | Refills: 0 | Status: DISCONTINUED | COMMUNITY
Start: 2023-09-19 | End: 2023-10-31

## 2023-10-31 RX ORDER — LEVOFLOXACIN 500 MG/1
500 TABLET, FILM COATED ORAL DAILY
Qty: 7 | Refills: 0 | Status: DISCONTINUED | COMMUNITY
Start: 2023-09-18 | End: 2023-10-31

## 2023-10-31 RX ORDER — CALCITONIN SALMON 200 [IU]/1
200 SPRAY, METERED NASAL DAILY
Qty: 3 | Refills: 3 | Status: DISCONTINUED | COMMUNITY
Start: 2022-01-25 | End: 2023-10-31

## 2023-10-31 RX ORDER — ANASTROZOLE TABLETS 1 MG/1
1 TABLET ORAL
Qty: 90 | Refills: 0 | Status: ACTIVE | COMMUNITY
Start: 2023-07-24

## 2023-10-31 RX ORDER — DENOSUMAB 60 MG/ML
60 INJECTION SUBCUTANEOUS
Qty: 1 | Refills: 0 | Status: COMPLETED | OUTPATIENT
Start: 2023-10-31

## 2023-10-31 RX ADMIN — DENOSUMAB 60 MG/ML: 60 INJECTION SUBCUTANEOUS at 00:00

## 2023-11-01 PROBLEM — Z79.52 LONG TERM CURRENT USE OF SYSTEMIC STEROIDS: Status: ACTIVE | Noted: 2023-04-18

## 2023-11-13 PROCEDURE — 96374 THER/PROPH/DIAG INJ IV PUSH: CPT

## 2023-11-13 PROCEDURE — 84132 ASSAY OF SERUM POTASSIUM: CPT

## 2023-11-13 PROCEDURE — 80048 BASIC METABOLIC PNL TOTAL CA: CPT

## 2023-11-13 PROCEDURE — 97165 OT EVAL LOW COMPLEX 30 MIN: CPT

## 2023-11-13 PROCEDURE — 82803 BLOOD GASES ANY COMBINATION: CPT

## 2023-11-13 PROCEDURE — 97162 PT EVAL MOD COMPLEX 30 MIN: CPT

## 2023-11-13 PROCEDURE — 85014 HEMATOCRIT: CPT

## 2023-11-13 PROCEDURE — 71045 X-RAY EXAM CHEST 1 VIEW: CPT

## 2023-11-13 PROCEDURE — 82435 ASSAY OF BLOOD CHLORIDE: CPT

## 2023-11-13 PROCEDURE — 87186 SC STD MICRODIL/AGAR DIL: CPT

## 2023-11-13 PROCEDURE — 83605 ASSAY OF LACTIC ACID: CPT

## 2023-11-13 PROCEDURE — 86803 HEPATITIS C AB TEST: CPT

## 2023-11-13 PROCEDURE — 85025 COMPLETE CBC W/AUTO DIFF WBC: CPT

## 2023-11-13 PROCEDURE — 74183 MRI ABD W/O CNTR FLWD CNTR: CPT

## 2023-11-13 PROCEDURE — 87040 BLOOD CULTURE FOR BACTERIA: CPT

## 2023-11-13 PROCEDURE — 82330 ASSAY OF CALCIUM: CPT

## 2023-11-13 PROCEDURE — 81001 URINALYSIS AUTO W/SCOPE: CPT

## 2023-11-13 PROCEDURE — 82947 ASSAY GLUCOSE BLOOD QUANT: CPT

## 2023-11-13 PROCEDURE — 99285 EMERGENCY DEPT VISIT HI MDM: CPT

## 2023-11-13 PROCEDURE — 85018 HEMOGLOBIN: CPT

## 2023-11-13 PROCEDURE — 87150 DNA/RNA AMPLIFIED PROBE: CPT

## 2023-11-13 PROCEDURE — 36415 COLL VENOUS BLD VENIPUNCTURE: CPT

## 2023-11-13 PROCEDURE — 85027 COMPLETE CBC AUTOMATED: CPT

## 2023-11-13 PROCEDURE — 84295 ASSAY OF SERUM SODIUM: CPT

## 2023-11-13 PROCEDURE — 0225U NFCT DS DNA&RNA 21 SARSCOV2: CPT

## 2023-11-13 PROCEDURE — 94640 AIRWAY INHALATION TREATMENT: CPT

## 2023-11-13 PROCEDURE — A9585: CPT

## 2023-11-13 PROCEDURE — 76770 US EXAM ABDO BACK WALL COMP: CPT

## 2023-11-13 PROCEDURE — 80053 COMPREHEN METABOLIC PANEL: CPT

## 2023-11-13 PROCEDURE — 85610 PROTHROMBIN TIME: CPT

## 2023-11-13 PROCEDURE — 83036 HEMOGLOBIN GLYCOSYLATED A1C: CPT

## 2023-11-13 PROCEDURE — 93970 EXTREMITY STUDY: CPT

## 2023-11-13 PROCEDURE — 76700 US EXAM ABDOM COMPLETE: CPT

## 2023-11-13 PROCEDURE — 87077 CULTURE AEROBIC IDENTIFY: CPT

## 2023-11-13 PROCEDURE — 76830 TRANSVAGINAL US NON-OB: CPT

## 2023-11-13 PROCEDURE — 93306 TTE W/DOPPLER COMPLETE: CPT

## 2023-11-13 PROCEDURE — 87086 URINE CULTURE/COLONY COUNT: CPT

## 2023-11-13 PROCEDURE — 80061 LIPID PANEL: CPT

## 2023-11-13 PROCEDURE — 76856 US EXAM PELVIC COMPLETE: CPT

## 2023-11-13 PROCEDURE — 85730 THROMBOPLASTIN TIME PARTIAL: CPT

## 2023-11-17 ENCOUNTER — INPATIENT (INPATIENT)
Facility: HOSPITAL | Age: 78
LOS: 3 days | Discharge: HOME | DRG: 872 | End: 2023-11-21
Attending: INTERNAL MEDICINE | Admitting: INTERNAL MEDICINE
Payer: MEDICARE

## 2023-11-17 VITALS
TEMPERATURE: 100 F | WEIGHT: 156.97 LBS | OXYGEN SATURATION: 95 % | HEART RATE: 106 BPM | DIASTOLIC BLOOD PRESSURE: 84 MMHG | RESPIRATION RATE: 19 BRPM | SYSTOLIC BLOOD PRESSURE: 130 MMHG | HEIGHT: 65 IN

## 2023-11-17 DIAGNOSIS — Z98.41 CATARACT EXTRACTION STATUS, RIGHT EYE: Chronic | ICD-10-CM

## 2023-11-17 DIAGNOSIS — Z98.890 OTHER SPECIFIED POSTPROCEDURAL STATES: Chronic | ICD-10-CM

## 2023-11-17 DIAGNOSIS — S82.899A OTHER FRACTURE OF UNSPECIFIED LOWER LEG, INITIAL ENCOUNTER FOR CLOSED FRACTURE: Chronic | ICD-10-CM

## 2023-11-17 LAB
BASE EXCESS BLDV CALC-SCNC: 1.2 MMOL/L — SIGNIFICANT CHANGE UP (ref -2–3)
BASE EXCESS BLDV CALC-SCNC: 1.2 MMOL/L — SIGNIFICANT CHANGE UP (ref -2–3)
CA-I SERPL-SCNC: 1.21 MMOL/L — SIGNIFICANT CHANGE UP (ref 1.15–1.33)
CA-I SERPL-SCNC: 1.21 MMOL/L — SIGNIFICANT CHANGE UP (ref 1.15–1.33)
CHLORIDE BLDV-SCNC: 101 MMOL/L — SIGNIFICANT CHANGE UP (ref 96–108)
CHLORIDE BLDV-SCNC: 101 MMOL/L — SIGNIFICANT CHANGE UP (ref 96–108)
CO2 BLDV-SCNC: 27 MMOL/L — HIGH (ref 22–26)
CO2 BLDV-SCNC: 27 MMOL/L — HIGH (ref 22–26)
GAS PNL BLDV: 134 MMOL/L — LOW (ref 136–145)
GAS PNL BLDV: 134 MMOL/L — LOW (ref 136–145)
GAS PNL BLDV: SIGNIFICANT CHANGE UP
GAS PNL BLDV: SIGNIFICANT CHANGE UP
GLUCOSE BLDV-MCNC: 148 MG/DL — HIGH (ref 70–99)
GLUCOSE BLDV-MCNC: 148 MG/DL — HIGH (ref 70–99)
HCO3 BLDV-SCNC: 26 MMOL/L — SIGNIFICANT CHANGE UP (ref 22–29)
HCO3 BLDV-SCNC: 26 MMOL/L — SIGNIFICANT CHANGE UP (ref 22–29)
HCT VFR BLD CALC: 38.9 % — SIGNIFICANT CHANGE UP (ref 34.5–45)
HCT VFR BLD CALC: 38.9 % — SIGNIFICANT CHANGE UP (ref 34.5–45)
HCT VFR BLDA CALC: 38 % — SIGNIFICANT CHANGE UP (ref 34.5–46.5)
HCT VFR BLDA CALC: 38 % — SIGNIFICANT CHANGE UP (ref 34.5–46.5)
HGB BLD CALC-MCNC: 12.5 G/DL — SIGNIFICANT CHANGE UP (ref 11.7–16.1)
HGB BLD CALC-MCNC: 12.5 G/DL — SIGNIFICANT CHANGE UP (ref 11.7–16.1)
HGB BLD-MCNC: 12.3 G/DL — SIGNIFICANT CHANGE UP (ref 11.5–15.5)
HGB BLD-MCNC: 12.3 G/DL — SIGNIFICANT CHANGE UP (ref 11.5–15.5)
LACTATE BLDV-MCNC: 1.7 MMOL/L — SIGNIFICANT CHANGE UP (ref 0.5–2)
LACTATE BLDV-MCNC: 1.7 MMOL/L — SIGNIFICANT CHANGE UP (ref 0.5–2)
MCHC RBC-ENTMCNC: 28.8 PG — SIGNIFICANT CHANGE UP (ref 27–34)
MCHC RBC-ENTMCNC: 28.8 PG — SIGNIFICANT CHANGE UP (ref 27–34)
MCHC RBC-ENTMCNC: 31.6 GM/DL — LOW (ref 32–36)
MCHC RBC-ENTMCNC: 31.6 GM/DL — LOW (ref 32–36)
MCV RBC AUTO: 91.1 FL — SIGNIFICANT CHANGE UP (ref 80–100)
MCV RBC AUTO: 91.1 FL — SIGNIFICANT CHANGE UP (ref 80–100)
PCO2 BLDV: 41 MMHG — SIGNIFICANT CHANGE UP (ref 39–42)
PCO2 BLDV: 41 MMHG — SIGNIFICANT CHANGE UP (ref 39–42)
PH BLDV: 7.41 — SIGNIFICANT CHANGE UP (ref 7.32–7.43)
PH BLDV: 7.41 — SIGNIFICANT CHANGE UP (ref 7.32–7.43)
PLATELET # BLD AUTO: 182 K/UL — SIGNIFICANT CHANGE UP (ref 150–400)
PLATELET # BLD AUTO: 182 K/UL — SIGNIFICANT CHANGE UP (ref 150–400)
PO2 BLDV: 42 MMHG — SIGNIFICANT CHANGE UP (ref 25–45)
PO2 BLDV: 42 MMHG — SIGNIFICANT CHANGE UP (ref 25–45)
POTASSIUM BLDV-SCNC: 4.1 MMOL/L — SIGNIFICANT CHANGE UP (ref 3.5–5.1)
POTASSIUM BLDV-SCNC: 4.1 MMOL/L — SIGNIFICANT CHANGE UP (ref 3.5–5.1)
RBC # BLD: 4.27 M/UL — SIGNIFICANT CHANGE UP (ref 3.8–5.2)
RBC # BLD: 4.27 M/UL — SIGNIFICANT CHANGE UP (ref 3.8–5.2)
RBC # FLD: 14.6 % — HIGH (ref 10.3–14.5)
RBC # FLD: 14.6 % — HIGH (ref 10.3–14.5)
SAO2 % BLDV: 63.1 % — LOW (ref 67–88)
SAO2 % BLDV: 63.1 % — LOW (ref 67–88)
WBC # BLD: 12.74 K/UL — HIGH (ref 3.8–10.5)
WBC # BLD: 12.74 K/UL — HIGH (ref 3.8–10.5)
WBC # FLD AUTO: 12.74 K/UL — HIGH (ref 3.8–10.5)
WBC # FLD AUTO: 12.74 K/UL — HIGH (ref 3.8–10.5)

## 2023-11-17 PROCEDURE — 99285 EMERGENCY DEPT VISIT HI MDM: CPT | Mod: GC

## 2023-11-17 PROCEDURE — 71045 X-RAY EXAM CHEST 1 VIEW: CPT | Mod: 26

## 2023-11-17 RX ORDER — PIPERACILLIN AND TAZOBACTAM 4; .5 G/20ML; G/20ML
3.38 INJECTION, POWDER, LYOPHILIZED, FOR SOLUTION INTRAVENOUS ONCE
Refills: 0 | Status: COMPLETED | OUTPATIENT
Start: 2023-11-17 | End: 2023-11-17

## 2023-11-17 RX ORDER — VANCOMYCIN HCL 1 G
1000 VIAL (EA) INTRAVENOUS ONCE
Refills: 0 | Status: COMPLETED | OUTPATIENT
Start: 2023-11-17 | End: 2023-11-17

## 2023-11-17 RX ORDER — SODIUM CHLORIDE 9 MG/ML
2200 INJECTION, SOLUTION INTRAVENOUS ONCE
Refills: 0 | Status: DISCONTINUED | OUTPATIENT
Start: 2023-11-17 | End: 2023-11-17

## 2023-11-17 RX ORDER — SODIUM CHLORIDE 9 MG/ML
2200 INJECTION INTRAMUSCULAR; INTRAVENOUS; SUBCUTANEOUS ONCE
Refills: 0 | Status: COMPLETED | OUTPATIENT
Start: 2023-11-17 | End: 2023-11-17

## 2023-11-17 RX ADMIN — PIPERACILLIN AND TAZOBACTAM 200 GRAM(S): 4; .5 INJECTION, POWDER, LYOPHILIZED, FOR SOLUTION INTRAVENOUS at 23:30

## 2023-11-17 RX ADMIN — SODIUM CHLORIDE 1466.67 MILLILITER(S): 9 INJECTION INTRAMUSCULAR; INTRAVENOUS; SUBCUTANEOUS at 23:30

## 2023-11-17 NOTE — ED PROVIDER NOTE - CLINICAL SUMMARY MEDICAL DECISION MAKING FREE TEXT BOX
Ki Elise, PGY2 - This is a 78 year old female with pmh of L breast ca s/p lumpectomy, lymphoma in remission, dvt/pe on eliquis, hld, gerd, CVA, osteoporosis, urosepsis presenting today with shivering, feeling unwell, and sore throat starting last night about 24 hours prior to presentation. Denies any chest pain, shortness of breath, abdominal pain, urinary sxs. Vitals show fever rectally to 100.5F, HR was increased to 100s on triage. Patient well appearing, otherwise not in acute distress. Will check for source for fever. Will work up for sepsis as patient high risk and had similar history. Will check labs, urine, cxr. No abd ttp at this time so will defer CT imaging of the abdomen/pelvis at this time. Most likely will be admitted for sepsis.

## 2023-11-17 NOTE — ED PROVIDER NOTE - PHYSICAL EXAMINATION
General: NAD  HEENT: NCAT. Non erythematous, non swollen tonsils. No exudates.  Cardiac: RRR, 2+ radial pulses  Chest: CTA. systolic murmur heard  Abdomen: soft, non-distended, no ttp, no rebound or guarding  Extremities: no peripheral edema, calf tenderness, or leg size discrepancies  Skin: no rashes  Neuro: AAOx3, motor and sensory grossly intact. CN II-XII intact. 5/5 strength upper and lower extremities. Normal sensation bilaterally upper and lower extremities.   Psych: mood and affect appropriate

## 2023-11-17 NOTE — ED PROVIDER NOTE - OBJECTIVE STATEMENT
This is a 78 year old female with pmh of L breast ca s/p lumpectomy, lymphoma in remission, dvt/pe on eliquis, hld, gerd, CVA, osteoporosis, urosepsis presenting today with shivering, feeling unwell, and sore throat starting last night about 24 hours prior to presentation. Denies any chest pain, shortness of breath, abdominal pain, urinary sxs. Had admission in August, for urosepsis had 8 day stay. Took tylenol 1 hour prior to presentation.

## 2023-11-17 NOTE — ED ADULT TRIAGE NOTE - CHIEF COMPLAINT QUOTE
Hx Sepsis in august   Came to ED for chills  x this AM. Endorses back pain.    TMAX: 102.3F.   On eliquis,   Took tylenol x 1 hour ago. Hx Sepsis in august   Came to ED for chills  x this AM. Endorses back pain.    TMAX: 102.3F at home.   On eliquis,   Took tylenol x 1 hour ago.

## 2023-11-17 NOTE — ED PROVIDER NOTE - NS ED ROS FT
GENERAL: No fever, + chills  	EYES: No change in vision  	HEENT: No trouble swallowing or speaking  	CARDIAC: No chest pain  	PULMONARY: No cough, no SOB  	GI: No abdominal pain, no nausea, no vomiting, no diarrhea, no constipation  	: No changes in urination  	SKIN: No rashes  	NEURO: No headache, no numbness  	MSK: No visible bony deformity   Otherwise as HPI or negative.

## 2023-11-17 NOTE — ED PROVIDER NOTE - ATTENDING CONTRIBUTION TO CARE
Attending MD Agudelo: I personally have seen and examined this patient.  Resident note reviewed and agree on plan of care and except where noted.  See below for details.     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  Reports had sepsis in August (urosepsis) and reports was asymptomatic at that time as well.  Denies chest pain, shortness of breath, URI symptoms, abdominal pain, vomiting, diarrhea, urinary complaints.   Reports has overactive bladder and urinary frequency at baseline, reports unchanged, denies dysuria.      TO BE COMPLETED Attending MD Agudelo: I personally have seen and examined this patient.  Resident note reviewed and agree on plan of care and except where noted.  See below for details.     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  Reports had sepsis in August (urosepsis) and reports was asymptomatic at that time as well.  Denies chest pain, shortness of breath, URI symptoms, abdominal pain, vomiting, diarrhea, urinary complaints.   Reports has overactive bladder and urinary frequency at baseline, reports unchanged, denies dysuria.      Exam:   General: NAD  HENT: head NCAT, airway patent  Eyes: anicteric, no conjunctival injection   Lungs: lungs CTAB with good inspiratory effort, no wheezing, no rhonchi, no rales  Cardiac: +S1S2, no obvious m/r/g  GI: abdomen soft with +BS, NT, ND  : no CVAT  MSK: ranging neck and extremities freely  Neuro: moving all extremities spontaneously, nonfocal  Psych: normal mood and affect     A/P: 78F with chills, malaise, suspect occult infection, possibly urinary source, will proceed with sepsis work up, will obtain labs, blood culture, UA/UrCx, will give empiric abx, will give IVFs, will need admission Attending MD Agudelo: I personally have seen and examined this patient.  Resident note reviewed and agree on plan of care and except where noted.  See below for details.     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  Reports had sepsis in August (urosepsis) and reports was asymptomatic at that time as well.  Denies chest pain, shortness of breath, URI symptoms, abdominal pain, vomiting, diarrhea, urinary complaints.   Reports has overactive bladder and urinary frequency at baseline, reports unchanged, denies dysuria.      Exam:   General: NAD  HENT: head NCAT, airway patent  Eyes: anicteric, no conjunctival injection   Lungs: lungs CTAB with good inspiratory effort, no wheezing, no rhonchi, no rales  Cardiac: +S1S2, no obvious r/g, +murmur  GI: abdomen soft with +BS, NT, ND  : no CVAT  MSK: ranging neck and extremities freely  Neuro: moving all extremities spontaneously, nonfocal  Psych: normal mood and affect     A/P: 78F with chills, malaise, suspect occult infection, possibly urinary source, will proceed with sepsis work up, will obtain labs, blood culture, UA/UrCx, will give empiric abx, will give IVFs, will need admission

## 2023-11-18 DIAGNOSIS — C85.90 NON-HODGKIN LYMPHOMA, UNSPECIFIED, UNSPECIFIED SITE: ICD-10-CM

## 2023-11-18 DIAGNOSIS — C50.919 MALIGNANT NEOPLASM OF UNSPECIFIED SITE OF UNSPECIFIED FEMALE BREAST: ICD-10-CM

## 2023-11-18 DIAGNOSIS — I77.6 ARTERITIS, UNSPECIFIED: ICD-10-CM

## 2023-11-18 DIAGNOSIS — A41.9 SEPSIS, UNSPECIFIED ORGANISM: ICD-10-CM

## 2023-11-18 DIAGNOSIS — K21.9 GASTRO-ESOPHAGEAL REFLUX DISEASE WITHOUT ESOPHAGITIS: ICD-10-CM

## 2023-11-18 DIAGNOSIS — I82.90 ACUTE EMBOLISM AND THROMBOSIS OF UNSPECIFIED VEIN: ICD-10-CM

## 2023-11-18 DIAGNOSIS — F41.9 ANXIETY DISORDER, UNSPECIFIED: ICD-10-CM

## 2023-11-18 LAB
ALBUMIN SERPL ELPH-MCNC: 3.7 G/DL — SIGNIFICANT CHANGE UP (ref 3.3–5)
ALBUMIN SERPL ELPH-MCNC: 3.7 G/DL — SIGNIFICANT CHANGE UP (ref 3.3–5)
ALBUMIN SERPL ELPH-MCNC: 4.2 G/DL — SIGNIFICANT CHANGE UP (ref 3.3–5)
ALBUMIN SERPL ELPH-MCNC: 4.2 G/DL — SIGNIFICANT CHANGE UP (ref 3.3–5)
ALP SERPL-CCNC: 53 U/L — SIGNIFICANT CHANGE UP (ref 40–120)
ALP SERPL-CCNC: 53 U/L — SIGNIFICANT CHANGE UP (ref 40–120)
ALP SERPL-CCNC: 59 U/L — SIGNIFICANT CHANGE UP (ref 40–120)
ALP SERPL-CCNC: 59 U/L — SIGNIFICANT CHANGE UP (ref 40–120)
ALT FLD-CCNC: 16 U/L — SIGNIFICANT CHANGE UP (ref 10–45)
ALT FLD-CCNC: 16 U/L — SIGNIFICANT CHANGE UP (ref 10–45)
ALT FLD-CCNC: 20 U/L — SIGNIFICANT CHANGE UP (ref 10–45)
ALT FLD-CCNC: 20 U/L — SIGNIFICANT CHANGE UP (ref 10–45)
ANION GAP SERPL CALC-SCNC: 12 MMOL/L — SIGNIFICANT CHANGE UP (ref 5–17)
ANION GAP SERPL CALC-SCNC: 12 MMOL/L — SIGNIFICANT CHANGE UP (ref 5–17)
ANISOCYTOSIS BLD QL: SLIGHT — SIGNIFICANT CHANGE UP
ANISOCYTOSIS BLD QL: SLIGHT — SIGNIFICANT CHANGE UP
APPEARANCE UR: CLEAR — SIGNIFICANT CHANGE UP
APPEARANCE UR: CLEAR — SIGNIFICANT CHANGE UP
APTT BLD: 30.7 SEC — SIGNIFICANT CHANGE UP (ref 24.5–35.6)
APTT BLD: 30.7 SEC — SIGNIFICANT CHANGE UP (ref 24.5–35.6)
AST SERPL-CCNC: 22 U/L — SIGNIFICANT CHANGE UP (ref 10–40)
AST SERPL-CCNC: 22 U/L — SIGNIFICANT CHANGE UP (ref 10–40)
AST SERPL-CCNC: 23 U/L — SIGNIFICANT CHANGE UP (ref 10–40)
AST SERPL-CCNC: 23 U/L — SIGNIFICANT CHANGE UP (ref 10–40)
BACTERIA # UR AUTO: ABNORMAL /HPF
BACTERIA # UR AUTO: ABNORMAL /HPF
BASOPHILS # BLD AUTO: 0 K/UL — SIGNIFICANT CHANGE UP (ref 0–0.2)
BASOPHILS # BLD AUTO: 0 K/UL — SIGNIFICANT CHANGE UP (ref 0–0.2)
BASOPHILS NFR BLD AUTO: 0 % — SIGNIFICANT CHANGE UP (ref 0–2)
BASOPHILS NFR BLD AUTO: 0 % — SIGNIFICANT CHANGE UP (ref 0–2)
BILIRUB SERPL-MCNC: 0.8 MG/DL — SIGNIFICANT CHANGE UP (ref 0.2–1.2)
BILIRUB SERPL-MCNC: 0.8 MG/DL — SIGNIFICANT CHANGE UP (ref 0.2–1.2)
BILIRUB SERPL-MCNC: 0.9 MG/DL — SIGNIFICANT CHANGE UP (ref 0.2–1.2)
BILIRUB SERPL-MCNC: 0.9 MG/DL — SIGNIFICANT CHANGE UP (ref 0.2–1.2)
BILIRUB UR-MCNC: NEGATIVE — SIGNIFICANT CHANGE UP
BILIRUB UR-MCNC: NEGATIVE — SIGNIFICANT CHANGE UP
BUN SERPL-MCNC: 12 MG/DL — SIGNIFICANT CHANGE UP (ref 7–23)
BUN SERPL-MCNC: 12 MG/DL — SIGNIFICANT CHANGE UP (ref 7–23)
BUN SERPL-MCNC: 16 MG/DL — SIGNIFICANT CHANGE UP (ref 7–23)
BUN SERPL-MCNC: 16 MG/DL — SIGNIFICANT CHANGE UP (ref 7–23)
CALCIUM SERPL-MCNC: 8.9 MG/DL — SIGNIFICANT CHANGE UP (ref 8.4–10.5)
CALCIUM SERPL-MCNC: 8.9 MG/DL — SIGNIFICANT CHANGE UP (ref 8.4–10.5)
CALCIUM SERPL-MCNC: 9.6 MG/DL — SIGNIFICANT CHANGE UP (ref 8.4–10.5)
CALCIUM SERPL-MCNC: 9.6 MG/DL — SIGNIFICANT CHANGE UP (ref 8.4–10.5)
CAST: 1 /LPF — SIGNIFICANT CHANGE UP (ref 0–4)
CAST: 1 /LPF — SIGNIFICANT CHANGE UP (ref 0–4)
CHLORIDE SERPL-SCNC: 100 MMOL/L — SIGNIFICANT CHANGE UP (ref 96–108)
CHLORIDE SERPL-SCNC: 100 MMOL/L — SIGNIFICANT CHANGE UP (ref 96–108)
CHLORIDE SERPL-SCNC: 107 MMOL/L — SIGNIFICANT CHANGE UP (ref 96–108)
CHLORIDE SERPL-SCNC: 107 MMOL/L — SIGNIFICANT CHANGE UP (ref 96–108)
CO2 SERPL-SCNC: 24 MMOL/L — SIGNIFICANT CHANGE UP (ref 22–31)
CO2 SERPL-SCNC: 24 MMOL/L — SIGNIFICANT CHANGE UP (ref 22–31)
CO2 SERPL-SCNC: 25 MMOL/L — SIGNIFICANT CHANGE UP (ref 22–31)
CO2 SERPL-SCNC: 25 MMOL/L — SIGNIFICANT CHANGE UP (ref 22–31)
COLOR SPEC: YELLOW — SIGNIFICANT CHANGE UP
COLOR SPEC: YELLOW — SIGNIFICANT CHANGE UP
CREAT SERPL-MCNC: 0.74 MG/DL — SIGNIFICANT CHANGE UP (ref 0.5–1.3)
CREAT SERPL-MCNC: 0.74 MG/DL — SIGNIFICANT CHANGE UP (ref 0.5–1.3)
CREAT SERPL-MCNC: 0.94 MG/DL — SIGNIFICANT CHANGE UP (ref 0.5–1.3)
CREAT SERPL-MCNC: 0.94 MG/DL — SIGNIFICANT CHANGE UP (ref 0.5–1.3)
DACRYOCYTES BLD QL SMEAR: SLIGHT — SIGNIFICANT CHANGE UP
DACRYOCYTES BLD QL SMEAR: SLIGHT — SIGNIFICANT CHANGE UP
DIFF PNL FLD: ABNORMAL
DIFF PNL FLD: ABNORMAL
EGFR: 62 ML/MIN/1.73M2 — SIGNIFICANT CHANGE UP
EGFR: 62 ML/MIN/1.73M2 — SIGNIFICANT CHANGE UP
EGFR: 83 ML/MIN/1.73M2 — SIGNIFICANT CHANGE UP
EGFR: 83 ML/MIN/1.73M2 — SIGNIFICANT CHANGE UP
ELLIPTOCYTES BLD QL SMEAR: SLIGHT — SIGNIFICANT CHANGE UP
ELLIPTOCYTES BLD QL SMEAR: SLIGHT — SIGNIFICANT CHANGE UP
EOSINOPHIL # BLD AUTO: 0 K/UL — SIGNIFICANT CHANGE UP (ref 0–0.5)
EOSINOPHIL # BLD AUTO: 0 K/UL — SIGNIFICANT CHANGE UP (ref 0–0.5)
EOSINOPHIL NFR BLD AUTO: 0 % — SIGNIFICANT CHANGE UP (ref 0–6)
EOSINOPHIL NFR BLD AUTO: 0 % — SIGNIFICANT CHANGE UP (ref 0–6)
FLUAV AG NPH QL: SIGNIFICANT CHANGE UP
FLUAV AG NPH QL: SIGNIFICANT CHANGE UP
FLUBV AG NPH QL: SIGNIFICANT CHANGE UP
FLUBV AG NPH QL: SIGNIFICANT CHANGE UP
GLUCOSE SERPL-MCNC: 100 MG/DL — HIGH (ref 70–99)
GLUCOSE SERPL-MCNC: 100 MG/DL — HIGH (ref 70–99)
GLUCOSE SERPL-MCNC: 145 MG/DL — HIGH (ref 70–99)
GLUCOSE SERPL-MCNC: 145 MG/DL — HIGH (ref 70–99)
GLUCOSE UR QL: NEGATIVE MG/DL — SIGNIFICANT CHANGE UP
GLUCOSE UR QL: NEGATIVE MG/DL — SIGNIFICANT CHANGE UP
HCT VFR BLD CALC: 37.4 % — SIGNIFICANT CHANGE UP (ref 34.5–45)
HCT VFR BLD CALC: 37.4 % — SIGNIFICANT CHANGE UP (ref 34.5–45)
HGB BLD-MCNC: 12 G/DL — SIGNIFICANT CHANGE UP (ref 11.5–15.5)
HGB BLD-MCNC: 12 G/DL — SIGNIFICANT CHANGE UP (ref 11.5–15.5)
INR BLD: 1.3 RATIO — HIGH (ref 0.85–1.18)
INR BLD: 1.3 RATIO — HIGH (ref 0.85–1.18)
KETONES UR-MCNC: NEGATIVE MG/DL — SIGNIFICANT CHANGE UP
KETONES UR-MCNC: NEGATIVE MG/DL — SIGNIFICANT CHANGE UP
LEUKOCYTE ESTERASE UR-ACNC: ABNORMAL
LEUKOCYTE ESTERASE UR-ACNC: ABNORMAL
LYMPHOCYTES # BLD AUTO: 1.24 K/UL — SIGNIFICANT CHANGE UP (ref 1–3.3)
LYMPHOCYTES # BLD AUTO: 1.24 K/UL — SIGNIFICANT CHANGE UP (ref 1–3.3)
LYMPHOCYTES # BLD AUTO: 9.7 % — LOW (ref 13–44)
LYMPHOCYTES # BLD AUTO: 9.7 % — LOW (ref 13–44)
MACROCYTES BLD QL: SLIGHT — SIGNIFICANT CHANGE UP
MACROCYTES BLD QL: SLIGHT — SIGNIFICANT CHANGE UP
MANUAL SMEAR VERIFICATION: SIGNIFICANT CHANGE UP
MANUAL SMEAR VERIFICATION: SIGNIFICANT CHANGE UP
MCHC RBC-ENTMCNC: 29.1 PG — SIGNIFICANT CHANGE UP (ref 27–34)
MCHC RBC-ENTMCNC: 29.1 PG — SIGNIFICANT CHANGE UP (ref 27–34)
MCHC RBC-ENTMCNC: 32.1 GM/DL — SIGNIFICANT CHANGE UP (ref 32–36)
MCHC RBC-ENTMCNC: 32.1 GM/DL — SIGNIFICANT CHANGE UP (ref 32–36)
MCV RBC AUTO: 90.8 FL — SIGNIFICANT CHANGE UP (ref 80–100)
MCV RBC AUTO: 90.8 FL — SIGNIFICANT CHANGE UP (ref 80–100)
MICROCYTES BLD QL: SLIGHT — SIGNIFICANT CHANGE UP
MICROCYTES BLD QL: SLIGHT — SIGNIFICANT CHANGE UP
MONOCYTES # BLD AUTO: 1.35 K/UL — HIGH (ref 0–0.9)
MONOCYTES # BLD AUTO: 1.35 K/UL — HIGH (ref 0–0.9)
MONOCYTES NFR BLD AUTO: 10.6 % — SIGNIFICANT CHANGE UP (ref 2–14)
MONOCYTES NFR BLD AUTO: 10.6 % — SIGNIFICANT CHANGE UP (ref 2–14)
NEUTROPHILS # BLD AUTO: 10.15 K/UL — HIGH (ref 1.8–7.4)
NEUTROPHILS # BLD AUTO: 10.15 K/UL — HIGH (ref 1.8–7.4)
NEUTROPHILS NFR BLD AUTO: 79.7 % — HIGH (ref 43–77)
NEUTROPHILS NFR BLD AUTO: 79.7 % — HIGH (ref 43–77)
NITRITE UR-MCNC: POSITIVE
NITRITE UR-MCNC: POSITIVE
NRBC # BLD: 0 /100 WBCS — SIGNIFICANT CHANGE UP (ref 0–0)
NRBC # BLD: 0 /100 WBCS — SIGNIFICANT CHANGE UP (ref 0–0)
OVALOCYTES BLD QL SMEAR: SLIGHT — SIGNIFICANT CHANGE UP
OVALOCYTES BLD QL SMEAR: SLIGHT — SIGNIFICANT CHANGE UP
PH UR: 6.5 — SIGNIFICANT CHANGE UP (ref 5–8)
PH UR: 6.5 — SIGNIFICANT CHANGE UP (ref 5–8)
PLAT MORPH BLD: NORMAL — SIGNIFICANT CHANGE UP
PLAT MORPH BLD: NORMAL — SIGNIFICANT CHANGE UP
PLATELET # BLD AUTO: 159 K/UL — SIGNIFICANT CHANGE UP (ref 150–400)
PLATELET # BLD AUTO: 159 K/UL — SIGNIFICANT CHANGE UP (ref 150–400)
POLYCHROMASIA BLD QL SMEAR: SLIGHT — SIGNIFICANT CHANGE UP
POLYCHROMASIA BLD QL SMEAR: SLIGHT — SIGNIFICANT CHANGE UP
POTASSIUM SERPL-MCNC: 4 MMOL/L — SIGNIFICANT CHANGE UP (ref 3.5–5.3)
POTASSIUM SERPL-MCNC: 4 MMOL/L — SIGNIFICANT CHANGE UP (ref 3.5–5.3)
POTASSIUM SERPL-MCNC: 4.1 MMOL/L — SIGNIFICANT CHANGE UP (ref 3.5–5.3)
POTASSIUM SERPL-MCNC: 4.1 MMOL/L — SIGNIFICANT CHANGE UP (ref 3.5–5.3)
POTASSIUM SERPL-SCNC: 4 MMOL/L — SIGNIFICANT CHANGE UP (ref 3.5–5.3)
POTASSIUM SERPL-SCNC: 4 MMOL/L — SIGNIFICANT CHANGE UP (ref 3.5–5.3)
POTASSIUM SERPL-SCNC: 4.1 MMOL/L — SIGNIFICANT CHANGE UP (ref 3.5–5.3)
POTASSIUM SERPL-SCNC: 4.1 MMOL/L — SIGNIFICANT CHANGE UP (ref 3.5–5.3)
PROT SERPL-MCNC: 6.2 G/DL — SIGNIFICANT CHANGE UP (ref 6–8.3)
PROT SERPL-MCNC: 6.2 G/DL — SIGNIFICANT CHANGE UP (ref 6–8.3)
PROT SERPL-MCNC: 6.9 G/DL — SIGNIFICANT CHANGE UP (ref 6–8.3)
PROT SERPL-MCNC: 6.9 G/DL — SIGNIFICANT CHANGE UP (ref 6–8.3)
PROT UR-MCNC: NEGATIVE MG/DL — SIGNIFICANT CHANGE UP
PROT UR-MCNC: NEGATIVE MG/DL — SIGNIFICANT CHANGE UP
PROTHROM AB SERPL-ACNC: 14.2 SEC — HIGH (ref 9.5–13)
PROTHROM AB SERPL-ACNC: 14.2 SEC — HIGH (ref 9.5–13)
RBC # BLD: 4.12 M/UL — SIGNIFICANT CHANGE UP (ref 3.8–5.2)
RBC # BLD: 4.12 M/UL — SIGNIFICANT CHANGE UP (ref 3.8–5.2)
RBC # FLD: 14.5 % — SIGNIFICANT CHANGE UP (ref 10.3–14.5)
RBC # FLD: 14.5 % — SIGNIFICANT CHANGE UP (ref 10.3–14.5)
RBC BLD AUTO: ABNORMAL
RBC BLD AUTO: ABNORMAL
RBC CASTS # UR COMP ASSIST: 2 /HPF — SIGNIFICANT CHANGE UP (ref 0–4)
RBC CASTS # UR COMP ASSIST: 2 /HPF — SIGNIFICANT CHANGE UP (ref 0–4)
RSV RNA NPH QL NAA+NON-PROBE: SIGNIFICANT CHANGE UP
RSV RNA NPH QL NAA+NON-PROBE: SIGNIFICANT CHANGE UP
SARS-COV-2 RNA SPEC QL NAA+PROBE: SIGNIFICANT CHANGE UP
SARS-COV-2 RNA SPEC QL NAA+PROBE: SIGNIFICANT CHANGE UP
SODIUM SERPL-SCNC: 136 MMOL/L — SIGNIFICANT CHANGE UP (ref 135–145)
SODIUM SERPL-SCNC: 136 MMOL/L — SIGNIFICANT CHANGE UP (ref 135–145)
SODIUM SERPL-SCNC: 144 MMOL/L — SIGNIFICANT CHANGE UP (ref 135–145)
SODIUM SERPL-SCNC: 144 MMOL/L — SIGNIFICANT CHANGE UP (ref 135–145)
SP GR SPEC: 1.01 — SIGNIFICANT CHANGE UP (ref 1–1.03)
SP GR SPEC: 1.01 — SIGNIFICANT CHANGE UP (ref 1–1.03)
SQUAMOUS # UR AUTO: 2 /HPF — SIGNIFICANT CHANGE UP (ref 0–5)
SQUAMOUS # UR AUTO: 2 /HPF — SIGNIFICANT CHANGE UP (ref 0–5)
UROBILINOGEN FLD QL: 0.2 MG/DL — SIGNIFICANT CHANGE UP (ref 0.2–1)
UROBILINOGEN FLD QL: 0.2 MG/DL — SIGNIFICANT CHANGE UP (ref 0.2–1)
WBC # BLD: 10.37 K/UL — SIGNIFICANT CHANGE UP (ref 3.8–10.5)
WBC # BLD: 10.37 K/UL — SIGNIFICANT CHANGE UP (ref 3.8–10.5)
WBC # FLD AUTO: 10.37 K/UL — SIGNIFICANT CHANGE UP (ref 3.8–10.5)
WBC # FLD AUTO: 10.37 K/UL — SIGNIFICANT CHANGE UP (ref 3.8–10.5)
WBC UR QL: 58 /HPF — HIGH (ref 0–5)
WBC UR QL: 58 /HPF — HIGH (ref 0–5)

## 2023-11-18 PROCEDURE — 99223 1ST HOSP IP/OBS HIGH 75: CPT

## 2023-11-18 RX ORDER — SERTRALINE 25 MG/1
50 TABLET, FILM COATED ORAL ONCE
Refills: 0 | Status: COMPLETED | OUTPATIENT
Start: 2023-11-18 | End: 2023-11-18

## 2023-11-18 RX ORDER — ATORVASTATIN CALCIUM 80 MG/1
20 TABLET, FILM COATED ORAL ONCE
Refills: 0 | Status: COMPLETED | OUTPATIENT
Start: 2023-11-18 | End: 2023-11-18

## 2023-11-18 RX ORDER — PANTOPRAZOLE SODIUM 20 MG/1
40 TABLET, DELAYED RELEASE ORAL
Refills: 0 | Status: DISCONTINUED | OUTPATIENT
Start: 2023-11-18 | End: 2023-11-21

## 2023-11-18 RX ORDER — OXYBUTYNIN CHLORIDE 5 MG
5 TABLET ORAL
Refills: 0 | Status: DISCONTINUED | OUTPATIENT
Start: 2023-11-18 | End: 2023-11-21

## 2023-11-18 RX ORDER — ONDANSETRON 8 MG/1
4 TABLET, FILM COATED ORAL EVERY 8 HOURS
Refills: 0 | Status: DISCONTINUED | OUTPATIENT
Start: 2023-11-18 | End: 2023-11-21

## 2023-11-18 RX ORDER — ANASTROZOLE 1 MG/1
1 TABLET ORAL DAILY
Refills: 0 | Status: DISCONTINUED | OUTPATIENT
Start: 2023-11-18 | End: 2023-11-21

## 2023-11-18 RX ORDER — ACETAMINOPHEN 500 MG
650 TABLET ORAL EVERY 6 HOURS
Refills: 0 | Status: DISCONTINUED | OUTPATIENT
Start: 2023-11-18 | End: 2023-11-21

## 2023-11-18 RX ORDER — ATORVASTATIN CALCIUM 80 MG/1
20 TABLET, FILM COATED ORAL AT BEDTIME
Refills: 0 | Status: DISCONTINUED | OUTPATIENT
Start: 2023-11-18 | End: 2023-11-21

## 2023-11-18 RX ORDER — LANOLIN ALCOHOL/MO/W.PET/CERES
3 CREAM (GRAM) TOPICAL AT BEDTIME
Refills: 0 | Status: DISCONTINUED | OUTPATIENT
Start: 2023-11-18 | End: 2023-11-21

## 2023-11-18 RX ORDER — SERTRALINE 25 MG/1
50 TABLET, FILM COATED ORAL DAILY
Refills: 0 | Status: DISCONTINUED | OUTPATIENT
Start: 2023-11-18 | End: 2023-11-21

## 2023-11-18 RX ORDER — APIXABAN 2.5 MG/1
2.5 TABLET, FILM COATED ORAL ONCE
Refills: 0 | Status: COMPLETED | OUTPATIENT
Start: 2023-11-18 | End: 2023-11-18

## 2023-11-18 RX ORDER — CEFTRIAXONE 500 MG/1
1000 INJECTION, POWDER, FOR SOLUTION INTRAMUSCULAR; INTRAVENOUS EVERY 24 HOURS
Refills: 0 | Status: DISCONTINUED | OUTPATIENT
Start: 2023-11-18 | End: 2023-11-21

## 2023-11-18 RX ORDER — APIXABAN 2.5 MG/1
2.5 TABLET, FILM COATED ORAL EVERY 12 HOURS
Refills: 0 | Status: DISCONTINUED | OUTPATIENT
Start: 2023-11-18 | End: 2023-11-21

## 2023-11-18 RX ADMIN — CEFTRIAXONE 100 MILLIGRAM(S): 500 INJECTION, POWDER, FOR SOLUTION INTRAMUSCULAR; INTRAVENOUS at 06:08

## 2023-11-18 RX ADMIN — PANTOPRAZOLE SODIUM 40 MILLIGRAM(S): 20 TABLET, DELAYED RELEASE ORAL at 06:08

## 2023-11-18 RX ADMIN — APIXABAN 2.5 MILLIGRAM(S): 2.5 TABLET, FILM COATED ORAL at 01:26

## 2023-11-18 RX ADMIN — PIPERACILLIN AND TAZOBACTAM 3.38 GRAM(S): 4; .5 INJECTION, POWDER, LYOPHILIZED, FOR SOLUTION INTRAVENOUS at 00:51

## 2023-11-18 RX ADMIN — APIXABAN 2.5 MILLIGRAM(S): 2.5 TABLET, FILM COATED ORAL at 06:07

## 2023-11-18 RX ADMIN — ANASTROZOLE 1 MILLIGRAM(S): 1 TABLET ORAL at 14:25

## 2023-11-18 RX ADMIN — ATORVASTATIN CALCIUM 20 MILLIGRAM(S): 80 TABLET, FILM COATED ORAL at 21:36

## 2023-11-18 RX ADMIN — Medication 5 MILLIGRAM(S): at 18:02

## 2023-11-18 RX ADMIN — SERTRALINE 50 MILLIGRAM(S): 25 TABLET, FILM COATED ORAL at 01:26

## 2023-11-18 RX ADMIN — Medication 5 MILLIGRAM(S): at 06:07

## 2023-11-18 RX ADMIN — APIXABAN 2.5 MILLIGRAM(S): 2.5 TABLET, FILM COATED ORAL at 18:02

## 2023-11-18 RX ADMIN — Medication 5 MILLIGRAM(S): at 06:06

## 2023-11-18 RX ADMIN — Medication 250 MILLIGRAM(S): at 00:51

## 2023-11-18 RX ADMIN — ATORVASTATIN CALCIUM 20 MILLIGRAM(S): 80 TABLET, FILM COATED ORAL at 01:25

## 2023-11-18 RX ADMIN — SERTRALINE 50 MILLIGRAM(S): 25 TABLET, FILM COATED ORAL at 12:04

## 2023-11-18 NOTE — H&P ADULT - PROBLEM SELECTOR PLAN 1
T 100.5 , wbc 12 , tachycardic ,  presumed urinary source , UA + , rvp negative , CXR clear. s/p vanc and zosyn , h/o ecoli bacteremia  prior cultures sensitive to ceftriaxone . patient referred for MRI abd to evaluated renal cysts as outpatient , will obtain while here as may be contributing to recurrent infections   - s/p 2200ml IV fluid bolus , additional IV fluids as needed    - ceftriaxone   - f/u blood and urine cultures  - MRSA/MSSA pcr   - MRI abdomen

## 2023-11-18 NOTE — CONSULT NOTE ADULT - NS ATTEND AMEND GEN_ALL_CORE FT
79 y/o F on adjuvant AI for hx breast cancer, admitted with fever, generalized weakness. Had similar presentation in August when she was admitted for E. coli bacteremia. Has had recurrent UTI's. UA positive now as well. Started on ceftriaxone, cultures pending.   Continue anastrozole. Ordered for MRI abd to further evaluate renal cysts.

## 2023-11-18 NOTE — H&P ADULT - NSHPREVIEWOFSYSTEMS_GEN_ALL_CORE
CONSTITUTIONAL: No weakness,+  fevers  + chills  EYES/ENT: No visual changes;  No dysphagia  NECK: No pain or stiffness  RESPIRATORY: No cough, wheezing, hemoptysis; No shortness of breath  CARDIOVASCULAR: No chest pain or palpitations; No lower extremity edema  EXTREMITIES: no le edema, cyanosis, clubbing  GASTROINTESTINAL: No abdominal or epigastric pain. No nausea, vomiting, or hematemesis; No diarrhea or constipation. No melena or hematochezia.  BACK: + chronic  back pain  GENITOURINARY: No dysuria, frequency or hematuria  NEUROLOGICAL: No numbness or weakness  MSK: no joint swelling or pain  SKIN: No itching, burning, rashes, or lesions   PSYCH: no agitation  All other review of systems is negative unless indicated above.

## 2023-11-18 NOTE — H&P ADULT - HISTORY OF PRESENT ILLNESS
Patient is a 78 year old female w/ pmh l breast ca s/p l lumpectomy + anastrazole, r breast adh s/p excision + tamoxifen x5yrs, lymphoma (DLBCL vs. 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   Patient is a 78 year old female w/ pmh l breast ca s/p l lumpectomy + anastrazole, r breast adh s/p excision + tamoxifen x5yrs, lymphoma (DLBCL vs. 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    Patient reports  fever and rigors at home , T max 102  on day of admission. she reports feeling unwell on day prior to admission with a scratchy throat and tested negative for covid . She reports no cough , no runny nose , no abdominal pain, no nausea or vomiting , no diarrhea , no dysuria or change in urinary symptoms , no flank pain. She reports similar presentation this past august when she was admitted for sepsis/ bacteremia with ecoli. She reports recurrent UTis as outpatient since then.

## 2023-11-18 NOTE — H&P ADULT - NSHPLABSRESULTS_GEN_ALL_CORE
Labs personally reviewed:                          12.3   12.74 )-----------( 182      ( 2023 23:38 )             38.9     11-    136  |  100  |  16  ----------------------------<  145<H>  4.0   |  24  |  0.94    Ca    9.6      2023 23:38    TPro  6.9  /  Alb  4.2  /  TBili  0.8  /  DBili  x   /  AST  23  /  ALT  20  /  AlkPhos  59  11-        LIVER FUNCTIONS - ( 2023 23:38 )  Alb: 4.2 g/dL / Pro: 6.9 g/dL / ALK PHOS: 59 U/L / ALT: 20 U/L / AST: 23 U/L / GGT: x           PT/INR - ( 2023 23:38 )   PT: 14.2 sec;   INR: 1.30 ratio         PTT - ( 2023 23:38 )  PTT:30.7 sec  Urinalysis Basic - ( 2023 01:00 )    Color: Yellow / Appearance: Clear / S.010 / pH: x  Gluc: x / Ketone: Negative mg/dL  / Bili: Negative / Urobili: 0.2 mg/dL   Blood: x / Protein: Negative mg/dL / Nitrite: Positive   Leuk Esterase: Moderate / RBC: 2 /HPF / WBC 58 /HPF   Sq Epi: x / Non Sq Epi: 2 /HPF / Bacteria: Few /HPF      CAPILLARY BLOOD GLUCOSE          Imaging:  CXR personally reviewed: no focal opacity    EKG personally reviewed: Labs personally reviewed:                          12.3   12.74 )-----------( 182      ( 2023 23:38 )             38.9     -    136  |  100  |  16  ----------------------------<  145<H>  4.0   |  24  |  0.94    Ca    9.6      2023 23:38    TPro  6.9  /  Alb  4.2  /  TBili  0.8  /  DBili  x   /  AST  23  /  ALT  20  /  AlkPhos  59  11-        LIVER FUNCTIONS - ( 2023 23:38 )  Alb: 4.2 g/dL / Pro: 6.9 g/dL / ALK PHOS: 59 U/L / ALT: 20 U/L / AST: 23 U/L / GGT: x           PT/INR - ( 2023 23:38 )   PT: 14.2 sec;   INR: 1.30 ratio         PTT - ( 2023 23:38 )  PTT:30.7 sec  Urinalysis Basic - ( 2023 01:00 )    Color: Yellow / Appearance: Clear / S.010 / pH: x  Gluc: x / Ketone: Negative mg/dL  / Bili: Negative / Urobili: 0.2 mg/dL   Blood: x / Protein: Negative mg/dL / Nitrite: Positive   Leuk Esterase: Moderate / RBC: 2 /HPF / WBC 58 /HPF   Sq Epi: x / Non Sq Epi: 2 /HPF / Bacteria: Few /HPF      CAPILLARY BLOOD GLUCOSE          Imaging:  CXR personally reviewed: no focal opacity    EKG personally reviewed: sinus tachycardic at 107 bpm , no acute s-t elevations

## 2023-11-18 NOTE — H&P ADULT - ASSESSMENT
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

## 2023-11-18 NOTE — ED ADULT NURSE NOTE - OBJECTIVE STATEMENT
77 y/o Axox4 F arrived from home complaining of chills. PMH breast CA/lymphoma , blood clots (takes Eliquis), HLD, CVA, recent admission in August for urosepsis. Pt states feeling generalized malaise, weakness a/w sore throat and fever TMAX 102F. Pt endorses taking Tylenol @1730 today. Pt denies urinary symptoms, SOB, chest pain. Pt on CM sinus narciso.

## 2023-11-18 NOTE — H&P ADULT - NSHPPHYSICALEXAM_GEN_ALL_CORE
Vital Signs Last 24 Hrs  T(C): 38.1 (17 Nov 2023 22:57), Max: 38.1 (17 Nov 2023 22:57)  T(F): 100.5 (17 Nov 2023 22:57), Max: 100.5 (17 Nov 2023 22:57)  HR: 66 (17 Nov 2023 22:57) (66 - 106)  BP: 122/55 (17 Nov 2023 22:57) (122/55 - 130/84)  BP(mean): --  RR: 18 (17 Nov 2023 22:57) (18 - 19)  SpO2: 94% (17 Nov 2023 22:57) (94% - 95%)    Parameters below as of 17 Nov 2023 22:57  Patient On (Oxygen Delivery Method): room air Vital Signs Last 24 Hrs  T(C): 38.1 (17 Nov 2023 22:57), Max: 38.1 (17 Nov 2023 22:57)  T(F): 100.5 (17 Nov 2023 22:57), Max: 100.5 (17 Nov 2023 22:57)  HR: 66 (17 Nov 2023 22:57) (66 - 106)  BP: 122/55 (17 Nov 2023 22:57) (122/55 - 130/84)  BP(mean): --  RR: 18 (17 Nov 2023 22:57) (18 - 19)  SpO2: 94% (17 Nov 2023 22:57) (94% - 95%)    Parameters below as of 17 Nov 2023 22:57  Patient On (Oxygen Delivery Method): room air    GENERAL: No acute distress, well-developed  HEAD:  Atraumatic, Normocephalic  ENT: EOMI, PERRLA, conjunctiva and sclera clear,  moist mucosa no pharyngeal erythema or exudates   NECK: supple , no JVD   CHEST/LUNG: Clear to auscultation bilaterally; No wheeze, equal breath sounds bilaterally   BACK: No spinal tenderness,  No CVA tenderness   HEART: Regular rate and rhythm; No murmurs, rubs, or gallops  ABDOMEN: Soft, Nontender, Nondistended; Bowel sounds present  EXTREMITIES:  No clubbing, cyanosis, or edema  MSK: No joint swelling or effusions, ROM intact   PSYCH: Normal behavior/affect  NEUROLOGY: AAOx3, non-focal, cranial nerves intact  SKIN: Normal color, No rashes or lesions

## 2023-11-18 NOTE — ED ADULT NURSE NOTE - CHIEF COMPLAINT QUOTE
Hx Sepsis in august   Came to ED for chills  x this AM. Endorses back pain.    TMAX: 102.3F at home.   On eliquis,   Took tylenol x 1 hour ago.

## 2023-11-18 NOTE — H&P ADULT - NSHPPOADEEPVENOUSTHROMB_GEN_A_CORE
Pt states  He has had diabetic wound on second toe of left foot for about a week, was seen here 3 days ago had xrays done and states his paperwork told him to come back to get it rechecked, states still have redness and pain in that toe. no

## 2023-11-19 LAB
BUN SERPL-MCNC: 17 MG/DL — SIGNIFICANT CHANGE UP (ref 7–23)
BUN SERPL-MCNC: 17 MG/DL — SIGNIFICANT CHANGE UP (ref 7–23)
CALCIUM SERPL-MCNC: 8.6 MG/DL — SIGNIFICANT CHANGE UP (ref 8.4–10.5)
CALCIUM SERPL-MCNC: 8.6 MG/DL — SIGNIFICANT CHANGE UP (ref 8.4–10.5)
CHLORIDE SERPL-SCNC: 106 MMOL/L — SIGNIFICANT CHANGE UP (ref 96–108)
CHLORIDE SERPL-SCNC: 106 MMOL/L — SIGNIFICANT CHANGE UP (ref 96–108)
CO2 SERPL-SCNC: 23 MMOL/L — SIGNIFICANT CHANGE UP (ref 22–31)
CO2 SERPL-SCNC: 23 MMOL/L — SIGNIFICANT CHANGE UP (ref 22–31)
CREAT SERPL-MCNC: 0.81 MG/DL — SIGNIFICANT CHANGE UP (ref 0.5–1.3)
CREAT SERPL-MCNC: 0.81 MG/DL — SIGNIFICANT CHANGE UP (ref 0.5–1.3)
EGFR: 75 ML/MIN/1.73M2 — SIGNIFICANT CHANGE UP
EGFR: 75 ML/MIN/1.73M2 — SIGNIFICANT CHANGE UP
GLUCOSE SERPL-MCNC: 102 MG/DL — HIGH (ref 70–99)
GLUCOSE SERPL-MCNC: 102 MG/DL — HIGH (ref 70–99)
HCT VFR BLD CALC: 38.6 % — SIGNIFICANT CHANGE UP (ref 34.5–45)
HCT VFR BLD CALC: 38.6 % — SIGNIFICANT CHANGE UP (ref 34.5–45)
HGB BLD-MCNC: 12.3 G/DL — SIGNIFICANT CHANGE UP (ref 11.5–15.5)
HGB BLD-MCNC: 12.3 G/DL — SIGNIFICANT CHANGE UP (ref 11.5–15.5)
MAGNESIUM SERPL-MCNC: 2 MG/DL — SIGNIFICANT CHANGE UP (ref 1.6–2.6)
MAGNESIUM SERPL-MCNC: 2 MG/DL — SIGNIFICANT CHANGE UP (ref 1.6–2.6)
MCHC RBC-ENTMCNC: 29.3 PG — SIGNIFICANT CHANGE UP (ref 27–34)
MCHC RBC-ENTMCNC: 29.3 PG — SIGNIFICANT CHANGE UP (ref 27–34)
MCHC RBC-ENTMCNC: 31.9 GM/DL — LOW (ref 32–36)
MCHC RBC-ENTMCNC: 31.9 GM/DL — LOW (ref 32–36)
MCV RBC AUTO: 91.9 FL — SIGNIFICANT CHANGE UP (ref 80–100)
MCV RBC AUTO: 91.9 FL — SIGNIFICANT CHANGE UP (ref 80–100)
NRBC # BLD: 0 /100 WBCS — SIGNIFICANT CHANGE UP (ref 0–0)
NRBC # BLD: 0 /100 WBCS — SIGNIFICANT CHANGE UP (ref 0–0)
PLATELET # BLD AUTO: 182 K/UL — SIGNIFICANT CHANGE UP (ref 150–400)
PLATELET # BLD AUTO: 182 K/UL — SIGNIFICANT CHANGE UP (ref 150–400)
POTASSIUM SERPL-MCNC: 4.3 MMOL/L — SIGNIFICANT CHANGE UP (ref 3.5–5.3)
POTASSIUM SERPL-MCNC: 4.3 MMOL/L — SIGNIFICANT CHANGE UP (ref 3.5–5.3)
POTASSIUM SERPL-SCNC: 4.3 MMOL/L — SIGNIFICANT CHANGE UP (ref 3.5–5.3)
POTASSIUM SERPL-SCNC: 4.3 MMOL/L — SIGNIFICANT CHANGE UP (ref 3.5–5.3)
RBC # BLD: 4.2 M/UL — SIGNIFICANT CHANGE UP (ref 3.8–5.2)
RBC # BLD: 4.2 M/UL — SIGNIFICANT CHANGE UP (ref 3.8–5.2)
RBC # FLD: 14.4 % — SIGNIFICANT CHANGE UP (ref 10.3–14.5)
RBC # FLD: 14.4 % — SIGNIFICANT CHANGE UP (ref 10.3–14.5)
SODIUM SERPL-SCNC: 143 MMOL/L — SIGNIFICANT CHANGE UP (ref 135–145)
SODIUM SERPL-SCNC: 143 MMOL/L — SIGNIFICANT CHANGE UP (ref 135–145)
WBC # BLD: 9.11 K/UL — SIGNIFICANT CHANGE UP (ref 3.8–10.5)
WBC # BLD: 9.11 K/UL — SIGNIFICANT CHANGE UP (ref 3.8–10.5)
WBC # FLD AUTO: 9.11 K/UL — SIGNIFICANT CHANGE UP (ref 3.8–10.5)
WBC # FLD AUTO: 9.11 K/UL — SIGNIFICANT CHANGE UP (ref 3.8–10.5)

## 2023-11-19 PROCEDURE — 74183 MRI ABD W/O CNTR FLWD CNTR: CPT | Mod: 26

## 2023-11-19 RX ADMIN — PANTOPRAZOLE SODIUM 40 MILLIGRAM(S): 20 TABLET, DELAYED RELEASE ORAL at 05:29

## 2023-11-19 RX ADMIN — APIXABAN 2.5 MILLIGRAM(S): 2.5 TABLET, FILM COATED ORAL at 05:29

## 2023-11-19 RX ADMIN — ANASTROZOLE 1 MILLIGRAM(S): 1 TABLET ORAL at 12:07

## 2023-11-19 RX ADMIN — Medication 5 MILLIGRAM(S): at 17:26

## 2023-11-19 RX ADMIN — CEFTRIAXONE 100 MILLIGRAM(S): 500 INJECTION, POWDER, FOR SOLUTION INTRAMUSCULAR; INTRAVENOUS at 06:39

## 2023-11-19 RX ADMIN — Medication 5 MILLIGRAM(S): at 05:29

## 2023-11-19 RX ADMIN — ATORVASTATIN CALCIUM 20 MILLIGRAM(S): 80 TABLET, FILM COATED ORAL at 21:26

## 2023-11-19 RX ADMIN — APIXABAN 2.5 MILLIGRAM(S): 2.5 TABLET, FILM COATED ORAL at 17:26

## 2023-11-19 RX ADMIN — SERTRALINE 50 MILLIGRAM(S): 25 TABLET, FILM COATED ORAL at 12:06

## 2023-11-19 NOTE — PROGRESS NOTE ADULT - ASSESSMENT
Patient is a 78y old  Female who presents with a chief complaint of chills+ generalized weakness/fatigue/malaise     Breast Cancer  --under the care of Chantale Bailey of [facility]  --pT1aN0 %, ME 70% well differentiated  invasive ductal carcinoma of L breast  --s/p L lumpectomy 9/16/22  --History [facility]H s/p right breast excision + tamoxifen x 5 years  --on anastrazole, continue while inpatient.  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      will follow,    Africa Baker NP  Hematology/ Oncology  [facility]  [phone] (office)  [phone] (alt office)  Evenings and weekends please call MD on call or office

## 2023-11-19 NOTE — PROGRESS NOTE ADULT - PROBLEM SELECTOR PLAN 7
s/p chemo ? in remission   - outpatient follow up as scheduled
s/p chemo ? in remission   - outpatient follow up as scheduled

## 2023-11-19 NOTE — PROGRESS NOTE ADULT - PROBLEM SELECTOR PLAN 1
T 100.5 , wbc 12 , tachycardic ,  presumed urinary source , UA + , rvp negative , CXR clear. s/p vanc and zosyn , h/o ecoli bacteremia  prior cultures sensitive to ceftriaxone . patient referred for MRI abd to evaluated renal cysts as outpatient , will obtain while here as may be contributing to recurrent infections   - s/p 2200ml IV fluid bolus , additional IV fluids as needed    - ceftriaxone   - f/u blood and urine cultures  - MRSA/MSSA pcr   - MRI abdomen
T 100.5 , wbc 12 , tachycardic ,  presumed urinary source , UA + , rvp negative , CXR clear. s/p vanc and zosyn , h/o ecoli bacteremia  prior cultures sensitive to ceftriaxone . patient referred for MRI abd to evaluated renal cysts as outpatient , will obtain while here as may be contributing to recurrent infections   - s/p 2200ml IV fluid bolus , additional IV fluids as needed    - ceftriaxone cont, till sensitivity   - f/u blood and urine cultures  - MRSA/MSSA pcr   - MRI abdomen

## 2023-11-20 ENCOUNTER — TRANSCRIPTION ENCOUNTER (OUTPATIENT)
Age: 78
End: 2023-11-20

## 2023-11-20 LAB
BUN SERPL-MCNC: 20 MG/DL — SIGNIFICANT CHANGE UP (ref 7–23)
BUN SERPL-MCNC: 20 MG/DL — SIGNIFICANT CHANGE UP (ref 7–23)
CALCIUM SERPL-MCNC: 8.6 MG/DL — SIGNIFICANT CHANGE UP (ref 8.4–10.5)
CALCIUM SERPL-MCNC: 8.6 MG/DL — SIGNIFICANT CHANGE UP (ref 8.4–10.5)
CHLORIDE SERPL-SCNC: 107 MMOL/L — SIGNIFICANT CHANGE UP (ref 96–108)
CHLORIDE SERPL-SCNC: 107 MMOL/L — SIGNIFICANT CHANGE UP (ref 96–108)
CO2 SERPL-SCNC: 22 MMOL/L — SIGNIFICANT CHANGE UP (ref 22–31)
CO2 SERPL-SCNC: 22 MMOL/L — SIGNIFICANT CHANGE UP (ref 22–31)
CREAT SERPL-MCNC: 0.75 MG/DL — SIGNIFICANT CHANGE UP (ref 0.5–1.3)
CREAT SERPL-MCNC: 0.75 MG/DL — SIGNIFICANT CHANGE UP (ref 0.5–1.3)
EGFR: 82 ML/MIN/1.73M2 — SIGNIFICANT CHANGE UP
EGFR: 82 ML/MIN/1.73M2 — SIGNIFICANT CHANGE UP
GLUCOSE SERPL-MCNC: 97 MG/DL — SIGNIFICANT CHANGE UP (ref 70–99)
GLUCOSE SERPL-MCNC: 97 MG/DL — SIGNIFICANT CHANGE UP (ref 70–99)
HCT VFR BLD CALC: 36.2 % — SIGNIFICANT CHANGE UP (ref 34.5–45)
HCT VFR BLD CALC: 36.2 % — SIGNIFICANT CHANGE UP (ref 34.5–45)
HGB BLD-MCNC: 11.5 G/DL — SIGNIFICANT CHANGE UP (ref 11.5–15.5)
HGB BLD-MCNC: 11.5 G/DL — SIGNIFICANT CHANGE UP (ref 11.5–15.5)
MCHC RBC-ENTMCNC: 28.8 PG — SIGNIFICANT CHANGE UP (ref 27–34)
MCHC RBC-ENTMCNC: 28.8 PG — SIGNIFICANT CHANGE UP (ref 27–34)
MCHC RBC-ENTMCNC: 31.8 GM/DL — LOW (ref 32–36)
MCHC RBC-ENTMCNC: 31.8 GM/DL — LOW (ref 32–36)
MCV RBC AUTO: 90.7 FL — SIGNIFICANT CHANGE UP (ref 80–100)
MCV RBC AUTO: 90.7 FL — SIGNIFICANT CHANGE UP (ref 80–100)
MRSA PCR RESULT.: SIGNIFICANT CHANGE UP
MRSA PCR RESULT.: SIGNIFICANT CHANGE UP
NRBC # BLD: 0 /100 WBCS — SIGNIFICANT CHANGE UP (ref 0–0)
NRBC # BLD: 0 /100 WBCS — SIGNIFICANT CHANGE UP (ref 0–0)
PLATELET # BLD AUTO: 185 K/UL — SIGNIFICANT CHANGE UP (ref 150–400)
PLATELET # BLD AUTO: 185 K/UL — SIGNIFICANT CHANGE UP (ref 150–400)
POTASSIUM SERPL-MCNC: 4 MMOL/L — SIGNIFICANT CHANGE UP (ref 3.5–5.3)
POTASSIUM SERPL-MCNC: 4 MMOL/L — SIGNIFICANT CHANGE UP (ref 3.5–5.3)
POTASSIUM SERPL-SCNC: 4 MMOL/L — SIGNIFICANT CHANGE UP (ref 3.5–5.3)
POTASSIUM SERPL-SCNC: 4 MMOL/L — SIGNIFICANT CHANGE UP (ref 3.5–5.3)
RBC # BLD: 3.99 M/UL — SIGNIFICANT CHANGE UP (ref 3.8–5.2)
RBC # BLD: 3.99 M/UL — SIGNIFICANT CHANGE UP (ref 3.8–5.2)
RBC # FLD: 14.3 % — SIGNIFICANT CHANGE UP (ref 10.3–14.5)
RBC # FLD: 14.3 % — SIGNIFICANT CHANGE UP (ref 10.3–14.5)
S AUREUS DNA NOSE QL NAA+PROBE: SIGNIFICANT CHANGE UP
S AUREUS DNA NOSE QL NAA+PROBE: SIGNIFICANT CHANGE UP
SODIUM SERPL-SCNC: 141 MMOL/L — SIGNIFICANT CHANGE UP (ref 135–145)
SODIUM SERPL-SCNC: 141 MMOL/L — SIGNIFICANT CHANGE UP (ref 135–145)
WBC # BLD: 7.06 K/UL — SIGNIFICANT CHANGE UP (ref 3.8–10.5)
WBC # BLD: 7.06 K/UL — SIGNIFICANT CHANGE UP (ref 3.8–10.5)
WBC # FLD AUTO: 7.06 K/UL — SIGNIFICANT CHANGE UP (ref 3.8–10.5)
WBC # FLD AUTO: 7.06 K/UL — SIGNIFICANT CHANGE UP (ref 3.8–10.5)

## 2023-11-20 PROCEDURE — 99232 SBSQ HOSP IP/OBS MODERATE 35: CPT

## 2023-11-20 RX ORDER — LANOLIN ALCOHOL/MO/W.PET/CERES
1 CREAM (GRAM) TOPICAL
Qty: 0 | Refills: 0 | DISCHARGE
Start: 2023-11-20

## 2023-11-20 RX ADMIN — SERTRALINE 50 MILLIGRAM(S): 25 TABLET, FILM COATED ORAL at 11:14

## 2023-11-20 RX ADMIN — PANTOPRAZOLE SODIUM 40 MILLIGRAM(S): 20 TABLET, DELAYED RELEASE ORAL at 06:23

## 2023-11-20 RX ADMIN — CEFTRIAXONE 100 MILLIGRAM(S): 500 INJECTION, POWDER, FOR SOLUTION INTRAMUSCULAR; INTRAVENOUS at 06:24

## 2023-11-20 RX ADMIN — ATORVASTATIN CALCIUM 20 MILLIGRAM(S): 80 TABLET, FILM COATED ORAL at 21:23

## 2023-11-20 RX ADMIN — ANASTROZOLE 1 MILLIGRAM(S): 1 TABLET ORAL at 11:15

## 2023-11-20 RX ADMIN — APIXABAN 2.5 MILLIGRAM(S): 2.5 TABLET, FILM COATED ORAL at 06:23

## 2023-11-20 RX ADMIN — Medication 5 MILLIGRAM(S): at 06:23

## 2023-11-20 RX ADMIN — Medication 5 MILLIGRAM(S): at 18:00

## 2023-11-20 RX ADMIN — APIXABAN 2.5 MILLIGRAM(S): 2.5 TABLET, FILM COATED ORAL at 18:00

## 2023-11-20 NOTE — DISCHARGE NOTE PROVIDER - CARE PROVIDER_API CALL
BROCKWAY-MARCHELLO, JULIA PALADINO  Follow Up Time:    BROCKWAY-MARCHELLO, JULIA PALADINO  Follow Up Time:     SUMA ANNE, [place]  [address] [facility]   [address] [place] [id number]  Phone: ()-  Fax: ()-  Follow Up Time:

## 2023-11-20 NOTE — DISCHARGE NOTE PROVIDER - HOSPITAL COURSE
HPI:  Patient is a 78 year old female w/ pmh l breast ca s/p l lumpectomy + anastrazole, r breast adh s/p excision + tamoxifen x5yrs, lymphoma (DLBCL vs. 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    Patient reports  fever and rigors at home , T max 102  on day of admission. she reports feeling unwell on day prior to admission with a scratchy throat and tested negative for covid . She reports no cough , no runny nose , no abdominal pain, no nausea or vomiting , no diarrhea , no dysuria or change in urinary symptoms , no flank pain. She reports similar presentation this past august when she was admitted for sepsis/ bacteremia with ecoli. She reports recurrent UTis as outpatient since then.   (18 Nov 2023 01:35)    Hospital Course:      Important Medication Changes and Reason:    Active or Pending Issues Requiring Follow-up:    Advanced Directives:   [ x] Full code  [ ] DNR  [ ] Hospice    Discharge Diagnoses:         HPI:  Patient is a 78 year old female w/ pmh l breast ca s/p l lumpectomy + anastrazole, r breast adh s/p excision + tamoxifen x5yrs, lymphoma (DLBCL vs. 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    Patient reports  fever and rigors at home , T max 102  on day of admission. she reports feeling unwell on day prior to admission with a scratchy throat and tested negative for covid . She reports no cough , no runny nose , no abdominal pain, no nausea or vomiting , no diarrhea , no dysuria or change in urinary symptoms , no flank pain. She reports similar presentation this past august when she was admitted for sepsis/ bacteremia with ecoli. She reports recurrent UTis as outpatient since then.   (18 Nov 2023 01:35)    Hospital Course:  Sepsis.   - T 100.5 , wbc 12 , tachycardic ,  presumed urinary source , UA + , rvp negative , CXR clear. s/p vanc and zosyn , h/o ecoli bacteremia  prior cultures sensitive to ceftriaxone . patient referred for MRI abd to evaluated renal cysts as outpatient , will obtain while here as may be contributing to recurrent infections   - s/p 2200ml IV fluid bolus, additional IV fluids as needed    - UCx w/ 50-99K E. coli. C/w Rocephin for now pending sensitivity  on discharge switch to bactrim DS bid to finish the 4 week course  - BCx2 w/ NGTD; F/u final  - MRSA/MSSA PCR Negative   - MRI abdomen - R renal hemorrhagic cyst, complex cystic lesion in the L medical breast --> F/u heme/onc  - Pt states that she follows with Dr. Dexter Spencer and has an outpatient follow up scheduled       Breast cancer.   - continue anastrazole.  - MR abdomen w/ complex cystic lesion in the L medical breast --> F/u heme/onc   - heme/onc eval appreciated; F/u recs    History of Renal Cyst  - MRI abdomen - R renal hemorrhagic cyst   - outpatient follow up with Dr. Dexter Underwoodshannon and has an outpatient follow up scheduled           Important Medication Changes and Reason:  Bactrim DS 2x/day x 4 weeks total until 12/16/23     Active or Pending Issues Requiring Follow-up:    Advanced Directives:   [ x] Full code  [ ] DNR  [ ] Hospice    Discharge Diagnoses:  sespis  Breast Cancer  Renal Cyst

## 2023-11-20 NOTE — PROGRESS NOTE ADULT - ASSESSMENT
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   - T 100.5 , wbc 12 , tachycardic ,  presumed urinary source , UA + , rvp negative , CXR clear. s/p vanc and zosyn , h/o ecoli bacteremia  prior cultures sensitive to ceftriaxone . patient referred for MRI abd to evaluated renal cysts as outpatient , will obtain while here as may be contributing to recurrent infections   - s/p 2200ml IV fluid bolus, additional IV fluids as needed    - UCx w/ 50-99K E. coli. C/w Rocephin for now pending sensitivity    - BCx2 w/ NGTD; F/u final  - MRSA/MSSA PCR Negative   - MRI abdomen - R renal hemorrhagic cyst, complex cystic lesion in the L medical breast --> F/u heme/onc  - Pt states that she follows with Dr. Dexter Spencer and has an outpatient follow up scheduled       Breast cancer.   - continue anastrazole.  - MR abdomen w/ complex cystic lesion in the L medical breast --> F/u heme/onc   - heme/onc eval appreciated; F/u recs    History of Renal Cyst  - MRI abdomen - R renal hemorrhagic cyst   - outpatient follow up with Dr. Dexter Spencer and has an outpatient follow up scheduled       VTE (venous thromboembolism).   - continue ELIQUIS.    CNS vasculitis.   - continue chronic prednisone.    GERD (gastroesophageal reflux disease).   -continue PPI.    Anxiety and depression.   - continue sertraline.    Lymphoma.   - s/p chemo ? in remission   - outpatient follow up as scheduled [facility]  - Heme/Onc eval appreciated; F/u recs     PPX  - Eliquis and PPI     Discussed with Attending and ACP.

## 2023-11-20 NOTE — DISCHARGE NOTE PROVIDER - NSDCCPCAREPLAN_GEN_ALL_CORE_FT
PRINCIPAL DISCHARGE DIAGNOSIS  Diagnosis: Sepsis  Assessment and Plan of Treatment: Take all antibiotics as ordered.  Call you Health care provider upon arrival home to make a one week follow up appointment.  If you develop fever, chills, malaise, or change in mental status call your Health Care Provider or go to the Emergency Department.  Nutrition is important, eat small frequent meals to help ensure you get adequate calories.  Do not stay in bed all day!  Increase your activity daily as tolerated.        SECONDARY DISCHARGE DIAGNOSES  Diagnosis: Breast cancer  Assessment and Plan of Treatment:      PRINCIPAL DISCHARGE DIAGNOSIS  Diagnosis: Sepsis  Assessment and Plan of Treatment: Pt states that she follows with Dr. Dexter Spencer and has an outpatient follow up scheduled     Follow up with PCP with outpatient follow up with PCP switch to bactrim DS bid to finish the 4 week course  Take all antibiotics as ordered.  Call you Health care provider upon arrival home to make a one week follow up appointment.  If you develop fever, chills, malaise, or change in mental status call your Health Care Provider or go to the Emergency Department.  Nutrition is important, eat small frequent meals to help ensure you get adequate calories.  Do not stay in bed all day!  Increase your activity daily as tolerated.        SECONDARY DISCHARGE DIAGNOSES  Diagnosis: Breast cancer  Assessment and Plan of Treatment: --under the care of Chantale Bailey of [facility]  --invasive ductal carcinoma of L breast  --s/p L lumpectomy 9/16/22  --History ADH s/p right breast excision + tamoxifen x 5 years  --on anastrazole, continue while inpatient.  --ongoing care after Discharge/Dispo/Med rec discussed with attending. Patient medically cleared for discharge with outpatient follow up with PCP  - S/p RCVANGIE x 6 5/2011 with CR      Diagnosis: Renal cyst  Assessment and Plan of Treatment: MRI abdomen - R renal hemorrhagic cyst   - outpatient follow up with Dr. Dexter Spencer and has an outpatient follow up scheduled

## 2023-11-20 NOTE — DISCHARGE NOTE PROVIDER - NSDCMRMEDTOKEN_GEN_ALL_CORE_FT
acetaminophen 325 mg oral tablet: 2 tab(s) orally every 6 hours, As needed, Mild Pain (1 - 3)  anastrozole 1 mg oral tablet: 1 tab(s) orally once a day  atorvastatin 20 mg oral tablet: 1 tab(s) orally once a day  Biotin 5000 mcg oral capsule: 1 cap(s) orally once a day  dexlansoprazole 30 mg oral delayed release capsule: 1 cap(s) orally once a day  Eliquis 2.5 mg oral tablet: 1 tab(s) orally 2 times a day  fluticasone 50 mcg/inh nasal spray: 1 spray(s) nasal 2 times a day as needed for allergic rhinitis  melatonin 3 mg oral tablet: 1 tab(s) orally once a day (at bedtime) As needed Insomnia  predniSONE 5 mg oral delayed release tablet: 1 tab(s) orally every other day  Prolia 60 mg/mL subcutaneous solution: 60 milligram(s) subcutaneously every 6 months  sertraline 50 mg oral tablet: 1 tab(s) orally once a day  VESIcare 10 mg oral tablet: 1 tab(s) orally once a day  Vitamin D3 5000 intl units oral capsule: 1 cap(s) orally once a day   acetaminophen 325 mg oral tablet: 2 tab(s) orally every 6 hours, As needed, Mild Pain (1 - 3)  anastrozole 1 mg oral tablet: 1 tab(s) orally once a day  atorvastatin 20 mg oral tablet: 1 tab(s) orally once a day  Bactrim  mg-160 mg oral tablet: 1 tab(s) orally 2 times a day to be completed on 12/16/23  Biotin 5000 mcg oral capsule: 1 cap(s) orally once a day  dexlansoprazole 30 mg oral delayed release capsule: 1 cap(s) orally once a day  Eliquis 2.5 mg oral tablet: 1 tab(s) orally 2 times a day  fluticasone 50 mcg/inh nasal spray: 1 spray(s) nasal 2 times a day as needed for allergic rhinitis  melatonin 3 mg oral tablet: 1 tab(s) orally once a day (at bedtime) As needed Insomnia  predniSONE 5 mg oral delayed release tablet: 1 tab(s) orally every other day  Prolia 60 mg/mL subcutaneous solution: 60 milligram(s) subcutaneously every 6 months  sertraline 50 mg oral tablet: 1 tab(s) orally once a day  VESIcare 10 mg oral tablet: 1 tab(s) orally once a day  Vitamin D3 5000 intl units oral capsule: 1 cap(s) orally once a day

## 2023-11-20 NOTE — CONSULT NOTE ADULT - SUBJECTIVE AND OBJECTIVE BOX
Reason for consult: breast ca    HPI:  Patient is a 78 year old female w/ pmh l breast ca s/p l lumpectomy + anastrazole, r breast adh s/p excision + tamoxifen x5yrs, lymphoma (DLBCL vs. 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    Patient reports  fever and rigors at home , T max 102  on day of admission. she reports feeling unwell on day prior to admission with a scratchy throat and tested negative for covid . She reports no cough , no runny nose , no abdominal pain, no nausea or vomiting , no diarrhea , no dysuria or change in urinary symptoms , no flank pain. She reports similar presentation this past august when she was admitted for sepsis/ bacteremia with ecoli. She reports recurrent UTis as outpatient since then.   (18 Nov 2023 01:35)    Hematology/Oncology called to see [facility]    PAST MEDICAL & SURGICAL HISTORY:  CVA (cerebral vascular accident)      Lymphoma      Osteoporosis      Seizure      Fracture  lumbar spine,  healed with conservative treatment      Neuropathy  bilateral feet      HLD (hyperlipidemia)      Depression      GERD (gastroesophageal reflux disease)      Hodgkins disease      Pulmonary embolus      Fracture, ankle  Right, s/p metal plate      H/O sinus surgery      Cataract extraction status of eye, right          FAMILY HISTORY:  Family history of coronary artery disease in father (Father, Mother)  both parents s/p CABG        Alochol: Denied  Smoking: Nonsmoker  Drug Use: Denied  Marital Status:         Allergies    No Known Allergies    Intolerances        MEDICATIONS  (STANDING):  anastrozole 1 milliGRAM(s) Oral daily  apixaban 2.5 milliGRAM(s) Oral every 12 hours  atorvastatin 20 milliGRAM(s) Oral at bedtime  cefTRIAXone   IVPB 1000 milliGRAM(s) IV Intermittent every 24 hours  oxybutynin 5 milliGRAM(s) Oral two times a day  pantoprazole    Tablet 40 milliGRAM(s) Oral before breakfast  predniSONE   Tablet 5 milliGRAM(s) Oral every 48 hours  sertraline 50 milliGRAM(s) Oral daily    MEDICATIONS  (PRN):  acetaminophen     Tablet .. 650 milliGRAM(s) Oral every 6 hours PRN Temp greater or equal to 38C (100.4F), Mild Pain (1 - 3)  melatonin 3 milliGRAM(s) Oral at bedtime PRN Insomnia  ondansetron Injectable 4 milliGRAM(s) IV Push every 8 hours PRN Nausea and/or Vomiting      ROS  No fever, sweats, chills  No epistaxis, HA, sore throat  No CP, SOB, cough, sputum  No n/v/d, abd pain, melena, hematochezia  No edema  No rash  No anxiety  No back pain, joint pain  No bleeding, bruising  No dysuria, hematuria    T(C): 36.8 (11-18-23 @ 12:23), Max: 38.1 (11-17-23 @ 22:57)  HR: 67 (11-18-23 @ 12:23) (65 - 106)  BP: 100/60 (11-18-23 @ 12:23) (100/60 - 130/84)  RR: 17 (11-18-23 @ 12:23) (16 - 19)  SpO2: 97% (11-18-23 @ 12:23) (92% - 100%)  Wt(kg): --    PE  NAD  Awake, alert  Anicteric, MMM  RRR  CTAB  Abd soft, NT, ND  No c/c/e  No rash grossly  FROM                          12.0   10.37 )-----------( 159      ( 18 Nov 2023 06:57 )             37.4       11-18    144  |  107  |  12  ----------------------------<  100<H>  4.1   |  25  |  0.74    Ca    8.9      18 Nov 2023 06:57    TPro  6.2  /  Alb  3.7  /  TBili  0.9  /  DBili  x   /  AST  22  /  ALT  16  /  AlkPhos  53  11-18  
HPI:  Patient is a 78 year old female w/ pmh l breast ca s/p l lumpectomy + anastrazole, r breast adh s/p excision + tamoxifen x5yrs, lymphoma (DLBCL vs. 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    Patient reports  fever and rigors at home , T max 102  on day of admission. she reports feeling unwell on day prior to admission with a scratchy throat and tested negative for covid . She reports no cough , no runny nose , no abdominal pain, no nausea or vomiting , no diarrhea , no dysuria or change in urinary symptoms , no flank pain. She reports similar presentation this past august when she was admitted for sepsis/ bacteremia with ecoli. She reports recurrent UTis as outpatient since then.   (18 Nov 2023 01:35)      PAST MEDICAL & SURGICAL HISTORY:  CVA (cerebral vascular accident)      Lymphoma      Osteoporosis      Seizure      Fracture  lumbar spine,  healed with conservative treatment      Neuropathy  bilateral feet      HLD (hyperlipidemia)      Depression      GERD (gastroesophageal reflux disease)      Hodgkins disease      Pulmonary embolus      Fracture, ankle  Right, s/p metal plate      H/O sinus surgery      Cataract extraction status of eye, right          Allergies    No Known Allergies    Intolerances        ANTIMICROBIALS:  cefTRIAXone   IVPB 1000 every 24 hours      OTHER MEDS:  acetaminophen     Tablet .. 650 milliGRAM(s) Oral every 6 hours PRN  anastrozole 1 milliGRAM(s) Oral daily  apixaban 2.5 milliGRAM(s) Oral every 12 hours  atorvastatin 20 milliGRAM(s) Oral at bedtime  melatonin 3 milliGRAM(s) Oral at bedtime PRN  ondansetron Injectable 4 milliGRAM(s) IV Push every 8 hours PRN  oxybutynin 5 milliGRAM(s) Oral two times a day  pantoprazole    Tablet 40 milliGRAM(s) Oral before breakfast  predniSONE   Tablet 5 milliGRAM(s) Oral every 48 hours  sertraline 50 milliGRAM(s) Oral daily      SOCIAL HISTORY:  , lives with   no smoking, alcohol or drug abuse  no recent travel    FAMILY HISTORY:  Family history of coronary artery disease in father (Father, Mother)  both parents s/p CABG        ROS:  Unobtainable because:   All other systems negative     Constitutional: + fever, + chills  Eye: no eye pain, no redness, no vision changes  ENT:  no sore throat, no rhinorrhea  Cardiovascular:  no chest pain, no palpitation  Respiratory:  no SOB, no cough  GI:  no abd pain, no vomiting, no diarrhea  urinary: no dysuria, no hematuria, no flank pain  : no vaginal discharge or bleeding  musculoskeletal:  no joint pain, no joint swelling  skin:  no rash  neurology:  no headache, no seizure, no change in mental status  psych: no anxiety, no depression     Physical Exam:    General:    NAD, non toxic  Head: atraumatic, normocephalic  Eyes: normal sclera and conjunctiva  ENT:   no oropharyngeal lesions, no LAD, neck supple  Cardio:    regular S1,S2  Respiratory:   clear b/l, no wheezing  abd:   soft, BS +, not tender  :     no CVAT, no suprapubic tenderness, no sandra  Musculoskeletal : no joint swelling, no edema  Skin:    no rash  vascular: no phlebitis  Neurologic:     no focal deficits  psych: normal affect      Drug Dosing Weight  Height (cm): 165.1 (17 Nov 2023 20:35)  Weight (kg): 71.2 (17 Nov 2023 20:35)  BMI (kg/m2): 26.1 (17 Nov 2023 20:35)  BSA (m2): 1.78 (17 Nov 2023 20:35)    Vital Signs Last 24 Hrs  T(F): 98 (11-20-23 @ 12:14), Max: 100.5 (11-17-23 @ 22:57)    Vital Signs Last 24 Hrs  HR: 63 (11-20-23 @ 12:14) (60 - 84)  BP: 109/70 (11-20-23 @ 12:14) (109/63 - 114/64)  RR: 18 (11-20-23 @ 12:14)  SpO2: 97% (11-20-23 @ 12:14) (94% - 97%)  Wt(kg): --                          11.5   7.06  )-----------( 185      ( 20 Nov 2023 07:22 )             36.2       11-20    141  |  107  |  20  ----------------------------<  97  4.0   |  22  |  0.75    Ca    8.6      20 Nov 2023 07:25  Mg     2.0     11-19        Urinalysis Basic - ( 20 Nov 2023 07:25 )    Color: x / Appearance: x / SG: x / pH: x  Gluc: 97 mg/dL / Ketone: x  / Bili: x / Urobili: x   Blood: x / Protein: x / Nitrite: x   Leuk Esterase: x / RBC: x / WBC x   Sq Epi: x / Non Sq Epi: x / Bacteria: x        MICROBIOLOGY:  v  Clean Catch Clean Catch (Midstream)  11-18-23   50,000 - 99,000 CFU/mL Escherichia coli  --  --      .Blood Blood-Peripheral  11-17-23   No growth at 48 Hours  --  --      .Blood Blood-Peripheral  11-17-23   No growth at 48 Hours  --  --                  RADIOLOGY:    Images independently visualized and reviewed personally,  findings as below  < from: MR Abdomen w/wo IV Cont (11.19.23 @ 10:45) >  IMPRESSION:  Right renal findings likely represent hemorrhagic renal cyst given   decreasing size, signal characteristics, and nonenhancement.    Partially visualized complex cystic lesion in the left medial breast of   uncertain etiology. Correlate with breast imaging.    < end of copied text >  < from: Xray Chest 1 View- PORTABLE-Urgent (11.17.23 @ 23:38) >  IMPRESSION:    Clear lungs.    < end of copied text >

## 2023-11-20 NOTE — DISCHARGE NOTE PROVIDER - PROVIDER TOKENS
PROVIDER:[TOKEN:[[id number]]] PROVIDER:[TOKEN:[[id number]]],PROVIDER:[TOKEN:[[id number]]]

## 2023-11-20 NOTE — DISCHARGE NOTE NURSING/CASE MANAGEMENT/SOCIAL WORK - PATIENT PORTAL LINK FT
You can access the FollowMyHealth Patient Portal offered by [facility] by registering at the following website: [email or web address]. By joining [employer]’s FollowMyHealth portal, you will also be able to view your health information using other applications (apps) compatible with our system.

## 2023-11-20 NOTE — DISCHARGE NOTE NURSING/CASE MANAGEMENT/SOCIAL WORK - NSDCPEELIQUISCOMP_GEN_ALL_CORE
Discussed the importance of blood sugar control in the prevention of ocular complications. Apixaban/Eliquis is used to treat and prevent blood clots. If you are not able to swallow the tablets whole, they may be crushed and mixed in water, apple juice, or applesauce and promptly taken within four hours. Never skip a dose of Apixaban/Eliquis. If you forget to take your Apixaban/Eliquis, take a dose as soon as you remember. If it is almost time for your next Apixaban/Eliquis dose, wait until then and take a regular dose. DO NOT take an extra pill to ‘catch up’.  NEVER TAKE A DOUBLE DOSE. Notify your doctor that you missed a dose. Take Apixaban/Eliquis at the same time each morning and evening. Apixaban/Eliquis may be taken with other medication or food.

## 2023-11-20 NOTE — CONSULT NOTE ADULT - ASSESSMENT
78 f with  breast ca s/p lumpectomy + anastrazole + tamoxifen x 5yrs, lymphoma (DLBCL vs. 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 5, was admitted 8/2023 for fever and rigors, leukocytosis, CT with R pyelo, had E-coli bacteremia (R to amp, bactrim, genta, I to cefazolin) , also had a complex renal cyst so MRI was done to make sure it is not an abscess and showed ruptured hemorrhagic cyst, pt was treated for a 2 week course, discharged with levo  again had positive urine cx 9/2023 with ESBL E-coli S to bactrim, R to levo and again fever, chills 10/2023 and urine cx with E-coli R to bactrim, amp genta  now again p/w fever, rigors and no other symptoms  here febrile to 100.5, WBC: 12 urine cx: E-coli  abd MRI: Right renal findings likely represent hemorrhagic renal cyst given decreasing size, signal characteristics, and nonenhancement.        previous ruptured hemorrhagic renal cyst and pyelo with E-coli bacteremia, has had positive urine cx and chills as outpatient as well, now again with fever, leukocytosis, sepsis, urine cx with E-coli   MRI again with likely  hemorrhagic renal cyst   h/o lymphoma, breast ca and CNS vasculitis    * follow the urine cx sensitivity  * c/w ceftriaxone for now  * on discharge will likely do a longer course ( 4 weeks) in view of recurrent infection and a hemorrhagic cyst  * discharge regimen based on sensitivities    The above assessment and plan was discussed with the primary team    Kelly Freedman MD  contact on teams  After 5pm and on weekends call [phone]    
Patient is a 78y old  Female who presents with a chief complaint of chills+ generalized weakness/fatigue/malaise     Breast Cancer  --under the care of Chantale Bailey of [facility]  --pT1aN0 %, [place] 70% well differentiated  invasive ductal carcinoma of L breast  --s/p L lumpectomy 9/16/22  --History ADH s/p right breast excision + tamoxifen x 5 years  --on anastrazole, continue while inpatient.  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

## 2023-11-20 NOTE — PROGRESS NOTE ADULT - ASSESSMENT
Patient is a 78y old  Female who presents with a chief complaint of chills+ generalized weakness/fatigue/malaise     Breast Cancer  [place] [facility] [place]  --pT1aN0 %, [place] 70% well differentiated  invasive ductal carcinoma of L breast  --s/p L lumpectomy 9/16/22  --History ADH s/p right breast excision + tamoxifen x 5 years  --on anastrazole, continue while inpatient.  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      will follow,    Africa Baker NP  Hematology/ Oncology  [facility]  [phone] (office)  [phone] (alt office)  Evenings and weekends please call MD on call or office

## 2023-11-21 VITALS
HEART RATE: 80 BPM | RESPIRATION RATE: 18 BRPM | OXYGEN SATURATION: 91 % | TEMPERATURE: 98 F | DIASTOLIC BLOOD PRESSURE: 57 MMHG | SYSTOLIC BLOOD PRESSURE: 106 MMHG

## 2023-11-21 LAB
-  AMOXICILLIN/CLAVULANIC ACID: SIGNIFICANT CHANGE UP
-  AMOXICILLIN/CLAVULANIC ACID: SIGNIFICANT CHANGE UP
-  AMPICILLIN/SULBACTAM: SIGNIFICANT CHANGE UP
-  AMPICILLIN/SULBACTAM: SIGNIFICANT CHANGE UP
-  AMPICILLIN: SIGNIFICANT CHANGE UP
-  AMPICILLIN: SIGNIFICANT CHANGE UP
-  AZTREONAM: SIGNIFICANT CHANGE UP
-  AZTREONAM: SIGNIFICANT CHANGE UP
-  CEFAZOLIN: SIGNIFICANT CHANGE UP
-  CEFAZOLIN: SIGNIFICANT CHANGE UP
-  CEFEPIME: SIGNIFICANT CHANGE UP
-  CEFEPIME: SIGNIFICANT CHANGE UP
-  CEFOXITIN: SIGNIFICANT CHANGE UP
-  CEFOXITIN: SIGNIFICANT CHANGE UP
-  CEFTRIAXONE: SIGNIFICANT CHANGE UP
-  CEFTRIAXONE: SIGNIFICANT CHANGE UP
-  CEFUROXIME: SIGNIFICANT CHANGE UP
-  CEFUROXIME: SIGNIFICANT CHANGE UP
-  CIPROFLOXACIN: SIGNIFICANT CHANGE UP
-  CIPROFLOXACIN: SIGNIFICANT CHANGE UP
-  ERTAPENEM: SIGNIFICANT CHANGE UP
-  ERTAPENEM: SIGNIFICANT CHANGE UP
-  GENTAMICIN: SIGNIFICANT CHANGE UP
-  GENTAMICIN: SIGNIFICANT CHANGE UP
-  IMIPENEM: SIGNIFICANT CHANGE UP
-  IMIPENEM: SIGNIFICANT CHANGE UP
-  LEVOFLOXACIN: SIGNIFICANT CHANGE UP
-  LEVOFLOXACIN: SIGNIFICANT CHANGE UP
-  MEROPENEM: SIGNIFICANT CHANGE UP
-  MEROPENEM: SIGNIFICANT CHANGE UP
-  NITROFURANTOIN: SIGNIFICANT CHANGE UP
-  NITROFURANTOIN: SIGNIFICANT CHANGE UP
-  PIPERACILLIN/TAZOBACTAM: SIGNIFICANT CHANGE UP
-  PIPERACILLIN/TAZOBACTAM: SIGNIFICANT CHANGE UP
-  TOBRAMYCIN: SIGNIFICANT CHANGE UP
-  TOBRAMYCIN: SIGNIFICANT CHANGE UP
-  TRIMETHOPRIM/SULFAMETHOXAZOLE: SIGNIFICANT CHANGE UP
-  TRIMETHOPRIM/SULFAMETHOXAZOLE: SIGNIFICANT CHANGE UP
ANION GAP SERPL CALC-SCNC: 14 MMOL/L — SIGNIFICANT CHANGE UP (ref 5–17)
ANION GAP SERPL CALC-SCNC: 14 MMOL/L — SIGNIFICANT CHANGE UP (ref 5–17)
BUN SERPL-MCNC: 18 MG/DL — SIGNIFICANT CHANGE UP (ref 7–23)
BUN SERPL-MCNC: 18 MG/DL — SIGNIFICANT CHANGE UP (ref 7–23)
CALCIUM SERPL-MCNC: 9.1 MG/DL — SIGNIFICANT CHANGE UP (ref 8.4–10.5)
CALCIUM SERPL-MCNC: 9.1 MG/DL — SIGNIFICANT CHANGE UP (ref 8.4–10.5)
CHLORIDE SERPL-SCNC: 103 MMOL/L — SIGNIFICANT CHANGE UP (ref 96–108)
CHLORIDE SERPL-SCNC: 103 MMOL/L — SIGNIFICANT CHANGE UP (ref 96–108)
CO2 SERPL-SCNC: 24 MMOL/L — SIGNIFICANT CHANGE UP (ref 22–31)
CO2 SERPL-SCNC: 24 MMOL/L — SIGNIFICANT CHANGE UP (ref 22–31)
CREAT SERPL-MCNC: 0.88 MG/DL — SIGNIFICANT CHANGE UP (ref 0.5–1.3)
CREAT SERPL-MCNC: 0.88 MG/DL — SIGNIFICANT CHANGE UP (ref 0.5–1.3)
CULTURE RESULTS: ABNORMAL
CULTURE RESULTS: ABNORMAL
EGFR: 68 ML/MIN/1.73M2 — SIGNIFICANT CHANGE UP
EGFR: 68 ML/MIN/1.73M2 — SIGNIFICANT CHANGE UP
GLUCOSE SERPL-MCNC: 94 MG/DL — SIGNIFICANT CHANGE UP (ref 70–99)
GLUCOSE SERPL-MCNC: 94 MG/DL — SIGNIFICANT CHANGE UP (ref 70–99)
HCT VFR BLD CALC: 38.1 % — SIGNIFICANT CHANGE UP (ref 34.5–45)
HCT VFR BLD CALC: 38.1 % — SIGNIFICANT CHANGE UP (ref 34.5–45)
HGB BLD-MCNC: 12.1 G/DL — SIGNIFICANT CHANGE UP (ref 11.5–15.5)
HGB BLD-MCNC: 12.1 G/DL — SIGNIFICANT CHANGE UP (ref 11.5–15.5)
MCHC RBC-ENTMCNC: 28.7 PG — SIGNIFICANT CHANGE UP (ref 27–34)
MCHC RBC-ENTMCNC: 28.7 PG — SIGNIFICANT CHANGE UP (ref 27–34)
MCHC RBC-ENTMCNC: 31.8 GM/DL — LOW (ref 32–36)
MCHC RBC-ENTMCNC: 31.8 GM/DL — LOW (ref 32–36)
MCV RBC AUTO: 90.5 FL — SIGNIFICANT CHANGE UP (ref 80–100)
MCV RBC AUTO: 90.5 FL — SIGNIFICANT CHANGE UP (ref 80–100)
METHOD TYPE: SIGNIFICANT CHANGE UP
METHOD TYPE: SIGNIFICANT CHANGE UP
NRBC # BLD: 0 /100 WBCS — SIGNIFICANT CHANGE UP (ref 0–0)
NRBC # BLD: 0 /100 WBCS — SIGNIFICANT CHANGE UP (ref 0–0)
ORGANISM # SPEC MICROSCOPIC CNT: ABNORMAL
PLATELET # BLD AUTO: 205 K/UL — SIGNIFICANT CHANGE UP (ref 150–400)
PLATELET # BLD AUTO: 205 K/UL — SIGNIFICANT CHANGE UP (ref 150–400)
POTASSIUM SERPL-MCNC: 4.2 MMOL/L — SIGNIFICANT CHANGE UP (ref 3.5–5.3)
POTASSIUM SERPL-MCNC: 4.2 MMOL/L — SIGNIFICANT CHANGE UP (ref 3.5–5.3)
POTASSIUM SERPL-SCNC: 4.2 MMOL/L — SIGNIFICANT CHANGE UP (ref 3.5–5.3)
POTASSIUM SERPL-SCNC: 4.2 MMOL/L — SIGNIFICANT CHANGE UP (ref 3.5–5.3)
RBC # BLD: 4.21 M/UL — SIGNIFICANT CHANGE UP (ref 3.8–5.2)
RBC # BLD: 4.21 M/UL — SIGNIFICANT CHANGE UP (ref 3.8–5.2)
RBC # FLD: 14.1 % — SIGNIFICANT CHANGE UP (ref 10.3–14.5)
RBC # FLD: 14.1 % — SIGNIFICANT CHANGE UP (ref 10.3–14.5)
SODIUM SERPL-SCNC: 141 MMOL/L — SIGNIFICANT CHANGE UP (ref 135–145)
SODIUM SERPL-SCNC: 141 MMOL/L — SIGNIFICANT CHANGE UP (ref 135–145)
SPECIMEN SOURCE: SIGNIFICANT CHANGE UP
SPECIMEN SOURCE: SIGNIFICANT CHANGE UP
WBC # BLD: 7.95 K/UL — SIGNIFICANT CHANGE UP (ref 3.8–10.5)
WBC # BLD: 7.95 K/UL — SIGNIFICANT CHANGE UP (ref 3.8–10.5)
WBC # FLD AUTO: 7.95 K/UL — SIGNIFICANT CHANGE UP (ref 3.8–10.5)
WBC # FLD AUTO: 7.95 K/UL — SIGNIFICANT CHANGE UP (ref 3.8–10.5)

## 2023-11-21 PROCEDURE — 85610 PROTHROMBIN TIME: CPT

## 2023-11-21 PROCEDURE — 82803 BLOOD GASES ANY COMBINATION: CPT

## 2023-11-21 PROCEDURE — 87641 MR-STAPH DNA AMP PROBE: CPT

## 2023-11-21 PROCEDURE — 99285 EMERGENCY DEPT VISIT HI MDM: CPT | Mod: 25

## 2023-11-21 PROCEDURE — 82435 ASSAY OF BLOOD CHLORIDE: CPT

## 2023-11-21 PROCEDURE — 83605 ASSAY OF LACTIC ACID: CPT

## 2023-11-21 PROCEDURE — 85730 THROMBOPLASTIN TIME PARTIAL: CPT

## 2023-11-21 PROCEDURE — 80053 COMPREHEN METABOLIC PANEL: CPT

## 2023-11-21 PROCEDURE — 99232 SBSQ HOSP IP/OBS MODERATE 35: CPT | Mod: GC

## 2023-11-21 PROCEDURE — 87086 URINE CULTURE/COLONY COUNT: CPT

## 2023-11-21 PROCEDURE — 80048 BASIC METABOLIC PNL TOTAL CA: CPT

## 2023-11-21 PROCEDURE — 85027 COMPLETE CBC AUTOMATED: CPT

## 2023-11-21 PROCEDURE — 84295 ASSAY OF SERUM SODIUM: CPT

## 2023-11-21 PROCEDURE — 84132 ASSAY OF SERUM POTASSIUM: CPT

## 2023-11-21 PROCEDURE — 96374 THER/PROPH/DIAG INJ IV PUSH: CPT

## 2023-11-21 PROCEDURE — 85025 COMPLETE CBC W/AUTO DIFF WBC: CPT

## 2023-11-21 PROCEDURE — 82330 ASSAY OF CALCIUM: CPT

## 2023-11-21 PROCEDURE — 36415 COLL VENOUS BLD VENIPUNCTURE: CPT

## 2023-11-21 PROCEDURE — 71045 X-RAY EXAM CHEST 1 VIEW: CPT

## 2023-11-21 PROCEDURE — 83735 ASSAY OF MAGNESIUM: CPT

## 2023-11-21 PROCEDURE — 81001 URINALYSIS AUTO W/SCOPE: CPT

## 2023-11-21 PROCEDURE — 85018 HEMOGLOBIN: CPT

## 2023-11-21 PROCEDURE — A9585: CPT

## 2023-11-21 PROCEDURE — 74183 MRI ABD W/O CNTR FLWD CNTR: CPT

## 2023-11-21 PROCEDURE — 87640 STAPH A DNA AMP PROBE: CPT

## 2023-11-21 PROCEDURE — 87186 SC STD MICRODIL/AGAR DIL: CPT

## 2023-11-21 PROCEDURE — 82947 ASSAY GLUCOSE BLOOD QUANT: CPT

## 2023-11-21 PROCEDURE — 87040 BLOOD CULTURE FOR BACTERIA: CPT

## 2023-11-21 PROCEDURE — 87637 SARSCOV2&INF A&B&RSV AMP PRB: CPT

## 2023-11-21 PROCEDURE — 85014 HEMATOCRIT: CPT

## 2023-11-21 RX ADMIN — Medication 5 MILLIGRAM(S): at 05:23

## 2023-11-21 RX ADMIN — CEFTRIAXONE 100 MILLIGRAM(S): 500 INJECTION, POWDER, FOR SOLUTION INTRAMUSCULAR; INTRAVENOUS at 05:25

## 2023-11-21 RX ADMIN — ANASTROZOLE 1 MILLIGRAM(S): 1 TABLET ORAL at 14:34

## 2023-11-21 RX ADMIN — PANTOPRAZOLE SODIUM 40 MILLIGRAM(S): 20 TABLET, DELAYED RELEASE ORAL at 05:24

## 2023-11-21 RX ADMIN — Medication 5 MILLIGRAM(S): at 18:09

## 2023-11-21 RX ADMIN — APIXABAN 2.5 MILLIGRAM(S): 2.5 TABLET, FILM COATED ORAL at 05:24

## 2023-11-21 RX ADMIN — SERTRALINE 50 MILLIGRAM(S): 25 TABLET, FILM COATED ORAL at 14:34

## 2023-11-21 RX ADMIN — APIXABAN 2.5 MILLIGRAM(S): 2.5 TABLET, FILM COATED ORAL at 18:09

## 2023-11-21 NOTE — PROGRESS NOTE ADULT - ASSESSMENT
Patient is a 78y old  Female who presents with a chief complaint of chills+ generalized weakness/fatigue/malaise     Bre[place] Cancer  [place] [facility] [place]  --pT1aN0 %, [place] 70% well differentiated  invasive ductal carcinoma of L breast  --s/p L lumpectomy 9/16/22  --History ADH s/p right breast excision + tamoxifen x 5 years  --on anastrazole, continue while inpatient.  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    Discharge planning in progress    will follow,    Africa Baker NP  Hematology/ Oncology  [facility]  [phone] (office)  [phone] (alt office)  Evenings and weekends please call MD on call or office

## 2023-11-21 NOTE — PROGRESS NOTE ADULT - REASON FOR ADMISSION
fever and chills  x 1 day

## 2023-11-21 NOTE — PROGRESS NOTE ADULT - ASSESSMENT
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   - T 100.5 , wbc 12 , tachycardic ,  presumed urinary source , UA + , rvp negative , CXR clear. s/p vanc and zosyn , h/o ecoli bacteremia  prior cultures sensitive to ceftriaxone . patient referred for MRI abd to evaluated renal cysts as outpatient , will obtain while here as may be contributing to recurrent infections   - s/p 2200ml IV fluid bolus, additional IV fluids as needed    - UCx w/ 50-99K E. coli. C/w Rocephin- sensitive to Bactrim. Planned for 4 week course of bactrim DS as per ID with outpatient uro and Heme/Onc follow up   - BCx2 w/ NGTD; F/u final  - MRSA/MSSA PCR Negative   - MRI abdomen - R renal hemorrhagic cyst, complex cystic lesion in the L medical breast --> F/u heme/onc  - Pt states that she follows with Dr. Dexter Spencer and has an outpatient follow up scheduled       Breast cancer.   - continue anastrazole.  - MR abdomen w/ complex cystic lesion in the L medical breast --> F/u heme/onc   - heme/onc eval appreciated; F/u recs    History of Renal Cyst  - MRI abdomen - R renal hemorrhagic cyst   - outpatient follow up with Dr. Dexter Spencer and has an outpatient follow up scheduled       VTE (venous thromboembolism).   - continue ELIQUIS.    CNS vasculitis.   - continue chronic prednisone.    GERD (gastroesophageal reflux disease).   -continue PPI.    Anxiety and depression.   - continue sertraline.    Lymphoma.   - s/p chemo ? in remission   - outpatient follow up as scheduled [facility]  - Heme/Onc eval appreciated; F/u recs     PPX  - Eliquis and PPI     Discussed with Attending and ACP.

## 2023-11-21 NOTE — PROGRESS NOTE ADULT - SUBJECTIVE AND OBJECTIVE BOX
Name of Patient : VINCENT ENGLAND  MRN: [id number]  Date of visit: 11-21-23 @ 16:57      Subjective: Patient seen and examined. No new events except as noted.   Patient seen earlier this AM. Sitting up in bed. Offers no new complaints.     REVIEW OF SYSTEMS:    CONSTITUTIONAL: Generalized weakness; Afebrile   EYES/ENT: No visual changes;  No vertigo or throat pain   NECK: No pain or stiffness  RESPIRATORY: No cough, wheezing, hemoptysis; No shortness of breath  CARDIOVASCULAR: No chest pain or palpitations  GASTROINTESTINAL: No abdominal or epigastric pain. No nausea, vomiting, or hematemesis; No diarrhea or constipation. No melena or hematochezia.  GENITOURINARY: No dysuria, frequency or hematuria  NEUROLOGICAL: No numbness or weakness  SKIN: No itching, burning, rashes, or lesions   All other review of systems is negative unless indicated above.    MEDICATIONS:  MEDICATIONS  (STANDING):  anastrozole 1 milliGRAM(s) Oral daily  apixaban 2.5 milliGRAM(s) Oral every 12 hours  atorvastatin 20 milliGRAM(s) Oral at bedtime  cefTRIAXone   IVPB 1000 milliGRAM(s) IV Intermittent every 24 hours  oxybutynin 5 milliGRAM(s) Oral two times a day  pantoprazole    Tablet 40 milliGRAM(s) Oral before breakfast  predniSONE   Tablet 5 milliGRAM(s) Oral every 48 hours  sertraline 50 milliGRAM(s) Oral daily      PHYSICAL EXAM:  T(C): 36.7 (11-21-23 @ 11:34), Max: 36.7 (11-20-23 @ 21:38)  HR: 80 (11-21-23 @ 11:34) (64 - 80)  BP: 106/57 (11-21-23 @ 11:34) (106/57 - 118/72)  RR: 18 (11-21-23 @ 11:34) (18 - 18)  SpO2: 91% (11-21-23 @ 11:34) (91% - 97%)  Wt(kg): --  I&O's Summary    20 Nov 2023 07:01  -  21 Nov 2023 07:00  --------------------------------------------------------  IN: 960 mL / OUT: 0 mL / NET: 960 mL    21 Nov 2023 07:01  -  21 Nov 2023 16:57  --------------------------------------------------------  IN: 600 mL / OUT: 0 mL / NET: 600 mL          Appearance: Awake, generalized weakness, Sitting up in bed   HEENT:  Eyes are open   Lymphatic: No lymphadenopathy   Cardiovascular: Normal    Respiratory: normal effort , clear  Gastrointestinal:  Soft, Non-tender  Skin: No rashes,  warm to touch  Psychiatry:  Mood & affect appropriate  Musculoskeletal: No edema      11-20-23 @ 07:01  -  11-21-23 @ 07:00  --------------------------------------------------------  IN: 960 mL / OUT: 0 mL / NET: 960 mL    11-21-23 @ 07:01  -  11-21-23 @ 16:57  --------------------------------------------------------  IN: 600 mL / OUT: 0 mL / NET: 600 mL                                12.1   7.95  )-----------( 205      ( 21 Nov 2023 07:11 )             38.1               11-21    141  |  103  |  18  ----------------------------<  94  4.2   |  24  |  0.88    Ca    9.1      21 Nov 2023 07:11                         Urinalysis Basic - ( 21 Nov 2023 07:11 )    Color: x / Appearance: x / SG: x / pH: x  Gluc: 94 mg/dL / Ketone: x  / Bili: x / Urobili: x   Blood: x / Protein: x / Nitrite: x   Leuk Esterase: x / RBC: x / WBC x   Sq Epi: x / Non Sq Epi: x / Bacteria: x        Culture - Urine (11.18.23 @ 01:00)     Specimen Source: Clean Catch Clean Catch (Midstream)  Culture Results: 50,000 - 99,000 CFU/mL Escherichia coli  Organism Identification: Escherichia coli  Organism: Escherichia coli  Method Type: KIZZY  - Ceftriaxone: S <=1  - Trimethoprim/Sulfamethoxazole: S 2/38      Culture - Blood (11.17.23 @ 23:30)   Specimen Source: .Blood Blood-Peripheral  Culture Results: No growth at 72 Hours    Culture - Blood (11.17.23 @ 23:15)   Specimen Source: .Blood Blood-Peripheral  Culture Results: No growth at 72 Hours
  Follow Up:  sepsis, UTI, renal complex hemorrhagic cyst    Interval History/ROS: pt afebrile, no vomiting, flank pain or diarrhea          Allergies  No Known Allergies        ANTIMICROBIALS:  cefTRIAXone   IVPB 1000 every 24 hours      OTHER MEDS:  acetaminophen     Tablet .. 650 milliGRAM(s) Oral every 6 hours PRN  anastrozole 1 milliGRAM(s) Oral daily  apixaban 2.5 milliGRAM(s) Oral every 12 hours  atorvastatin 20 milliGRAM(s) Oral at bedtime  melatonin 3 milliGRAM(s) Oral at bedtime PRN  ondansetron Injectable 4 milliGRAM(s) IV Push every 8 hours PRN  oxybutynin 5 milliGRAM(s) Oral two times a day  pantoprazole    Tablet 40 milliGRAM(s) Oral before breakfast  predniSONE   Tablet 5 milliGRAM(s) Oral every 48 hours  sertraline 50 milliGRAM(s) Oral daily      Vital Signs Last 24 Hrs  T(C): 36.7 (21 Nov 2023 11:34), Max: 36.7 (20 Nov 2023 12:14)  T(F): 98.1 (21 Nov 2023 11:34), Max: 98.1 (20 Nov 2023 21:38)  HR: 80 (21 Nov 2023 11:34) (63 - 80)  BP: 106/57 (21 Nov 2023 11:34) (106/57 - 118/72)  BP(mean): --  RR: 18 (21 Nov 2023 11:34) (18 - 18)  SpO2: 91% (21 Nov 2023 11:34) (91% - 97%)    Parameters below as of 21 Nov 2023 11:34  Patient On (Oxygen Delivery Method): room air        Physical Exam:  General:    NAD,  non toxic  Respiratory:    comfortable on RA  abd:     soft,   BS +,   no tenderness  :   no CVAT,  no suprapubic tenderness,   no  sandra  Musculoskeletal:   no joint swelling  vascular: no phlebitis  Skin:    no rash                          12.1   7.95  )-----------( 205      ( 21 Nov 2023 07:11 )             38.1       11-21    141  |  103  |  18  ----------------------------<  94  4.2   |  24  |  0.88    Ca    9.1      21 Nov 2023 07:11        Urinalysis Basic - ( 21 Nov 2023 07:11 )    Color: x / Appearance: x / SG: x / pH: x  Gluc: 94 mg/dL / Ketone: x  / Bili: x / Urobili: x   Blood: x / Protein: x / Nitrite: x   Leuk Esterase: x / RBC: x / WBC x   Sq Epi: x / Non Sq Epi: x / Bacteria: x        MICROBIOLOGY:  v  Clean Catch Clean Catch (Midstream)  11-18-23   50,000 - 99,000 CFU/mL Escherichia coli  --  --      .Blood Blood-Peripheral  11-17-23   No growth at 72 Hours  --  --      .Blood Blood-Peripheral  11-17-23   No growth at 72 Hours  --  --                RADIOLOGY:  Images independently visualized and reviewed personally, findings as below  < from: MR Abdomen w/wo IV Cont (11.19.23 @ 10:45) >  Right renal findings likely represent hemorrhagic renal cyst given   decreasing size, signal characteristics, and nonenhancement.    Partially visualized complex cystic lesion in the left medial breast of   uncertain etiology. Correlate with breast imaging.    < end of copied text >  
Patient is a 78y old  Female who presents with a chief complaint of fever and chills  x 1 day (19 Nov 2023 13:11)    Patient seen and examined  Appears comfortable in bed, no new complaints offered.    MEDICATIONS  (STANDING):  anastrozole 1 milliGRAM(s) Oral daily  apixaban 2.5 milliGRAM(s) Oral every 12 hours  atorvastatin 20 milliGRAM(s) Oral at bedtime  cefTRIAXone   IVPB 1000 milliGRAM(s) IV Intermittent every 24 hours  oxybutynin 5 milliGRAM(s) Oral two times a day  pantoprazole    Tablet 40 milliGRAM(s) Oral before breakfast  predniSONE   Tablet 5 milliGRAM(s) Oral every 48 hours  sertraline 50 milliGRAM(s) Oral daily    MEDICATIONS  (PRN):  acetaminophen     Tablet .. 650 milliGRAM(s) Oral every 6 hours PRN Temp greater or equal to 38C (100.4F), Mild Pain (1 - 3)  melatonin 3 milliGRAM(s) Oral at bedtime PRN Insomnia  ondansetron Injectable 4 milliGRAM(s) IV Push every 8 hours PRN Nausea and/or Vomiting      Vital Signs Last 24 Hrs  T(C): 36.7 (20 Nov 2023 12:14), Max: 37.2 (19 Nov 2023 20:40)  T(F): 98 (20 Nov 2023 12:14), Max: 99 (19 Nov 2023 20:40)  HR: 63 (20 Nov 2023 12:14) (60 - 84)  BP: 109/70 (20 Nov 2023 12:14) (105/67 - 114/64)  BP(mean): --  RR: 18 (20 Nov 2023 12:14) (17 - 18)  SpO2: 97% (20 Nov 2023 12:14) (94% - 97%)    Parameters below as of 20 Nov 2023 12:14  Patient On (Oxygen Delivery Method): room air        PE  Awake, alert  Anicteric, MMM  RRR  CTAB  Abd soft, NT, ND  No c/c/e  No rash grossly  FROM                          11.5   7.06  )-----------( 185      ( 20 Nov 2023 07:22 )             36.2       11-20    141  |  107  |  20  ----------------------------<  97  4.0   |  22  |  0.75    Ca    8.6      20 Nov 2023 07:25  Mg     2.0     11-19        
Patient is a 78y old  Female who presents with a chief complaint of fever and chills  x 1 day (20 Nov 2023 15:35)    Patient seen and examined. Patient resting comfortably no new complaints reported.    MEDICATIONS  (STANDING):  anastrozole 1 milliGRAM(s) Oral daily  apixaban 2.5 milliGRAM(s) Oral every 12 hours  atorvastatin 20 milliGRAM(s) Oral at bedtime  cefTRIAXone   IVPB 1000 milliGRAM(s) IV Intermittent every 24 hours  oxybutynin 5 milliGRAM(s) Oral two times a day  pantoprazole    Tablet 40 milliGRAM(s) Oral before breakfast  predniSONE   Tablet 5 milliGRAM(s) Oral every 48 hours  sertraline 50 milliGRAM(s) Oral daily    MEDICATIONS  (PRN):  acetaminophen     Tablet .. 650 milliGRAM(s) Oral every 6 hours PRN Temp greater or equal to 38C (100.4F), Mild Pain (1 - 3)  melatonin 3 milliGRAM(s) Oral at bedtime PRN Insomnia  ondansetron Injectable 4 milliGRAM(s) IV Push every 8 hours PRN Nausea and/or Vomiting        Vital Signs Last 24 Hrs  T(C): 36.4 (21 Nov 2023 05:18), Max: 36.7 (20 Nov 2023 12:14)  T(F): 97.6 (21 Nov 2023 05:18), Max: 98.1 (20 Nov 2023 21:38)  HR: 64 (21 Nov 2023 05:18) (63 - 64)  BP: 118/72 (21 Nov 2023 05:18) (109/70 - 118/72)  BP(mean): --  RR: 18 (21 Nov 2023 05:18) (18 - 18)  SpO2: 97% (21 Nov 2023 05:18) (94% - 97%)    Parameters below as of 21 Nov 2023 05:18  Patient On (Oxygen Delivery Method): room air        PE  Awake, alert  Anicteric, MMM  RRR  CTAB  Abd soft, NT, ND  No c/c/e  No rash grossly  FROM                          12.1   7.95  )-----------( 205      ( 21 Nov 2023 07:11 )             38.1       11-21    141  |  103  |  18  ----------------------------<  94  4.2   |  24  |  0.88    Ca    9.1      21 Nov 2023 07:11        
Name of Patient : VINCENT ENGLAND  MRN: [id number]  Date of visit: 11-20-23 @ 15:20      Subjective: Patient seen and examined. No new events except as noted.   Patient seen earlier this AM. Sitting up in bed. Offers no new complaints.   S/P MR abdomen.   Continues on IV ABX for UTI.     REVIEW OF SYSTEMS:    CONSTITUTIONAL: Generalized weakness; Afebrile   EYES/ENT: No visual changes;  No vertigo or throat pain   NECK: No pain or stiffness  RESPIRATORY: No cough, wheezing, hemoptysis; No shortness of breath  CARDIOVASCULAR: No chest pain or palpitations  GASTROINTESTINAL: No abdominal or epigastric pain. No nausea, vomiting, or hematemesis; No diarrhea or constipation. No melena or hematochezia.  GENITOURINARY: No dysuria, frequency or hematuria  NEUROLOGICAL: No numbness or weakness  SKIN: No itching, burning, rashes, or lesions   All other review of systems is negative unless indicated above.    MEDICATIONS:  MEDICATIONS  (STANDING):  anastrozole 1 milliGRAM(s) Oral daily  apixaban 2.5 milliGRAM(s) Oral every 12 hours  atorvastatin 20 milliGRAM(s) Oral at bedtime  cefTRIAXone   IVPB 1000 milliGRAM(s) IV Intermittent every 24 hours  oxybutynin 5 milliGRAM(s) Oral two times a day  pantoprazole    Tablet 40 milliGRAM(s) Oral before breakfast  predniSONE   Tablet 5 milliGRAM(s) Oral every 48 hours  sertraline 50 milliGRAM(s) Oral daily      PHYSICAL EXAM:  T(C): 36.7 (11-20-23 @ 12:14), Max: 37.2 (11-19-23 @ 20:40)  HR: 63 (11-20-23 @ 12:14) (60 - 84)  BP: 109/70 (11-20-23 @ 12:14) (109/63 - 114/64)  RR: 18 (11-20-23 @ 12:14) (17 - 18)  SpO2: 97% (11-20-23 @ 12:14) (94% - 97%)  Wt(kg): --  I&O's Summary    19 Nov 2023 07:01  -  20 Nov 2023 07:00  --------------------------------------------------------  IN: 410 mL / OUT: 0 mL / NET: 410 mL    20 Nov 2023 07:01  -  20 Nov 2023 15:20  --------------------------------------------------------  IN: 480 mL / OUT: 0 mL / NET: 480 mL          Appearance: Awake, generalized weakness, Sitting up in bed   HEENT:  Eyes are open   Lymphatic: No lymphadenopathy   Cardiovascular: Normal    Respiratory: normal effort , clear  Gastrointestinal:  Soft, Non-tender  Skin: No rashes,  warm to touch  Psychiatry:  Mood & affect appropriate  Musculoskeletal: No edema      11-19-23 @ 07:01  -  11-20-23 @ 07:00  --------------------------------------------------------  IN: 410 mL / OUT: 0 mL / NET: 410 mL    11-20-23 @ 07:01  -  11-20-23 @ 15:20  --------------------------------------------------------  IN: 480 mL / OUT: 0 mL / NET: 480 mL                            11.5   7.06  )-----------( 185      ( 20 Nov 2023 07:22 )             36.2               11-20    141  |  107  |  20  ----------------------------<  97  4.0   |  22  |  0.75    Ca    8.6      20 Nov 2023 07:25  Mg     2.0     11-19                         Urinalysis Basic - ( 20 Nov 2023 07:25 )    Color: x / Appearance: x / SG: x / pH: x  Gluc: 97 mg/dL / Ketone: x  / Bili: x / Urobili: x   Blood: x / Protein: x / Nitrite: x   Leuk Esterase: x / RBC: x / WBC x   Sq Epi: x / Non Sq Epi: x / Bacteria: x          Culture - Urine (11.18.23 @ 01:00)   Specimen Source: Clean Catch Clean Catch (Midstream)  Culture Results: 50,000 - 99,000 CFU/mL Escherichia coli    Culture - Blood (11.17.23 @ 23:30)   Specimen Source: .Blood Blood-Peripheral  Culture Results: No growth at 48 Hours    Culture - Blood (11.17.23 @ 23:15)   Specimen Source: .Blood Blood-Peripheral  Culture Results: No growth at 48 Hours    MRSA/MSSA PCR (11.19.23 @ 21:52)   MRSA PCR Result.: NotDetec  Staph aureus PCR Result: NotDetec      < from: MR Abdomen w/wo IV Cont (11.19.23 @ 10:45) >    ACC: [id number] EXAM:  MR ABDOMEN WAW IC   ORDERED BY: LAWRENCE JUNG     PROCEDURE DATE:  11/19/2023          INTERPRETATION:  CLINICAL INFORMATION: Follow-up complex renal cyst    COMPARISON: MR abdomen 8/22/2023, CT abdomen and pelvis 1/20/2018    CONTRAST/COMPLICATIONS:  IV Contrast: Gadavist  7 cc administered   0.5 cc discarded  Oral Contrast: NONE  Complications: None reported at time of study completion    PROCEDURE:  MRI of the abdomen was performed.    FINDINGS:  LOWER CHEST: Within normal limits.    LIVER: Steatosis.  BILE DUCTS: Normal caliber.  GALLBLADDER: Within normal limits.  SPLEEN: Within normal limits.  PANCREAS: Within normal limits.  ADRENALS: Within normal limits.  KIDNEYS/URETERS: In the right lower pole, in the area of prior complex   renal cyst, is a 1.1 x 1.0 cm nonenhancing, nonfat-containing, T2   hypointense and T1 hyperintense lesion. Unchanged additional right renal   cyst. No hydronephrosis.    VISUALIZED PORTIONS:  BOWEL: Within normal limits.  PERITONEUM: No ascites.  VESSELS: Atherosclerotic changes.  RETROPERITONEUM/LYMPH NODES: No lymphadenopathy.  ABDOMINAL WALL: Partially visualized complex cystic lesion in the left   medial breast measuring approximately 2.7 x 2.2 cm. Small fat-containing   umbilical hernia.  BONES: Chronic T12 compression deformity.    IMPRESSION:  Right renal findings likely represent hemorrhagic renal cyst given   decreasing size, signal characteristics, and nonenhancement.    Partially visualized complex cystic lesion in the left medial breast of   uncertain etiology. Correlate with breast imaging.    --- End of Report ---           RADHA CABRERA MD; Resident Radiologist  This document has been electronically signed.  ELVA CRANDALL MD; Attending Radiologist  This document has been electronically signed. Nov 20 2023  9:30AM    < end of copied text >  
Patient is a 78y old  Female who presents with a chief complaint of fever and chills  x 1 day (18 Nov 2023 13:04)    Patient seen and examined. Patient re sting in bed, family at bedside.    MEDICATIONS  (STANDING):  anastrozole 1 milliGRAM(s) Oral daily  apixaban 2.5 milliGRAM(s) Oral every 12 hours  atorvastatin 20 milliGRAM(s) Oral at bedtime  cefTRIAXone   IVPB 1000 milliGRAM(s) IV Intermittent every 24 hours  oxybutynin 5 milliGRAM(s) Oral two times a day  pantoprazole    Tablet 40 milliGRAM(s) Oral before breakfast  predniSONE   Tablet 5 milliGRAM(s) Oral every 48 hours  sertraline 50 milliGRAM(s) Oral daily    MEDICATIONS  (PRN):  acetaminophen     Tablet .. 650 milliGRAM(s) Oral every 6 hours PRN Temp greater or equal to 38C (100.4F), Mild Pain (1 - 3)  melatonin 3 milliGRAM(s) Oral at bedtime PRN Insomnia  ondansetron Injectable 4 milliGRAM(s) IV Push every 8 hours PRN Nausea and/or Vomiting      Vital Signs Last 24 Hrs  T(C): 36.9 (19 Nov 2023 05:28), Max: 37.3 (18 Nov 2023 20:53)  T(F): 98.5 (19 Nov 2023 05:28), Max: 99.1 (18 Nov 2023 20:53)  HR: 73 (19 Nov 2023 05:28) (66 - 76)  BP: 118/64 (19 Nov 2023 05:28) (117/74 - 139/76)  BP(mean): --  RR: 17 (19 Nov 2023 05:28) (17 - 17)  SpO2: 97% (19 Nov 2023 05:28) (96% - 97%)    Parameters below as of 19 Nov 2023 05:28  Patient On (Oxygen Delivery Method): room air        PE  Awake, alert  Anicteric, MMM  RRR  CTAB  Abd soft, NT, ND  No c/c/e  No rash grossly  FROM                          12.3   9.11  )-----------( 182      ( 19 Nov 2023 07:09 )             38.6       11-19    143  |  106  |  17  ----------------------------<  102<H>  4.3   |  23  |  0.81    Ca    8.6      19 Nov 2023 07:06  Mg     2.0     11-19    TPro  6.2  /  Alb  3.7  /  TBili  0.9  /  DBili  x   /  AST  22  /  ALT  16  /  AlkPhos  53  11-18      ACC: [id number] EXAM:  MR ABDOMEN WAW IC   ORDERED BY:  POOJA JACK     PROCEDURE DATE:  08/22/2023          INTERPRETATION:  CLINICAL INFORMATION: Further evaluation of complex   renal cyst seen on recent ultrasound. History of left breast cancer   status post lumpectomy.    COMPARISON: Abdominal ultrasound 8/21/2023, CT abdomen and pelvis   1/20/2018    CONTRAST/COMPLICATIONS:  IV Contrast: Gadavist  7.5 cc administered   0 cc discarded  Oral Contrast: NONE  Complications: None reported at time of study completion    PROCEDURE:  MRI of the abdomen was performed.    FINDINGS:  LOWER CHEST: Trace right pleural effusion and bibasilar dependent   atelectasis.    LIVER: Hepatic steatosis. Normal morphology.  BILE DUCTS: Normal caliber.  GALLBLADDER: Within normal limits.  SPLEEN: Within normal limits.  PANCREAS: Within normal limits.  ADRENALS: Within normal limits.  KIDNEYS/URETERS: Nonenhancing right lower pole cystic lesion containing   areas of T1 hyperintensity and restricted diffusion, likely representing   subacute/chronic blood products. This lesion measures 3.3 x 3.1 cm   (12-72), increased in size from 1/20/2018. The inferior margin is   ill-defined, with surrounding perirenal infiltration and mild flank   edema. Right renal cyst. No hydronephrosis.    VISUALIZED PORTIONS:  BOWEL: Within normal limits.  PERITONEUM: No ascites.  VESSELS: Atherosclerotic changes.  RETROPERITONEUM/LYMPH NODES: No lymphadenopathy.  ABDOMINAL WALL: Within normal limits.  BONES: Chronic T12 compression deformity.    IMPRESSION:  Findings likely representing a ruptured right lower pole hemorrhagic   renal cyst, given surrounding perirenal infiltration. Correlate for   history of trauma. Underlying neoplasm is felt to be less likely but not   excluded. Recommend follow-up contrast enhanced MR abdomen in 2-3 months   to assess for underlying tumor.    --- End of Report ---  
Name of Patient : VINCENT ENGLAND  MRN: [id number]  Date of visit: 11-18-23       Subjective: Patient seen and examined. No new events except as noted.     REVIEW OF SYSTEMS:    CONSTITUTIONAL: No weakness, fevers or chills  EYES/ENT: No visual changes;  No vertigo or throat pain   NECK: No pain or stiffness  RESPIRATORY: No cough, wheezing, hemoptysis; No shortness of breath  CARDIOVASCULAR: No chest pain or palpitations  GASTROINTESTINAL: No abdominal or epigastric pain. No nausea, vomiting, or hematemesis; No diarrhea or constipation. No melena or hematochezia.  GENITOURINARY: No dysuria, frequency or hematuria  NEUROLOGICAL: No numbness or weakness  SKIN: No itching, burning, rashes, or lesions   All other review of systems is negative unless indicated above.    MEDICATIONS:  MEDICATIONS  (STANDING):  anastrozole 1 milliGRAM(s) Oral daily  apixaban 2.5 milliGRAM(s) Oral every 12 hours  atorvastatin 20 milliGRAM(s) Oral at bedtime  cefTRIAXone   IVPB 1000 milliGRAM(s) IV Intermittent every 24 hours  oxybutynin 5 milliGRAM(s) Oral two times a day  pantoprazole    Tablet 40 milliGRAM(s) Oral before breakfast  predniSONE   Tablet 5 milliGRAM(s) Oral every 48 hours  sertraline 50 milliGRAM(s) Oral daily      PHYSICAL EXAM:  T(C): 36.8 (11-18-23 @ 16:36), Max: 38.1 (11-17-23 @ 22:57)  HR: 66 (11-18-23 @ 16:36) (65 - 89)  BP: 139/76 (11-18-23 @ 16:36) (100/60 - 139/76)  RR: 17 (11-18-23 @ 16:36) (16 - 18)  SpO2: 96% (11-18-23 @ 16:36) (92% - 100%)  Wt(kg): --  I&O's Summary        Appearance: Normal	  HEENT:  PERRLA   Lymphatic: No lymphadenopathy   Cardiovascular: Normal S1 S2, no JVD  Respiratory: normal effort , clear  Gastrointestinal:  Soft, Non-tender  Skin: No rashes,  warm to touch  Psychiatry:  Mood & affect appropriate  Musculuskeletal: No edema    recent labs, Imaging and EKGs personally reviewed                   
Name of Patient : VINCENT ENGLAND  MRN: [id number]  Date of visit: 11-19-23       Subjective: Patient seen and examined. No new events except as noted.     REVIEW OF SYSTEMS:    CONSTITUTIONAL: No weakness, fevers or chills  EYES/ENT: No visual changes;  No vertigo or throat pain   NECK: No pain or stiffness  RESPIRATORY: No cough, wheezing, hemoptysis; No shortness of breath  CARDIOVASCULAR: No chest pain or palpitations  GASTROINTESTINAL: No abdominal or epigastric pain. No nausea, vomiting, or hematemesis; No diarrhea or constipation. No melena or hematochezia.  GENITOURINARY: No dysuria, frequency or hematuria  NEUROLOGICAL: No numbness or weakness  SKIN: No itching, burning, rashes, or lesions   All other review of systems is negative unless indicated above.    MEDICATIONS:  MEDICATIONS  (STANDING):  anastrozole 1 milliGRAM(s) Oral daily  apixaban 2.5 milliGRAM(s) Oral every 12 hours  atorvastatin 20 milliGRAM(s) Oral at bedtime  cefTRIAXone   IVPB 1000 milliGRAM(s) IV Intermittent every 24 hours  oxybutynin 5 milliGRAM(s) Oral two times a day  pantoprazole    Tablet 40 milliGRAM(s) Oral before breakfast  predniSONE   Tablet 5 milliGRAM(s) Oral every 48 hours  sertraline 50 milliGRAM(s) Oral daily      PHYSICAL EXAM:  T(C): 37.2 (11-19-23 @ 20:40), Max: 37.2 (11-19-23 @ 20:40)  HR: 84 (11-19-23 @ 20:40) (69 - 84)  BP: 109/63 (11-19-23 @ 20:40) (105/67 - 118/64)  RR: 17 (11-19-23 @ 20:40) (17 - 17)  SpO2: 94% (11-19-23 @ 20:40) (94% - 97%)  Wt(kg): --  I&O's Summary    Appearance: Normal	  HEENT:  PERRLA   Lymphatic: No lymphadenopathy   Cardiovascular: Normal S1 S2, no JVD  Respiratory: normal effort , clear  Gastrointestinal:  Soft, Non-tender  Skin: No rashes,  warm to touch  Psychiatry:  Mood & affect appropriate  Musculuskeletal: No edema    recent labs, Imaging and EKGs personally reviewed                           12.3   9.11  )-----------( 182      ( 19 Nov 2023 07:09 )             38.6               11-19    143  |  106  |  17  ----------------------------<  102<H>  4.3   |  23  |  0.81    Ca    8.6      19 Nov 2023 07:06  Mg     2.0     11-19    TPro  6.2  /  Alb  3.7  /  TBili  0.9  /  DBili  x   /  AST  22  /  ALT  16  /  AlkPhos  53  11-18    PT/INR - ( 17 Nov 2023 23:38 )   PT: 14.2 sec;   INR: 1.30 ratio         PTT - ( 17 Nov 2023 23:38 )  PTT:30.7 sec                   Urinalysis Basic - ( 19 Nov 2023 07:06 )    Color: x / Appearance: x / SG: x / pH: x  Gluc: 102 mg/dL / Ketone: x  / Bili: x / Urobili: x   Blood: x / Protein: x / Nitrite: x   Leuk Esterase: x / RBC: x / WBC x   Sq Epi: x / Non Sq Epi: x / Bacteria: x

## 2023-11-21 NOTE — PROGRESS NOTE ADULT - ASSESSMENT
78 f with  breast ca s/p lumpectomy + anastrazole + tamoxifen x 5yrs, lymphoma (DLBCL vs. 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 5, was admitted 8/2023 for fever and rigors, leukocytosis, CT with R pyelo, had E-coli bacteremia (R to amp, bactrim, genta, I to cefazolin) , also had a complex renal cyst so MRI was done to make sure it is not an abscess and showed ruptured hemorrhagic cyst, pt was treated for a 2 week course, discharged with levo  again had positive urine cx 9/2023 with ESBL E-coli S to bactrim, R to levo and again fever, chills 10/2023 and urine cx with E-coli R to bactrim, amp genta  now again p/w fever, rigors and no other symptoms  here febrile to 100.5, WBC: 12 urine cx: E-coli  abd MRI: Right renal findings likely represent hemorrhagic renal cyst given decreasing size, signal characteristics, and nonenhancement.        previous ruptured hemorrhagic renal cyst and pyelo with E-coli bacteremia, has had positive urine cx and chills as outpatient as well, now again with fever, leukocytosis, sepsis, urine cx with E-coli   MRI again with likely  hemorrhagic renal cyst   h/o lymphoma, breast ca and CNS vasculitis    * follow the urine cx sensitivity  * c/w ceftriaxone for now  * on discharge will likely do a longer course ( 4 weeks) in view of recurrent infection and a hemorrhagic cyst  * discharge regimen based on sensitivities    The above assessment and plan was discussed with the primary team    Kelly Freedman MD  contact on teams  After 5pm and on weekends call [phone]       78 f with  breast ca s/p lumpectomy + anastrazole + tamoxifen x 5yrs, lymphoma (DLBCL vs. 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 5, was admitted 8/2023 for fever and rigors, leukocytosis, CT with R pyelo, had E-coli bacteremia (R to amp, bactrim, genta, I to cefazolin) , also had a complex renal cyst so MRI was done to make sure it is not an abscess and showed ruptured hemorrhagic cyst, pt was treated for a 2 week course, discharged with levo  again had positive urine cx 9/2023 with ESBL E-coli S to bactrim, R to levo and again fever, chills 10/2023 and urine cx with E-coli R to bactrim, amp genta  now again p/w fever, rigors and no other symptoms  here febrile to 100.5, WBC: 12 urine cx: E-coli  abd MRI: Right renal findings likely represent hemorrhagic renal cyst given decreasing size, signal characteristics, and nonenhancement.        previous ruptured hemorrhagic renal cyst and pyelo with E-coli bacteremia, has had positive urine cx and chills as outpatient as well, now again with fever, leukocytosis, sepsis, urine cx with E-coli   MRI again with likely  hemorrhagic renal cyst   h/o lymphoma, breast ca and CNS vasculitis    * E-coli now S to bactrim and levo but I to cipro   * will  do a longer course ( 4 weeks) in view of recurrent infection and a hemorrhagic cyst  * on discharge switch to bactrim DS bid to finish the 4 week course    The above assessment and plan was discussed with the primary team    Kelly Freedman MD  contact on teams  After 5pm and on weekends call [phone]

## 2023-11-21 NOTE — PROGRESS NOTE ADULT - NS ATTEND AMEND GEN_ALL_CORE FT
Pt care and plan discussed and reviewed with PA. Plan as outlined above edited by me to reflect our discussion. Advanced care planning/advanced directives discussed with patient/family. DNR status including forceful chest compressions to attempt to restart the heart, ventilator support/artificial breathing, electric shock, artificial nutrition, health care proxy, Molst form all discussed with pt.
continue current management per above note
As above. 79 y/o female with history of breast cancer on adjuvant therapy admitted with chills.    - Continue anastrozole while inpatient.  - MRI reviewed; ruptured right lower pole renal cyst.  - On IV antibiotics  - Discharge planning
Pt care and plan discussed and reviewed with PA. Plan as outlined above edited by me to reflect our discussion. Advanced care planning/advanced directives discussed with patient/family. DNR status including forceful chest compressions to attempt to restart the heart, ventilator support/artificial breathing, electric shock, artificial nutrition, health care proxy, Molst form all discussed with pt.
79 y/o F on adjuvant AI for hx breast cancer, admitted with fever, generalized weakness. Had similar presentation in August when she was admitted for E. coli bacteremia. Has had recurrent UTI's. UA positive now as well. Started on ceftriaxone, cultures pending.   Continue anastrozole. MRI abd done to further evaluate renal cysts, read pending

## 2023-11-21 NOTE — PROGRESS NOTE ADULT - PROVIDER SPECIALTY LIST ADULT
Heme/Onc
Infectious Disease
Internal Medicine
Heme/Onc
Internal Medicine
Heme/Onc
Internal Medicine
Internal Medicine

## 2023-11-23 LAB
CULTURE RESULTS: SIGNIFICANT CHANGE UP
SPECIMEN SOURCE: SIGNIFICANT CHANGE UP

## 2023-11-30 NOTE — ASU DISCHARGE PLAN (ADULT/PEDIATRIC) - CARE PROVIDER_API CALL
Otto Ward)  Ophthalmology  [address] [place] [address] [id number]  Phone: [phone]  Fax: [phone]  Follow Up Time: You are being discharged after placement of port-a-cath on the right chest. Your incisions were closed with sutures and skin staples, and covered with a dressing. Please keep the dressing on until your appointment for chemotherapy. You may shower starting tomorrow, allowing water to run over the incisions and dressings, but please do not scrub at them or remove the dressings. You may restart all your regular home medications. For mild pain, you may take tylenol or ibuprofen. You have been prescribed Percocet for moderate-severe pain. You may take 1 tablet every 6 hours. Please be advised that it contains acetaminophen, and you should not exceed 3 grams of acetaminophen total in a day from tylenol and percocet.     Please call Dr. Hartley's office to schedule a follow up appointment on Tuesday December 19th. Please feel free to call the office if you have any questions or concerns.

## 2023-12-15 RX ORDER — APIXABAN 2.5 MG/1
2.5 TABLET, FILM COATED ORAL
Qty: 180 | Refills: 3 | Status: ACTIVE | COMMUNITY
Start: 2018-07-25 | End: 1900-01-01

## 2023-12-20 ENCOUNTER — INPATIENT (INPATIENT)
Facility: HOSPITAL | Age: 78
LOS: 6 days | Discharge: HOME HEALTH | DRG: 177 | End: 2023-12-27
Attending: INTERNAL MEDICINE | Admitting: INTERNAL MEDICINE
Payer: MEDICARE

## 2023-12-20 VITALS
OXYGEN SATURATION: 97 % | SYSTOLIC BLOOD PRESSURE: 132 MMHG | WEIGHT: 158.07 LBS | HEIGHT: 65 IN | TEMPERATURE: 98 F | HEART RATE: 79 BPM | RESPIRATION RATE: 19 BRPM | DIASTOLIC BLOOD PRESSURE: 88 MMHG

## 2023-12-20 DIAGNOSIS — S82.899A OTHER FRACTURE OF UNSPECIFIED LOWER LEG, INITIAL ENCOUNTER FOR CLOSED FRACTURE: Chronic | ICD-10-CM

## 2023-12-20 DIAGNOSIS — R55 SYNCOPE AND COLLAPSE: ICD-10-CM

## 2023-12-20 DIAGNOSIS — Z98.41 CATARACT EXTRACTION STATUS, RIGHT EYE: Chronic | ICD-10-CM

## 2023-12-20 DIAGNOSIS — Z98.890 OTHER SPECIFIED POSTPROCEDURAL STATES: Chronic | ICD-10-CM

## 2023-12-20 LAB
ALBUMIN SERPL ELPH-MCNC: 4.2 G/DL — SIGNIFICANT CHANGE UP (ref 3.3–5)
ALBUMIN SERPL ELPH-MCNC: 4.2 G/DL — SIGNIFICANT CHANGE UP (ref 3.3–5)
ALP SERPL-CCNC: 55 U/L — SIGNIFICANT CHANGE UP (ref 40–120)
ALP SERPL-CCNC: 55 U/L — SIGNIFICANT CHANGE UP (ref 40–120)
ALT FLD-CCNC: 34 U/L — SIGNIFICANT CHANGE UP (ref 10–45)
ALT FLD-CCNC: 34 U/L — SIGNIFICANT CHANGE UP (ref 10–45)
ANION GAP SERPL CALC-SCNC: 13 MMOL/L — SIGNIFICANT CHANGE UP (ref 5–17)
ANION GAP SERPL CALC-SCNC: 13 MMOL/L — SIGNIFICANT CHANGE UP (ref 5–17)
APPEARANCE UR: CLEAR — SIGNIFICANT CHANGE UP
APPEARANCE UR: CLEAR — SIGNIFICANT CHANGE UP
APTT BLD: 21.8 SEC — LOW (ref 24.5–35.6)
APTT BLD: 21.8 SEC — LOW (ref 24.5–35.6)
AST SERPL-CCNC: 62 U/L — HIGH (ref 10–40)
AST SERPL-CCNC: 62 U/L — HIGH (ref 10–40)
BACTERIA # UR AUTO: NEGATIVE /HPF — SIGNIFICANT CHANGE UP
BACTERIA # UR AUTO: NEGATIVE /HPF — SIGNIFICANT CHANGE UP
BASE EXCESS BLDV CALC-SCNC: 0.8 MMOL/L — SIGNIFICANT CHANGE UP (ref -2–3)
BASE EXCESS BLDV CALC-SCNC: 0.8 MMOL/L — SIGNIFICANT CHANGE UP (ref -2–3)
BASOPHILS # BLD AUTO: 0.16 K/UL — SIGNIFICANT CHANGE UP (ref 0–0.2)
BASOPHILS # BLD AUTO: 0.16 K/UL — SIGNIFICANT CHANGE UP (ref 0–0.2)
BASOPHILS NFR BLD AUTO: 1.5 % — SIGNIFICANT CHANGE UP (ref 0–2)
BASOPHILS NFR BLD AUTO: 1.5 % — SIGNIFICANT CHANGE UP (ref 0–2)
BILIRUB SERPL-MCNC: 0.5 MG/DL — SIGNIFICANT CHANGE UP (ref 0.2–1.2)
BILIRUB SERPL-MCNC: 0.5 MG/DL — SIGNIFICANT CHANGE UP (ref 0.2–1.2)
BILIRUB UR-MCNC: NEGATIVE — SIGNIFICANT CHANGE UP
BILIRUB UR-MCNC: NEGATIVE — SIGNIFICANT CHANGE UP
BUN SERPL-MCNC: 18 MG/DL — SIGNIFICANT CHANGE UP (ref 7–23)
BUN SERPL-MCNC: 18 MG/DL — SIGNIFICANT CHANGE UP (ref 7–23)
CA-I SERPL-SCNC: 1.13 MMOL/L — LOW (ref 1.15–1.33)
CA-I SERPL-SCNC: 1.13 MMOL/L — LOW (ref 1.15–1.33)
CALCIUM SERPL-MCNC: 9.1 MG/DL — SIGNIFICANT CHANGE UP (ref 8.4–10.5)
CALCIUM SERPL-MCNC: 9.1 MG/DL — SIGNIFICANT CHANGE UP (ref 8.4–10.5)
CAST: 1 /LPF — SIGNIFICANT CHANGE UP (ref 0–4)
CAST: 1 /LPF — SIGNIFICANT CHANGE UP (ref 0–4)
CHLORIDE BLDV-SCNC: 102 MMOL/L — SIGNIFICANT CHANGE UP (ref 96–108)
CHLORIDE BLDV-SCNC: 102 MMOL/L — SIGNIFICANT CHANGE UP (ref 96–108)
CHLORIDE SERPL-SCNC: 103 MMOL/L — SIGNIFICANT CHANGE UP (ref 96–108)
CHLORIDE SERPL-SCNC: 103 MMOL/L — SIGNIFICANT CHANGE UP (ref 96–108)
CO2 BLDV-SCNC: 27 MMOL/L — HIGH (ref 22–26)
CO2 BLDV-SCNC: 27 MMOL/L — HIGH (ref 22–26)
CO2 SERPL-SCNC: 22 MMOL/L — SIGNIFICANT CHANGE UP (ref 22–31)
CO2 SERPL-SCNC: 22 MMOL/L — SIGNIFICANT CHANGE UP (ref 22–31)
COLOR SPEC: YELLOW — SIGNIFICANT CHANGE UP
COLOR SPEC: YELLOW — SIGNIFICANT CHANGE UP
CREAT SERPL-MCNC: 0.73 MG/DL — SIGNIFICANT CHANGE UP (ref 0.5–1.3)
CREAT SERPL-MCNC: 0.73 MG/DL — SIGNIFICANT CHANGE UP (ref 0.5–1.3)
DIFF PNL FLD: NEGATIVE — SIGNIFICANT CHANGE UP
DIFF PNL FLD: NEGATIVE — SIGNIFICANT CHANGE UP
EGFR: 85 ML/MIN/1.73M2 — SIGNIFICANT CHANGE UP
EGFR: 85 ML/MIN/1.73M2 — SIGNIFICANT CHANGE UP
EOSINOPHIL # BLD AUTO: 0.09 K/UL — SIGNIFICANT CHANGE UP (ref 0–0.5)
EOSINOPHIL # BLD AUTO: 0.09 K/UL — SIGNIFICANT CHANGE UP (ref 0–0.5)
EOSINOPHIL NFR BLD AUTO: 0.8 % — SIGNIFICANT CHANGE UP (ref 0–6)
EOSINOPHIL NFR BLD AUTO: 0.8 % — SIGNIFICANT CHANGE UP (ref 0–6)
FLUAV AG NPH QL: SIGNIFICANT CHANGE UP
FLUAV AG NPH QL: SIGNIFICANT CHANGE UP
FLUBV AG NPH QL: SIGNIFICANT CHANGE UP
FLUBV AG NPH QL: SIGNIFICANT CHANGE UP
GAS PNL BLDV: 135 MMOL/L — LOW (ref 136–145)
GAS PNL BLDV: 135 MMOL/L — LOW (ref 136–145)
GAS PNL BLDV: SIGNIFICANT CHANGE UP
GLUCOSE BLDV-MCNC: 90 MG/DL — SIGNIFICANT CHANGE UP (ref 70–99)
GLUCOSE BLDV-MCNC: 90 MG/DL — SIGNIFICANT CHANGE UP (ref 70–99)
GLUCOSE SERPL-MCNC: 119 MG/DL — HIGH (ref 70–99)
GLUCOSE SERPL-MCNC: 119 MG/DL — HIGH (ref 70–99)
GLUCOSE UR QL: NEGATIVE MG/DL — SIGNIFICANT CHANGE UP
GLUCOSE UR QL: NEGATIVE MG/DL — SIGNIFICANT CHANGE UP
HCO3 BLDV-SCNC: 26 MMOL/L — SIGNIFICANT CHANGE UP (ref 22–29)
HCO3 BLDV-SCNC: 26 MMOL/L — SIGNIFICANT CHANGE UP (ref 22–29)
HCT VFR BLD CALC: 37.8 % — SIGNIFICANT CHANGE UP (ref 34.5–45)
HCT VFR BLD CALC: 37.8 % — SIGNIFICANT CHANGE UP (ref 34.5–45)
HCT VFR BLDA CALC: 37 % — SIGNIFICANT CHANGE UP (ref 34.5–46.5)
HCT VFR BLDA CALC: 37 % — SIGNIFICANT CHANGE UP (ref 34.5–46.5)
HGB BLD CALC-MCNC: 12.4 G/DL — SIGNIFICANT CHANGE UP (ref 11.7–16.1)
HGB BLD CALC-MCNC: 12.4 G/DL — SIGNIFICANT CHANGE UP (ref 11.7–16.1)
HGB BLD-MCNC: 12.2 G/DL — SIGNIFICANT CHANGE UP (ref 11.5–15.5)
HGB BLD-MCNC: 12.2 G/DL — SIGNIFICANT CHANGE UP (ref 11.5–15.5)
IMM GRANULOCYTES NFR BLD AUTO: 0.6 % — SIGNIFICANT CHANGE UP (ref 0–0.9)
IMM GRANULOCYTES NFR BLD AUTO: 0.6 % — SIGNIFICANT CHANGE UP (ref 0–0.9)
INR BLD: 1.26 RATIO — HIGH (ref 0.85–1.18)
INR BLD: 1.26 RATIO — HIGH (ref 0.85–1.18)
KETONES UR-MCNC: NEGATIVE MG/DL — SIGNIFICANT CHANGE UP
KETONES UR-MCNC: NEGATIVE MG/DL — SIGNIFICANT CHANGE UP
LACTATE BLDV-MCNC: 1.9 MMOL/L — SIGNIFICANT CHANGE UP (ref 0.5–2)
LACTATE BLDV-MCNC: 1.9 MMOL/L — SIGNIFICANT CHANGE UP (ref 0.5–2)
LEUKOCYTE ESTERASE UR-ACNC: ABNORMAL
LEUKOCYTE ESTERASE UR-ACNC: ABNORMAL
LYMPHOCYTES # BLD AUTO: 0.28 K/UL — LOW (ref 1–3.3)
LYMPHOCYTES # BLD AUTO: 0.28 K/UL — LOW (ref 1–3.3)
LYMPHOCYTES # BLD AUTO: 2.6 % — LOW (ref 13–44)
LYMPHOCYTES # BLD AUTO: 2.6 % — LOW (ref 13–44)
MAGNESIUM SERPL-MCNC: 1.8 MG/DL — SIGNIFICANT CHANGE UP (ref 1.6–2.6)
MAGNESIUM SERPL-MCNC: 1.8 MG/DL — SIGNIFICANT CHANGE UP (ref 1.6–2.6)
MCHC RBC-ENTMCNC: 28.8 PG — SIGNIFICANT CHANGE UP (ref 27–34)
MCHC RBC-ENTMCNC: 28.8 PG — SIGNIFICANT CHANGE UP (ref 27–34)
MCHC RBC-ENTMCNC: 32.3 GM/DL — SIGNIFICANT CHANGE UP (ref 32–36)
MCHC RBC-ENTMCNC: 32.3 GM/DL — SIGNIFICANT CHANGE UP (ref 32–36)
MCV RBC AUTO: 89.2 FL — SIGNIFICANT CHANGE UP (ref 80–100)
MCV RBC AUTO: 89.2 FL — SIGNIFICANT CHANGE UP (ref 80–100)
MONOCYTES # BLD AUTO: 1.13 K/UL — HIGH (ref 0–0.9)
MONOCYTES # BLD AUTO: 1.13 K/UL — HIGH (ref 0–0.9)
MONOCYTES NFR BLD AUTO: 10.4 % — SIGNIFICANT CHANGE UP (ref 2–14)
MONOCYTES NFR BLD AUTO: 10.4 % — SIGNIFICANT CHANGE UP (ref 2–14)
NEUTROPHILS # BLD AUTO: 9.13 K/UL — HIGH (ref 1.8–7.4)
NEUTROPHILS # BLD AUTO: 9.13 K/UL — HIGH (ref 1.8–7.4)
NEUTROPHILS NFR BLD AUTO: 84.1 % — HIGH (ref 43–77)
NEUTROPHILS NFR BLD AUTO: 84.1 % — HIGH (ref 43–77)
NITRITE UR-MCNC: NEGATIVE — SIGNIFICANT CHANGE UP
NITRITE UR-MCNC: NEGATIVE — SIGNIFICANT CHANGE UP
NRBC # BLD: 1 /100 WBCS — HIGH (ref 0–0)
NRBC # BLD: 1 /100 WBCS — HIGH (ref 0–0)
PCO2 BLDV: 43 MMHG — HIGH (ref 39–42)
PCO2 BLDV: 43 MMHG — HIGH (ref 39–42)
PH BLDV: 7.39 — SIGNIFICANT CHANGE UP (ref 7.32–7.43)
PH BLDV: 7.39 — SIGNIFICANT CHANGE UP (ref 7.32–7.43)
PH UR: 7 — SIGNIFICANT CHANGE UP (ref 5–8)
PH UR: 7 — SIGNIFICANT CHANGE UP (ref 5–8)
PLATELET # BLD AUTO: 241 K/UL — SIGNIFICANT CHANGE UP (ref 150–400)
PLATELET # BLD AUTO: 241 K/UL — SIGNIFICANT CHANGE UP (ref 150–400)
PO2 BLDV: 37 MMHG — SIGNIFICANT CHANGE UP (ref 25–45)
PO2 BLDV: 37 MMHG — SIGNIFICANT CHANGE UP (ref 25–45)
POTASSIUM BLDV-SCNC: 3.7 MMOL/L — SIGNIFICANT CHANGE UP (ref 3.5–5.1)
POTASSIUM BLDV-SCNC: 3.7 MMOL/L — SIGNIFICANT CHANGE UP (ref 3.5–5.1)
POTASSIUM SERPL-MCNC: 4.4 MMOL/L — SIGNIFICANT CHANGE UP (ref 3.5–5.3)
POTASSIUM SERPL-MCNC: 4.4 MMOL/L — SIGNIFICANT CHANGE UP (ref 3.5–5.3)
POTASSIUM SERPL-SCNC: 4.4 MMOL/L — SIGNIFICANT CHANGE UP (ref 3.5–5.3)
POTASSIUM SERPL-SCNC: 4.4 MMOL/L — SIGNIFICANT CHANGE UP (ref 3.5–5.3)
PROT SERPL-MCNC: 6.8 G/DL — SIGNIFICANT CHANGE UP (ref 6–8.3)
PROT SERPL-MCNC: 6.8 G/DL — SIGNIFICANT CHANGE UP (ref 6–8.3)
PROT UR-MCNC: NEGATIVE MG/DL — SIGNIFICANT CHANGE UP
PROT UR-MCNC: NEGATIVE MG/DL — SIGNIFICANT CHANGE UP
PROTHROM AB SERPL-ACNC: 13.1 SEC — HIGH (ref 9.5–13)
PROTHROM AB SERPL-ACNC: 13.1 SEC — HIGH (ref 9.5–13)
RBC # BLD: 4.24 M/UL — SIGNIFICANT CHANGE UP (ref 3.8–5.2)
RBC # BLD: 4.24 M/UL — SIGNIFICANT CHANGE UP (ref 3.8–5.2)
RBC # FLD: 14.6 % — HIGH (ref 10.3–14.5)
RBC # FLD: 14.6 % — HIGH (ref 10.3–14.5)
RBC CASTS # UR COMP ASSIST: 2 /HPF — SIGNIFICANT CHANGE UP (ref 0–4)
RBC CASTS # UR COMP ASSIST: 2 /HPF — SIGNIFICANT CHANGE UP (ref 0–4)
RSV RNA NPH QL NAA+NON-PROBE: SIGNIFICANT CHANGE UP
RSV RNA NPH QL NAA+NON-PROBE: SIGNIFICANT CHANGE UP
SAO2 % BLDV: 55.9 % — LOW (ref 67–88)
SAO2 % BLDV: 55.9 % — LOW (ref 67–88)
SARS-COV-2 RNA SPEC QL NAA+PROBE: DETECTED
SARS-COV-2 RNA SPEC QL NAA+PROBE: DETECTED
SODIUM SERPL-SCNC: 138 MMOL/L — SIGNIFICANT CHANGE UP (ref 135–145)
SODIUM SERPL-SCNC: 138 MMOL/L — SIGNIFICANT CHANGE UP (ref 135–145)
SP GR SPEC: 1.01 — SIGNIFICANT CHANGE UP (ref 1–1.03)
SP GR SPEC: 1.01 — SIGNIFICANT CHANGE UP (ref 1–1.03)
SQUAMOUS # UR AUTO: 2 /HPF — SIGNIFICANT CHANGE UP (ref 0–5)
SQUAMOUS # UR AUTO: 2 /HPF — SIGNIFICANT CHANGE UP (ref 0–5)
TROPONIN T, HIGH SENSITIVITY RESULT: 14 NG/L — SIGNIFICANT CHANGE UP (ref 0–51)
TROPONIN T, HIGH SENSITIVITY RESULT: 14 NG/L — SIGNIFICANT CHANGE UP (ref 0–51)
TROPONIN T, HIGH SENSITIVITY RESULT: 15 NG/L — SIGNIFICANT CHANGE UP (ref 0–51)
TROPONIN T, HIGH SENSITIVITY RESULT: 15 NG/L — SIGNIFICANT CHANGE UP (ref 0–51)
UROBILINOGEN FLD QL: 0.2 MG/DL — SIGNIFICANT CHANGE UP (ref 0.2–1)
UROBILINOGEN FLD QL: 0.2 MG/DL — SIGNIFICANT CHANGE UP (ref 0.2–1)
WBC # BLD: 10.85 K/UL — HIGH (ref 3.8–10.5)
WBC # BLD: 10.85 K/UL — HIGH (ref 3.8–10.5)
WBC # FLD AUTO: 10.85 K/UL — HIGH (ref 3.8–10.5)
WBC # FLD AUTO: 10.85 K/UL — HIGH (ref 3.8–10.5)
WBC UR QL: 1 /HPF — SIGNIFICANT CHANGE UP (ref 0–5)
WBC UR QL: 1 /HPF — SIGNIFICANT CHANGE UP (ref 0–5)

## 2023-12-20 PROCEDURE — 99285 EMERGENCY DEPT VISIT HI MDM: CPT

## 2023-12-20 PROCEDURE — 76377 3D RENDER W/INTRP POSTPROCES: CPT | Mod: 26

## 2023-12-20 PROCEDURE — 70450 CT HEAD/BRAIN W/O DYE: CPT | Mod: 26,MA

## 2023-12-20 PROCEDURE — 72125 CT NECK SPINE W/O DYE: CPT | Mod: 26,MA

## 2023-12-20 PROCEDURE — 71250 CT THORAX DX C-: CPT | Mod: 26

## 2023-12-20 PROCEDURE — 70486 CT MAXILLOFACIAL W/O DYE: CPT | Mod: 26,MA

## 2023-12-20 PROCEDURE — 71045 X-RAY EXAM CHEST 1 VIEW: CPT | Mod: 26

## 2023-12-20 RX ORDER — TETANUS TOXOID, REDUCED DIPHTHERIA TOXOID AND ACELLULAR PERTUSSIS VACCINE, ADSORBED 5; 2.5; 8; 8; 2.5 [IU]/.5ML; [IU]/.5ML; UG/.5ML; UG/.5ML; UG/.5ML
0.5 SUSPENSION INTRAMUSCULAR ONCE
Refills: 0 | Status: COMPLETED | OUTPATIENT
Start: 2023-12-20 | End: 2023-12-20

## 2023-12-20 RX ORDER — ANASTROZOLE 1 MG/1
1 TABLET ORAL DAILY
Refills: 0 | Status: DISCONTINUED | OUTPATIENT
Start: 2023-12-20 | End: 2023-12-27

## 2023-12-20 RX ORDER — ACETAMINOPHEN 500 MG
650 TABLET ORAL EVERY 6 HOURS
Refills: 0 | Status: DISCONTINUED | OUTPATIENT
Start: 2023-12-20 | End: 2023-12-27

## 2023-12-20 RX ORDER — SODIUM CHLORIDE 9 MG/ML
500 INJECTION INTRAMUSCULAR; INTRAVENOUS; SUBCUTANEOUS ONCE
Refills: 0 | Status: COMPLETED | OUTPATIENT
Start: 2023-12-20 | End: 2023-12-20

## 2023-12-20 RX ORDER — ATORVASTATIN CALCIUM 80 MG/1
20 TABLET, FILM COATED ORAL AT BEDTIME
Refills: 0 | Status: DISCONTINUED | OUTPATIENT
Start: 2023-12-20 | End: 2023-12-22

## 2023-12-20 RX ORDER — LANOLIN ALCOHOL/MO/W.PET/CERES
3 CREAM (GRAM) TOPICAL AT BEDTIME
Refills: 0 | Status: DISCONTINUED | OUTPATIENT
Start: 2023-12-20 | End: 2023-12-27

## 2023-12-20 RX ORDER — ACETAMINOPHEN 500 MG
1000 TABLET ORAL ONCE
Refills: 0 | Status: COMPLETED | OUTPATIENT
Start: 2023-12-20 | End: 2023-12-20

## 2023-12-20 RX ORDER — FLUTICASONE PROPIONATE 50 MCG
1 SPRAY, SUSPENSION NASAL
Refills: 0 | Status: DISCONTINUED | OUTPATIENT
Start: 2023-12-20 | End: 2023-12-27

## 2023-12-20 RX ORDER — PANTOPRAZOLE SODIUM 20 MG/1
40 TABLET, DELAYED RELEASE ORAL
Refills: 0 | Status: DISCONTINUED | OUTPATIENT
Start: 2023-12-20 | End: 2023-12-27

## 2023-12-20 RX ORDER — REMDESIVIR 5 MG/ML
INJECTION INTRAVENOUS
Refills: 0 | Status: DISCONTINUED | OUTPATIENT
Start: 2023-12-20 | End: 2023-12-20

## 2023-12-20 RX ORDER — CHLORHEXIDINE GLUCONATE 213 G/1000ML
1 SOLUTION TOPICAL DAILY
Refills: 0 | Status: DISCONTINUED | OUTPATIENT
Start: 2023-12-20 | End: 2023-12-27

## 2023-12-20 RX ORDER — SERTRALINE 25 MG/1
50 TABLET, FILM COATED ORAL DAILY
Refills: 0 | Status: DISCONTINUED | OUTPATIENT
Start: 2023-12-20 | End: 2023-12-27

## 2023-12-20 RX ORDER — SODIUM CHLORIDE 9 MG/ML
1000 INJECTION INTRAMUSCULAR; INTRAVENOUS; SUBCUTANEOUS
Refills: 0 | Status: DISCONTINUED | OUTPATIENT
Start: 2023-12-20 | End: 2023-12-27

## 2023-12-20 RX ADMIN — ATORVASTATIN CALCIUM 20 MILLIGRAM(S): 80 TABLET, FILM COATED ORAL at 22:54

## 2023-12-20 RX ADMIN — SODIUM CHLORIDE 500 MILLILITER(S): 9 INJECTION INTRAMUSCULAR; INTRAVENOUS; SUBCUTANEOUS at 11:33

## 2023-12-20 RX ADMIN — SODIUM CHLORIDE 50 MILLILITER(S): 9 INJECTION INTRAMUSCULAR; INTRAVENOUS; SUBCUTANEOUS at 18:41

## 2023-12-20 RX ADMIN — Medication 650 MILLIGRAM(S): at 18:42

## 2023-12-20 RX ADMIN — TETANUS TOXOID, REDUCED DIPHTHERIA TOXOID AND ACELLULAR PERTUSSIS VACCINE, ADSORBED 0.5 MILLILITER(S): 5; 2.5; 8; 8; 2.5 SUSPENSION INTRAMUSCULAR at 14:05

## 2023-12-20 RX ADMIN — Medication 400 MILLIGRAM(S): at 14:05

## 2023-12-20 NOTE — H&P ADULT - NS ATTEND AMEND GEN_ALL_CORE FT
Pt care and plan discussed and reviewed with PA. Plan as outlined above edited by me to reflect our discussion. Advanced care planning/advanced directives discussed with patient/family. DNR status including forceful chest compressions to attempt to restart the heart, ventilator support/artificial breathing, electric shock, artificial nutrition, health care proxy, Molst form all discussed with pt. total of seventy five minutes spent .More than 50% of the visit was spent counseling and/or coordinating care by the attending physician.

## 2023-12-20 NOTE — H&P ADULT - NSHPPHYSICALEXAM_GEN_ALL_CORE
Vital Signs Last 24 Hrs  T(C): 36.6 (20 Dec 2023 10:00), Max: 36.6 (20 Dec 2023 10:00)  T(F): 97.9 (20 Dec 2023 10:00), Max: 97.9 (20 Dec 2023 10:00)  HR: 79 (20 Dec 2023 10:00) (79 - 79)  BP: 132/88 (20 Dec 2023 10:00) (132/88 - 132/88)  BP(mean): --  RR: 19 (20 Dec 2023 10:00) (19 - 19)  SpO2: 97% (20 Dec 2023 10:00) (97% - 97%)    Parameters below as of 20 Dec 2023 10:00  Patient On (Oxygen Delivery Method): room air    Appearance: Awake, Weak appearing female, lying down in bed, HOB elevated, Facial contusion and ecchymosis, Alert and Oriented X 3 	  HEENT: Eyes are open; +Facial ecchymosis; + Raccoon eyes R >L; + contusion   Lymphatic: No lymphadenopathy grossly   Cardiovascular: Normal S1 S2, No JVD   Respiratory: Lungs clear to auscultation, normal effort on RA   Gastrointestinal:  Soft, Non-tender, + BS	  Skin: + Facial ecchymosis; + R sided tashia-occular ecchymosis; B/L Knee ecchymosis   Musculoskeletal: + B/L Knee pain   Psychiatry:  Mood & affect appropriate  Ext: No edema

## 2023-12-20 NOTE — PATIENT PROFILE ADULT - FALL HARM RISK - HARM RISK INTERVENTIONS
Communicate Risk of Fall with Harm to all staff/Reinforce activity limits and safety measures with patient and family/Tailored Fall Risk Interventions/Visual Cue: Yellow wristband and red socks/Bed in lowest position, wheels locked, appropriate side rails in place/Call bell, personal items and telephone in reach/Instruct patient to call for assistance before getting out of bed or chair/Non-slip footwear when patient is out of bed/[place] to call system/Physically safe environment - no spills, clutter or unnecessary equipment/Purposeful Proactive Rounding/Room/bathroom lighting operational, light cord in reach Communicate Risk of Fall with Harm to all staff/Reinforce activity limits and safety measures with patient and family/Tailored Fall Risk Interventions/Visual Cue: Yellow wristband and red socks/Bed in lowest position, wheels locked, appropriate side rails in place/Call bell, personal items and telephone in reach/Instruct patient to call for assistance before getting out of bed or chair/Non-slip footwear when patient is out of bed/[place] to call system/Physically safe environment - no spills, clutter or unnecessary equipment/Purposeful Proactive Rounding/Room/bathroom lighting operational, light cord in reach

## 2023-12-20 NOTE — H&P ADULT - HISTORY OF PRESENT ILLNESS
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 Patient reports that she awoke in yas usual state of health this morning, was supposed to go to outpatient laboratory to obtain lab work. Patient states that she told her  to bring the car to her by the elevator and does not recall further. States that she told her  she "feels unwell" and feels "off." Per  at the bedside, he found patient face down on the floor.  reports that he stepped away from patient for under 2 minutes to bring the car over to her and awoke patient. Patient reports pain to her B/L knees. Patient reports that she has been ambulating since and was advised to come to [facility] by her [facility] team. Patient denies any bladder or bowel incontinence. Denies chest pain, palpitations, shortness of breath or dyspnea.  Denies nausea, vomiting, fever, chills, cough, abdominal pain, headache, dizziness, lightheadedness neck or back pain.  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 Patient reports that she awoke in yas usual state of health this morning, was supposed to go to outpatient laboratory to obtain lab work. Patient states that she told her  to bring the car to her by the elevator and does not recall further. States that she told her  she "feels unwell" and feels "off." Per  at the bedside, he found patient face down on the floor.  reports that he stepped away from patient for under 2 minutes to bring the car over to her and awoke patient. Patient reports pain to her B/L knees. Patient reports that she has been ambulating since and was advised to come to [facility] by her [facility] team. Patient denies any bladder or bowel incontinence. Denies chest pain, palpitations, shortness of breath or dyspnea.  Denies nausea, vomiting, fever, chills, cough, abdominal pain, headache, dizziness, lightheadedness neck or back pain.

## 2023-12-20 NOTE — ED ADULT TRIAGE NOTE - PAIN: PRESENCE, MLM
Hospitalist Progress Note  Eduardo Solorio MD  Answering service: [phone] OR [phone] from in house phone        Date of Service:  2018  NAME:  Juan Goins  :  1951  MRN:  [id number]      Admission Summary:   Juan Goins is a 78 y.o.  female with past medical history of anemia, uterine cancer, s/p hysterectomy, type 2 diabetes mellitus, headaches, hypertension, and hyperlipidemia presented to the ED from home with chief complaint of abdominal pain. Patient's daughter who speaks English provide translation. Patient is a limited historian. Patient's daughter provides additional history    Interval history / Subjective:   Ms. Azalia Thakkar is feeling ok. Pain is much better. She ambulated earlier today. Eating well without nausea or abdominal pain. 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 Relative small bowel obstruction. Associated dilated appendix.  Associated complex right pleural effusion and small amount of ascites  - Right kanika colectomy on , path pending  - Hem/Onc, General surgery consulted    SBO due to colon mass (POA) - resolved  - Right hemicolectomy as above  - Advance diet per surgery  - Surgical service consulted    Leukocytosis possible reactive - stable, no evidence of infection    Dehydration likely due to poor oral intake - resolved with IVF     Hypokalemia - monitor and replace as needed    Hyponatremia multifactorial - resolved with normal saline    DM-II - A1c 8.3, BG a little better  - Continue lantus, SSI as needed    HTN - BP normal  - Continue to hold home metoprolol and lisinopril    Hx of hypercholesterolemia - continue to hold home zocor    Hx of uterine ca s/p hysterectomy    Obesity - Body mass index is 31.41 kg/(m^2). Code status: Full Code  DVT prophylaxis: SCD  Care Plan discussed with: Patient/Family, Nurse and   Disposition: Home after path and chemo plan in place     Agatha Carranza [phone]     [facility]  Date Reviewed: 5/27/2018          Codes Class Noted POA    S/P right colectomy ICD-10-CM: Z90.49  ICD-9-CM: V45.89  5/27/2018 No    Overview Signed 5/29/2018  8:25 AM by Justin Amaral MD     Right hemicolectomy for cecal mass with partial SBO, Ndiaye             Leukocytosis ICD-10-CM: N68.363  ICD-9-CM: 288.60  5/26/2018 Yes        SBO (small bowel obstruction) (RUST 75.) ICD-10-CM: V77.525  ICD-9-CM: 560.9  5/26/2018 Yes        Abdominal pain ICD-10-CM: R10.9  ICD-9-CM: 789.00  5/26/2018 Yes        Type 2 diabetes mellitus with hyperglycemia (RUST 75.) ICD-10-CM: E11.65  ICD-9-CM: 250.00  5/26/2018 Yes        * (Principal)Colonic mass ICD-10-CM: K63.9  ICD-9-CM: 569.89  5/26/2018 Yes        HTN (hypertension) ICD-10-CM: I10  ICD-9-CM: 401.9  5/23/2013 Yes              Vital Signs:    Last 24hrs VS reviewed since prior progress note. Most recent are:  Visit Vitals    /78 (BP 1 Location: Right arm, BP Patient Position: At rest)    Pulse 72    Temp 97.8 °F (36.6 °C)    Resp 18    Ht 5' 4\" (1.626 m)    Wt 83 kg (183 lb)    SpO2 99%    BMI 31.41 kg/m2         Intake/Output Summary (Last 24 hours) at 05/30/18 1557  Last data filed at 05/30/18 1548   Gross per 24 hour   Intake              960 ml   Output              750 ml   Net              210 ml        Physical Examination:         Constitutional:  No acute distress, tired   ENT:  Oral mucous moist, oropharynx benign. Neck supple,    Resp:  CTA bilaterally. No wheezing/rhonchi/rales. No accessory muscle use   CV:  Regular rhythm, normal rate, no murmurs, gallops, rubs    GI:  Soft, non distended, mildly tender around surgical site, + bowel sounds,    Musculoskeletal:  No edema.  Muscle tone and bulk normal    Neurologic: Moves all extremities. AAOx3, CN II-XII reviewed     Skin:  Good turgor, no rashes or ulcers       Data Review:     Telemetry    ECG    Xray    CT scan    MRI    Echocardiogram    Ultrasound              I have reviewed the flow sheet and recent notes  New labs and data personally reviewed. Labs:     Recent Labs      05/30/18   0555  05/29/18   0355   WBC  13.0*  14.8*   HGB  10.9*  11.4*   HCT  33.7*  35.3   PLT  283  285     Recent Labs      05/30/18   0555  05/29/18   0355   NA  139  138   K  3.4*  3.5   CL  108  108   CO2  26  21   BUN  7  16   CREA  0.21*  0.38*   GLU  135*  201*   CA  8.7  8.2*     No results for input(s): SGOT, GPT, ALT, AP, TBIL, TBILI, TP, ALB, GLOB, GGT, AML, LPSE in the last 72 hours. No lab exists for component: AMYP, HLPSE  No results for input(s): INR, PTP, APTT in the last 72 hours. No lab exists for component: INREXT, INREXT   No results for input(s): FE, TIBC, PSAT, FERR in the last 72 hours. No results found for: FOL, RBCF   No results for input(s): PH, PCO2, PO2 in the last 72 hours. No results for input(s): CPK, CKNDX, TROIQ in the last 72 hours.     No lab exists for component: CPKMB  Lab Results   Component Value Date/Time    Cholesterol, total 174 06/01/2017 09:40 AM    HDL Cholesterol 45 06/01/2017 09:40 AM    LDL, calculated 92 06/01/2017 09:40 AM    Triglyceride 183 (H) 06/01/2017 09:40 AM    CHOL/HDL Ratio 3.4 01/29/2010 11:46 AM     Lab Results   Component Value Date/Time    Glucose (POC) 130 (H) 05/30/2018 12:17 PM    Glucose (POC) 135 (H) 05/30/2018 07:42 AM    Glucose (POC) 213 (H) 05/29/2018 09:05 PM    Glucose (POC) 180 (H) 05/29/2018 04:38 PM    Glucose (POC) 178 (H) 05/29/2018 11:35 AM     Lab Results   Component Value Date/Time    Color YELLOW/STRAW 05/26/2018 05:40 AM    Appearance CLEAR 05/26/2018 05:40 AM    Specific gravity 1.010 05/26/2018 05:40 AM    pH (UA) 6.0 05/26/2018 05:40 AM    Protein NEGATIVE  05/26/2018 05:40 AM    Glucose >1000 (A) 05/26/2018 05:40 AM    Ketone 80 (A) 05/26/2018 05:40 AM    Bilirubin Negative 04/30/2015 12:00 AM    Urobilinogen 1.0 05/26/2018 05:40 AM    Nitrites NEGATIVE  05/26/2018 05:40 AM    Leukocyte Esterase SMALL (A) 05/26/2018 05:40 AM    Epithelial cells FEW 05/26/2018 05:40 AM    Bacteria 1+ (A) 05/26/2018 05:40 AM    WBC 0-4 05/26/2018 05:40 AM    RBC 0-5 05/26/2018 05:40 AM         Medications Reviewed:     Current Facility-Administered Medications   Medication Dose Route Frequency    oxyCODONE IR (ROXICODONE) tablet 5 mg  5 mg Oral Q4H PRN    simvastatin (ZOCOR) tablet 20 mg  20 mg Oral QHS    ferrous sulfate tablet 325 mg  1 Tab Oral DAILY WITH BREAKFAST    fentaNYL citrate (PF) injection 50 mcg  50 mcg IntraVENous Q4H PRN    zolpidem (AMBIEN) tablet 5 mg  5 mg Oral QHS    insulin glargine (LANTUS) injection 30 Units  30 Units SubCUTAneous QHS    enoxaparin (LOVENOX) injection 40 mg  40 mg SubCUTAneous Q24H    albuterol (PROVENTIL VENTOLIN) nebulizer solution 2.5 mg  2.5 mg Nebulization Q6H PRN    metoprolol tartrate (LOPRESSOR) tablet 50 mg  50 mg Oral BID    lisinopril (PRINIVIL, ZESTRIL) tablet 10 mg  10 mg Oral DAILY    celecoxib (CELEBREX) capsule 100 mg  100 mg Oral BID    naloxone (NARCAN) injection 0.4 mg  0.4 mg IntraVENous PRN    ondansetron (ZOFRAN) injection 4 mg  4 mg IntraVENous Q4H PRN    sodium chloride (NS) flush 5-10 mL  5-10 mL IntraVENous Q8H    sodium chloride (NS) flush 5-10 mL  5-10 mL IntraVENous PRN    glucose chewable tablet 16 g  4 Tab Oral PRN    dextrose (D50W) injection syrg 12.5-25 g  12.5-25 g IntraVENous PRN    glucagon (GLUCAGEN) injection 1 mg  1 mg IntraMUSCular PRN    insulin lispro (HUMALOG) injection   SubCUTAneous Q6H    0.45% sodium chloride with KCl 20 mEq/L infusion   IntraVENous CONTINUOUS     ______________________________________________________________________  EXPECTED LENGTH OF STAY: 6d 7h  ACTUAL LENGTH OF STAY:          4 Cedric Villa MD complains of pain/discomfort

## 2023-12-20 NOTE — H&P ADULT - ASSESSMENT
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 Patient reports that she awoke in yas usual state of health this morning, was supposed to go to outpatient laboratory to obtain lab work. Patient states that she told her  to bring the car to her by the elevator and does not recall further. States that she told her  she "feels unwell" and feels "off." Per  at the bedside, he found patient face down on the floor.  reports that he stepped away from patient for under 2 minutes to bring the car over to her and awoke patient. Patient reports pain to her B/L knees. Patient reports that she has been ambulating since and was advised to come to [facility] by her [facility] team. Patient denies any bladder or bowel incontinence. Denies chest pain, palpitations, shortness of breath or dyspnea.  Denies nausea, vomiting, fever, chills, cough, abdominal pain, headache, dizziness, lightheadedness neck or back pain.       Syncope  - Pt with syncope --> Likely due to Mod-Severe AS versus infectious etiology, however rule out other etiologies   - 8/2023 TTE w/ Preserved EF, No WMA, Grade II Diastolic dysfunction, Mod-Severe AS  - CT Head / C-Spine / Maxillofacial and 3D as noted   - Check Orthostatics, will start gentle IVF for hydration X 24 hours (Patient with Mod-Severe Aortic Stenosis)  - Check Carotid Duplex  - Check EEG (Pt w/ history of Seizures 10 years ago, not on AED)  - Trop X 2 negative, trend in AM   - Monitor on telemetry   - Neuro checks per protocol   - Fall precautions   - Neuro and Cardio evaluations consulted     COVID +  - Start RDV X 5 days; Monitor Cr and LFTs while on RDV   - Check ESR, CRP, Ferritin, D-dimer in AM  - Check CT Chest to R/O PNA  - Check Procal in AM    - AC on hold in view of R frontal hematoma   - Incentive spirometer   - Hold off on additional steroids (Pt on steroids at home) as patient is not hypoxic, on RA   - Monitor O2 saturation; Supplement PRN to maintain > 90%    Leukocytosis   - Pt recently completed prolonged course of ABX on 12/16  - F/u repeat UCx --> in lab   - Check BCx2 in AM   - Trend CBC, temp curve, VS     Knee Pain   - Pt with chronic knee pain, Osteoporosis   - B/L Knees with Ecchymosis --> B/L Knee X-ray ordered, F/u results     Breast CA, Lymphoma   - Follows with [facility] and Uro-Gyn   - C/w Anastrazole  - Heme/Onc eval consulted; F/u recs      H/O DVT PE  - Last dose of Eliquis on 12/19 PM  - Hold in view of scalp hematoma  - Resume once cleared from Neuro     H/O CVA, CNS Vasculitis   - C/w Prednisone  - Neuro eval consulted; F/u recs     HLD  - Check lipid panel  - Statin therapy     GERD  -     PPX  - Eliquis on hold in view of hematoma      Discussed in detail with patient and her  at the bedside.   Discussed with Attending and ACP.  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 Patient reports that she awoke in yas usual state of health this morning, was supposed to go to outpatient laboratory to obtain lab work. Patient states that she told her  to bring the car to her by the elevator and does not recall further. States that she told her  she "feels unwell" and feels "off." Per  at the bedside, he found patient face down on the floor.  reports that he stepped away from patient for under 2 minutes to bring the car over to her and awoke patient. Patient reports pain to her B/L knees. Patient reports that she has been ambulating since and was advised to come to [facility] by her [facility] team. Patient denies any bladder or bowel incontinence. Denies chest pain, palpitations, shortness of breath or dyspnea.  Denies nausea, vomiting, fever, chills, cough, abdominal pain, headache, dizziness, lightheadedness neck or back pain.       Syncope  - Pt with syncope --> Likely due to Mod-Severe AS versus infectious etiology, however rule out other etiologies   - 8/2023 TTE w/ Preserved EF, No WMA, Grade II Diastolic dysfunction, Mod-Severe AS  - CT Head / C-Spine / Maxillofacial and 3D as noted   - Check Orthostatics, will start gentle IVF for hydration X 24 hours (Patient with Mod-Severe Aortic Stenosis)  - Check Carotid Duplex  - Check EEG (Pt w/ history of Seizures 10 years ago, not on AED)  - Trop X 2 negative, trend in AM   - Monitor on telemetry   - Neuro checks per protocol   - Fall precautions   - Neuro and Cardio evaluations consulted     COVID +  - Start RDV X 5 days; Monitor Cr and LFTs while on RDV   - Check ESR, CRP, Ferritin, D-dimer in AM  - Check CT Chest to R/O PNA  - Check Procal in AM    - AC on hold in view of R frontal hematoma   - Incentive spirometer   - Hold off on additional steroids (Pt on steroids at home) as patient is not hypoxic, on RA   - Monitor O2 saturation; Supplement PRN to maintain > 90%    Leukocytosis   - Pt recently completed prolonged course of ABX on 12/16  - F/u repeat UCx --> in lab   - Check BCx2 in AM   - Trend CBC, temp curve, VS     Knee Pain   - Pt with chronic knee pain, Osteoporosis   - B/L Knees with Ecchymosis --> B/L Knee X-ray ordered, F/u results     Breast CA, Lymphoma   - Follows with [facility] and [facility]ro-Gyn   - C/w Anastrazole  - Heme/Onc eval consulted; F/u recs      H/O DVT PE  - Last dose of Eliquis on 12/19 PM  - Hold in view of scalp hematoma  - Resume once cleared from Neuro     H/O CVA, CNS Vasculitis   - C/w Prednisone  - Neuro eval consulted; F/u recs     HLD  - Check lipid panel  - Statin therapy     GERD  -     PPX  - Eliquis on hold in view of hematoma      Discussed in detail with patient and her  at the bedside.   Discussed with Attending and ACP.  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 with a chief complaint of syncope this morning. Patient reports that she awoke in yas usual state of health this morning, was supposed to go to outpatient laboratory to obtain lab work. Patient states that she told her  to bring the car to her by the elevator and does not recall further. States that she told her  she "feels unwell" and feels "off." Per  at the bedside, he found patient face down on the floor.  reports that he stepped away from patient for under 2 minutes to bring the car over to her and awoke patient. Patient reports pain to her B/L knees. Patient reports that she has been ambulating since and was advised to come to [facility] by her [facility] team. Patient denies any bladder or bowel incontinence. Denies chest pain, palpitations, shortness of breath or dyspnea.  Denies nausea, vomiting, fever, chills, cough, abdominal pain, headache, dizziness, lightheadedness neck or back pain.       Syncope  - Pt with syncope --> Likely due to Mod-Severe AS versus infectious etiology, however rule out other etiologies   - 8/2023 TTE w/ Preserved EF, No WMA, Grade II Diastolic dysfunction, Mod-Severe AS  - CT Head / C-Spine / Maxillofacial and 3D as noted   - Check Orthostatics, will start gentle IVF for hydration X 24 hours (Patient with Mod-Severe Aortic Stenosis)  - Check Carotid Duplex  - Check EEG (Pt w/ history of Seizures 10 years ago, not on AED)  - Trop X 2 negative, trend in AM   - Monitor on telemetry   - Neuro checks per protocol   - Fall precautions   - Neuro and Cardio evaluations consulted     COVID +  - Start RDV X 5 days; Monitor Cr and LFTs while on RDV   - Check ESR, CRP, Ferritin, D-dimer in AM  - Check CT Chest to R/O PNA  - Check Procal in AM    - AC on hold in view of R frontal hematoma   - Incentive spirometer   - Hold off on additional steroids (Pt on steroids at home) as patient is not hypoxic, on RA   - Monitor O2 saturation; Supplement PRN to maintain > 90%    Leukocytosis   - Pt recently completed prolonged course of ABX on 12/16  - F/u repeat UCx --> in lab   - Check BCx2 in AM   - Trend CBC, temp curve, VS     Mod-Severe AS  - Seen on 8/2023 TTE  - Monitor volume status   - Cardio eval consulted; F/u recs     Knee Pain   - Pt with chronic knee pain, Osteoporosis   - B/L Knees with Ecchymosis --> B/L Knee X-ray ordered, F/u results     Breast CA, Lymphoma   - Follows with [facility] and Uro-Gyn   - C/w Anastrazole  - Heme/Onc eval consulted; F/u recs      H/O DVT PE  - Last dose of Eliquis on 12/19 PM  - Hold in view of scalp hematoma  - Resume once cleared from Neuro     H/O CVA, CNS Vasculitis   - C/w Prednisone  - Neuro eval consulted; F/u recs     HLD  - Check lipid panel  - Statin therapy     GERD  -     PPX  - Eliquis on hold in view of hematoma      Discussed in detail with patient and her  at the bedside.   Discussed with Attending and ACP.  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 Patient reports that she awoke in yas usual state of health this morning, was supposed to go to outpatient laboratory to obtain lab work. Patient states that she told her  to bring the car to her by the elevator and does not recall further. States that she told her  she "feels unwell" and feels "off." Per  at the bedside, he found patient face down on the floor.  reports that he stepped away from patient for under 2 minutes to bring the car over to her and awoke patient. Patient reports pain to her B/L knees. Patient reports that she has been ambulating since and was advised to come to [facility] by her [facility] team. Patient denies any bladder or bowel incontinence. Denies chest pain, palpitations, shortness of breath or dyspnea.  Denies nausea, vomiting, fever, chills, cough, abdominal pain, headache, dizziness, lightheadedness neck or back pain.       Syncope  - Pt with syncope --> Likely due to Mod-Severe AS versus infectious etiology, however rule out other etiologies   - 8/2023 TTE w/ Preserved EF, No WMA, Grade II Diastolic dysfunction, Mod-Severe AS  - CT Head / C-Spine / Maxillofacial and 3D as noted   - Check Orthostatics, will start gentle IVF for hydration X 24 hours (Patient with Mod-Severe Aortic Stenosis)  - Check Carotid Duplex  - Check EEG (Pt w/ history of Seizures 10 years ago, not on AED)  - Trop X 2 negative, trend in AM   - Monitor on telemetry   - Neuro checks per protocol   - Fall precautions   - Neuro and Cardio evaluations consulted     COVID +  - Start RDV X 5 days; Monitor Cr and LFTs while on RDV   - Check ESR, CRP, Ferritin, D-dimer in AM  - Check CT Chest to R/O PNA  - Check Procal in AM    - AC on hold in view of R frontal hematoma   - Incentive spirometer   - Hold off on additional steroids (Pt on steroids at home) as patient is not hypoxic, on RA   - Monitor O2 saturation; Supplement PRN to maintain > 90%    Leukocytosis   - Pt recently completed prolonged course of ABX on 12/16  - F/u repeat UCx --> in lab   - Check BCx2 in AM   - Trend CBC, temp curve, VS     Mod-Severe AS  - Seen on 8/2023 TTE  - Monitor volume status   - Cardio eval consulted; F/u recs     Knee Pain   - Pt with chronic knee pain, Osteoporosis   - B/L Knees with Ecchymosis --> B/L Knee X-ray ordered, F/u results     Breast CA, Lymphoma   - Follows with [facility] and Uro-Gyn   - C/w Anastrazole  - Heme/Onc eval consulted; F/u recs      H/O DVT PE  - Last dose of Eliquis on 12/19 PM  - Hold in view of scalp hematoma  - Resume once cleared from Neuro     H/O CVA, CNS Vasculitis   - C/w Prednisone  - Neuro eval consulted; F/u recs     HLD  - Check lipid panel  - Statin therapy     GERD  -     PPX  - Eliquis on hold in view of hematoma      Discussed in detail with patient and her  at the bedside.   Discussed with Attending and ACP.  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 Patient reports that she awoke in yas usual state of health this morning, was supposed to go to outpatient laboratory to obtain lab work. Patient states that she told her  to bring the car to her by the elevator and does not recall further. States that she told her  she "feels unwell" and feels "off." Per  at the bedside, he found patient face down on the floor.  reports that he stepped away from patient for under 2 minutes to bring the car over to her and awoke patient. Patient reports pain to her B/L knees. Patient reports that she has been ambulating since and was advised to come to [facility] by her [facility] team. Patient denies any bladder or bowel incontinence. Denies chest pain, palpitations, shortness of breath or dyspnea.  Denies nausea, vomiting, fever, chills, cough, abdominal pain, headache, dizziness, lightheadedness neck or back pain.       Syncope  - Pt with syncope --> Likely due to Mod-Severe AS versus infectious etiology, however rule out other etiologies   - 8/2023 TTE w/ Preserved EF, No WMA, Grade II Diastolic dysfunction, Mod-Severe AS  - CT Head / C-Spine / Maxillofacial and 3D as noted   - Check Orthostatics, will start gentle IVF for hydration X 24 hours (Patient with Mod-Severe Aortic Stenosis)  - Check Carotid Duplex  - Check EEG (Pt w/ history of Seizures 10 years ago, not on AED)  - Trop X 2 negative, trend in AM   - Monitor on telemetry   - Neuro checks per protocol   - Fall precautions   - Neuro and Cardio evaluations consulted     COVID +  - Start RDV X 5 days; Monitor Cr and LFTs while on RDV   - Check ESR, CRP, Ferritin, D-dimer in AM  - Check CT Chest to R/O PNA  - Check Procal in AM    - AC on hold in view of R frontal hematoma   - Incentive spirometer   - Hold off on additional steroids (Pt on steroids at home) as patient is not hypoxic, on RA   - Monitor O2 saturation; Supplement PRN to maintain > 90%    Leukocytosis   - Pt recently completed prolonged course of ABX on 12/16  - F/u repeat UCx --> in lab   - Check BCx2 in AM   - Trend CBC, temp curve, VS     Mod-Severe AS  - Seen on 8/2023 TTE  - Monitor volume status   - Cardio eval consulted; F/u recs     Knee Pain   - Pt with chronic knee pain, Osteoporosis   - B/L Knees with Ecchymosis --> B/L Knee X-ray ordered, F/u results     Breast CA, Lymphoma   - Follows with [facility] and Uro-Gyn   - C/w Anastrazole  - Heme/Onc eval consulted; F/u recs      H/O DVT PE  - Last dose of Eliquis on 12/19 PM  - Hold in view of scalp hematoma  - Resume once cleared from Neuro     H/O CVA, CNS Vasculitis   - C/w Prednisone  - Neuro eval consulted; F/u recs     HLD  - Check lipid panel  - Statin therapy     GERD  - C/w Anti-Acid therapeutic interchange while admitted     PPX  - Eliquis on hold in view of hematoma      Discussed in detail with patient and her  at the bedside.   Discussed with Attending and ACP.  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 Patient reports that she awoke in yas usual state of health this morning, was supposed to go to outpatient laboratory to obtain lab work. Patient states that she told her  to bring the car to her by the elevator and does not recall further. States that she told her  she "feels unwell" and feels "off." Per  at the bedside, he found patient face down on the floor.  reports that he stepped away from patient for under 2 minutes to bring the car over to her and awoke patient. Patient reports pain to her B/L knees. Patient reports that she has been ambulating since and was advised to come to [facility] by her [facility] team. Patient denies any bladder or bowel incontinence. Denies chest pain, palpitations, shortness of breath or dyspnea.  Denies nausea, vomiting, fever, chills, cough, abdominal pain, headache, dizziness, lightheadedness neck or back pain.       Syncope  - Pt with syncope --> Likely due to Mod-Severe AS versus infectious etiology, however rule out other etiologies   - 8/2023 TTE w/ Preserved EF, No WMA, Grade II Diastolic dysfunction, Mod-Severe AS  - CT Head / C-Spine / Maxillofacial and 3D as noted   - Check Orthostatics, will start gentle IVF for hydration X 24 hours (Patient with Mod-Severe Aortic Stenosis)  - Check Carotid Duplex  - Check EEG (Pt w/ history of Seizures 10 years ago, not on AED)  - Trop X 2 negative, trend in AM   - Monitor on telemetry   - Neuro checks per protocol   - Fall precautions   - Neuro and Cardio evaluations consulted     COVID +  - Start RDV X 5 days; Monitor Cr and LFTs while on RDV   - Check ESR, CRP, Ferritin, D-dimer in AM  - Check CT Chest to R/O PNA  - Check Procal in AM    - AC on hold in view of R frontal hematoma   - Incentive spirometer   - Hold off on additional steroids (Pt on steroids at home) as patient is not hypoxic, on RA   - Monitor O2 saturation; Supplement PRN to maintain > 90%    Leukocytosis   - Pt recently completed prolonged course of ABX on 12/16  - F/u repeat UCx --> in lab   - Check BCx2 in AM   - Trend CBC, temp curve, VS     Mod-Severe AS  - Seen on 8/2023 TTE  - Monitor volume status   - Cardio eval consulted; F/u recs     Knee Pain   - Pt with chronic knee pain, Osteoporosis   - B/L Knees with Ecchymosis --> B/L Knee X-ray ordered, F/u results     Breast CA, Lymphoma   - [place]s with [facility] [place]   - C/w Anastrazole  - Heme/Onc eval consulted; F/u recs      H/O DVT PE  - Last dose of Eliquis on 12/19 PM  - Hold in view of scalp hematoma  - Resume once cleared from Neuro     H/O CVA, CNS Vasculitis   - C/w Prednisone  - Neuro eval consulted; F/u recs     HLD  - Check lipid panel  - Statin therapy     GERD  - C/w Anti-Acid therapeutic interchange while admitted     PPX  - Eliquis on hold in view of hematoma      Discussed in detail with patient and her  at the bedside.   Discussed with Attending and ACP.

## 2023-12-20 NOTE — ED ADULT TRIAGE NOTE - BP NONINVASIVE DIASTOLIC (MM HG)
"Referring Physician: Maricel Moise     CC:   Chief Complaint   Patient presents with     Consult     New patient here for 'balding patch on scalp'       HPI:   We had the pleasure of seeing Osiel in our Pediatric Dermatology clinic today, in consultation from Maricel Moise for evaluation of 1.5 year history of hair loss. He is accompanied by his mother today.   Mother states that Osiel first developed a small area of alopecia on his L occipital scalp about 1 to 1.5 years ago. First started as a small quarter-sized area that over the course of 6-12 months expanded in size, but has since been stable over the last 6 months. Osiel reports mild associated pruritus in that area but otherwise no pain, burning, or other dysesthesias. No associated eyebrow or eyelash loss. No nail changes. Mother reports possible decrease in hair density on his legs. In January 2018, saw their pediatrician who was concerned about possible tinea capitis and he was treated with a short course of oral griseofulvin which was not helpful. Health otherwise stable. No other skin concerns.     Past Medical/Surgical History: Recurrent nose bleeds  Family History: Male pattern hair loss in paternal grandfather  Social History: Lives with 2 parents and 1 sister, 1 brother. In .   Medications:   No current outpatient prescriptions on file.      Allergies: No Known Allergies   ROS: a 10 point review of systems including constitutional, HEENT, CV, GI, musculoskeletal, Neurologic, Endocrine, Respiratory, Hematologic and Allergic/Immunologic was performed and was negative:  Physical examination: /70 (BP Location: Left arm, Patient Position: Sitting, Cuff Size: Child)  Pulse 84  Ht 3' 8.57\" (113.2 cm)  Wt 42 lb 8.8 oz (19.3 kg)  BMI 15.06 kg/m2   General: Well-developed, well-nourished in no apparent distress.  Eyelids and conjunctivae normal.  Neck was supple, with thyroid not palpable. Patient was breathing " comfortably on room air. Extremities were warm and well-perfused without edema. There was no clubbing or cyanosis, nails normal.  No abdominal organomegaly. No lymphadenopathy.  Normal mood and affect.    Skin: A complete skin examination and palpation of skin and subcutaneous tissues of the scalp, eyebrows, face, chest, back, abdomen, groin and upper and lower extremities was performed and was normal except as noted below:  - Huynh Type III  - On the L occipital scalp, there is a ~ 4 x 3 cm oval-shaped area of alopecia with centrally fine vellus, intermediate and terminal pigmented hairs. Negative hair pull test.   - Eyebrows, eyelashes, and nail wnl.   - No other lesions of concern.           In office labs or procedures performed today:   None  Assessment and Plan  1. Alopecia areata. Etiology discussed as a form of non-scarring alopecia due to abnormaly lymphocytic inflammation surrounding hair follicles on the scalp. Reassuring that lesion has been stable in size, he has a negative hair pull test today, and there are centrally some hair regrowth in that area. We discussed various treatment options including topical or intralesional steroids, though mother opted to trial of topical steroids. Will have him follow-up in 3 months to assess response.   Plan:  1. Start mometasone 0.1% cream QHS.   Follow-up in 3 months.   Thank you for allowing us to participate in Osiel's care.    Howard Decker MD   PGY-3 Dermatology Resident  Pager [phone]    I have personally examined this patient and agree with the resident's documentation and plan of care.  I have reviewed and amended the resident's note above.  The documentation accurately reflects my clinical observations, diagnoses, treatment and follow-up plans.     Harshad Santamaria MD  , Pediatric Dermatology     [phone]

## 2023-12-20 NOTE — H&P ADULT - NSHPREVIEWOFSYSTEMS_GEN_ALL_CORE
REVIEW OF SYSTEMS:    CONSTITUTIONAL: Generalized weakness; S/P Syncope; Feels unwell   EYES/ENT: No visual changes;  No vertigo or throat pain   NECK: Denies any acute pain or stiffness  RESPIRATORY: No cough, wheezing, hemoptysis; No shortness of breath  CARDIOVASCULAR: No chest pain or palpitations  GASTROINTESTINAL: No abdominal or epigastric pain. No nausea, vomiting, or hematemesis; No diarrhea or constipation. No melena or hematochezia.  GENITOURINARY: No dysuria, frequency or hematuria  NEUROLOGICAL: No numbness or weakness  SKIN: + Facial ecchymosis; + Raccoon eyes R> L; Facial ecchymosis; + Contusion; + Edema; B/L Knee ecchymosis   All other review of systems is negative unless indicated above.

## 2023-12-20 NOTE — ED PROVIDER NOTE - NS ED ATTENDING STATEMENT MOD
This was a shared visit with the SIX[facility]. I reviewed and verified the documentation and independently performed the documented:

## 2023-12-20 NOTE — ED ADULT NURSE NOTE - NSFALLHARMRISKINTERV_ED_ALL_ED
Assistance OOB with selected safe patient handling equipment if applicable/Communicate risk of Fall with Harm to all staff, patient, and family/Orthostatic vital signs/Provide visual cue: red socks, yellow wristband, yellow gown, etc/Reinforce activity limits and safety measures with patient and family/Bed in lowest position, wheels locked, appropriate side rails in place/Call bell, personal items and telephone in reach/Instruct patient to call for assistance before getting out of bed/chair/stretcher/Non-slip footwear applied when patient is off stretcher/[place] to call system/Physically safe environment - no spills, clutter or unnecessary equipment/Purposeful Proactive Rounding/Room/bathroom lighting operational, light cord in reach Assistance OOB with selected safe patient handling equipment if applicable/Communicate risk of Fall with Harm to all staff, patient, and family/Orthostatic vital signs/Provide visual cue: red socks, yellow wristband, yellow gown, etc/Reinforce activity limits and safety measures with patient and family/Bed in lowest position, wheels locked, appropriate side rails in place/Call bell, personal items and telephone in reach/Instruct patient to call for assistance before getting out of bed/chair/stretcher/Non-slip footwear applied when patient is off stretcher/[place] to call system/Physically safe environment - no spills, clutter or unnecessary equipment/Purposeful Proactive Rounding/Room/bathroom lighting operational, light cord in reach

## 2023-12-20 NOTE — ED PROVIDER NOTE - OBJECTIVE STATEMENT
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 Pt woke up in [facility] today, got ready to go to a routine PMD appt and reports when she was walking to the car she started to "not feel well" although can not describe any specific complaints, states "I can't explain it." Called for her  to grab a chair but had syncopal episode before he was able to grab one. Fell face forward. Was able to get herself up and has been ambulating since. Denies nausea, vomiting, fever, chills, chest pain, SOB, cough, abd pain, headache, dizziness, neck or back pain, bladder/bowel dsfxn, extremity weakness, paresthesias.   11/18-11/21

## 2023-12-20 NOTE — ED ADULT NURSE NOTE - OBJECTIVE STATEMENT
77 yo presents to the ED from home. A&Ox4, ambulatory with  at bedside s/p syncope this morning. Pt woke up at home today, got ready to go to a routine PMD appt and reports when she was walking to the car she started to "not feel well" although can not describe any specific complaints, states "I can't explain it." Called for her  to grab a chair but had syncopal episode before he was able to grab one. Fell face forward. Was able to get herself up and has been ambulating since. reports yesterday she had a cough, felt like she had a sinus infection this AM. Denies nausea, vomiting, fever, chills, chest pain, SOB, cough, abd pain, headache, dizziness, neck or back pain, bladder/bowel dysfunction, extremity weakness, paresthesias. EKG done in triage. pt placed on CM. 20G inserted R wrist. neuro intact. lip lac, R eye bruising, superficial abrasion to nose. teeth intact. 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 79 yo presents to the ED from home. A&Ox4, ambulatory with  at bedside s/p syncope this morning. Pt woke up at home today, got ready to go to a routine PMD appt and reports when she was walking to the car she started to "not feel well" although can not describe any specific complaints, states "I can't explain it." Called for her  to grab a chair but had syncopal episode before he was able to grab one. Fell face forward. Was able to get herself up and has been ambulating since. reports yesterday she had a cough, felt like she had a sinus infection this AM. Denies nausea, vomiting, fever, chills, chest pain, SOB, cough, abd pain, headache, dizziness, neck or back pain, bladder/bowel dysfunction, extremity weakness, paresthesias. EKG done in triage. pt placed on CM. 20G inserted R wrist. neuro intact. lip lac, R eye bruising, superficial abrasion to nose. teeth intact. 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

## 2023-12-20 NOTE — ED PROVIDER NOTE - CARE PLAN
Principal Discharge DX:	Syncope   1 Principal Discharge DX:	Syncope  Secondary Diagnosis:	Facial contusion, initial encounter  Secondary Diagnosis:	Laceration of lower lip

## 2023-12-20 NOTE — ED PROVIDER NOTE - ATTENDING APP SHARED VISIT CONTRIBUTION OF CARE
Attending MD Manzo:   I personally have seen and examined this patient. I have performed a substantive portion of the visit including all aspects of the medical decision making.   Physician assistant note reviewed and agree on plan of care and except where noted.        78-year-old woman with a history of breast cancer, DVT PE on Eliquis presenting after syncopal episode.  Patient states she felt "not well" could not really elaborate further as to what her symptoms were, shortly thereafter she woke up on the ground.  She has evidence of facial trauma, denies any preceding chest pain shortness of breath or palpitations prior to event.  Patient reports that she has had a similar episode to this years ago and it was attributed to dehydration.    Patient's vital signs are nonactionable.  She is awake and alert in no apparent distress.  GCS 15.  Patient has right frontal forehead abrasion with associated contusion, nasal bridge swelling, no septal hematoma right intraoral mucosal laceration, no dental tenderness to palpation, laceration to the right lower lip below the vermilion.  Nontender cervical spine nontender chest wall heart sounds regular with systolic murmur throughout precordium.  Abdomen nontender.  Moves all extremity spontaneously.    Patient presenting for evaluation of syncopal episode, somewhat of a prodrome but not clearly delineated, ECG reassuring however systolic murmur on exam.  Plan to maintain on telemetry obtain CT head CT max face CT cervical spine, given age and presence of murmur, patient would likely benefit from extended telemetry and echocardiogram to rule out structural heart disease or valvular abnormality.        *The above represents an initial assessment/impression. Please refer to progress notes for potential changes in patient clinical course*

## 2023-12-20 NOTE — ED PROVIDER NOTE - PHYSICAL EXAMINATION
CONSTITUTIONAL: Well appearing and in no apparent distress.  ENT: Airway patent, moist mucous membranes.   EYES: Pupils equal, round and reactive to light. EOMI. Conjunctiva normal appearing. Periorbital bruising to right eye. Superficial abrasion to nasal bridge. ?Through and through laceration under right lower lip. No obv FB. Teeth intact. No tongue or lip lacerations. No facial TTP.  NECK: No midline cspine TTP. FROM of neck.  CARDIAC: Normal rate, regular rhythm.  Heart sounds S1, S2.    RESPIRATORY: Breath sounds clear and equal bilaterally.   GASTROINTESTINAL: Abdomen soft, non-tender, not distended.  MUSCULOSKELETAL: Spine appears normal. No midline TTP.   NEUROLOGICAL: Alert and oriented x3, no focal deficits, no motor or sensory deficits. 5/5 muscle strength throughout. Non focal neuro exam.   SKIN: Skin normal color, warm, dry and intact.   PSYCHIATRIC: Normal mood and affect. CONSTITUTIONAL: Well appearing and in no apparent distress.  ENT: Airway patent, moist mucous membranes.   EYES: Pupils equal, round and reactive to light. EOMI. Conjunctiva normal appearing. Periorbital bruising to right eye. Superficial abrasion to nasal bridge. Small superficial inner lip laceration to right lower lip. No obv FB. Small superficial skin laceration below right lower lip. No active bleeding. Teeth intact. No tongue or lip lacerations. No facial TTP.  NECK: No midline cspine TTP. FROM of neck.  CARDIAC: Normal rate, regular rhythm.  Heart sounds S1, S2.    RESPIRATORY: Breath sounds clear and equal bilaterally.   GASTROINTESTINAL: Abdomen soft, non-tender, not distended.  MUSCULOSKELETAL: Spine appears normal. No midline TTP.   NEUROLOGICAL: Alert and oriented x3, no focal deficits, no motor or sensory deficits. 5/5 muscle strength throughout. Non focal neuro exam.   SKIN: Skin normal color, warm, dry and intact.   PSYCHIATRIC: Normal mood and affect.

## 2023-12-21 LAB
A1C WITH ESTIMATED AVERAGE GLUCOSE RESULT: 6 % — HIGH (ref 4–5.6)
A1C WITH ESTIMATED AVERAGE GLUCOSE RESULT: 6 % — HIGH (ref 4–5.6)
ALBUMIN SERPL ELPH-MCNC: 3.6 G/DL — SIGNIFICANT CHANGE UP (ref 3.3–5)
ALBUMIN SERPL ELPH-MCNC: 3.6 G/DL — SIGNIFICANT CHANGE UP (ref 3.3–5)
ALBUMIN SERPL ELPH-MCNC: 3.7 G/DL — SIGNIFICANT CHANGE UP (ref 3.3–5)
ALBUMIN SERPL ELPH-MCNC: 3.7 G/DL — SIGNIFICANT CHANGE UP (ref 3.3–5)
ALBUMIN SERPL ELPH-MCNC: 3.8 G/DL — SIGNIFICANT CHANGE UP (ref 3.3–5)
ALBUMIN SERPL ELPH-MCNC: 3.8 G/DL — SIGNIFICANT CHANGE UP (ref 3.3–5)
ALP SERPL-CCNC: 50 U/L — SIGNIFICANT CHANGE UP (ref 40–120)
ALP SERPL-CCNC: 52 U/L — SIGNIFICANT CHANGE UP (ref 40–120)
ALP SERPL-CCNC: 52 U/L — SIGNIFICANT CHANGE UP (ref 40–120)
ALT FLD-CCNC: 35 U/L — SIGNIFICANT CHANGE UP (ref 10–45)
ALT FLD-CCNC: 36 U/L — SIGNIFICANT CHANGE UP (ref 10–45)
ALT FLD-CCNC: 36 U/L — SIGNIFICANT CHANGE UP (ref 10–45)
ANION GAP SERPL CALC-SCNC: 13 MMOL/L — SIGNIFICANT CHANGE UP (ref 5–17)
ANION GAP SERPL CALC-SCNC: 13 MMOL/L — SIGNIFICANT CHANGE UP (ref 5–17)
AST SERPL-CCNC: 67 U/L — HIGH (ref 10–40)
AST SERPL-CCNC: 67 U/L — HIGH (ref 10–40)
AST SERPL-CCNC: 68 U/L — HIGH (ref 10–40)
AST SERPL-CCNC: 68 U/L — HIGH (ref 10–40)
AST SERPL-CCNC: 69 U/L — HIGH (ref 10–40)
AST SERPL-CCNC: 69 U/L — HIGH (ref 10–40)
BILIRUB DIRECT SERPL-MCNC: 0.1 MG/DL — SIGNIFICANT CHANGE UP (ref 0–0.3)
BILIRUB INDIRECT FLD-MCNC: 0.3 MG/DL — SIGNIFICANT CHANGE UP (ref 0.2–1)
BILIRUB INDIRECT FLD-MCNC: 0.3 MG/DL — SIGNIFICANT CHANGE UP (ref 0.2–1)
BILIRUB INDIRECT FLD-MCNC: 0.4 MG/DL — SIGNIFICANT CHANGE UP (ref 0.2–1)
BILIRUB INDIRECT FLD-MCNC: 0.4 MG/DL — SIGNIFICANT CHANGE UP (ref 0.2–1)
BILIRUB SERPL-MCNC: 0.4 MG/DL — SIGNIFICANT CHANGE UP (ref 0.2–1.2)
BILIRUB SERPL-MCNC: 0.4 MG/DL — SIGNIFICANT CHANGE UP (ref 0.2–1.2)
BILIRUB SERPL-MCNC: 0.5 MG/DL — SIGNIFICANT CHANGE UP (ref 0.2–1.2)
BUN SERPL-MCNC: 11 MG/DL — SIGNIFICANT CHANGE UP (ref 7–23)
BUN SERPL-MCNC: 11 MG/DL — SIGNIFICANT CHANGE UP (ref 7–23)
CALCIUM SERPL-MCNC: 8.6 MG/DL — SIGNIFICANT CHANGE UP (ref 8.4–10.5)
CALCIUM SERPL-MCNC: 8.6 MG/DL — SIGNIFICANT CHANGE UP (ref 8.4–10.5)
CHLORIDE SERPL-SCNC: 104 MMOL/L — SIGNIFICANT CHANGE UP (ref 96–108)
CHLORIDE SERPL-SCNC: 104 MMOL/L — SIGNIFICANT CHANGE UP (ref 96–108)
CHOLEST SERPL-MCNC: 146 MG/DL — SIGNIFICANT CHANGE UP
CHOLEST SERPL-MCNC: 146 MG/DL — SIGNIFICANT CHANGE UP
CO2 SERPL-SCNC: 21 MMOL/L — LOW (ref 22–31)
CO2 SERPL-SCNC: 21 MMOL/L — LOW (ref 22–31)
CREAT SERPL-MCNC: 0.63 MG/DL — SIGNIFICANT CHANGE UP (ref 0.5–1.3)
CREAT SERPL-MCNC: 0.69 MG/DL — SIGNIFICANT CHANGE UP (ref 0.5–1.3)
CREAT SERPL-MCNC: 0.69 MG/DL — SIGNIFICANT CHANGE UP (ref 0.5–1.3)
CRP SERPL-MCNC: 49 MG/L — HIGH (ref 0–4)
CRP SERPL-MCNC: 49 MG/L — HIGH (ref 0–4)
CULTURE RESULTS: SIGNIFICANT CHANGE UP
CULTURE RESULTS: SIGNIFICANT CHANGE UP
D DIMER BLD IA.RAPID-MCNC: 254 NG/ML DDU — HIGH
D DIMER BLD IA.RAPID-MCNC: 254 NG/ML DDU — HIGH
EGFR: 89 ML/MIN/1.73M2 — SIGNIFICANT CHANGE UP
EGFR: 89 ML/MIN/1.73M2 — SIGNIFICANT CHANGE UP
EGFR: 91 ML/MIN/1.73M2 — SIGNIFICANT CHANGE UP
ERYTHROCYTE [SEDIMENTATION RATE] IN BLOOD: 40 MM/HR — HIGH (ref 0–20)
ERYTHROCYTE [SEDIMENTATION RATE] IN BLOOD: 40 MM/HR — HIGH (ref 0–20)
ESTIMATED AVERAGE GLUCOSE: 126 MG/DL — HIGH (ref 68–114)
ESTIMATED AVERAGE GLUCOSE: 126 MG/DL — HIGH (ref 68–114)
FERRITIN SERPL-MCNC: 62 NG/ML — SIGNIFICANT CHANGE UP (ref 13–330)
FERRITIN SERPL-MCNC: 62 NG/ML — SIGNIFICANT CHANGE UP (ref 13–330)
GLUCOSE SERPL-MCNC: 102 MG/DL — HIGH (ref 70–99)
GLUCOSE SERPL-MCNC: 102 MG/DL — HIGH (ref 70–99)
HCT VFR BLD CALC: 35.7 % — SIGNIFICANT CHANGE UP (ref 34.5–45)
HCT VFR BLD CALC: 35.7 % — SIGNIFICANT CHANGE UP (ref 34.5–45)
HDLC SERPL-MCNC: 53 MG/DL — SIGNIFICANT CHANGE UP
HDLC SERPL-MCNC: 53 MG/DL — SIGNIFICANT CHANGE UP
HGB BLD-MCNC: 11.6 G/DL — SIGNIFICANT CHANGE UP (ref 11.5–15.5)
HGB BLD-MCNC: 11.6 G/DL — SIGNIFICANT CHANGE UP (ref 11.5–15.5)
LIPID PNL WITH DIRECT LDL SERPL: 78 MG/DL — SIGNIFICANT CHANGE UP
LIPID PNL WITH DIRECT LDL SERPL: 78 MG/DL — SIGNIFICANT CHANGE UP
MAGNESIUM SERPL-MCNC: 1.9 MG/DL — SIGNIFICANT CHANGE UP (ref 1.6–2.6)
MAGNESIUM SERPL-MCNC: 1.9 MG/DL — SIGNIFICANT CHANGE UP (ref 1.6–2.6)
MCHC RBC-ENTMCNC: 28.6 PG — SIGNIFICANT CHANGE UP (ref 27–34)
MCHC RBC-ENTMCNC: 28.6 PG — SIGNIFICANT CHANGE UP (ref 27–34)
MCHC RBC-ENTMCNC: 32.5 GM/DL — SIGNIFICANT CHANGE UP (ref 32–36)
MCHC RBC-ENTMCNC: 32.5 GM/DL — SIGNIFICANT CHANGE UP (ref 32–36)
MCV RBC AUTO: 88.1 FL — SIGNIFICANT CHANGE UP (ref 80–100)
MCV RBC AUTO: 88.1 FL — SIGNIFICANT CHANGE UP (ref 80–100)
MRSA PCR RESULT.: SIGNIFICANT CHANGE UP
MRSA PCR RESULT.: SIGNIFICANT CHANGE UP
NON HDL CHOLESTEROL: 93 MG/DL — SIGNIFICANT CHANGE UP
NON HDL CHOLESTEROL: 93 MG/DL — SIGNIFICANT CHANGE UP
NRBC # BLD: 0 /100 WBCS — SIGNIFICANT CHANGE UP (ref 0–0)
NRBC # BLD: 0 /100 WBCS — SIGNIFICANT CHANGE UP (ref 0–0)
PHOSPHATE SERPL-MCNC: 2.8 MG/DL — SIGNIFICANT CHANGE UP (ref 2.5–4.5)
PHOSPHATE SERPL-MCNC: 2.8 MG/DL — SIGNIFICANT CHANGE UP (ref 2.5–4.5)
PLATELET # BLD AUTO: 178 K/UL — SIGNIFICANT CHANGE UP (ref 150–400)
PLATELET # BLD AUTO: 178 K/UL — SIGNIFICANT CHANGE UP (ref 150–400)
POTASSIUM SERPL-MCNC: 3.8 MMOL/L — SIGNIFICANT CHANGE UP (ref 3.5–5.3)
POTASSIUM SERPL-MCNC: 3.8 MMOL/L — SIGNIFICANT CHANGE UP (ref 3.5–5.3)
POTASSIUM SERPL-SCNC: 3.8 MMOL/L — SIGNIFICANT CHANGE UP (ref 3.5–5.3)
POTASSIUM SERPL-SCNC: 3.8 MMOL/L — SIGNIFICANT CHANGE UP (ref 3.5–5.3)
PROCALCITONIN SERPL-MCNC: 0.1 NG/ML — SIGNIFICANT CHANGE UP (ref 0.02–0.1)
PROCALCITONIN SERPL-MCNC: 0.1 NG/ML — SIGNIFICANT CHANGE UP (ref 0.02–0.1)
PROT SERPL-MCNC: 6 G/DL — SIGNIFICANT CHANGE UP (ref 6–8.3)
RBC # BLD: 4.05 M/UL — SIGNIFICANT CHANGE UP (ref 3.8–5.2)
RBC # BLD: 4.05 M/UL — SIGNIFICANT CHANGE UP (ref 3.8–5.2)
RBC # FLD: 14.8 % — HIGH (ref 10.3–14.5)
RBC # FLD: 14.8 % — HIGH (ref 10.3–14.5)
S AUREUS DNA NOSE QL NAA+PROBE: SIGNIFICANT CHANGE UP
S AUREUS DNA NOSE QL NAA+PROBE: SIGNIFICANT CHANGE UP
SODIUM SERPL-SCNC: 138 MMOL/L — SIGNIFICANT CHANGE UP (ref 135–145)
SODIUM SERPL-SCNC: 138 MMOL/L — SIGNIFICANT CHANGE UP (ref 135–145)
SPECIMEN SOURCE: SIGNIFICANT CHANGE UP
SPECIMEN SOURCE: SIGNIFICANT CHANGE UP
TRIGL SERPL-MCNC: 81 MG/DL — SIGNIFICANT CHANGE UP
TRIGL SERPL-MCNC: 81 MG/DL — SIGNIFICANT CHANGE UP
TROPONIN T, HIGH SENSITIVITY RESULT: 17 NG/L — SIGNIFICANT CHANGE UP (ref 0–51)
TROPONIN T, HIGH SENSITIVITY RESULT: 17 NG/L — SIGNIFICANT CHANGE UP (ref 0–51)
TSH SERPL-MCNC: 1.19 UIU/ML — SIGNIFICANT CHANGE UP (ref 0.27–4.2)
TSH SERPL-MCNC: 1.19 UIU/ML — SIGNIFICANT CHANGE UP (ref 0.27–4.2)
WBC # BLD: 5.9 K/UL — SIGNIFICANT CHANGE UP (ref 3.8–10.5)
WBC # BLD: 5.9 K/UL — SIGNIFICANT CHANGE UP (ref 3.8–10.5)
WBC # FLD AUTO: 5.9 K/UL — SIGNIFICANT CHANGE UP (ref 3.8–10.5)
WBC # FLD AUTO: 5.9 K/UL — SIGNIFICANT CHANGE UP (ref 3.8–10.5)

## 2023-12-21 PROCEDURE — 99223 1ST HOSP IP/OBS HIGH 75: CPT

## 2023-12-21 PROCEDURE — 93880 EXTRACRANIAL BILAT STUDY: CPT | Mod: 26

## 2023-12-21 PROCEDURE — 73560 X-RAY EXAM OF KNEE 1 OR 2: CPT | Mod: 26,50

## 2023-12-21 RX ORDER — SOLIFENACIN SUCCINATE 10 MG/1
1 TABLET ORAL
Qty: 0 | Refills: 0 | DISCHARGE

## 2023-12-21 RX ORDER — APIXABAN 2.5 MG/1
1 TABLET, FILM COATED ORAL
Refills: 0 | DISCHARGE

## 2023-12-21 RX ORDER — SERTRALINE 25 MG/1
1 TABLET, FILM COATED ORAL
Qty: 0 | Refills: 0 | DISCHARGE

## 2023-12-21 RX ORDER — CHOLECALCIFEROL (VITAMIN D3) 125 MCG
1 CAPSULE ORAL
Qty: 0 | Refills: 0 | DISCHARGE

## 2023-12-21 RX ORDER — DEXLANSOPRAZOLE 30 MG/1
1 CAPSULE, DELAYED RELEASE ORAL
Qty: 0 | Refills: 0 | DISCHARGE

## 2023-12-21 RX ORDER — DENOSUMAB 60 MG/ML
60 INJECTION SUBCUTANEOUS
Refills: 0 | DISCHARGE

## 2023-12-21 RX ORDER — ANASTROZOLE 1 MG/1
1 TABLET ORAL
Refills: 0 | DISCHARGE

## 2023-12-21 RX ORDER — HEPARIN SODIUM 5000 [USP'U]/ML
5000 INJECTION INTRAVENOUS; SUBCUTANEOUS EVERY 8 HOURS
Refills: 0 | Status: DISCONTINUED | OUTPATIENT
Start: 2023-12-21 | End: 2023-12-22

## 2023-12-21 RX ADMIN — Medication 1 SPRAY(S): at 22:32

## 2023-12-21 RX ADMIN — Medication 650 MILLIGRAM(S): at 05:57

## 2023-12-21 RX ADMIN — Medication 650 MILLIGRAM(S): at 17:56

## 2023-12-21 RX ADMIN — ATORVASTATIN CALCIUM 20 MILLIGRAM(S): 80 TABLET, FILM COATED ORAL at 21:16

## 2023-12-21 RX ADMIN — SERTRALINE 50 MILLIGRAM(S): 25 TABLET, FILM COATED ORAL at 12:04

## 2023-12-21 RX ADMIN — Medication 100 MILLIGRAM(S): at 22:30

## 2023-12-21 RX ADMIN — CHLORHEXIDINE GLUCONATE 1 APPLICATION(S): 213 SOLUTION TOPICAL at 12:09

## 2023-12-21 RX ADMIN — Medication 650 MILLIGRAM(S): at 18:56

## 2023-12-21 RX ADMIN — Medication 5 MILLIGRAM(S): at 12:04

## 2023-12-21 RX ADMIN — ANASTROZOLE 1 MILLIGRAM(S): 1 TABLET ORAL at 12:05

## 2023-12-21 RX ADMIN — PANTOPRAZOLE SODIUM 40 MILLIGRAM(S): 20 TABLET, DELAYED RELEASE ORAL at 05:56

## 2023-12-21 NOTE — PROGRESS NOTE ADULT - ASSESSMENT
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 Patient reports that she awoke in yas usual state of health this morning, was supposed to go to outpatient laboratory to obtain lab work. Patient states that she told her  to bring the car to her by the elevator and does not recall further. States that she told her  she "feels unwell" and feels "off." Per  at the bedside, he found patient face down on the floor.  reports that he stepped away from patient for under 2 minutes to bring the car over to her and awoke patient. Patient reports pain to her B/L knees. Patient reports that she has been ambulating since and was advised to come to [facility] by her [facility] team. Patient denies any bladder or bowel incontinence. Denies chest pain, palpitations, shortness of breath or dyspnea.  Denies nausea, vomiting, fever, chills, cough, abdominal pain, headache, dizziness, lightheadedness neck or back pain.       Syncope  - Pt with syncope --> Likely due to Mod-Severe AS versus infectious etiology, however rule out other etiologies   - 8/2023 TTE w/ Preserved EF, No WMA, Grade II Diastolic dysfunction, Mod-Severe AS  - CT Head / C-Spine / Maxillofacial and 3D as noted   - Orthostatics negative; S/P will start gentle IVF, C/w PO hydration   - Check Carotid Duplex and TTE --> pending   - Check EEG (Pt w/ history of Seizures 10 years ago, not on AED)  - Check MR head, MR C-spine in view of stenosis, MR A H/N (Expedited with radiology)  - Trop X 3 negative, trend    - Monitor on telemetry   - Neuro checks per protocol   - Fall precautions   - Neuro, EP and Cardio evaluations appreciated; F/u recs --> outpatient EP/ Cardio follow up for event monitor.     COVID +  - Patient with cough, per pharmacy, patient does not meet requirement for RDV per policy at this time. Continue to monitor symptoms at present   - Procal neg, ESR, CRP, Ferritin, D-dimer noted   - CT Chest w/ DYLAN GG 4mm nodule, additional nodules, trace pericardial effusion, L breast lesion, Neg for PNA    - AC on hold in view of R frontal hematoma, resume once cleared from Neuro   - Incentive spirometer and Tessalon Perle PRN   - Hold off on additional steroids (Pt on steroids at home) as patient is not hypoxic, on RA   - Monitor O2 saturation; Supplement PRN to maintain > 90%    Leukocytosis   - Pt recently completed prolonged course of ABX on 12/16  - UCx --> negative,  BCx2 in lab   - Trend CBC, temp curve, VS     Mod-Severe AS, Trace pericardial effusion   - Seen on 8/2023 TTE  - Check repeat TTE   - Monitor volume status   - Cardio eval consulted; F/u recs     Knee Pain   - Pt with chronic knee pain, Osteoporosis   - B/L Knees with Ecchymosis --> X-rays neg for fracture     Breast CA, Lymphoma   - Follows with [facility] and Uro-Gyn   - CT w/ breast lesion, F/u with oncology for further management   - C/w Anastrazole  - Heme/Onc eval appreciated; F/u recs      H/O DVT PE  - Last dose of Eliquis on 12/19 PM  - Hold in view of scalp hematoma  - Resume once cleared from Neuro     H/O CVA, CNS Vasculitis   - C/w Prednisone  - Neuro eval appreciated; F/u recs     HLD  - Lipid panel  - Statin therapy     GERD  - C/w Anti-Acid therapeutic interchange while admitted     PPX  - Eliquis on hold in view of hematoma      Discussed in detail with patient.   Discussed with Attending, Neuro, Heme/Onc, Radiology and ACP.  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 Patient reports that she awoke in yas usual state of health this morning, was supposed to go to outpatient laboratory to obtain lab work. Patient states that she told her  to bring the car to her by the elevator and does not recall further. States that she told her  she "feels unwell" and feels "off." Per  at the bedside, he found patient face down on the floor.  reports that he stepped away from patient for under 2 minutes to bring the car over to her and awoke patient. Patient reports pain to her B/L knees. Patient reports that she has been ambulating since and was advised to come to [facility] by her [facility] team. Patient denies any bladder or bowel incontinence. Denies chest pain, palpitations, shortness of breath or dyspnea.  Denies nausea, vomiting, fever, chills, cough, abdominal pain, headache, dizziness, lightheadedness neck or back pain.       Syncope  - Pt with syncope --> Likely due to Mod-Severe AS versus infectious etiology, however rule out other etiologies   - 8/2023 TTE w/ Preserved EF, No WMA, Grade II Diastolic dysfunction, Mod-Severe AS  - CT Head / C-Spine / Maxillofacial and 3D as noted   - Orthostatics negative; S/P will start gentle IVF, C/w PO hydration   - Check Carotid Duplex and TTE --> pending   - Check EEG (Pt w/ history of Seizures 10 years ago, not on AED)  - Check MR head, MR C-spine in view of stenosis, MR A H/N (Expedited with radiology)  - Trop X 3 negative, trend    - Monitor on telemetry   - Neuro checks per protocol   - Fall precautions   - Neuro, EP and Cardio evaluations appreciated; F/u recs --> outpatient EP/ Cardio follow up for event monitor.     COVID +  - Patient with cough, per pharmacy, patient does not meet requirement for RDV per policy at this time. Continue to monitor symptoms at present   - Procal neg, ESR, CRP, Ferritin, D-dimer noted   - CT Chest w/ DYLAN GG 4mm nodule, additional nodules, trace pericardial effusion, L breast lesion, Neg for PNA    - AC on hold in view of R frontal hematoma, resume once cleared from Neuro   - Incentive spirometer and Tessalon Perle PRN   - Hold off on additional steroids (Pt on steroids at home) as patient is not hypoxic, on RA   - Monitor O2 saturation; Supplement PRN to maintain > 90%    Leukocytosis   - Pt recently completed prolonged course of ABX on 12/16  - UCx --> negative,  BCx2 in lab   - Trend CBC, temp curve, VS     Mod-Severe AS, Trace pericardial effusion   - Seen on 8/2023 TTE  - Check repeat TTE   - Monitor volume status   - Cardio eval consulted; F/u recs     Knee Pain   - Pt with chronic knee pain, Osteoporosis   - B/L Knees with Ecchymosis --> X-rays neg for fracture     Breast CA, Lymphoma   - Follows with [facility] and Uro-Gyn   - CT w/ breast lesion, F/u with oncology for further management   - C/w Anastrazole  - Heme/Onc eval appreciated; F/u recs      H/O DVT PE  - Last dose of Eliquis on 12/19 PM  - Hold in view of scalp hematoma  - Resume once cleared from Neuro     H/O CVA, CNS Vasculitis   - C/w Prednisone  - Neuro eval appreciated; F/u recs     HLD  - Lipid panel  - Statin therapy     GERD  - C/w Anti-Acid therapeutic interchange while admitted     PPX  - Eliquis on hold in view of hematoma      Discussed in detail with patient.   Discussed with Attending, Neuro, Heme/Onc, Radiology and ACP.

## 2023-12-21 NOTE — CONSULT NOTE ADULT - SUBJECTIVE AND OBJECTIVE BOX
Vascular Cardiology Consult Note    DIRECT SERVICE NUMBER:  [phone]                 CC:      HPI:    Susannah is known to me for many years from the office.  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  History of breast CA.  CNS vasculistis.  Admitted with syncope and fall with facial injury.  Of note, she has mod/severe AS on prior echo.  She has been dealing with urologic infection which required antibiotics.  Was noted to have covid and is in isolation room currently         Allergies    No Known Allergies    Intolerances    	    MEDICATIONS:        acetaminophen     Tablet .. 650 milliGRAM(s) Oral every 6 hours PRN  melatonin 3 milliGRAM(s) Oral at bedtime PRN  sertraline 50 milliGRAM(s) Oral daily    pantoprazole    Tablet 40 milliGRAM(s) Oral before breakfast    atorvastatin 20 milliGRAM(s) Oral at bedtime  predniSONE   Tablet 5 milliGRAM(s) Oral every other day    anastrozole 1 milliGRAM(s) Oral daily  chlorhexidine 2% Cloths 1 Application(s) Topical daily  fluticasone propionate 50 MICROgram(s)/spray Nasal Spray 1 Spray(s) Both Nostrils two times a day PRN  sodium chloride 0.9%. 1000 milliLiter(s) IV Continuous <Continuous>      PAST MEDICAL & SURGICAL HISTORY:  CVA (cerebral vascular accident)      Lymphoma      Osteoporosis      Seizure      Fracture  lumbar spine,  healed with conservative treatment      Neuropathy  bilateral feet      HLD (hyperlipidemia)      Depression      GERD (gastroesophageal reflux disease)      Hodgkins disease      Pulmonary embolus      Fracture, ankle  Right, s/p metal plate      H/O sinus surgery      Cataract extraction status of eye, right          FAMILY HISTORY:  Family history of coronary artery disease in father (Father, Mother)  both parents s/p CABG        SOCIAL HISTORY:  unchanged    REVIEW OF SYSTEMS:    CONSTITUTIONAL: No fever, weight loss, or fatigue  EYES: No eye pain, visual disturbances, or discharge  ENT:  No difficulty hearing, tinnitus, vertigo; No sinus or throat pain  NECK: No pain or stiffness  RESPIRATORY:  No shortness of breath   CARDIOVASCULAR:  No Chest pain   GASTROINTESTINAL: No abdominal or epigastric pain. No nausea, vomiting, or hematemesis; No diarrhea or constipation. No melena or hematochezia.  GENITOURINARY: No dysuria, frequency, hematuria, or incontinence  NEUROLOGICAL: No headaches, memory loss, loss of strength, numbness, or tremors  LYMPH Nodes: No enlarged glands  ENDOCRINE: No heat or cold intolerance; No hair loss  MUSCULOSKELETAL: No joint pain or swelling; No muscle, back, or extremity pain  PSYCHIATRIC: No depression, anxiety, mood swings, or difficulty sleeping  HEME/LYMPH: No easy bruising, or bleeding gums  ALLERGY AND IMMUNOLOGIC: No hives or eczema	    [ x] All others negative	  [ ] Unable to obtain    PHYSICAL EXAM:  T(C): 36.6 (12-20-23 @ 21:15), Max: 37.6 (12-20-23 @ 16:39)  HR: 60 (12-20-23 @ 21:15) (60 - 75)  BP: 107/63 (12-20-23 @ 21:15) (107/63 - 135/59)  RR: 18 (12-20-23 @ 21:15) (18 - 18)  SpO2: 95% (12-20-23 @ 21:15) (95% - 97%)  Wt(kg): --  I&O's Summary    20 Dec 2023 07:01  -  21 Dec 2023 07:00  --------------------------------------------------------  IN: 80 mL / OUT: 0 mL / NET: 80 mL    21 Dec 2023 07:01  -  21 Dec 2023 12:03  --------------------------------------------------------  IN: 0 mL / OUT: 700 mL / NET: -700 mL        Appearance:  	Face with eccymosis  HEENT:   Normal oral mucosa, PERRL, EOMI	  Lymphatic: No lymphadenopathy  Cardiovascular:  S1S2 RRR Murmur  Respiratory:  	Clear  Psychiatry:  Alert and oriented  Gastrointestinal:  Soft, Non-tender, + BS	  Skin: No rashes, No ecchymoses, No cyanosis	   No Edema    LABS:	 	    CBC Full  -  ( 21 Dec 2023 07:29 )  WBC Count : 5.90 K/uL  Hemoglobin : 11.6 g/dL  Hematocrit : 35.7 %  Platelet Count - Automated : 178 K/uL  Mean Cell Volume : 88.1 fl  Mean Cell Hemoglobin : 28.6 pg  Mean Cell Hemoglobin Concentration : 32.5 gm/dL  Auto Neutrophil # : x  Auto Lymphocyte # : x  Auto Monocyte # : x  Auto Eosinophil # : x  Auto Basophil # : x  Auto Neutrophil % : x  Auto Lymphocyte % : x  Auto Monocyte % : x  Auto Eosinophil % : x  Auto Basophil % : x    12-21    138  |  104  |  11  ----------------------------<  102<H>  3.8   |  21<L>  |  0.63  12-20    138  |  103  |  18  ----------------------------<  119<H>  4.4   |  22  |  0.73    Ca    8.6      21 Dec 2023 07:26  Ca    9.1      20 Dec 2023 11:43  Phos  2.8     12-21  Mg     1.9     12-21  Mg     1.8     12-20    TPro  6.0  /  Alb  3.6  /  TBili  0.5  /  DBili  0.1  /  AST  69<H>  /  ALT  36  /  AlkPhos  50  12-21  TPro  6.8  /  Alb  4.2  /  TBili  0.5  /  DBili  x   /  AST  62<H>  /  ALT  34  /  AlkPhos  55  12-20          Assessment:  1. History of VTE  2.  Moderate/severe AS  3 Syncope           Plan:  1. Tele  2.  Agree with gentle diuretics  3.  Neuro workup  4.  Covid management per primary  5.  Echo to re-assess aortic valve         Thank you      Vascular Cardiology Service    Please call with any questions:   DIRECT SERVICE NUMBER:  [phone]          Vascular Cardiology Consult Note    DIRECT SERVICE NUMBER:  [phone]                 CC:      HPI:    Susannah is known to me for many years from the office.  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  History of breast CA.  CNS vasculistis.  Admitted with syncope and fall with facial injury.  Of note, she has mod/severe AS on prior echo.  She has been dealing with urologic infection which required antibiotics.  Was noted to have covid and is in isolation room currently         Allergies    No Known Allergies    Intolerances    	    MEDICATIONS:        acetaminophen     Tablet .. 650 milliGRAM(s) Oral every 6 hours PRN  melatonin 3 milliGRAM(s) Oral at bedtime PRN  sertraline 50 milliGRAM(s) Oral daily    pantoprazole    Tablet 40 milliGRAM(s) Oral before breakfast    atorvastatin 20 milliGRAM(s) Oral at bedtime  predniSONE   Tablet 5 milliGRAM(s) Oral every other day    anastrozole 1 milliGRAM(s) Oral daily  chlorhexidine 2% Cloths 1 Application(s) Topical daily  fluticasone propionate 50 MICROgram(s)/spray Nasal Spray 1 Spray(s) Both Nostrils two times a day PRN  sodium chloride 0.9%. 1000 milliLiter(s) IV Continuous <Continuous>      PAST MEDICAL & SURGICAL HISTORY:  CVA (cerebral vascular accident)      Lymphoma      Osteoporosis      Seizure      Fracture  lumbar spine,  healed with conservative treatment      Neuropathy  bilateral feet      HLD (hyperlipidemia)      Depression      GERD (gastroesophageal reflux disease)      Hodgkins disease      Pulmonary embolus      Fracture, ankle  Right, s/p metal plate      H/O sinus surgery      Cataract extraction status of eye, right          FAMILY HISTORY:  Family history of coronary artery disease in father (Father, Mother)  both parents s/p CABG        SOCIAL HISTORY:  unchanged    REVIEW OF SYSTEMS:    CONSTITUTIONAL: No fever, weight loss, or fatigue  EYES: No eye pain, visual disturbances, or discharge  ENT:  No difficulty hearing, tinnitus, vertigo; No sinus or throat pain  NECK: No pain or stiffness  RESPIRATORY:  No shortness of breath   CARDIOVASCULAR:  No Chest pain   GASTROINTESTINAL: No abdominal or epigastric pain. No nausea, vomiting, or hematemesis; No diarrhea or constipation. No melena or hematochezia.  GENITOURINARY: No dysuria, frequency, hematuria, or incontinence  NEUROLOGICAL: No headaches, memory loss, loss of strength, numbness, or tremors  LYMPH Nodes: No enlarged glands  ENDOCRINE: No heat or cold intolerance; No hair loss  MUSCULOSKELETAL: No joint pain or swelling; No muscle, back, or extremity pain  PSYCHIATRIC: No depression, anxiety, mood swings, or difficulty sleeping  HEME/LYMPH: No easy bruising, or bleeding gums  ALLERGY AND IMMUNOLOGIC: No hives or eczema	    [ x] All others negative	  [ ] Unable to obtain    PHYSICAL EXAM:  T(C): 36.6 (12-20-23 @ 21:15), Max: 37.6 (12-20-23 @ 16:39)  HR: 60 (12-20-23 @ 21:15) (60 - 75)  BP: 107/63 (12-20-23 @ 21:15) (107/63 - 135/59)  RR: 18 (12-20-23 @ 21:15) (18 - 18)  SpO2: 95% (12-20-23 @ 21:15) (95% - 97%)  Wt(kg): --  I&O's Summary    20 Dec 2023 07:01  -  21 Dec 2023 07:00  --------------------------------------------------------  IN: 80 mL / OUT: 0 mL / NET: 80 mL    21 Dec 2023 07:01  -  21 Dec 2023 12:03  --------------------------------------------------------  IN: 0 mL / OUT: 700 mL / NET: -700 mL        Appearance:  	Face with eccymosis  HEENT:   Normal oral mucosa, PERRL, EOMI	  Lymphatic: No lymphadenopathy  Cardiovascular:  S1S2 RRR Murmur  Respiratory:  	Clear  Psychiatry:  Alert and oriented  Gastrointestinal:  Soft, Non-tender, + BS	  Skin: No rashes, No ecchymoses, No cyanosis	   No Edema    LABS:	 	    CBC Full  -  ( 21 Dec 2023 07:29 )  WBC Count : 5.90 K/uL  Hemoglobin : 11.6 g/dL  Hematocrit : 35.7 %  Platelet Count - Automated : 178 K/uL  Mean Cell Volume : 88.1 fl  Mean Cell Hemoglobin : 28.6 pg  Mean Cell Hemoglobin Concentration : 32.5 gm/dL  Auto Neutrophil # : x  Auto Lymphocyte # : x  Auto Monocyte # : x  Auto Eosinophil # : x  Auto Basophil # : x  Auto Neutrophil % : x  Auto Lymphocyte % : x  Auto Monocyte % : x  Auto Eosinophil % : x  Auto Basophil % : x    12-21    138  |  104  |  11  ----------------------------<  102<H>  3.8   |  21<L>  |  0.63  12-20    138  |  103  |  18  ----------------------------<  119<H>  4.4   |  22  |  0.73    Ca    8.6      21 Dec 2023 07:26  Ca    9.1      20 Dec 2023 11:43  Phos  2.8     12-21  Mg     1.9     12-21  Mg     1.8     12-20    TPro  6.0  /  Alb  3.6  /  TBili  0.5  /  DBili  0.1  /  AST  69<H>  /  ALT  36  /  AlkPhos  50  12-21  TPro  6.8  /  Alb  4.2  /  TBili  0.5  /  DBili  x   /  AST  62<H>  /  ALT  34  /  AlkPhos  55  12-20          Assessment:  1. History of VTE  2.  Moderate/severe AS  3 Syncope           Plan:  1. Tele  2.  Agree with gentle diuretics  3.  Neuro workup  4.  Covid management per primary  5.  Echo to re-assess aortic valve         Thank you      Vascular Cardiology Service    Please call with any questions:   DIRECT SERVICE NUMBER:  [phone]          Vascular Cardiology Consult Note    DIRECT SERVICE NUMBER:  [phone]                 CC:      HPI:    Susannah is known to me for many years from the office.  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  History of breast CA.  CNS vasculistis.  Admitted with syncope and fall with facial injury.  Of note, she has mod/severe AS on prior echo.  She has been dealing with urologic infection which required antibiotics.  Was noted to have covid and is in isolation room currently         Allergies    No Known Allergies    Intolerances    	    MEDICATIONS:        acetaminophen     Tablet .. 650 milliGRAM(s) Oral every 6 hours PRN  melatonin 3 milliGRAM(s) Oral at bedtime PRN  sertraline 50 milliGRAM(s) Oral daily    pantoprazole    Tablet 40 milliGRAM(s) Oral before breakfast    atorvastatin 20 milliGRAM(s) Oral at bedtime  predniSONE   Tablet 5 milliGRAM(s) Oral every other day    anastrozole 1 milliGRAM(s) Oral daily  chlorhexidine 2% Cloths 1 Application(s) Topical daily  fluticasone propionate 50 MICROgram(s)/spray Nasal Spray 1 Spray(s) Both Nostrils two times a day PRN  sodium chloride 0.9%. 1000 milliLiter(s) IV Continuous <Continuous>      PAST MEDICAL & SURGICAL HISTORY:  CVA (cerebral vascular accident)      Lymphoma      Osteoporosis      Seizure      Fracture  lumbar spine,  healed with conservative treatment      Neuropathy  bilateral feet      HLD (hyperlipidemia)      Depression      GERD (gastroesophageal reflux disease)      Hodgkins disease      Pulmonary embolus      Fracture, ankle  Right, s/p metal plate      H/O sinus surgery      Cataract extraction status of eye, right          FAMILY HISTORY:  Family history of coronary artery disease in father (Father, Mother)  both parents s/p CABG        SOCIAL HISTORY:  unchanged    REVIEW OF SYSTEMS:    CONSTITUTIONAL: No fever, weight loss, or fatigue  EYES: No eye pain, visual disturbances, or discharge  ENT:  No difficulty hearing, tinnitus, vertigo; No sinus or throat pain  NECK: No pain or stiffness  RESPIRATORY:  No shortness of breath   CARDIOVASCULAR:  No Chest pain   GASTROINTESTINAL: No abdominal or epigastric pain. No nausea, vomiting, or hematemesis; No diarrhea or constipation. No melena or hematochezia.  GENITOURINARY: No dysuria, frequency, hematuria, or incontinence  NEUROLOGICAL: No headaches, memory loss, loss of strength, numbness, or tremors  LYMPH Nodes: No enlarged glands  ENDOCRINE: No heat or cold intolerance; No hair loss  MUSCULOSKELETAL: No joint pain or swelling; No muscle, back, or extremity pain  PSYCHIATRIC: No depression, anxiety, mood swings, or difficulty sleeping  HEME/LYMPH: No easy bruising, or bleeding gums  ALLERGY AND IMMUNOLOGIC: No hives or eczema	    [ x] All others negative	  [ ] Unable to obtain    PHYSICAL EXAM:  T(C): 36.6 (12-20-23 @ 21:15), Max: 37.6 (12-20-23 @ 16:39)  HR: 60 (12-20-23 @ 21:15) (60 - 75)  BP: 107/63 (12-20-23 @ 21:15) (107/63 - 135/59)  RR: 18 (12-20-23 @ 21:15) (18 - 18)  SpO2: 95% (12-20-23 @ 21:15) (95% - 97%)  Wt(kg): --  I&O's Summary    20 Dec 2023 07:01  -  21 Dec 2023 07:00  --------------------------------------------------------  IN: 80 mL / OUT: 0 mL / NET: 80 mL    21 Dec 2023 07:01  -  21 Dec 2023 12:03  --------------------------------------------------------  IN: 0 mL / OUT: 700 mL / NET: -700 mL        Appearance:  	Face with eccymosis  HEENT:   Normal oral mucosa, PERRL, EOMI	  Lymphatic: No lymphadenopathy  Cardiovascular:  S1S2 RRR Murmur  Respiratory:  	Clear  Psychiatry:  Alert and oriented  Gastrointestinal:  Soft, Non-tender, + BS	  Skin: No rashes, No ecchymoses, No cyanosis	   No Edema    LABS:	 	    CBC Full  -  ( 21 Dec 2023 07:29 )  WBC Count : 5.90 K/uL  Hemoglobin : 11.6 g/dL  Hematocrit : 35.7 %  Platelet Count - Automated : 178 K/uL  Mean Cell Volume : 88.1 fl  Mean Cell Hemoglobin : 28.6 pg  Mean Cell Hemoglobin Concentration : 32.5 gm/dL  Auto Neutrophil # : x  Auto Lymphocyte # : x  Auto Monocyte # : x  Auto Eosinophil # : x  Auto Basophil # : x  Auto Neutrophil % : x  Auto Lymphocyte % : x  Auto Monocyte % : x  Auto Eosinophil % : x  Auto Basophil % : x    12-21    138  |  104  |  11  ----------------------------<  102<H>  3.8   |  21<L>  |  0.63  12-20    138  |  103  |  18  ----------------------------<  119<H>  4.4   |  22  |  0.73    Ca    8.6      21 Dec 2023 07:26  Ca    9.1      20 Dec 2023 11:43  Phos  2.8     12-21  Mg     1.9     12-21  Mg     1.8     12-20    TPro  6.0  /  Alb  3.6  /  TBili  0.5  /  DBili  0.1  /  AST  69<H>  /  ALT  36  /  AlkPhos  50  12-21  TPro  6.8  /  Alb  4.2  /  TBili  0.5  /  DBili  x   /  AST  62<H>  /  ALT  34  /  AlkPhos  55  12-20          Assessment:  1. History of VTE  2.  Moderate/severe AS  3 Syncope           Plan:  1. Tele  2.  Agree with gentle hydration   3.  Neuro workup  4.  Covid management per primary  5.  Echo to re-assess aortic valve         Thank you      Vascular Cardiology Service    Please call with any questions:   DIRECT SERVICE NUMBER:  [phone]          Vascular Cardiology Consult Note    DIRECT SERVICE NUMBER:  [phone]                 CC:      HPI:    Susannah is known to me for many years from the office.  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  History of breast CA.  CNS vasculistis.  Admitted with syncope and fall with facial injury.  Of note, she has mod/severe AS on prior echo.  She has been dealing with urologic infection which required antibiotics.  Was noted to have covid and is in isolation room currently         Allergies    No Known Allergies    Intolerances    	    MEDICATIONS:        acetaminophen     Tablet .. 650 milliGRAM(s) Oral every 6 hours PRN  melatonin 3 milliGRAM(s) Oral at bedtime PRN  sertraline 50 milliGRAM(s) Oral daily    pantoprazole    Tablet 40 milliGRAM(s) Oral before breakfast    atorvastatin 20 milliGRAM(s) Oral at bedtime  predniSONE   Tablet 5 milliGRAM(s) Oral every other day    anastrozole 1 milliGRAM(s) Oral daily  chlorhexidine 2% Cloths 1 Application(s) Topical daily  fluticasone propionate 50 MICROgram(s)/spray Nasal Spray 1 Spray(s) Both Nostrils two times a day PRN  sodium chloride 0.9%. 1000 milliLiter(s) IV Continuous <Continuous>      PAST MEDICAL & SURGICAL HISTORY:  CVA (cerebral vascular accident)      Lymphoma      Osteoporosis      Seizure      Fracture  lumbar spine,  healed with conservative treatment      Neuropathy  bilateral feet      HLD (hyperlipidemia)      Depression      GERD (gastroesophageal reflux disease)      Hodgkins disease      Pulmonary embolus      Fracture, ankle  Right, s/p metal plate      H/O sinus surgery      Cataract extraction status of eye, right          FAMILY HISTORY:  Family history of coronary artery disease in father (Father, Mother)  both parents s/p CABG        SOCIAL HISTORY:  unchanged    REVIEW OF SYSTEMS:    CONSTITUTIONAL: No fever, weight loss, or fatigue  EYES: No eye pain, visual disturbances, or discharge  ENT:  No difficulty hearing, tinnitus, vertigo; No sinus or throat pain  NECK: No pain or stiffness  RESPIRATORY:  No shortness of breath   CARDIOVASCULAR:  No Chest pain   GASTROINTESTINAL: No abdominal or epigastric pain. No nausea, vomiting, or hematemesis; No diarrhea or constipation. No melena or hematochezia.  GENITOURINARY: No dysuria, frequency, hematuria, or incontinence  NEUROLOGICAL: No headaches, memory loss, loss of strength, numbness, or tremors  LYMPH Nodes: No enlarged glands  ENDOCRINE: No heat or cold intolerance; No hair loss  MUSCULOSKELETAL: No joint pain or swelling; No muscle, back, or extremity pain  PSYCHIATRIC: No depression, anxiety, mood swings, or difficulty sleeping  HEME/LYMPH: No easy bruising, or bleeding gums  ALLERGY AND IMMUNOLOGIC: No hives or eczema	    [ x] All others negative	  [ ] Unable to obtain    PHYSICAL EXAM:  T(C): 36.6 (12-20-23 @ 21:15), Max: 37.6 (12-20-23 @ 16:39)  HR: 60 (12-20-23 @ 21:15) (60 - 75)  BP: 107/63 (12-20-23 @ 21:15) (107/63 - 135/59)  RR: 18 (12-20-23 @ 21:15) (18 - 18)  SpO2: 95% (12-20-23 @ 21:15) (95% - 97%)  Wt(kg): --  I&O's Summary    20 Dec 2023 07:01  -  21 Dec 2023 07:00  --------------------------------------------------------  IN: 80 mL / OUT: 0 mL / NET: 80 mL    21 Dec 2023 07:01  -  21 Dec 2023 12:03  --------------------------------------------------------  IN: 0 mL / OUT: 700 mL / NET: -700 mL        Appearance:  	Face with eccymosis  HEENT:   Normal oral mucosa, PERRL, EOMI	  Lymphatic: No lymphadenopathy  Cardiovascular:  S1S2 RRR Murmur  Respiratory:  	Clear  Psychiatry:  Alert and oriented  Gastrointestinal:  Soft, Non-tender, + BS	  Skin: No rashes, No ecchymoses, No cyanosis	   No Edema    LABS:	 	    CBC Full  -  ( 21 Dec 2023 07:29 )  WBC Count : 5.90 K/uL  Hemoglobin : 11.6 g/dL  Hematocrit : 35.7 %  Platelet Count - Automated : 178 K/uL  Mean Cell Volume : 88.1 fl  Mean Cell Hemoglobin : 28.6 pg  Mean Cell Hemoglobin Concentration : 32.5 gm/dL  Auto Neutrophil # : x  Auto Lymphocyte # : x  Auto Monocyte # : x  Auto Eosinophil # : x  Auto Basophil # : x  Auto Neutrophil % : x  Auto Lymphocyte % : x  Auto Monocyte % : x  Auto Eosinophil % : x  Auto Basophil % : x    12-21    138  |  104  |  11  ----------------------------<  102<H>  3.8   |  21<L>  |  0.63  12-20    138  |  103  |  18  ----------------------------<  119<H>  4.4   |  22  |  0.73    Ca    8.6      21 Dec 2023 07:26  Ca    9.1      20 Dec 2023 11:43  Phos  2.8     12-21  Mg     1.9     12-21  Mg     1.8     12-20    TPro  6.0  /  Alb  3.6  /  TBili  0.5  /  DBili  0.1  /  AST  69<H>  /  ALT  36  /  AlkPhos  50  12-21  TPro  6.8  /  Alb  4.2  /  TBili  0.5  /  DBili  x   /  AST  62<H>  /  ALT  34  /  AlkPhos  55  12-20          Assessment:  1. History of VTE  2.  Moderate/severe AS  3 Syncope           Plan:  1. Tele  2.  Agree with gentle hydration   3.  Neuro workup  4.  Covid management per primary  5.  Echo to re-assess aortic valve         Thank you      Vascular Cardiology Service    Please call with any questions:   DIRECT SERVICE NUMBER:  [phone]

## 2023-12-21 NOTE — PROGRESS NOTE ADULT - SUBJECTIVE AND OBJECTIVE BOX
Name of Patient : VINCENT ENGLAND  MRN: [id number]  Date of visit: 12-21-23 @ 15:04      Subjective: Patient seen and examined. No new events except as noted.   Patient seen earlier this AM. Lying down in bed.   Reports her pain is controlled.   States that she has a mild cough.   Denies chest pain, palpitations, shortness of breath or dyspnea.     REVIEW OF SYSTEMS:    CONSTITUTIONAL: Generalized weakness   EYES/ENT: No visual changes;  No vertigo or throat pain   NECK: No pain or stiffness  RESPIRATORY: + Cough, No wheezing, hemoptysis; No shortness of breath  CARDIOVASCULAR: No chest pain or palpitations  GASTROINTESTINAL: No abdominal or epigastric pain. No nausea, vomiting, or hematemesis; No diarrhea or constipation. No melena or hematochezia.  GENITOURINARY: No dysuria, frequency or hematuria  NEUROLOGICAL: No numbness or weakness  SKIN: + Facial ecchymosis; + Raccoon eyes R> L; Facial ecchymosis; + Contusion; + Edema; B/L Knee ecchymosis   All other review of systems is negative unless indicated above.    MEDICATIONS:  MEDICATIONS  (STANDING):  anastrozole 1 milliGRAM(s) Oral daily  atorvastatin 20 milliGRAM(s) Oral at bedtime  chlorhexidine 2% Cloths 1 Application(s) Topical daily  pantoprazole    Tablet 40 milliGRAM(s) Oral before breakfast  predniSONE   Tablet 5 milliGRAM(s) Oral every other day  sertraline 50 milliGRAM(s) Oral daily  sodium chloride 0.9%. 1000 milliLiter(s) (50 mL/Hr) IV Continuous <Continuous>      PHYSICAL EXAM:  T(C): 37.2 (12-21-23 @ 12:07), Max: 37.6 (12-20-23 @ 16:39)  HR: 65 (12-21-23 @ 12:07) (60 - 75)  BP: 121/60 (12-21-23 @ 12:07) (107/63 - 135/59)  RR: 18 (12-21-23 @ 12:07) (18 - 18)  SpO2: 96% (12-21-23 @ 12:07) (95% - 97%)  Wt(kg): --  I&O's Summary    20 Dec 2023 07:01  -  21 Dec 2023 07:00  --------------------------------------------------------  IN: 80 mL / OUT: 0 mL / NET: 80 mL    21 Dec 2023 07:01  -  21 Dec 2023 15:04  --------------------------------------------------------  IN: 0 mL / OUT: 700 mL / NET: -700 mL        Appearance: Awake, Weak appearing female, lying down in bed, HOB elevated, Facial contusion	  HEENT:  Eyes are open; +Facial ecchymosis; + Raccoon eyes R >L; + contusion   Lymphatic: No lymphadenopathy   Cardiovascular: Normal S1 S2, no JVD  Respiratory: normal effort , clear  Gastrointestinal:  Soft, Non-tender  Skin: + Facial ecchymosis; + R sided tashia-occular ecchymosis; B/L Knee ecchymosis   Psychiatry:  Mood & affect appropriate  Musculoskeletal: No edema        12-20-23 @ 07:01  -  12-21-23 @ 07:00  --------------------------------------------------------  IN: 80 mL / OUT: 0 mL / NET: 80 mL    12-21-23 @ 07:01  -  12-21-23 @ 15:04  --------------------------------------------------------  IN: 0 mL / OUT: 700 mL / NET: -700 mL                                  11.6   5.90  )-----------( 178      ( 21 Dec 2023 07:29 )             35.7               12-21    x   |  x   |  x   ----------------------------<  x   x    |  x   |  0.69    Ca    8.6      21 Dec 2023 07:26  Phos  2.8     12-21  Mg     1.9     12-21    TPro  6.0  /  Alb  3.7  /  TBili  0.4  /  DBili  0.1  /  AST  68<H>  /  ALT  35  /  AlkPhos  50  12-21    PT/INR - ( 20 Dec 2023 11:43 )   PT: 13.1 sec;   INR: 1.26 ratio         PTT - ( 20 Dec 2023 11:43 )  PTT:21.8 sec                   Urinalysis Basic - ( 21 Dec 2023 07:26 )    Color: x / Appearance: x / SG: x / pH: x  Gluc: 102 mg/dL / Ketone: x  / Bili: x / Urobili: x   Blood: x / Protein: x / Nitrite: x   Leuk Esterase: x / RBC: x / WBC x   Sq Epi: x / Non Sq Epi: x / Bacteria: x      Culture - Urine (12.20.23 @ 14:24)   Specimen Source: Clean Catch Clean Catch (Midstream)  Culture Results: <10,000 CFU/mL Normal Urogenital Yaneth      < from: Xray Knee 1 or 2 Views, Bilateral (12.21.23 @ 11:58) >    IMPRESSION:  1.  No acute osseous abnormalities.    < end of copied text >      < from: CT Chest No Cont (12.20.23 @ 22:09) >  FINDINGS:    LUNGS AND AIRWAYS: Patent central airways.  Minimal dependent   subsegmental atelectasis.    New 4 mm left upper lobe groundglass pulmonary nodule (series 4, image   226)    Many additional scattered sub-4 mm nodules are noted (for instance, right   middle lobe nodule on series 4, image 249) stable from 2018.    PLEURA: No pleural effusion.  MEDIASTINUM AND KEISHA: No lymphadenopathy.  VESSELS: Atherosclerotic calcification of the aorta and coronary   arteries. Aortic valvular calcifications.  HEART: Heart size is normal. Trace pericardial effusion.  CHEST WALL AND LOWER NECK: A 2.6 x 2.2 cm encapsulated lesion in the left   medial breast containing an internal fluid level is redemonstrated (3:95)   as mentioned on prior abdominal MRI. This is adjacent to several medial   left breast/chest wall surgical clips.  VISUALIZED UPPER ABDOMEN: Hepatic steatosis.  BONES: Degenerative changes. Bony structures are osteopenic. Chronic   severe T12 compression fracture deformity. Mild T5 compression deformity.   No suspicious osteoblastic or osteolytic lesions.    IMPRESSION:  No pneumonia.    New tiny indeterminate 4 mm left upper lobe groundglass pulmonary nodule.   According to Fleischner guideline for management of incidental lung   nodule, no routine follow-up CT chest is recommended.  Redemonstrated partially cystic lesion of the left medial breast of   uncertain etiology. Correlation with prior mammogram/breast ultrasound is   recommended.        --- End of Report ---    < end of copied text >     Name of Patient : VINCENT ENGLAND  MRN: [id number]  Date of visit: 12-21-23 @ 15:04      Subjective: Patient seen and examined. No new events except as noted.   Patient seen earlier this AM. Lying down in bed.   Reports her pain is controlled.   States that she has a mild cough.   Denies chest pain, palpitations, shortness of breath or dyspnea.     REVIEW OF SYSTEMS:    CONSTITUTIONAL: Generalized weakness   EYES/ENT: No visual changes;  No vertigo or throat pain   NECK: No pain or stiffness  RESPIRATORY: + Cough, No wheezing, hemoptysis; No shortness of breath  CARDIOVASCULAR: No chest pain or palpitations  GASTROINTESTINAL: No abdominal or epigastric pain. No nausea, vomiting, or hematemesis; No diarrhea or constipation. No melena or hematochezia.  GENITOURINARY: No dysuria, frequency or hematuria  NEUROLOGICAL: No numbness or weakness  SKIN: + Facial ecchymosis; + Raccoon eyes R> L; Facial ecchymosis; + Contusion; + Edema; B/L Knee ecchymosis   All other review of systems is negative unless indicated above.    MEDICATIONS:  MEDICATIONS  (STANDING):  anastrozole 1 milliGRAM(s) Oral daily  atorvastatin 20 milliGRAM(s) Oral at bedtime  chlorhexidine 2% Cloths 1 Application(s) Topical daily  pantoprazole    Tablet 40 milliGRAM(s) Oral before breakfast  predniSONE   Tablet 5 milliGRAM(s) Oral every other day  sertraline 50 milliGRAM(s) Oral daily  sodium chloride 0.9%. 1000 milliLiter(s) (50 mL/Hr) IV Continuous <Continuous>      PHYSICAL EXAM:  T(C): 37.2 (12-21-23 @ 12:07), Max: 37.6 (12-20-23 @ 16:39)  HR: 65 (12-21-23 @ 12:07) (60 - 75)  BP: 121/60 (12-21-23 @ 12:07) (107/63 - 135/59)  RR: 18 (12-21-23 @ 12:07) (18 - 18)  SpO2: 96% (12-21-23 @ 12:07) (95% - 97%)  Wt(kg): --  I&O's Summary    20 Dec 2023 07:01  -  21 Dec 2023 07:00  --------------------------------------------------------  IN: 80 mL / OUT: 0 mL / NET: 80 mL    21 Dec 2023 07:01  -  21 Dec 2023 15:04  --------------------------------------------------------  IN: 0 mL / OUT: 700 mL / NET: -700 mL        Appearance: Awake, Weak appearing female, lying down in bed, HOB elevated, Facial contusion	  HEENT:  Eyes are open; +Facial ecchymosis; + Raccoon eyes R >L; + contusion   Lymphatic: No lymphadenopathy   Cardiovascular: Normal S1 S2, no JVD  Respiratory: normal effort , clear  Gastrointestinal:  Soft, Non-tender  Skin: + Facial ecchymosis; + R sided tashia-occular ecchymosis; B/L Knee ecchymosis   Psychiatry:  Mood & affect appropriate  Musculoskeletal: No edema        12-20-23 @ 07:01  -  12-21-23 @ 07:00  --------------------------------------------------------  IN: 80 mL / OUT: 0 mL / NET: 80 mL    12-21-23 @ 07:01  -  12-21-23 @ 15:04  --------------------------------------------------------  IN: 0 mL / OUT: 700 mL / NET: -700 mL                                  11.6   5.90  )-----------( 178      ( 21 Dec 2023 07:29 )             35.7               12-21    x   |  x   |  x   ----------------------------<  x   x    |  x   |  0.69    Ca    8.6      21 Dec 2023 07:26  Phos  2.8     12-21  Mg     1.9     12-21    TPro  6.0  /  Alb  3.7  /  TBili  0.4  /  DBili  0.1  /  AST  68<H>  /  ALT  35  /  AlkPhos  50  12-21    PT/INR - ( 20 Dec 2023 11:43 )   PT: 13.1 sec;   INR: 1.26 ratio         PTT - ( 20 Dec 2023 11:43 )  PTT:21.8 sec                   Urinalysis Basic - ( 21 Dec 2023 07:26 )    Color: x / Appearance: x / SG: x / pH: x  Gluc: 102 mg/dL / Ketone: x  / Bili: x / Urobili: x   Blood: x / Protein: x / Nitrite: x   Leuk Esterase: x / RBC: x / WBC x   Sq Epi: x / Non Sq Epi: x / Bacteria: x      Culture - Urine (12.20.23 @ 14:24)   Specimen Source: Clean Catch Clean Catch (Midstream)  Culture Results: <10,000 CFU/mL Normal Urogenital Yaneth      < from: Xray Knee 1 or 2 Views, Bilateral (12.21.23 @ 11:58) >    IMPRESSION:  1.  No acute osseous abnormalities.    < end of copied text >      < from: CT Chest No Cont (12.20.23 @ 22:09) >  FINDINGS:    LUNGS AND AIRWAYS: Patent central airways.  Minimal dependent   subsegmental atelectasis.    New 4 mm left upper lobe groundglass pulmonary nodule (series 4, image   226)    Many additional scattered sub-4 mm nodules are noted (for instance, right   middle lobe nodule on series 4, image 249) stable from 2018.    PLEURA: No pleural effusion.  MEDIASTINUM AND KEISHA: No lymphadenopathy.  VESSELS: Atherosclerotic calcification of the aorta and coronary   arteries. Aortic valvular calcifications.  HEART: Heart size is normal. Trace pericardial effusion.  CHEST WALL AND LOWER NECK: A 2.6 x 2.2 cm encapsulated lesion in the left   medial breast containing an internal fluid level is redemonstrated (3:95)   as mentioned on prior abdominal MRI. This is adjacent to several medial   left breast/chest wall surgical clips.  VISUALIZED UPPER ABDOMEN: Hepatic steatosis.  BONES: Degenerative changes. Bony structures are osteopenic. Chronic   severe T12 compression fracture deformity. Mild T5 compression deformity.   No suspicious osteoblastic or osteolytic lesions.    IMPRESSION:  No pneumonia.    New tiny indeterminate 4 mm left upper lobe groundglass pulmonary nodule.   According to Fleischner guideline for management of incidental lung   nodule, no routine follow-up CT chest is recommended.  Redemonstrated partially cystic lesion of the left medial breast of   uncertain etiology. Correlation with prior mammogram/breast ultrasound is   recommended.        --- End of Report ---    < end of copied text >

## 2023-12-21 NOTE — CONSULT NOTE ADULT - NS ATTEND AMEND GEN_ALL_CORE FT
What Type Of Note Output Would You Prefer (Optional)?: Standard Output How Severe Are Your Spot(S)?: moderate Hpi Title: Evaluation of Skin Lesions 79 y/o F on adjuvant anastrozole for L breast cancer, admitted for syncope. Found to be COVID+. Mod/severe AS on echo, cardiology following, feel this was more likely a vasovagal event. Neuro work-up pending. 77 y/o F on adjuvant anastrozole for L breast cancer, admitted for syncope. Found to be COVID+. Mod/severe AS on echo, cardiology following, feel this was more likely a vasovagal event. Neuro work-up pending.

## 2023-12-21 NOTE — CONSULT NOTE ADULT - ASSESSMENT
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  not on asa   - prednisone for CNS vascultiisi   - TTE and CD ordered as part of syncope workup  - EEG pending    - would consider CTA H/N or MRA H/N given prior h/o CNS vasculitis   - consider MRI C spine given mod-severe stenosis   - would also get MRI brain with and w/o   - Hemoglobin A1c and lipid panel  - telemetry  - covid precuations.  on Remdesivir. monitor O2 for steroids   - orthostatics   - PT/OT/SS/SLP, OOBC  - check FS, glucose control <180  - GI/DVT ppx  - Counseling on diet, exercise, and medication adherence was done  - Counseling on smoking cessation and alcohol consumption offered when appropriate.  - Pain assessed and judicious use of narcotics when appropriate was discussed.    - Stroke education given when appropriate.  - Importance of fall prevention discussed.   - Differential diagnosis and plan of care discussed with patient and/or family and primary team  - Thank you for allowing me to participate in the care of this patient. Call with questions.   - sees Dr. 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  not on asa   - prednisone for CNS vascultiisi   - TTE and CD ordered as part of syncope workup  - EEG pending    - would consider CTA H/N or MRA H/N given prior h/o CNS vasculitis   - consider MRI C spine given mod-severe stenosis   - would also get MRI brain with and w/o   - Hemoglobin A1c and lipid panel  - telemetry  - covid precuations.  on Remdesivir. monitor O2 for steroids   - orthostatics   - PT/OT/SS/SLP, OOBC  - check FS, glucose control <180  - GI/DVT ppx  - Counseling on diet, exercise, and medication adherence was done  - Counseling on smoking cessation and alcohol consumption offered when appropriate.  - Pain assessed and judicious use of narcotics when appropriate was discussed.    - Stroke education given when appropriate.  - Importance of fall prevention discussed.   - Differential diagnosis and plan of care discussed with patient and/or family and primary team  - Thank you for allowing me to participate in the care of this patient. Call with questions.   - sees Dr. 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  not on asa   - prednisone for CNS vascultiisi   - TTE and CD ordered as part of syncope workup  - EEG pending    - would consider CTA H/N or MRA H/N given prior h/o CNS vasculitis   - consider MRI C spine given mod-severe stenosis   - would also get MRI brain with and w/o   - Hemoglobin A1c and lipid panel  - telemetry  - covid precuations.  on Remdesivir. monitor O2 for steroids   - orthostatics   - PT/OT/SS/SLP, OOBC  - check FS, glucose control <180  - GI/DVT ppx  - Counseling on diet, exercise, and medication adherence was done  - Counseling on smoking cessation and alcohol consumption offered when appropriate.  - Pain assessed and judicious use of narcotics when appropriate was discussed.    - Stroke education given when appropriate.  - Importance of fall prevention discussed.   - Differential diagnosis and plan of care discussed with patient and/or family and primary team  - Thank you for allowing me to participate in the care of this patient. Call with questions.   - sees Dr. 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  not on asa   - prednisone for CNS vascultiisi   - TTE and CD ordered as part of syncope workup  - EEG pending    - would consider CTA H/N or MRA H/N given prior h/o CNS vasculitis   - consider MRI C spine given mod-severe stenosis   - would also get MRI brain with and w/o   - Hemoglobin A1c and lipid panel  - telemetry  - covid precuations.  on Remdesivir. monitor O2 for steroids   - orthostatics   - PT/OT/SS/SLP, OOBC  - check FS, glucose control <180  - GI/DVT ppx  - Counseling on diet, exercise, and medication adherence was done  - Counseling on smoking cessation and alcohol consumption offered when appropriate.  - Pain assessed and judicious use of narcotics when appropriate was discussed.    - Stroke education given when appropriate.  - Importance of fall prevention discussed.   - Differential diagnosis and plan of care discussed with patient and/or family and primary team  - Thank you for allowing me to participate in the care of this patient. Call with questions.   - sees Dr. Naif Love neuro outpatient \  Sina Calles MD  Vascular Neurology  Office: [phone]

## 2023-12-21 NOTE — CONSULT NOTE ADULT - ASSESSMENT
Patient is a 78 year old Female with a PMHx of Left Breast CA S/P L Lumpectomy, on Anastrazole, Right breast ADH S/P resection and on Tamoxifen X 5 years, Lymphoma, history of DVT/PE, CVA 2/2 CNS vasculitis, HLD, GERD, overactive bladder, seizures 10 years ago, not on AEDs, osteoporosis complicated by right tib-fib fracture s/p ORIF, recent admission 11/18-11/21 for urosepsis 2/2 E coli infection, S/P 4 week course of Bactrim on 12/16, follows with uro-gynecologist, who presents to [facility] with a chief complaint of syncope.    Breast Cancer  --under the care of Dr. 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  --ongoing care after discharge    Syncope, COVID  - CT chest w/ No pneumonia. New tiny indeterminate 4 mm left upper lobe groundglass pulmonary nodule. Redemonstrated partially cystic lesion of the left medial breast of uncertain etiology.  - CT head w/ no evidence of acute intracranial hemorrhage or midline shift.  - CT C spine w/ no evidence of acute osseous fracture or ko dislocation. Multilevel degenerative change of the cervical spine as discussed above, most significantly at the C4-C6 levels where there is moderate to severe narrowing of the spinal canal. In the setting of myelopathy, recommend MRI of the cervical spine for further evaluation.  - Started remdesivir on 12/20, now discontinued  - Suspect may be related to AS vs. 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 AC on hold for right frontal hematoma    Will continue to follow.    Rufino Mancuso PA-C  Hematology/Oncology  [facility]  [phone] (office) Patient is a 78 year old Female with a PMHx of Left Breast CA S/P L Lumpectomy, on Anastrazole, Right breast ADH S/P resection and on Tamoxifen X 5 years, Lymphoma, history of DVT/PE, CVA 2/2 CNS vasculitis, HLD, GERD, overactive bladder, seizures 10 years ago, not on AEDs, osteoporosis complicated by right tib-fib fracture s/p ORIF, recent admission 11/18-11/21 for urosepsis 2/2 E coli infection, S/P 4 week course of Bactrim on 12/16, follows with uro-gynecologist, who presents to [facility] with a chief complaint of syncope.    Breast Cancer  --[place] of 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  --ongoing care after discharge    Syncope, COVID  - CT chest w/ No pneumonia. New tiny indeterminate 4 mm left upper lobe groundglass pulmonary nodule. Redemonstrated partially cystic lesion of the left medial breast of uncertain etiology.  - CT head w/ no evidence of acute intracranial hemorrhage or midline shift.  - CT C spine w/ no evidence of acute osseous fracture or ko dislocation. Multilevel degenerative change of the cervical spine as discussed above, most significantly at the C4-C6 levels where there is moderate to severe narrowing of the spinal canal. In the setting of myelopathy, recommend MRI of the cervical spine for further evaluation.  - Started remdesivir on 12/20, now discontinued  - Suspect may be related to AS vs. 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 AC on hold for right frontal hematoma    Will continue to follow.    Rufino Mancuso PA-C  Hematology/Oncology  [facility]  [phone] (office)

## 2023-12-21 NOTE — CONSULT NOTE ADULT - SUBJECTIVE AND OBJECTIVE BOX
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 Patient reports that she awoke in yas usual state of health this morning, was supposed to go to outpatient laboratory to obtain lab work. Patient states that she told her  to bring the car to her by the elevator and does not recall further. States that she told her  she "feels unwell" and feels "off." Per  at the bedside, he found patient face down on the floor.  reports that he stepped away from patient for under 2 minutes to bring the car over to her and awoke patient. Patient reports pain to her B/L knees. Patient reports that she has been ambulating since and was advised to come to [facility] by her [facility] [place]. Patient denies any bladder or bowel incontinence. Denies chest pain, palpitations, shortness of breath or dyspnea.  Denies nausea, vomiting, fever, chills, cough, abdominal pain, headache, dizziness, lightheadedness neck or back pain.  (20 Dec 2023 15:43)    Describes a generalized feeling of "something just feeling off" prior to fainting.  No describable headache, vertigo, nausea, skin pallor, sweating, hot/flushed feeling, etc.  Last fainting episode was >5 yrs ago, unrelated incident.  A 10 pt ROS is otherwise negative.    PAST MEDICAL & SURGICAL HISTORY:  CVA (cerebral vascular accident)  Lymphoma  Osteoporosis  Seizure  Fracture  lumbar spine,  healed with conservative treatment  Neuropathy  bilateral feet  HLD (hyperlipidemia)  Depression  GERD (gastroesophageal reflux disease)  Hodgkins disease  Pulmonary embolus  Fracture, ankle  Right, s/p metal plate  H/O sinus surgery  Cataract extraction status of eye, right      MEDICATIONS  (STANDING):  anastrozole 1 milliGRAM(s) Oral daily  atorvastatin 20 milliGRAM(s) Oral at bedtime  chlorhexidine 2% Cloths 1 Application(s) Topical daily  pantoprazole    Tablet 40 milliGRAM(s) Oral before breakfast  predniSONE   Tablet 5 milliGRAM(s) Oral every other day  sertraline 50 milliGRAM(s) Oral daily  sodium chloride 0.9%. 1000 milliLiter(s) (50 mL/Hr) IV Continuous <Continuous>    Allergies    No Known Allergies    Intolerances        FAMILY HISTORY:  Family history of coronary artery disease in father (Father, Mother)  both parents s/p CABG      Non-contributary for premature coronary disease or sudden cardiac death    SOCIAL HISTORY:    [ x] Non-smoker  [ ] Smoker  [ ] Alcohol      PHYSICAL EXAM:  T(C): 37.2 (12-21-23 @ 12:07), Max: 37.6 (12-20-23 @ 16:39)  HR: 65 (12-21-23 @ 12:07) (60 - 75)  BP: 121/60 (12-21-23 @ 12:07) (107/63 - 135/59)  RR: 18 (12-21-23 @ 12:07) (18 - 18)  SpO2: 96% (12-21-23 @ 12:07) (95% - 97%)  Wt(kg): --    Appearance: Elderly woman in no acute distress  HEENT:   Normal oral mucosa, PERRL, EOMI	  Lymphatic: No lymphadenopathy , no edema  Cardiovascular: Normal S1 S2, No JVD, No murmurs , Peripheral pulses palpable 2+ bilaterally  Respiratory: Lungs clear to auscultation, normal effort 	  Gastrointestinal:  Soft, Non-tender, + BS	  Skin: ecchymoses - periorbital, chin, cheekbones, etc.  Musculoskeletal: Normal range of motion, normal strength  Psychiatry:  Mood & affect appropriate      TELEMETRY: NSR	    ECG: NSR, normal intervals  Echo:  Historically normal LVEF, moderate-to-severe Aortic valve stenosis.  	  LABS:	 	                          11.6   5.90  )-----------( 178      ( 21 Dec 2023 07:29 )             35.7     12-21    x   |  x   |  x   ----------------------------<  x   x    |  x   |  0.69    Ca    8.6      21 Dec 2023 07:26  Phos  2.8     12-21  Mg     1.9     12-21    TPro  6.0  /  Alb  3.7  /  TBili  0.4  /  DBili  0.1  /  AST  68<H>  /  ALT  35  /  AlkPhos  50  12-21  TSH: Thyroid Stimulating Hormone, Serum: 1.19 uIU/mL (12-21 @ 07:26)      ASSESSMENT/PLAN: Ms Yates is a very pleasant 78y Female here after semi-witnessed collapse in her apartment complex parking garage.  She has history of stroke, CNS vasculitis, breast cancer on maintenance anastrozole, and documented moderate-to-severe aortic valve stenosis.  On day of presentation, she was preparing for AM fasting labs .  She did not know she had COVID until diagnosed in the ED.    Her fainting episode is most likely vasovagal, spurred on by relative dehydration in fasted state, having had COVID, and about a 15 min walk through her housing complex down to the parking garage.  Her forwards fall and facial injury are entirely unfortunate, but is not by itself indicative of a more sinister cause.  Checking an echocardiogram re: AS progression, although this is not likely the cause of her syncopal event.  OK to continue atorvastatin for remote hx of CVA.  Maintain telemetry re: arrhythmia surveillance. At this time I would not implant a loop recorder. Would recommend event monitoring and office followup.    Sunday Lino M.D.  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 Patient reports that she awoke in yas usual state of health this morning, was supposed to go to outpatient laboratory to obtain lab work. Patient states that she told her  to bring the car to her by the elevator and does not recall further. States that she told her  she "feels unwell" and feels "off." Per  at the bedside, he found patient face down on the floor.  reports that he stepped away from patient for under 2 minutes to bring the car over to her and awoke patient. Patient reports pain to her B/L knees. Patient reports that she has been ambulating since and was advised to come to [facility] by her [facility] team. Patient denies any bladder or bowel incontinence. Denies chest pain, palpitations, shortness of breath or dyspnea.  Denies nausea, vomiting, fever, chills, cough, abdominal pain, headache, dizziness, lightheadedness neck or back pain.  (20 Dec 2023 15:43)    Describes a generalized feeling of "something just feeling off" prior to fainting.  No describable headache, vertigo, nausea, skin pallor, sweating, hot/flushed feeling, etc.  Last fainting episode was >5 yrs ago, unrelated incident.  A 10 pt ROS is otherwise negative.    PAST MEDICAL & SURGICAL HISTORY:  CVA (cerebral vascular accident)  Lymphoma  Osteoporosis  Seizure  Fracture  lumbar spine,  healed with conservative treatment  Neuropathy  bilateral feet  HLD (hyperlipidemia)  Depression  GERD (gastroesophageal reflux disease)  Hodgkins disease  Pulmonary embolus  Fracture, ankle  Right, s/p metal plate  H/O sinus surgery  Cataract extraction status of eye, right      MEDICATIONS  (STANDING):  anastrozole 1 milliGRAM(s) Oral daily  atorvastatin 20 milliGRAM(s) Oral at bedtime  chlorhexidine 2% Cloths 1 Application(s) Topical daily  pantoprazole    Tablet 40 milliGRAM(s) Oral before breakfast  predniSONE   Tablet 5 milliGRAM(s) Oral every other day  sertraline 50 milliGRAM(s) Oral daily  sodium chloride 0.9%. 1000 milliLiter(s) (50 mL/Hr) IV Continuous <Continuous>    Allergies    No Known Allergies    Intolerances        FAMILY HISTORY:  Family history of coronary artery disease in father (Father, Mother)  both parents s/p CABG      Non-contributary for premature coronary disease or sudden cardiac death    SOCIAL HISTORY:    [ x] Non-smoker  [ ] Smoker  [ ] Alcohol      PHYSICAL EXAM:  T(C): 37.2 (12-21-23 @ 12:07), Max: 37.6 (12-20-23 @ 16:39)  HR: 65 (12-21-23 @ 12:07) (60 - 75)  BP: 121/60 (12-21-23 @ 12:07) (107/63 - 135/59)  RR: 18 (12-21-23 @ 12:07) (18 - 18)  SpO2: 96% (12-21-23 @ 12:07) (95% - 97%)  Wt(kg): --    Appearance: Elderly woman in no acute distress  HEENT:   Normal oral mucosa, PERRL, EOMI	  Lymphatic: No lymphadenopathy , no edema  Cardiovascular: Normal S1 S2, No JVD, No murmurs , Peripheral pulses palpable 2+ bilaterally  Respiratory: Lungs clear to auscultation, normal effort 	  Gastrointestinal:  Soft, Non-tender, + BS	  Skin: ecchymoses - periorbital, chin, cheekbones, etc.  Musculoskeletal: Normal range of motion, normal strength  Psychiatry:  Mood & affect appropriate      TELEMETRY: NSR	    ECG: NSR, normal intervals  Echo:  Historically normal LVEF, moderate-to-severe Aortic valve stenosis.  	  LABS:	 	                          11.6   5.90  )-----------( 178      ( 21 Dec 2023 07:29 )             35.7     12-21    x   |  x   |  x   ----------------------------<  x   x    |  x   |  0.69    Ca    8.6      21 Dec 2023 07:26  Phos  2.8     12-21  Mg     1.9     12-21    TPro  6.0  /  Alb  3.7  /  TBili  0.4  /  DBili  0.1  /  AST  68<H>  /  ALT  35  /  AlkPhos  50  12-21  TSH: Thyroid Stimulating Hormone, Serum: 1.19 uIU/mL (12-21 @ 07:26)      ASSESSMENT/PLAN: Ms Yates is a very pleasant 78y Female here after semi-witnessed collapse in her apartment complex parking garage.  She has history of stroke, CNS vasculitis, breast cancer on maintenance anastrozole, and documented moderate-to-severe aortic valve stenosis.  On day of presentation, she was preparing for AM fasting labs .  She did not know she had COVID until diagnosed in the ED.    Her fainting episode is most likely vasovagal, spurred on by relative dehydration in fasted state, having had COVID, and about a 15 min walk through her housing complex down to the parking garage.  Her forwards fall and facial injury are entirely unfortunate, but is not by itself indicative of a more sinister cause.  Checking an echocardiogram re: AS progression, although this is not likely the cause of her syncopal event.  OK to continue atorvastatin for remote hx of CVA.  Maintain telemetry re: arrhythmia surveillance. At this time I would not implant a loop recorder. Would recommend event monitoring and office followup.    Sunday Lino M.D.  Cardiac Electrophysiology    office [phone]  pager [phone]

## 2023-12-21 NOTE — CONSULT NOTE ADULT - SUBJECTIVE AND OBJECTIVE BOX
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 Patient reports that she awoke in yas usual state of health this morning, was supposed to go to outpatient laboratory to obtain lab work. Patient states that she told her  to bring the car to her by the elevator and does not recall further. States that she told her  she "feels unwell" and feels "off." Per  at the bedside, he found patient face down on the floor.  reports that he stepped away from patient for under 2 minutes to bring the car over to her and awoke patient. Patient reports pain to her B/L knees. Patient reports that she has been ambulating since and was advised to come to [facility] by her [facility] [place]. Patient denies any bladder or bowel incontinence. Denies chest pain, palpitations, shortness of breath or dyspnea.  Denies nausea, vomiting, fever, chills, cough, abdominal pain, headache, dizziness, lightheadedness neck or back pain.  (20 Dec 2023 15:43)       PAST MEDICAL & SURGICAL HISTORY:  CVA (cerebral vascular accident)      Lymphoma      Osteoporosis      Seizure      Fracture  lumbar spine,  healed with conservative treatment      Neuropathy  bilateral feet      HLD (hyperlipidemia)      Depression      GERD (gastroesophageal reflux disease)      Hodgkins disease      Pulmonary embolus      Fracture, ankle  Right, s/p metal plate      H/O sinus surgery      Cataract extraction status of eye, right          Allergies: Allergies    No Known Allergies    Intolerances        Social History: Denies toxic habits including tobacco, ETOH or illicit drugs.    Family History: FAMILY HISTORY:  Family history of coronary artery disease in father (Father, Mother)  both parents s/p CABG    . No family history of strokes    Medications: MEDICATIONS  (STANDING):  anastrozole 1 milliGRAM(s) Oral daily  atorvastatin 20 milliGRAM(s) Oral at bedtime  chlorhexidine 2% Cloths 1 Application(s) Topical daily  pantoprazole    Tablet 40 milliGRAM(s) Oral before breakfast  predniSONE   Tablet 5 milliGRAM(s) Oral every other day  sertraline 50 milliGRAM(s) Oral daily  sodium chloride 0.9%. 1000 milliLiter(s) (50 mL/Hr) IV Continuous <Continuous>    MEDICATIONS  (PRN):  acetaminophen     Tablet .. 650 milliGRAM(s) Oral every 6 hours PRN Moderate Pain (4 - 6)  fluticasone propionate 50 MICROgram(s)/spray Nasal Spray 1 Spray(s) Both Nostrils two times a day PRN allergic rhinitis  melatonin 3 milliGRAM(s) Oral at bedtime PRN Insomnia      Review of Systems:  CONSTITUTIONAL:  No weight loss, fever, chills, weakness or fatigue.  HEENT:  Eyes:  No visual loss, blurred vision, double vision or yellow sclera. Ears, Nose, Throat:  No hearing loss, sneezing, congestion, runny nose or sore throat.  SKIN:  No rash or itching.  CARDIOVASCULAR:  No chest pain, chest pressure or chest discomfort. No palpitations or edema.  RESPIRATORY:  No shortness of breath, cough or sputum.  GASTROINTESTINAL:  No anorexia, nausea, vomiting or diarrhea. No abdominal pain or blood.  GENITOURINARY:  No burning on urination or incontinence   NEUROLOGICAL:  No headache, dizziness, syncope, paralysis, ataxia, numbness or tingling in the extremities. No change in bowel or bladder control. no limb weakness. no vision changes.   MUSCULOSKELETAL:  No muscle, back pain, joint pain or stiffness.  HEMATOLOGIC:  No anemia, bleeding or bruising.  LYMPHATICS:  No enlarged nodes. No history of splenectomy.  PSYCHIATRIC:  No history of depression or anxiety.  ENDOCRINOLOGIC:  No reports of sweating, cold or heat intolerance. No polyuria or polydipsia.      Vitals:  Vital Signs Last 24 Hrs  T(C): 36.6 (20 Dec 2023 21:15), Max: 37.6 (20 Dec 2023 16:39)  T(F): 97.9 (20 Dec 2023 21:15), Max: 99.6 (20 Dec 2023 16:39)  HR: 60 (20 Dec 2023 21:15) (60 - 75)  BP: 107/63 (20 Dec 2023 21:15) (107/63 - 135/59)  BP(mean): --  RR: 18 (20 Dec 2023 21:15) (18 - 18)  SpO2: 95% (20 Dec 2023 21:15) (95% - 97%)    Parameters below as of 20 Dec 2023 21:15  Patient On (Oxygen Delivery Method): room air        General Exam:   General Appearance: Appropriately dressed and in no acute distress       Head: Normocephalic, atraumatic and no dysmorphic features  Ear, Nose, and Throat: Moist mucous membranes  CVS: S1S2+  Resp: No SOB, no wheeze or rhonchi  GI: soft NT/ND  Extremities: No edema or cyanosis  Skin: No bruises or rashes     Neurological Exam:  Mental Status: Awake, alert and oriented x 3.  Able to follow simple and complex verbal commands. Able to name and repeat. fluent speech. No obvious aphasia or dysarthria noted.   Cranial Nerves: PERRL, EOMI, VFFC, sensation V1-V3 intact,  no obvious facial asymmetry, equal elevation of palate, scm/trap 5/5, tongue is midline on protrusion. no obvious papilledema on fundoscopic exam. hearing is grossly intact.   Motor: Normal bulk, tone and strength throughout. Fine finger movements were intact and symmetric. no tremors or drift noted.    Sensation: Intact to light touch and pinprick throughout. no right/left confusion. no extinction to tactile on DSS. Romberg was negative.   Reflexes: 1+ throughout at biceps, brachioradialis, triceps, patellars and ankles bilaterally and equal. No clonus. R toe and L toe were both downgoing.  Coordination: No dysmetria on FNF or HKS  Gait: deferred     Data/Labs/Imaging which I personally reviewed.     Labs:     CBC Full  -  ( 21 Dec 2023 07:29 )  WBC Count : 5.90 K/uL  RBC Count : 4.05 M/uL  Hemoglobin : 11.6 g/dL  Hematocrit : 35.7 %  Platelet Count - Automated : 178 K/uL  Mean Cell Volume : 88.1 fl  Mean Cell Hemoglobin : 28.6 pg  Mean Cell Hemoglobin Concentration : 32.5 gm/dL  Auto Neutrophil # : x  Auto Lymphocyte # : x  Auto Monocyte # : x  Auto Eosinophil # : x  Auto Basophil # : x  Auto Neutrophil % : x  Auto Lymphocyte % : x  Auto Monocyte % : x  Auto Eosinophil % : x  Auto Basophil % : x    12-21    138  |  104  |  11  ----------------------------<  102<H>  3.8   |  21<L>  |  0.63    Ca    8.6      21 Dec 2023 07:26  Phos  2.8     12-21  Mg     1.9     12-21    TPro  6.0  /  Alb  3.6  /  TBili  0.5  /  DBili  0.1  /  AST  69<H>  /  ALT  36  /  AlkPhos  50  12-21    LIVER FUNCTIONS - ( 21 Dec 2023 07:26 )  Alb: 3.6 g/dL / Pro: 6.0 g/dL / ALK PHOS: 50 U/L / ALT: 36 U/L / AST: 69 U/L / GGT: x           PT/INR - ( 20 Dec 2023 11:43 )   PT: 13.1 sec;   INR: 1.26 ratio         PTT - ( 20 Dec 2023 11:43 )  PTT:21.8 sec  Urinalysis Basic - ( 21 Dec 2023 07:26 )    Color: x / Appearance: x / SG: x / pH: x  Gluc: 102 mg/dL / Ketone: x  / Bili: x / Urobili: x   Blood: x / Protein: x / Nitrite: x   Leuk Esterase: x / RBC: x / WBC x   Sq Epi: x / Non Sq Epi: x / Bacteria: x      < from: CT Head No Cont (12.20.23 @ 12:33) >    ACC: [id number] EXAM:  CT CERVICAL SPINE   ORDERED BY:  SAMIA MILLS     ACC: [id number] EXAM:  CT MAXILLOFACIAL   ORDERED BY:  SAMIA MILLS     ACC: [id number] EXAM:  CT BRAIN   ORDERED BY:  SAMIA MILLS     ACC: [id number] EXAM:  CT 3D RECONSTRUCT W [facility]   ORDERED BY:    SAMIA MILLS     PROCEDURE DATE:  12/20/2023          INTERPRETATION:  EXAM: CT HEAD, FACIAL BONES, AND CERVICAL SPINE WITHOUT   INTRAVENOUS CONTRAST    HISTORY: Trauma, syncopal episode, currently on anticoagulation    TECHNIQUE: Multiple axial images were obtained of the head, facial bones,   and cervical spine. Sagittal and coronal reformatted images were obtained   from the axial data set. The images were reviewed in brain and bone   windows.    COMPARISON: CT of the head May 13, 2017, MRI of the head May 31, 2021    FINDINGS:    CT HEAD:  No acute intracranial hemorrhage. Areas of decreased attenuation in the   deep and periventricular white matter, compatible with chronic small   vessel disease. Mild encephalomalacia/gliotic change in the high right   frontal lobe. Chronic appearing infarcts in the left cerebellar   hemisphere. Mild parenchymal volume loss. No hydrocephalus. The   extra-axial spaces and basal cisterns are within normal limits. No   midline shift or mass effect present.    The cranial cervical junction is within normal limits. The sella is not   expanded. No depressed calvarial fracture. Prior right frontal   craniotomy. Mucosal thickening throughout the ethmoid air cells. The   visualized mastoid air cells are well aerated. The visualized orbits are   status post cataract surgery. Right frontal scalp hematoma/contusion.    CT FACIAL BONES:  The nasal bones are intact.    The orbital rims, zygomatic arches, and pterygoid plates are intact   bilaterally.    The mandible is intact. Mild arthritic changes in the bilateral TMJs.    Mucosal thickening throughout the ethmoid air cells. Probable polyp in   the right maxillary sinus. The visualized mastoid air cells are clear   bilaterally.    No fracture of the skull base or lower calvarium.      CT CERVICAL SPINE:  The atlantodental interval is within normal limits. The lateral masses   are properly aligned. No facet subluxation or malalignment at the   craniovertebral or atlantoaxial articulations. No dens fracture. No   significant prevertebral soft tissue swelling.    Subtle reversal to the cervical lordosis. Normal alignment of the   cervical vertebrae. Vertebral body height is well-maintained. Reduced   intervertebral disc height throughout the cervical spine, most   significantly C3-C5 with chronic endplate change. No jumped or perched   facets. No acute osseous fracture.    C2-C3: Subtle disc bulge. The bilateral neuroforamina are patent. No   narrowing of the spinal canal.    C3-C4: Disc osteophyte complex with uncovertebral joint hypertrophy.   Severe narrowing of the bilateral neuroforamen. No significant narrowing   of the spinal canal. Mild facet arthropathy.    C4-C5: Disc osteophyte complex with uncovertebral joint hypertrophy.   Severe narrowing of the left neural foramen and moderate to severe   narrowing of the right neural foramen. Moderate to severe narrowing of   the spinal canal. Mild facet arthropathy.    C5-C6: Disc osteophyte complex with uncovertebral joint hypertrophy.   Severe narrowing of the bilateral neuroforamen, left greater than right.   Moderate to severe narrowing of the spinal canal. Facet arthropathy.    C6-C7: Disc osteophyte complex with uncovertebral joint hypertrophy.   Severe narrowing of the bilateral neuroforamen. Mild narrowing of the   spinal canal. Facet arthropathy.    C7-T1: No large disc bulge. The bilateral neuroforamina are patent. No   significant narrowing of the spinal canal.    The paraspinal muscles are unremarkable. Soft tissue fullness in the   right vallecula.    Visualized portions of the thyroid are unremarkable.    Pleural-based calcifications in the left lung apex. Subtle groundglass   opacities in the right lung apex. Small 0.2 cm calcification in the right   lung apex.      IMPRESSION:    CT HEAD:  1.  No evidence of acute intracranial hemorrhage or midline shift.  2.  Chronic ischemic changes as discussed above.    CT FACIAL BONES:  1.  No evidence of fracture of the facial bones.  2.  Mild paranasal sinus disease.    CT CERVICAL SPINE:  1.  No evidence of acute osseous fracture or ko dislocation.  2.  Multilevel degenerative change of the cervical spine as discussed   above, most significantly at the C4-C6 levels where there is moderate to   severe narrowing of the spinal canal. In the setting of myelopathy,   recommend MRI of the cervical spine for further evaluation.  3.  Soft tissue fullness in the right vallecula. Recommend direct visual   inspection.  4.  Pleural-based and nodular calcifications in the bilateral lung apices   as discussed above, correlate with patient history. Additionally, subtle   ground glass opacities in the right lung apex. This could indicate a   developing infectious/inflammatory process. Consider designated imaging   of the chest for further evaluation if clinically indicated.    --- End of Report ---            TRINIDAD CONCEPCION MD; Attending Radiologist  This document has been electronically signed. 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 Patient reports that she awoke in yas usual state of health this morning, was supposed to go to outpatient laboratory to obtain lab work. Patient states that she told her  to bring the car to her by the elevator and does not recall further. States that she told her  she "feels unwell" and feels "off." Per  at the bedside, he found patient face down on the floor.  reports that he stepped away from patient for under 2 minutes to bring the car over to her and awoke patient. Patient reports pain to her B/L knees. Patient reports that she has been ambulating since and was advised to come to [facility] by her [facility] [place]. Patient denies any bladder or bowel incontinence. Denies chest pain, palpitations, shortness of breath or dyspnea.  Denies nausea, vomiting, fever, chills, cough, abdominal pain, headache, dizziness, lightheadedness neck or back pain.  (20 Dec 2023 15:43)       PAST MEDICAL & SURGICAL HISTORY:  CVA (cerebral vascular accident)      Lymphoma      Osteoporosis      Seizure      Fracture  lumbar spine,  healed with conservative treatment      Neuropathy  bilateral feet      HLD (hyperlipidemia)      Depression      GERD (gastroesophageal reflux disease)      Hodgkins disease      Pulmonary embolus      Fracture, ankle  Right, s/p metal plate      H/O sinus surgery      Cataract extraction status of eye, right          Allergies: Allergies    No Known Allergies    Intolerances        Social History: Denies toxic habits including tobacco, ETOH or illicit drugs.    Family History: FAMILY HISTORY:  Family history of coronary artery disease in father (Father, Mother)  both parents s/p CABG    . No family history of strokes    Medications: MEDICATIONS  (STANDING):  anastrozole 1 milliGRAM(s) Oral daily  atorvastatin 20 milliGRAM(s) Oral at bedtime  chlorhexidine 2% Cloths 1 Application(s) Topical daily  pantoprazole    Tablet 40 milliGRAM(s) Oral before breakfast  predniSONE   Tablet 5 milliGRAM(s) Oral every other day  sertraline 50 milliGRAM(s) Oral daily  sodium chloride 0.9%. 1000 milliLiter(s) (50 mL/Hr) IV Continuous <Continuous>    MEDICATIONS  (PRN):  acetaminophen     Tablet .. 650 milliGRAM(s) Oral every 6 hours PRN Moderate Pain (4 - 6)  fluticasone propionate 50 MICROgram(s)/spray Nasal Spray 1 Spray(s) Both Nostrils two times a day PRN allergic rhinitis  melatonin 3 milliGRAM(s) Oral at bedtime PRN Insomnia      Review of Systems:  CONSTITUTIONAL:  No weight loss, fever, chills, weakness or fatigue.  HEENT:  Eyes:  No visual loss, blurred vision, double vision or yellow sclera. Ears, Nose, Throat:  No hearing loss, sneezing, congestion, runny nose or sore throat.  SKIN:  No rash or itching.  CARDIOVASCULAR:  No chest pain, chest pressure or chest discomfort. No palpitations or edema.  RESPIRATORY:  No shortness of breath, cough or sputum.  GASTROINTESTINAL:  No anorexia, nausea, vomiting or diarrhea. No abdominal pain or blood.  GENITOURINARY:  No burning on urination or incontinence   NEUROLOGICAL:  No headache, dizziness, syncope, paralysis, ataxia, numbness or tingling in the extremities. No change in bowel or bladder control. no limb weakness. no vision changes.   MUSCULOSKELETAL:  No muscle, back pain, joint pain or stiffness.  HEMATOLOGIC:  No anemia, bleeding or bruising.  LYMPHATICS:  No enlarged nodes. No history of splenectomy.  PSYCHIATRIC:  No history of depression or anxiety.  ENDOCRINOLOGIC:  No reports of sweating, cold or heat intolerance. No polyuria or polydipsia.      Vitals:  Vital Signs Last 24 Hrs  T(C): 36.6 (20 Dec 2023 21:15), Max: 37.6 (20 Dec 2023 16:39)  T(F): 97.9 (20 Dec 2023 21:15), Max: 99.6 (20 Dec 2023 16:39)  HR: 60 (20 Dec 2023 21:15) (60 - 75)  BP: 107/63 (20 Dec 2023 21:15) (107/63 - 135/59)  BP(mean): --  RR: 18 (20 Dec 2023 21:15) (18 - 18)  SpO2: 95% (20 Dec 2023 21:15) (95% - 97%)    Parameters below as of 20 Dec 2023 21:15  Patient On (Oxygen Delivery Method): room air        General Exam:   General Appearance: Appropriately dressed and in no acute distress       Head: Normocephalic, atraumatic and no dysmorphic features  Ear, Nose, and Throat: Moist mucous membranes  CVS: S1S2+  Resp: No SOB, no wheeze or rhonchi  GI: soft NT/ND  Extremities: No edema or cyanosis  Skin: No bruises or rashes     Neurological Exam:  Mental Status: Awake, alert and oriented x 3.  Able to follow simple and complex verbal commands. Able to name and repeat. fluent speech. No obvious aphasia or dysarthria noted.   Cranial Nerves: PERRL, EOMI, VFFC, sensation V1-V3 intact,  no obvious facial asymmetry, equal elevation of palate, scm/trap 5/5, tongue is midline on protrusion. no obvious papilledema on fundoscopic exam. hearing is grossly intact.   Motor: Normal bulk, tone and strength throughout. Fine finger movements were intact and symmetric. no tremors or drift noted.    Sensation: Intact to light touch and pinprick throughout. no right/left confusion. no extinction to tactile on DSS. Romberg was negative.   Reflexes: 1+ throughout at biceps, brachioradialis, triceps, patellars and ankles bilaterally and equal. No clonus. R toe and L toe were both downgoing.  Coordination: No dysmetria on FNF or HKS  Gait: deferred     Data/Labs/Imaging which I personally reviewed.     Labs:     CBC Full  -  ( 21 Dec 2023 07:29 )  WBC Count : 5.90 K/uL  RBC Count : 4.05 M/uL  Hemoglobin : 11.6 g/dL  Hematocrit : 35.7 %  Platelet Count - Automated : 178 K/uL  Mean Cell Volume : 88.1 fl  Mean Cell Hemoglobin : 28.6 pg  Mean Cell Hemoglobin Concentration : 32.5 gm/dL  Auto Neutrophil # : x  Auto Lymphocyte # : x  Auto Monocyte # : x  Auto Eosinophil # : x  Auto Basophil # : x  Auto Neutrophil % : x  Auto Lymphocyte % : x  Auto Monocyte % : x  Auto Eosinophil % : x  Auto Basophil % : x    12-21    138  |  104  |  11  ----------------------------<  102<H>  3.8   |  21<L>  |  0.63    Ca    8.6      21 Dec 2023 07:26  Phos  2.8     12-21  Mg     1.9     12-21    TPro  6.0  /  Alb  3.6  /  TBili  0.5  /  DBili  0.1  /  AST  69<H>  /  ALT  36  /  AlkPhos  50  12-21    LIVER FUNCTIONS - ( 21 Dec 2023 07:26 )  Alb: 3.6 g/dL / Pro: 6.0 g/dL / ALK PHOS: 50 U/L / ALT: 36 U/L / AST: 69 U/L / GGT: x           PT/INR - ( 20 Dec 2023 11:43 )   PT: 13.1 sec;   INR: 1.26 ratio         PTT - ( 20 Dec 2023 11:43 )  PTT:21.8 sec  Urinalysis Basic - ( 21 Dec 2023 07:26 )    Color: x / Appearance: x / SG: x / pH: x  Gluc: 102 mg/dL / Ketone: x  / Bili: x / Urobili: x   Blood: x / Protein: x / Nitrite: x   Leuk Esterase: x / RBC: x / WBC x   Sq Epi: x / Non Sq Epi: x / Bacteria: x      < from: CT Head No Cont (12.20.23 @ 12:33) >    ACC: [id number] EXAM:  CT CERVICAL SPINE   ORDERED BY:  SAMIA MILLS     ACC: [id number] EXAM:  CT MAXILLOFACIAL   ORDERED BY:  SAMIA MILLS     ACC: [id number] EXAM:  CT BRAIN   ORDERED BY:  SAMIA MILLS     ACC: [id number] EXAM:  CT 3D RECONSTRUCT W [place]   ORDERED BY:    SAMIA MILLS     PROCEDURE DATE:  12/20/2023          INTERPRETATION:  EXAM: CT HEAD, FACIAL BONES, AND CERVICAL SPINE WITHOUT   INTRAVENOUS CONTRAST    HISTORY: Trauma, syncopal episode, currently on anticoagulation    TECHNIQUE: Multiple axial images were obtained of the head, facial bones,   and cervical spine. Sagittal and coronal reformatted images were obtained   from the axial data set. The images were reviewed in brain and bone   windows.    COMPARISON: CT of the head May 13, 2017, MRI of the head May 31, 2021    FINDINGS:    CT HEAD:  No acute intracranial hemorrhage. Areas of decreased attenuation in the   deep and periventricular white matter, compatible with chronic small   vessel disease. Mild encephalomalacia/gliotic change in the high right   frontal lobe. Chronic appearing infarcts in the left cerebellar   hemisphere. Mild parenchymal volume loss. No hydrocephalus. The   extra-axial spaces and basal cisterns are within normal limits. No   midline shift or mass effect present.    The cranial cervical junction is within normal limits. The sella is not   expanded. No depressed calvarial fracture. Prior right frontal   craniotomy. Mucosal thickening throughout the ethmoid air cells. The   visualized mastoid air cells are well aerated. The visualized orbits are   status post cataract surgery. Right frontal scalp hematoma/contusion.    CT FACIAL BONES:  The nasal bones are intact.    The orbital rims, zygomatic arches, and pterygoid plates are intact   bilaterally.    The mandible is intact. Mild arthritic changes in the bilateral TMJs.    Mucosal thickening throughout the ethmoid air cells. Probable polyp in   the right maxillary sinus. The visualized mastoid air cells are clear   bilaterally.    No fracture of the skull base or lower calvarium.      CT CERVICAL SPINE:  The atlantodental interval is within normal limits. The lateral masses   are properly aligned. No facet subluxation or malalignment at the   craniovertebral or atlantoaxial articulations. No dens fracture. No   significant prevertebral soft tissue swelling.    Subtle reversal to the cervical lordosis. Normal alignment of the   cervical vertebrae. Vertebral body height is well-maintained. Reduced   intervertebral disc height throughout the cervical spine, most   significantly C3-C5 with chronic endplate change. No jumped or perched   facets. No acute osseous fracture.    C2-C3: Subtle disc bulge. The bilateral neuroforamina are patent. No   narrowing of the spinal canal.    C3-C4: Disc osteophyte complex with uncovertebral joint hypertrophy.   Severe narrowing of the bilateral neuroforamen. No significant narrowing   of the spinal canal. Mild facet arthropathy.    C4-C5: Disc osteophyte complex with uncovertebral joint hypertrophy.   Severe narrowing of the left neural foramen and moderate to severe   narrowing of the right neural foramen. Moderate to severe narrowing of   the spinal canal. Mild facet arthropathy.    C5-C6: Disc osteophyte complex with uncovertebral joint hypertrophy.   Severe narrowing of the bilateral neuroforamen, left greater than right.   Moderate to severe narrowing of the spinal canal. Facet arthropathy.    C6-C7: Disc osteophyte complex with uncovertebral joint hypertrophy.   Severe narrowing of the bilateral neuroforamen. Mild narrowing of the   spinal canal. Facet arthropathy.    C7-T1: No large disc bulge. The bilateral neuroforamina are patent. No   significant narrowing of the spinal canal.    The paraspinal muscles are unremarkable. Soft tissue fullness in the   right vallecula.    Visualized portions of the thyroid are unremarkable.    Pleural-based calcifications in the left lung apex. Subtle groundglass   opacities in the right lung apex. Small 0.2 cm calcification in the right   lung apex.      IMPRESSION:    CT HEAD:  1.  No evidence of acute intracranial hemorrhage or midline shift.  2.  Chronic ischemic changes as discussed above.    CT FACIAL BONES:  1.  No evidence of fracture of the facial bones.  2.  Mild paranasal sinus disease.    CT CERVICAL SPINE:  1.  No evidence of acute osseous fracture or ko dislocation.  2.  Multilevel degenerative change of the cervical spine as discussed   above, most significantly at the C4-C6 levels where there is moderate to   severe narrowing of the spinal canal. In the setting of myelopathy,   recommend MRI of the cervical spine for further evaluation.  3.  Soft tissue fullness in the right vallecula. Recommend direct visual   inspection.  4.  Pleural-based and nodular calcifications in the bilateral lung apices   as discussed above, correlate with patient history. Additionally, subtle   ground glass opacities in the right lung apex. This could indicate a   developing infectious/inflammatory process. Consider designated imaging   of the chest for further evaluation if clinically indicated.    --- End of Report ---            TRINIDAD CONCEPCION MD; Attending Radiologist  This document has been electronically signed. Dec 20 2023  1:32PM    < end of copied text >

## 2023-12-21 NOTE — PHARMACOTHERAPY INTERVENTION NOTE - COMMENTS
Confirmed home medications with patient and pharmacy, updated in Outpatient Medication Review.    Home Medications:  acetaminophen 325 mg oral tablet: 2 tab(s) orally every 6 hours, As needed, Mild Pain (1 - 3)   anastrozole 1 mg oral tablet: 1 tab(s) orally once a day   atorvastatin 20 mg oral tablet: 1 tab(s) orally once a day  Biotin 5000 mcg oral capsule: 1 cap(s) orally once a day   dexlansoprazole 30 mg oral delayed release capsule: 1 cap(s) orally once a day   Eliquis 2.5 mg oral tablet: 1 tab(s) orally 2 times a day   predniSONE 5 mg oral delayed release tablet: 1 tab(s) orally every other day   Prolia 60 mg/mL subcutaneous solution: 60 milligram(s) subcutaneously every 6 months   sertraline 50 mg oral tablet: 1 tab(s) orally once a day   VESIcare 10 mg oral tablet: 1 tab(s) orally once a day   Vitamin D3 5000 intl units oral capsule: 1 cap(s) orally once a day    Damian Sow, PharmD  PGY1 Pharmacy Resident   Available on [facility]  SpectraLink: [phone]   Confirmed home medications with patient and pharmacy, updated in Outpatient Medication Review.    Home Medications:  acetaminophen 325 mg oral tablet: 2 tab(s) orally every 6 hours, As needed, Mild Pain (1 - 3)   anastrozole 1 mg oral tablet: 1 tab(s) orally once a day   atorvastatin 20 mg oral tablet: 1 tab(s) orally once a day  Biotin 5000 mcg oral capsule: 1 cap(s) orally once a day   dexlansoprazole 30 mg oral delayed release capsule: 1 cap(s) orally once a day   Eliquis 2.5 mg oral tablet: 1 tab(s) orally 2 times a day   predniSONE 5 mg oral delayed release tablet: 1 tab(s) orally every other day   Prolia 60 mg/mL subcutaneous solution: 60 milligram(s) subcutaneously every 6 months   sertraline 50 mg oral tablet: 1 tab(s) orally once a day   VESIcare 10 mg oral tablet: 1 tab(s) orally once a day   Vitamin D3 5000 intl units oral capsule: 1 cap(s) orally once a day    Damian Sow, PharmD  PGY1 Pharmacy Resident   Available on [facility]  SpectraLink: [phone]

## 2023-12-21 NOTE — CONSULT NOTE ADULT - SUBJECTIVE AND OBJECTIVE BOX
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 Patient reports that she awoke in yas usual state of health this morning, was supposed to go to outpatient laboratory to obtain lab work. Patient states that she told her  to bring the car to her by the elevator and does not recall further. States that she told her  she "feels unwell" and feels "off." Per  at the bedside, he found patient face down on the floor.  reports that he stepped away from patient for under 2 minutes to bring the car over to her and awoke patient. Patient reports pain to her B/L knees. Patient reports that she has been ambulating since and was advised to come to [facility] by [place] [facility] [place]. Patient denies any bladder or bowel incontinence. Denies chest pain, palpitations, shortness of breath or dyspnea.  Denies nausea, vomiting, fever, chills, cough, abdominal pain, headache, dizziness, lightheadedness neck or back pain.  (20 Dec 2023 15:43)    Hematology/Oncology called to see patient who follows with Julisa Bailey [facility] [place] the treatment and surveillance of breast ca.    PAST MEDICAL & SURGICAL HISTORY:  CVA (cerebral vascular accident)      Lymphoma      Osteoporosis      Seizure      Fracture  lumbar spine,  healed with conservative treatment      Neuropathy  bilateral feet      HLD (hyperlipidemia)      Depression      GERD (gastroesophageal reflux disease)      Hodgkins disease      Pulmonary embolus      Fracture, ankle  Right, s/p metal plate      H/O sinus surgery      Cataract extraction status of eye, right          FAMILY HISTORY:  Family history of coronary artery disease in father (Father, Mother)  both parents s/p CABG        Alochol: Denied  Smoking: Nonsmoker  Drug Use: Denied  Marital Status:         Allergies    No Known Allergies    Intolerances        MEDICATIONS  (STANDING):  anastrozole 1 milliGRAM(s) Oral daily  atorvastatin 20 milliGRAM(s) Oral at bedtime  chlorhexidine 2% Cloths 1 Application(s) Topical daily  pantoprazole    Tablet 40 milliGRAM(s) Oral before breakfast  predniSONE   Tablet 5 milliGRAM(s) Oral every other day  sertraline 50 milliGRAM(s) Oral daily  sodium chloride 0.9%. 1000 milliLiter(s) (50 mL/Hr) IV Continuous <Continuous>    MEDICATIONS  (PRN):  acetaminophen     Tablet .. 650 milliGRAM(s) Oral every 6 hours PRN Moderate Pain (4 - 6)  fluticasone propionate 50 MICROgram(s)/spray Nasal Spray 1 Spray(s) Both Nostrils two times a day PRN allergic rhinitis  melatonin 3 milliGRAM(s) Oral at bedtime PRN Insomnia      ROS  No fever, sweats, chills  No epistaxis, HA, sore throat  No CP, SOB, cough, sputum  No n/v/d, abd pain, melena, hematochezia  No edema  No rash  No anxiety  No back pain, joint pain  No bleeding, bruising  No dysuria, hematuria    T(C): 37.2 (12-21-23 @ 12:07), Max: 37.6 (12-20-23 @ 16:39)  HR: 65 (12-21-23 @ 12:07) (60 - 75)  BP: 121/60 (12-21-23 @ 12:07) (107/63 - 135/59)  RR: 18 (12-21-23 @ 12:07) (18 - 18)  SpO2: 96% (12-21-23 @ 12:07) (95% - 97%)  Wt(kg): --    PE  NAD  Awake, alert  Anicteric, MMM  RRR  CTAB  Abd soft, NT, ND  No c/c/e  Ecchymoses to face  FROM                          11.6   5.90  )-----------( 178      ( 21 Dec 2023 07:29 )             35.7       12-21    x   |  x   |  x   ----------------------------<  x   x    |  x   |  0.69    Ca    8.6      21 Dec 2023 07:26  Phos  2.8     12-21  Mg     1.9     12-21    TPro  6.0  /  Alb  3.7  /  TBili  0.4  /  DBili  0.1  /  AST  68<H>  /  ALT  35  /  AlkPhos  50  12-21   Reason for consult: breast cancer    HPI:  Patient is a 78 year old Female with a PMHx of Left Breast CA S/P L Lumpectomy, on Anastrazole, Right breast ADH S/P resection and on Tamoxifen X 5 years, Lymphoma, history of DVT/PE, 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 Patient reports that she awoke in yas usual state of health this morning, was supposed to go to outpatient laboratory to obtain lab work. Patient states that she told her  to bring the car to her by the elevator and does not recall further. States that she told her  she "feels unwell" and feels "off." Per  at the bedside, he found patient face down on the floor.  reports that he stepped away from patient for under 2 minutes to bring the car over to her and awoke patient. Patient reports pain to her B/L knees. Patient reports that she has been ambulating since and was advised to come to [facility] by her [facility] team. Patient denies any bladder or bowel incontinence. Denies chest pain, palpitations, shortness of breath or dyspnea.  Denies nausea, vomiting, fever, chills, cough, abdominal pain, headache, dizziness, lightheadedness neck or back pain.  (20 Dec 2023 15:43)    Hematology/Oncology called to see patient who follows with Julisa Bailey [facility] for the treatment and surveillance of breast ca.    PAST MEDICAL & SURGICAL HISTORY:  CVA (cerebral vascular accident)      Lymphoma      Osteoporosis      Seizure      Fracture  lumbar spine,  healed with conservative treatment      Neuropathy  bilateral feet      HLD (hyperlipidemia)      Depression      GERD (gastroesophageal reflux disease)      Hodgkins disease      Pulmonary embolus      Fracture, ankle  Right, s/p metal plate      H/O sinus surgery      Cataract extraction status of eye, right          FAMILY HISTORY:  Family history of coronary artery disease in father (Father, Mother)  both parents s/p CABG        Alochol: Denied  Smoking: Nonsmoker  Drug Use: Denied  Marital Status:         Allergies    No Known Allergies    Intolerances        MEDICATIONS  (STANDING):  anastrozole 1 milliGRAM(s) Oral daily  atorvastatin 20 milliGRAM(s) Oral at bedtime  chlorhexidine 2% Cloths 1 Application(s) Topical daily  pantoprazole    Tablet 40 milliGRAM(s) Oral before breakfast  predniSONE   Tablet 5 milliGRAM(s) Oral every other day  sertraline 50 milliGRAM(s) Oral daily  sodium chloride 0.9%. 1000 milliLiter(s) (50 mL/Hr) IV Continuous <Continuous>    MEDICATIONS  (PRN):  acetaminophen     Tablet .. 650 milliGRAM(s) Oral every 6 hours PRN Moderate Pain (4 - 6)  fluticasone propionate 50 MICROgram(s)/spray Nasal Spray 1 Spray(s) Both Nostrils two times a day PRN allergic rhinitis  melatonin 3 milliGRAM(s) Oral at bedtime PRN Insomnia      ROS  No fever, sweats, chills  No epistaxis, HA, sore throat  No CP, SOB, cough, sputum  No n/v/d, abd pain, melena, hematochezia  No edema  No rash  No anxiety  No back pain, joint pain  No bleeding, bruising  No dysuria, hematuria    T(C): 37.2 (12-21-23 @ 12:07), Max: 37.6 (12-20-23 @ 16:39)  HR: 65 (12-21-23 @ 12:07) (60 - 75)  BP: 121/60 (12-21-23 @ 12:07) (107/63 - 135/59)  RR: 18 (12-21-23 @ 12:07) (18 - 18)  SpO2: 96% (12-21-23 @ 12:07) (95% - 97%)  Wt(kg): --    PE  NAD  Awake, alert  Anicteric, MMM  RRR  CTAB  Abd soft, NT, ND  No c/c/e  Ecchymoses to face  FROM                          11.6   5.90  )-----------( 178      ( 21 Dec 2023 07:29 )             35.7       12-21    x   |  x   |  x   ----------------------------<  x   x    |  x   |  0.69    Ca    8.6      21 Dec 2023 07:26  Phos  2.8     12-21  Mg     1.9     12-21    TPro  6.0  /  Alb  3.7  /  TBili  0.4  /  DBili  0.1  /  AST  68<H>  /  ALT  35  /  AlkPhos  50  12-21

## 2023-12-22 LAB
ALBUMIN SERPL ELPH-MCNC: 3.7 G/DL — SIGNIFICANT CHANGE UP (ref 3.3–5)
ALBUMIN SERPL ELPH-MCNC: 3.7 G/DL — SIGNIFICANT CHANGE UP (ref 3.3–5)
ALP SERPL-CCNC: 50 U/L — SIGNIFICANT CHANGE UP (ref 40–120)
ALP SERPL-CCNC: 50 U/L — SIGNIFICANT CHANGE UP (ref 40–120)
ALT FLD-CCNC: 37 U/L — SIGNIFICANT CHANGE UP (ref 10–45)
ALT FLD-CCNC: 37 U/L — SIGNIFICANT CHANGE UP (ref 10–45)
ANION GAP SERPL CALC-SCNC: 13 MMOL/L — SIGNIFICANT CHANGE UP (ref 5–17)
ANION GAP SERPL CALC-SCNC: 13 MMOL/L — SIGNIFICANT CHANGE UP (ref 5–17)
AST SERPL-CCNC: 62 U/L — HIGH (ref 10–40)
AST SERPL-CCNC: 62 U/L — HIGH (ref 10–40)
BILIRUB DIRECT SERPL-MCNC: <0.1 MG/DL — SIGNIFICANT CHANGE UP (ref 0–0.3)
BILIRUB DIRECT SERPL-MCNC: <0.1 MG/DL — SIGNIFICANT CHANGE UP (ref 0–0.3)
BILIRUB INDIRECT FLD-MCNC: >0.2 MG/DL — SIGNIFICANT CHANGE UP (ref 0.2–1)
BILIRUB INDIRECT FLD-MCNC: >0.2 MG/DL — SIGNIFICANT CHANGE UP (ref 0.2–1)
BILIRUB SERPL-MCNC: 0.3 MG/DL — SIGNIFICANT CHANGE UP (ref 0.2–1.2)
BILIRUB SERPL-MCNC: 0.3 MG/DL — SIGNIFICANT CHANGE UP (ref 0.2–1.2)
BUN SERPL-MCNC: 17 MG/DL — SIGNIFICANT CHANGE UP (ref 7–23)
BUN SERPL-MCNC: 17 MG/DL — SIGNIFICANT CHANGE UP (ref 7–23)
CALCIUM SERPL-MCNC: 8.4 MG/DL — SIGNIFICANT CHANGE UP (ref 8.4–10.5)
CALCIUM SERPL-MCNC: 8.4 MG/DL — SIGNIFICANT CHANGE UP (ref 8.4–10.5)
CHLORIDE SERPL-SCNC: 105 MMOL/L — SIGNIFICANT CHANGE UP (ref 96–108)
CHLORIDE SERPL-SCNC: 105 MMOL/L — SIGNIFICANT CHANGE UP (ref 96–108)
CO2 SERPL-SCNC: 22 MMOL/L — SIGNIFICANT CHANGE UP (ref 22–31)
CO2 SERPL-SCNC: 22 MMOL/L — SIGNIFICANT CHANGE UP (ref 22–31)
CREAT SERPL-MCNC: 0.64 MG/DL — SIGNIFICANT CHANGE UP (ref 0.5–1.3)
EGFR: 91 ML/MIN/1.73M2 — SIGNIFICANT CHANGE UP
GLUCOSE SERPL-MCNC: 111 MG/DL — HIGH (ref 70–99)
GLUCOSE SERPL-MCNC: 111 MG/DL — HIGH (ref 70–99)
HCT VFR BLD CALC: 36.4 % — SIGNIFICANT CHANGE UP (ref 34.5–45)
HCT VFR BLD CALC: 36.4 % — SIGNIFICANT CHANGE UP (ref 34.5–45)
HGB BLD-MCNC: 11.7 G/DL — SIGNIFICANT CHANGE UP (ref 11.5–15.5)
HGB BLD-MCNC: 11.7 G/DL — SIGNIFICANT CHANGE UP (ref 11.5–15.5)
MCHC RBC-ENTMCNC: 28.5 PG — SIGNIFICANT CHANGE UP (ref 27–34)
MCHC RBC-ENTMCNC: 28.5 PG — SIGNIFICANT CHANGE UP (ref 27–34)
MCHC RBC-ENTMCNC: 32.1 GM/DL — SIGNIFICANT CHANGE UP (ref 32–36)
MCHC RBC-ENTMCNC: 32.1 GM/DL — SIGNIFICANT CHANGE UP (ref 32–36)
MCV RBC AUTO: 88.6 FL — SIGNIFICANT CHANGE UP (ref 80–100)
MCV RBC AUTO: 88.6 FL — SIGNIFICANT CHANGE UP (ref 80–100)
NRBC # BLD: 0 /100 WBCS — SIGNIFICANT CHANGE UP (ref 0–0)
NRBC # BLD: 0 /100 WBCS — SIGNIFICANT CHANGE UP (ref 0–0)
PLATELET # BLD AUTO: 162 K/UL — SIGNIFICANT CHANGE UP (ref 150–400)
PLATELET # BLD AUTO: 162 K/UL — SIGNIFICANT CHANGE UP (ref 150–400)
POTASSIUM SERPL-MCNC: 3.7 MMOL/L — SIGNIFICANT CHANGE UP (ref 3.5–5.3)
POTASSIUM SERPL-MCNC: 3.7 MMOL/L — SIGNIFICANT CHANGE UP (ref 3.5–5.3)
POTASSIUM SERPL-SCNC: 3.7 MMOL/L — SIGNIFICANT CHANGE UP (ref 3.5–5.3)
POTASSIUM SERPL-SCNC: 3.7 MMOL/L — SIGNIFICANT CHANGE UP (ref 3.5–5.3)
PROT SERPL-MCNC: 6 G/DL — SIGNIFICANT CHANGE UP (ref 6–8.3)
PROT SERPL-MCNC: 6 G/DL — SIGNIFICANT CHANGE UP (ref 6–8.3)
RBC # BLD: 4.11 M/UL — SIGNIFICANT CHANGE UP (ref 3.8–5.2)
RBC # BLD: 4.11 M/UL — SIGNIFICANT CHANGE UP (ref 3.8–5.2)
RBC # FLD: 14.6 % — HIGH (ref 10.3–14.5)
RBC # FLD: 14.6 % — HIGH (ref 10.3–14.5)
SODIUM SERPL-SCNC: 140 MMOL/L — SIGNIFICANT CHANGE UP (ref 135–145)
SODIUM SERPL-SCNC: 140 MMOL/L — SIGNIFICANT CHANGE UP (ref 135–145)
TROPONIN T, HIGH SENSITIVITY RESULT: 12 NG/L — SIGNIFICANT CHANGE UP (ref 0–51)
TROPONIN T, HIGH SENSITIVITY RESULT: 12 NG/L — SIGNIFICANT CHANGE UP (ref 0–51)
WBC # BLD: 4.33 K/UL — SIGNIFICANT CHANGE UP (ref 3.8–10.5)
WBC # BLD: 4.33 K/UL — SIGNIFICANT CHANGE UP (ref 3.8–10.5)
WBC # FLD AUTO: 4.33 K/UL — SIGNIFICANT CHANGE UP (ref 3.8–10.5)
WBC # FLD AUTO: 4.33 K/UL — SIGNIFICANT CHANGE UP (ref 3.8–10.5)

## 2023-12-22 PROCEDURE — 70549 MR ANGIOGRAPH NECK W/O&W/DYE: CPT | Mod: 26

## 2023-12-22 PROCEDURE — 72156 MRI NECK SPINE W/O & W/DYE: CPT | Mod: 26

## 2023-12-22 PROCEDURE — 70553 MRI BRAIN STEM W/O & W/DYE: CPT | Mod: 26

## 2023-12-22 PROCEDURE — 70546 MR ANGIOGRAPH HEAD W/O&W/DYE: CPT | Mod: 26,59

## 2023-12-22 RX ORDER — ATORVASTATIN CALCIUM 80 MG/1
40 TABLET, FILM COATED ORAL AT BEDTIME
Refills: 0 | Status: DISCONTINUED | OUTPATIENT
Start: 2023-12-22 | End: 2023-12-27

## 2023-12-22 RX ORDER — ASPIRIN/CALCIUM CARB/MAGNESIUM 324 MG
81 TABLET ORAL DAILY
Refills: 0 | Status: DISCONTINUED | OUTPATIENT
Start: 2023-12-22 | End: 2023-12-27

## 2023-12-22 RX ORDER — APIXABAN 2.5 MG/1
2.5 TABLET, FILM COATED ORAL EVERY 12 HOURS
Refills: 0 | Status: DISCONTINUED | OUTPATIENT
Start: 2023-12-22 | End: 2023-12-27

## 2023-12-22 RX ADMIN — Medication 650 MILLIGRAM(S): at 05:37

## 2023-12-22 RX ADMIN — ANASTROZOLE 1 MILLIGRAM(S): 1 TABLET ORAL at 12:07

## 2023-12-22 RX ADMIN — ATORVASTATIN CALCIUM 40 MILLIGRAM(S): 80 TABLET, FILM COATED ORAL at 21:14

## 2023-12-22 RX ADMIN — CHLORHEXIDINE GLUCONATE 1 APPLICATION(S): 213 SOLUTION TOPICAL at 12:07

## 2023-12-22 RX ADMIN — SERTRALINE 50 MILLIGRAM(S): 25 TABLET, FILM COATED ORAL at 12:07

## 2023-12-22 RX ADMIN — APIXABAN 2.5 MILLIGRAM(S): 2.5 TABLET, FILM COATED ORAL at 18:23

## 2023-12-22 RX ADMIN — PANTOPRAZOLE SODIUM 40 MILLIGRAM(S): 20 TABLET, DELAYED RELEASE ORAL at 05:36

## 2023-12-22 RX ADMIN — Medication 81 MILLIGRAM(S): at 12:07

## 2023-12-22 RX ADMIN — Medication 100 MILLIGRAM(S): at 05:44

## 2023-12-22 RX ADMIN — HEPARIN SODIUM 5000 UNIT(S): 5000 INJECTION INTRAVENOUS; SUBCUTANEOUS at 05:41

## 2023-12-22 RX ADMIN — Medication 650 MILLIGRAM(S): at 06:15

## 2023-12-22 NOTE — PHYSICAL THERAPY INITIAL EVALUATION ADULT - ADDITIONAL COMMENTS
Patient reported that she resides with her spouse in an apartment ([facility]) in [place], [place]. There is elevator access. Prior to admission pt reports being independent of all ADL's & functional mobility without AD. Pt owns RW, cane and grab bars. Pt has walk in shower. Patient reported that she resides with her spouse in an apartment ([facility]) in [place], [place]. There is elevator access. Prior to admission pt reports being independent of all ADL's & functional mobility without AD. Pt owns RW, cane and grab bars. Pt has walk in shower.

## 2023-12-22 NOTE — PHYSICAL THERAPY INITIAL EVALUATION ADULT - TRANSFER TRAINING, PT EVAL
GOAL: Pt will perform ALL transfers with independence, with out assistive device as needed, in 2 weeks.

## 2023-12-22 NOTE — PROGRESS NOTE ADULT - NS ATTEND AMEND GEN_ALL_CORE FT
78 year old female with h/o b/l PE and DVT on eliquis,  DLBCL s/p RCHOP x6 cycles with a CR 5/2011 and left breast cancer on adjuvant anastrazole admitted for syncope- presumed vasovagal, found to be COVID+ initially on remdesivir now off. MRI Brain showing an acute punctate infarct in the left posterior temporal lobe. Negative MRA H/N. ASA added and resumed her Eliquis. Vascular and Neuro following. [place] [facility] [place]. 78 year old female with h/o b/l PE and DVT on eliquis,  DLBCL s/p RCHOP x6 cycles with a CR 5/2011 and left breast cancer on adjuvant anastrazole admitted for syncope- presumed vasovagal, found to be COVID+ initially on remdesivir now off. MRI Brain showing an acute punctate infarct in the left posterior temporal lobe. Negative MRA H/N. ASA added and resumed her Eliquis. Vascular and Neuro following. Follow up with [place]. Pierre at [facility] [place].

## 2023-12-22 NOTE — PROGRESS NOTE ADULT - SUBJECTIVE AND OBJECTIVE BOX
Patient is a 78y old  Female who presents with a chief complaint of Syncope (22 Dec 2023 11:36)    Pt seen and examined. Reports difficulty grasping things, weakness.    MEDICATIONS  (STANDING):  anastrozole 1 milliGRAM(s) Oral daily  apixaban 2.5 milliGRAM(s) Oral every 12 hours  aspirin enteric coated 81 milliGRAM(s) Oral daily  atorvastatin 20 milliGRAM(s) Oral at bedtime  chlorhexidine 2% Cloths 1 Application(s) Topical daily  pantoprazole    Tablet 40 milliGRAM(s) Oral before breakfast  predniSONE   Tablet 5 milliGRAM(s) Oral every other day  sertraline 50 milliGRAM(s) Oral daily  sodium chloride 0.9%. 1000 milliLiter(s) (50 mL/Hr) IV Continuous <Continuous>    MEDICATIONS  (PRN):  acetaminophen     Tablet .. 650 milliGRAM(s) Oral every 6 hours PRN Moderate Pain (4 - 6)  benzonatate 100 milliGRAM(s) Oral every 8 hours PRN Cough  fluticasone propionate 50 MICROgram(s)/spray Nasal Spray 1 Spray(s) Both Nostrils two times a day PRN allergic rhinitis  melatonin 3 milliGRAM(s) Oral at bedtime PRN Insomnia      Vital Signs Last 24 Hrs  T(C): 36.6 (22 Dec 2023 11:40), Max: 37.1 (21 Dec 2023 21:01)  T(F): 97.9 (22 Dec 2023 11:40), Max: 98.7 (21 Dec 2023 21:01)  HR: 57 (22 Dec 2023 11:40) (57 - 64)  BP: 107/61 (22 Dec 2023 11:40) (107/61 - 123/59)  BP(mean): --  RR: 18 (22 Dec 2023 11:40) (18 - 18)  SpO2: 95% (22 Dec 2023 11:40) (94% - 95%)    Parameters below as of 22 Dec 2023 11:40  Patient On (Oxygen Delivery Method): room air        PE  NAD  Awake, alert  Anicteric, MMM  No c/c/e  Bruising to face  FROM                          11.7   4.33  )-----------( 162      ( 22 Dec 2023 05:57 )             36.4       12-22    140  |  105  |  17  ----------------------------<  111<H>  3.7   |  22  |  0.64    Ca    8.4      22 Dec 2023 05:56  Phos  2.8     12-21  Mg     1.9     12-21    TPro  6.0  /  Alb  3.7  /  TBili  0.3  /  DBili  <0.1  /  AST  62<H>  /  ALT  37  /  AlkPhos  50  12-22      ACC: [id number] EXAM:  MR ANGIO BRAIN WAW IC   ORDERED BY: LUCIANA VEGA     ACC: [id number] EXAM:  MR ANGIO NECK WAW IC   ORDERED BY: LUCIANA VEGA     ACC: [id number] EXAM:  MR BRAIN WAW IC   ORDERED BY: LUCIANA VEGA     PROCEDURE DATE:  12/22/2023          INTERPRETATION:  Exam Type: MRI BRAIN WITH AND WITHOUT CONTRAST , MRA COW   WITHOUT CONTRAST, MRA NECK WITH AND WITHOUT CONTRAST  Indication: Syncope, R/O Mets, H/O CNS vasculitis  Comparison:CT head 12/20/2023    Technique: Multiplanar MR imaging of the brain was obtained with and   without contrast. Time of flight MRA of the neck with and without   contrast and [place] without contrast were obtained. 7.5  cc of   gadavist was administered. 0  cc was discarded.    Findings:    MRI brain: There is a punctate focus of restricted diffusion within the   left posterior temporal lobe, compatible with an acute infarct.   Encephalomalacia in the left cerebellum is present with extensive old   hemorrhagic blood products. Similarly, there is encephalomalacia with   chronic appearing hemorrhagic blood products within the medial right   cerebellum. 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 There are multiple small   foci of T2/FLAIR hyperintense signal in the periventricular and   subcortical white matter of the bilateral cerebri, suggestive of mild to   moderate chronic microvascular ischemic changes. There is no abnormal   enhancement.    Prior right frontal craniotomy with encephalomalacia/gliosis in the   underlying right anterolateral frontal lobe.    Cerebellar tonsils are in normal location. The intracranial flow voids   are normal in appearance. Visualized paranasal sinuses and mastoids are   normal in signal.    MRA of the [place] demonstrates normal antegrade flow in the   major intracranial arteries. No flow gap is seen to suggest high grade   stenosis. No evidence of medium or large sized aneurysm.    MRA of the neck demonstrates normal antegrade flow in the bilateral   common carotid, cervical internal carotid and vertebral arteries. No flow   gap is seen to suggest high grade stenosis.        IMPRESSION:    MRI brain: Punctate acute infarct in the left posterior temporal lobe.    Encephalomalacia in the left cerebellum is present with extensive old   hemorrhagic blood products. Similarly, there is encephalomalacia with   chronic appearing hemorrhagic blood products within the medial right   cerebellum. There are multiple small foci of T2/FLAIR hyperintense signal   in the periventricular and subcortical white matter of the bilateral   cerebri, suggestive of mild to moderate chronic microvascular ischemic   changes.    Prior right frontal craniotomy with encephalomalacia/gliosis in the   underlying right anterolateral frontal lobe.    MRA brain: No hemodynamically significant stenosis    MRA neck: No hemodynamically significant stenosis    --- End of Report ---    PHILL CATHERINE MD; Attending Radiologist  This document has been electronically signed. Dec 22 2023 10:14AM   Patient is a 78y old  Female who presents with a chief complaint of Syncope (22 Dec 2023 11:36)    Pt seen and examined. Reports difficulty grasping things, weakness.    MEDICATIONS  (STANDING):  anastrozole 1 milliGRAM(s) Oral daily  apixaban 2.5 milliGRAM(s) Oral every 12 hours  aspirin enteric coated 81 milliGRAM(s) Oral daily  atorvastatin 20 milliGRAM(s) Oral at bedtime  chlorhexidine 2% Cloths 1 Application(s) Topical daily  pantoprazole    Tablet 40 milliGRAM(s) Oral before breakfast  predniSONE   Tablet 5 milliGRAM(s) Oral every other day  sertraline 50 milliGRAM(s) Oral daily  sodium chloride 0.9%. 1000 milliLiter(s) (50 mL/Hr) IV Continuous <Continuous>    MEDICATIONS  (PRN):  acetaminophen     Tablet .. 650 milliGRAM(s) Oral every 6 hours PRN Moderate Pain (4 - 6)  benzonatate 100 milliGRAM(s) Oral every 8 hours PRN Cough  fluticasone propionate 50 MICROgram(s)/spray Nasal Spray 1 Spray(s) Both Nostrils two times a day PRN allergic rhinitis  melatonin 3 milliGRAM(s) Oral at bedtime PRN Insomnia      Vital Signs Last 24 Hrs  T(C): 36.6 (22 Dec 2023 11:40), Max: 37.1 (21 Dec 2023 21:01)  T(F): 97.9 (22 Dec 2023 11:40), Max: 98.7 (21 Dec 2023 21:01)  HR: 57 (22 Dec 2023 11:40) (57 - 64)  BP: 107/61 (22 Dec 2023 11:40) (107/61 - 123/59)  BP(mean): --  RR: 18 (22 Dec 2023 11:40) (18 - 18)  SpO2: 95% (22 Dec 2023 11:40) (94% - 95%)    Parameters below as of 22 Dec 2023 11:40  Patient On (Oxygen Delivery Method): room air        PE  NAD  Awake, alert  Anicteric, MMM  No c/c/e  Bruising to face  FROM                          11.7   4.33  )-----------( 162      ( 22 Dec 2023 05:57 )             36.4       12-22    140  |  105  |  17  ----------------------------<  111<H>  3.7   |  22  |  0.64    Ca    8.4      22 Dec 2023 05:56  Phos  2.8     12-21  Mg     1.9     12-21    TPro  6.0  /  Alb  3.7  /  TBili  0.3  /  DBili  <0.1  /  AST  62<H>  /  ALT  37  /  AlkPhos  50  12-22      ACC: [id number] EXAM:  MR ANGIO BRAIN WAW IC   ORDERED BY: LUCIANA VEGA     ACC: [id number] EXAM:  MR ANGIO NECK WAW IC   ORDERED BY: LUCIANA VEGA     ACC: [id number] EXAM:  MR BRAIN WAW IC   ORDERED BY: LUCIANA VEGA     PROCEDURE DATE:  12/22/2023          INTERPRETATION:  Exam Type: MRI BRAIN WITH AND WITHOUT CONTRAST , MRA COW   WITHOUT CONTRAST, MRA NECK WITH AND WITHOUT CONTRAST  Indication: Syncope, R/O Mets, H/O CNS vasculitis  Comparison:CT head 12/20/2023    Technique: Multiplanar MR imaging of the brain was obtained with and   without contrast. Time of flight MRA of the neck with and without   contrast and [place] without contrast were obtained. 7.5  cc of   gadavist was administered. 0  cc was discarded.    Findings:    MRI brain: There is a punctate focus of restricted diffusion within the   left posterior temporal lobe, compatible with an acute infarct.   Encephalomalacia in the left cerebellum is present with extensive old   hemorrhagic blood products. Similarly, there is encephalomalacia with   chronic appearing hemorrhagic blood products within the medial right   cerebellum. 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 There are multiple small   foci of T2/FLAIR hyperintense signal in the periventricular and   subcortical white matter of the bilateral cerebri, suggestive of mild to   moderate chronic microvascular ischemic changes. There is no abnormal   enhancement.    Prior right frontal craniotomy with encephalomalacia/gliosis in the   underlying right anterolateral frontal lobe.    Cerebellar tonsils are in normal location. The intracranial flow voids   are normal in appearance. Visualized paranasal sinuses and mastoids are   normal in signal.    MRA of the [place] demonstrates normal antegrade flow in the   major intracranial arteries. No flow gap is seen to suggest high grade   stenosis. No evidence of medium or large sized aneurysm.    MRA of the neck demonstrates normal antegrade flow in the bilateral   common carotid, cervical internal carotid and vertebral arteries. No flow   gap is seen to suggest high grade stenosis.        IMPRESSION:    MRI brain: Punctate acute infarct in the left posterior temporal lobe.    Encephalomalacia in the left cerebellum is present with extensive old   hemorrhagic blood products. Similarly, there is encephalomalacia with   chronic appearing hemorrhagic blood products within the medial right   cerebellum. There are multiple small foci of T2/FLAIR hyperintense signal   in the periventricular and subcortical white matter of the bilateral   cerebri, suggestive of mild to moderate chronic microvascular ischemic   changes.    Prior right frontal craniotomy with encephalomalacia/gliosis in the   underlying right anterolateral frontal lobe.    MRA brain: No hemodynamically significant stenosis    MRA neck: No hemodynamically significant stenosis    --- End of Report ---    PHILL CATHERINE MD; Attending Radiologist  This document has been electronically signed. Dec 22 2023 10:14AM

## 2023-12-22 NOTE — PROGRESS NOTE ADULT - ASSESSMENT
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 Patient reports that she awoke in yas usual state of health this morning, was supposed to go to outpatient laboratory to obtain lab work. Patient states that she told her  to bring the car to her by the elevator and does not recall further. States that she told her  she "feels unwell" and feels "off." Per  at the bedside, he found patient face down on the floor.  reports that he stepped away from patient for under 2 minutes to bring the car over to her and awoke patient. Patient reports pain to her B/L knees. Patient reports that she has been ambulating since and was advised to come to [facility] by her [facility] team. Patient denies any bladder or bowel incontinence. Denies chest pain, palpitations, shortness of breath or dyspnea.  Denies nausea, vomiting, fever, chills, cough, abdominal pain, headache, dizziness, lightheadedness neck or back pain.       Syncope  - Pt with syncope --> Likely due to Mod-Severe AS versus infectious etiology, however rule out other etiologies   - 8/2023 TTE w/ Preserved EF, No WMA, Grade II Diastolic dysfunction, Mod-Severe AS  - CT Head / C-Spine / Maxillofacial and 3D as noted   - Orthostatics negative; S/P will start gentle IVF, C/w PO hydration   - Carotid Duplex  with mild calcified plaque, without significant stenosis   - TTE w/ BUBBLE study -- pending    - EEG (Pt w/ history of Seizures 10 years ago, not on AED) --> Switched to routine as per discussion with neuro   - MR head, MR A H/N --> w/ Punctate Acute Infarct mild to moderate microvascular ischemic change m, prior OR changes, neg for significant stenosis --> Started on ASA 81 Mg PO Qd and Eliquis 2.5 BID resumed    - MR C-spine w/ multi-level degenerative changes   - Trop X 3 negative, trend    - Monitor on telemetry   - Neuro checks per protocol   - Fall precautions   - Neuro, EP and Cardio evaluations appreciated; F/u recs --> EP to follow up for extended cardiac monitoring     Acute CVA  - Seen on MR  - ASA 81 Mg PO Qd started and Eliquis 2.5 BID resume per discussion w/ Neuro/Cardio  - EP to follow up for cardiac monitoring  - TTE w/ Bubble pending  - Lipitor increased to 40   - Neuro eval appreciated; F/u recs    COVID +  - Patient with cough, per pharmacy, patient does not meet requirement for RDV per policy at this time. Continue to monitor symptoms at present   - Procal neg, ESR, CRP, Ferritin, D-dimer noted --> On AC  - CT Chest w/ DYLAN GG 4mm nodule, additional nodules, trace pericardial effusion, L breast lesion, Neg for PNA    - Incentive spirometer and Tessalon Perle PRN   - Hold off on additional steroids (Pt on steroids at home) as patient is not hypoxic, on RA   - Monitor O2 saturation; Supplement PRN to maintain > 90%    Leukocytosis   - Pt recently completed prolonged course of ABX on 12/16  - UCx --> negative,  BCx2 in lab   - Trend CBC, temp curve, VS     Mod-Severe AS, Trace pericardial effusion   - Seen on 8/2023 TTE  - Check repeat TTE ---> Pending   - Monitor volume status   - Cardio eval consulted; F/u recs     Knee Pain   - Pt with chronic knee pain, Osteoporosis   - B/L Knees with Ecchymosis --> X-rays neg for fracture     Breast CA, Lymphoma   - Follows with [facility] and Uro-Gyn   - CT w/ breast lesion, F/u with oncology for further management   - C/w Anastrazole  - Heme/Onc eval appreciated; F/u recs      H/O DVT PE  - Last dose of Eliquis on 12/19 PM, resumed   - Hold in view of scalp hematoma    H/O CVA, CNS Vasculitis   - C/w Prednisone  - Neuro eval appreciated; F/u recs     HLD  - Lipid panel; C/w Statin therapy --> increased to 40     GERD  - C/w Anti-Acid therapeutic interchange while admitted     PPX  - Eliquis on hold in view of hematoma      Discussed in detail with patient.   Discussed with Attending, Neuro and ACP.  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 Patient reports that she awoke in yas usual state of health this morning, was supposed to go to outpatient laboratory to obtain lab work. Patient states that she told her  to bring the car to her by the elevator and does not recall further. States that she told her  she "feels unwell" and feels "off." Per  at the bedside, he found patient face down on the floor.  reports that he stepped away from patient for under 2 minutes to bring the car over to her and awoke patient. Patient reports pain to her B/L knees. Patient reports that she has been ambulating since and was advised to come to [facility] by her [facility] team. Patient denies any bladder or bowel incontinence. Denies chest pain, palpitations, shortness of breath or dyspnea.  Denies nausea, vomiting, fever, chills, cough, abdominal pain, headache, dizziness, lightheadedness neck or back pain.       Syncope  - Pt with syncope --> Likely due to Mod-Severe AS versus infectious etiology, however rule out other etiologies   - 8/2023 TTE w/ Preserved EF, No WMA, Grade II Diastolic dysfunction, Mod-Severe AS  - CT Head / C-Spine / Maxillofacial and 3D as noted   - Orthostatics negative; S/P will start gentle IVF, C/w PO hydration   - Carotid Duplex  with mild calcified plaque, without significant stenosis   - TTE w/ BUBBLE study -- pending    - EEG (Pt w/ history of Seizures 10 years ago, not on AED) --> Switched to routine as per discussion with neuro   - MR head, MR A H/N --> w/ Punctate Acute Infarct mild to moderate microvascular ischemic change m, prior OR changes, neg for significant stenosis --> Started on ASA 81 Mg PO Qd and Eliquis 2.5 BID resumed    - MR C-spine w/ multi-level degenerative changes   - Trop X 3 negative, trend    - Monitor on telemetry   - Neuro checks per protocol   - Fall precautions   - Neuro, EP and Cardio evaluations appreciated; F/u recs --> EP to follow up for extended cardiac monitoring     Acute CVA  - Seen on MR  - ASA 81 Mg PO Qd started and Eliquis 2.5 BID resume per discussion w/ Neuro/Cardio  - EP to follow up for cardiac monitoring  - TTE w/ Bubble pending  - Lipitor increased to 40   - Neuro eval appreciated; F/u recs    COVID +  - Patient with cough, per pharmacy, patient does not meet requirement for RDV per policy at this time. Continue to monitor symptoms at present   - Procal neg, ESR, CRP, Ferritin, D-dimer noted --> On AC  - CT Chest w/ DYLAN GG 4mm nodule, additional nodules, trace pericardial effusion, L breast lesion, Neg for PNA    - Incentive spirometer and Tessalon Perle PRN   - Hold off on additional steroids (Pt on steroids at home) as patient is not hypoxic, on RA   - Monitor O2 saturation; Supplement PRN to maintain > 90%    Leukocytosis   - Pt recently completed prolonged course of ABX on 12/16  - UCx --> negative,  BCx2 in lab   - Trend CBC, temp curve, VS     Mod-Severe AS, Trace pericardial effusion   - Seen on 8/2023 TTE  - Check repeat TTE ---> Pending   - Monitor volume status   - Cardio eval consulted; F/u recs     Knee Pain   - Pt with chronic knee pain, Osteoporosis   - B/L Knees with Ecchymosis --> X-rays neg for fracture     Breast CA, Lymphoma   - Follows with [facility] and Uro-Gyn   - CT w/ breast lesion, F/u with oncology for further management   - C/w Anastrazole  - Heme/Onc eval appreciated; F/u recs      H/O DVT PE  - Last dose of Eliquis on 12/19 PM, resumed   - Hold in view of scalp hematoma    H/O CVA, CNS Vasculitis   - C/w Prednisone  - Neuro eval appreciated; F/u recs     HLD  - Lipid panel; C/w Statin therapy --> increased to 40     GERD  - C/w Anti-Acid therapeutic interchange while admitted     PPX  - Eliquis on hold in view of hematoma      Discussed in detail with patient.   Discussed with Attending, Neuro and ACP.

## 2023-12-22 NOTE — PROGRESS NOTE ADULT - SUBJECTIVE AND OBJECTIVE BOX
Name of Patient : VINCENT ENGLAND  MRN: [id number]  Date of visit: 12-22-23 @ 16:15      Subjective: Patient seen and examined. No new events except as noted.   Patient seen earlier this AM. Lying down in bed, S/P MR this AM  Found to have CVA - Eliquis 2.5 BID resumed and started on ASA 81   Denies chest pain, palpitations, shortness of breath or dyspnea.     REVIEW OF SYSTEMS:    CONSTITUTIONAL: Generalized weakness   EYES/ENT: No visual changes;  No vertigo or throat pain   NECK: No pain or stiffness  RESPIRATORY: + Cough, No wheezing, hemoptysis; No shortness of breath  CARDIOVASCULAR: No chest pain or palpitations  GASTROINTESTINAL: No abdominal or epigastric pain. No nausea, vomiting, or hematemesis; No diarrhea or constipation. No melena or hematochezia.  GENITOURINARY: No dysuria, frequency or hematuria  NEUROLOGICAL: No numbness or weakness  SKIN: + Facial ecchymosis; + Raccoon eyes R> L; Facial ecchymosis; + Contusion; + Edema; B/L Knee ecchymosis   All other review of systems is negative unless indicated above.    MEDICATIONS:  MEDICATIONS  (STANDING):  anastrozole 1 milliGRAM(s) Oral daily  apixaban 2.5 milliGRAM(s) Oral every 12 hours  aspirin enteric coated 81 milliGRAM(s) Oral daily  atorvastatin 20 milliGRAM(s) Oral at bedtime  chlorhexidine 2% Cloths 1 Application(s) Topical daily  pantoprazole    Tablet 40 milliGRAM(s) Oral before breakfast  predniSONE   Tablet 5 milliGRAM(s) Oral every other day  sertraline 50 milliGRAM(s) Oral daily  sodium chloride 0.9%. 1000 milliLiter(s) (50 mL/Hr) IV Continuous <Continuous>      PHYSICAL EXAM:  T(C): 36.6 (12-22-23 @ 11:40), Max: 37.1 (12-21-23 @ 21:01)  HR: 60 (12-22-23 @ 15:53) (57 - 64)  BP: 133/63 (12-22-23 @ 15:53) (107/61 - 133/63)  RR: 18 (12-22-23 @ 15:53) (18 - 18)  SpO2: 95% (12-22-23 @ 15:53) (94% - 95%)  Wt(kg): --  I&O's Summary    21 Dec 2023 07:01  -  22 Dec 2023 07:00  --------------------------------------------------------  IN: 0 mL / OUT: 900 mL / NET: -900 mL    22 Dec 2023 07:01  -  22 Dec 2023 16:15  --------------------------------------------------------  IN: 240 mL / OUT: 450 mL / NET: -210 mL          Appearance: Awake, Weak appearing female, lying down in bed, HOB elevated, Facial contusion	  HEENT:  Eyes are open; +Facial ecchymosis; + Raccoon eyes R >L; + contusion   Lymphatic: No lymphadenopathy   Cardiovascular: Normal S1 S2, no JVD  Respiratory: normal effort , clear  Gastrointestinal:  Soft, Non-tender  Skin: + Facial ecchymosis; + R sided tashia-occular ecchymosis; B/L Knee ecchymosis   Psychiatry:  Mood & affect appropriate  Musculoskeletal: No edema      12-21-23 @ 07:01  -  12-22-23 @ 07:00  --------------------------------------------------------  IN: 0 mL / OUT: 900 mL / NET: -900 mL    12-22-23 @ 07:01  -  12-22-23 @ 16:15  --------------------------------------------------------  IN: 240 mL / OUT: 450 mL / NET: -210 mL                            11.7   4.33  )-----------( 162      ( 22 Dec 2023 05:57 )             36.4               12-22    140  |  105  |  17  ----------------------------<  111<H>  3.7   |  22  |  0.64    Ca    8.4      22 Dec 2023 05:56  Phos  2.8     12-21  Mg     1.9     12-21    TPro  6.0  /  Alb  3.7  /  TBili  0.3  /  DBili  <0.1  /  AST  62<H>  /  ALT  37  /  AlkPhos  50  12-22                       Urinalysis Basic - ( 22 Dec 2023 05:56 )    Color: x / Appearance: x / SG: x / pH: x  Gluc: 111 mg/dL / Ketone: x  / Bili: x / Urobili: x   Blood: x / Protein: x / Nitrite: x   Leuk Esterase: x / RBC: x / WBC x   Sq Epi: x / Non Sq Epi: x / Bacteria: x          < from: [facility] Duplex Carotid, Bilat (12.21.23 @ 14:41) >    IMPRESSION: Mild calcified plaque without duplex evidence of   hemodynamically significant stenosis of either carotid artery.      < end of copied text >        < from: MR Angio Neck w/wo IV Cont (12.22.23 @ 09:55) >    IMPRESSION:    MRI brain: Punctate acute infarct in the left posterior temporal lobe.    Encephalomalacia in the left cerebellum is present with extensive old   hemorrhagic blood products. Similarly, there is encephalomalacia with   chronic appearing hemorrhagic blood products within the medial right   cerebellum. There are multiplesmall foci of T2/FLAIR hyperintense signal   in the periventricular and subcortical white matter of the bilateral   cerebri, suggestive of mild to moderate chronic microvascular ischemic   changes.    Prior right frontal craniotomy with encephalomalacia/gliosis in the   underlying right anterolateral frontal lobe.      MRA brain: No hemodynamically significant stenosis    MRA neck: No hemodynamically significant stenosis    < end of copied text >        < from: MR Cervical Spine w/wo IV Cont (12.22.23 @ 09:54) >    IMPRESSION:   Multilevel degenerative changes as detailed above.      < end of copied text >   Name of Patient : VINCENT ENGLAND  MRN: [id number]  Date of visit: 12-22-23 @ 16:15      Subjective: Patient seen and examined. No new events except as noted.   Patient seen earlier this AM. Lying down in bed, S/P MR this AM  Found to have CVA - Eliquis 2.5 BID resumed and started on ASA 81   Denies chest pain, palpitations, shortness of breath or dyspnea.     REVIEW OF SYSTEMS:    CONSTITUTIONAL: Generalized weakness   EYES/ENT: No visual changes;  No vertigo or throat pain   NECK: No pain or stiffness  RESPIRATORY: + Cough, No wheezing, hemoptysis; No shortness of breath  CARDIOVASCULAR: No chest pain or palpitations  GASTROINTESTINAL: No abdominal or epigastric pain. No nausea, vomiting, or hematemesis; No diarrhea or constipation. No melena or hematochezia.  GENITOURINARY: No dysuria, frequency or hematuria  NEUROLOGICAL: No numbness or weakness  SKIN: + Facial ecchymosis; + Raccoon eyes R> L; Facial ecchymosis; + Contusion; + Edema; B/L Knee ecchymosis   All other review of systems is negative unless indicated above.    MEDICATIONS:  MEDICATIONS  (STANDING):  anastrozole 1 milliGRAM(s) Oral daily  apixaban 2.5 milliGRAM(s) Oral every 12 hours  aspirin enteric coated 81 milliGRAM(s) Oral daily  atorvastatin 20 milliGRAM(s) Oral at bedtime  chlorhexidine 2% Cloths 1 Application(s) Topical daily  pantoprazole    Tablet 40 milliGRAM(s) Oral before breakfast  predniSONE   Tablet 5 milliGRAM(s) Oral every other day  sertraline 50 milliGRAM(s) Oral daily  sodium chloride 0.9%. 1000 milliLiter(s) (50 mL/Hr) IV Continuous <Continuous>      PHYSICAL EXAM:  T(C): 36.6 (12-22-23 @ 11:40), Max: 37.1 (12-21-23 @ 21:01)  HR: 60 (12-22-23 @ 15:53) (57 - 64)  BP: 133/63 (12-22-23 @ 15:53) (107/61 - 133/63)  RR: 18 (12-22-23 @ 15:53) (18 - 18)  SpO2: 95% (12-22-23 @ 15:53) (94% - 95%)  Wt(kg): --  I&O's Summary    21 Dec 2023 07:01  -  22 Dec 2023 07:00  --------------------------------------------------------  IN: 0 mL / OUT: 900 mL / NET: -900 mL    22 Dec 2023 07:01  -  22 Dec 2023 16:15  --------------------------------------------------------  IN: 240 mL / OUT: 450 mL / NET: -210 mL          Appearance: Awake, Weak appearing female, lying down in bed, HOB elevated, Facial contusion	  HEENT:  Eyes are open; +Facial ecchymosis; + Raccoon eyes R >L; + contusion   Lymphatic: No lymphadenopathy   Cardiovascular: Normal S1 S2, no JVD  Respiratory: normal effort , clear  Gastrointestinal:  Soft, Non-tender  Skin: + Facial ecchymosis; + R sided tashia-occular ecchymosis; B/L Knee ecchymosis   Psychiatry:  Mood & affect appropriate  Musculoskeletal: No edema      12-21-23 @ 07:01  -  12-22-23 @ 07:00  --------------------------------------------------------  IN: 0 mL / OUT: 900 mL / NET: -900 mL    12-22-23 @ 07:01  -  12-22-23 @ 16:15  --------------------------------------------------------  IN: 240 mL / OUT: 450 mL / NET: -210 mL                            11.7   4.33  )-----------( 162      ( 22 Dec 2023 05:57 )             36.4               12-22    140  |  105  |  17  ----------------------------<  111<H>  3.7   |  22  |  0.64    Ca    8.4      22 Dec 2023 05:56  Phos  2.8     12-21  Mg     1.9     12-21    TPro  6.0  /  Alb  3.7  /  TBili  0.3  /  DBili  <0.1  /  AST  62<H>  /  ALT  37  /  AlkPhos  50  12-22                       Urinalysis Basic - ( 22 Dec 2023 05:56 )    Color: x / Appearance: x / SG: x / pH: x  Gluc: 111 mg/dL / Ketone: x  / Bili: x / Urobili: x   Blood: x / Protein: x / Nitrite: x   Leuk Esterase: x / RBC: x / WBC x   Sq Epi: x / Non Sq Epi: x / Bacteria: x          < from: VA Duplex Carotid, Bilat (12.21.23 @ 14:41) >    IMPRESSION: Mild calcified plaque without duplex evidence of   hemodynamically significant stenosis of either carotid artery.      < end of copied text >        < from: MR Angio Neck w/wo IV Cont (12.22.23 @ 09:55) >    IMPRESSION:    MRI brain: Punctate acute infarct in the left posterior temporal lobe.    Encephalomalacia in the left cerebellum is present with extensive old   hemorrhagic blood products. Similarly, there is encephalomalacia with   chronic appearing hemorrhagic blood products within the medial right   cerebellum. There are multiplesmall foci of T2/FLAIR hyperintense signal   in the periventricular and subcortical white matter of the bilateral   cerebri, suggestive of mild to moderate chronic microvascular ischemic   changes.    Prior right frontal craniotomy with encephalomalacia/gliosis in the   underlying right anterolateral frontal lobe.      MRA brain: No hemodynamically significant stenosis    MRA neck: No hemodynamically significant stenosis    < end of copied text >        < from: MR Cervical Spine w/wo IV Cont (12.22.23 @ 09:54) >    IMPRESSION:   Multilevel degenerative changes as detailed above.      < end of copied text >

## 2023-12-22 NOTE — PROGRESS NOTE ADULT - ASSESSMENT
78 year old Female with  Left Breast CA S/P L Lumpectomy, on Anastrazole, Right breast ADH S/P resection and on Tamoxifen X 5 years, Lymphoma, history of DVT/PE, Stroke 2/2 CNS vasculitis, HLD, GERD, overactive bladder, seizures 10 years ago, not on AEDs, osteoporosis complicated by right tib-fib fracture s/p ORIF, recent admission 11/18-11/21 for urosepsis 2/2 E coli infection, S/P 4 week course of Bactrim on 12/16, follows with uro-gynecologist, who presents to [facility] with a chief complaint of syncope  CTH chronic changes with encephalomalacia and gliosis in high R frontal lobe  CT C spine multilevel DGD most at C4-6 with mod to severe narrowing.   NIHSS 1 premrs2  dimer 254  ESR 40   LDL 78   CRP 49   + covid   A1c 6  LDL 78 \    MRI brain with AIS in left post temporal lobe, punctate.  old L cerebellar and extensive old hemorrhagic products  mod MVID; prior R frontal crani   MRA H/N neg   MRI C spine with Degenertive chagnes   TTE as above. 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, stable   2) h/o seizure, 10 years ago in setting of stroke, none since   3) cervical radic   4) syncope in setting of covid, and  AS   5) New Acute punctate, emolic appearing L post temporal lobe infarct;  possible related to covid, but D dimer not > 2x upper limit normal but still elevated     - add asa 81mg daily and increase atorvastatin to 40mg PO QHS   - would pursue extended cardiac monitoring  - prednisone for CNS vascultiisi   - TTE with bubble now r/o PFO   - EEG pending  can change to routine   - telemetry  - covid precuations.  on Remdesivir. monitor O2 for steroids   - orthostatics   - PT/OT/SS/SLP, OOBC  - check FS, glucose control <180  - GI/DVT ppx   - Thank you for allowing me to participate in the care of this patient. Call with questions.   - sees Dr. Naif Love neuro outpatient \  Sina Calles MD  Vascular Neurology  Office: [phone]    78 year old Female with  Left Breast CA S/P L Lumpectomy, on Anastrazole, Right breast ADH S/P resection and on Tamoxifen X 5 years, Lymphoma, history of DVT/PE, Stroke 2/2 CNS vasculitis, HLD, GERD, overactive bladder, seizures 10 years ago, not on AEDs, osteoporosis complicated by right tib-fib fracture s/p ORIF, recent admission 11/18-11/21 for urosepsis 2/2 E coli infection, S/P 4 week course of Bactrim on 12/16, follows with uro-gynecologist, who presents to [facility] with a chief complaint of syncope  CTH chronic changes with encephalomalacia and gliosis in high R frontal lobe  CT C spine multilevel DGD most at C4-6 with mod to severe narrowing.   NIHSS 1 premrs2  dimer 254  ESR 40   LDL 78   CRP 49   + covid   A1c 6  LDL 78 \    MRI brain with AIS in left post temporal lobe, punctate.  old L cerebellar and extensive old hemorrhagic products  mod MVID; prior R frontal crani   MRA H/N neg   MRI C spine with Degenertive chagnes   TTE as above. 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, stable   2) h/o seizure, 10 years ago in setting of stroke, none since   3) cervical radic   4) syncope in setting of covid, and  AS   5) New Acute punctate, emolic appearing L post temporal lobe infarct;  possible related to covid, but D dimer not > 2x upper limit normal but still elevated     - add asa 81mg daily and increase atorvastatin to 40mg PO QHS   - would pursue extended cardiac monitoring  - prednisone for CNS vascultiisi   - TTE with bubble now r/o PFO   - EEG pending  can change to routine   - telemetry  - covid precuations.  on Remdesivir. monitor O2 for steroids   - orthostatics   - PT/OT/SS/SLP, OOBC  - check FS, glucose control <180  - GI/DVT ppx   - Thank you for allowing me to participate in the care of this patient. Call with questions.   - sees Dr. Naif Love neuro outpatient \  Sina Calles MD  Vascular Neurology  Office: [phone]

## 2023-12-22 NOTE — PROGRESS NOTE ADULT - SUBJECTIVE AND OBJECTIVE BOX
Neurology     S: patient seen. MRI + AIS       Medications: MEDICATIONS  (STANDING):  anastrozole 1 milliGRAM(s) Oral daily  apixaban 2.5 milliGRAM(s) Oral every 12 hours  aspirin enteric coated 81 milliGRAM(s) Oral daily  atorvastatin 20 milliGRAM(s) Oral at bedtime  chlorhexidine 2% Cloths 1 Application(s) Topical daily  pantoprazole    Tablet 40 milliGRAM(s) Oral before breakfast  predniSONE   Tablet 5 milliGRAM(s) Oral every other day  sertraline 50 milliGRAM(s) Oral daily  sodium chloride 0.9%. 1000 milliLiter(s) (50 mL/Hr) IV Continuous <Continuous>    MEDICATIONS  (PRN):  acetaminophen     Tablet .. 650 milliGRAM(s) Oral every 6 hours PRN Moderate Pain (4 - 6)  benzonatate 100 milliGRAM(s) Oral every 8 hours PRN Cough  fluticasone propionate 50 MICROgram(s)/spray Nasal Spray 1 Spray(s) Both Nostrils two times a day PRN allergic rhinitis  melatonin 3 milliGRAM(s) Oral at bedtime PRN Insomnia       Vitals:  Vital Signs Last 24 Hrs  T(C): 36.4 (22 Dec 2023 04:30), Max: 37.2 (21 Dec 2023 12:07)  T(F): 97.5 (22 Dec 2023 04:30), Max: 98.9 (21 Dec 2023 12:07)  HR: 64 (22 Dec 2023 04:30) (61 - 65)  BP: 123/59 (22 Dec 2023 04:30) (108/62 - 123/59)  BP(mean): --  RR: 18 (22 Dec 2023 04:30) (18 - 18)  SpO2: 94% (22 Dec 2023 04:30) (94% - 96%)    Parameters below as of 22 Dec 2023 04:30  Patient On (Oxygen Delivery Method): room air             General Exam:   General Appearance: Appropriately dressed and in no acute distress       Head: Normocephalic, atraumatic and no dysmorphic features  Ear, Nose, and Throat: Moist mucous membranes  CVS: S1S2+  Resp: No SOB, no wheeze or rhonchi  GI: soft NT/ND  Extremities: No edema or cyanosis  Skin: No bruises or rashes     Neurological Exam:  Mental Status: Awake, alert and oriented x 3.  Able to follow simple and complex verbal commands. Able to name and repeat. fluent speech. No obvious aphasia or dysarthria noted.   Cranial Nerves: PERRL, EOMI, VFFC, sensation V1-V3 intact,  no obvious facial asymmetry, equal elevation of palate, scm/trap 5/5, tongue is midline on protrusion. no obvious papilledema on fundoscopic exam. hearing is grossly intact.   Motor: Normal bulk, tone and strength throughout. Fine finger movements were intact and symmetric. no tremors or drift noted.    Sensation: Intact to light touch and pinprick throughout. no right/left confusion. no extinction to tactile on DSS. Romberg was negative.   Reflexes: 1+ throughout at biceps, brachioradialis, triceps, patellars and ankles bilaterally and equal. No clonus. R toe and L toe were both downgoing.  Coordination: No dysmetria on FNF or HKS  Gait: deferred     Data/Labs/Imaging which I personally reviewed.     Labs:       LABS:                          11.7   4.33  )-----------( 162      ( 22 Dec 2023 05:57 )             36.4     12-22    140  |  105  |  17  ----------------------------<  111<H>  3.7   |  22  |  0.64    Ca    8.4      22 Dec 2023 05:56  Phos  2.8     12-21  Mg     1.9     12-21    TPro  6.0  /  Alb  3.7  /  TBili  0.3  /  DBili  <0.1  /  AST  62<H>  /  ALT  37  /  AlkPhos  50  12-22    LIVER FUNCTIONS - ( 22 Dec 2023 05:56 )  Alb: 3.7 g/dL / Pro: 6.0 g/dL / ALK PHOS: 50 U/L / ALT: 37 U/L / AST: 62 U/L / GGT: x           PT/INR - ( 20 Dec 2023 11:43 )   PT: 13.1 sec;   INR: 1.26 ratio         PTT - ( 20 Dec 2023 11:43 )  PTT:21.8 sec  Urinalysis Basic - ( 22 Dec 2023 05:56 )    Color: x / Appearance: x / SG: x / pH: x  Gluc: 111 mg/dL / Ketone: x  / Bili: x / Urobili: x   Blood: x / Protein: x / Nitrite: x   Leuk Esterase: x / RBC: x / WBC x   Sq Epi: x / Non Sq Epi: x / Bacteria: x        < from: CT Head No Cont (12.20.23 @ 12:33) >    ACC: [id number] EXAM:  CT CERVICAL SPINE   ORDERED BY:  SAMIA MILLS     ACC: [id number] EXAM:  CT MAXILLOFACIAL   ORDERED BY:  SAMIA MILLS     ACC: [id number] EXAM:  CT BRAIN   ORDERED BY:  SAMIA MILLS     ACC: [id number] EXAM:  CT 3D RECONSTRUCT W [facility]   ORDERED BY:    SAMIA MILLS     PROCEDURE DATE:  12/20/2023          INTERPRETATION:  EXAM: CT HEAD, FACIAL BONES, AND CERVICAL SPINE WITHOUT   INTRAVENOUS CONTRAST    HISTORY: Trauma, syncopal episode, currently on anticoagulation    TECHNIQUE: Multiple axial images were obtained of the head, facial bones,   and cervical spine. Sagittal and coronal reformatted images were obtained   from the axial data set. The images were reviewed in brain and bone   windows.    COMPARISON: CT of the head May 13, 2017, MRI of the head May 31, 2021    FINDINGS:    CT HEAD:  No acute intracranial hemorrhage. Areas of decreased attenuation in the   deep and periventricular white matter, compatible with chronic small   vessel disease. Mild encephalomalacia/gliotic change in the high right   frontal lobe. Chronic appearing infarcts in the left cerebellar   hemisphere. Mild parenchymal volume loss. No hydrocephalus. The   extra-axial spaces and basal cisterns are within normal limits. No   midline shift or mass effect present.    The cranial cervical junction is within normal limits. The sella is not   expanded. No depressed calvarial fracture. Prior right frontal   craniotomy. Mucosal thickening throughout the ethmoid air cells. The   visualized mastoid air cells are well aerated. The visualized orbits are   status post cataract surgery. Right frontal scalp hematoma/contusion.    CT FACIAL BONES:  The nasal bones are intact.    The orbital rims, zygomatic arches, and pterygoid plates are intact   bilaterally.    The mandible is intact. Mild arthritic changes in the bilateral TMJs.    Mucosal thickening throughout the ethmoid air cells. Probable polyp in   the right maxillary sinus. The visualized mastoid air cells are clear   bilaterally.    No fracture of the skull base or lower calvarium.      CT CERVICAL SPINE:  The atlantodental interval is within normal limits. The lateral masses   are properly aligned. No facet subluxation or malalignment at the   craniovertebral or atlantoaxial articulations. No dens fracture. No   significant prevertebral soft tissue swelling.    Subtle reversal to the cervical lordosis. Normal alignment of the   cervical vertebrae. Vertebral body height is well-maintained. Reduced   intervertebral disc height throughout the cervical spine, most   significantly C3-C5 with chronic endplate change. No jumped or perched   facets. No acute osseous fracture.    C2-C3: Subtle disc bulge. The bilateral neuroforamina are patent. No   narrowing of the spinal canal.    C3-C4: Disc osteophyte complex with uncovertebral joint hypertrophy.   Severe narrowing of the bilateral neuroforamen. No significant narrowing   of the spinal canal. Mild facet arthropathy.    C4-C5: Disc osteophyte complex with uncovertebral joint hypertrophy.   Severe narrowing of the left neural foramen and moderate to severe   narrowing of the right neural foramen. Moderate to severe narrowing of   the spinal canal. Mild facet arthropathy.    C5-C6: Disc osteophyte complex with uncovertebral joint hypertrophy.   Severe narrowing of the bilateral neuroforamen, left greater than right.   Moderate to severe narrowing of the spinal canal. Facet arthropathy.    C6-C7: Disc osteophyte complex with uncovertebral joint hypertrophy.   Severe narrowing of the bilateral neuroforamen. Mild narrowing of the   spinal canal. Facet arthropathy.    C7-T1: No large disc bulge. The bilateral neuroforamina are patent. No   significant narrowing of the spinal canal.    The paraspinal muscles are unremarkable. Soft tissue fullness in the   right vallecula.    Visualized portions of the thyroid are unremarkable.    Pleural-based calcifications in the left lung apex. Subtle groundglass   opacities in the right lung apex. Small 0.2 cm calcification in the right   lung apex.      IMPRESSION:    CT HEAD:  1.  No evidence of acute intracranial hemorrhage or midline shift.  2.  Chronic ischemic changes as discussed above.    CT FACIAL BONES:  1.  No evidence of fracture of the facial bones.  2.  Mild paranasal sinus disease.    CT CERVICAL SPINE:  1.  No evidence of acute osseous fracture or ko dislocation.  2.  Multilevel degenerative change of the cervical spine as discussed   above, most significantly at the C4-C6 levels where there is moderate to   severe narrowing of the spinal canal. In the setting of myelopathy,   recommend MRI of the cervical spine for further evaluation.  3.  Soft tissue fullness in the right vallecula. Recommend direct visual   inspection.  4.  Pleural-based and nodular calcifications in the bilateral lung apices   as discussed above, correlate with patient history. Additionally, subtle   ground glass opacities in the right lung apex. This could indicate a   developing infectious/inflammatory process. Consider designated imaging   of the chest for further evaluation if clinically indicated.    --- End of Report ---   < from: MR Angio Head w/wo IV Cont (12.22.23 @ 09:55) >    ACC: [id number] EXAM:  MR ANGIO BRAIN WAW IC   ORDERED BY: LUCIANA VEGA     ACC: [id number] EXAM:  MR ANGIO NECK WAW IC   ORDERED BY: LUCIANA VEGA     ACC: [id number] EXAM:  MR BRAIN WAW IC   ORDERED BY: LUCIANA VEGA     PROCEDURE DATE:  12/22/2023         INTERPRETATION:  Exam Type: MRI BRAIN WITH AND WITHOUT CONTRAST , MRA COW   WITHOUT CONTRAST, MRA NECK WITH AND WITHOUT CONTRAST  Indication: Syncope, R/O Mets, H/O CNS vasculitis  Comparison:CT head 12/20/2023    Technique: Multiplanar MR imaging of the brain was obtained with and   without contrast. Time of flight MRA of the neck with and without   contrast and [place] without contrast were obtained. 7.5  cc of   gadavist was administered. 0  cc was discarded.    Findings:    MRI brain: There is a punctate focus of restricted diffusion within the   left posterior temporal lobe, compatible with an acute infarct.   Encephalomalacia in the left cerebellum is present with extensive old   hemorrhagic blood products. Similarly, there is encephalomalacia with   chronic appearing hemorrhagic blood products within the medial right   cerebellum. 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 There are multiple small   foci of T2/FLAIR hyperintense signal in the periventricular and   subcortical white matter of the bilateral cerebri, suggestive of mild to   moderate chronic microvascular ischemic changes. There is no abnormal   enhancement.    Prior right frontal craniotomy with encephalomalacia/gliosis in the   underlying right anterolateral frontal lobe.    Cerebellar tonsils are in normal location. The intracranial flow voids   are normal in appearance. Visualized paranasal sinuses and mastoids are   normal in signal.    MRA of the [place] demonstrates normal antegrade flow in the   major intracranial arteries. No flow gap is seen to suggest high grade   stenosis. No evidence of medium or large sized aneurysm.    MRA of the neck demonstrates normal antegrade flow in the bilateral   common carotid, cervical internal carotid and vertebral arteries. No flow   gap is seen to suggest high grade stenosis.        IMPRESSION:    MRI brain: Punctate acute infarct in the left posterior temporal lobe.    Encephalomalacia in the left cerebellum is present with extensive old   hemorrhagic blood products. Similarly, there is encephalomalacia with   chronic appearing hemorrhagic blood products within the medial right   cerebellum. There are multiplesmall foci of T2/FLAIR hyperintense signal   in the periventricular and subcortical white matter of the bilateral   cerebri, suggestive of mild to moderate chronic microvascular ischemic   changes.    Prior right frontal craniotomy with encephalomalacia/gliosis in the   underlying right anterolateral frontal lobe.      MRA brain: No hemodynamically significant stenosis    MRA neck: No hemodynamically significant stenosis    --- End of Report ---         < from: MR Cervical Spine w/wo IV Cont (12.22.23 @ 09:54) >    ACC: [id number] EXAM:  MR SPINE CERVICAL WAW IC   ORDERED BY: LUCIANA VEGA     PROCEDURE DATE:  12/22/2023          INTERPRETATION:  Exam Date: 12/22/2023 9:54 AM    MR cervical spine with and without gadolinium    CLINICAL INFORMATION:  Stenosisseen on CT syncope.    TECHNIQUE:   Sagittal and axial T1-weighted images, sagittal proton   density images, axial and sagittal T2-weighted images of the cervical   spine were obtained.   Following the intravenous administration of 7.5 ml   Gadavist fat saturated sagittal and axial T1-weighted images were   obtained.  0 mL were discarded.    FINDINGS: Comparison is made to CT cervical of 12/20/2023.    Cervical vertebral alignment is intact.  Cervical vertebral body heights   are maintained.  [place] signal intensity within cervical vertebral bodies   and posterior elements is unremarkable.  No osseous expansion, epidural   disease or paraspinal abnormality is found.    At C2/C3, there is a very small posterior disc bulge without significant   foraminal or central spinal canal stenosis.    At C3/C4, there is a posterior disc bulge and bilateral uncovertebral   spurring resulting in moderate to severe narrowing of the bilateral   neural foramen without significant central spinal canal stenosis.    At C4/C5, there is a posterior disc bulge and bilateral uncovertebral   spurring resulting in moderate to severe stenosis of the bilateral neural   foramen and mild to moderate central spinal canal stenosis.    At C5/C6, there is a posterior disc bulge and bilateral uncovertebral   spurring resulting in moderate to severe stenosis of the bilateral neural   foramen and mild central spinal canal stenosis.    At C6/C7, there is a posterior disc bulge and bilateral uncovertebral   spurring resulting in moderate to severe stenosis of the bilateral neural   foramen without significant central spinal canal stenosis.    At C7/T1, there are no significant degenerative changes    The cervical cord maintains intact morphology  No cord signal intensity   abnormality or focal cord lesion is appreciated.   There is no abnormal   cord enhancement.  The cervical medullary junction remains intact.      IMPRESSION:   Multilevel degenerative changes as detailed above.    --- End of Report ---            PHILL CATHERINE MD; Attending Radiologist  This document has been electronically signed. Dec 22 2023 11:04AM    < end of copied text >  < from: TTE W or WO Ultrasound Enhancing Agent (08.17.23 @ 07:12) >  < from: TTE W or WO Ultrasound Enhancing Agent (08.17.23 @ 07:12) >  CONCLUSIONS:      1. Normal left ventricular cavity size. Left ventricular wall thickness is normal. Left ventricular systolic function is normal with an ejection fraction of 63 % by Jones's method of disks. There are no regional wall motion abnormalities seen.   2. There is moderate (grade 2) left ventricular diastolic dysfunction, with normal filling pressure.   3. Moderately enlarged right ventricular cavity size and reduced systolic right ventricular function. The tricuspid annular plane systolic excursion (TAPSE) is 1.8 cm (normal >=1.7 cm).   4. Moderate-to-severe aortic stenosis.   5. Trace aortic regurgitation.   6. No pericardial effusion seen.   7. Estimated pulmonary artery systolic pressure is 35 mmHg.   8. No prior echocardiogram is available for comparison.    < end of copied text >   Neurology     S: patient seen. MRI + AIS       Medications: MEDICATIONS  (STANDING):  anastrozole 1 milliGRAM(s) Oral daily  apixaban 2.5 milliGRAM(s) Oral every 12 hours  aspirin enteric coated 81 milliGRAM(s) Oral daily  atorvastatin 20 milliGRAM(s) Oral at bedtime  chlorhexidine 2% Cloths 1 Application(s) Topical daily  pantoprazole    Tablet 40 milliGRAM(s) Oral before breakfast  predniSONE   Tablet 5 milliGRAM(s) Oral every other day  sertraline 50 milliGRAM(s) Oral daily  sodium chloride 0.9%. 1000 milliLiter(s) (50 mL/Hr) IV Continuous <Continuous>    MEDICATIONS  (PRN):  acetaminophen     Tablet .. 650 milliGRAM(s) Oral every 6 hours PRN Moderate Pain (4 - 6)  benzonatate 100 milliGRAM(s) Oral every 8 hours PRN Cough  fluticasone propionate 50 MICROgram(s)/spray Nasal Spray 1 Spray(s) Both Nostrils two times a day PRN allergic rhinitis  melatonin 3 milliGRAM(s) Oral at bedtime PRN Insomnia       Vitals:  Vital Signs Last 24 Hrs  T(C): 36.4 (22 Dec 2023 04:30), Max: 37.2 (21 Dec 2023 12:07)  T(F): 97.5 (22 Dec 2023 04:30), Max: 98.9 (21 Dec 2023 12:07)  HR: 64 (22 Dec 2023 04:30) (61 - 65)  BP: 123/59 (22 Dec 2023 04:30) (108/62 - 123/59)  BP(mean): --  RR: 18 (22 Dec 2023 04:30) (18 - 18)  SpO2: 94% (22 Dec 2023 04:30) (94% - 96%)    Parameters below as of 22 Dec 2023 04:30  Patient On (Oxygen Delivery Method): room air             General Exam:   General Appearance: Appropriately dressed and in no acute distress       Head: Normocephalic, atraumatic and no dysmorphic features  Ear, Nose, and Throat: Moist mucous membranes  CVS: S1S2+  Resp: No SOB, no wheeze or rhonchi  GI: soft NT/ND  Extremities: No edema or cyanosis  Skin: No bruises or rashes     Neurological Exam:  Mental Status: Awake, alert and oriented x 3.  Able to follow simple and complex verbal commands. Able to name and repeat. fluent speech. No obvious aphasia or dysarthria noted.   Cranial Nerves: PERRL, EOMI, VFFC, sensation V1-V3 intact,  no obvious facial asymmetry, equal elevation of palate, scm/trap 5/5, tongue is midline on protrusion. no obvious papilledema on fundoscopic exam. hearing is grossly intact.   Motor: Normal bulk, tone and strength throughout. Fine finger movements were intact and symmetric. no tremors or drift noted.    Sensation: Intact to light touch and pinprick throughout. no right/left confusion. no extinction to tactile on DSS. Romberg was negative.   Reflexes: 1+ throughout at biceps, brachioradialis, triceps, patellars and ankles bilaterally and equal. No clonus. R toe and L toe were both downgoing.  Coordination: No dysmetria on FNF or HKS  Gait: deferred     Data/Labs/Imaging which I personally reviewed.     Labs:       LABS:                          11.7   4.33  )-----------( 162      ( 22 Dec 2023 05:57 )             36.4     12-22    140  |  105  |  17  ----------------------------<  111<H>  3.7   |  22  |  0.64    Ca    8.4      22 Dec 2023 05:56  Phos  2.8     12-21  Mg     1.9     12-21    TPro  6.0  /  Alb  3.7  /  TBili  0.3  /  DBili  <0.1  /  AST  62<H>  /  ALT  37  /  AlkPhos  50  12-22    LIVER FUNCTIONS - ( 22 Dec 2023 05:56 )  Alb: 3.7 g/dL / Pro: 6.0 g/dL / ALK PHOS: 50 U/L / ALT: 37 U/L / AST: 62 U/L / GGT: x           PT/INR - ( 20 Dec 2023 11:43 )   PT: 13.1 sec;   INR: 1.26 ratio         PTT - ( 20 Dec 2023 11:43 )  PTT:21.8 sec  Urinalysis Basic - ( 22 Dec 2023 05:56 )    Color: x / Appearance: x / SG: x / pH: x  Gluc: 111 mg/dL / Ketone: x  / Bili: x / Urobili: x   Blood: x / Protein: x / Nitrite: x   Leuk Esterase: x / RBC: x / WBC x   Sq Epi: x / Non Sq Epi: x / Bacteria: x        < from: CT Head No Cont (12.20.23 @ 12:33) >    ACC: [id number] EXAM:  CT CERVICAL SPINE   ORDERED BY:  SAMIA MILLS     ACC: [id number] EXAM:  CT MAXILLOFACIAL   ORDERED BY:  SAMIA MILLS     ACC: [id number] EXAM:  CT BRAIN   ORDERED BY:  SAMIA MILLS     ACC: [id number] EXAM:  CT 3D RECONSTRUCT W [facility]   ORDERED BY:    SAMIA MILLS     PROCEDURE DATE:  12/20/2023          INTERPRETATION:  EXAM: CT HEAD, FACIAL BONES, AND CERVICAL SPINE WITHOUT   INTRAVENOUS CONTRAST    HISTORY: Trauma, syncopal episode, currently on anticoagulation    TECHNIQUE: Multiple axial images were obtained of the head, facial bones,   and cervical spine. Sagittal and coronal reformatted images were obtained   from the axial data set. The images were reviewed in brain and bone   windows.    COMPARISON: CT of the head May 13, 2017, MRI of the head May 31, 2021    FINDINGS:    CT HEAD:  No acute intracranial hemorrhage. Areas of decreased attenuation in the   deep and periventricular white matter, compatible with chronic small   vessel disease. Mild encephalomalacia/gliotic change in the high right   frontal lobe. Chronic appearing infarcts in the left cerebellar   hemisphere. Mild parenchymal volume loss. No hydrocephalus. The   extra-axial spaces and basal cisterns are within normal limits. No   midline shift or mass effect present.    The cranial cervical junction is within normal limits. The sella is not   expanded. No depressed calvarial fracture. Prior right frontal   craniotomy. Mucosal thickening throughout the ethmoid air cells. The   visualized mastoid air cells are well aerated. The visualized orbits are   status post cataract surgery. Right frontal scalp hematoma/contusion.    CT FACIAL BONES:  The nasal bones are intact.    The orbital rims, zygomatic arches, and pterygoid plates are intact   bilaterally.    The mandible is intact. Mild arthritic changes in the bilateral TMJs.    Mucosal thickening throughout the ethmoid air cells. Probable polyp in   the right maxillary sinus. The visualized mastoid air cells are clear   bilaterally.    No fracture of the skull base or lower calvarium.      CT CERVICAL SPINE:  The atlantodental interval is within normal limits. The lateral masses   are properly aligned. No facet subluxation or malalignment at the   craniovertebral or atlantoaxial articulations. No dens fracture. No   significant prevertebral soft tissue swelling.    Subtle reversal to the cervical lordosis. Normal alignment of the   cervical vertebrae. Vertebral body height is well-maintained. Reduced   intervertebral disc height throughout the cervical spine, most   significantly C3-C5 with chronic endplate change. No jumped or perched   facets. No acute osseous fracture.    C2-C3: Subtle disc bulge. The bilateral neuroforamina are patent. No   narrowing of the spinal canal.    C3-C4: Disc osteophyte complex with uncovertebral joint hypertrophy.   Severe narrowing of the bilateral neuroforamen. No significant narrowing   of the spinal canal. Mild facet arthropathy.    C4-C5: Disc osteophyte complex with uncovertebral joint hypertrophy.   Severe narrowing of the left neural foramen and moderate to severe   narrowing of the right neural foramen. Moderate to severe narrowing of   the spinal canal. Mild facet arthropathy.    C5-C6: Disc osteophyte complex with uncovertebral joint hypertrophy.   Severe narrowing of the bilateral neuroforamen, left greater than right.   Moderate to severe narrowing of the spinal canal. Facet arthropathy.    C6-C7: Disc osteophyte complex with uncovertebral joint hypertrophy.   Severe narrowing of the bilateral neuroforamen. Mild narrowing of the   spinal canal. Facet arthropathy.    C7-T1: No large disc bulge. The bilateral neuroforamina are patent. No   significant narrowing of the spinal canal.    The paraspinal muscles are unremarkable. Soft tissue fullness in the   right vallecula.    Visualized portions of the thyroid are unremarkable.    Pleural-based calcifications in the left lung apex. Subtle groundglass   opacities in the right lung apex. Small 0.2 cm calcification in the right   lung apex.      IMPRESSION:    CT HEAD:  1.  No evidence of acute intracranial hemorrhage or midline shift.  2.  Chronic ischemic changes as discussed above.    CT FACIAL BONES:  1.  No evidence of fracture of the facial bones.  2.  Mild paranasal sinus disease.    CT CERVICAL SPINE:  1.  No evidence of acute osseous fracture or ko dislocation.  2.  Multilevel degenerative change of the cervical spine as discussed   above, most significantly at the C4-C6 levels where there is moderate to   severe narrowing of the spinal canal. In the setting of myelopathy,   recommend MRI of the cervical spine for further evaluation.  3.  Soft tissue fullness in the right vallecula. Recommend direct visual   inspection.  4.  Pleural-based and nodular calcifications in the bilateral lung apices   as discussed above, correlate with patient history. Additionally, subtle   ground glass opacities in the right lung apex. This could indicate a   developing infectious/inflammatory process. Consider designated imaging   of the chest for further evaluation if clinically indicated.    --- End of Report ---   < from: MR Angio Head w/wo IV Cont (12.22.23 @ 09:55) >    ACC: [id number] EXAM:  MR ANGIO BRAIN WAW IC   ORDERED BY: LUCIANA VEGA     ACC: [id number] EXAM:  MR ANGIO NECK WAW IC   ORDERED BY: LUCIANA VEGA     ACC: [id number] EXAM:  MR BRAIN WAW IC   ORDERED BY: LUCIANA VEGA     PROCEDURE DATE:  12/22/2023         INTERPRETATION:  Exam Type: MRI BRAIN WITH AND WITHOUT CONTRAST , MRA COW   WITHOUT CONTRAST, MRA NECK WITH AND WITHOUT CONTRAST  Indication: Syncope, R/O Mets, H/O CNS vasculitis  Comparison:CT head 12/20/2023    Technique: Multiplanar MR imaging of the brain was obtained with and   without contrast. Time of flight MRA of the neck with and without   contrast and [place] without contrast were obtained. 7.5  cc of   gadavist was administered. 0  cc was discarded.    Findings:    MRI brain: There is a punctate focus of restricted diffusion within the   left posterior temporal lobe, compatible with an acute infarct.   Encephalomalacia in the left cerebellum is present with extensive old   hemorrhagic blood products. Similarly, there is encephalomalacia with   chronic appearing hemorrhagic blood products within the medial right   cerebellum. 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 There are multiple small   foci of T2/FLAIR hyperintense signal in the periventricular and   subcortical white matter of the bilateral cerebri, suggestive of mild to   moderate chronic microvascular ischemic changes. There is no abnormal   enhancement.    Prior right frontal craniotomy with encephalomalacia/gliosis in the   underlying right anterolateral frontal lobe.    Cerebellar tonsils are in normal location. The intracranial flow voids   are normal in appearance. Visualized paranasal sinuses and mastoids are   normal in signal.    MRA of the [place] demonstrates normal antegrade flow in the   major intracranial arteries. No flow gap is seen to suggest high grade   stenosis. No evidence of medium or large sized aneurysm.    MRA of the neck demonstrates normal antegrade flow in the bilateral   common carotid, cervical internal carotid and vertebral arteries. No flow   gap is seen to suggest high grade stenosis.        IMPRESSION:    MRI brain: Punctate acute infarct in the left posterior temporal lobe.    Encephalomalacia in the left cerebellum is present with extensive old   hemorrhagic blood products. Similarly, there is encephalomalacia with   chronic appearing hemorrhagic blood products within the medial right   cerebellum. There are multiplesmall foci of T2/FLAIR hyperintense signal   in the periventricular and subcortical white matter of the bilateral   cerebri, suggestive of mild to moderate chronic microvascular ischemic   changes.    Prior right frontal craniotomy with encephalomalacia/gliosis in the   underlying right anterolateral frontal lobe.      MRA brain: No hemodynamically significant stenosis    MRA neck: No hemodynamically significant stenosis    --- End of Report ---         < from: MR Cervical Spine w/wo IV Cont (12.22.23 @ 09:54) >    ACC: [id number] EXAM:  MR SPINE CERVICAL WAW IC   ORDERED BY: LUCIANA VEGA     PROCEDURE DATE:  12/22/2023          INTERPRETATION:  Exam Date: 12/22/2023 9:54 AM    MR cervical spine with and without gadolinium    CLINICAL INFORMATION:  Stenosisseen on CT syncope.    TECHNIQUE:   Sagittal and axial T1-weighted images, sagittal proton   density images, axial and sagittal T2-weighted images of the cervical   spine were obtained.   Following the intravenous administration of 7.5 ml   Gadavist fat saturated sagittal and axial T1-weighted images were   obtained.  0 mL were discarded.    FINDINGS: Comparison is made to CT cervical of 12/20/2023.    Cervical vertebral alignment is intact.  Cervical vertebral body heights   are maintained.  Marrow signal intensity within cervical vertebral bodies   and posterior elements is unremarkable.  No osseous expansion, epidural   disease or paraspinal abnormality is found.    At C2/C3, there is a very small posterior disc bulge without significant   foraminal or central spinal canal stenosis.    At C3/C4, there is a posterior disc bulge and bilateral uncovertebral   spurring resulting in moderate to severe narrowing of the bilateral   neural foramen without significant central spinal canal stenosis.    At C4/C5, there is a posterior disc bulge and bilateral uncovertebral   spurring resulting in moderate to severe stenosis of the bilateral neural   foramen and mild to moderate central spinal canal stenosis.    At C5/C6, there is a posterior disc bulge and bilateral uncovertebral   spurring resulting in moderate to severe stenosis of the bilateral neural   foramen and mild central spinal canal stenosis.    At C6/C7, there is a posterior disc bulge and bilateral uncovertebral   spurring resulting in moderate to severe stenosis of the bilateral neural   foramen without significant central spinal canal stenosis.    At C7/T1, there are no significant degenerative changes    The cervical cord maintains intact morphology  No cord signal intensity   abnormality or focal cord lesion is appreciated.   There is no abnormal   cord enhancement.  The cervical medullary junction remains intact.      IMPRESSION:   Multilevel degenerative changes as detailed above.    --- End of Report ---            PHILL CATHERINE MD; Attending Radiologist  This document has been electronically signed. Dec 22 2023 11:04AM    < end of copied text >  < from: TTE W or WO Ultrasound Enhancing Agent (08.17.23 @ 07:12) >  < from: TTE W or WO Ultrasound Enhancing Agent (08.17.23 @ 07:12) >  CONCLUSIONS:      1. Normal left ventricular cavity size. Left ventricular wall thickness is normal. Left ventricular systolic function is normal with an ejection fraction of 63 % by Jones's method of disks. There are no regional wall motion abnormalities seen.   2. There is moderate (grade 2) left ventricular diastolic dysfunction, with normal filling pressure.   3. Moderately enlarged right ventricular cavity size and reduced systolic right ventricular function. The tricuspid annular plane systolic excursion (TAPSE) is 1.8 cm (normal >=1.7 cm).   4. Moderate-to-severe aortic stenosis.   5. Trace aortic regurgitation.   6. No pericardial effusion seen.   7. Estimated pulmonary artery systolic pressure is 35 mmHg.   8. No prior echocardiogram is available for comparison.    < end of copied text >

## 2023-12-22 NOTE — PROGRESS NOTE ADULT - SUBJECTIVE AND OBJECTIVE BOX
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 Patient reports that she awoke in yas usual state of health this morning, was supposed to go to outpatient laboratory to obtain lab work. Patient states that she told her  to bring the car to her by the elevator and does not recall further. States that she told her  she "feels unwell" and feels "off." Per  at the bedside, he found patient face down on the floor.  reports that he stepped away from patient for under 2 minutes to bring the car over to her and awoke patient. Patient reports pain to her B/L knees. Patient reports that she has been ambulating since and was advised to come to [facility] by her [facility] team. Patient denies any bladder or bowel incontinence. Denies chest pain, palpitations, shortness of breath or dyspnea.  Denies nausea, vomiting, fever, chills, cough, abdominal pain, headache, dizziness, lightheadedness neck or back pain.  (20 Dec 2023 15:43)    Describes a generalized feeling of "something just feeling off" prior to fainting.  No describable headache, vertigo, nausea, skin pallor, sweating, hot/flushed feeling, etc.  Last fainting episode was >5 yrs ago, unrelated incident.  A 10 pt ROS is otherwise negative.  Date of service 12/22- resting comfortably, no new complaints, being taken off floor for addl testing today.    PAST MEDICAL & SURGICAL HISTORY:  CVA (cerebral vascular accident)  Lymphoma  Osteoporosis  Seizure  Fracture  lumbar spine,  healed with conservative treatment  Neuropathy  bilateral feet  HLD (hyperlipidemia)  Depression  GERD (gastroesophageal reflux disease)  Hodgkins disease  Pulmonary embolus  Fracture, ankle  Right, s/p metal plate  H/O sinus surgery  Cataract extraction status of eye, right    acetaminophen     Tablet .. 650 milliGRAM(s) Oral every 6 hours PRN  anastrozole 1 milliGRAM(s) Oral daily  atorvastatin 20 milliGRAM(s) Oral at bedtime  benzonatate 100 milliGRAM(s) Oral every 8 hours PRN  chlorhexidine 2% Cloths 1 Application(s) Topical daily  fluticasone propionate 50 MICROgram(s)/spray Nasal Spray 1 Spray(s) Both Nostrils two times a day PRN  heparin   Injectable 5000 Unit(s) SubCutaneous every 8 hours  melatonin 3 milliGRAM(s) Oral at bedtime PRN  pantoprazole    Tablet 40 milliGRAM(s) Oral before breakfast  predniSONE   Tablet 5 milliGRAM(s) Oral every other day  sertraline 50 milliGRAM(s) Oral daily  sodium chloride 0.9%. 1000 milliLiter(s) IV Continuous <Continuous>                            11.7   4.33  )-----------( 162      ( 22 Dec 2023 05:57 )             36.4       12-22    140  |  105  |  17  ----------------------------<  111<H>  3.7   |  22  |  0.64    Ca    8.4      22 Dec 2023 05:56  Phos  2.8     12-21  Mg     1.9     12-21    TPro  6.0  /  Alb  3.7  /  TBili  0.3  /  DBili  <0.1  /  AST  62<H>  /  ALT  37  /  AlkPhos  50  12-22    T(C): 36.4 (12-22-23 @ 04:30), Max: 37.2 (12-21-23 @ 12:07)  HR: 64 (12-22-23 @ 04:30) (61 - 65)  BP: 123/59 (12-22-23 @ 04:30) (108/62 - 123/59)  RR: 18 (12-22-23 @ 04:30) (18 - 18)  SpO2: 94% (12-22-23 @ 04:30) (94% - 96%)  Wt(kg): --    I&O's Summary    21 Dec 2023 07:01  -  22 Dec 2023 07:00  --------------------------------------------------------  IN: 0 mL / OUT: 900 mL / NET: -900 mL    Appearance: Elderly woman in no acute distress  HEENT:   Normal oral mucosa, PERRL, EOMI	  Lymphatic: No lymphadenopathy , no edema  Cardiovascular: Normal S1 S2, No JVD, No murmurs , Peripheral pulses palpable 2+ bilaterally  Respiratory: Lungs clear to auscultation, normal effort 	  Gastrointestinal:  Soft, Non-tender, + BS	  Skin: ecchymoses - periorbital, chin, cheekbones, etc.  Musculoskeletal: Normal range of motion, normal strength  Psychiatry:  Mood & affect appropriate    TELEMETRY: NSR	    ECG: NSR, normal intervals  Echo:  Historically normal LVEF, moderate-to-severe Aortic valve stenosis.    ASSESSMENT/PLAN: Ms Yates is a very pleasant 78y Female here after semi-witnessed collapse in her apartment complex parking garage.  She has history of stroke, CNS vasculitis, breast cancer on maintenance anastrozole, and documented moderate-to-severe aortic valve stenosis.  On day of presentation, she was preparing for AM fasting labs .  She did not know she had COVID until diagnosed in the ED.    Her fainting episode is most likely vasovagal, spurred on by relative dehydration in fasted state, having had COVID, and about a 15 min walk through her housing complex down to the parking garage.  Her forwards fall and facial injury are entirely unfortunate, but is not by itself indicative of a more sinister cause.  Checking an echocardiogram re: AS progression, although this is not likely the cause of her syncopal event.  OK to continue atorvastatin for remote hx of CVA.  Maintain telemetry re: arrhythmia surveillance. At this time I would not implant a loop recorder. Would recommend event monitoring and office followup.    Sunday Lino M.D.  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 with a chief complaint of syncope this morning. Patient reports that she awoke in yas usual state of health this morning, was supposed to go to outpatient laboratory to obtain lab work. Patient states that she told her  to bring the car to her by the elevator and does not recall further. States that she told her  she "feels unwell" and feels "off." Per  at the bedside, he found patient face down on the floor.  reports that he stepped away from patient for under 2 minutes to bring the car over to her and awoke patient. Patient reports pain to her B/L knees. Patient reports that she has been ambulating since and was advised to come to [facility] by her [facility] [place]. Patient denies any bladder or bowel incontinence. Denies chest pain, palpitations, shortness of breath or dyspnea.  Denies nausea, vomiting, fever, chills, cough, abdominal pain, headache, dizziness, lightheadedness neck or back pain.  (20 Dec 2023 15:43)    Describes a generalized feeling of "something just feeling off" prior to fainting.  No describable headache, vertigo, nausea, skin pallor, sweating, hot/flushed feeling, etc.  Last fainting episode was >5 yrs ago, unrelated incident.  A 10 pt ROS is otherwise negative.  Date of service 12/22- resting comfortably, no new complaints, being taken off floor for addl testing today.    PAST MEDICAL & SURGICAL HISTORY:  CVA (cerebral vascular accident)  Lymphoma  Osteoporosis  Seizure  Fracture  lumbar spine,  healed with conservative treatment  Neuropathy  bilateral feet  HLD (hyperlipidemia)  Depression  GERD (gastroesophageal reflux disease)  Hodgkins disease  Pulmonary embolus  Fracture, ankle  Right, s/p metal plate  H/O sinus surgery  Cataract extraction status of eye, right    acetaminophen     Tablet .. 650 milliGRAM(s) Oral every 6 hours PRN  anastrozole 1 milliGRAM(s) Oral daily  atorvastatin 20 milliGRAM(s) Oral at bedtime  benzonatate 100 milliGRAM(s) Oral every 8 hours PRN  chlorhexidine 2% Cloths 1 Application(s) Topical daily  fluticasone propionate 50 MICROgram(s)/spray Nasal Spray 1 Spray(s) Both Nostrils two times a day PRN  heparin   Injectable 5000 Unit(s) SubCutaneous every 8 hours  melatonin 3 milliGRAM(s) Oral at bedtime PRN  pantoprazole    Tablet 40 milliGRAM(s) Oral before breakfast  predniSONE   Tablet 5 milliGRAM(s) Oral every other day  sertraline 50 milliGRAM(s) Oral daily  sodium chloride 0.9%. 1000 milliLiter(s) IV Continuous <Continuous>                            11.7   4.33  )-----------( 162      ( 22 Dec 2023 05:57 )             36.4       12-22    140  |  105  |  17  ----------------------------<  111<H>  3.7   |  22  |  0.64    Ca    8.4      22 Dec 2023 05:56  Phos  2.8     12-21  Mg     1.9     12-21    TPro  6.0  /  Alb  3.7  /  TBili  0.3  /  DBili  <0.1  /  AST  62<H>  /  ALT  37  /  AlkPhos  50  12-22    T(C): 36.4 (12-22-23 @ 04:30), Max: 37.2 (12-21-23 @ 12:07)  HR: 64 (12-22-23 @ 04:30) (61 - 65)  BP: 123/59 (12-22-23 @ 04:30) (108/62 - 123/59)  RR: 18 (12-22-23 @ 04:30) (18 - 18)  SpO2: 94% (12-22-23 @ 04:30) (94% - 96%)  Wt(kg): --    I&O's Summary    21 Dec 2023 07:01  -  22 Dec 2023 07:00  --------------------------------------------------------  IN: 0 mL / OUT: 900 mL / NET: -900 mL    Appearance: Elderly woman in no acute distress  HEENT:   Normal oral mucosa, PERRL, EOMI	  Lymphatic: No lymphadenopathy , no edema  Cardiovascular: Normal S1 S2, No JVD, No murmurs , Peripheral pulses palpable 2+ bilaterally  Respiratory: Lungs clear to auscultation, normal effort 	  Gastrointestinal:  Soft, Non-tender, + BS	  Skin: ecchymoses - periorbital, chin, cheekbones, etc.  Musculoskeletal: Normal range of motion, normal strength  Psychiatry:  Mood & affect appropriate    TELEMETRY: NSR	    ECG: NSR, normal intervals  Echo:  Historically normal LVEF, moderate-to-severe Aortic valve stenosis.    ASSESSMENT/PLAN: Ms Yates is a very pleasant 78y Female here after semi-witnessed collapse in her apartment complex parking garage.  She has history of stroke, CNS vasculitis, breast cancer on maintenance anastrozole, and documented moderate-to-severe aortic valve stenosis.  On day of presentation, she was preparing for AM fasting labs .  She did not know she had COVID until diagnosed in the ED.    Her fainting episode is most likely vasovagal, spurred on by relative dehydration in fasted state, having had COVID, and about a 15 min walk through her housing complex down to the parking garage.  Her forwards fall and facial injury are entirely unfortunate, but is not by itself indicative of a more sinister cause.  Checking an echocardiogram re: AS progression, although this is not likely the cause of her syncopal event.  OK to continue atorvastatin for remote hx of CVA.  Maintain telemetry re: arrhythmia surveillance. At this time I would not implant a loop recorder. Would recommend event monitoring and office followup.    Sunday Lino M.D.  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 Patient reports that she awoke in yas usual state of health this morning, was supposed to go to outpatient laboratory to obtain lab work. Patient states that she told her  to bring the car to her by the elevator and does not recall further. States that she told her  she "feels unwell" and feels "off." Per  at the bedside, he found patient face down on the floor.  reports that he stepped away from patient for under 2 minutes to bring the car over to her and awoke patient. Patient reports pain to her B/L knees. Patient reports that she has been ambulating since and was advised to come to [facility] by her [facility] team. Patient denies any bladder or bowel incontinence. Denies chest pain, palpitations, shortness of breath or dyspnea.  Denies nausea, vomiting, fever, chills, cough, abdominal pain, headache, dizziness, lightheadedness neck or back pain.  (20 Dec 2023 15:43)    Describes a generalized feeling of "something just feeling off" prior to fainting.  No describable headache, vertigo, nausea, skin pallor, sweating, hot/flushed feeling, etc.  Last fainting episode was >5 yrs ago, unrelated incident.  A 10 pt ROS is otherwise negative.  Date of service 12/22- resting comfortably, no new complaints, being taken off floor for addl testing today.    PAST MEDICAL & SURGICAL HISTORY:  CVA (cerebral vascular accident)  Lymphoma  Osteoporosis  Seizure  Fracture  lumbar spine,  healed with conservative treatment  Neuropathy  bilateral feet  HLD (hyperlipidemia)  Depression  GERD (gastroesophageal reflux disease)  Hodgkins disease  Pulmonary embolus  Fracture, ankle  Right, s/p metal plate  H/O sinus surgery  Cataract extraction status of eye, right    acetaminophen     Tablet .. 650 milliGRAM(s) Oral every 6 hours PRN  anastrozole 1 milliGRAM(s) Oral daily  atorvastatin 20 milliGRAM(s) Oral at bedtime  benzonatate 100 milliGRAM(s) Oral every 8 hours PRN  chlorhexidine 2% Cloths 1 Application(s) Topical daily  fluticasone propionate 50 MICROgram(s)/spray Nasal Spray 1 Spray(s) Both Nostrils two times a day PRN  heparin   Injectable 5000 Unit(s) SubCutaneous every 8 hours  melatonin 3 milliGRAM(s) Oral at bedtime PRN  pantoprazole    Tablet 40 milliGRAM(s) Oral before breakfast  predniSONE   Tablet 5 milliGRAM(s) Oral every other day  sertraline 50 milliGRAM(s) Oral daily  sodium chloride 0.9%. 1000 milliLiter(s) IV Continuous <Continuous>                            11.7   4.33  )-----------( 162      ( 22 Dec 2023 05:57 )             36.4       12-22    140  |  105  |  17  ----------------------------<  111<H>  3.7   |  22  |  0.64    Ca    8.4      22 Dec 2023 05:56  Phos  2.8     12-21  Mg     1.9     12-21    TPro  6.0  /  Alb  3.7  /  TBili  0.3  /  DBili  <0.1  /  AST  62<H>  /  ALT  37  /  AlkPhos  50  12-22    T(C): 36.4 (12-22-23 @ 04:30), Max: 37.2 (12-21-23 @ 12:07)  HR: 64 (12-22-23 @ 04:30) (61 - 65)  BP: 123/59 (12-22-23 @ 04:30) (108/62 - 123/59)  RR: 18 (12-22-23 @ 04:30) (18 - 18)  SpO2: 94% (12-22-23 @ 04:30) (94% - 96%)  Wt(kg): --    I&O's Summary    21 Dec 2023 07:01  -  22 Dec 2023 07:00  --------------------------------------------------------  IN: 0 mL / OUT: 900 mL / NET: -900 mL    Appearance: Elderly woman in no acute distress  HEENT:   Normal oral mucosa, PERRL, EOMI	  Lymphatic: No lymphadenopathy , no edema  Cardiovascular: Normal S1 S2, No JVD, No murmurs , Peripheral pulses palpable 2+ bilaterally  Respiratory: Lungs clear to auscultation, normal effort 	  Gastrointestinal:  Soft, Non-tender, + BS	  Skin: ecchymoses - periorbital, chin, cheekbones, etc.  Musculoskeletal: Normal range of motion, normal strength  Psychiatry:  Mood & affect appropriate    TELEMETRY: NSR	    ECG: NSR, normal intervals  Echo:  Historically normal LVEF, moderate-to-severe Aortic valve stenosis.    ASSESSMENT/PLAN: Ms Yates is a very pleasant 78y Female here after semi-witnessed collapse in her apartment complex parking garage.  She has history of stroke, CNS vasculitis, breast cancer on maintenance anastrozole, and documented moderate-to-severe aortic valve stenosis.  On day of presentation, she was preparing for AM fasting labs .  She did not know she had COVID until diagnosed in the ED.    Her fainting episode is most likely vasovagal, spurred on by relative dehydration in fasted state, having had COVID, and about a 15 min walk through her housing complex down to the parking garage.  Her forwards fall and facial injury are entirely unfortunate, but is not by itself indicative of a more sinister cause.  Checking an echocardiogram re: AS progression, although this is not likely the cause of her syncopal event.  OK to continue atorvastatin for remote hx of CVA.  Maintain telemetry re: arrhythmia surveillance. At this time I would not implant a loop recorder. Would recommend event monitoring and office followup.    ADDENDUM:  MRI reviewed. Small stroke incidentally identified, otherwise no major symptoms noticed on physical exam.  Unlikely for such a small stroke to result in fainting.  Recommend clearance from COVID then rehab placement.  [facility] thru [facility] / will be monitored by team that reports to her [facility] heart team.  Optional ILR placement, electively as an outpatient.  Plan discussed w/ Dr Spraks and Dr Crockett.    Sunday Lino M.D.  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 Patient reports that she awoke in yas usual state of health this morning, was supposed to go to outpatient laboratory to obtain lab work. Patient states that she told her  to bring the car to her by the elevator and does not recall further. States that she told her  she "feels unwell" and feels "off." Per  at the bedside, he found patient face down on the floor.  reports that he stepped away from patient for under 2 minutes to bring the car over to her and awoke patient. Patient reports pain to her B/L knees. Patient reports that she has been ambulating since and was advised to come to [facility] by her [facility] team. Patient denies any bladder or bowel incontinence. Denies chest pain, palpitations, shortness of breath or dyspnea.  Denies nausea, vomiting, fever, chills, cough, abdominal pain, headache, dizziness, lightheadedness neck or back pain.  (20 Dec 2023 15:43)    Describes a generalized feeling of "something just feeling off" prior to fainting.  No describable headache, vertigo, nausea, skin pallor, sweating, hot/flushed feeling, etc.  Last fainting episode was >5 yrs ago, unrelated incident.  A 10 pt ROS is otherwise negative.  Date of service 12/22- resting comfortably, no new complaints, being taken off floor for addl testing today.    PAST MEDICAL & SURGICAL HISTORY:  CVA (cerebral vascular accident)  Lymphoma  Osteoporosis  Seizure  Fracture  lumbar spine,  healed with conservative treatment  Neuropathy  bilateral feet  HLD (hyperlipidemia)  Depression  GERD (gastroesophageal reflux disease)  Hodgkins disease  Pulmonary embolus  Fracture, ankle  Right, s/p metal plate  H/O sinus surgery  Cataract extraction status of eye, right    acetaminophen     Tablet .. 650 milliGRAM(s) Oral every 6 hours PRN  anastrozole 1 milliGRAM(s) Oral daily  atorvastatin 20 milliGRAM(s) Oral at bedtime  benzonatate 100 milliGRAM(s) Oral every 8 hours PRN  chlorhexidine 2% Cloths 1 Application(s) Topical daily  fluticasone propionate 50 MICROgram(s)/spray Nasal Spray 1 Spray(s) Both Nostrils two times a day PRN  heparin   Injectable 5000 Unit(s) SubCutaneous every 8 hours  melatonin 3 milliGRAM(s) Oral at bedtime PRN  pantoprazole    Tablet 40 milliGRAM(s) Oral before breakfast  predniSONE   Tablet 5 milliGRAM(s) Oral every other day  sertraline 50 milliGRAM(s) Oral daily  sodium chloride 0.9%. 1000 milliLiter(s) IV Continuous <Continuous>                            11.7   4.33  )-----------( 162      ( 22 Dec 2023 05:57 )             36.4       12-22    140  |  105  |  17  ----------------------------<  111<H>  3.7   |  22  |  0.64    Ca    8.4      22 Dec 2023 05:56  Phos  2.8     12-21  Mg     1.9     12-21    TPro  6.0  /  Alb  3.7  /  TBili  0.3  /  DBili  <0.1  /  AST  62<H>  /  ALT  37  /  AlkPhos  50  12-22    T(C): 36.4 (12-22-23 @ 04:30), Max: 37.2 (12-21-23 @ 12:07)  HR: 64 (12-22-23 @ 04:30) (61 - 65)  BP: 123/59 (12-22-23 @ 04:30) (108/62 - 123/59)  RR: 18 (12-22-23 @ 04:30) (18 - 18)  SpO2: 94% (12-22-23 @ 04:30) (94% - 96%)  Wt(kg): --    I&O's Summary    21 Dec 2023 07:01  -  22 Dec 2023 07:00  --------------------------------------------------------  IN: 0 mL / OUT: 900 mL / NET: -900 mL    Appearance: Elderly woman in no acute distress  HEENT:   Normal oral mucosa, PERRL, EOMI	  Lymphatic: No lymphadenopathy , no edema  Cardiovascular: Normal S1 S2, No JVD, No murmurs , Peripheral pulses palpable 2+ bilaterally  Respiratory: Lungs clear to auscultation, normal effort 	  Gastrointestinal:  Soft, Non-tender, + BS	  Skin: ecchymoses - periorbital, chin, cheekbones, etc.  Musculoskeletal: Normal range of motion, normal strength  Psychiatry:  Mood & affect appropriate    TELEMETRY: NSR	    ECG: NSR, normal intervals  Echo:  Historically normal LVEF, moderate-to-severe Aortic valve stenosis.    ASSESSMENT/PLAN: Ms Yates is a very pleasant 78y Female here after semi-witnessed collapse in her apartment complex parking garage.  She has history of stroke, CNS vasculitis, breast cancer on maintenance anastrozole, and documented moderate-to-severe aortic valve stenosis.  On day of presentation, she was preparing for AM fasting labs .  She did not know she had COVID until diagnosed in the ED.    Her fainting episode is most likely vasovagal, spurred on by relative dehydration in fasted state, having had COVID, and about a 15 min walk through her housing complex down to the parking garage.  Her forwards fall and facial injury are entirely unfortunate, but is not by itself indicative of a more sinister cause.  Checking an echocardiogram re: AS progression, although this is not likely the cause of her syncopal event.  OK to continue atorvastatin for remote hx of CVA.  Maintain telemetry re: arrhythmia surveillance. At this time I would not implant a loop recorder. Would recommend event monitoring and office followup.    ADDENDUM:  MRI reviewed. Small stroke incidentally identified, otherwise no major symptoms noticed on physical exam.  Unlikely for such a small stroke to result in fainting.  Recommend clearance from COVID then rehab placement.  MCOT thru [facility] / will be monitored by team that reports to her [facility] heart team.  Optional ILR placement, electively as an outpatient.  Plan discussed w/ Dr Sparks and Dr Crockett.    Sunday Lino M.D.  Cardiac Electrophysiology    office [phone]  pager [phone]

## 2023-12-22 NOTE — PROGRESS NOTE ADULT - ASSESSMENT
79 y/o F on adjuvant anastrozole for L breast cancer, admitted for syncope.    Breast Cancer  --under the care of Dr. 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  --ongoing care after discharge    Syncope, COVID+  - Per EP, suspect syncope related to vasovagal due to dehydration  - CT chest w/ No pneumonia. New tiny indeterminate 4 mm left upper lobe groundglass pulmonary nodule. Redemonstrated partially cystic lesion of the left medial breast of uncertain etiology.  - CT head w/ no evidence of acute intracranial hemorrhage or midline shift.  - CT C spine w/ no evidence of acute osseous fracture or ko dislocation. Multilevel degenerative change of the cervical spine as discussed above, most significantly at the C4-C6 levels where there is moderate to severe narrowing of the spinal canal. In the setting of myelopathy, recommend MRI of the cervical spine for further evaluation.  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    Rufino Mancuso PA-C  Hematology/Oncology  [facility]  [phone] (office) 79 y/o F on adjuvant anastrozole for L breast cancer, admitted for syncope.    Breast Cancer  --under the care of Dr. 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  --ongoing care after discharge    Syncope, COVID+  - Per EP, suspect syncope related to vasovagal due to dehydration  - CT chest w/ No pneumonia. New tiny indeterminate 4 mm left upper lobe groundglass pulmonary nodule. Redemonstrated partially cystic lesion of the left medial breast of uncertain etiology.  - CT head w/ no evidence of acute intracranial hemorrhage or midline shift.  - CT C spine w/ no evidence of acute osseous fracture or ok dislocation. Multilevel degenerative change of the cervical spine as discussed above, most significantly at the C4-C6 levels where there is moderate to severe narrowing of the spinal canal. In the setting of myelopathy, recommend MRI of the cervical spine for further evaluation.  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    Rufino Mancuso PA-C  Hematology/Oncology  [facility]  [phone] (office)

## 2023-12-22 NOTE — PHYSICAL THERAPY INITIAL EVALUATION ADULT - PERTINENT HX OF CURRENT PROBLEM, REHAB EVAL
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 Pt woke up in [place] today, got ready to go to a routine PMD appt and reports when she was walking to the car she started to "not feel well". Called for her  to grab a chair but had syncopal episode before he was able to grab one. Fell face forward. Was able to get herself up and has been ambulating since. 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 Pt woke up in USOH today, got ready to go to a routine PMD appt and reports when she was walking to the car she started to "not feel well". Called for her  to grab a chair but had syncopal episode before he was able to grab one. Fell face forward. Was able to get herself up and has been ambulating since.

## 2023-12-22 NOTE — PROGRESS NOTE ADULT - NS ATTEND AMEND GEN_ALL_CORE FT
Pt care and plan discussed and reviewed with PA. Plan as outlined above edited by me to reflect our discussion. Advanced care planning/advanced directives discussed with patient/family. DNR status including forceful chest compressions to attempt to restart the heart, ventilator support/artificial breathing, electric shock, artificial nutrition, health care proxy, Molst form all discussed with pt. total of fifty seven minutes spent .More than 50% of the visit was spent counseling and/or coordinating care by the attending physician.

## 2023-12-23 LAB
ALBUMIN SERPL ELPH-MCNC: 4.2 G/DL — SIGNIFICANT CHANGE UP (ref 3.3–5)
ALBUMIN SERPL ELPH-MCNC: 4.2 G/DL — SIGNIFICANT CHANGE UP (ref 3.3–5)
ALP SERPL-CCNC: 56 U/L — SIGNIFICANT CHANGE UP (ref 40–120)
ALP SERPL-CCNC: 56 U/L — SIGNIFICANT CHANGE UP (ref 40–120)
ALT FLD-CCNC: 39 U/L — SIGNIFICANT CHANGE UP (ref 10–45)
ALT FLD-CCNC: 39 U/L — SIGNIFICANT CHANGE UP (ref 10–45)
ANION GAP SERPL CALC-SCNC: 16 MMOL/L — SIGNIFICANT CHANGE UP (ref 5–17)
ANION GAP SERPL CALC-SCNC: 16 MMOL/L — SIGNIFICANT CHANGE UP (ref 5–17)
AST SERPL-CCNC: 52 U/L — HIGH (ref 10–40)
AST SERPL-CCNC: 52 U/L — HIGH (ref 10–40)
BILIRUB DIRECT SERPL-MCNC: 0.1 MG/DL — SIGNIFICANT CHANGE UP (ref 0–0.3)
BILIRUB DIRECT SERPL-MCNC: 0.1 MG/DL — SIGNIFICANT CHANGE UP (ref 0–0.3)
BILIRUB INDIRECT FLD-MCNC: 0.3 MG/DL — SIGNIFICANT CHANGE UP (ref 0.2–1)
BILIRUB INDIRECT FLD-MCNC: 0.3 MG/DL — SIGNIFICANT CHANGE UP (ref 0.2–1)
BILIRUB SERPL-MCNC: 0.4 MG/DL — SIGNIFICANT CHANGE UP (ref 0.2–1.2)
BILIRUB SERPL-MCNC: 0.4 MG/DL — SIGNIFICANT CHANGE UP (ref 0.2–1.2)
BUN SERPL-MCNC: 22 MG/DL — SIGNIFICANT CHANGE UP (ref 7–23)
BUN SERPL-MCNC: 22 MG/DL — SIGNIFICANT CHANGE UP (ref 7–23)
CALCIUM SERPL-MCNC: 8.8 MG/DL — SIGNIFICANT CHANGE UP (ref 8.4–10.5)
CALCIUM SERPL-MCNC: 8.8 MG/DL — SIGNIFICANT CHANGE UP (ref 8.4–10.5)
CHLORIDE SERPL-SCNC: 101 MMOL/L — SIGNIFICANT CHANGE UP (ref 96–108)
CHLORIDE SERPL-SCNC: 101 MMOL/L — SIGNIFICANT CHANGE UP (ref 96–108)
CO2 SERPL-SCNC: 19 MMOL/L — LOW (ref 22–31)
CO2 SERPL-SCNC: 19 MMOL/L — LOW (ref 22–31)
CREAT SERPL-MCNC: 0.63 MG/DL — SIGNIFICANT CHANGE UP (ref 0.5–1.3)
CREAT SERPL-MCNC: 0.63 MG/DL — SIGNIFICANT CHANGE UP (ref 0.5–1.3)
CREAT SERPL-MCNC: 0.75 MG/DL — SIGNIFICANT CHANGE UP (ref 0.5–1.3)
CREAT SERPL-MCNC: 0.75 MG/DL — SIGNIFICANT CHANGE UP (ref 0.5–1.3)
EGFR: 82 ML/MIN/1.73M2 — SIGNIFICANT CHANGE UP
EGFR: 82 ML/MIN/1.73M2 — SIGNIFICANT CHANGE UP
EGFR: 91 ML/MIN/1.73M2 — SIGNIFICANT CHANGE UP
EGFR: 91 ML/MIN/1.73M2 — SIGNIFICANT CHANGE UP
GLUCOSE SERPL-MCNC: 95 MG/DL — SIGNIFICANT CHANGE UP (ref 70–99)
GLUCOSE SERPL-MCNC: 95 MG/DL — SIGNIFICANT CHANGE UP (ref 70–99)
HCT VFR BLD CALC: 38.5 % — SIGNIFICANT CHANGE UP (ref 34.5–45)
HCT VFR BLD CALC: 38.5 % — SIGNIFICANT CHANGE UP (ref 34.5–45)
HGB BLD-MCNC: 12.4 G/DL — SIGNIFICANT CHANGE UP (ref 11.5–15.5)
HGB BLD-MCNC: 12.4 G/DL — SIGNIFICANT CHANGE UP (ref 11.5–15.5)
MCHC RBC-ENTMCNC: 28.5 PG — SIGNIFICANT CHANGE UP (ref 27–34)
MCHC RBC-ENTMCNC: 28.5 PG — SIGNIFICANT CHANGE UP (ref 27–34)
MCHC RBC-ENTMCNC: 32.2 GM/DL — SIGNIFICANT CHANGE UP (ref 32–36)
MCHC RBC-ENTMCNC: 32.2 GM/DL — SIGNIFICANT CHANGE UP (ref 32–36)
MCV RBC AUTO: 88.5 FL — SIGNIFICANT CHANGE UP (ref 80–100)
MCV RBC AUTO: 88.5 FL — SIGNIFICANT CHANGE UP (ref 80–100)
NRBC # BLD: 0 /100 WBCS — SIGNIFICANT CHANGE UP (ref 0–0)
NRBC # BLD: 0 /100 WBCS — SIGNIFICANT CHANGE UP (ref 0–0)
PLATELET # BLD AUTO: 191 K/UL — SIGNIFICANT CHANGE UP (ref 150–400)
PLATELET # BLD AUTO: 191 K/UL — SIGNIFICANT CHANGE UP (ref 150–400)
POTASSIUM SERPL-MCNC: 4.3 MMOL/L — SIGNIFICANT CHANGE UP (ref 3.5–5.3)
POTASSIUM SERPL-MCNC: 4.3 MMOL/L — SIGNIFICANT CHANGE UP (ref 3.5–5.3)
POTASSIUM SERPL-SCNC: 4.3 MMOL/L — SIGNIFICANT CHANGE UP (ref 3.5–5.3)
POTASSIUM SERPL-SCNC: 4.3 MMOL/L — SIGNIFICANT CHANGE UP (ref 3.5–5.3)
PROT SERPL-MCNC: 6.7 G/DL — SIGNIFICANT CHANGE UP (ref 6–8.3)
PROT SERPL-MCNC: 6.7 G/DL — SIGNIFICANT CHANGE UP (ref 6–8.3)
RBC # BLD: 4.35 M/UL — SIGNIFICANT CHANGE UP (ref 3.8–5.2)
RBC # BLD: 4.35 M/UL — SIGNIFICANT CHANGE UP (ref 3.8–5.2)
RBC # FLD: 14.6 % — HIGH (ref 10.3–14.5)
RBC # FLD: 14.6 % — HIGH (ref 10.3–14.5)
SODIUM SERPL-SCNC: 136 MMOL/L — SIGNIFICANT CHANGE UP (ref 135–145)
SODIUM SERPL-SCNC: 136 MMOL/L — SIGNIFICANT CHANGE UP (ref 135–145)
WBC # BLD: 5.72 K/UL — SIGNIFICANT CHANGE UP (ref 3.8–10.5)
WBC # BLD: 5.72 K/UL — SIGNIFICANT CHANGE UP (ref 3.8–10.5)
WBC # FLD AUTO: 5.72 K/UL — SIGNIFICANT CHANGE UP (ref 3.8–10.5)
WBC # FLD AUTO: 5.72 K/UL — SIGNIFICANT CHANGE UP (ref 3.8–10.5)

## 2023-12-23 RX ADMIN — APIXABAN 2.5 MILLIGRAM(S): 2.5 TABLET, FILM COATED ORAL at 17:47

## 2023-12-23 RX ADMIN — SERTRALINE 50 MILLIGRAM(S): 25 TABLET, FILM COATED ORAL at 11:48

## 2023-12-23 RX ADMIN — Medication 81 MILLIGRAM(S): at 11:48

## 2023-12-23 RX ADMIN — APIXABAN 2.5 MILLIGRAM(S): 2.5 TABLET, FILM COATED ORAL at 05:45

## 2023-12-23 RX ADMIN — ANASTROZOLE 1 MILLIGRAM(S): 1 TABLET ORAL at 11:48

## 2023-12-23 RX ADMIN — Medication 5 MILLIGRAM(S): at 11:48

## 2023-12-23 RX ADMIN — CHLORHEXIDINE GLUCONATE 1 APPLICATION(S): 213 SOLUTION TOPICAL at 11:44

## 2023-12-23 RX ADMIN — ATORVASTATIN CALCIUM 40 MILLIGRAM(S): 80 TABLET, FILM COATED ORAL at 21:50

## 2023-12-23 RX ADMIN — PANTOPRAZOLE SODIUM 40 MILLIGRAM(S): 20 TABLET, DELAYED RELEASE ORAL at 05:45

## 2023-12-23 NOTE — PROGRESS NOTE ADULT - ASSESSMENT
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 Patient reports that she awoke in yas usual state of health this morning, was supposed to go to outpatient laboratory to obtain lab work. Patient states that she told her  to bring the car to her by the elevator and does not recall further. States that she told her  she "feels unwell" and feels "off." Per  at the bedside, he found patient face down on the floor.  reports that he stepped away from patient for under 2 minutes to bring the car over to her and awoke patient. Patient reports pain to her B/L knees. Patient reports that she has been ambulating since and was advised to come to [facility] by her [facility] team. Patient denies any bladder or bowel incontinence. Denies chest pain, palpitations, shortness of breath or dyspnea.  Denies nausea, vomiting, fever, chills, cough, abdominal pain, headache, dizziness, lightheadedness neck or back pain.       Syncope  - Pt with syncope --> Likely due to Mod-Severe AS versus infectious etiology, however rule out other etiologies   - 8/2023 TTE w/ Preserved EF, No WMA, Grade II Diastolic dysfunction, Mod-Severe AS  - CT Head / C-Spine / Maxillofacial and 3D as noted   - Orthostatics negative; S/P will start gentle IVF, C/w PO hydration   - Carotid Duplex  with mild calcified plaque, without significant stenosis   - TTE w/ BUBBLE study -- pending    - EEG (Pt w/ history of Seizures 10 years ago, not on AED) --> Switched to routine as per discussion with neuro   - MR head, MR A H/N --> w/ Punctate Acute Infarct mild to moderate microvascular ischemic change m, prior OR changes, neg for significant stenosis --> Started on ASA 81 Mg PO Qd and Eliquis 2.5 BID resumed    - MR C-spine w/ multi-level degenerative changes   - Trop X 3 negative, trend    - Monitor on telemetry   - Neuro checks per protocol   - Fall precautions   - Neuro, EP and Cardio evaluations appreciated; F/u recs --> EP to follow up for extended cardiac monitoring   - resumed eliquis and ASA     Acute CVA  - Seen on MR  - ASA 81 Mg PO Qd started and Eliquis 2.5 BID resume per discussion w/ Neuro/Cardio  - EP to follow up for cardiac monitoring  - TTE w/ Bubble pending  - Lipitor increased to 40   - Neuro eval appreciated; F/u recs    COVID +  - Patient with cough, per pharmacy, patient does not meet requirement for RDV per policy at this time. Continue to monitor symptoms at present   - Procal neg, ESR, CRP, Ferritin, D-dimer noted --> On AC  - CT Chest w/ DYLAN GG 4mm nodule, additional nodules, trace pericardial effusion, L breast lesion, Neg for PNA    - Incentive spirometer and Tessalon Perle PRN   - Hold off on additional steroids (Pt on steroids at home) as patient is not hypoxic, on RA   - Monitor O2 saturation; Supplement PRN to maintain > 90%    Leukocytosis   - Pt recently completed prolonged course of ABX on 12/16  - UCx --> negative,  BCx2 in lab   - Trend CBC, temp curve, VS     Mod-Severe AS, Trace pericardial effusion   - Seen on 8/2023 TTE  - Check repeat TTE ---> Pending   - Monitor volume status   - Cardio eval consulted; F/u recs     Knee Pain   - Pt with chronic knee pain, Osteoporosis   - B/L Knees with Ecchymosis --> X-rays neg for fracture     Breast CA, Lymphoma   - Follows with [facility] and [facility]   - CT w/ breast lesion, F/u with oncology for further management   - C/w Anastrazole  - Heme/Onc eval appreciated; F/u recs      H/O DVT PE  - Last dose of Eliquis on 12/19 PM, resumed   - Hold in view of scalp hematoma    H/O CVA, CNS Vasculitis   - C/w Prednisone  - Neuro eval appreciated; F/u recs     HLD  - Lipid panel; C/w Statin therapy --> increased to 40     GERD  - C/w Anti-Acid therapeutic interchange while admitted     PPX     Patient is a 78 year old Female with a PMHx of Left Breast CA S/P L Lumpectomy, on Anastrazole, Right breast ADH S/P resection and on Tamoxifen X 5 years, Lymphoma, history of DVT/PE, 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 Patient reports that she awoke in yas usual state of health this morning, was supposed to go to outpatient laboratory to obtain lab work. Patient states that she told her  to bring the car to her by the elevator and does not recall further. States that she told her  she "feels unwell" and feels "off." Per  at the bedside, he found patient face down on the floor.  reports that he stepped away from patient for under 2 minutes to bring the car over to her and awoke patient. Patient reports pain to her B/L knees. Patient reports that she has been ambulating since and was advised to come to [facility] by her [facility] team. Patient denies any bladder or bowel incontinence. Denies chest pain, palpitations, shortness of breath or dyspnea.  Denies nausea, vomiting, fever, chills, cough, abdominal pain, headache, dizziness, lightheadedness neck or back pain.       Syncope  - Pt with syncope --> Likely due to Mod-Severe AS versus infectious etiology, however rule out other etiologies   - 8/2023 TTE w/ Preserved EF, No WMA, Grade II Diastolic dysfunction, Mod-Severe AS  - CT Head / C-Spine / Maxillofacial and 3D as noted   - Orthostatics negative; S/P will start gentle IVF, C/w PO hydration   - Carotid Duplex  with mild calcified plaque, without significant stenosis   - TTE w/ BUBBLE study -- pending    - EEG (Pt w/ history of Seizures 10 years ago, not on AED) --> Switched to routine as per discussion with neuro   - MR head, MR A H/N --> w/ Punctate Acute Infarct mild to moderate microvascular ischemic change m, prior OR changes, neg for significant stenosis --> Started on ASA 81 Mg PO Qd and Eliquis 2.5 BID resumed    - MR C-spine w/ multi-level degenerative changes   - Trop X 3 negative, trend    - Monitor on telemetry   - Neuro checks per protocol   - Fall precautions   - Neuro, EP and Cardio evaluations appreciated; F/u recs --> EP to follow up for extended cardiac monitoring   - resumed eliquis and ASA     Acute CVA  - Seen on MR  - ASA 81 Mg PO Qd started and Eliquis 2.5 BID resume per discussion w/ Neuro/Cardio  - EP to follow up for cardiac monitoring  - TTE w/ Bubble pending  - Lipitor increased to 40   - Neuro eval appreciated; F/u recs    COVID +  - Patient with cough, per pharmacy, patient does not meet requirement for RDV per policy at this time. Continue to monitor symptoms at present   - Procal neg, ESR, CRP, Ferritin, D-dimer noted --> On AC  - CT Chest w/ DYLAN GG 4mm nodule, additional nodules, trace pericardial effusion, L breast lesion, Neg for PNA    - Incentive spirometer and Tessalon Perle PRN   - Hold off on additional steroids (Pt on steroids at home) as patient is not hypoxic, on RA   - Monitor O2 saturation; Supplement PRN to maintain > 90%    Leukocytosis   - Pt recently completed prolonged course of ABX on 12/16  - UCx --> negative,  BCx2 in lab   - Trend CBC, temp curve, VS     Mod-Severe AS, Trace pericardial effusion   - Seen on 8/2023 TTE  - Check repeat TTE ---> Pending   - Monitor volume status   - Cardio eval consulted; F/u recs     Knee Pain   - Pt with chronic knee pain, Osteoporosis   - B/L Knees with Ecchymosis --> X-rays neg for fracture     Breast CA, Lymphoma   - Follows with [facility] and Uro-Gyn   - CT w/ breast lesion, F/u with oncology for further management   - C/w Anastrazole  - Heme/Onc eval appreciated; F/u recs      H/O DVT PE  - Last dose of Eliquis on 12/19 PM, resumed   - Hold in view of scalp hematoma    H/O CVA, CNS Vasculitis   - C/w Prednisone  - Neuro eval appreciated; F/u recs     HLD  - Lipid panel; C/w Statin therapy --> increased to 40     GERD  - C/w Anti-Acid therapeutic interchange while admitted     PPX

## 2023-12-23 NOTE — PROGRESS NOTE ADULT - SUBJECTIVE AND OBJECTIVE BOX
Name of Patient : VINCENT ENGLAND  MRN: [id number]        Subjective: Patient seen and examined. No new events except as noted.     REVIEW OF SYSTEMS:    CONSTITUTIONAL: No weakness, fevers or chills  EYES/ENT: No visual changes;  No vertigo or throat pain   NECK: No pain or stiffness  RESPIRATORY: No cough, wheezing, hemoptysis; No shortness of breath  CARDIOVASCULAR: No chest pain or palpitations  GASTROINTESTINAL: No abdominal or epigastric pain. No nausea, vomiting, or hematemesis; No diarrhea or constipation. No melena or hematochezia.  GENITOURINARY: No dysuria, frequency or hematuria  NEUROLOGICAL: No numbness or weakness  SKIN: No itching, burning, rashes, or lesions   All other review of systems is negative unless indicated above.    MEDICATIONS:  MEDICATIONS  (STANDING):  anastrozole 1 milliGRAM(s) Oral daily  apixaban 2.5 milliGRAM(s) Oral every 12 hours  aspirin enteric coated 81 milliGRAM(s) Oral daily  atorvastatin 40 milliGRAM(s) Oral at bedtime  chlorhexidine 2% Cloths 1 Application(s) Topical daily  pantoprazole    Tablet 40 milliGRAM(s) Oral before breakfast  predniSONE   Tablet 5 milliGRAM(s) Oral every other day  sertraline 50 milliGRAM(s) Oral daily  sodium chloride 0.9%. 1000 milliLiter(s) (50 mL/Hr) IV Continuous <Continuous>      PHYSICAL EXAM:  T(C): 37.1 (12-23-23 @ 21:10), Max: 37.1 (12-23-23 @ 21:10)  HR: 66 (12-23-23 @ 21:10) (65 - 85)  BP: 126/70 (12-23-23 @ 21:10) (108/65 - 126/70)  RR: 18 (12-23-23 @ 21:10) (18 - 18)  SpO2: 94% (12-23-23 @ 21:10) (94% - 98%)  Wt(kg): --  I&O's Summary    22 Dec 2023 07:01  -  23 Dec 2023 07:00  --------------------------------------------------------  IN: 240 mL / OUT: 450 mL / NET: -210 mL    23 Dec 2023 07:01  -  24 Dec 2023 00:10  --------------------------------------------------------  IN: 640 mL / OUT: 0 mL / NET: 640 mL          Appearance: Normal	  HEENT:  PERRLA   Lymphatic: No lymphadenopathy   Cardiovascular: Normal S1 S2, no JVD  Respiratory: normal effort , clear  Gastrointestinal:  Soft, Non-tender  Skin: No rashes,  warm to touch  Psychiatry:  Mood & affect appropriate  Musculuskeletal: No edema    recent labs, Imaging and EKGs personally reviewed     12-22-23 @ 07:01  -  12-23-23 @ 07:00  --------------------------------------------------------  IN: 240 mL / OUT: 450 mL / NET: -210 mL    12-23-23 @ 07:01  -  12-24-23 @ 00:10  --------------------------------------------------------  IN: 640 mL / OUT: 0 mL / NET: 640 mL                          12.4   5.72  )-----------( 191      ( 23 Dec 2023 07:21 )             38.5               12-23    x   |  x   |  x   ----------------------------<  x   x    |  x   |  0.75    Ca    8.8      23 Dec 2023 07:19    TPro  6.7  /  Alb  4.2  /  TBili  0.4  /  DBili  0.1  /  AST  52<H>  /  ALT  39  /  AlkPhos  56  12-23                       Urinalysis Basic - ( 23 Dec 2023 07:19 )    Color: x / Appearance: x / SG: x / pH: x  Gluc: 95 mg/dL / Ketone: x  / Bili: x / Urobili: x   Blood: x / Protein: x / Nitrite: x   Leuk Esterase: x / RBC: x / WBC x   Sq Epi: x / Non Sq Epi: x / Bacteria: x               Name of Patient : VINCENT ENGLAND  MRN: [id number]        Subjective: Patient seen and examined. No new events except as noted.     REVIEW OF SYSTEMS:    CONSTITUTIONAL: No weakness, fevers or chills  EYES/ENT: No visual changes;  No vertigo or throat pain   NECK: No pain or stiffness  RESPIRATORY: No cough, wheezing, hemoptysis; No shortness of breath  CARDIOVASCULAR: No chest pain or palpitations  GASTROINTESTINAL: No abdominal or epigastric pain. No nausea, vomiting, or hematemesis; No diarrhea or constipation. No melena or hematochezia.  GENITOURINARY: No dysuria, frequency or hematuria  NEUROLOGICAL: No numbness or weakness  SKIN: No itching, burning, rashes, or lesions   All other review of systems is negative unless indicated above.    MEDICATIONS:  MEDICATIONS  (STANDING):  anastrozole 1 milliGRAM(s) Oral daily  apixaban 2.5 milliGRAM(s) Oral every 12 hours  aspirin enteric coated 81 milliGRAM(s) Oral daily  atorvastatin 40 milliGRAM(s) Oral at bedtime  chlorhexidine 2% Cloths 1 Application(s) Topical daily  pantoprazole    Tablet 40 milliGRAM(s) Oral before breakfast  predniSONE   Tablet 5 milliGRAM(s) Oral every other day  sertraline 50 milliGRAM(s) Oral daily  sodium chloride 0.9%. 1000 milliLiter(s) (50 mL/Hr) IV Continuous <Continuous>      PHYSICAL EXAM:  T(C): 37.1 (12-23-23 @ 21:10), Max: 37.1 (12-23-23 @ 21:10)  HR: 66 (12-23-23 @ 21:10) (65 - 85)  BP: 126/70 (12-23-23 @ 21:10) (108/65 - 126/70)  RR: 18 (12-23-23 @ 21:10) (18 - 18)  SpO2: 94% (12-23-23 @ 21:10) (94% - 98%)  Wt(kg): --  I&O's Summary    22 Dec 2023 07:01  -  23 Dec 2023 07:00  --------------------------------------------------------  IN: 240 mL / OUT: 450 mL / NET: -210 mL    23 Dec 2023 07:01  -  24 Dec 2023 00:10  --------------------------------------------------------  IN: 640 mL / OUT: 0 mL / NET: 640 mL          Appearance: Normal	  HEENT:  PERRLA   Lymphatic: No lymphadenopathy   Cardiovascular: Normal S1 S2, no JVD  Respiratory: normal effort , clear  Gastrointestinal:  Soft, Non-tender  Skin: No rashes,  warm to touch  Psychiatry:  Mood & affect appropriate  Musculuskeletal: No edema    recent labs, Imaging and EKGs personally reviewed     12-22-23 @ 07:01  -  12-23-23 @ 07:00  --------------------------------------------------------  IN: 240 mL / OUT: 450 mL / NET: -210 mL    12-23-23 @ 07:01  -  12-24-23 @ 00:10  --------------------------------------------------------  IN: 640 mL / OUT: 0 mL / NET: 640 mL                          12.4   5.72  )-----------( 191      ( 23 Dec 2023 07:21 )             38.5               12-23    x   |  x   |  x   ----------------------------<  x   x    |  x   |  0.75    Ca    8.8      23 Dec 2023 07:19    TPro  6.7  /  Alb  4.2  /  TBili  0.4  /  DBili  0.1  /  AST  52<H>  /  ALT  39  /  AlkPhos  56  12-23                       Urinalysis Basic - ( 23 Dec 2023 07:19 )    Color: x / Appearance: x / SG: x / pH: x  Gluc: 95 mg/dL / Ketone: x  / Bili: x / Urobili: x   Blood: x / Protein: x / Nitrite: x   Leuk Esterase: x / RBC: x / WBC x   Sq Epi: x / Non Sq Epi: x / Bacteria: x

## 2023-12-23 NOTE — OCCUPATIONAL THERAPY INITIAL EVALUATION ADULT - PERTINENT HX OF CURRENT PROBLEM, REHAB EVAL
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 Patient reports that she awoke in yas usual state of health this morning, was supposed to go to outpatient laboratory to obtain lab work. Patient states that she told her  to bring the car to her by the elevator and does not recall further. States that she told her  she "feels unwell" and feels "off." Per  at the bedside, he found patient face down on the floor.  reports that he stepped away from patient for under 2 minutes to bring the car over to her and awoke patient. Patient reports pain to her B/L knees. Patient reports that she has been ambulating since and was advised to come to [facility] by her [facility] team. Patient denies any bladder or bowel incontinence. Denies chest pain, palpitations, shortness of breath or dyspnea.  Denies nausea, vomiting, fever, chills, cough, abdominal pain, headache, dizziness, lightheadedness neck or back pain. CT CHEST: No pneumonia.New tiny indeterminate 4 mm left upper lobe groundglass pulmonary nodule. According to Fleischner guideline for management of incidental lung nodule, no routine follow-up CT chest is recommended.Redemonstrated partially cystic lesion of the left medial breast of uncertain etiology. Correlation with prior mammogram/breast ultrasound is recommended.CT HEAD:1.  No evidence of acute intracranial hemorrhage or midline shift.2.  Chronic ischemic changes as discussed above.CT FACIAL BONES:1.  No evidence of fracture of the facial bones.2.  Mild paranasal sinus disease.CT CERVICAL SPINE:1.  No evidence of acute osseous fracture or ko dislocation.2.  Multilevel degenerative change of the cervical spine as discussed above, most significantly at the C4-C6 levels where there is moderate to severe narrowing of the spinal canal. In the setting of myelopathy, recommend MRI of the cervical spine for further evaluation.3.  Soft tissue fullness in the right vallecula. Recommend direct visual inspection.4.  Pleural-based and nodular calcifications in the bilateral lung apices as discussed above, correlate with patient history. Additionally, subtle ground glass opacities in the right lung apex. This could indicate a developing infectious/inflammatory process. Consider designated imaging of the chest for further evaluation if clinically indicated. MRI HEAD: MRI brain: Punctate acute infarct in the left posterior temporal lobe.  Encephalomalacia in the left cerebellum is present with extensive old hemorrhagic blood products. Similarly, there is encephalomalacia with chronic appearing hemorrhagic blood products within the medial right cerebellum. There are multiple small foci of T2/FLAIR hyperintense signal in the periventricularand subcortical white matter of the bilateral cerebri, suggestive of mild to moderate chronic microvascular ischemic changes.Prior right frontal craniotomy with encephalomalacia/gliosis in the underlying right anterolateral frontal lobe.  MRA brain: No hemodynamically significant stenosis MRA neck: No hemodynamically significant stenosis MRI SPINE: At C2/C3, there is a very small posterior disc bulge without significant foraminal or central spinal canal stenosis.At C3/C4, there is a posterior disc bulge and bilateral uncovertebral spurring resulting in moderate to severe narrowing of the bilateral neural foramen without significant central spinal canal stenosis.At C4/C5, there is a posterior disc bulge and bilateral uncovertebral spurring resulting in moderate to severe stenosis of the bilateral neural foramen and mild to moderate central spinal canal stenosis.At C5/C6, there is a posterior disc bulge and bilateral uncovertebral spurring resulting in moderate to severe stenosis of the bilateral neural   foramen and mild central spinal canal stenosis.At C6/C7, there is a posterior disc bulge and bilateral uncovertebral spurring resulting in moderate to severe stenosis of the bilateral neural foramen without significant central spinal canal stenosis.At C7/T1, there are no significant degenerative changes VA: Mild calcified plaque without duplex evidence of hemodynamically significant stenosis of either carotid artery. 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 Patient reports that she awoke in yas usual state of health this morning, was supposed to go to outpatient laboratory to obtain lab work. Patient states that she told her  to bring the car to her by the elevator and does not recall further. States that she told her  she "feels unwell" and feels "off." Per  at the bedside, he found patient face down on the floor.  reports that he stepped away from patient for under 2 minutes to bring the car over to her and awoke patient. Patient reports pain to her B/L knees. Patient reports that she has been ambulating since and was advised to come to [facility] by her [facility] team. Patient denies any bladder or bowel incontinence. Denies chest pain, palpitations, shortness of breath or dyspnea.  Denies nausea, vomiting, fever, chills, cough, abdominal pain, headache, dizziness, lightheadedness neck or back pain. CT CHEST: No pneumonia.New tiny indeterminate 4 mm left upper lobe groundglass pulmonary nodule. According to Fleischner guideline for management of incidental lung nodule, no routine follow-up CT chest is recommended.Redemonstrated partially cystic lesion of the left medial breast of uncertain etiology. Correlation with prior mammogram/breast ultrasound is recommended.CT HEAD:1.  No evidence of acute intracranial hemorrhage or midline shift.2.  Chronic ischemic changes as discussed above.CT FACIAL BONES:1.  No evidence of fracture of the facial bones.2.  Mild paranasal sinus disease.CT CERVICAL SPINE:1.  No evidence of acute osseous fracture or ko dislocation.2.  Multilevel degenerative change of the cervical spine as discussed above, most significantly at the C4-C6 levels where there is moderate to severe narrowing of the spinal canal. In the setting of myelopathy, recommend MRI of the cervical spine for further evaluation.3.  Soft tissue fullness in the right vallecula. Recommend direct visual inspection.4.  Pleural-based and nodular calcifications in the bilateral lung apices as discussed above, correlate with patient history. Additionally, subtle ground glass opacities in the right lung apex. This could indicate a developing infectious/inflammatory process. Consider designated imaging of the chest for further evaluation if clinically indicated. MRI HEAD: MRI brain: Punctate acute infarct in the left posterior temporal lobe.  Encephalomalacia in the left cerebellum is present with extensive old hemorrhagic blood products. Similarly, there is encephalomalacia with chronic appearing hemorrhagic blood products within the medial right cerebellum. There are multiple small foci of T2/FLAIR hyperintense signal in the periventricularand subcortical white matter of the bilateral cerebri, suggestive of mild to moderate chronic microvascular ischemic changes.Prior right frontal craniotomy with encephalomalacia/gliosis in the underlying right anterolateral frontal lobe.  MRA brain: No hemodynamically significant stenosis MRA neck: No hemodynamically significant stenosis MRI SPINE: At C2/C3, there is a very small posterior disc bulge without significant foraminal or central spinal canal stenosis.At C3/C4, there is a posterior disc bulge and bilateral uncovertebral spurring resulting in moderate to severe narrowing of the bilateral neural foramen without significant central spinal canal stenosis.At C4/C5, there is a posterior disc bulge and bilateral uncovertebral spurring resulting in moderate to severe stenosis of the bilateral neural foramen and mild to moderate central spinal canal stenosis.At C5/C6, there is a posterior disc bulge and bilateral uncovertebral spurring resulting in moderate to severe stenosis of the bilateral neural   foramen and mild central spinal canal stenosis.At C6/C7, there is a posterior disc bulge and bilateral uncovertebral spurring resulting in moderate to severe stenosis of the bilateral neural foramen without significant central spinal canal stenosis.At C7/T1, there are no significant degenerative changes VA: Mild calcified plaque without duplex evidence of hemodynamically significant stenosis of either carotid artery. XRAY CHEST/KNEES: CLEAR

## 2023-12-23 NOTE — PROGRESS NOTE ADULT - SUBJECTIVE AND OBJECTIVE BOX
Neurology     S: patient seen. MRI + AIS         Medications: MEDICATIONS  (STANDING):  anastrozole 1 milliGRAM(s) Oral daily  apixaban 2.5 milliGRAM(s) Oral every 12 hours  aspirin enteric coated 81 milliGRAM(s) Oral daily  atorvastatin 40 milliGRAM(s) Oral at bedtime  chlorhexidine 2% Cloths 1 Application(s) Topical daily  pantoprazole    Tablet 40 milliGRAM(s) Oral before breakfast  predniSONE   Tablet 5 milliGRAM(s) Oral every other day  sertraline 50 milliGRAM(s) Oral daily  sodium chloride 0.9%. 1000 milliLiter(s) (50 mL/Hr) IV Continuous <Continuous>    MEDICATIONS  (PRN):  acetaminophen     Tablet .. 650 milliGRAM(s) Oral every 6 hours PRN Moderate Pain (4 - 6)  benzonatate 100 milliGRAM(s) Oral every 8 hours PRN Cough  fluticasone propionate 50 MICROgram(s)/spray Nasal Spray 1 Spray(s) Both Nostrils two times a day PRN allergic rhinitis  melatonin 3 milliGRAM(s) Oral at bedtime PRN Insomnia       Vitals:  Vital Signs Last 24 Hrs  T(C): 36.8 (23 Dec 2023 06:09), Max: 37.3 (22 Dec 2023 21:05)  T(F): 98.2 (23 Dec 2023 06:09), Max: 99.1 (22 Dec 2023 21:05)  HR: 85 (23 Dec 2023 09:06) (60 - 85)  BP: 121/78 (23 Dec 2023 09:06) (121/60 - 133/63)  BP(mean): --  RR: 18 (23 Dec 2023 06:09) (18 - 18)  SpO2: 98% (23 Dec 2023 09:06) (95% - 98%)    Parameters below as of 23 Dec 2023 09:06  Patient On (Oxygen Delivery Method): room air              General Exam:   General Appearance: Appropriately dressed and in no acute distress       Head: Normocephalic, atraumatic and no dysmorphic features  Ear, Nose, and Throat: Moist mucous membranes  CVS: S1S2+  Resp: No SOB, no wheeze or rhonchi  GI: soft NT/ND  Extremities: No edema or cyanosis  Skin: No bruises or rashes     Neurological Exam:  Mental Status: Awake, alert and oriented x 3.  Able to follow simple and complex verbal commands. Able to name and repeat. fluent speech. No obvious aphasia or dysarthria noted.   Cranial Nerves: PERRL, EOMI, VFFC, sensation V1-V3 intact,  no obvious facial asymmetry, equal elevation of palate, scm/trap 5/5, tongue is midline on protrusion. no obvious papilledema on fundoscopic exam. hearing is grossly intact.   Motor: Normal bulk, tone and strength throughout. Fine finger movements were intact and symmetric. no tremors or drift noted.    Sensation: Intact to light touch and pinprick throughout. no right/left confusion. no extinction to tactile on DSS. Romberg was negative.   Reflexes: 1+ throughout at biceps, brachioradialis, triceps, patellars and ankles bilaterally and equal. No clonus. R toe and L toe were both downgoing.  Coordination: No dysmetria on FNF or HKS  Gait: deferred     Data/Labs/Imaging which I personally reviewed.       LABS:                          12.4   5.72  )-----------( 191      ( 23 Dec 2023 07:21 )             38.5     12-23    x   |  x   |  x   ----------------------------<  x   x    |  x   |  0.75    Ca    8.8      23 Dec 2023 07:19    TPro  6.7  /  Alb  4.2  /  TBili  0.4  /  DBili  0.1  /  AST  52<H>  /  ALT  39  /  AlkPhos  56  12-23    LIVER FUNCTIONS - ( 23 Dec 2023 11:06 )  Alb: 4.2 g/dL / Pro: 6.7 g/dL / ALK PHOS: 56 U/L / ALT: 39 U/L / AST: 52 U/L / GGT: x             Urinalysis Basic - ( 23 Dec 2023 07:19 )    Color: x / Appearance: x / SG: x / pH: x  Gluc: 95 mg/dL / Ketone: x  / Bili: x / Urobili: x   Blood: x / Protein: x / Nitrite: x   Leuk Esterase: x / RBC: x / WBC x   Sq Epi: x / Non Sq Epi: x / Bacteria: x            < from: CT Head No Cont (12.20.23 @ 12:33) >    ACC: [id number] EXAM:  CT CERVICAL SPINE   ORDERED BY:  SAMIA MILLS     ACC: [id number] EXAM:  CT MAXILLOFACIAL   ORDERED BY:  SAMIA MILLS     ACC: [id number] EXAM:  CT BRAIN   ORDERED BY:  SAMIA MILLS     ACC: [id number] EXAM:  CT 3D RECONSTRUCT W TAYLOR   ORDERED BY:    SAMIA MILLS     PROCEDURE DATE:  12/20/2023          INTERPRETATION:  EXAM: CT HEAD, FACIAL BONES, AND CERVICAL SPINE WITHOUT   INTRAVENOUS CONTRAST    HISTORY: Trauma, syncopal episode, currently on anticoagulation    TECHNIQUE: Multiple axial images were obtained of the head, facial bones,   and cervical spine. Sagittal and coronal reformatted images were obtained   from the axial data set. The images were reviewed in brain and bone   windows.    COMPARISON: CT of the head May 13, 2017, MRI of the head May 31, 2021    FINDINGS:    CT HEAD:  No acute intracranial hemorrhage. Areas of decreased attenuation in the   deep and periventricular white matter, compatible with chronic small   vessel disease. Mild encephalomalacia/gliotic change in the high right   frontal lobe. Chronic appearing infarcts in the left cerebellar   hemisphere. Mild parenchymal volume loss. No hydrocephalus. The   extra-axial spaces and basal cisterns are within normal limits. No   midline shift or mass effect present.    The cranial cervical junction is within normal limits. The sella is not   expanded. No depressed calvarial fracture. Prior right frontal   craniotomy. Mucosal thickening throughout the ethmoid air cells. The   visualized mastoid air cells are well aerated. The visualized orbits are   status post cataract surgery. Right frontal scalp hematoma/contusion.    CT FACIAL BONES:  The nasal bones are intact.    The orbital rims, zygomatic arches, and pterygoid plates are intact   bilaterally.    The mandible is intact. Mild arthritic changes in the bilateral TMJs.    Mucosal thickening throughout the ethmoid air cells. Probable polyp in   the right maxillary sinus. The visualized mastoid air cells are clear   bilaterally.    No fracture of the skull base or lower calvarium.      CT CERVICAL SPINE:  The atlantodental interval is within normal limits. The lateral masses   are properly aligned. No facet subluxation or malalignment at the   craniovertebral or atlantoaxial articulations. No dens fracture. No   significant prevertebral soft tissue swelling.    Subtle reversal to the cervical lordosis. Normal alignment of the   cervical vertebrae. Vertebral body height is well-maintained. Reduced   intervertebral disc height throughout the cervical spine, most   significantly C3-C5 with chronic endplate change. No jumped or perched   facets. No acute osseous fracture.    C2-C3: Subtle disc bulge. The bilateral neuroforamina are patent. No   narrowing of the spinal canal.    C3-C4: Disc osteophyte complex with uncovertebral joint hypertrophy.   Severe narrowing of the bilateral neuroforamen. No significant narrowing   of the spinal canal. Mild facet arthropathy.    C4-C5: Disc osteophyte complex with uncovertebral joint hypertrophy.   Severe narrowing of the left neural foramen and moderate to severe   narrowing of the right neural foramen. Moderate to severe narrowing of   the spinal canal. Mild facet arthropathy.    C5-C6: Disc osteophyte complex with uncovertebral joint hypertrophy.   Severe narrowing of the bilateral neuroforamen, left greater than right.   Moderate to severe narrowing of the spinal canal. Facet arthropathy.    C6-C7: Disc osteophyte complex with uncovertebral joint hypertrophy.   Severe narrowing of the bilateral neuroforamen. Mild narrowing of the   spinal canal. Facet arthropathy.    C7-T1: No large disc bulge. The bilateral neuroforamina are patent. No   significant narrowing of the spinal canal.    The paraspinal muscles are unremarkable. Soft tissue fullness in the   right vallecula.    Visualized portions of the thyroid are unremarkable.    Pleural-based calcifications in the left lung apex. Subtle groundglass   opacities in the right lung apex. Small 0.2 cm calcification in the right   lung apex.      IMPRESSION:    CT HEAD:  1.  No evidence of acute intracranial hemorrhage or midline shift.  2.  Chronic ischemic changes as discussed above.    CT FACIAL BONES:  1.  No evidence of fracture of the facial bones.  2.  Mild paranasal sinus disease.    CT CERVICAL SPINE:  1.  No evidence of acute osseous fracture or ko dislocation.  2.  Multilevel degenerative change of the cervical spine as discussed   above, most significantly at the C4-C6 levels where there is moderate to   severe narrowing of the spinal canal. In the setting of myelopathy,   recommend MRI of the cervical spine for further evaluation.  3.  Soft tissue fullness in the right vallecula. Recommend direct visual   inspection.  4.  Pleural-based and nodular calcifications in the bilateral lung apices   as discussed above, correlate with patient history. Additionally, subtle   ground glass opacities in the right lung apex. This could indicate a   developing infectious/inflammatory process. Consider designated imaging   of the chest for further evaluation if clinically indicated.    --- End of Report ---   < from: MR Angio Head w/wo IV Cont (12.22.23 @ 09:55) >    ACC: [id number] EXAM:  MR ANGIO BRAIN WAW IC   ORDERED BY: LUCIANA VEGA     ACC: [id number] EXAM:  MR ANGIO NECK WAW IC   ORDERED BY: LUCIANA VEGA     ACC: [id number] EXAM:  MR BRAIN WAW IC   ORDERED BY: LUCIANA VEGA     PROCEDURE DATE:  12/22/2023         INTERPRETATION:  Exam Type: MRI BRAIN WITH AND WITHOUT CONTRAST , MRA COW   WITHOUT CONTRAST, MRA NECK WITH AND WITHOUT CONTRAST  Indication: Syncope, R/O Mets, H/O CNS vasculitis  Comparison:CT head 12/20/2023    Technique: Multiplanar MR imaging of the brain was obtained with and   without contrast. Time of flight MRA of the neck with and without   contrast and [place] without contrast were obtained. 7.5  cc of   gadavist was administered. 0  cc was discarded.    Findings:    MRI brain: There is a punctate focus of restricted diffusion within the   left posterior temporal lobe, compatible with an acute infarct.   Encephalomalacia in the left cerebellum is present with extensive old   hemorrhagic blood products. Similarly, there is encephalomalacia with   chronic appearing hemorrhagic blood products within the medial right   cerebellum. 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 There are multiple small   foci of T2/FLAIR hyperintense signal in the periventricular and   subcortical white matter of the bilateral cerebri, suggestive of mild to   moderate chronic microvascular ischemic changes. There is no abnormal   enhancement.    Prior right frontal craniotomy with encephalomalacia/gliosis in the   underlying right anterolateral frontal lobe.    Cerebellar tonsils are in normal location. The intracranial flow voids   are normal in appearance. Visualized paranasal sinuses and mastoids are   normal in signal.    MRA of the [place] demonstrates normal antegrade flow in the   major intracranial arteries. No flow gap is seen to suggest high grade   stenosis. No evidence of medium or large sized aneurysm.    MRA of the neck demonstrates normal antegrade flow in the bilateral   common carotid, cervical internal carotid and vertebral arteries. No flow   gap is seen to suggest high grade stenosis.        IMPRESSION:    MRI brain: Punctate acute infarct in the left posterior temporal lobe.    Encephalomalacia in the left cerebellum is present with extensive old   hemorrhagic blood products. Similarly, there is encephalomalacia with   chronic appearing hemorrhagic blood products within the medial right   cerebellum. There are multiplesmall foci of T2/FLAIR hyperintense signal   in the periventricular and subcortical white matter of the bilateral   cerebri, suggestive of mild to moderate chronic microvascular ischemic   changes.    Prior right frontal craniotomy with encephalomalacia/gliosis in the   underlying right anterolateral frontal lobe.      MRA brain: No hemodynamically significant stenosis    MRA neck: No hemodynamically significant stenosis    --- End of Report ---         < from: MR Cervical Spine w/wo IV Cont (12.22.23 @ 09:54) >    ACC: [id number] EXAM:  MR SPINE CERVICAL WAW IC   ORDERED BY: LUCIANA VEGA     PROCEDURE DATE:  12/22/2023          INTERPRETATION:  Exam Date: 12/22/2023 9:54 AM    MR cervical spine with and without gadolinium    CLINICAL INFORMATION:  Stenosisseen on CT syncope.    TECHNIQUE:   Sagittal and axial T1-weighted images, sagittal proton   density images, axial and sagittal T2-weighted images of the cervical   spine were obtained.   Following the intravenous administration of 7.5 ml   Gadavist fat saturated sagittal and axial T1-weighted images were   obtained.  0 mL were discarded.    FINDINGS: Comparison is made to CT cervical of 12/20/2023.    Cervical vertebral alignment is intact.  Cervical vertebral body heights   are maintained.  Marrow signal intensity within cervical vertebral bodies   and posterior elements is unremarkable.  No osseous expansion, epidural   disease or paraspinal abnormality is found.    At C2/C3, there is a very small posterior disc bulge without significant   foraminal or central spinal canal stenosis.    At C3/C4, there is a posterior disc bulge and bilateral uncovertebral   spurring resulting in moderate to severe narrowing of the bilateral   neural foramen without significant central spinal canal stenosis.    At C4/C5, there is a posterior disc bulge and bilateral uncovertebral   spurring resulting in moderate to severe stenosis of the bilateral neural   foramen and mild to moderate central spinal canal stenosis.    At C5/C6, there is a posterior disc bulge and bilateral uncovertebral   spurring resulting in moderate to severe stenosis of the bilateral neural   foramen and mild central spinal canal stenosis.    At C6/C7, there is a posterior disc bulge and bilateral uncovertebral   spurring resulting in moderate to severe stenosis of the bilateral neural   foramen without significant central spinal canal stenosis.    At C7/T1, there are no significant degenerative changes    The cervical cord maintains intact morphology  No cord signal intensity   abnormality or focal cord lesion is appreciated.   There is no abnormal   cord enhancement.  The cervical medullary junction remains intact.      IMPRESSION:   Multilevel degenerative changes as detailed above.    --- End of Report ---            PHILL CATHERINE MD; Attending Radiologist  This document has been electronically signed. Dec 22 2023 11:04AM    < end of copied text >  < from: TTE W or WO Ultrasound Enhancing Agent (08.17.23 @ 07:12) >  < from: TTE W or WO Ultrasound Enhancing Agent (08.17.23 @ 07:12) >  CONCLUSIONS:      1. Normal left ventricular cavity size. Left ventricular wall thickness is normal. Left ventricular systolic function is normal with an ejection fraction of 63 % by Jones's method of disks. There are no regional wall motion abnormalities seen.   2. There is moderate (grade 2) left ventricular diastolic dysfunction, with normal filling pressure.   3. Moderately enlarged right ventricular cavity size and reduced systolic right ventricular function. The tricuspid annular plane systolic excursion (TAPSE) is 1.8 cm (normal >=1.7 cm).   4. Moderate-to-severe aortic stenosis.   5. Trace aortic regurgitation.   6. No pericardial effusion seen.   7. Estimated pulmonary artery systolic pressure is 35 mmHg.   8. No prior echocardiogram is available for comparison.    < end of copied text >   Neurology     S: patient seen. MRI + AIS         Medications: MEDICATIONS  (STANDING):  anastrozole 1 milliGRAM(s) Oral daily  apixaban 2.5 milliGRAM(s) Oral every 12 hours  aspirin enteric coated 81 milliGRAM(s) Oral daily  atorvastatin 40 milliGRAM(s) Oral at bedtime  chlorhexidine 2% Cloths 1 Application(s) Topical daily  pantoprazole    Tablet 40 milliGRAM(s) Oral before breakfast  predniSONE   Tablet 5 milliGRAM(s) Oral every other day  sertraline 50 milliGRAM(s) Oral daily  sodium chloride 0.9%. 1000 milliLiter(s) (50 mL/Hr) IV Continuous <Continuous>    MEDICATIONS  (PRN):  acetaminophen     Tablet .. 650 milliGRAM(s) Oral every 6 hours PRN Moderate Pain (4 - 6)  benzonatate 100 milliGRAM(s) Oral every 8 hours PRN Cough  fluticasone propionate 50 MICROgram(s)/spray Nasal Spray 1 Spray(s) Both Nostrils two times a day PRN allergic rhinitis  melatonin 3 milliGRAM(s) Oral at bedtime PRN Insomnia       Vitals:  Vital Signs Last 24 Hrs  T(C): 36.8 (23 Dec 2023 06:09), Max: 37.3 (22 Dec 2023 21:05)  T(F): 98.2 (23 Dec 2023 06:09), Max: 99.1 (22 Dec 2023 21:05)  HR: 85 (23 Dec 2023 09:06) (60 - 85)  BP: 121/78 (23 Dec 2023 09:06) (121/60 - 133/63)  BP(mean): --  RR: 18 (23 Dec 2023 06:09) (18 - 18)  SpO2: 98% (23 Dec 2023 09:06) (95% - 98%)    Parameters below as of 23 Dec 2023 09:06  Patient On (Oxygen Delivery Method): room air              General Exam:   General Appearance: Appropriately dressed and in no acute distress       Head: Normocephalic, atraumatic and no dysmorphic features  Ear, Nose, and Throat: Moist mucous membranes  CVS: S1S2+  Resp: No SOB, no wheeze or rhonchi  GI: soft NT/ND  Extremities: No edema or cyanosis  Skin: No bruises or rashes     Neurological Exam:  Mental Status: Awake, alert and oriented x 3.  Able to follow simple and complex verbal commands. Able to name and repeat. fluent speech. No obvious aphasia or dysarthria noted.   Cranial Nerves: PERRL, EOMI, VFFC, sensation V1-V3 intact,  no obvious facial asymmetry, equal elevation of palate, scm/trap 5/5, tongue is midline on protrusion. no obvious papilledema on fundoscopic exam. hearing is grossly intact.   Motor: Normal bulk, tone and strength throughout. Fine finger movements were intact and symmetric. no tremors or drift noted.    Sensation: Intact to light touch and pinprick throughout. no right/left confusion. no extinction to tactile on DSS. Romberg was negative.   Reflexes: 1+ throughout at biceps, brachioradialis, triceps, patellars and ankles bilaterally and equal. No clonus. R toe and L toe were both downgoing.  Coordination: No dysmetria on FNF or HKS  Gait: deferred     Data/Labs/Imaging which I personally reviewed.       LABS:                          12.4   5.72  )-----------( 191      ( 23 Dec 2023 07:21 )             38.5     12-23    x   |  x   |  x   ----------------------------<  x   x    |  x   |  0.75    Ca    8.8      23 Dec 2023 07:19    TPro  6.7  /  Alb  4.2  /  TBili  0.4  /  DBili  0.1  /  AST  52<H>  /  ALT  39  /  AlkPhos  56  12-23    LIVER FUNCTIONS - ( 23 Dec 2023 11:06 )  Alb: 4.2 g/dL / Pro: 6.7 g/dL / ALK PHOS: 56 U/L / ALT: 39 U/L / AST: 52 U/L / GGT: x             Urinalysis Basic - ( 23 Dec 2023 07:19 )    Color: x / Appearance: x / SG: x / pH: x  Gluc: 95 mg/dL / Ketone: x  / Bili: x / Urobili: x   Blood: x / Protein: x / Nitrite: x   Leuk Esterase: x / RBC: x / WBC x   Sq Epi: x / Non Sq Epi: x / Bacteria: x            < from: CT Head No Cont (12.20.23 @ 12:33) >    ACC: [id number] EXAM:  CT CERVICAL SPINE   ORDERED BY:  SAMIA MILLS     ACC: [id number] EXAM:  CT MAXILLOFACIAL   ORDERED BY:  SAMIA MILLS     ACC: [id number] EXAM:  CT BRAIN   ORDERED BY:  SAMIA MILLS     ACC: [id number] EXAM:  CT 3D RECONSTRUCT W TAYLOR   ORDERED BY:    SAMIA MILLS     PROCEDURE DATE:  12/20/2023          INTERPRETATION:  EXAM: CT HEAD, FACIAL BONES, AND CERVICAL SPINE WITHOUT   INTRAVENOUS CONTRAST    HISTORY: Trauma, syncopal episode, currently on anticoagulation    TECHNIQUE: Multiple axial images were obtained of the head, facial bones,   and cervical spine. Sagittal and coronal reformatted images were obtained   from the axial data set. The images were reviewed in brain and bone   windows.    COMPARISON: CT of the head May 13, 2017, MRI of the head May 31, 2021    FINDINGS:    CT HEAD:  No acute intracranial hemorrhage. Areas of decreased attenuation in the   deep and periventricular white matter, compatible with chronic small   vessel disease. Mild encephalomalacia/gliotic change in the high right   frontal lobe. Chronic appearing infarcts in the left cerebellar   hemisphere. Mild parenchymal volume loss. No hydrocephalus. The   extra-axial spaces and basal cisterns are within normal limits. No   midline shift or mass effect present.    The cranial cervical junction is within normal limits. The sella is not   expanded. No depressed calvarial fracture. Prior right frontal   craniotomy. Mucosal thickening throughout the ethmoid air cells. The   visualized mastoid air cells are well aerated. The visualized orbits are   status post cataract surgery. Right frontal scalp hematoma/contusion.    CT FACIAL BONES:  The nasal bones are intact.    The orbital rims, zygomatic arches, and pterygoid plates are intact   bilaterally.    The mandible is intact. Mild arthritic changes in the bilateral TMJs.    Mucosal thickening throughout the ethmoid air cells. Probable polyp in   the right maxillary sinus. The visualized mastoid air cells are clear   bilaterally.    No fracture of the skull base or lower calvarium.      CT CERVICAL SPINE:  The atlantodental interval is within normal limits. The lateral masses   are properly aligned. No facet subluxation or malalignment at the   craniovertebral or atlantoaxial articulations. No dens fracture. No   significant prevertebral soft tissue swelling.    Subtle reversal to the cervical lordosis. Normal alignment of the   cervical vertebrae. Vertebral body height is well-maintained. Reduced   intervertebral disc height throughout the cervical spine, most   significantly C3-C5 with chronic endplate change. No jumped or perched   facets. No acute osseous fracture.    C2-C3: Subtle disc bulge. The bilateral neuroforamina are patent. No   narrowing of the spinal canal.    C3-C4: Disc osteophyte complex with uncovertebral joint hypertrophy.   Severe narrowing of the bilateral neuroforamen. No significant narrowing   of the spinal canal. Mild facet arthropathy.    C4-C5: Disc osteophyte complex with uncovertebral joint hypertrophy.   Severe narrowing of the left neural foramen and moderate to severe   narrowing of the right neural foramen. Moderate to severe narrowing of   the spinal canal. Mild facet arthropathy.    C5-C6: Disc osteophyte complex with uncovertebral joint hypertrophy.   Severe narrowing of the bilateral neuroforamen, left greater than right.   Moderate to severe narrowing of the spinal canal. Facet arthropathy.    C6-C7: Disc osteophyte complex with uncovertebral joint hypertrophy.   Severe narrowing of the bilateral neuroforamen. Mild narrowing of the   spinal canal. Facet arthropathy.    C7-T1: No large disc bulge. The bilateral neuroforamina are patent. No   significant narrowing of the spinal canal.    The paraspinal muscles are unremarkable. Soft tissue fullness in the   right vallecula.    Visualized portions of the thyroid are unremarkable.    Pleural-based calcifications in the left lung apex. Subtle groundglass   opacities in the right lung apex. Small 0.2 cm calcification in the right   lung apex.      IMPRESSION:    CT HEAD:  1.  No evidence of acute intracranial hemorrhage or midline shift.  2.  Chronic ischemic changes as discussed above.    CT FACIAL BONES:  1.  No evidence of fracture of the facial bones.  2.  Mild paranasal sinus disease.    CT CERVICAL SPINE:  1.  No evidence of acute osseous fracture or ko dislocation.  2.  Multilevel degenerative change of the cervical spine as discussed   above, most significantly at the C4-C6 levels where there is moderate to   severe narrowing of the spinal canal. In the setting of myelopathy,   recommend MRI of the cervical spine for further evaluation.  3.  Soft tissue fullness in the right vallecula. Recommend direct visual   inspection.  4.  Pleural-based and nodular calcifications in the bilateral lung apices   as discussed above, correlate with patient history. Additionally, subtle   ground glass opacities in the right lung apex. This could indicate a   developing infectious/inflammatory process. Consider designated imaging   of the chest for further evaluation if clinically indicated.    --- End of Report ---   < from: MR Angio Head w/wo IV Cont (12.22.23 @ 09:55) >    ACC: [id number] EXAM:  MR ANGIO BRAIN WAW IC   ORDERED BY: LUCIANA VEGA     ACC: [id number] EXAM:  MR ANGIO NECK WAW IC   ORDERED BY: LUCIANA VEGA     ACC: [id number] EXAM:  MR BRAIN WAW IC   ORDERED BY: LUCIANA VEGA     PROCEDURE DATE:  12/22/2023         INTERPRETATION:  Exam Type: MRI BRAIN WITH AND WITHOUT CONTRAST , MRA COW   WITHOUT CONTRAST, MRA NECK WITH AND WITHOUT CONTRAST  Indication: Syncope, R/O Mets, H/O CNS vasculitis  Comparison:CT head 12/20/2023    Technique: Multiplanar MR imaging of the brain was obtained with and   without contrast. Time of flight MRA of the neck with and without   contrast and [place] without contrast were obtained. 7.5  cc of   gadavist was administered. 0  cc was discarded.    Findings:    MRI brain: There is a punctate focus of restricted diffusion within the   left posterior temporal lobe, compatible with an acute infarct.   Encephalomalacia in the left cerebellum is present with extensive old   hemorrhagic blood products. Similarly, there is encephalomalacia with   chronic appearing hemorrhagic blood products within the medial right   cerebellum. 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 There are multiple small   foci of T2/FLAIR hyperintense signal in the periventricular and   subcortical white matter of the bilateral cerebri, suggestive of mild to   moderate chronic microvascular ischemic changes. There is no abnormal   enhancement.    Prior right frontal craniotomy with encephalomalacia/gliosis in the   underlying right anterolateral frontal lobe.    Cerebellar tonsils are in normal location. The intracranial flow voids   are normal in appearance. Visualized paranasal sinuses and mastoids are   normal in signal.    MRA of the [place] demonstrates normal antegrade flow in the   major intracranial arteries. No flow gap is seen to suggest high grade   stenosis. No evidence of medium or large sized aneurysm.    MRA of the neck demonstrates normal antegrade flow in the bilateral   common carotid, cervical internal carotid and vertebral arteries. No flow   gap is seen to suggest high grade stenosis.        IMPRESSION:    MRI brain: Punctate acute infarct in the left posterior temporal lobe.    Encephalomalacia in the left cerebellum is present with extensive old   hemorrhagic blood products. Similarly, there is encephalomalacia with   chronic appearing hemorrhagic blood products within the medial right   cerebellum. There are multiplesmall foci of T2/FLAIR hyperintense signal   in the periventricular and subcortical white matter of the bilateral   cerebri, suggestive of mild to moderate chronic microvascular ischemic   changes.    Prior right frontal craniotomy with encephalomalacia/gliosis in the   underlying right anterolateral frontal lobe.      MRA brain: No hemodynamically significant stenosis    MRA neck: No hemodynamically significant stenosis    --- End of Report ---         < from: MR Cervical Spine w/wo IV Cont (12.22.23 @ 09:54) >    ACC: [id number] EXAM:  MR SPINE CERVICAL WAW IC   ORDERED BY: LUCIANA VEGA     PROCEDURE DATE:  12/22/2023          INTERPRETATION:  Exam Date: 12/22/2023 9:54 AM    MR cervical spine with and without gadolinium    CLINICAL INFORMATION:  Stenosisseen on CT syncope.    TECHNIQUE:   Sagittal and axial T1-weighted images, sagittal proton   density images, axial and sagittal T2-weighted images of the cervical   spine were obtained.   Following the intravenous administration of 7.5 ml   Gadavist fat saturated sagittal and axial T1-weighted images were   obtained.  0 mL were discarded.    FINDINGS: Comparison is made to CT cervical of 12/20/2023.    Cervical vertebral alignment is intact.  Cervical vertebral body heights   are maintained.  [place] signal intensity within cervical vertebral bodies   and posterior elements is unremarkable.  No osseous expansion, epidural   disease or paraspinal abnormality is found.    At C2/C3, there is a very small posterior disc bulge without significant   foraminal or central spinal canal stenosis.    At C3/C4, there is a posterior disc bulge and bilateral uncovertebral   spurring resulting in moderate to severe narrowing of the bilateral   neural foramen without significant central spinal canal stenosis.    At C4/C5, there is a posterior disc bulge and bilateral uncovertebral   spurring resulting in moderate to severe stenosis of the bilateral neural   foramen and mild to moderate central spinal canal stenosis.    At C5/C6, there is a posterior disc bulge and bilateral uncovertebral   spurring resulting in moderate to severe stenosis of the bilateral neural   foramen and mild central spinal canal stenosis.    At C6/C7, there is a posterior disc bulge and bilateral uncovertebral   spurring resulting in moderate to severe stenosis of the bilateral neural   foramen without significant central spinal canal stenosis.    At C7/T1, there are no significant degenerative changes    The cervical cord maintains intact morphology  No cord signal intensity   abnormality or focal cord lesion is appreciated.   There is no abnormal   cord enhancement.  The cervical medullary junction remains intact.      IMPRESSION:   Multilevel degenerative changes as detailed above.    --- End of Report ---            PHILL CATHERINE MD; Attending Radiologist  This document has been electronically signed. Dec 22 2023 11:04AM    < end of copied text >  < from: TTE W or WO Ultrasound Enhancing Agent (08.17.23 @ 07:12) >  < from: TTE W or WO Ultrasound Enhancing Agent (08.17.23 @ 07:12) >  CONCLUSIONS:      1. Normal left ventricular cavity size. Left ventricular wall thickness is normal. Left ventricular systolic function is normal with an ejection fraction of 63 % by Jones's method of disks. There are no regional wall motion abnormalities seen.   2. There is moderate (grade 2) left ventricular diastolic dysfunction, with normal filling pressure.   3. Moderately enlarged right ventricular cavity size and reduced systolic right ventricular function. The tricuspid annular plane systolic excursion (TAPSE) is 1.8 cm (normal >=1.7 cm).   4. Moderate-to-severe aortic stenosis.   5. Trace aortic regurgitation.   6. No pericardial effusion seen.   7. Estimated pulmonary artery systolic pressure is 35 mmHg.   8. No prior echocardiogram is available for comparison.    < end of copied text >

## 2023-12-23 NOTE — OCCUPATIONAL THERAPY INITIAL EVALUATION ADULT - ADL RETRAINING, OT EVAL
GOAL: Pt will be able to button shits during dressing ADLs independently in 2 weeks. GOAL: Pt will be able to  3/3 home medications independently  without dropping  in 2 weeks.

## 2023-12-23 NOTE — OCCUPATIONAL THERAPY INITIAL EVALUATION ADULT - LIVES WITH, PROFILE
Pt reports living with , apartment + elevator to enter. PTA was independent with all ADLs and mobility with no AD/DME. Owns RW , shower chair , and grab bars in walk-in shower./spouse

## 2023-12-23 NOTE — PROGRESS NOTE ADULT - ASSESSMENT
79 y/o F on adjuvant anastrozole for L breast cancer, admitted for syncope.    Breast Cancer  --under the care of Dr. 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  --ongoing care after discharge    Syncope, COVID+, stroke  - Per EP, suspect syncope related to vasovagal due to dehydration  - CT chest w/ No pneumonia. New tiny indeterminate 4 mm left upper lobe groundglass pulmonary nodule. Redemonstrated partially cystic lesion of the left medial breast of uncertain etiology.  - CT head w/ no evidence of acute intracranial hemorrhage or midline shift.  - CT C spine w/ no evidence of acute osseous fracture or ko dislocation. Multilevel degenerative change of the cervical spine as discussed above, most significantly at the C4-C6 levels where there is moderate to severe narrowing of the spinal canal. In the setting of myelopathy, recommend MRI of the cervical spine for further evaluation.  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    Rufino Mancuso PA-C  Hematology/Oncology  [facility]  [phone] (office) 77 y/o F on adjuvant anastrozole for L breast cancer, admitted for syncope.    Breast Cancer  --under the care of Dr. 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  --ongoing care after discharge    Syncope, COVID+, stroke  - Per EP, suspect syncope related to vasovagal due to dehydration  - CT chest w/ No pneumonia. New tiny indeterminate 4 mm left upper lobe groundglass pulmonary nodule. Redemonstrated partially cystic lesion of the left medial breast of uncertain etiology.  - CT head w/ no evidence of acute intracranial hemorrhage or midline shift.  - CT C spine w/ no evidence of acute osseous fracture or ko dislocation. Multilevel degenerative change of the cervical spine as discussed above, most significantly at the C4-C6 levels where there is moderate to severe narrowing of the spinal canal. In the setting of myelopathy, recommend MRI of the cervical spine for further evaluation.  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    Rufino Mancuso PA-C  Hematology/Oncology  [facility]  [phone] (office)

## 2023-12-23 NOTE — PROGRESS NOTE ADULT - ASSESSMENT
78 year old Female with  Left Breast CA S/P L Lumpectomy, on Anastrazole, Right breast ADH S/P resection and on Tamoxifen X 5 years, Lymphoma, history of DVT/PE, Stroke 2/2 CNS vasculitis, HLD, GERD, overactive bladder, seizures 10 years ago, not on AEDs, osteoporosis complicated by right tib-fib fracture s/p ORIF, recent admission 11/18-11/21 for urosepsis 2/2 E coli infection, S/P 4 week course of Bactrim on 12/16, follows with uro-gynecologist, who presents to [facility] with a chief complaint of syncope  CTH chronic changes with encephalomalacia and gliosis in high R frontal lobe  CT C spine multilevel DGD most at C4-6 with mod to severe narrowing.   NIHSS 1 premrs2  dimer 254  ESR 40   LDL 78   CRP 49   + covid   A1c 6  LDL 78 \    MRI brain with AIS in left post temporal lobe, punctate.  old L cerebellar and extensive old hemorrhagic products  mod MVID; prior R frontal crani   MRA H/N neg   MRI C spine with Degenertive chagnes   TTE as above. 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  on Remdesivir. monitor O2 for steroids   - orthostatics   - PT/OT/SS/SLP, OOBC  - check FS, glucose control <180  - GI/DVT ppx   - Thank you for allowing me to participate in the care of this patient. Call with questions.   - sees Dr. Naif Love neuro outpatient \  Sina Calles MD  Vascular Neurology  Office: [phone]    78 year old Female with  Left Breast CA S/P L Lumpectomy, on Anastrazole, Right breast ADH S/P resection and on Tamoxifen X 5 years, Lymphoma, history of DVT/PE, Stroke 2/2 CNS vasculitis, HLD, GERD, overactive bladder, seizures 10 years ago, not on AEDs, osteoporosis complicated by right tib-fib fracture s/p ORIF, recent admission 11/18-11/21 for urosepsis 2/2 E coli infection, S/P 4 week course of Bactrim on 12/16, follows with uro-gynecologist, who presents to [facility] with a chief complaint of syncope  CTH chronic changes with encephalomalacia and gliosis in high R frontal lobe  CT C spine multilevel DGD most at C4-6 with mod to severe narrowing.   NIHSS 1 premrs2  dimer 254  ESR 40   LDL 78   CRP 49   + covid   A1c 6  LDL 78 \    MRI brain with AIS in left post temporal lobe, punctate.  old L cerebellar and extensive old hemorrhagic products  mod MVID; prior R frontal crani   MRA H/N neg   MRI C spine with Degenertive chagnes   TTE as above. 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  on Remdesivir. monitor O2 for steroids   - orthostatics   - PT/OT/SS/SLP, OOBC  - check FS, glucose control <180  - GI/DVT ppx   - Thank you for allowing me to participate in the care of this patient. Call with questions.   - sees Dr. Naif Love neuro outpatient \  Sina Calles MD  Vascular Neurology  Office: [phone]

## 2023-12-23 NOTE — PROGRESS NOTE ADULT - SUBJECTIVE AND OBJECTIVE BOX
Patient is a 78y old  Female who presents with a chief complaint of Syncope (23 Dec 2023 09:55)    Patient seen and examined at bedside. No acute events.    MEDICATIONS  (STANDING):  anastrozole 1 milliGRAM(s) Oral daily  apixaban 2.5 milliGRAM(s) Oral every 12 hours  aspirin enteric coated 81 milliGRAM(s) Oral daily  atorvastatin 40 milliGRAM(s) Oral at bedtime  chlorhexidine 2% Cloths 1 Application(s) Topical daily  pantoprazole    Tablet 40 milliGRAM(s) Oral before breakfast  predniSONE   Tablet 5 milliGRAM(s) Oral every other day  sertraline 50 milliGRAM(s) Oral daily  sodium chloride 0.9%. 1000 milliLiter(s) (50 mL/Hr) IV Continuous <Continuous>    MEDICATIONS  (PRN):  acetaminophen     Tablet .. 650 milliGRAM(s) Oral every 6 hours PRN Moderate Pain (4 - 6)  benzonatate 100 milliGRAM(s) Oral every 8 hours PRN Cough  fluticasone propionate 50 MICROgram(s)/spray Nasal Spray 1 Spray(s) Both Nostrils two times a day PRN allergic rhinitis  melatonin 3 milliGRAM(s) Oral at bedtime PRN Insomnia      Vital Signs Last 24 Hrs  T(C): 36.8 (23 Dec 2023 06:09), Max: 37.3 (22 Dec 2023 21:05)  T(F): 98.2 (23 Dec 2023 06:09), Max: 99.1 (22 Dec 2023 21:05)  HR: 85 (23 Dec 2023 09:06) (60 - 85)  BP: 121/78 (23 Dec 2023 09:06) (121/60 - 133/63)  BP(mean): --  RR: 18 (23 Dec 2023 06:09) (18 - 18)  SpO2: 98% (23 Dec 2023 09:06) (95% - 98%)    Parameters below as of 23 Dec 2023 09:06  Patient On (Oxygen Delivery Method): room air        PE  NAD  Awake, alert  Anicteric, MMM  No c/c/e  Ecchymoses to face  FROM                          12.4   5.72  )-----------( 191      ( 23 Dec 2023 07:21 )             38.5       12-23    x   |  x   |  x   ----------------------------<  x   x    |  x   |  0.75    Ca    8.8      23 Dec 2023 07:19    TPro  6.7  /  Alb  4.2  /  TBili  0.4  /  DBili  0.1  /  AST  52<H>  /  ALT  39  /  AlkPhos  56  12-23

## 2023-12-23 NOTE — OCCUPATIONAL THERAPY INITIAL EVALUATION ADULT - FINE MOTOR COORDINATION, RIGHT HAND, MANIPULATE OBJECTS, OT EVAL
c/o of difficulty with fine motor movement intermittently. No impairment notice at time of eval./normal performance

## 2023-12-23 NOTE — PROGRESS NOTE ADULT - SUBJECTIVE AND OBJECTIVE BOX
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 Patient reports that she awoke in yas usual state of health this morning, was supposed to go to outpatient laboratory to obtain lab work. Patient states that she told her  to bring the car to her by the elevator and does not recall further. States that she told her  she "feels unwell" and feels "off." Per  at the bedside, he found patient face down on the floor.  reports that he stepped away from patient for under 2 minutes to bring the car over to her and awoke patient. Patient reports pain to her B/L knees. Patient reports that she has been ambulating since and was advised to come to [facility] by her [facility]. Patient denies any bladder or bowel incontinence. Denies chest pain, palpitations, shortness of breath or dyspnea.  Denies nausea, vomiting, fever, chills, cough, abdominal pain, headache, dizziness, lightheadedness neck or back pain.  (20 Dec 2023 15:43)    Describes a generalized feeling of "something just feeling off" prior to fainting.  No describable headache, vertigo, nausea, skin pallor, sweating, hot/flushed feeling, etc.  Last fainting episode was >5 yrs ago, unrelated incident.  A 10 pt ROS is otherwise negative.  Date of service 12/22- resting comfortably, no new complaints, being taken off floor for addl testing today.    PAST MEDICAL & SURGICAL HISTORY:  CVA (cerebral vascular accident)  Lymphoma  Osteoporosis  Seizure  Fracture  lumbar spine,  healed with conservative treatment  Neuropathy  bilateral feet  HLD (hyperlipidemia)  Depression  GERD (gastroesophageal reflux disease)  Hodgkins disease  Pulmonary embolus  Fracture, ankle  Right, s/p metal plate  H/O sinus surgery  Cataract extraction status of eye, right          acetaminophen     Tablet .. 650 milliGRAM(s) Oral every 6 hours PRN  anastrozole 1 milliGRAM(s) Oral daily  apixaban 2.5 milliGRAM(s) Oral every 12 hours  aspirin enteric coated 81 milliGRAM(s) Oral daily  atorvastatin 40 milliGRAM(s) Oral at bedtime  benzonatate 100 milliGRAM(s) Oral every 8 hours PRN  chlorhexidine 2% Cloths 1 Application(s) Topical daily  fluticasone propionate 50 MICROgram(s)/spray Nasal Spray 1 Spray(s) Both Nostrils two times a day PRN  melatonin 3 milliGRAM(s) Oral at bedtime PRN  pantoprazole    Tablet 40 milliGRAM(s) Oral before breakfast  predniSONE   Tablet 5 milliGRAM(s) Oral every other day  sertraline 50 milliGRAM(s) Oral daily  sodium chloride 0.9%. 1000 milliLiter(s) IV Continuous <Continuous>                            12.4   5.72  )-----------( 191      ( 23 Dec 2023 07:21 )             38.5       Hemoglobin: 12.4 g/dL (12-23 @ 07:21)  Hemoglobin: 11.7 g/dL (12-22 @ 05:57)  Hemoglobin: 11.6 g/dL (12-21 @ 07:29)  Hemoglobin: 12.2 g/dL (12-20 @ 11:43)      12-23    136  |  101  |  22  ----------------------------<  95  4.3   |  19<L>  |  0.63    Ca    8.8      23 Dec 2023 07:19    TPro  6.0  /  Alb  3.7  /  TBili  0.3  /  DBili  <0.1  /  AST  62<H>  /  ALT  37  /  AlkPhos  50  12-22    Creatinine Trend: 0.63<--, 0.64<--, 0.69<--, 0.63<--, 0.73<--    COAGS:           T(C): 36.8 (12-23-23 @ 06:09), Max: 37.3 (12-22-23 @ 21:05)  HR: 85 (12-23-23 @ 09:06) (57 - 85)  BP: 121/78 (12-23-23 @ 09:06) (107/61 - 133/63)  RR: 18 (12-23-23 @ 06:09) (18 - 18)  SpO2: 98% (12-23-23 @ 09:06) (95% - 98%)  Wt(kg): --    I&O's Summary    22 Dec 2023 07:01  -  23 Dec 2023 07:00  --------------------------------------------------------  IN: 240 mL / OUT: 450 mL / NET: -210 mL      Appearance: Elderly woman in no acute distress  HEENT:   Normal oral mucosa, PERRL, EOMI	  Lymphatic: No lymphadenopathy , no edema  Cardiovascular: Normal S1 S2, No JVD, No murmurs , Peripheral pulses palpable 2+ bilaterally  Respiratory: Lungs clear to auscultation, normal effort 	  Gastrointestinal:  Soft, Non-tender, + BS	  Skin: ecchymoses - periorbital, chin, cheekbones, etc.  Musculoskeletal: Normal range of motion, normal strength  Psychiatry:  Mood & affect appropriate    TELEMETRY: NSR	    ECG: NSR, normal intervals  Echo:  Historically normal LVEF, moderate-to-severe Aortic valve stenosis.    ASSESSMENT/PLAN: Ms Yates is a very pleasant 78y Female here after semi-witnessed collapse in her apartment complex parking garage.  She has history of stroke, CNS vasculitis, breast cancer on maintenance anastrozole, and documented moderate-to-severe aortic valve stenosis.  On day of presentation, she was preparing for AM fasting labs .  She did not know she had COVID until diagnosed in the ED.    Her fainting episode is most likely vasovagal, spurred on by relative dehydration in fasted state, having had COVID, and about a 15 min walk through her housing complex down to the parking garage.  Her forwards fall and facial injury are entirely unfortunate, but is not by itself indicative of a more sinister cause.  Checking an echocardiogram re: AS progression, although this is not likely the cause of her syncopal event.  OK to continue atorvastatin for remote hx of CVA.  Maintain telemetry re: arrhythmia surveillance. At this time I would not implant a loop recorder. Would recommend event monitoring and office followup.    MRI reviewed. Small stroke incidentally identified, otherwise no major symptoms noticed on physical exam.  Unlikely for such a small stroke to result in fainting.  Recommend clearance from COVID then rehab placement.  [facility] thru [facility] / will be monitored by team that reports to her [facility] heart team.  Optional ILR placement, electively as an outpatient.  Plan discussed w/ Dr Sparks and Dr Crockett.    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 with a chief complaint of syncope this morning. Patient reports that she awoke in yas usual state of health this morning, was supposed to go to outpatient laboratory to obtain lab work. Patient states that she told her  to bring the car to her by the elevator and does not recall further. States that she told her  she "feels unwell" and feels "off." Per  at the bedside, he found patient face down on the floor.  reports that he stepped away from patient for under 2 minutes to bring the car over to her and awoke patient. Patient reports pain to her B/L knees. Patient reports that she has been ambulating since and was advised to come to [facility] by her [facility] team. Patient denies any bladder or bowel incontinence. Denies chest pain, palpitations, shortness of breath or dyspnea.  Denies nausea, vomiting, fever, chills, cough, abdominal pain, headache, dizziness, lightheadedness neck or back pain.  (20 Dec 2023 15:43)    Describes a generalized feeling of "something just feeling off" prior to fainting.  No describable headache, vertigo, nausea, skin pallor, sweating, hot/flushed feeling, etc.  Last fainting episode was >5 yrs ago, unrelated incident.  A 10 pt ROS is otherwise negative.  Date of service 12/22- resting comfortably, no new complaints, being taken off floor for addl testing today.    PAST MEDICAL & SURGICAL HISTORY:  CVA (cerebral vascular accident)  Lymphoma  Osteoporosis  Seizure  Fracture  lumbar spine,  healed with conservative treatment  Neuropathy  bilateral feet  HLD (hyperlipidemia)  Depression  GERD (gastroesophageal reflux disease)  Hodgkins disease  Pulmonary embolus  Fracture, ankle  Right, s/p metal plate  H/O sinus surgery  Cataract extraction status of eye, right          acetaminophen     Tablet .. 650 milliGRAM(s) Oral every 6 hours PRN  anastrozole 1 milliGRAM(s) Oral daily  apixaban 2.5 milliGRAM(s) Oral every 12 hours  aspirin enteric coated 81 milliGRAM(s) Oral daily  atorvastatin 40 milliGRAM(s) Oral at bedtime  benzonatate 100 milliGRAM(s) Oral every 8 hours PRN  chlorhexidine 2% Cloths 1 Application(s) Topical daily  fluticasone propionate 50 MICROgram(s)/spray Nasal Spray 1 Spray(s) Both Nostrils two times a day PRN  melatonin 3 milliGRAM(s) Oral at bedtime PRN  pantoprazole    Tablet 40 milliGRAM(s) Oral before breakfast  predniSONE   Tablet 5 milliGRAM(s) Oral every other day  sertraline 50 milliGRAM(s) Oral daily  sodium chloride 0.9%. 1000 milliLiter(s) IV Continuous <Continuous>                            12.4   5.72  )-----------( 191      ( 23 Dec 2023 07:21 )             38.5       Hemoglobin: 12.4 g/dL (12-23 @ 07:21)  Hemoglobin: 11.7 g/dL (12-22 @ 05:57)  Hemoglobin: 11.6 g/dL (12-21 @ 07:29)  Hemoglobin: 12.2 g/dL (12-20 @ 11:43)      12-23    136  |  101  |  22  ----------------------------<  95  4.3   |  19<L>  |  0.63    Ca    8.8      23 Dec 2023 07:19    TPro  6.0  /  Alb  3.7  /  TBili  0.3  /  DBili  <0.1  /  AST  62<H>  /  ALT  37  /  AlkPhos  50  12-22    Creatinine Trend: 0.63<--, 0.64<--, 0.69<--, 0.63<--, 0.73<--    COAGS:           T(C): 36.8 (12-23-23 @ 06:09), Max: 37.3 (12-22-23 @ 21:05)  HR: 85 (12-23-23 @ 09:06) (57 - 85)  BP: 121/78 (12-23-23 @ 09:06) (107/61 - 133/63)  RR: 18 (12-23-23 @ 06:09) (18 - 18)  SpO2: 98% (12-23-23 @ 09:06) (95% - 98%)  Wt(kg): --    I&O's Summary    22 Dec 2023 07:01  -  23 Dec 2023 07:00  --------------------------------------------------------  IN: 240 mL / OUT: 450 mL / NET: -210 mL      Appearance: Elderly woman in no acute distress  HEENT:   Normal oral mucosa, PERRL, EOMI	  Lymphatic: No lymphadenopathy , no edema  Cardiovascular: Normal S1 S2, No JVD, No murmurs , Peripheral pulses palpable 2+ bilaterally  Respiratory: Lungs clear to auscultation, normal effort 	  Gastrointestinal:  Soft, Non-tender, + BS	  Skin: ecchymoses - periorbital, chin, cheekbones, etc.  Musculoskeletal: Normal range of motion, normal strength  Psychiatry:  Mood & affect appropriate    TELEMETRY: NSR	    ECG: NSR, normal intervals  Echo:  Historically normal LVEF, moderate-to-severe Aortic valve stenosis.    ASSESSMENT/PLAN: Ms Yates is a very pleasant 78y Female here after semi-witnessed collapse in her apartment complex parking garage.  She has history of stroke, CNS vasculitis, breast cancer on maintenance anastrozole, and documented moderate-to-severe aortic valve stenosis.  On day of presentation, she was preparing for AM fasting labs .  She did not know she had COVID until diagnosed in the ED.    Her fainting episode is most likely vasovagal, spurred on by relative dehydration in fasted state, having had COVID, and about a 15 min walk through her housing complex down to the parking garage.  Her forwards fall and facial injury are entirely unfortunate, but is not by itself indicative of a more sinister cause.  Checking an echocardiogram re: AS progression, although this is not likely the cause of her syncopal event.  OK to continue atorvastatin for remote hx of CVA.  Maintain telemetry re: arrhythmia surveillance. At this time I would not implant a loop recorder. Would recommend event monitoring and office followup.    MRI reviewed. Small stroke incidentally identified, otherwise no major symptoms noticed on physical exam.  Unlikely for such a small stroke to result in fainting.  Recommend clearance from COVID then rehab placement.  [facility]OT thru [facility] / will be monitored by team that reports to her [facility] heart team.  Optional ILR placement, electively as an outpatient.  Plan discussed w/ Dr Sparks and Dr Crockett.

## 2023-12-24 LAB
ALBUMIN SERPL ELPH-MCNC: 3.7 G/DL — SIGNIFICANT CHANGE UP (ref 3.3–5)
ALBUMIN SERPL ELPH-MCNC: 3.7 G/DL — SIGNIFICANT CHANGE UP (ref 3.3–5)
ALP SERPL-CCNC: 49 U/L — SIGNIFICANT CHANGE UP (ref 40–120)
ALP SERPL-CCNC: 49 U/L — SIGNIFICANT CHANGE UP (ref 40–120)
ALT FLD-CCNC: 31 U/L — SIGNIFICANT CHANGE UP (ref 10–45)
ALT FLD-CCNC: 31 U/L — SIGNIFICANT CHANGE UP (ref 10–45)
AST SERPL-CCNC: 41 U/L — HIGH (ref 10–40)
AST SERPL-CCNC: 41 U/L — HIGH (ref 10–40)
BILIRUB DIRECT SERPL-MCNC: <0.1 MG/DL — SIGNIFICANT CHANGE UP (ref 0–0.3)
BILIRUB DIRECT SERPL-MCNC: <0.1 MG/DL — SIGNIFICANT CHANGE UP (ref 0–0.3)
BILIRUB INDIRECT FLD-MCNC: >0.3 MG/DL — SIGNIFICANT CHANGE UP (ref 0.2–1)
BILIRUB INDIRECT FLD-MCNC: >0.3 MG/DL — SIGNIFICANT CHANGE UP (ref 0.2–1)
BILIRUB SERPL-MCNC: 0.4 MG/DL — SIGNIFICANT CHANGE UP (ref 0.2–1.2)
BILIRUB SERPL-MCNC: 0.4 MG/DL — SIGNIFICANT CHANGE UP (ref 0.2–1.2)
CREAT SERPL-MCNC: 0.74 MG/DL — SIGNIFICANT CHANGE UP (ref 0.5–1.3)
CREAT SERPL-MCNC: 0.74 MG/DL — SIGNIFICANT CHANGE UP (ref 0.5–1.3)
EGFR: 83 ML/MIN/1.73M2 — SIGNIFICANT CHANGE UP
EGFR: 83 ML/MIN/1.73M2 — SIGNIFICANT CHANGE UP
PROT SERPL-MCNC: 6 G/DL — SIGNIFICANT CHANGE UP (ref 6–8.3)
PROT SERPL-MCNC: 6 G/DL — SIGNIFICANT CHANGE UP (ref 6–8.3)

## 2023-12-24 RX ADMIN — APIXABAN 2.5 MILLIGRAM(S): 2.5 TABLET, FILM COATED ORAL at 17:10

## 2023-12-24 RX ADMIN — SERTRALINE 50 MILLIGRAM(S): 25 TABLET, FILM COATED ORAL at 11:28

## 2023-12-24 RX ADMIN — Medication 650 MILLIGRAM(S): at 23:38

## 2023-12-24 RX ADMIN — ANASTROZOLE 1 MILLIGRAM(S): 1 TABLET ORAL at 11:28

## 2023-12-24 RX ADMIN — ATORVASTATIN CALCIUM 40 MILLIGRAM(S): 80 TABLET, FILM COATED ORAL at 22:12

## 2023-12-24 RX ADMIN — CHLORHEXIDINE GLUCONATE 1 APPLICATION(S): 213 SOLUTION TOPICAL at 11:30

## 2023-12-24 RX ADMIN — Medication 81 MILLIGRAM(S): at 11:28

## 2023-12-24 RX ADMIN — Medication 650 MILLIGRAM(S): at 22:05

## 2023-12-24 RX ADMIN — PANTOPRAZOLE SODIUM 40 MILLIGRAM(S): 20 TABLET, DELAYED RELEASE ORAL at 05:57

## 2023-12-24 RX ADMIN — Medication 650 MILLIGRAM(S): at 12:25

## 2023-12-24 RX ADMIN — Medication 3 MILLIGRAM(S): at 22:07

## 2023-12-24 RX ADMIN — APIXABAN 2.5 MILLIGRAM(S): 2.5 TABLET, FILM COATED ORAL at 05:57

## 2023-12-24 RX ADMIN — Medication 650 MILLIGRAM(S): at 11:28

## 2023-12-24 NOTE — PROGRESS NOTE ADULT - SUBJECTIVE AND OBJECTIVE BOX
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 Patient reports that she awoke in yas usual state of health this morning, was supposed to go to outpatient laboratory to obtain lab work. Patient states that she told her  to bring the car to her by the elevator and does not recall further. States that she told her  she "feels unwell" and feels "off." Per  at the bedside, he found patient face down on the floor.  reports that he stepped away from patient for under 2 minutes to bring the car over to her and awoke patient. Patient reports pain to her B/L knees. Patient reports that she has been ambulating since and was advised to come to [facility] by her [facility] team. Patient denies any bladder or bowel incontinence. Denies chest pain, palpitations, shortness of breath or dyspnea.  Denies nausea, vomiting, fever, chills, cough, abdominal pain, headache, dizziness, lightheadedness neck or back pain.  (20 Dec 2023 15:43)    Describes a generalized feeling of "something just feeling off" prior to fainting.  No describable headache, vertigo, nausea, skin pallor, sweating, hot/flushed feeling, etc.  Last fainting episode was >5 yrs ago, unrelated incident.  A 10 pt ROS is otherwise negative.  12/22- resting comfortably, no new complaints, being taken off floor for addl testing today.  Date of service  12/24 no events overnight      PAST MEDICAL & SURGICAL HISTORY:  CVA (cerebral vascular accident)  Lymphoma  Osteoporosis  Seizure  Fracture  lumbar spine,  healed with conservative treatment  Neuropathy  bilateral feet  HLD (hyperlipidemia)  Depression  GERD (gastroesophageal reflux disease)  Hodgkins disease  Pulmonary embolus  Fracture, ankle  Right, s/p metal plate  H/O sinus surgery  Cataract extraction status of eye, right              acetaminophen     Tablet .. 650 milliGRAM(s) Oral every 6 hours PRN  anastrozole 1 milliGRAM(s) Oral daily  apixaban 2.5 milliGRAM(s) Oral every 12 hours  aspirin enteric coated 81 milliGRAM(s) Oral daily  atorvastatin 40 milliGRAM(s) Oral at bedtime  benzonatate 100 milliGRAM(s) Oral every 8 hours PRN  chlorhexidine 2% Cloths 1 Application(s) Topical daily  fluticasone propionate 50 MICROgram(s)/spray Nasal Spray 1 Spray(s) Both Nostrils two times a day PRN  melatonin 3 milliGRAM(s) Oral at bedtime PRN  pantoprazole    Tablet 40 milliGRAM(s) Oral before breakfast  predniSONE   Tablet 5 milliGRAM(s) Oral every other day  sertraline 50 milliGRAM(s) Oral daily  sodium chloride 0.9%. 1000 milliLiter(s) IV Continuous <Continuous>                            12.4   5.72  )-----------( 191      ( 23 Dec 2023 07:21 )             38.5       Hemoglobin: 12.4 g/dL (12-23 @ 07:21)  Hemoglobin: 11.7 g/dL (12-22 @ 05:57)  Hemoglobin: 11.6 g/dL (12-21 @ 07:29)  Hemoglobin: 12.2 g/dL (12-20 @ 11:43)      12-24    x   |  x   |  x   ----------------------------<  x   x    |  x   |  0.74    Ca    8.8      23 Dec 2023 07:19    TPro  6.0  /  Alb  3.7  /  TBili  0.4  /  DBili  <0.1  /  AST  41<H>  /  ALT  31  /  AlkPhos  49  12-24    Creatinine Trend: 0.74<--, 0.75<--, 0.63<--, 0.64<--, 0.69<--, 0.63<--    COAGS:           T(C): 36.7 (12-24-23 @ 05:12), Max: 37.1 (12-23-23 @ 21:10)  HR: 63 (12-24-23 @ 05:12) (63 - 75)  BP: 112/58 (12-24-23 @ 05:12) (108/65 - 126/70)  RR: 18 (12-24-23 @ 05:12) (18 - 18)  SpO2: 95% (12-24-23 @ 05:12) (94% - 95%)  Wt(kg): --    I&O's Summary    23 Dec 2023 07:01  -  24 Dec 2023 07:00  --------------------------------------------------------  IN: 720 mL / OUT: 0 mL / NET: 720 mL        Appearance: Elderly woman in no acute distress  HEENT:   Normal oral mucosa, PERRL, EOMI	  Lymphatic: No lymphadenopathy , no edema  Cardiovascular: Normal S1 S2, No JVD, No murmurs , Peripheral pulses palpable 2+ bilaterally  Respiratory: Lungs clear to auscultation, normal effort 	  Gastrointestinal:  Soft, Non-tender, + BS	  Skin: ecchymoses - periorbital, chin, cheekbones, etc.  Musculoskeletal: Normal range of motion, normal strength  Psychiatry:  Mood & affect appropriate    TELEMETRY: NSR	    ECG: NSR, normal intervals  Echo:  Historically normal LVEF, moderate-to-severe Aortic valve stenosis.    ASSESSMENT/PLAN: Ms Yates is a very pleasant 78y Female here after semi-witnessed collapse in her apartment complex parking garage.  She has history of stroke, CNS vasculitis, breast cancer on maintenance anastrozole, and documented moderate-to-severe aortic valve stenosis.  On day of presentation, she was preparing for AM fasting labs .  She did not know she had COVID until diagnosed in the ED.    Her fainting episode is most likely vasovagal, spurred on by relative dehydration in fasted state, having had COVID, and about a 15 min walk through her housing complex down to the parking garage.  Her forwards fall and facial injury are entirely unfortunate, but is not by itself indicative of a more sinister cause.  Checking an echocardiogram re: AS progression, although this is not likely the cause of her syncopal event.  OK to continue atorvastatin for remote hx of CVA.  Maintain telemetry re: arrhythmia surveillance. At this time I would not implant a loop recorder. Would recommend event monitoring and office followup.    MRI reviewed. Small stroke incidentally identified, otherwise no major symptoms noticed on physical exam.  Unlikely for such a small stroke to result in fainting.  Recommend clearance from COVID then rehab placement.  [facility] thru [facility] / will be monitored by team that reports to her [facility] heart team.  Optional ILR placement, electively as an outpatient.  Plan discussed w/ Dr Sparks and Dr Crockett.    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 Patient reports that she awoke in yas usual state of health this morning, was supposed to go to outpatient laboratory to obtain lab work. Patient states that she told her  to bring the car to her by the elevator and does not recall further. States that she told her  she "feels unwell" and feels "off." Per  at the bedside, he found patient face down on the floor.  reports that he stepped away from patient for under 2 minutes to bring the car over to her and awoke patient. Patient reports pain to her B/L knees. Patient reports that she has been ambulating since and was advised to come to [facility] by her [facility] team. Patient denies any bladder or bowel incontinence. Denies chest pain, palpitations, shortness of breath or dyspnea.  Denies nausea, vomiting, fever, chills, cough, abdominal pain, headache, dizziness, lightheadedness neck or back pain.  (20 Dec 2023 15:43)    Describes a generalized feeling of "something just feeling off" prior to fainting.  No describable headache, vertigo, nausea, skin pallor, sweating, hot/flushed feeling, etc.  Last fainting episode was >5 yrs ago, unrelated incident.  A 10 pt ROS is otherwise negative.  12/22- resting comfortably, no new complaints, being taken off floor for addl testing today.  Date of service  12/24 no events overnight      PAST MEDICAL & SURGICAL HISTORY:  CVA (cerebral vascular accident)  Lymphoma  Osteoporosis  Seizure  Fracture  lumbar spine,  healed with conservative treatment  Neuropathy  bilateral feet  HLD (hyperlipidemia)  Depression  GERD (gastroesophageal reflux disease)  Hodgkins disease  Pulmonary embolus  Fracture, ankle  Right, s/p metal plate  H/O sinus surgery  Cataract extraction status of eye, right              acetaminophen     Tablet .. 650 milliGRAM(s) Oral every 6 hours PRN  anastrozole 1 milliGRAM(s) Oral daily  apixaban 2.5 milliGRAM(s) Oral every 12 hours  aspirin enteric coated 81 milliGRAM(s) Oral daily  atorvastatin 40 milliGRAM(s) Oral at bedtime  benzonatate 100 milliGRAM(s) Oral every 8 hours PRN  chlorhexidine 2% Cloths 1 Application(s) Topical daily  fluticasone propionate 50 MICROgram(s)/spray Nasal Spray 1 Spray(s) Both Nostrils two times a day PRN  melatonin 3 milliGRAM(s) Oral at bedtime PRN  pantoprazole    Tablet 40 milliGRAM(s) Oral before breakfast  predniSONE   Tablet 5 milliGRAM(s) Oral every other day  sertraline 50 milliGRAM(s) Oral daily  sodium chloride 0.9%. 1000 milliLiter(s) IV Continuous <Continuous>                            12.4   5.72  )-----------( 191      ( 23 Dec 2023 07:21 )             38.5       Hemoglobin: 12.4 g/dL (12-23 @ 07:21)  Hemoglobin: 11.7 g/dL (12-22 @ 05:57)  Hemoglobin: 11.6 g/dL (12-21 @ 07:29)  Hemoglobin: 12.2 g/dL (12-20 @ 11:43)      12-24    x   |  x   |  x   ----------------------------<  x   x    |  x   |  0.74    Ca    8.8      23 Dec 2023 07:19    TPro  6.0  /  Alb  3.7  /  TBili  0.4  /  DBili  <0.1  /  AST  41<H>  /  ALT  31  /  AlkPhos  49  12-24    Creatinine Trend: 0.74<--, 0.75<--, 0.63<--, 0.64<--, 0.69<--, 0.63<--    COAGS:           T(C): 36.7 (12-24-23 @ 05:12), Max: 37.1 (12-23-23 @ 21:10)  HR: 63 (12-24-23 @ 05:12) (63 - 75)  BP: 112/58 (12-24-23 @ 05:12) (108/65 - 126/70)  RR: 18 (12-24-23 @ 05:12) (18 - 18)  SpO2: 95% (12-24-23 @ 05:12) (94% - 95%)  Wt(kg): --    I&O's Summary    23 Dec 2023 07:01  -  24 Dec 2023 07:00  --------------------------------------------------------  IN: 720 mL / OUT: 0 mL / NET: 720 mL        Appearance: Elderly woman in no acute distress  HEENT:   Normal oral mucosa, PERRL, EOMI	  Lymphatic: No lymphadenopathy , no edema  Cardiovascular: Normal S1 S2, No JVD, No murmurs , Peripheral pulses palpable 2+ bilaterally  Respiratory: Lungs clear to auscultation, normal effort 	  Gastrointestinal:  Soft, Non-tender, + BS	  Skin: ecchymoses - periorbital, chin, cheekbones, etc.  Musculoskeletal: Normal range of motion, normal strength  Psychiatry:  Mood & affect appropriate    TELEMETRY: NSR	    ECG: NSR, normal intervals  Echo:  Historically normal LVEF, moderate-to-severe Aortic valve stenosis.    ASSESSMENT/PLAN: Ms Yates is a very pleasant 78y Female here after semi-witnessed collapse in her apartment complex parking garage.  She has history of stroke, CNS vasculitis, breast cancer on maintenance anastrozole, and documented moderate-to-severe aortic valve stenosis.  On day of presentation, she was preparing for AM fasting labs .  She did not know she had COVID until diagnosed in the ED.    Her fainting episode is most likely vasovagal, spurred on by relative dehydration in fasted state, having had COVID, and about a 15 min walk through her housing complex down to the parking garage.  Her forwards fall and facial injury are entirely unfortunate, but is not by itself indicative of a more sinister cause.  Checking an echocardiogram re: AS progression, although this is not likely the cause of her syncopal event.  OK to continue atorvastatin for remote hx of CVA.  Maintain telemetry re: arrhythmia surveillance. At this time I would not implant a loop recorder. Would recommend event monitoring and office followup.    MRI reviewed. Small stroke incidentally identified, otherwise no major symptoms noticed on physical exam.  Unlikely for such a small stroke to result in fainting.  Recommend clearance from COVID then rehab placement.  [facility] thru [facility] / will be monitored by team that reports to her [facility] heart team.  Optional ILR placement, electively as an outpatient.  Plan discussed w/ Dr Sparks and Dr Crockett.

## 2023-12-24 NOTE — PROGRESS NOTE ADULT - SUBJECTIVE AND OBJECTIVE BOX
Neurology     S: patient seen. MRI + AIS           Medications: MEDICATIONS  (STANDING):  anastrozole 1 milliGRAM(s) Oral daily  apixaban 2.5 milliGRAM(s) Oral every 12 hours  aspirin enteric coated 81 milliGRAM(s) Oral daily  atorvastatin 40 milliGRAM(s) Oral at bedtime  chlorhexidine 2% Cloths 1 Application(s) Topical daily  pantoprazole    Tablet 40 milliGRAM(s) Oral before breakfast  predniSONE   Tablet 5 milliGRAM(s) Oral every other day  sertraline 50 milliGRAM(s) Oral daily  sodium chloride 0.9%. 1000 milliLiter(s) (50 mL/Hr) IV Continuous <Continuous>    MEDICATIONS  (PRN):  acetaminophen     Tablet .. 650 milliGRAM(s) Oral every 6 hours PRN Moderate Pain (4 - 6)  benzonatate 100 milliGRAM(s) Oral every 8 hours PRN Cough  fluticasone propionate 50 MICROgram(s)/spray Nasal Spray 1 Spray(s) Both Nostrils two times a day PRN allergic rhinitis  melatonin 3 milliGRAM(s) Oral at bedtime PRN Insomnia       Vitals:  Vital Signs Last 24 Hrs  T(C): 37.1 (24 Dec 2023 11:33), Max: 37.1 (23 Dec 2023 21:10)  T(F): 98.8 (24 Dec 2023 11:33), Max: 98.8 (23 Dec 2023 21:10)  HR: 76 (24 Dec 2023 11:33) (63 - 76)  BP: 118/68 (24 Dec 2023 11:33) (112/58 - 126/70)  BP(mean): --  RR: 18 (24 Dec 2023 11:33) (18 - 18)  SpO2: 95% (24 Dec 2023 11:33) (94% - 95%)    Parameters below as of 24 Dec 2023 11:33  Patient On (Oxygen Delivery Method): room air                  General Exam:   General Appearance: Appropriately dressed and in no acute distress       Head: Normocephalic, atraumatic and no dysmorphic features  Ear, Nose, and Throat: Moist mucous membranes  CVS: S1S2+  Resp: No SOB, no wheeze or rhonchi  GI: soft NT/ND  Extremities: No edema or cyanosis  Skin: No bruises or rashes     Neurological Exam:  Mental Status: Awake, alert and oriented x 3.  Able to follow simple and complex verbal commands. Able to name and repeat. fluent speech. No obvious aphasia or dysarthria noted.   Cranial Nerves: PERRL, EOMI, VFFC, sensation V1-V3 intact,  no obvious facial asymmetry, equal elevation of palate, scm/trap 5/5, tongue is midline on protrusion. no obvious papilledema on fundoscopic exam. hearing is grossly intact.   Motor: Normal bulk, tone and strength throughout. Fine finger movements were intact and symmetric. no tremors or drift noted.    Sensation: Intact to light touch and pinprick throughout. no right/left confusion. no extinction to tactile on DSS. Romberg was negative.   Reflexes: 1+ throughout at biceps, brachioradialis, triceps, patellars and ankles bilaterally and equal. No clonus. R toe and L toe were both downgoing.  Coordination: No dysmetria on FNF or HKS  Gait: deferred     Data/Labs/Imaging which I personally reviewed.         LABS:                          12.4   5.72  )-----------( 191      ( 23 Dec 2023 07:21 )             38.5     12-24    x   |  x   |  x   ----------------------------<  x   x    |  x   |  0.74    Ca    8.8      23 Dec 2023 07:19    TPro  6.0  /  Alb  3.7  /  TBili  0.4  /  DBili  <0.1  /  AST  41<H>  /  ALT  31  /  AlkPhos  49  12-24    LIVER FUNCTIONS - ( 24 Dec 2023 07:42 )  Alb: 3.7 g/dL / Pro: 6.0 g/dL / ALK PHOS: 49 U/L / ALT: 31 U/L / AST: 41 U/L / GGT: x             Urinalysis Basic - ( 23 Dec 2023 07:19 )    Color: x / Appearance: x / SG: x / pH: x  Gluc: 95 mg/dL / Ketone: x  / Bili: x / Urobili: x   Blood: x / Protein: x / Nitrite: x   Leuk Esterase: x / RBC: x / WBC x   Sq Epi: x / Non Sq Epi: x / Bacteria: x                < from: CT Head No Cont (12.20.23 @ 12:33) >    ACC: [id number] EXAM:  CT CERVICAL SPINE   ORDERED BY:  SAMIA MILLS     ACC: [id number] EXAM:  CT MAXILLOFACIAL   ORDERED BY:  SAMIA MILLS     ACC: [id number] EXAM:  CT BRAIN   ORDERED BY:  SAMIA MILLS     ACC: [id number] EXAM:  CT 3D RECONSTRUCT W TAYLOR   ORDERED BY:    SAMIA MILLS     PROCEDURE DATE:  12/20/2023          INTERPRETATION:  EXAM: CT HEAD, FACIAL BONES, AND CERVICAL SPINE WITHOUT   INTRAVENOUS CONTRAST    HISTORY: Trauma, syncopal episode, currently on anticoagulation    TECHNIQUE: Multiple axial images were obtained of the head, facial bones,   and cervical spine. Sagittal and coronal reformatted images were obtained   from the axial data set. The images were reviewed in brain and bone   windows.    COMPARISON: CT of the head May 13, 2017, MRI of the head May 31, 2021    FINDINGS:    CT HEAD:  No acute intracranial hemorrhage. Areas of decreased attenuation in the   deep and periventricular white matter, compatible with chronic small   vessel disease. Mild encephalomalacia/gliotic change in the high right   frontal lobe. Chronic appearing infarcts in the left cerebellar   hemisphere. Mild parenchymal volume loss. No hydrocephalus. The   extra-axial spaces and basal cisterns are within normal limits. No   midline shift or mass effect present.    The cranial cervical junction is within normal limits. The sella is not   expanded. No depressed calvarial fracture. Prior right frontal   craniotomy. Mucosal thickening throughout the ethmoid air cells. The   visualized mastoid air cells are well aerated. The visualized orbits are   status post cataract surgery. Right frontal scalp hematoma/contusion.    CT FACIAL BONES:  The nasal bones are intact.    The orbital rims, zygomatic arches, and pterygoid plates are intact   bilaterally.    The mandible is intact. Mild arthritic changes in the bilateral TMJs.    Mucosal thickening throughout the ethmoid air cells. Probable polyp in   the right maxillary sinus. The visualized mastoid air cells are clear   bilaterally.    No fracture of the skull base or lower calvarium.      CT CERVICAL SPINE:  The atlantodental interval is within normal limits. The lateral masses   are properly aligned. No facet subluxation or malalignment at the   craniovertebral or atlantoaxial articulations. No dens fracture. No   significant prevertebral soft tissue swelling.    Subtle reversal to the cervical lordosis. Normal alignment of the   cervical vertebrae. Vertebral body height is well-maintained. Reduced   intervertebral disc height throughout the cervical spine, most   significantly C3-C5 with chronic endplate change. No jumped or perched   facets. No acute osseous fracture.    C2-C3: Subtle disc bulge. The bilateral neuroforamina are patent. No   narrowing of the spinal canal.    C3-C4: Disc osteophyte complex with uncovertebral joint hypertrophy.   Severe narrowing of the bilateral neuroforamen. No significant narrowing   of the spinal canal. Mild facet arthropathy.    C4-C5: Disc osteophyte complex with uncovertebral joint hypertrophy.   Severe narrowing of the left neural foramen and moderate to severe   narrowing of the right neural foramen. Moderate to severe narrowing of   the spinal canal. Mild facet arthropathy.    C5-C6: Disc osteophyte complex with uncovertebral joint hypertrophy.   Severe narrowing of the bilateral neuroforamen, left greater than right.   Moderate to severe narrowing of the spinal canal. Facet arthropathy.    C6-C7: Disc osteophyte complex with uncovertebral joint hypertrophy.   Severe narrowing of the bilateral neuroforamen. Mild narrowing of the   spinal canal. Facet arthropathy.    C7-T1: No large disc bulge. The bilateral neuroforamina are patent. No   significant narrowing of the spinal canal.    The paraspinal muscles are unremarkable. Soft tissue fullness in the   right vallecula.    Visualized portions of the thyroid are unremarkable.    Pleural-based calcifications in the left lung apex. Subtle groundglass   opacities in the right lung apex. Small 0.2 cm calcification in the right   lung apex.      IMPRESSION:    CT HEAD:  1.  No evidence of acute intracranial hemorrhage or midline shift.  2.  Chronic ischemic changes as discussed above.    CT FACIAL BONES:  1.  No evidence of fracture of the facial bones.  2.  Mild paranasal sinus disease.    CT CERVICAL SPINE:  1.  No evidence of acute osseous fracture or ko dislocation.  2.  Multilevel degenerative change of the cervical spine as discussed   above, most significantly at the C4-C6 levels where there is moderate to   severe narrowing of the spinal canal. In the setting of myelopathy,   recommend MRI of the cervical spine for further evaluation.  3.  Soft tissue fullness in the right vallecula. Recommend direct visual   inspection.  4.  Pleural-based and nodular calcifications in the bilateral lung apices   as discussed above, correlate with patient history. Additionally, subtle   ground glass opacities in the right lung apex. This could indicate a   developing infectious/inflammatory process. Consider designated imaging   of the chest for further evaluation if clinically indicated.    --- End of Report ---   < from: MR Angio Head w/wo IV Cont (12.22.23 @ 09:55) >    ACC: [id number] EXAM:  MR ANGIO BRAIN WAW IC   ORDERED BY: LUCIANA VEGA     ACC: [id number] EXAM:  MR ANGIO NECK WAW IC   ORDERED BY: LUCIANA VEGA     ACC: [id number] EXAM:  MR BRAIN WAW IC   ORDERED BY: LUCIANA VEGA     PROCEDURE DATE:  12/22/2023         INTERPRETATION:  Exam Type: MRI BRAIN WITH AND WITHOUT CONTRAST , MRA COW   WITHOUT CONTRAST, MRA NECK WITH AND WITHOUT CONTRAST  Indication: Syncope, R/O Mets, H/O CNS vasculitis  Comparison:CT head 12/20/2023    Technique: Multiplanar MR imaging of the brain was obtained with and   without contrast. Time of flight MRA of the neck with and without   contrast and [place] without contrast were obtained. 7.5  cc of   gadavist was administered. 0  cc was discarded.    Findings:    MRI brain: There is a punctate focus of restricted diffusion within the   left posterior temporal lobe, compatible with an acute infarct.   Encephalomalacia in the left cerebellum is present with extensive old   hemorrhagic blood products. Similarly, there is encephalomalacia with   chronic appearing hemorrhagic blood products within the medial right   cerebellum. 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 There are multiple small   foci of T2/FLAIR hyperintense signal in the periventricular and   subcortical white matter of the bilateral cerebri, suggestive of mild to   moderate chronic microvascular ischemic changes. There is no abnormal   enhancement.    Prior right frontal craniotomy with encephalomalacia/gliosis in the   underlying right anterolateral frontal lobe.    Cerebellar tonsils are in normal location. The intracranial flow voids   are normal in appearance. Visualized paranasal sinuses and mastoids are   normal in signal.    MRA of the [place] demonstrates normal antegrade flow in the   major intracranial arteries. No flow gap is seen to suggest high grade   stenosis. No evidence of medium or large sized aneurysm.    MRA of the neck demonstrates normal antegrade flow in the bilateral   common carotid, cervical internal carotid and vertebral arteries. No flow   gap is seen to suggest high grade stenosis.        IMPRESSION:    MRI brain: Punctate acute infarct in the left posterior temporal lobe.    Encephalomalacia in the left cerebellum is present with extensive old   hemorrhagic blood products. Similarly, there is encephalomalacia with   chronic appearing hemorrhagic blood products within the medial right   cerebellum. There are multiplesmall foci of T2/FLAIR hyperintense signal   in the periventricular and subcortical white matter of the bilateral   cerebri, suggestive of mild to moderate chronic microvascular ischemic   changes.    Prior right frontal craniotomy with encephalomalacia/gliosis in the   underlying right anterolateral frontal lobe.      MRA brain: No hemodynamically significant stenosis    MRA neck: No hemodynamically significant stenosis    --- End of Report ---         < from: MR Cervical Spine w/wo IV Cont (12.22.23 @ 09:54) >    ACC: [id number] EXAM:  MR SPINE CERVICAL WAW IC   ORDERED BY: LUCIANA VEGA     PROCEDURE DATE:  12/22/2023          INTERPRETATION:  Exam Date: 12/22/2023 9:54 AM    MR cervical spine with and without gadolinium    CLINICAL INFORMATION:  Stenosisseen on CT syncope.    TECHNIQUE:   Sagittal and axial T1-weighted images, sagittal proton   density images, axial and sagittal T2-weighted images of the cervical   spine were obtained.   Following the intravenous administration of 7.5 ml   Gadavist fat saturated sagittal and axial T1-weighted images were   obtained.  0 mL were discarded.    FINDINGS: Comparison is made to CT cervical of 12/20/2023.    Cervical vertebral alignment is intact.  Cervical vertebral body heights   are maintained.  [place] signal intensity within cervical vertebral bodies   and posterior elements is unremarkable.  No osseous expansion, epidural   disease or paraspinal abnormality is found.    At C2/C3, there is a very small posterior disc bulge without significant   foraminal or central spinal canal stenosis.    At C3/C4, there is a posterior disc bulge and bilateral uncovertebral   spurring resulting in moderate to severe narrowing of the bilateral   neural foramen without significant central spinal canal stenosis.    At C4/C5, there is a posterior disc bulge and bilateral uncovertebral   spurring resulting in moderate to severe stenosis of the bilateral neural   foramen and mild to moderate central spinal canal stenosis.    At C5/C6, there is a posterior disc bulge and bilateral uncovertebral   spurring resulting in moderate to severe stenosis of the bilateral neural   foramen and mild central spinal canal stenosis.    At C6/C7, there is a posterior disc bulge and bilateral uncovertebral   spurring resulting in moderate to severe stenosis of the bilateral neural   foramen without significant central spinal canal stenosis.    At C7/T1, there are no significant degenerative changes    The cervical cord maintains intact morphology  No cord signal intensity   abnormality or focal cord lesion is appreciated.   There is no abnormal   cord enhancement.  The cervical medullary junction remains intact.      IMPRESSION:   Multilevel degenerative changes as detailed above.    --- End of Report ---            PHILL CATHERINE MD; Attending Radiologist  This document has been electronically signed. Dec 22 2023 11:04AM    < end of copied text >  < from: TTE W or WO Ultrasound Enhancing Agent (08.17.23 @ 07:12) >  < from: TTE W or WO Ultrasound Enhancing Agent (08.17.23 @ 07:12) >  CONCLUSIONS:      1. Normal left ventricular cavity size. Left ventricular wall thickness is normal. Left ventricular systolic function is normal with an ejection fraction of 63 % by Jones's method of disks. There are no regional wall motion abnormalities seen.   2. There is moderate (grade 2) left ventricular diastolic dysfunction, with normal filling pressure.   3. Moderately enlarged right ventricular cavity size and reduced systolic right ventricular function. The tricuspid annular plane systolic excursion (TAPSE) is 1.8 cm (normal >=1.7 cm).   4. Moderate-to-severe aortic stenosis.   5. Trace aortic regurgitation.   6. No pericardial effusion seen.   7. Estimated pulmonary artery systolic pressure is 35 mmHg.   8. No prior echocardiogram is available for comparison.    < end of copied text >   Neurology     S: patient seen. MRI + AIS           Medications: MEDICATIONS  (STANDING):  anastrozole 1 milliGRAM(s) Oral daily  apixaban 2.5 milliGRAM(s) Oral every 12 hours  aspirin enteric coated 81 milliGRAM(s) Oral daily  atorvastatin 40 milliGRAM(s) Oral at bedtime  chlorhexidine 2% Cloths 1 Application(s) Topical daily  pantoprazole    Tablet 40 milliGRAM(s) Oral before breakfast  predniSONE   Tablet 5 milliGRAM(s) Oral every other day  sertraline 50 milliGRAM(s) Oral daily  sodium chloride 0.9%. 1000 milliLiter(s) (50 mL/Hr) IV Continuous <Continuous>    MEDICATIONS  (PRN):  acetaminophen     Tablet .. 650 milliGRAM(s) Oral every 6 hours PRN Moderate Pain (4 - 6)  benzonatate 100 milliGRAM(s) Oral every 8 hours PRN Cough  fluticasone propionate 50 MICROgram(s)/spray Nasal Spray 1 Spray(s) Both Nostrils two times a day PRN allergic rhinitis  melatonin 3 milliGRAM(s) Oral at bedtime PRN Insomnia       Vitals:  Vital Signs Last 24 Hrs  T(C): 37.1 (24 Dec 2023 11:33), Max: 37.1 (23 Dec 2023 21:10)  T(F): 98.8 (24 Dec 2023 11:33), Max: 98.8 (23 Dec 2023 21:10)  HR: 76 (24 Dec 2023 11:33) (63 - 76)  BP: 118/68 (24 Dec 2023 11:33) (112/58 - 126/70)  BP(mean): --  RR: 18 (24 Dec 2023 11:33) (18 - 18)  SpO2: 95% (24 Dec 2023 11:33) (94% - 95%)    Parameters below as of 24 Dec 2023 11:33  Patient On (Oxygen Delivery Method): room air                  General Exam:   General Appearance: Appropriately dressed and in no acute distress       Head: Normocephalic, atraumatic and no dysmorphic features  Ear, Nose, and Throat: Moist mucous membranes  CVS: S1S2+  Resp: No SOB, no wheeze or rhonchi  GI: soft NT/ND  Extremities: No edema or cyanosis  Skin: No bruises or rashes     Neurological Exam:  Mental Status: Awake, alert and oriented x 3.  Able to follow simple and complex verbal commands. Able to name and repeat. fluent speech. No obvious aphasia or dysarthria noted.   Cranial Nerves: PERRL, EOMI, VFFC, sensation V1-V3 intact,  no obvious facial asymmetry, equal elevation of palate, scm/trap 5/5, tongue is midline on protrusion. no obvious papilledema on fundoscopic exam. hearing is grossly intact.   Motor: Normal bulk, tone and strength throughout. Fine finger movements were intact and symmetric. no tremors or drift noted.    Sensation: Intact to light touch and pinprick throughout. no right/left confusion. no extinction to tactile on DSS. Romberg was negative.   Reflexes: 1+ throughout at biceps, brachioradialis, triceps, patellars and ankles bilaterally and equal. No clonus. R toe and L toe were both downgoing.  Coordination: No dysmetria on FNF or HKS  Gait: deferred     Data/Labs/Imaging which I personally reviewed.         LABS:                          12.4   5.72  )-----------( 191      ( 23 Dec 2023 07:21 )             38.5     12-24    x   |  x   |  x   ----------------------------<  x   x    |  x   |  0.74    Ca    8.8      23 Dec 2023 07:19    TPro  6.0  /  Alb  3.7  /  TBili  0.4  /  DBili  <0.1  /  AST  41<H>  /  ALT  31  /  AlkPhos  49  12-24    LIVER FUNCTIONS - ( 24 Dec 2023 07:42 )  Alb: 3.7 g/dL / Pro: 6.0 g/dL / ALK PHOS: 49 U/L / ALT: 31 U/L / AST: 41 U/L / GGT: x             Urinalysis Basic - ( 23 Dec 2023 07:19 )    Color: x / Appearance: x / SG: x / pH: x  Gluc: 95 mg/dL / Ketone: x  / Bili: x / Urobili: x   Blood: x / Protein: x / Nitrite: x   Leuk Esterase: x / RBC: x / WBC x   Sq Epi: x / Non Sq Epi: x / Bacteria: x                < from: CT Head No Cont (12.20.23 @ 12:33) >    ACC: [id number] EXAM:  CT CERVICAL SPINE   ORDERED BY:  SAMIA MILLS     ACC: [id number] EXAM:  CT MAXILLOFACIAL   ORDERED BY:  SAMIA MILLS     ACC: [id number] EXAM:  CT BRAIN   ORDERED BY:  SAMIA MILLS     ACC: [id number] EXAM:  CT 3D RECONSTRUCT W TAYLOR   ORDERED BY:    SAMIA MILLS     PROCEDURE DATE:  12/20/2023          INTERPRETATION:  EXAM: CT HEAD, FACIAL BONES, AND CERVICAL SPINE WITHOUT   INTRAVENOUS CONTRAST    HISTORY: Trauma, syncopal episode, currently on anticoagulation    TECHNIQUE: Multiple axial images were obtained of the head, facial bones,   and cervical spine. Sagittal and coronal reformatted images were obtained   from the axial data set. The images were reviewed in brain and bone   windows.    COMPARISON: CT of the head May 13, 2017, MRI of the head May 31, 2021    FINDINGS:    CT HEAD:  No acute intracranial hemorrhage. Areas of decreased attenuation in the   deep and periventricular white matter, compatible with chronic small   vessel disease. Mild encephalomalacia/gliotic change in the high right   frontal lobe. Chronic appearing infarcts in the left cerebellar   hemisphere. Mild parenchymal volume loss. No hydrocephalus. The   extra-axial spaces and basal cisterns are within normal limits. No   midline shift or mass effect present.    The cranial cervical junction is within normal limits. The sella is not   expanded. No depressed calvarial fracture. Prior right frontal   craniotomy. Mucosal thickening throughout the ethmoid air cells. The   visualized mastoid air cells are well aerated. The visualized orbits are   status post cataract surgery. Right frontal scalp hematoma/contusion.    CT FACIAL BONES:  The nasal bones are intact.    The orbital rims, zygomatic arches, and pterygoid plates are intact   bilaterally.    The mandible is intact. Mild arthritic changes in the bilateral TMJs.    Mucosal thickening throughout the ethmoid air cells. Probable polyp in   the right maxillary sinus. The visualized mastoid air cells are clear   bilaterally.    No fracture of the skull base or lower calvarium.      CT CERVICAL SPINE:  The atlantodental interval is within normal limits. The lateral masses   are properly aligned. No facet subluxation or malalignment at the   craniovertebral or atlantoaxial articulations. No dens fracture. No   significant prevertebral soft tissue swelling.    Subtle reversal to the cervical lordosis. Normal alignment of the   cervical vertebrae. Vertebral body height is well-maintained. Reduced   intervertebral disc height throughout the cervical spine, most   significantly C3-C5 with chronic endplate change. No jumped or perched   facets. No acute osseous fracture.    C2-C3: Subtle disc bulge. The bilateral neuroforamina are patent. No   narrowing of the spinal canal.    C3-C4: Disc osteophyte complex with uncovertebral joint hypertrophy.   Severe narrowing of the bilateral neuroforamen. No significant narrowing   of the spinal canal. Mild facet arthropathy.    C4-C5: Disc osteophyte complex with uncovertebral joint hypertrophy.   Severe narrowing of the left neural foramen and moderate to severe   narrowing of the right neural foramen. Moderate to severe narrowing of   the spinal canal. Mild facet arthropathy.    C5-C6: Disc osteophyte complex with uncovertebral joint hypertrophy.   Severe narrowing of the bilateral neuroforamen, left greater than right.   Moderate to severe narrowing of the spinal canal. Facet arthropathy.    C6-C7: Disc osteophyte complex with uncovertebral joint hypertrophy.   Severe narrowing of the bilateral neuroforamen. Mild narrowing of the   spinal canal. Facet arthropathy.    C7-T1: No large disc bulge. The bilateral neuroforamina are patent. No   significant narrowing of the spinal canal.    The paraspinal muscles are unremarkable. Soft tissue fullness in the   right vallecula.    Visualized portions of the thyroid are unremarkable.    Pleural-based calcifications in the left lung apex. Subtle groundglass   opacities in the right lung apex. Small 0.2 cm calcification in the right   lung apex.      IMPRESSION:    CT HEAD:  1.  No evidence of acute intracranial hemorrhage or midline shift.  2.  Chronic ischemic changes as discussed above.    CT FACIAL BONES:  1.  No evidence of fracture of the facial bones.  2.  Mild paranasal sinus disease.    CT CERVICAL SPINE:  1.  No evidence of acute osseous fracture or ko dislocation.  2.  Multilevel degenerative change of the cervical spine as discussed   above, most significantly at the C4-C6 levels where there is moderate to   severe narrowing of the spinal canal. In the setting of myelopathy,   recommend MRI of the cervical spine for further evaluation.  3.  Soft tissue fullness in the right vallecula. Recommend direct visual   inspection.  4.  Pleural-based and nodular calcifications in the bilateral lung apices   as discussed above, correlate with patient history. Additionally, subtle   ground glass opacities in the right lung apex. This could indicate a   developing infectious/inflammatory process. Consider designated imaging   of the chest for further evaluation if clinically indicated.    --- End of Report ---   < from: MR Angio Head w/wo IV Cont (12.22.23 @ 09:55) >    ACC: [id number] EXAM:  MR ANGIO BRAIN WAW IC   ORDERED BY: LUCIANA VEGA     ACC: [id number] EXAM:  MR ANGIO NECK WAW IC   ORDERED BY: LUCIANA VEGA     ACC: [id number] EXAM:  MR BRAIN WAW IC   ORDERED BY: LUCIANA VEGA     PROCEDURE DATE:  12/22/2023         INTERPRETATION:  Exam Type: MRI BRAIN WITH AND WITHOUT CONTRAST , MRA COW   WITHOUT CONTRAST, MRA NECK WITH AND WITHOUT CONTRAST  Indication: Syncope, R/O Mets, H/O CNS vasculitis  Comparison:CT head 12/20/2023    Technique: Multiplanar MR imaging of the brain was obtained with and   without contrast. Time of flight MRA of the neck with and without   contrast and [place] without contrast were obtained. 7.5  cc of   gadavist was administered. 0  cc was discarded.    Findings:    MRI brain: There is a punctate focus of restricted diffusion within the   left posterior temporal lobe, compatible with an acute infarct.   Encephalomalacia in the left cerebellum is present with extensive old   hemorrhagic blood products. Similarly, there is encephalomalacia with   chronic appearing hemorrhagic blood products within the medial right   cerebellum. 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 There are multiple small   foci of T2/FLAIR hyperintense signal in the periventricular and   subcortical white matter of the bilateral cerebri, suggestive of mild to   moderate chronic microvascular ischemic changes. There is no abnormal   enhancement.    Prior right frontal craniotomy with encephalomalacia/gliosis in the   underlying right anterolateral frontal lobe.    Cerebellar tonsils are in normal location. The intracranial flow voids   are normal in appearance. Visualized paranasal sinuses and mastoids are   normal in signal.    MRA of the [place] demonstrates normal antegrade flow in the   major intracranial arteries. No flow gap is seen to suggest high grade   stenosis. No evidence of medium or large sized aneurysm.    MRA of the neck demonstrates normal antegrade flow in the bilateral   common carotid, cervical internal carotid and vertebral arteries. No flow   gap is seen to suggest high grade stenosis.        IMPRESSION:    MRI brain: Punctate acute infarct in the left posterior temporal lobe.    Encephalomalacia in the left cerebellum is present with extensive old   hemorrhagic blood products. Similarly, there is encephalomalacia with   chronic appearing hemorrhagic blood products within the medial right   cerebellum. There are multiplesmall foci of T2/FLAIR hyperintense signal   in the periventricular and subcortical white matter of the bilateral   cerebri, suggestive of mild to moderate chronic microvascular ischemic   changes.    Prior right frontal craniotomy with encephalomalacia/gliosis in the   underlying right anterolateral frontal lobe.      MRA brain: No hemodynamically significant stenosis    MRA neck: No hemodynamically significant stenosis    --- End of Report ---         < from: MR Cervical Spine w/wo IV Cont (12.22.23 @ 09:54) >    ACC: [id number] EXAM:  MR SPINE CERVICAL WAW IC   ORDERED BY: LUCIANA VEGA     PROCEDURE DATE:  12/22/2023          INTERPRETATION:  Exam Date: 12/22/2023 9:54 AM    MR cervical spine with and without gadolinium    CLINICAL INFORMATION:  Stenosisseen on CT syncope.    TECHNIQUE:   Sagittal and axial T1-weighted images, sagittal proton   density images, axial and sagittal T2-weighted images of the cervical   spine were obtained.   Following the intravenous administration of 7.5 ml   Gadavist fat saturated sagittal and axial T1-weighted images were   obtained.  0 mL were discarded.    FINDINGS: Comparison is made to CT cervical of 12/20/2023.    Cervical vertebral alignment is intact.  Cervical vertebral body heights   are maintained.  [place] signal intensity within cervical vertebral bodies   and posterior elements is unremarkable.  No osseous expansion, epidural   disease or paraspinal abnormality is found.    At C2/C3, there is a very small posterior disc bulge without significant   foraminal or central spinal canal stenosis.    At C3/C4, there is a posterior disc bulge and bilateral uncovertebral   spurring resulting in moderate to severe narrowing of the bilateral   neural foramen without significant central spinal canal stenosis.    At C4/C5, there is a posterior disc bulge and bilateral uncovertebral   spurring resulting in moderate to severe stenosis of the bilateral neural   foramen and mild to moderate central spinal canal stenosis.    At C5/C6, there is a posterior disc bulge and bilateral uncovertebral   spurring resulting in moderate to severe stenosis of the bilateral neural   foramen and mild central spinal canal stenosis.    At C6/C7, there is a posterior disc bulge and bilateral uncovertebral   spurring resulting in moderate to severe stenosis of the bilateral neural   foramen without significant central spinal canal stenosis.    At C7/T1, there are no significant degenerative changes    The cervical cord maintains intact morphology  No cord signal intensity   abnormality or focal cord lesion is appreciated.   There is no abnormal   cord enhancement.  The cervical medullary junction remains intact.      IMPRESSION:   Multilevel degenerative changes as detailed above.    --- End of Report ---            PHILL CATHERINE MD; Attending Radiologist  This document has been electronically signed. Dec 22 2023 11:04AM    < end of copied text >  < from: TTE W or WO Ultrasound Enhancing Agent (08.17.23 @ 07:12) >  < from: TTE W or WO Ultrasound Enhancing Agent (08.17.23 @ 07:12) >  CONCLUSIONS:      1. Normal left ventricular cavity size. Left ventricular wall thickness is normal. Left ventricular systolic function is normal with an ejection fraction of 63 % by Jones's method of disks. There are no regional wall motion abnormalities seen.   2. There is moderate (grade 2) left ventricular diastolic dysfunction, with normal filling pressure.   3. Moderately enlarged right ventricular cavity size and reduced systolic right ventricular function. The tricuspid annular plane systolic excursion (TAPSE) is 1.8 cm (normal >=1.7 cm).   4. Moderate-to-severe aortic stenosis.   5. Trace aortic regurgitation.   6. No pericardial effusion seen.   7. Estimated pulmonary artery systolic pressure is 35 mmHg.   8. No prior echocardiogram is available for comparison.    < end of copied text >

## 2023-12-24 NOTE — PROGRESS NOTE ADULT - SUBJECTIVE AND OBJECTIVE BOX
Name of Patient : VINCENT ENGLAND  MRN: [id number]      Subjective: Patient seen and examined. No new events except as noted.     REVIEW OF SYSTEMS:    CONSTITUTIONAL: No weakness, fevers or chills  EYES/ENT: No visual changes;  No vertigo or throat pain   NECK: No pain or stiffness  RESPIRATORY: No cough, wheezing, hemoptysis; No shortness of breath  CARDIOVASCULAR: No chest pain or palpitations  GASTROINTESTINAL: No abdominal or epigastric pain. No nausea, vomiting, or hematemesis; No diarrhea or constipation. No melena or hematochezia.  GENITOURINARY: No dysuria, frequency or hematuria  NEUROLOGICAL: No numbness or weakness  SKIN: No itching, burning, rashes, or lesions   All other review of systems is negative unless indicated above.    MEDICATIONS:  MEDICATIONS  (STANDING):  anastrozole 1 milliGRAM(s) Oral daily  apixaban 2.5 milliGRAM(s) Oral every 12 hours  aspirin enteric coated 81 milliGRAM(s) Oral daily  atorvastatin 40 milliGRAM(s) Oral at bedtime  chlorhexidine 2% Cloths 1 Application(s) Topical daily  pantoprazole    Tablet 40 milliGRAM(s) Oral before breakfast  predniSONE   Tablet 5 milliGRAM(s) Oral every other day  sertraline 50 milliGRAM(s) Oral daily  sodium chloride 0.9%. 1000 milliLiter(s) (50 mL/Hr) IV Continuous <Continuous>      PHYSICAL EXAM:  T(C): 36.6 (12-24-23 @ 22:13), Max: 37.1 (12-24-23 @ 11:33)  HR: 61 (12-24-23 @ 22:13) (61 - 76)  BP: 111/67 (12-24-23 @ 22:13) (111/67 - 118/68)  RR: 18 (12-24-23 @ 22:13) (18 - 18)  SpO2: 94% (12-24-23 @ 22:13) (94% - 95%)  Wt(kg): --  I&O's Summary    23 Dec 2023 07:01  -  24 Dec 2023 07:00  --------------------------------------------------------  IN: 720 mL / OUT: 0 mL / NET: 720 mL          Appearance: Normal	  HEENT:  PERRLA   Lymphatic: No lymphadenopathy   Cardiovascular: Normal S1 S2, no JVD  Respiratory: normal effort , clear  Gastrointestinal:  Soft, Non-tender  Skin: No rashes,  warm to touch  Psychiatry:  Mood & affect appropriate  Musculuskeletal: No edema    recent labs, Imaging and EKGs personally reviewed     12-23-23 @ 07:01  -  12-24-23 @ 07:00  --------------------------------------------------------  IN: 720 mL / OUT: 0 mL / NET: 720 mL                          12.4   5.72  )-----------( 191      ( 23 Dec 2023 07:21 )             38.5               12-24    x   |  x   |  x   ----------------------------<  x   x    |  x   |  0.74    Ca    8.8      23 Dec 2023 07:19    TPro  6.0  /  Alb  3.7  /  TBili  0.4  /  DBili  <0.1  /  AST  41<H>  /  ALT  31  /  AlkPhos  49  12-24                       Urinalysis Basic - ( 23 Dec 2023 07:19 )    Color: x / Appearance: x / SG: x / pH: x  Gluc: 95 mg/dL / Ketone: x  / Bili: x / Urobili: x   Blood: x / Protein: x / Nitrite: x   Leuk Esterase: x / RBC: x / WBC x   Sq Epi: x / Non Sq Epi: x / Bacteria: x               Name of Patient : VINCENT ENGLAND  MRN: [id number]      Subjective: Patient seen and examined. No new events except as noted.     REVIEW OF SYSTEMS:    CONSTITUTIONAL: No weakness, fevers or chills  EYES/ENT: No visual changes;  No vertigo or throat pain   NECK: No pain or stiffness  RESPIRATORY: No cough, wheezing, hemoptysis; No shortness of breath  CARDIOVASCULAR: No chest pain or palpitations  GASTROINTESTINAL: No abdominal or epigastric pain. No nausea, vomiting, or hematemesis; No diarrhea or constipation. No melena or hematochezia.  GENITOURINARY: No dysuria, frequency or hematuria  NEUROLOGICAL: No numbness or weakness  SKIN: No itching, burning, rashes, or lesions   All other review of systems is negative unless indicated above.    MEDICATIONS:  MEDICATIONS  (STANDING):  anastrozole 1 milliGRAM(s) Oral daily  apixaban 2.5 milliGRAM(s) Oral every 12 hours  aspirin enteric coated 81 milliGRAM(s) Oral daily  atorvastatin 40 milliGRAM(s) Oral at bedtime  chlorhexidine 2% Cloths 1 Application(s) Topical daily  pantoprazole    Tablet 40 milliGRAM(s) Oral before breakfast  predniSONE   Tablet 5 milliGRAM(s) Oral every other day  sertraline 50 milliGRAM(s) Oral daily  sodium chloride 0.9%. 1000 milliLiter(s) (50 mL/Hr) IV Continuous <Continuous>      PHYSICAL EXAM:  T(C): 36.6 (12-24-23 @ 22:13), Max: 37.1 (12-24-23 @ 11:33)  HR: 61 (12-24-23 @ 22:13) (61 - 76)  BP: 111/67 (12-24-23 @ 22:13) (111/67 - 118/68)  RR: 18 (12-24-23 @ 22:13) (18 - 18)  SpO2: 94% (12-24-23 @ 22:13) (94% - 95%)  Wt(kg): --  I&O's Summary    23 Dec 2023 07:01  -  24 Dec 2023 07:00  --------------------------------------------------------  IN: 720 mL / OUT: 0 mL / NET: 720 mL          Appearance: Normal	  HEENT:  PERRLA   Lymphatic: No lymphadenopathy   Cardiovascular: Normal S1 S2, no JVD  Respiratory: normal effort , clear  Gastrointestinal:  Soft, Non-tender  Skin: No rashes,  warm to touch  Psychiatry:  Mood & affect appropriate  Musculuskeletal: No edema    recent labs, Imaging and EKGs personally reviewed     12-23-23 @ 07:01  -  12-24-23 @ 07:00  --------------------------------------------------------  IN: 720 mL / OUT: 0 mL / NET: 720 mL                          12.4   5.72  )-----------( 191      ( 23 Dec 2023 07:21 )             38.5               12-24    x   |  x   |  x   ----------------------------<  x   x    |  x   |  0.74    Ca    8.8      23 Dec 2023 07:19    TPro  6.0  /  Alb  3.7  /  TBili  0.4  /  DBili  <0.1  /  AST  41<H>  /  ALT  31  /  AlkPhos  49  12-24                       Urinalysis Basic - ( 23 Dec 2023 07:19 )    Color: x / Appearance: x / SG: x / pH: x  Gluc: 95 mg/dL / Ketone: x  / Bili: x / Urobili: x   Blood: x / Protein: x / Nitrite: x   Leuk Esterase: x / RBC: x / WBC x   Sq Epi: x / Non Sq Epi: x / Bacteria: x

## 2023-12-24 NOTE — PROGRESS NOTE ADULT - ASSESSMENT
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   NIHSS 1 premrs2  dimer 254  ESR 40   LDL 78   CRP 49   + covid   A1c 6  LDL 78 \    MRI brain with AIS in left post temporal lobe, punctate.  old L cerebellar and extensive old hemorrhagic products  mod MVID; prior R frontal crani   MRA H/N neg   MRI C spine with Degenertive chagnes   TTE as above. 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  on Remdesivir. monitor O2 for steroids   - orthostatics   - PT/OT/SS/SLP, OOBC  - check FS, glucose control <180  - GI/DVT ppx   - Thank you for allowing me to participate in the care of this patient. Call with questions.   - sees Dr. 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   NIHSS 1 premrs2  dimer 254  ESR 40   LDL 78   CRP 49   + covid   A1c 6  LDL 78 \    MRI brain with AIS in left post temporal lobe, punctate.  old L cerebellar and extensive old hemorrhagic products  mod MVID; prior R frontal crani   MRA H/N neg   MRI C spine with Degenertive chagnes   TTE as above. 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  on Remdesivir. monitor O2 for steroids   - orthostatics   - PT/OT/SS/SLP, OOBC  - check FS, glucose control <180  - GI/DVT ppx   - Thank you for allowing me to participate in the care of this patient. Call with questions.   - sees Dr. Naif Love neuro outpatient \  Sina Calles MD  Vascular Neurology  Office: [phone]

## 2023-12-24 NOTE — PROGRESS NOTE ADULT - ASSESSMENT
Patient is a 78 year old Female with a PMHx of Left Breast CA S/P L Lumpectomy, on Anastrazole, Right breast ADH S/P resection and on Tamoxifen X 5 years, Lymphoma, history of DVT/PE, CVA 2/2 CNS vasculitis, HLD, GERD, overactive bladder, seizures 10 years ago, not on AEDs, osteoporosis complicated by right tib-fib fracture s/p ORIF, recent admission 11/18-11/21 for urosepsis 2/2 E coli infection, S/P 4 week course of Bactrim on 12/16, follows with uro-gynecologist, who presents to [facility] with a chief complaint of syncope this morning. Patient reports that she awoke in yas usual state of health this morning, was supposed to go to outpatient laboratory to obtain lab work. Patient states that she told her  to bring the car to her by the elevator and does not recall further. States that she told her  she "feels unwell" and feels "off." Per  at the bedside, he found patient face down on the floor.  reports that he stepped away from patient for under 2 minutes to bring the car over to her and awoke patient. Patient reports pain to her B/L knees. Patient reports that she has been ambulating since and was advised to come to [facility] by her [facility] team. Patient denies any bladder or bowel incontinence. Denies chest pain, palpitations, shortness of breath or dyspnea.  Denies nausea, vomiting, fever, chills, cough, abdominal pain, headache, dizziness, lightheadedness neck or back pain.       Syncope  - Pt with syncope --> Likely due to Mod-Severe AS versus infectious etiology, however rule out other etiologies   - 8/2023 TTE w/ Preserved EF, No WMA, Grade II Diastolic dysfunction, Mod-Severe AS  - CT Head / C-Spine / Maxillofacial and 3D as noted   - Orthostatics negative; S/P will start gentle IVF, C/w PO hydration   - Carotid Duplex  with mild calcified plaque, without significant stenosis   - TTE w/ BUBBLE study -- pending    - EEG (Pt w/ history of Seizures 10 years ago, not on AED) --> Switched to routine as per discussion with neuro   - MR head, MR A H/N --> w/ Punctate Acute Infarct mild to moderate microvascular ischemic change m, prior OR changes, neg for significant stenosis --> Started on ASA 81 Mg PO Qd and Eliquis 2.5 BID resumed    - MR C-spine w/ multi-level degenerative changes   - Trop X 3 negative, trend    - Monitor on telemetry   - Neuro checks per protocol   - Fall precautions   - Neuro, EP and Cardio evaluations appreciated; F/u recs --> EP to follow up for extended cardiac monitoring   - resumed eliquis and ASA     Acute CVA  - Seen on MR  - ASA 81 Mg PO Qd started and Eliquis 2.5 BID resume per discussion w/ Neuro/Cardio  - EP to follow up for cardiac monitoring  - TTE w/ Bubble pending  - Lipitor increased to 40   - Neuro eval appreciated; F/u recs    COVID +  - Patient with cough, per pharmacy, patient does not meet requirement for RDV per policy at this time. Continue to monitor symptoms at present   - Procal neg, ESR, CRP, Ferritin, D-dimer noted --> On AC  - CT Chest w/ DYLAN GG 4mm nodule, additional nodules, trace pericardial effusion, L breast lesion, Neg for PNA    - Incentive spirometer and Tessalon Perle PRN   - Hold off on additional steroids (Pt on steroids at home) as patient is not hypoxic, on RA   - Monitor O2 saturation; Supplement PRN to maintain > 90%    Leukocytosis   - Pt recently completed prolonged course of ABX on 12/16  - UCx --> negative,  BCx2 in lab   - Trend CBC, temp curve, VS     Mod-Severe AS, Trace pericardial effusion   - Seen on 8/2023 TTE  - Check repeat TTE ---> Pending   - Monitor volume status   - Cardio eval consulted; F/u recs     Knee Pain   - Pt with chronic knee pain, Osteoporosis   - B/L Knees with Ecchymosis --> X-rays neg for fracture     Breast CA, Lymphoma   - Follows with [facility] and Uro-Gyn   - CT w/ breast lesion, F/u with oncology for further management   - C/w Anastrazole  - Heme/Onc eval appreciated; F/u recs      H/O DVT PE  - Last dose of Eliquis on 12/19 PM, resumed   - Hold in view of scalp hematoma    H/O CVA, CNS Vasculitis   - C/w Prednisone  - Neuro eval appreciated; F/u recs     HLD  - Lipid panel; C/w Statin therapy --> increased to 40     GERD  - C/w Anti-Acid therapeutic interchange while admitted     PPX     Patient is a 78 year old Female with a PMHx of Left Breast CA S/P L Lumpectomy, on Anastrazole, Right breast ADH S/P resection and on Tamoxifen X 5 years, Lymphoma, history of DVT/PE, 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 Patient reports that she awoke in yas usual state of health this morning, was supposed to go to outpatient laboratory to obtain lab work. Patient states that she told her  to bring the car to her by the elevator and does not recall further. States that she told her  she "feels unwell" and feels "off." Per  at the bedside, he found patient face down on the floor.  reports that he stepped away from patient for under 2 minutes to bring the car over to her and awoke patient. Patient reports pain to her B/L knees. Patient reports that she has been ambulating since and was advised to come to [facility] by her [facility] team. Patient denies any bladder or bowel incontinence. Denies chest pain, palpitations, shortness of breath or dyspnea.  Denies nausea, vomiting, fever, chills, cough, abdominal pain, headache, dizziness, lightheadedness neck or back pain.       Syncope  - Pt with syncope --> Likely due to Mod-Severe AS versus infectious etiology, however rule out other etiologies   - 8/2023 TTE w/ Preserved EF, No WMA, Grade II Diastolic dysfunction, Mod-Severe AS  - CT Head / C-Spine / Maxillofacial and 3D as noted   - Orthostatics negative; S/P will start gentle IVF, C/w PO hydration   - Carotid Duplex  with mild calcified plaque, without significant stenosis   - TTE w/ BUBBLE study -- pending    - EEG (Pt w/ history of Seizures 10 years ago, not on AED) --> Switched to routine as per discussion with neuro   - MR head, MR A H/N --> w/ Punctate Acute Infarct mild to moderate microvascular ischemic change m, prior OR changes, neg for significant stenosis --> Started on ASA 81 Mg PO Qd and Eliquis 2.5 BID resumed    - MR C-spine w/ multi-level degenerative changes   - Trop X 3 negative, trend    - Monitor on telemetry   - Neuro checks per protocol   - Fall precautions   - Neuro, EP and Cardio evaluations appreciated; F/u recs --> EP to follow up for extended cardiac monitoring   - resumed eliquis and ASA     Acute CVA  - Seen on MR  - ASA 81 Mg PO Qd started and Eliquis 2.5 BID resume per discussion w/ Neuro/Cardio  - EP to follow up for cardiac monitoring  - TTE w/ Bubble pending  - Lipitor increased to 40   - Neuro eval appreciated; F/u recs    COVID +  - Patient with cough, per pharmacy, patient does not meet requirement for RDV per policy at this time. Continue to monitor symptoms at present   - Procal neg, ESR, CRP, Ferritin, D-dimer noted --> On AC  - CT Chest w/ DYLAN GG 4mm nodule, additional nodules, trace pericardial effusion, L breast lesion, Neg for PNA    - Incentive spirometer and Tessalon Perle PRN   - Hold off on additional steroids (Pt on steroids at home) as patient is not hypoxic, on RA   - Monitor O2 saturation; Supplement PRN to maintain > 90%    Leukocytosis   - Pt recently completed prolonged course of ABX on 12/16  - UCx --> negative,  BCx2 in lab   - Trend CBC, temp curve, VS     Mod-Severe AS, Trace pericardial effusion   - Seen on 8/2023 TTE  - Check repeat TTE ---> Pending   - Monitor volume status   - Cardio eval consulted; F/u recs     Knee Pain   - Pt with chronic knee pain, Osteoporosis   - B/L Knees with Ecchymosis --> X-rays neg for fracture     Breast CA, Lymphoma   - Follows with [facility] and Uro-Gyn   - CT w/ breast lesion, F/u with oncology for further management   - C/w Anastrazole  - Heme/Onc eval appreciated; F/u recs      H/O DVT PE  - Last dose of Eliquis on 12/19 PM, resumed   - Hold in view of scalp hematoma    H/O CVA, CNS Vasculitis   - C/w Prednisone  - Neuro eval appreciated; F/u recs     HLD  - Lipid panel; C/w Statin therapy --> increased to 40     GERD  - C/w Anti-Acid therapeutic interchange while admitted     PPX

## 2023-12-24 NOTE — PROGRESS NOTE ADULT - ASSESSMENT
79 y/o F on adjuvant anastrozole for L breast cancer, admitted for syncope.    Breast Cancer  --under the care of Dr. 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  --ongoing care after discharge    Syncope, COVID+, stroke  - Per EP, suspect syncope related to vasovagal due to dehydration  - CT chest w/ No pneumonia. New tiny indeterminate 4 mm left upper lobe groundglass pulmonary nodule. Redemonstrated partially cystic lesion of the left medial breast of uncertain etiology.  - CT head w/ no evidence of acute intracranial hemorrhage or midline shift.  - CT C spine w/ no evidence of acute osseous fracture or ko dislocation. Multilevel degenerative change of the cervical spine as discussed above, most significantly at the C4-C6 levels where there is moderate to severe narrowing of the spinal canal. In the setting of myelopathy, recommend MRI of the cervical spine for further evaluation.  - Now off remdesivir  - Per neurology, on ASA and eliquis based on MRI head (Punctate acute infarct in the left posterior temporal lobe)  - Neg MRA H/N   - Vascular following  - Pending TTE w/ bubble study and EEG  - Per EP, optional ILR placement, electively as an outpatient.    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    Rufino Mancuso PA-C  Hematology/Oncology  [facility]  [phone] (office) 79 y/o F on adjuvant anastrozole for L breast cancer, admitted for syncope.    Breast Cancer  --under the care of Dr. 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  --ongoing care after discharge    Syncope, COVID+, stroke  - Per EP, suspect syncope related to vasovagal due to dehydration  - CT chest w/ No pneumonia. New tiny indeterminate 4 mm left upper lobe groundglass pulmonary nodule. Redemonstrated partially cystic lesion of the left medial breast of uncertain etiology.  - CT head w/ no evidence of acute intracranial hemorrhage or midline shift.  - CT C spine w/ no evidence of acute osseous fracture or ko dislocation. Multilevel degenerative change of the cervical spine as discussed above, most significantly at the C4-C6 levels where there is moderate to severe narrowing of the spinal canal. In the setting of myelopathy, recommend MRI of the cervical spine for further evaluation.  - Now off remdesivir  - Per neurology, on ASA and eliquis based on MRI head (Punctate acute infarct in the left posterior temporal lobe)  - Neg MRA H/N   - Vascular following  - Pending TTE w/ bubble study and EEG  - Per EP, optional ILR placement, electively as an outpatient.    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    Rufino Mancuso PA-C  Hematology/Oncology  [facility]  [phone] (office)

## 2023-12-24 NOTE — PROGRESS NOTE ADULT - SUBJECTIVE AND OBJECTIVE BOX
Patient is a 78y old  Female who presents with a chief complaint of Syncope (24 Dec 2023 10:05)    Pt seen and examined at bedside. Feeling emotional about the prolonged hospital stay, no acute events.    MEDICATIONS  (STANDING):  anastrozole 1 milliGRAM(s) Oral daily  apixaban 2.5 milliGRAM(s) Oral every 12 hours  aspirin enteric coated 81 milliGRAM(s) Oral daily  atorvastatin 40 milliGRAM(s) Oral at bedtime  chlorhexidine 2% Cloths 1 Application(s) Topical daily  pantoprazole    Tablet 40 milliGRAM(s) Oral before breakfast  predniSONE   Tablet 5 milliGRAM(s) Oral every other day  sertraline 50 milliGRAM(s) Oral daily  sodium chloride 0.9%. 1000 milliLiter(s) (50 mL/Hr) IV Continuous <Continuous>    MEDICATIONS  (PRN):  acetaminophen     Tablet .. 650 milliGRAM(s) Oral every 6 hours PRN Moderate Pain (4 - 6)  benzonatate 100 milliGRAM(s) Oral every 8 hours PRN Cough  fluticasone propionate 50 MICROgram(s)/spray Nasal Spray 1 Spray(s) Both Nostrils two times a day PRN allergic rhinitis  melatonin 3 milliGRAM(s) Oral at bedtime PRN Insomnia    Vital Signs Last 24 Hrs  T(C): 36.7 (24 Dec 2023 05:12), Max: 37.1 (23 Dec 2023 21:10)  T(F): 98.1 (24 Dec 2023 05:12), Max: 98.8 (23 Dec 2023 21:10)  HR: 63 (24 Dec 2023 05:12) (63 - 75)  BP: 112/58 (24 Dec 2023 05:12) (108/65 - 126/70)  BP(mean): --  RR: 18 (24 Dec 2023 05:12) (18 - 18)  SpO2: 95% (24 Dec 2023 05:12) (94% - 95%)    Parameters below as of 24 Dec 2023 05:12  Patient On (Oxygen Delivery Method): room air    PE  NAD  Awake, alert  Anicteric, MMM  No c/c/e  Ecchymoses to face  FROM                          12.4   5.72  )-----------( 191      ( 23 Dec 2023 07:21 )             38.5       12-24    x   |  x   |  x   ----------------------------<  x   x    |  x   |  0.74    Ca    8.8      23 Dec 2023 07:19    TPro  6.0  /  Alb  3.7  /  TBili  0.4  /  DBili  <0.1  /  AST  41<H>  /  ALT  31  /  AlkPhos  49  12-24

## 2023-12-24 NOTE — PROGRESS NOTE ADULT - NS ATTEND AMEND GEN_ALL_CORE FT
no changes to above plan of care  [facility] on discharge, followup w/ Dr Sparks.  If ILR desired, can be done after clearing COVID.

## 2023-12-24 NOTE — PROGRESS NOTE ADULT - NS ATTEND AMEND GEN_ALL_CORE FT
Agree with above assessment and plan.   Pending TTE with bubble study, Pending EEG.   ILR optional, can be done outpatient per EP   f/u at [facility] [place] Agree with above assessment and plan.   Pending TTE with bubble study, Pending EEG.   ILR optional, can be done outpatient per EP   [place] [facility] [place]

## 2023-12-25 LAB
ALBUMIN SERPL ELPH-MCNC: 3.7 G/DL — SIGNIFICANT CHANGE UP (ref 3.3–5)
ALBUMIN SERPL ELPH-MCNC: 3.7 G/DL — SIGNIFICANT CHANGE UP (ref 3.3–5)
ALP SERPL-CCNC: 47 U/L — SIGNIFICANT CHANGE UP (ref 40–120)
ALP SERPL-CCNC: 47 U/L — SIGNIFICANT CHANGE UP (ref 40–120)
ALT FLD-CCNC: 30 U/L — SIGNIFICANT CHANGE UP (ref 10–45)
ALT FLD-CCNC: 30 U/L — SIGNIFICANT CHANGE UP (ref 10–45)
ANION GAP SERPL CALC-SCNC: 12 MMOL/L — SIGNIFICANT CHANGE UP (ref 5–17)
ANION GAP SERPL CALC-SCNC: 12 MMOL/L — SIGNIFICANT CHANGE UP (ref 5–17)
AST SERPL-CCNC: 36 U/L — SIGNIFICANT CHANGE UP (ref 10–40)
AST SERPL-CCNC: 36 U/L — SIGNIFICANT CHANGE UP (ref 10–40)
BILIRUB DIRECT SERPL-MCNC: 0.1 MG/DL — SIGNIFICANT CHANGE UP (ref 0–0.3)
BILIRUB DIRECT SERPL-MCNC: 0.1 MG/DL — SIGNIFICANT CHANGE UP (ref 0–0.3)
BILIRUB INDIRECT FLD-MCNC: 0.3 MG/DL — SIGNIFICANT CHANGE UP (ref 0.2–1)
BILIRUB INDIRECT FLD-MCNC: 0.3 MG/DL — SIGNIFICANT CHANGE UP (ref 0.2–1)
BILIRUB SERPL-MCNC: 0.4 MG/DL — SIGNIFICANT CHANGE UP (ref 0.2–1.2)
BILIRUB SERPL-MCNC: 0.4 MG/DL — SIGNIFICANT CHANGE UP (ref 0.2–1.2)
BUN SERPL-MCNC: 21 MG/DL — SIGNIFICANT CHANGE UP (ref 7–23)
BUN SERPL-MCNC: 21 MG/DL — SIGNIFICANT CHANGE UP (ref 7–23)
CALCIUM SERPL-MCNC: 9.1 MG/DL — SIGNIFICANT CHANGE UP (ref 8.4–10.5)
CALCIUM SERPL-MCNC: 9.1 MG/DL — SIGNIFICANT CHANGE UP (ref 8.4–10.5)
CHLORIDE SERPL-SCNC: 103 MMOL/L — SIGNIFICANT CHANGE UP (ref 96–108)
CHLORIDE SERPL-SCNC: 103 MMOL/L — SIGNIFICANT CHANGE UP (ref 96–108)
CO2 SERPL-SCNC: 24 MMOL/L — SIGNIFICANT CHANGE UP (ref 22–31)
CO2 SERPL-SCNC: 24 MMOL/L — SIGNIFICANT CHANGE UP (ref 22–31)
CREAT SERPL-MCNC: 0.68 MG/DL — SIGNIFICANT CHANGE UP (ref 0.5–1.3)
EGFR: 90 ML/MIN/1.73M2 — SIGNIFICANT CHANGE UP
GLUCOSE SERPL-MCNC: 99 MG/DL — SIGNIFICANT CHANGE UP (ref 70–99)
GLUCOSE SERPL-MCNC: 99 MG/DL — SIGNIFICANT CHANGE UP (ref 70–99)
HCT VFR BLD CALC: 37.8 % — SIGNIFICANT CHANGE UP (ref 34.5–45)
HCT VFR BLD CALC: 37.8 % — SIGNIFICANT CHANGE UP (ref 34.5–45)
HGB BLD-MCNC: 12.1 G/DL — SIGNIFICANT CHANGE UP (ref 11.5–15.5)
HGB BLD-MCNC: 12.1 G/DL — SIGNIFICANT CHANGE UP (ref 11.5–15.5)
MCHC RBC-ENTMCNC: 28.3 PG — SIGNIFICANT CHANGE UP (ref 27–34)
MCHC RBC-ENTMCNC: 28.3 PG — SIGNIFICANT CHANGE UP (ref 27–34)
MCHC RBC-ENTMCNC: 32 GM/DL — SIGNIFICANT CHANGE UP (ref 32–36)
MCHC RBC-ENTMCNC: 32 GM/DL — SIGNIFICANT CHANGE UP (ref 32–36)
MCV RBC AUTO: 88.5 FL — SIGNIFICANT CHANGE UP (ref 80–100)
MCV RBC AUTO: 88.5 FL — SIGNIFICANT CHANGE UP (ref 80–100)
NRBC # BLD: 0 /100 WBCS — SIGNIFICANT CHANGE UP (ref 0–0)
NRBC # BLD: 0 /100 WBCS — SIGNIFICANT CHANGE UP (ref 0–0)
PLATELET # BLD AUTO: 192 K/UL — SIGNIFICANT CHANGE UP (ref 150–400)
PLATELET # BLD AUTO: 192 K/UL — SIGNIFICANT CHANGE UP (ref 150–400)
POTASSIUM SERPL-MCNC: 3.9 MMOL/L — SIGNIFICANT CHANGE UP (ref 3.5–5.3)
POTASSIUM SERPL-MCNC: 3.9 MMOL/L — SIGNIFICANT CHANGE UP (ref 3.5–5.3)
POTASSIUM SERPL-SCNC: 3.9 MMOL/L — SIGNIFICANT CHANGE UP (ref 3.5–5.3)
POTASSIUM SERPL-SCNC: 3.9 MMOL/L — SIGNIFICANT CHANGE UP (ref 3.5–5.3)
PROT SERPL-MCNC: 6 G/DL — SIGNIFICANT CHANGE UP (ref 6–8.3)
PROT SERPL-MCNC: 6 G/DL — SIGNIFICANT CHANGE UP (ref 6–8.3)
RBC # BLD: 4.27 M/UL — SIGNIFICANT CHANGE UP (ref 3.8–5.2)
RBC # BLD: 4.27 M/UL — SIGNIFICANT CHANGE UP (ref 3.8–5.2)
RBC # FLD: 14.6 % — HIGH (ref 10.3–14.5)
RBC # FLD: 14.6 % — HIGH (ref 10.3–14.5)
SODIUM SERPL-SCNC: 139 MMOL/L — SIGNIFICANT CHANGE UP (ref 135–145)
SODIUM SERPL-SCNC: 139 MMOL/L — SIGNIFICANT CHANGE UP (ref 135–145)
WBC # BLD: 4.99 K/UL — SIGNIFICANT CHANGE UP (ref 3.8–10.5)
WBC # BLD: 4.99 K/UL — SIGNIFICANT CHANGE UP (ref 3.8–10.5)
WBC # FLD AUTO: 4.99 K/UL — SIGNIFICANT CHANGE UP (ref 3.8–10.5)
WBC # FLD AUTO: 4.99 K/UL — SIGNIFICANT CHANGE UP (ref 3.8–10.5)

## 2023-12-25 RX ADMIN — Medication 650 MILLIGRAM(S): at 13:20

## 2023-12-25 RX ADMIN — PANTOPRAZOLE SODIUM 40 MILLIGRAM(S): 20 TABLET, DELAYED RELEASE ORAL at 06:02

## 2023-12-25 RX ADMIN — SERTRALINE 50 MILLIGRAM(S): 25 TABLET, FILM COATED ORAL at 12:17

## 2023-12-25 RX ADMIN — Medication 5 MILLIGRAM(S): at 12:18

## 2023-12-25 RX ADMIN — Medication 81 MILLIGRAM(S): at 12:18

## 2023-12-25 RX ADMIN — Medication 650 MILLIGRAM(S): at 23:56

## 2023-12-25 RX ADMIN — Medication 650 MILLIGRAM(S): at 22:05

## 2023-12-25 RX ADMIN — APIXABAN 2.5 MILLIGRAM(S): 2.5 TABLET, FILM COATED ORAL at 17:26

## 2023-12-25 RX ADMIN — Medication 650 MILLIGRAM(S): at 12:18

## 2023-12-25 RX ADMIN — APIXABAN 2.5 MILLIGRAM(S): 2.5 TABLET, FILM COATED ORAL at 06:02

## 2023-12-25 RX ADMIN — ANASTROZOLE 1 MILLIGRAM(S): 1 TABLET ORAL at 12:18

## 2023-12-25 RX ADMIN — CHLORHEXIDINE GLUCONATE 1 APPLICATION(S): 213 SOLUTION TOPICAL at 12:19

## 2023-12-25 RX ADMIN — ATORVASTATIN CALCIUM 40 MILLIGRAM(S): 80 TABLET, FILM COATED ORAL at 21:16

## 2023-12-25 NOTE — PROGRESS NOTE ADULT - SUBJECTIVE AND OBJECTIVE BOX
Neurology     S: patient seen. MRI + AIS           Medications: MEDICATIONS  (STANDING):  anastrozole 1 milliGRAM(s) Oral daily  apixaban 2.5 milliGRAM(s) Oral every 12 hours  aspirin enteric coated 81 milliGRAM(s) Oral daily  atorvastatin 40 milliGRAM(s) Oral at bedtime  chlorhexidine 2% Cloths 1 Application(s) Topical daily  pantoprazole    Tablet 40 milliGRAM(s) Oral before breakfast  predniSONE   Tablet 5 milliGRAM(s) Oral every other day  sertraline 50 milliGRAM(s) Oral daily  sodium chloride 0.9%. 1000 milliLiter(s) (50 mL/Hr) IV Continuous <Continuous>    MEDICATIONS  (PRN):  acetaminophen     Tablet .. 650 milliGRAM(s) Oral every 6 hours PRN Moderate Pain (4 - 6)  benzonatate 100 milliGRAM(s) Oral every 8 hours PRN Cough  fluticasone propionate 50 MICROgram(s)/spray Nasal Spray 1 Spray(s) Both Nostrils two times a day PRN allergic rhinitis  melatonin 3 milliGRAM(s) Oral at bedtime PRN Insomnia       Vitals:  Vital Signs Last 24 Hrs  T(C): 36.9 (25 Dec 2023 09:51), Max: 37.1 (24 Dec 2023 11:33)  T(F): 98.5 (25 Dec 2023 09:51), Max: 98.8 (24 Dec 2023 11:33)  HR: 61 (24 Dec 2023 22:13) (61 - 76)  BP: 111/67 (24 Dec 2023 22:13) (111/67 - 118/68)  BP(mean): --  RR: 18 (25 Dec 2023 09:51) (18 - 18)  SpO2: 96% (25 Dec 2023 09:51) (94% - 96%)    Parameters below as of 25 Dec 2023 09:51  Patient On (Oxygen Delivery Method): room air                    General Exam:   General Appearance: Appropriately dressed and in no acute distress       Head: Normocephalic, atraumatic and no dysmorphic features  Ear, Nose, and Throat: Moist mucous membranes  CVS: S1S2+  Resp: No SOB, no wheeze or rhonchi  GI: soft NT/ND  Extremities: No edema or cyanosis  Skin: No bruises or rashes     Neurological Exam:  Mental Status: Awake, alert and oriented x 3.  Able to follow simple and complex verbal commands. Able to name and repeat. fluent speech. No obvious aphasia or dysarthria noted.   Cranial Nerves: PERRL, EOMI, VFFC, sensation V1-V3 intact,  no obvious facial asymmetry, equal elevation of palate, scm/trap 5/5, tongue is midline on protrusion. no obvious papilledema on fundoscopic exam. hearing is grossly intact.   Motor: Normal bulk, tone and strength throughout. Fine finger movements were intact and symmetric. no tremors or drift noted.    Sensation: Intact to light touch and pinprick throughout. no right/left confusion. no extinction to tactile on DSS. Romberg was negative.   Reflexes: 1+ throughout at biceps, brachioradialis, triceps, patellars and ankles bilaterally and equal. No clonus. R toe and L toe were both downgoing.  Coordination: No dysmetria on FNF or HKS  Gait: deferred     Data/Labs/Imaging which I personally reviewed.           LABS:                          12.1   4.99  )-----------( 192      ( 25 Dec 2023 07:21 )             37.8     12-25    139  |  103  |  21  ----------------------------<  99  3.9   |  24  |  0.68    Ca    9.1      25 Dec 2023 07:21    TPro  6.0  /  Alb  3.7  /  TBili  0.4  /  DBili  0.1  /  AST  36  /  ALT  30  /  AlkPhos  47  12-25    LIVER FUNCTIONS - ( 25 Dec 2023 07:21 )  Alb: 3.7 g/dL / Pro: 6.0 g/dL / ALK PHOS: 47 U/L / ALT: 30 U/L / AST: 36 U/L / GGT: x             Urinalysis Basic - ( 25 Dec 2023 07:21 )    Color: x / Appearance: x / SG: x / pH: x  Gluc: 99 mg/dL / Ketone: x  / Bili: x / Urobili: x   Blood: x / Protein: x / Nitrite: x   Leuk Esterase: x / RBC: x / WBC x   Sq Epi: x / Non Sq Epi: x / Bacteria: x            < from: CT Head No Cont (12.20.23 @ 12:33) >    ACC: [id number] EXAM:  CT CERVICAL SPINE   ORDERED BY:  SAMIA MILLS     ACC: [id number] EXAM:  CT MAXILLOFACIAL   ORDERED BY:  SAMIA MILLS     ACC: [id number] EXAM:  CT BRAIN   ORDERED BY:  SAMIA MILLS     ACC: [id number] EXAM:  CT 3D RECONSTRUCT W TAYLOR   ORDERED BY:    SAMIA MILLS     PROCEDURE DATE:  12/20/2023          INTERPRETATION:  EXAM: CT HEAD, FACIAL BONES, AND CERVICAL SPINE WITHOUT   INTRAVENOUS CONTRAST    HISTORY: Trauma, syncopal episode, currently on anticoagulation    TECHNIQUE: Multiple axial images were obtained of the head, facial bones,   and cervical spine. Sagittal and coronal reformatted images were obtained   from the axial data set. The images were reviewed in brain and bone   windows.    COMPARISON: CT of the head May 13, 2017, MRI of the head May 31, 2021    FINDINGS:    CT HEAD:  No acute intracranial hemorrhage. Areas of decreased attenuation in the   deep and periventricular white matter, compatible with chronic small   vessel disease. Mild encephalomalacia/gliotic change in the high right   frontal lobe. Chronic appearing infarcts in the left cerebellar   hemisphere. Mild parenchymal volume loss. No hydrocephalus. The   extra-axial spaces and basal cisterns are within normal limits. No   midline shift or mass effect present.    The cranial cervical junction is within normal limits. The sella is not   expanded. No depressed calvarial fracture. Prior right frontal   craniotomy. Mucosal thickening throughout the ethmoid air cells. The   visualized mastoid air cells are well aerated. The visualized orbits are   status post cataract surgery. Right frontal scalp hematoma/contusion.    CT FACIAL BONES:  The nasal bones are intact.    The orbital rims, zygomatic arches, and pterygoid plates are intact   bilaterally.    The mandible is intact. Mild arthritic changes in the bilateral TMJs.    Mucosal thickening throughout the ethmoid air cells. Probable polyp in   the right maxillary sinus. The visualized mastoid air cells are clear   bilaterally.    No fracture of the skull base or lower calvarium.      CT CERVICAL SPINE:  The atlantodental interval is within normal limits. The lateral masses   are properly aligned. No facet subluxation or malalignment at the   craniovertebral or atlantoaxial articulations. No dens fracture. No   significant prevertebral soft tissue swelling.    Subtle reversal to the cervical lordosis. Normal alignment of the   cervical vertebrae. Vertebral body height is well-maintained. Reduced   intervertebral disc height throughout the cervical spine, most   significantly C3-C5 with chronic endplate change. No jumped or perched   facets. No acute osseous fracture.    C2-C3: Subtle disc bulge. The bilateral neuroforamina are patent. No   narrowing of the spinal canal.    C3-C4: Disc osteophyte complex with uncovertebral joint hypertrophy.   Severe narrowing of the bilateral neuroforamen. No significant narrowing   of the spinal canal. Mild facet arthropathy.    C4-C5: Disc osteophyte complex with uncovertebral joint hypertrophy.   Severe narrowing of the left neural foramen and moderate to severe   narrowing of the right neural foramen. Moderate to severe narrowing of   the spinal canal. Mild facet arthropathy.    C5-C6: Disc osteophyte complex with uncovertebral joint hypertrophy.   Severe narrowing of the bilateral neuroforamen, left greater than right.   Moderate to severe narrowing of the spinal canal. Facet arthropathy.    C6-C7: Disc osteophyte complex with uncovertebral joint hypertrophy.   Severe narrowing of the bilateral neuroforamen. Mild narrowing of the   spinal canal. Facet arthropathy.    C7-T1: No large disc bulge. The bilateral neuroforamina are patent. No   significant narrowing of the spinal canal.    The paraspinal muscles are unremarkable. Soft tissue fullness in the   right vallecula.    Visualized portions of the thyroid are unremarkable.    Pleural-based calcifications in the left lung apex. Subtle groundglass   opacities in the right lung apex. Small 0.2 cm calcification in the right   lung apex.      IMPRESSION:    CT HEAD:  1.  No evidence of acute intracranial hemorrhage or midline shift.  2.  Chronic ischemic changes as discussed above.    CT FACIAL BONES:  1.  No evidence of fracture of the facial bones.  2.  Mild paranasal sinus disease.    CT CERVICAL SPINE:  1.  No evidence of acute osseous fracture or ko dislocation.  2.  Multilevel degenerative change of the cervical spine as discussed   above, most significantly at the C4-C6 levels where there is moderate to   severe narrowing of the spinal canal. In the setting of myelopathy,   recommend MRI of the cervical spine for further evaluation.  3.  Soft tissue fullness in the right vallecula. Recommend direct visual   inspection.  4.  Pleural-based and nodular calcifications in the bilateral lung apices   as discussed above, correlate with patient history. Additionally, subtle   ground glass opacities in the right lung apex. This could indicate a   developing infectious/inflammatory process. Consider designated imaging   of the chest for further evaluation if clinically indicated.    --- End of Report ---   < from: MR Angio Head w/wo IV Cont (12.22.23 @ 09:55) >    ACC: [id number] EXAM:  MR ANGIO BRAIN WAW IC   ORDERED BY: LUCIANA VEGA     ACC: [id number] EXAM:  MR ANGIO NECK WAW IC   ORDERED BY: LUCIANA VEGA     ACC: [id number] EXAM:  MR BRAIN WAW IC   ORDERED BY: LUCIANA VEGA     PROCEDURE DATE:  12/22/2023         INTERPRETATION:  Exam Type: MRI BRAIN WITH AND WITHOUT CONTRAST , MRA COW   WITHOUT CONTRAST, MRA NECK WITH AND WITHOUT CONTRAST  Indication: Syncope, R/O Mets, H/O CNS vasculitis  Comparison:CT head 12/20/2023    Technique: Multiplanar MR imaging of the brain was obtained with and   without contrast. Time of flight MRA of the neck with and without   contrast and [place] without contrast were obtained. 7.5  cc of   gadavist was administered. 0  cc was discarded.    Findings:    MRI brain: There is a punctate focus of restricted diffusion within the   left posterior temporal lobe, compatible with an acute infarct.   Encephalomalacia in the left cerebellum is present with extensive old   hemorrhagic blood products. Similarly, there is encephalomalacia with   chronic appearing hemorrhagic blood products within the medial right   cerebellum. 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 There are multiple small   foci of T2/FLAIR hyperintense signal in the periventricular and   subcortical white matter of the bilateral cerebri, suggestive of mild to   moderate chronic microvascular ischemic changes. There is no abnormal   enhancement.    Prior right frontal craniotomy with encephalomalacia/gliosis in the   underlying right anterolateral frontal lobe.    Cerebellar tonsils are in normal location. The intracranial flow voids   are normal in appearance. Visualized paranasal sinuses and mastoids are   normal in signal.    MRA of the [place] demonstrates normal antegrade flow in the   major intracranial arteries. No flow gap is seen to suggest high grade   stenosis. No evidence of medium or large sized aneurysm.    MRA of the neck demonstrates normal antegrade flow in the bilateral   common carotid, cervical internal carotid and vertebral arteries. No flow   gap is seen to suggest high grade stenosis.        IMPRESSION:    MRI brain: Punctate acute infarct in the left posterior temporal lobe.    Encephalomalacia in the left cerebellum is present with extensive old   hemorrhagic blood products. Similarly, there is encephalomalacia with   chronic appearing hemorrhagic blood products within the medial right   cerebellum. There are multiplesmall foci of T2/FLAIR hyperintense signal   in the periventricular and subcortical white matter of the bilateral   cerebri, suggestive of mild to moderate chronic microvascular ischemic   changes.    Prior right frontal craniotomy with encephalomalacia/gliosis in the   underlying right anterolateral frontal lobe.      MRA brain: No hemodynamically significant stenosis    MRA neck: No hemodynamically significant stenosis    --- End of Report ---         < from: MR Cervical Spine w/wo IV Cont (12.22.23 @ 09:54) >    ACC: [id number] EXAM:  MR SPINE CERVICAL WAW IC   ORDERED BY: LUCIANA VEGA     PROCEDURE DATE:  12/22/2023          INTERPRETATION:  Exam Date: 12/22/2023 9:54 AM    MR cervical spine with and without gadolinium    CLINICAL INFORMATION:  Stenosisseen on CT syncope.    TECHNIQUE:   Sagittal and axial T1-weighted images, sagittal proton   density images, axial and sagittal T2-weighted images of the cervical   spine were obtained.   Following the intravenous administration of 7.5 ml   Gadavist fat saturated sagittal and axial T1-weighted images were   obtained.  0 mL were discarded.    FINDINGS: Comparison is made to CT cervical of 12/20/2023.    Cervical vertebral alignment is intact.  Cervical vertebral body heights   are maintained.  [place] signal intensity within cervical vertebral bodies   and posterior elements is unremarkable.  No osseous expansion, epidural   disease or paraspinal abnormality is found.    At C2/C3, there is a very small posterior disc bulge without significant   foraminal or central spinal canal stenosis.    At C3/C4, there is a posterior disc bulge and bilateral uncovertebral   spurring resulting in moderate to severe narrowing of the bilateral   neural foramen without significant central spinal canal stenosis.    At C4/C5, there is a posterior disc bulge and bilateral uncovertebral   spurring resulting in moderate to severe stenosis of the bilateral neural   foramen and mild to moderate central spinal canal stenosis.    At C5/C6, there is a posterior disc bulge and bilateral uncovertebral   spurring resulting in moderate to severe stenosis of the bilateral neural   foramen and mild central spinal canal stenosis.    At C6/C7, there is a posterior disc bulge and bilateral uncovertebral   spurring resulting in moderate to severe stenosis of the bilateral neural   foramen without significant central spinal canal stenosis.    At C7/T1, there are no significant degenerative changes    The cervical cord maintains intact morphology  No cord signal intensity   abnormality or focal cord lesion is appreciated.   There is no abnormal   cord enhancement.  The cervical medullary junction remains intact.      IMPRESSION:   Multilevel degenerative changes as detailed above.    --- End of Report ---            PHILL CATHERINE MD; Attending Radiologist  This document has been electronically signed. Dec 22 2023 11:04AM    < end of copied text >  < from: TTE W or WO Ultrasound Enhancing Agent (08.17.23 @ 07:12) >  < from: TTE W or WO Ultrasound Enhancing Agent (08.17.23 @ 07:12) >  CONCLUSIONS:      1. Normal left ventricular cavity size. Left ventricular wall thickness is normal. Left ventricular systolic function is normal with an ejection fraction of 63 % by Jones's method of disks. There are no regional wall motion abnormalities seen.   2. There is moderate (grade 2) left ventricular diastolic dysfunction, with normal filling pressure.   3. Moderately enlarged right ventricular cavity size and reduced systolic right ventricular function. The tricuspid annular plane systolic excursion (TAPSE) is 1.8 cm (normal >=1.7 cm).   4. Moderate-to-severe aortic stenosis.   5. Trace aortic regurgitation.   6. No pericardial effusion seen.   7. Estimated pulmonary artery systolic pressure is 35 mmHg.   8. No prior echocardiogram is available for comparison.    < end of copied text >   Neurology     S: patient seen. MRI + AIS           Medications: MEDICATIONS  (STANDING):  anastrozole 1 milliGRAM(s) Oral daily  apixaban 2.5 milliGRAM(s) Oral every 12 hours  aspirin enteric coated 81 milliGRAM(s) Oral daily  atorvastatin 40 milliGRAM(s) Oral at bedtime  chlorhexidine 2% Cloths 1 Application(s) Topical daily  pantoprazole    Tablet 40 milliGRAM(s) Oral before breakfast  predniSONE   Tablet 5 milliGRAM(s) Oral every other day  sertraline 50 milliGRAM(s) Oral daily  sodium chloride 0.9%. 1000 milliLiter(s) (50 mL/Hr) IV Continuous <Continuous>    MEDICATIONS  (PRN):  acetaminophen     Tablet .. 650 milliGRAM(s) Oral every 6 hours PRN Moderate Pain (4 - 6)  benzonatate 100 milliGRAM(s) Oral every 8 hours PRN Cough  fluticasone propionate 50 MICROgram(s)/spray Nasal Spray 1 Spray(s) Both Nostrils two times a day PRN allergic rhinitis  melatonin 3 milliGRAM(s) Oral at bedtime PRN Insomnia       Vitals:  Vital Signs Last 24 Hrs  T(C): 36.9 (25 Dec 2023 09:51), Max: 37.1 (24 Dec 2023 11:33)  T(F): 98.5 (25 Dec 2023 09:51), Max: 98.8 (24 Dec 2023 11:33)  HR: 61 (24 Dec 2023 22:13) (61 - 76)  BP: 111/67 (24 Dec 2023 22:13) (111/67 - 118/68)  BP(mean): --  RR: 18 (25 Dec 2023 09:51) (18 - 18)  SpO2: 96% (25 Dec 2023 09:51) (94% - 96%)    Parameters below as of 25 Dec 2023 09:51  Patient On (Oxygen Delivery Method): room air                    General Exam:   General Appearance: Appropriately dressed and in no acute distress       Head: Normocephalic, atraumatic and no dysmorphic features  Ear, Nose, and Throat: Moist mucous membranes  CVS: S1S2+  Resp: No SOB, no wheeze or rhonchi  GI: soft NT/ND  Extremities: No edema or cyanosis  Skin: No bruises or rashes     Neurological Exam:  Mental Status: Awake, alert and oriented x 3.  Able to follow simple and complex verbal commands. Able to name and repeat. fluent speech. No obvious aphasia or dysarthria noted.   Cranial Nerves: PERRL, EOMI, VFFC, sensation V1-V3 intact,  no obvious facial asymmetry, equal elevation of palate, scm/trap 5/5, tongue is midline on protrusion. no obvious papilledema on fundoscopic exam. hearing is grossly intact.   Motor: Normal bulk, tone and strength throughout. Fine finger movements were intact and symmetric. no tremors or drift noted.    Sensation: Intact to light touch and pinprick throughout. no right/left confusion. no extinction to tactile on DSS. Romberg was negative.   Reflexes: 1+ throughout at biceps, brachioradialis, triceps, patellars and ankles bilaterally and equal. No clonus. R toe and L toe were both downgoing.  Coordination: No dysmetria on FNF or HKS  Gait: deferred     Data/Labs/Imaging which I personally reviewed.           LABS:                          12.1   4.99  )-----------( 192      ( 25 Dec 2023 07:21 )             37.8     12-25    139  |  103  |  21  ----------------------------<  99  3.9   |  24  |  0.68    Ca    9.1      25 Dec 2023 07:21    TPro  6.0  /  Alb  3.7  /  TBili  0.4  /  DBili  0.1  /  AST  36  /  ALT  30  /  AlkPhos  47  12-25    LIVER FUNCTIONS - ( 25 Dec 2023 07:21 )  Alb: 3.7 g/dL / Pro: 6.0 g/dL / ALK PHOS: 47 U/L / ALT: 30 U/L / AST: 36 U/L / GGT: x             Urinalysis Basic - ( 25 Dec 2023 07:21 )    Color: x / Appearance: x / SG: x / pH: x  Gluc: 99 mg/dL / Ketone: x  / Bili: x / Urobili: x   Blood: x / Protein: x / Nitrite: x   Leuk Esterase: x / RBC: x / WBC x   Sq Epi: x / Non Sq Epi: x / Bacteria: x            < from: CT Head No Cont (12.20.23 @ 12:33) >    ACC: [id number] EXAM:  CT CERVICAL SPINE   ORDERED BY:  SAMIA MILLS     ACC: [id number] EXAM:  CT MAXILLOFACIAL   ORDERED BY:  SAMIA MILLS     ACC: [id number] EXAM:  CT BRAIN   ORDERED BY:  SAMIA MILLS     ACC: [id number] EXAM:  CT 3D RECONSTRUCT W TAYLOR   ORDERED BY:    SAMIA MILLS     PROCEDURE DATE:  12/20/2023          INTERPRETATION:  EXAM: CT HEAD, FACIAL BONES, AND CERVICAL SPINE WITHOUT   INTRAVENOUS CONTRAST    HISTORY: Trauma, syncopal episode, currently on anticoagulation    TECHNIQUE: Multiple axial images were obtained of the head, facial bones,   and cervical spine. Sagittal and coronal reformatted images were obtained   from the axial data set. The images were reviewed in brain and bone   windows.    COMPARISON: CT of the head May 13, 2017, MRI of the head May 31, 2021    FINDINGS:    CT HEAD:  No acute intracranial hemorrhage. Areas of decreased attenuation in the   deep and periventricular white matter, compatible with chronic small   vessel disease. Mild encephalomalacia/gliotic change in the high right   frontal lobe. Chronic appearing infarcts in the left cerebellar   hemisphere. Mild parenchymal volume loss. No hydrocephalus. The   extra-axial spaces and basal cisterns are within normal limits. No   midline shift or mass effect present.    The cranial cervical junction is within normal limits. The sella is not   expanded. No depressed calvarial fracture. Prior right frontal   craniotomy. Mucosal thickening throughout the ethmoid air cells. The   visualized mastoid air cells are well aerated. The visualized orbits are   status post cataract surgery. Right frontal scalp hematoma/contusion.    CT FACIAL BONES:  The nasal bones are intact.    The orbital rims, zygomatic arches, and pterygoid plates are intact   bilaterally.    The mandible is intact. Mild arthritic changes in the bilateral TMJs.    Mucosal thickening throughout the ethmoid air cells. Probable polyp in   the right maxillary sinus. The visualized mastoid air cells are clear   bilaterally.    No fracture of the skull base or lower calvarium.      CT CERVICAL SPINE:  The atlantodental interval is within normal limits. The lateral masses   are properly aligned. No facet subluxation or malalignment at the   craniovertebral or atlantoaxial articulations. No dens fracture. No   significant prevertebral soft tissue swelling.    Subtle reversal to the cervical lordosis. Normal alignment of the   cervical vertebrae. Vertebral body height is well-maintained. Reduced   intervertebral disc height throughout the cervical spine, most   significantly C3-C5 with chronic endplate change. No jumped or perched   facets. No acute osseous fracture.    C2-C3: Subtle disc bulge. The bilateral neuroforamina are patent. No   narrowing of the spinal canal.    C3-C4: Disc osteophyte complex with uncovertebral joint hypertrophy.   Severe narrowing of the bilateral neuroforamen. No significant narrowing   of the spinal canal. Mild facet arthropathy.    C4-C5: Disc osteophyte complex with uncovertebral joint hypertrophy.   Severe narrowing of the left neural foramen and moderate to severe   narrowing of the right neural foramen. Moderate to severe narrowing of   the spinal canal. Mild facet arthropathy.    C5-C6: Disc osteophyte complex with uncovertebral joint hypertrophy.   Severe narrowing of the bilateral neuroforamen, left greater than right.   Moderate to severe narrowing of the spinal canal. Facet arthropathy.    C6-C7: Disc osteophyte complex with uncovertebral joint hypertrophy.   Severe narrowing of the bilateral neuroforamen. Mild narrowing of the   spinal canal. Facet arthropathy.    C7-T1: No large disc bulge. The bilateral neuroforamina are patent. No   significant narrowing of the spinal canal.    The paraspinal muscles are unremarkable. Soft tissue fullness in the   right vallecula.    Visualized portions of the thyroid are unremarkable.    Pleural-based calcifications in the left lung apex. Subtle groundglass   opacities in the right lung apex. Small 0.2 cm calcification in the right   lung apex.      IMPRESSION:    CT HEAD:  1.  No evidence of acute intracranial hemorrhage or midline shift.  2.  Chronic ischemic changes as discussed above.    CT FACIAL BONES:  1.  No evidence of fracture of the facial bones.  2.  Mild paranasal sinus disease.    CT CERVICAL SPINE:  1.  No evidence of acute osseous fracture or ko dislocation.  2.  Multilevel degenerative change of the cervical spine as discussed   above, most significantly at the C4-C6 levels where there is moderate to   severe narrowing of the spinal canal. In the setting of myelopathy,   recommend MRI of the cervical spine for further evaluation.  3.  Soft tissue fullness in the right vallecula. Recommend direct visual   inspection.  4.  Pleural-based and nodular calcifications in the bilateral lung apices   as discussed above, correlate with patient history. Additionally, subtle   ground glass opacities in the right lung apex. This could indicate a   developing infectious/inflammatory process. Consider designated imaging   of the chest for further evaluation if clinically indicated.    --- End of Report ---   < from: MR Angio Head w/wo IV Cont (12.22.23 @ 09:55) >    ACC: [id number] EXAM:  MR ANGIO BRAIN WAW IC   ORDERED BY: LUCIANA VEGA     ACC: [id number] EXAM:  MR ANGIO NECK WAW IC   ORDERED BY: LUCIANA VEGA     ACC: [id number] EXAM:  MR BRAIN WAW IC   ORDERED BY: LUCIANA VEGA     PROCEDURE DATE:  12/22/2023         INTERPRETATION:  Exam Type: MRI BRAIN WITH AND WITHOUT CONTRAST , MRA COW   WITHOUT CONTRAST, MRA NECK WITH AND WITHOUT CONTRAST  Indication: Syncope, R/O Mets, H/O CNS vasculitis  Comparison:CT head 12/20/2023    Technique: Multiplanar MR imaging of the brain was obtained with and   without contrast. Time of flight MRA of the neck with and without   contrast and [place] of [place] without contrast were obtained. 7.5  cc of   gadavist was administered. 0  cc was discarded.    Findings:    MRI brain: There is a punctate focus of restricted diffusion within the   left posterior temporal lobe, compatible with an acute infarct.   Encephalomalacia in the left cerebellum is present with extensive old   hemorrhagic blood products. Similarly, there is encephalomalacia with   chronic appearing hemorrhagic blood products within the medial right   cerebellum. 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 There are multiple small   foci of T2/FLAIR hyperintense signal in the periventricular and   subcortical white matter of the bilateral cerebri, suggestive of mild to   moderate chronic microvascular ischemic changes. There is no abnormal   enhancement.    Prior right frontal craniotomy with encephalomalacia/gliosis in the   underlying right anterolateral frontal lobe.    Cerebellar tonsils are in normal location. The intracranial flow voids   are normal in appearance. Visualized paranasal sinuses and mastoids are   normal in signal.    MRA of the [place] of [place] demonstrates normal antegrade flow in the   major intracranial arteries. No flow gap is seen to suggest high grade   stenosis. No evidence of medium or large sized aneurysm.    MRA of the neck demonstrates normal antegrade flow in the bilateral   common carotid, cervical internal carotid and vertebral arteries. No flow   gap is seen to suggest high grade stenosis.        IMPRESSION:    MRI brain: Punctate acute infarct in the left posterior temporal lobe.    Encephalomalacia in the left cerebellum is present with extensive old   hemorrhagic blood products. Similarly, there is encephalomalacia with   chronic appearing hemorrhagic blood products within the medial right   cerebellum. There are multiplesmall foci of T2/FLAIR hyperintense signal   in the periventricular and subcortical white matter of the bilateral   cerebri, suggestive of mild to moderate chronic microvascular ischemic   changes.    Prior right frontal craniotomy with encephalomalacia/gliosis in the   underlying right anterolateral frontal lobe.      MRA brain: No hemodynamically significant stenosis    MRA neck: No hemodynamically significant stenosis    --- End of Report ---         < from: MR Cervical Spine w/wo IV Cont (12.22.23 @ 09:54) >    ACC: [id number] EXAM:  MR SPINE CERVICAL WAW IC   ORDERED BY: LUCIANA VEGA     PROCEDURE DATE:  12/22/2023          INTERPRETATION:  Exam Date: 12/22/2023 9:54 AM    MR cervical spine with and without gadolinium    CLINICAL INFORMATION:  Stenosisseen on CT syncope.    TECHNIQUE:   Sagittal and axial T1-weighted images, sagittal proton   density images, axial and sagittal T2-weighted images of the cervical   spine were obtained.   Following the intravenous administration of 7.5 ml   Gadavist fat saturated sagittal and axial T1-weighted images were   obtained.  0 mL were discarded.    FINDINGS: Comparison is made to CT cervical of 12/20/2023.    Cervical vertebral alignment is intact.  Cervical vertebral body heights   are maintained.  Marrow signal intensity within cervical vertebral bodies   and posterior elements is unremarkable.  No osseous expansion, epidural   disease or paraspinal abnormality is found.    At C2/C3, there is a very small posterior disc bulge without significant   foraminal or central spinal canal stenosis.    At C3/C4, there is a posterior disc bulge and bilateral uncovertebral   spurring resulting in moderate to severe narrowing of the bilateral   neural foramen without significant central spinal canal stenosis.    At C4/C5, there is a posterior disc bulge and bilateral uncovertebral   spurring resulting in moderate to severe stenosis of the bilateral neural   foramen and mild to moderate central spinal canal stenosis.    At C5/C6, there is a posterior disc bulge and bilateral uncovertebral   spurring resulting in moderate to severe stenosis of the bilateral neural   foramen and mild central spinal canal stenosis.    At C6/C7, there is a posterior disc bulge and bilateral uncovertebral   spurring resulting in moderate to severe stenosis of the bilateral neural   foramen without significant central spinal canal stenosis.    At C7/T1, there are no significant degenerative changes    The cervical cord maintains intact morphology  No cord signal intensity   abnormality or focal cord lesion is appreciated.   There is no abnormal   cord enhancement.  The cervical medullary junction remains intact.      IMPRESSION:   Multilevel degenerative changes as detailed above.    --- End of Report ---            PHLIL CATHERINE MD; Attending Radiologist  This document has been electronically signed. Dec 22 2023 11:04AM    < end of copied text >  < from: TTE W or WO Ultrasound Enhancing Agent (08.17.23 @ 07:12) >  < from: TTE W or WO Ultrasound Enhancing Agent (08.17.23 @ 07:12) >  CONCLUSIONS:      1. Normal left ventricular cavity size. Left ventricular wall thickness is normal. Left ventricular systolic function is normal with an ejection fraction of 63 % by Jones's method of disks. There are no regional wall motion abnormalities seen.   2. There is moderate (grade 2) left ventricular diastolic dysfunction, with normal filling pressure.   3. Moderately enlarged right ventricular cavity size and reduced systolic right ventricular function. The tricuspid annular plane systolic excursion (TAPSE) is 1.8 cm (normal >=1.7 cm).   4. Moderate-to-severe aortic stenosis.   5. Trace aortic regurgitation.   6. No pericardial effusion seen.   7. Estimated pulmonary artery systolic pressure is 35 mmHg.   8. No prior echocardiogram is available for comparison.    < end of copied text >

## 2023-12-25 NOTE — PROGRESS NOTE ADULT - SUBJECTIVE AND OBJECTIVE BOX
INTERVAL HPI/OVERNIGHT EVENTS:  Patient S&E at bedside. No o/n events. Pending TTE. EEG can be done outpatient along with ILR    VITAL SIGNS:  T(F): 98.7 (12-25-23 @ 12:21)  HR: 63 (12-25-23 @ 12:21)  BP: 107/58 (12-25-23 @ 12:21)  RR: 18 (12-25-23 @ 12:21)  SpO2: 94% (12-25-23 @ 12:21)  Wt(kg): --    PE  NAD  Awake, alert  Anicteric, MMM  No c/c/e  Ecchymoses to face  FROM      MEDICATIONS  (STANDING):  anastrozole 1 milliGRAM(s) Oral daily  apixaban 2.5 milliGRAM(s) Oral every 12 hours  aspirin enteric coated 81 milliGRAM(s) Oral daily  atorvastatin 40 milliGRAM(s) Oral at bedtime  chlorhexidine 2% Cloths 1 Application(s) Topical daily  pantoprazole    Tablet 40 milliGRAM(s) Oral before breakfast  predniSONE   Tablet 5 milliGRAM(s) Oral every other day  sertraline 50 milliGRAM(s) Oral daily  sodium chloride 0.9%. 1000 milliLiter(s) (50 mL/Hr) IV Continuous <Continuous>    MEDICATIONS  (PRN):  acetaminophen     Tablet .. 650 milliGRAM(s) Oral every 6 hours PRN Moderate Pain (4 - 6)  benzonatate 100 milliGRAM(s) Oral every 8 hours PRN Cough  fluticasone propionate 50 MICROgram(s)/spray Nasal Spray 1 Spray(s) Both Nostrils two times a day PRN allergic rhinitis  melatonin 3 milliGRAM(s) Oral at bedtime PRN Insomnia      Allergies    No Known Allergies    Intolerances        LABS:                        12.1   4.99  )-----------( 192      ( 25 Dec 2023 07:21 )             37.8     12-25    139  |  103  |  21  ----------------------------<  99  3.9   |  24  |  0.68    Ca    9.1      25 Dec 2023 07:21    TPro  6.0  /  Alb  3.7  /  TBili  0.4  /  DBili  0.1  /  AST  36  /  ALT  30  /  AlkPhos  47  12-25      Urinalysis Basic - ( 25 Dec 2023 07:21 )    Color: x / Appearance: x / SG: x / pH: x  Gluc: 99 mg/dL / Ketone: x  / Bili: x / Urobili: x   Blood: x / Protein: x / Nitrite: x   Leuk Esterase: x / RBC: x / WBC x   Sq Epi: x / Non Sq Epi: x / Bacteria: x        RADIOLOGY & ADDITIONAL TESTS:  Studies reviewed.

## 2023-12-25 NOTE — PROGRESS NOTE ADULT - SUBJECTIVE AND OBJECTIVE BOX
Name of Patient : VINCENT ENGLAND  MRN: [id number]  Date of visit: 12-25-23 @ 14:08      Subjective: Patient seen and examined. No new events except as noted.   Patient seen earlier this AM.  at the bedside.   Reports her cough is improving. TTE pending.   Denies chest pain, palpitations, shortness of breath or dyspnea.     REVIEW OF SYSTEMS:    CONSTITUTIONAL: Generalized weakness   EYES/ENT: No visual changes;  No vertigo or throat pain   NECK: No pain or stiffness  RESPIRATORY: + Cough is improving; No wheezing, hemoptysis; No shortness of breath  CARDIOVASCULAR: No chest pain or palpitations  GASTROINTESTINAL: No abdominal or epigastric pain. No nausea, vomiting, or hematemesis; No diarrhea or constipation. No melena or hematochezia.  GENITOURINARY: No dysuria, frequency or hematuria  NEUROLOGICAL: No numbness or weakness  SKIN:+  Facial ecchymosis; + Raccoon eyes R>L improving, No itching, burning, rashes, or lesions   All other review of systems is negative unless indicated above.    MEDICATIONS:  MEDICATIONS  (STANDING):  anastrozole 1 milliGRAM(s) Oral daily  apixaban 2.5 milliGRAM(s) Oral every 12 hours  aspirin enteric coated 81 milliGRAM(s) Oral daily  atorvastatin 40 milliGRAM(s) Oral at bedtime  chlorhexidine 2% Cloths 1 Application(s) Topical daily  pantoprazole    Tablet 40 milliGRAM(s) Oral before breakfast  predniSONE   Tablet 5 milliGRAM(s) Oral every other day  sertraline 50 milliGRAM(s) Oral daily  sodium chloride 0.9%. 1000 milliLiter(s) (50 mL/Hr) IV Continuous <Continuous>      PHYSICAL EXAM:  T(C): 37.1 (12-25-23 @ 12:21), Max: 37.1 (12-25-23 @ 12:21)  HR: 63 (12-25-23 @ 12:21) (61 - 63)  BP: 107/58 (12-25-23 @ 12:21) (107/58 - 111/67)  RR: 18 (12-25-23 @ 12:21) (18 - 18)  SpO2: 94% (12-25-23 @ 12:21) (94% - 96%)  Wt(kg): --  I&O's Summary    25 Dec 2023 07:01  -  25 Dec 2023 14:08  --------------------------------------------------------  IN: 300 mL / OUT: 0 mL / NET: 300 mL          Appearance: Awake, Weak appearing female, sitting up in bed   HEENT:  Eyes are open; +Facial ecchymosis; + Raccoon eyes R >L with improvident   Lymphatic: No lymphadenopathy   Cardiovascular: Normal S1 S2, no JVD  Respiratory: normal effort , clear  Gastrointestinal:  Soft, Non-tender  Skin: + Facial ecchymosis; + R sided tashia-occular ecchymosis  Psychiatry:  Mood & affect appropriate  Musculoskeletal: No edema      12-25-23 @ 07:01  -  12-25-23 @ 14:08  --------------------------------------------------------  IN: 300 mL / OUT: 0 mL / NET: 300 mL                            12.1   4.99  )-----------( 192      ( 25 Dec 2023 07:21 )             37.8               12-25    139  |  103  |  21  ----------------------------<  99  3.9   |  24  |  0.68    Ca    9.1      25 Dec 2023 07:21    TPro  6.0  /  Alb  3.7  /  TBili  0.4  /  DBili  0.1  /  AST  36  /  ALT  30  /  AlkPhos  47  12-25                       Urinalysis Basic - ( 25 Dec 2023 07:21 )    Color: x / Appearance: x / SG: x / pH: x  Gluc: 99 mg/dL / Ketone: x  / Bili: x / Urobili: x   Blood: x / Protein: x / Nitrite: x   Leuk Esterase: x / RBC: x / WBC x   Sq Epi: x / Non Sq Epi: x / Bacteria: x          Culture - Blood in AM (12.21.23 @ 07:23)   Specimen Source: .Blood Blood  Culture Results: No growth at 4 days    Culture - Blood in AM (12.21.23 @ 07:23)   Specimen Source: .Blood Blood  Culture Results: No growth at 4 days       Name of Patient : VINCENT ENGLAND  MRN: [id number]  Date of visit: 12-25-23 @ 14:08      Subjective: Patient seen and examined. No new events except as noted.   Patient seen earlier this AM.  at the bedside.   Reports her cough is improving. TTE pending.   Denies chest pain, palpitations, shortness of breath or dyspnea.     REVIEW OF SYSTEMS:    CONSTITUTIONAL: Generalized weakness   EYES/ENT: No visual changes;  No vertigo or throat pain   NECK: No pain or stiffness  RESPIRATORY: + Cough is improving; No wheezing, hemoptysis; No shortness of breath  CARDIOVASCULAR: No chest pain or palpitations  GASTROINTESTINAL: No abdominal or epigastric pain. No nausea, vomiting, or hematemesis; No diarrhea or constipation. No melena or hematochezia.  GENITOURINARY: No dysuria, frequency or hematuria  NEUROLOGICAL: No numbness or weakness  SKIN:+  Facial ecchymosis; + Raccoon eyes R>L improving, No itching, burning, rashes, or lesions   All other review of systems is negative unless indicated above.    MEDICATIONS:  MEDICATIONS  (STANDING):  anastrozole 1 milliGRAM(s) Oral daily  apixaban 2.5 milliGRAM(s) Oral every 12 hours  aspirin enteric coated 81 milliGRAM(s) Oral daily  atorvastatin 40 milliGRAM(s) Oral at bedtime  chlorhexidine 2% Cloths 1 Application(s) Topical daily  pantoprazole    Tablet 40 milliGRAM(s) Oral before breakfast  predniSONE   Tablet 5 milliGRAM(s) Oral every other day  sertraline 50 milliGRAM(s) Oral daily  sodium chloride 0.9%. 1000 milliLiter(s) (50 mL/Hr) IV Continuous <Continuous>      PHYSICAL EXAM:  T(C): 37.1 (12-25-23 @ 12:21), Max: 37.1 (12-25-23 @ 12:21)  HR: 63 (12-25-23 @ 12:21) (61 - 63)  BP: 107/58 (12-25-23 @ 12:21) (107/58 - 111/67)  RR: 18 (12-25-23 @ 12:21) (18 - 18)  SpO2: 94% (12-25-23 @ 12:21) (94% - 96%)  Wt(kg): --  I&O's Summary    25 Dec 2023 07:01  -  25 Dec 2023 14:08  --------------------------------------------------------  IN: 300 mL / OUT: 0 mL / NET: 300 mL          Appearance: Awake, Weak appearing female, sitting up in bed   HEENT:  Eyes are open; +Facial ecchymosis; + Raccoon eyes R >L with improvident   Lymphatic: No lymphadenopathy   Cardiovascular: Normal S1 S2, no JVD  Respiratory: normal effort , clear  Gastrointestinal:  Soft, Non-tender  Skin: + Facial ecchymosis; + R sided tashia-occular ecchymosis  Psychiatry:  Mood & affect appropriate  Musculoskeletal: No edema      12-25-23 @ 07:01  -  12-25-23 @ 14:08  --------------------------------------------------------  IN: 300 mL / OUT: 0 mL / NET: 300 mL                            12.1   4.99  )-----------( 192      ( 25 Dec 2023 07:21 )             37.8               12-25    139  |  103  |  21  ----------------------------<  99  3.9   |  24  |  0.68    Ca    9.1      25 Dec 2023 07:21    TPro  6.0  /  Alb  3.7  /  TBili  0.4  /  DBili  0.1  /  AST  36  /  ALT  30  /  AlkPhos  47  12-25                       Urinalysis Basic - ( 25 Dec 2023 07:21 )    Color: x / Appearance: x / SG: x / pH: x  Gluc: 99 mg/dL / Ketone: x  / Bili: x / Urobili: x   Blood: x / Protein: x / Nitrite: x   Leuk Esterase: x / RBC: x / WBC x   Sq Epi: x / Non Sq Epi: x / Bacteria: x          Culture - Blood in AM (12.21.23 @ 07:23)   Specimen Source: .Blood Blood  Culture Results: No growth at 4 days    Culture - Blood in AM (12.21.23 @ 07:23)   Specimen Source: .Blood Blood  Culture Results: No growth at 4 days

## 2023-12-25 NOTE — PROGRESS NOTE ADULT - ASSESSMENT
79 y/o F on adjuvant anastrozole for L breast cancer, admitted for syncope.    Breast Cancer  --under the care of Dr. 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  --ongoing care after discharge    Syncope, COVID+, stroke  - Per EP, suspect syncope related to vasovagal due to dehydration  - CT chest w/ No pneumonia. New tiny indeterminate 4 mm left upper lobe groundglass pulmonary nodule. Redemonstrated partially cystic lesion of the left medial breast of uncertain etiology.  - CT head w/ no evidence of acute intracranial hemorrhage or midline shift.  - CT C spine w/ no evidence of acute osseous fracture or ko dislocation. Multilevel degenerative change of the cervical spine as discussed above, most significantly at the C4-C6 levels where there is moderate to severe narrowing of the spinal canal. In the setting of myelopathy, recommend MRI of the cervical spine for further evaluation.  - Now off remdesivir  - Per neurology, on ASA and eliquis based on MRI head (Punctate acute infarct in the left posterior temporal lobe)  - Neg MRA H/N   - Vascular following  - Pending TTE w/ bubble study   - EEG can be done outpatient   - Per EP, optional ILR placement, electively as an outpatient.    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    Amaury Call MD  Hematology/Oncology  [facility]  [phone] (office) 77 y/o F on adjuvant anastrozole for L breast cancer, admitted for syncope.    Breast Cancer  --under the care of Dr. 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  --ongoing care after discharge    Syncope, COVID+, stroke  - Per EP, suspect syncope related to vasovagal due to dehydration  - CT chest w/ No pneumonia. New tiny indeterminate 4 mm left upper lobe groundglass pulmonary nodule. Redemonstrated partially cystic lesion of the left medial breast of uncertain etiology.  - CT head w/ no evidence of acute intracranial hemorrhage or midline shift.  - CT C spine w/ no evidence of acute osseous fracture or ko dislocation. Multilevel degenerative change of the cervical spine as discussed above, most significantly at the C4-C6 levels where there is moderate to severe narrowing of the spinal canal. In the setting of myelopathy, recommend MRI of the cervical spine for further evaluation.  - Now off remdesivir  - Per neurology, on ASA and eliquis based on MRI head (Punctate acute infarct in the left posterior temporal lobe)  - Neg MRA H/N   - Vascular following  - Pending TTE w/ bubble study   - EEG can be done outpatient   - Per EP, optional ILR placement, electively as an outpatient.    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    Amaury Call MD  Hematology/Oncology  [facility]  [phone] (office)

## 2023-12-25 NOTE — PROGRESS NOTE ADULT - ASSESSMENT
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 Patient reports that she awoke in yas usual state of health this morning, was supposed to go to outpatient laboratory to obtain lab work. Patient states that she told her  to bring the car to her by the elevator and does not recall further. States that she told her  she "feels unwell" and feels "off." Per  at the bedside, he found patient face down on the floor.  reports that he stepped away from patient for under 2 minutes to bring the car over to her and awoke patient. Patient reports pain to her B/L knees. Patient reports that she has been ambulating since and was advised to come to [facility] by her [facility] team. Patient denies any bladder or bowel incontinence. Denies chest pain, palpitations, shortness of breath or dyspnea.  Denies nausea, vomiting, fever, chills, cough, abdominal pain, headache, dizziness, lightheadedness neck or back pain.       Syncope  - Pt with syncope --> Likely due to Mod-Severe AS versus infectious etiology, however rule out other etiologies   - 8/2023 TTE w/ Preserved EF, No WMA, Grade II Diastolic dysfunction, Mod-Severe AS  - CT Head / C-Spine / Maxillofacial and 3D as noted   - MR head, MR A H/N --> w/ Punctate Acute Infarct mild to moderate microvascular ischemic change m, prior OR changes, neg for significant stenosis -->C/w  ASA 81 Mg PO Qd and Eliquis 2.5 BID resumed as per Vascular cardiology/ Neuro   - MR C-spine w/ multi-level degenerative changes   - Orthostatics negative;  Carotid Duplex w/ mild calcified plaque, without significant stenosis   - Check EEG (Pt w/ history of Seizures 10 years ago, not on AED) --> Switched to routine, can be done outpatient as per discussion with neuro   - TTE w/ BUBBLE study -- pending    - Trop X 3 negative   - Monitor on telemetry   - Neuro checks per protocol; Fall precautions   - Neuro, EP and Cardio evaluations appreciated; F/u recs --> EP to follow up for extended cardiac monitoring --> Plan for [facility] and then elective ILR placement as an outpatient   - C/w Eliquis and ASA     Acute CVA  - Seen on MR  - ASA 81 Mg PO Qd and Eliquis 2.5 BID resume per discussion w/ Neuro/Cardio  - EP to follow up for cardiac monitoring   - TTE w/ Bubble pending  - Lipitor 40   - PT/ OT   - Neuro eval appreciated; F/u recs    COVID +  - Patient with cough, per pharmacy, patient does not meet requirement for RDV per policy at this time. Continue to monitor symptoms at present   - Procal neg, ESR, CRP, Ferritin, D-dimer noted --> On AC  - CT Chest w/ DYLAN GG 4mm nodule, additional nodules, trace pericardial effusion, L breast lesion, Neg for PNA    - Incentive spirometer and Tessalon Perle PRN   - Hold off on additional steroids (Pt on steroids at home) as patient is not hypoxic, on RA   - Monitor O2 saturation; Supplement PRN to maintain > 90%    Leukocytosis   - Pt recently completed prolonged course of ABX on 12/16  - UCx, BCx2 --> negative    - Trend CBC, temp curve, VS     Mod-Severe AS, Trace pericardial effusion   - Seen on 8/2023 TTE  - Check repeat TTE ---> Pending   - Monitor volume status   - Cardio eval consulted; F/u recs     Knee Pain   - Pt with chronic knee pain, Osteoporosis   - B/L Knees with Ecchymosis --> X-rays neg for fracture     Breast CA, Lymphoma   - Follows with [facility] and Uro-Gyn   - CT w/ breast lesion, F/u with oncology for further management   - C/w Anastrazole  - Heme/Onc eval appreciated; F/u recs      H/O DVT PE  - Eliquis resumed.     H/O CVA, CNS Vasculitis   - C/w Prednisone  - Neuro eval appreciated; F/u recs     HLD  - Lipid panel; C/w Statin therapy --> increased to 40     GERD  - C/w Anti-Acid therapeutic interchange while admitted     PPX    Discussed with Attending and ACP. Discussed with patient and her  in detail at the bedside.  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 Patient reports that she awoke in yas usual state of health this morning, was supposed to go to outpatient laboratory to obtain lab work. Patient states that she told her  to bring the car to her by the elevator and does not recall further. States that she told her  she "feels unwell" and feels "off." Per  at the bedside, he found patient face down on the floor.  reports that he stepped away from patient for under 2 minutes to bring the car over to her and awoke patient. Patient reports pain to her B/L knees. Patient reports that she has been ambulating since and was advised to come to [facility] by her [facility] team. Patient denies any bladder or bowel incontinence. Denies chest pain, palpitations, shortness of breath or dyspnea.  Denies nausea, vomiting, fever, chills, cough, abdominal pain, headache, dizziness, lightheadedness neck or back pain.       Syncope  - Pt with syncope --> Likely due to Mod-Severe AS versus infectious etiology, however rule out other etiologies   - 8/2023 TTE w/ Preserved EF, No WMA, Grade II Diastolic dysfunction, Mod-Severe AS  - CT Head / C-Spine / Maxillofacial and 3D as noted   - MR head, MR A H/N --> w/ Punctate Acute Infarct mild to moderate microvascular ischemic change m, prior OR changes, neg for significant stenosis -->C/w  ASA 81 Mg PO Qd and Eliquis 2.5 BID resumed as per Vascular cardiology/ Neuro   - MR C-spine w/ multi-level degenerative changes   - Orthostatics negative;  Carotid Duplex w/ mild calcified plaque, without significant stenosis   - Check EEG (Pt w/ history of Seizures 10 years ago, not on AED) --> Switched to routine, can be done outpatient as per discussion with neuro   - TTE w/ BUBBLE study -- pending    - Trop X 3 negative   - Monitor on telemetry   - Neuro checks per protocol; Fall precautions   - Neuro, EP and Cardio evaluations appreciated; F/u recs --> EP to follow up for extended cardiac monitoring --> Plan for [facility] and then elective ILR placement as an outpatient   - C/w Eliquis and ASA     Acute CVA  - Seen on MR  - ASA 81 Mg PO Qd and Eliquis 2.5 BID resume per discussion w/ Neuro/Cardio  - EP to follow up for cardiac monitoring   - TTE w/ Bubble pending  - Lipitor 40   - PT/ OT   - Neuro eval appreciated; F/u recs    COVID +  - Patient with cough, per pharmacy, patient does not meet requirement for RDV per policy at this time. Continue to monitor symptoms at present   - Procal neg, ESR, CRP, Ferritin, D-dimer noted --> On AC  - CT Chest w/ DYLAN GG 4mm nodule, additional nodules, trace pericardial effusion, L breast lesion, Neg for PNA    - Incentive spirometer and Tessalon Perle PRN   - Hold off on additional steroids (Pt on steroids at home) as patient is not hypoxic, on RA   - Monitor O2 saturation; Supplement PRN to maintain > 90%    Leukocytosis   - Pt recently completed prolonged course of ABX on 12/16  - UCx, BCx2 --> negative    - Trend CBC, temp curve, VS     Mod-Severe AS, Trace pericardial effusion   - Seen on 8/2023 TTE  - Check repeat TTE ---> Pending   - Monitor volume status   - Cardio eval consulted; F/u recs     Knee Pain   - Pt with chronic knee pain, Osteoporosis   - B/L Knees with Ecchymosis --> X-rays neg for fracture     Breast CA, Lymphoma   - Follows with [facility] and Uro-Gyn   - CT w/ breast lesion, F/u with oncology for further management   - C/w Anastrazole  - Heme/Onc eval appreciated; F/u recs      H/O DVT PE  - Eliquis resumed.     H/O CVA, CNS Vasculitis   - C/w Prednisone  - Neuro eval appreciated; F/u recs     HLD  - Lipid panel; C/w Statin therapy --> increased to 40     GERD  - C/w Anti-Acid therapeutic interchange while admitted     PPX    Discussed with Attending and ACP. Discussed with patient and her  in detail at the bedside.

## 2023-12-25 NOTE — PROGRESS NOTE ADULT - SUBJECTIVE AND OBJECTIVE BOX
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 Patient reports that she awoke in yas usual state of health this morning, was supposed to go to outpatient laboratory to obtain lab work. Patient states that she told her  to bring the car to her by the elevator and does not recall further. States that she told her  she "feels unwell" and feels "off." Per  at the bedside, he found patient face down on the floor.  reports that he stepped away from patient for under 2 minutes to bring the car over to her and awoke patient. Patient reports pain to her B/L knees. Patient reports that she has been ambulating since and was advised to come to [facility] by her [facility] team. Patient denies any bladder or bowel incontinence. Denies chest pain, palpitations, shortness of breath or dyspnea.  Denies nausea, vomiting, fever, chills, cough, abdominal pain, headache, dizziness, lightheadedness neck or back pain.  (20 Dec 2023 15:43)    Describes a generalized feeling of "something just feeling off" prior to fainting.  No describable headache, vertigo, nausea, skin pallor, sweating, hot/flushed feeling, etc.  Last fainting episode was >5 yrs ago, unrelated incident.  A 10 pt ROS is otherwise negative.  12/22- resting comfortably, no new complaints, being taken off floor for addl testing today.  Date of service  12/24 no events overnight  12/25/23 - denies chest pain, SOB, palpitations, or dizziness. Review of systems otherwise negative    PAST MEDICAL & SURGICAL HISTORY:  CVA (cerebral vascular accident)  Lymphoma  Osteoporosis  Seizure  Fracture  lumbar spine,  healed with conservative treatment  Neuropathy  bilateral feet  HLD (hyperlipidemia)  Depression  GERD (gastroesophageal reflux disease)  Hodgkins disease  Pulmonary embolus  Fracture, ankle  Right, s/p metal plate  H/O sinus surgery  Cataract extraction status of eye, right         MEDICATIONS:  acetaminophen     Tablet .. 650 milliGRAM(s) Oral every 6 hours PRN  anastrozole 1 milliGRAM(s) Oral daily  apixaban 2.5 milliGRAM(s) Oral every 12 hours  aspirin enteric coated 81 milliGRAM(s) Oral daily  atorvastatin 40 milliGRAM(s) Oral at bedtime  benzonatate 100 milliGRAM(s) Oral every 8 hours PRN  chlorhexidine 2% Cloths 1 Application(s) Topical daily  fluticasone propionate 50 MICROgram(s)/spray Nasal Spray 1 Spray(s) Both Nostrils two times a day PRN  melatonin 3 milliGRAM(s) Oral at bedtime PRN  pantoprazole    Tablet 40 milliGRAM(s) Oral before breakfast  predniSONE   Tablet 5 milliGRAM(s) Oral every other day  sertraline 50 milliGRAM(s) Oral daily  sodium chloride 0.9%. 1000 milliLiter(s) IV Continuous <Continuous>      LABS:                        12.1   4.99  )-----------( 192      ( 25 Dec 2023 07:21 )             37.8       Hemoglobin: 12.1 g/dL (12-25 @ 07:21)  Hemoglobin: 12.4 g/dL (12-23 @ 07:21)  Hemoglobin: 11.7 g/dL (12-22 @ 05:57)  Hemoglobin: 11.6 g/dL (12-21 @ 07:29)  Hemoglobin: 12.2 g/dL (12-20 @ 11:43)      12-25    139  |  103  |  21  ----------------------------<  99  3.9   |  24  |  0.68    Ca    9.1      25 Dec 2023 07:21    TPro  6.0  /  Alb  3.7  /  TBili  0.4  /  DBili  0.1  /  AST  36  /  ALT  30  /  AlkPhos  47  12-25    Creatinine Trend: 0.68<--, 0.74<--, 0.75<--, 0.63<--, 0.64<--, 0.69<--    COAGS:           PHYSICAL EXAM:  T(C): 36.9 (12-25-23 @ 09:51), Max: 36.9 (12-25-23 @ 09:51)  HR: 61 (12-24-23 @ 22:13) (61 - 61)  BP: 111/67 (12-24-23 @ 22:13) (111/67 - 111/67)  RR: 18 (12-25-23 @ 09:51) (18 - 18)  SpO2: 96% (12-25-23 @ 09:51) (94% - 96%)  Wt(kg): --    I&O's Summary      Appearance: Elderly woman in no acute distress  HEENT:   Normal oral mucosa, PERRL, EOMI	  Lymphatic: No lymphadenopathy , no edema  Cardiovascular: Normal S1 S2, No JVD, No murmurs , Peripheral pulses palpable 2+ bilaterally  Respiratory: Lungs clear to auscultation, normal effort 	  Gastrointestinal:  Soft, Non-tender, + BS	  Skin: ecchymoses - periorbital, chin, cheekbones, etc.  Musculoskeletal: Normal range of motion, normal strength  Psychiatry:  Mood & affect appropriate    TELEMETRY: SB/SR , no events	    ECG: NSR, normal intervals  Echo:  Historically normal LVEF, moderate-to-severe Aortic valve stenosis.    ASSESSMENT/PLAN: Ms Yates is a very pleasant 78y Female here after semi-witnessed collapse in her apartment complex parking garage.  She has history of stroke, CNS vasculitis, breast cancer on maintenance anastrozole, and documented moderate-to-severe aortic valve stenosis.  On day of presentation, she was preparing for AM fasting labs .  She did not know she had COVID until diagnosed in the ED.    Her fainting episode is most likely vasovagal, spurred on by relative dehydration in fasted state, having had COVID, and about a 15 min walk through her housing complex down to the parking garage.  Her forwards fall and facial injury are entirely unfortunate, but is not by itself indicative of a more sinister cause.  Still pending an echocardiogram re: AS progression, although this is not likely the cause of her syncopal event.  OK to continue atorvastatin for remote hx of CVA.  Maintain telemetry re: arrhythmia surveillance. At this time I would not implant a loop recorder. Would recommend event monitoring and office followup.    MRI reviewed. Small stroke incidentally identified, otherwise no major symptoms noticed on physical exam.  Unlikely for such a small stroke to result in fainting.  Recommend clearance from COVID then rehab placement.  [facility] thru [facility] / will be monitored by team that reports to her [facility] heart team.  Optional ILR placement, electively as an outpatient.  Plan discussed w/ Dr Sparks and Dr Crockett.     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 Patient reports that she awoke in yas usual state of health this morning, was supposed to go to outpatient laboratory to obtain lab work. Patient states that she told her  to bring the car to her by the elevator and does not recall further. States that she told her  she "feels unwell" and feels "off." Per  at the bedside, he found patient face down on the floor.  reports that he stepped away from patient for under 2 minutes to bring the car over to her and awoke patient. Patient reports pain to her B/L knees. Patient reports that she has been ambulating since and was advised to come to [facility] by her [facility] team. Patient denies any bladder or bowel incontinence. Denies chest pain, palpitations, shortness of breath or dyspnea.  Denies nausea, vomiting, fever, chills, cough, abdominal pain, headache, dizziness, lightheadedness neck or back pain.  (20 Dec 2023 15:43)    Describes a generalized feeling of "something just feeling off" prior to fainting.  No describable headache, vertigo, nausea, skin pallor, sweating, hot/flushed feeling, etc.  Last fainting episode was >5 yrs ago, unrelated incident.  A 10 pt ROS is otherwise negative.  12/22- resting comfortably, no new complaints, being taken off floor for addl testing today.  Date of service  12/24 no events overnight  12/25/23 - denies chest pain, SOB, palpitations, or dizziness. Review of systems otherwise negative    PAST MEDICAL & SURGICAL HISTORY:  CVA (cerebral vascular accident)  Lymphoma  Osteoporosis  Seizure  Fracture  lumbar spine,  healed with conservative treatment  Neuropathy  bilateral feet  HLD (hyperlipidemia)  Depression  GERD (gastroesophageal reflux disease)  Hodgkins disease  Pulmonary embolus  Fracture, ankle  Right, s/p metal plate  H/O sinus surgery  Cataract extraction status of eye, right         MEDICATIONS:  acetaminophen     Tablet .. 650 milliGRAM(s) Oral every 6 hours PRN  anastrozole 1 milliGRAM(s) Oral daily  apixaban 2.5 milliGRAM(s) Oral every 12 hours  aspirin enteric coated 81 milliGRAM(s) Oral daily  atorvastatin 40 milliGRAM(s) Oral at bedtime  benzonatate 100 milliGRAM(s) Oral every 8 hours PRN  chlorhexidine 2% Cloths 1 Application(s) Topical daily  fluticasone propionate 50 MICROgram(s)/spray Nasal Spray 1 Spray(s) Both Nostrils two times a day PRN  melatonin 3 milliGRAM(s) Oral at bedtime PRN  pantoprazole    Tablet 40 milliGRAM(s) Oral before breakfast  predniSONE   Tablet 5 milliGRAM(s) Oral every other day  sertraline 50 milliGRAM(s) Oral daily  sodium chloride 0.9%. 1000 milliLiter(s) IV Continuous <Continuous>      LABS:                        12.1   4.99  )-----------( 192      ( 25 Dec 2023 07:21 )             37.8       Hemoglobin: 12.1 g/dL (12-25 @ 07:21)  Hemoglobin: 12.4 g/dL (12-23 @ 07:21)  Hemoglobin: 11.7 g/dL (12-22 @ 05:57)  Hemoglobin: 11.6 g/dL (12-21 @ 07:29)  Hemoglobin: 12.2 g/dL (12-20 @ 11:43)      12-25    139  |  103  |  21  ----------------------------<  99  3.9   |  24  |  0.68    Ca    9.1      25 Dec 2023 07:21    TPro  6.0  /  Alb  3.7  /  TBili  0.4  /  DBili  0.1  /  AST  36  /  ALT  30  /  AlkPhos  47  12-25    Creatinine Trend: 0.68<--, 0.74<--, 0.75<--, 0.63<--, 0.64<--, 0.69<--    COAGS:           PHYSICAL EXAM:  T(C): 36.9 (12-25-23 @ 09:51), Max: 36.9 (12-25-23 @ 09:51)  HR: 61 (12-24-23 @ 22:13) (61 - 61)  BP: 111/67 (12-24-23 @ 22:13) (111/67 - 111/67)  RR: 18 (12-25-23 @ 09:51) (18 - 18)  SpO2: 96% (12-25-23 @ 09:51) (94% - 96%)  Wt(kg): --    I&O's Summary      Appearance: Elderly woman in no acute distress  HEENT:   Normal oral mucosa, PERRL, EOMI	  Lymphatic: No lymphadenopathy , no edema  Cardiovascular: Normal S1 S2, No JVD, No murmurs , Peripheral pulses palpable 2+ bilaterally  Respiratory: Lungs clear to auscultation, normal effort 	  Gastrointestinal:  Soft, Non-tender, + BS	  Skin: ecchymoses - periorbital, chin, cheekbones, etc.  Musculoskeletal: Normal range of motion, normal strength  Psychiatry:  Mood & affect appropriate    TELEMETRY: SB/SR , no events	    ECG: NSR, normal intervals  Echo:  Historically normal LVEF, moderate-to-severe Aortic valve stenosis.    ASSESSMENT/PLAN: Ms Yates is a very pleasant 78y Female here after semi-witnessed collapse in her apartment complex parking garage.  She has history of stroke, CNS vasculitis, breast cancer on maintenance anastrozole, and documented moderate-to-severe aortic valve stenosis.  On day of presentation, she was preparing for AM fasting labs .  She did not know she had COVID until diagnosed in the ED.    Her fainting episode is most likely vasovagal, spurred on by relative dehydration in fasted state, having had COVID, and about a 15 min walk through her housing complex down to the parking garage.  Her forwards fall and facial injury are entirely unfortunate, but is not by itself indicative of a more sinister cause.  Still pending an echocardiogram re: AS progression, although this is not likely the cause of her syncopal event.  OK to continue atorvastatin for remote hx of CVA.  Maintain telemetry re: arrhythmia surveillance. At this time I would not implant a loop recorder. Would recommend event monitoring and office followup.    MRI reviewed. Small stroke incidentally identified, otherwise no major symptoms noticed on physical exam.  Unlikely for such a small stroke to result in fainting.  Recommend clearance from COVID then rehab placement.  [facility] thru [facility] / will be monitored by team that reports to her [facility] heart team.  Optional ILR placement, electively as an outpatient.  Plan discussed w/ Dr Sparks and Dr Crockett.     Darci Danielson PA-C  Pager: [phone]

## 2023-12-25 NOTE — PROGRESS NOTE ADULT - ASSESSMENT
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   NIHSS 1 premrs2  dimer 254  ESR 40   LDL 78   CRP 49   + covid   A1c 6  LDL 78 \    MRI brain with AIS in left post temporal lobe, punctate.  old L cerebellar and extensive old hemorrhagic products  mod MVID; prior R frontal crani   MRA H/N neg   MRI C spine with Degenertive chagnes   TTE as above. 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, stable   2) h/o seizure, 10 years ago in setting of stroke, none since   3) cervical radic   4) syncope in setting of covid, and  AS   5) New Acute punctate, emolic appearing L post temporal lobe infarct;  possible related to covid, but D dimer not > 2x upper limit normal but still elevated     - added asa 81mg daily and increased atorvastatin to 40mg PO QHS   - would pursue extended cardiac monitoring, possible outpatient ILR   - prednisone for CNS vascultiisi   - TTE with bubble now r/o PFO   - EEG pending  can change to routine ; can be outpatient if not done in house   - telemetry  - covid precuations.  on Remdesivir. monitor O2 for steroids   - orthostatics   - PT/OT/SS/SLP, OOBC  - check FS, glucose control <180  - GI/DVT ppx   - Thank you for allowing me to participate in the care of this patient. Call with questions.   - sees Dr. 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   NIHSS 1 premrs2  dimer 254  ESR 40   LDL 78   CRP 49   + covid   A1c 6  LDL 78 \    MRI brain with AIS in left post temporal lobe, punctate.  old L cerebellar and extensive old hemorrhagic products  mod MVID; prior R frontal crani   MRA H/N neg   MRI C spine with Degenertive chagnes   TTE as above. 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, stable   2) h/o seizure, 10 years ago in setting of stroke, none since   3) cervical radic   4) syncope in setting of covid, and  AS   5) New Acute punctate, emolic appearing L post temporal lobe infarct;  possible related to covid, but D dimer not > 2x upper limit normal but still elevated     - added asa 81mg daily and increased atorvastatin to 40mg PO QHS   - would pursue extended cardiac monitoring, possible outpatient ILR   - prednisone for CNS vascultiisi   - TTE with bubble now r/o PFO   - EEG pending  can change to routine ; can be outpatient if not done in house   - telemetry  - covid precuations.  on Remdesivir. monitor O2 for steroids   - orthostatics   - PT/OT/SS/SLP, OOBC  - check FS, glucose control <180  - GI/DVT ppx   - Thank you for allowing me to participate in the care of this patient. Call with questions.   - sees Dr. Naif Love neuro outpatient \  Sina Calles MD  Vascular Neurology  Office: [phone]

## 2023-12-26 LAB
ALBUMIN SERPL ELPH-MCNC: 3.6 G/DL — SIGNIFICANT CHANGE UP (ref 3.3–5)
ALBUMIN SERPL ELPH-MCNC: 3.6 G/DL — SIGNIFICANT CHANGE UP (ref 3.3–5)
ALP SERPL-CCNC: 45 U/L — SIGNIFICANT CHANGE UP (ref 40–120)
ALP SERPL-CCNC: 45 U/L — SIGNIFICANT CHANGE UP (ref 40–120)
ALT FLD-CCNC: 28 U/L — SIGNIFICANT CHANGE UP (ref 10–45)
ALT FLD-CCNC: 28 U/L — SIGNIFICANT CHANGE UP (ref 10–45)
ANION GAP SERPL CALC-SCNC: 13 MMOL/L — SIGNIFICANT CHANGE UP (ref 5–17)
ANION GAP SERPL CALC-SCNC: 13 MMOL/L — SIGNIFICANT CHANGE UP (ref 5–17)
AST SERPL-CCNC: 33 U/L — SIGNIFICANT CHANGE UP (ref 10–40)
AST SERPL-CCNC: 33 U/L — SIGNIFICANT CHANGE UP (ref 10–40)
BILIRUB DIRECT SERPL-MCNC: 0.1 MG/DL — SIGNIFICANT CHANGE UP (ref 0–0.3)
BILIRUB DIRECT SERPL-MCNC: 0.1 MG/DL — SIGNIFICANT CHANGE UP (ref 0–0.3)
BILIRUB INDIRECT FLD-MCNC: 0.2 MG/DL — SIGNIFICANT CHANGE UP (ref 0.2–1)
BILIRUB INDIRECT FLD-MCNC: 0.2 MG/DL — SIGNIFICANT CHANGE UP (ref 0.2–1)
BILIRUB SERPL-MCNC: 0.3 MG/DL — SIGNIFICANT CHANGE UP (ref 0.2–1.2)
BILIRUB SERPL-MCNC: 0.3 MG/DL — SIGNIFICANT CHANGE UP (ref 0.2–1.2)
BUN SERPL-MCNC: 18 MG/DL — SIGNIFICANT CHANGE UP (ref 7–23)
BUN SERPL-MCNC: 18 MG/DL — SIGNIFICANT CHANGE UP (ref 7–23)
CALCIUM SERPL-MCNC: 8.9 MG/DL — SIGNIFICANT CHANGE UP (ref 8.4–10.5)
CALCIUM SERPL-MCNC: 8.9 MG/DL — SIGNIFICANT CHANGE UP (ref 8.4–10.5)
CHLORIDE SERPL-SCNC: 105 MMOL/L — SIGNIFICANT CHANGE UP (ref 96–108)
CHLORIDE SERPL-SCNC: 105 MMOL/L — SIGNIFICANT CHANGE UP (ref 96–108)
CO2 SERPL-SCNC: 24 MMOL/L — SIGNIFICANT CHANGE UP (ref 22–31)
CO2 SERPL-SCNC: 24 MMOL/L — SIGNIFICANT CHANGE UP (ref 22–31)
CREAT SERPL-MCNC: 0.66 MG/DL — SIGNIFICANT CHANGE UP (ref 0.5–1.3)
CULTURE RESULTS: SIGNIFICANT CHANGE UP
EGFR: 90 ML/MIN/1.73M2 — SIGNIFICANT CHANGE UP
GLUCOSE SERPL-MCNC: 88 MG/DL — SIGNIFICANT CHANGE UP (ref 70–99)
GLUCOSE SERPL-MCNC: 88 MG/DL — SIGNIFICANT CHANGE UP (ref 70–99)
HCT VFR BLD CALC: 36.7 % — SIGNIFICANT CHANGE UP (ref 34.5–45)
HCT VFR BLD CALC: 36.7 % — SIGNIFICANT CHANGE UP (ref 34.5–45)
HGB BLD-MCNC: 11.6 G/DL — SIGNIFICANT CHANGE UP (ref 11.5–15.5)
HGB BLD-MCNC: 11.6 G/DL — SIGNIFICANT CHANGE UP (ref 11.5–15.5)
MAGNESIUM SERPL-MCNC: 2 MG/DL — SIGNIFICANT CHANGE UP (ref 1.6–2.6)
MAGNESIUM SERPL-MCNC: 2 MG/DL — SIGNIFICANT CHANGE UP (ref 1.6–2.6)
MCHC RBC-ENTMCNC: 28.3 PG — SIGNIFICANT CHANGE UP (ref 27–34)
MCHC RBC-ENTMCNC: 28.3 PG — SIGNIFICANT CHANGE UP (ref 27–34)
MCHC RBC-ENTMCNC: 31.6 GM/DL — LOW (ref 32–36)
MCHC RBC-ENTMCNC: 31.6 GM/DL — LOW (ref 32–36)
MCV RBC AUTO: 89.5 FL — SIGNIFICANT CHANGE UP (ref 80–100)
MCV RBC AUTO: 89.5 FL — SIGNIFICANT CHANGE UP (ref 80–100)
NRBC # BLD: 0 /100 WBCS — SIGNIFICANT CHANGE UP (ref 0–0)
NRBC # BLD: 0 /100 WBCS — SIGNIFICANT CHANGE UP (ref 0–0)
PLATELET # BLD AUTO: 199 K/UL — SIGNIFICANT CHANGE UP (ref 150–400)
PLATELET # BLD AUTO: 199 K/UL — SIGNIFICANT CHANGE UP (ref 150–400)
POTASSIUM SERPL-MCNC: 4 MMOL/L — SIGNIFICANT CHANGE UP (ref 3.5–5.3)
POTASSIUM SERPL-MCNC: 4 MMOL/L — SIGNIFICANT CHANGE UP (ref 3.5–5.3)
POTASSIUM SERPL-SCNC: 4 MMOL/L — SIGNIFICANT CHANGE UP (ref 3.5–5.3)
POTASSIUM SERPL-SCNC: 4 MMOL/L — SIGNIFICANT CHANGE UP (ref 3.5–5.3)
PROT SERPL-MCNC: 5.9 G/DL — LOW (ref 6–8.3)
PROT SERPL-MCNC: 5.9 G/DL — LOW (ref 6–8.3)
RBC # BLD: 4.1 M/UL — SIGNIFICANT CHANGE UP (ref 3.8–5.2)
RBC # BLD: 4.1 M/UL — SIGNIFICANT CHANGE UP (ref 3.8–5.2)
RBC # FLD: 14.4 % — SIGNIFICANT CHANGE UP (ref 10.3–14.5)
RBC # FLD: 14.4 % — SIGNIFICANT CHANGE UP (ref 10.3–14.5)
SODIUM SERPL-SCNC: 142 MMOL/L — SIGNIFICANT CHANGE UP (ref 135–145)
SODIUM SERPL-SCNC: 142 MMOL/L — SIGNIFICANT CHANGE UP (ref 135–145)
SPECIMEN SOURCE: SIGNIFICANT CHANGE UP
WBC # BLD: 6.82 K/UL — SIGNIFICANT CHANGE UP (ref 3.8–10.5)
WBC # BLD: 6.82 K/UL — SIGNIFICANT CHANGE UP (ref 3.8–10.5)
WBC # FLD AUTO: 6.82 K/UL — SIGNIFICANT CHANGE UP (ref 3.8–10.5)
WBC # FLD AUTO: 6.82 K/UL — SIGNIFICANT CHANGE UP (ref 3.8–10.5)

## 2023-12-26 PROCEDURE — 95816 EEG AWAKE AND DROWSY: CPT | Mod: 26

## 2023-12-26 RX ADMIN — ATORVASTATIN CALCIUM 40 MILLIGRAM(S): 80 TABLET, FILM COATED ORAL at 21:21

## 2023-12-26 RX ADMIN — APIXABAN 2.5 MILLIGRAM(S): 2.5 TABLET, FILM COATED ORAL at 05:29

## 2023-12-26 RX ADMIN — CHLORHEXIDINE GLUCONATE 1 APPLICATION(S): 213 SOLUTION TOPICAL at 11:37

## 2023-12-26 RX ADMIN — PANTOPRAZOLE SODIUM 40 MILLIGRAM(S): 20 TABLET, DELAYED RELEASE ORAL at 05:29

## 2023-12-26 RX ADMIN — Medication 650 MILLIGRAM(S): at 23:03

## 2023-12-26 RX ADMIN — ANASTROZOLE 1 MILLIGRAM(S): 1 TABLET ORAL at 11:35

## 2023-12-26 RX ADMIN — SERTRALINE 50 MILLIGRAM(S): 25 TABLET, FILM COATED ORAL at 11:35

## 2023-12-26 RX ADMIN — APIXABAN 2.5 MILLIGRAM(S): 2.5 TABLET, FILM COATED ORAL at 17:34

## 2023-12-26 RX ADMIN — Medication 81 MILLIGRAM(S): at 11:35

## 2023-12-26 NOTE — PROGRESS NOTE ADULT - NS ATTEND AMEND GEN_ALL_CORE FT
Pt care and plan discussed and reviewed with PA. Plan as outlined above edited by me to reflect our discussion. Advanced care planning/advanced directives discussed with patient/family. DNR status including forceful chest compressions to attempt to restart the heart, ventilator support/artificial breathing, electric shock, artificial nutrition, health care proxy, Molst form all discussed with pt. More than 50% of the visit was spent counseling and/or coordinating care by the attending physician.

## 2023-12-26 NOTE — PROVIDER CONTACT NOTE (OTHER) - BACKGROUND
Pt admitted for syncope and collapse. PMH of left breast cancer, DVT/PE, CVA, HLD, GERD, overactive bladder, osteoporosis.
Pt admitted for syncope and collapse. PMH of left breast cancer, DVT/PE, CVA, HLD, GERD, overactive bladder, osteoporosis.

## 2023-12-26 NOTE — PROGRESS NOTE ADULT - SUBJECTIVE AND OBJECTIVE BOX
Patient is a 78y old  Female who presents with a chief complaint of Syncope (26 Dec 2023 14:34)    no acute events overnight  pending QIAN    MEDICATIONS  (STANDING):  anastrozole 1 milliGRAM(s) Oral daily  apixaban 2.5 milliGRAM(s) Oral every 12 hours  aspirin enteric coated 81 milliGRAM(s) Oral daily  atorvastatin 40 milliGRAM(s) Oral at bedtime  chlorhexidine 2% Cloths 1 Application(s) Topical daily  pantoprazole    Tablet 40 milliGRAM(s) Oral before breakfast  predniSONE   Tablet 5 milliGRAM(s) Oral every other day  sertraline 50 milliGRAM(s) Oral daily  sodium chloride 0.9%. 1000 milliLiter(s) (50 mL/Hr) IV Continuous <Continuous>    MEDICATIONS  (PRN):  acetaminophen     Tablet .. 650 milliGRAM(s) Oral every 6 hours PRN Moderate Pain (4 - 6)  benzonatate 100 milliGRAM(s) Oral every 8 hours PRN Cough  fluticasone propionate 50 MICROgram(s)/spray Nasal Spray 1 Spray(s) Both Nostrils two times a day PRN allergic rhinitis  melatonin 3 milliGRAM(s) Oral at bedtime PRN Insomnia      ROS  No fever, sweats, chills  No epistaxis, HA, sore throat  No CP, SOB, cough, sputum  No n/v/d, abd pain, melena, hematochezia  No edema  No rash  No anxiety  No back pain, joint pain  No bleeding, bruising  No dysuria, hematuria    Vital Signs Last 24 Hrs  T(C): 37.2 (26 Dec 2023 13:05), Max: 37.2 (26 Dec 2023 13:05)  T(F): 99 (26 Dec 2023 13:05), Max: 99 (26 Dec 2023 13:05)  HR: 64 (26 Dec 2023 13:05) (61 - 77)  BP: 132/58 (26 Dec 2023 13:05) (113/58 - 132/58)  BP(mean): --  RR: 18 (26 Dec 2023 13:05) (18 - 18)  SpO2: 97% (26 Dec 2023 13:05) (93% - 99%)    Parameters below as of 26 Dec 2023 13:05  Patient On (Oxygen Delivery Method): room air        PE  NAD  Awake, alert  Anicteric, MMM  RRR  CTAB  Abd soft, NT, ND  No c/c/e  No rash grossly  FROM                          11.6   6.82  )-----------( 199      ( 26 Dec 2023 07:51 )             36.7       12-26    142  |  105  |  18  ----------------------------<  88  4.0   |  24  |  0.66    Ca    8.9      26 Dec 2023 07:51  Mg     2.0     12-26    TPro  5.9<L>  /  Alb  3.6  /  TBili  0.3  /  DBili  0.1  /  AST  33  /  ALT  28  /  AlkPhos  45  12-26

## 2023-12-26 NOTE — PROGRESS NOTE ADULT - ASSESSMENT
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 Patient reports that she awoke in yas usual state of health this morning, was supposed to go to outpatient laboratory to obtain lab work. Patient states that she told her  to bring the car to her by the elevator and does not recall further. States that she told her  she "feels unwell" and feels "off." Per  at the bedside, he found patient face down on the floor.  reports that he stepped away from patient for under 2 minutes to bring the car over to her and awoke patient. Patient reports pain to her B/L knees. Patient reports that she has been ambulating since and was advised to come to [facility] by her [facility] team. Patient denies any bladder or bowel incontinence. Denies chest pain, palpitations, shortness of breath or dyspnea.  Denies nausea, vomiting, fever, chills, cough, abdominal pain, headache, dizziness, lightheadedness neck or back pain.       Syncope  - Pt with syncope --> Likely due to Mod-Severe AS versus infectious etiology, however rule out other etiologies   - 8/2023 TTE w/ Preserved EF, No WMA, Grade II Diastolic dysfunction, Mod-Severe AS  - CT Head / C-Spine / Maxillofacial and 3D as noted   - MR head, MR A H/N --> w/ Punctate Acute Infarct mild to moderate microvascular ischemic change m, prior OR changes, neg for significant stenosis -->C/w  ASA 81 Mg PO Qd and Eliquis 2.5 BID resumed as per Vascular cardiology/ Neuro   - MR C-spine w/ multi-level degenerative changes   - Orthostatics negative;  Carotid Duplex w/ mild calcified plaque, without significant stenosis   - Check EEG (Pt w/ history of Seizures 10 years ago, not on AED) --> Switched to routine, can be done outpatient as per discussion with neuro   - TTE w/ BUBBLE study and assessment of AS -- pending  --> have called TTE to expedite   - Trop X 3 negative   - Monitor on telemetry   - Neuro checks per protocol; Fall precautions   - Neuro, EP and Cardio evaluations appreciated; F/u recs --> EP to follow up for extended cardiac monitoring --> Plan for [facility]OT and then elective ILR placement as an outpatient   - C/w Eliquis and ASA     Acute CVA  - Seen on MR  - ASA 81 Mg PO Qd and Eliquis 2.5 BID resume per discussion w/ Neuro/Cardio  - EP to follow up for cardiac monitoring   - TTE w/ Bubble pending  - Lipitor 40   - PT/ OT   - Neuro eval appreciated; F/u recs    COVID +  - Patient with cough, per pharmacy, patient does not meet requirement for RDV per policy at this time. Continue to monitor symptoms at present   - Procal neg, ESR, CRP, Ferritin, D-dimer noted --> On AC  - CT Chest w/ DYLAN GG 4mm nodule, additional nodules, trace pericardial effusion, L breast lesion, Neg for PNA    - Incentive spirometer and Tessalon Perle PRN   - Hold off on additional steroids (Pt on steroids at home) as patient is not hypoxic, on RA   - Monitor O2 saturation; Supplement PRN to maintain > 90%    Leukocytosis   - Pt recently completed prolonged course of ABX on 12/16  - UCx, BCx2 --> negative    - Trend CBC, temp curve, VS     Mod-Severe AS, Trace pericardial effusion   - Seen on 8/2023 TTE  - Check repeat TTE ---> Pending   - Monitor volume status   - Cardio eval consulted; F/u recs     Knee Pain   - Pt with chronic knee pain, Osteoporosis   - B/L Knees with Ecchymosis --> X-rays neg for fracture     Breast CA, Lymphoma   - Follows with [facility] and Uro-Gyn   - CT w/ breast lesion, F/u with oncology for further management   - C/w Anastrazole  - Heme/Onc eval appreciated; F/u recs -->outpatient follow up       H/O DVT PE  - Eliquis resumed.     H/O CVA, CNS Vasculitis   - C/w Prednisone  - Neuro eval appreciated; F/u recs     HLD  - Lipid panel; C/w Lipitor 40     GERD  - C/w Anti-Acid therapeutic interchange while admitted     PPX    Discussed with Attending and ACP. Discussed with TTE department.  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 Patient reports that she awoke in yas usual state of health this morning, was supposed to go to outpatient laboratory to obtain lab work. Patient states that she told her  to bring the car to her by the elevator and does not recall further. States that she told her  she "feels unwell" and feels "off." Per  at the bedside, he found patient face down on the floor.  reports that he stepped away from patient for under 2 minutes to bring the car over to her and awoke patient. Patient reports pain to her B/L knees. Patient reports that she has been ambulating since and was advised to come to [facility] by her [facility] [place]. Patient denies any bladder or bowel incontinence. Denies chest pain, palpitations, shortness of breath or dyspnea.  Denies nausea, vomiting, fever, chills, cough, abdominal pain, headache, dizziness, lightheadedness neck or back pain.       Syncope  - Pt with syncope --> Likely due to Mod-Severe AS versus infectious etiology, however rule out other etiologies   - 8/2023 TTE w/ Preserved EF, No WMA, Grade II Diastolic dysfunction, Mod-Severe AS  - CT Head / C-Spine / Maxillofacial and 3D as noted   - MR head, MR A H/N --> w/ Punctate Acute Infarct mild to moderate microvascular ischemic change m, prior OR changes, neg for significant stenosis -->C/w  ASA 81 Mg PO Qd and Eliquis 2.5 BID resumed as per Vascular cardiology/ Neuro   - MR C-spine w/ multi-level degenerative changes   - Orthostatics negative;  Carotid Duplex w/ mild calcified plaque, without significant stenosis   - Check EEG (Pt w/ history of Seizures 10 years ago, not on AED) --> Switched to routine, can be done outpatient as per discussion with neuro   - TTE w/ BUBBLE study and assessment of AS -- pending  --> have called TTE to expedite   - Trop X 3 negative   - Monitor on telemetry   - Neuro checks per protocol; Fall precautions   - Neuro, EP and Cardio evaluations appreciated; F/u recs --> EP to follow up for extended cardiac monitoring --> Plan for [facility]OT and then elective ILR placement as an outpatient   - C/w Eliquis and ASA     Acute CVA  - Seen on MR  - ASA 81 Mg PO Qd and Eliquis 2.5 BID resume per discussion w/ Neuro/Cardio  - EP to follow up for cardiac monitoring   - TTE w/ Bubble pending  - Lipitor 40   - PT/ OT   - Neuro eval appreciated; F/u recs    COVID +  - Patient with cough, per pharmacy, patient does not meet requirement for RDV per policy at this time. Continue to monitor symptoms at present   - Procal neg, ESR, CRP, Ferritin, D-dimer noted --> On AC  - CT Chest w/ DYLAN GG 4mm nodule, additional nodules, trace pericardial effusion, L breast lesion, Neg for PNA    - Incentive spirometer and Tessalon Perle PRN   - Hold off on additional steroids (Pt on steroids at home) as patient is not hypoxic, on RA   - Monitor O2 saturation; Supplement PRN to maintain > 90%    Leukocytosis   - Pt recently completed prolonged course of ABX on 12/16  - UCx, BCx2 --> negative    - Trend CBC, temp curve, VS     Mod-Severe AS, Trace pericardial effusion   - Seen on 8/2023 TTE  - Check repeat TTE ---> Pending   - Monitor volume status   - Cardio eval consulted; F/u recs     Knee Pain   - Pt with chronic knee pain, Osteoporosis   - B/L Knees with Ecchymosis --> X-rays neg for fracture     Breast CA, Lymphoma   - [place] [facility] [place]   - CT w/ breast lesion, F/u with oncology for further management   - C/w Anastrazole  - Heme/Onc eval appreciated; F/u recs -->outpatient follow up       H/O DVT PE  - Eliquis resumed.     H/O CVA, CNS Vasculitis   - C/w Prednisone  - Neuro eval appreciated; F/u recs     HLD  - Lipid panel; C/w Lipitor 40     GERD  - C/w Anti-Acid therapeutic interchange while admitted     PPX    Discussed with Attending and ACP. Discussed with TTE department.  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 Patient reports that she awoke in yas usual state of health this morning, was supposed to go to outpatient laboratory to obtain lab work. Patient states that she told her  to bring the car to her by the elevator and does not recall further. States that she told her  she "feels unwell" and feels "off." Per  at the bedside, he found patient face down on the floor.  reports that he stepped away from patient for under 2 minutes to bring the car over to her and awoke patient. Patient reports pain to her B/L knees. Patient reports that she has been ambulating since and was advised to come to [facility] by her [facility] team. Patient denies any bladder or bowel incontinence. Denies chest pain, palpitations, shortness of breath or dyspnea.  Denies nausea, vomiting, fever, chills, cough, abdominal pain, headache, dizziness, lightheadedness neck or back pain.       Syncope  - Pt with syncope --> Likely due to Mod-Severe AS versus infectious etiology, however rule out other etiologies   - 8/2023 TTE w/ Preserved EF, No WMA, Grade II Diastolic dysfunction, Mod-Severe AS  - CT Head / C-Spine / Maxillofacial and 3D as noted   - MR head, MR A H/N --> w/ Punctate Acute Infarct mild to moderate microvascular ischemic change m, prior OR changes, neg for significant stenosis -->C/w  ASA 81 Mg PO Qd and Eliquis 2.5 BID resumed as per Vascular cardiology/ Neuro   - MR C-spine w/ multi-level degenerative changes   - Orthostatics negative;  Carotid Duplex w/ mild calcified plaque, without significant stenosis   - Check EEG (Pt w/ history of Seizures 10 years ago, not on AED) --> Switched to routine, can be done outpatient as per discussion with neuro   - TTE w/ BUBBLE study and assessment of AS -- pending  --> have called TTE to expedite   - Trop X 3 negative   - Monitor on telemetry   - Neuro checks per protocol; Fall precautions   - Neuro, EP and Cardio evaluations appreciated; F/u recs --> EP to follow up for extended cardiac monitoring --> Plan for [facility] and then elective ILR placement as an outpatient   - C/w Eliquis and ASA     Acute CVA  - Seen on MR  - ASA 81 Mg PO Qd and Eliquis 2.5 BID resume per discussion w/ Neuro/Cardio  - EP to follow up for cardiac monitoring --> Pt with PAT, per discussion with EP, not consistent with Afib. Continue to monitor on tele   - TTE w/ Bubble pending  - Lipitor 40   - PT/ OT   - Neuro eval appreciated; F/u recs    COVID +  - Patient with cough, per pharmacy, patient does not meet requirement for RDV per policy at this time. Continue to monitor symptoms at present   - Procal neg, ESR, CRP, Ferritin, D-dimer noted --> On AC  - CT Chest w/ DYLAN GG 4mm nodule, additional nodules, trace pericardial effusion, L breast lesion, Neg for PNA    - Incentive spirometer and Tessalon Perle PRN   - Hold off on additional steroids (Pt on steroids at home) as patient is not hypoxic, on RA   - Monitor O2 saturation; Supplement PRN to maintain > 90%    Leukocytosis   - Pt recently completed prolonged course of ABX on 12/16  - UCx, BCx2 --> negative    - Trend CBC, temp curve, VS     Mod-Severe AS, Trace pericardial effusion   - Seen on 8/2023 TTE  - Check repeat TTE ---> Pending   - Monitor volume status   - Cardio eval consulted; F/u recs     Knee Pain   - Pt with chronic knee pain, Osteoporosis   - B/L Knees with Ecchymosis --> X-rays neg for fracture     Breast CA, Lymphoma   - Follows with [facility] and [place]ro[place]yn   - CT w/ breast lesion, F/u with oncology for further management   - C/w Anastrazole  - Heme/Onc eval appreciated; F/u recs -->outpatient follow up       H/O DVT PE  - Eliquis resumed.     H/O CVA, CNS Vasculitis   - C/w Prednisone  - Neuro eval appreciated; F/u recs     HLD  - Lipid panel; C/w Lipitor 40     GERD  - C/w Anti-Acid therapeutic interchange while admitted     PPX    Discussed with Attending and ACP. Discussed with TTE department.  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 Patient reports that she awoke in yas usual state of health this morning, was supposed to go to outpatient laboratory to obtain lab work. Patient states that she told her  to bring the car to her by the elevator and does not recall further. States that she told her  she "feels unwell" and feels "off." Per  at the bedside, he found patient face down on the floor.  reports that he stepped away from patient for under 2 minutes to bring the car over to her and awoke patient. Patient reports pain to her B/L knees. Patient reports that she has been ambulating since and was advised to come to [facility] by her [facility] [place]. Patient denies any bladder or bowel incontinence. Denies chest pain, palpitations, shortness of breath or dyspnea.  Denies nausea, vomiting, fever, chills, cough, abdominal pain, headache, dizziness, lightheadedness neck or back pain.       Syncope  - Pt with syncope --> Likely due to Mod-Severe AS versus infectious etiology, however rule out other etiologies   - 8/2023 TTE w/ Preserved EF, No WMA, Grade II Diastolic dysfunction, Mod-Severe AS  - CT Head / C-Spine / Maxillofacial and 3D as noted   - MR head, MR A H/N --> w/ Punctate Acute Infarct mild to moderate microvascular ischemic change m, prior OR changes, neg for significant stenosis -->C/w  ASA 81 Mg PO Qd and Eliquis 2.5 BID resumed as per Vascular cardiology/ Neuro   - MR C-spine w/ multi-level degenerative changes   - Orthostatics negative;  Carotid Duplex w/ mild calcified plaque, without significant stenosis   - Check EEG (Pt w/ history of Seizures 10 years ago, not on AED) --> Switched to routine, can be done outpatient as per discussion with neuro   - TTE w/ BUBBLE study and assessment of AS -- pending  --> have called TTE to expedite   - Trop X 3 negative   - Monitor on telemetry   - Neuro checks per protocol; Fall precautions   - Neuro, EP and Cardio evaluations appreciated; F/u recs --> EP to follow up for extended cardiac monitoring --> Plan for [facility]OT and then elective ILR placement as an outpatient   - C/w Eliquis and ASA     Acute CVA  - Seen on MR  - ASA 81 Mg PO Qd and Eliquis 2.5 BID resume per discussion w/ Neuro/Cardio  - EP to follow up for cardiac monitoring --> Pt with PAT, per discussion with EP, not consistent with Afib. Continue to monitor on tele   - TTE w/ Bubble pending  - Lipitor 40   - PT/ OT   - Neuro eval appreciated; F/u recs    COVID +  - Patient with cough, per pharmacy, patient does not meet requirement for RDV per policy at this time. Continue to monitor symptoms at present   - Procal neg, ESR, CRP, Ferritin, D-dimer noted --> On AC  - CT Chest w/ DYLAN GG 4mm nodule, additional nodules, trace pericardial effusion, L breast lesion, Neg for PNA    - Incentive spirometer and Tessalon Perle PRN   - Hold off on additional steroids (Pt on steroids at home) as patient is not hypoxic, on RA   - Monitor O2 saturation; Supplement PRN to maintain > 90%    Leukocytosis   - Pt recently completed prolonged course of ABX on 12/16  - UCx, BCx2 --> negative    - Trend CBC, temp curve, VS     Mod-Severe AS, Trace pericardial effusion   - Seen on 8/2023 TTE  - Check repeat TTE ---> Pending   - Monitor volume status   - Cardio eval consulted; F/u recs     Knee Pain   - Pt with chronic knee pain, Osteoporosis   - B/L Knees with Ecchymosis --> X-rays neg for fracture     Breast CA, Lymphoma   - [place] [facility] [place]   - CT w/ breast lesion, F/u with oncology for further management   - C/w Anastrazole  - Heme/Onc eval appreciated; F/u recs -->outpatient follow up       H/O DVT PE  - Eliquis resumed.     H/O CVA, CNS Vasculitis   - C/w Prednisone  - Neuro eval appreciated; F/u recs     HLD  - Lipid panel; C/w Lipitor 40     GERD  - C/w Anti-Acid therapeutic interchange while admitted     PPX    Discussed with Attending and ACP. Discussed with TTE department.

## 2023-12-26 NOTE — PROVIDER CONTACT NOTE (OTHER) - ASSESSMENT
Pt denies CP, SOB or any discomfort. VSS. BP: 117/70; HR: 61; SPO2: 97%; temp: 37.1 (oral)
Pt denies CP, SOB, palpitations or any discomfort. Vitals signs- BP: 116/59; HR: 62; SPO2: 99%; Temp: 98 (oral).

## 2023-12-26 NOTE — PROGRESS NOTE ADULT - ASSESSMENT
79 y/o F on adjuvant anastrozole for L breast cancer, admitted for syncope.    Breast Cancer  --under the care of Dr. 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  --ongoing care after discharge    Syncope, COVID+, stroke  - Per EP, suspect syncope related to vasovagal due to dehydration  - CT chest w/ No pneumonia. New tiny indeterminate 4 mm left upper lobe groundglass pulmonary nodule. Redemonstrated partially cystic lesion of the left medial breast of uncertain etiology.  - CT head w/ no evidence of acute intracranial hemorrhage or midline shift.  - CT C spine w/ no evidence of acute osseous fracture or ko dislocation. Multilevel degenerative change of the cervical spine as discussed above, most significantly at the C4-C6 levels where there is moderate to severe narrowing of the spinal canal. In the setting of myelopathy, recommend MRI of the cervical spine for further evaluation.  - Now off remdesivir  - Per neurology, on ASA and eliquis based on MRI head (Punctate acute infarct in the left posterior temporal lobe)  - Neg MRA H/N   - Vascular following  - Pending TTE w/ bubble study , to be done later today  - EEG can be done outpatient   - Per EP, optional ILR placement, electively as an outpatient.    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    Breast Cancer  --under the care of Dr. 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  --ongoing care after discharge    Syncope, COVID+, stroke  - Per EP, suspect syncope related to vasovagal due to dehydration  - CT chest w/ No pneumonia. New tiny indeterminate 4 mm left upper lobe groundglass pulmonary nodule. Redemonstrated partially cystic lesion of the left medial breast of uncertain etiology.  - CT head w/ no evidence of acute intracranial hemorrhage or midline shift.  - CT C spine w/ no evidence of acute osseous fracture or ko dislocation. Multilevel degenerative change of the cervical spine as discussed above, most significantly at the C4-C6 levels where there is moderate to severe narrowing of the spinal canal. In the setting of myelopathy, recommend MRI of the cervical spine for further evaluation.  - Now off remdesivir  - Per neurology, on ASA and eliquis based on MRI head (Punctate acute infarct in the left posterior temporal lobe)  - Neg MRA H/N   - Vascular following  - Pending TTE w/ bubble study , to be done later today  - EEG can be done outpatient   - Per EP, optional ILR placement, electively as an outpatient.    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

## 2023-12-26 NOTE — PROGRESS NOTE ADULT - SUBJECTIVE AND OBJECTIVE BOX
Neurology     S: patient seen. MRI + AIS         Medications: MEDICATIONS  (STANDING):  anastrozole 1 milliGRAM(s) Oral daily  apixaban 2.5 milliGRAM(s) Oral every 12 hours  aspirin enteric coated 81 milliGRAM(s) Oral daily  atorvastatin 40 milliGRAM(s) Oral at bedtime  chlorhexidine 2% Cloths 1 Application(s) Topical daily  pantoprazole    Tablet 40 milliGRAM(s) Oral before breakfast  predniSONE   Tablet 5 milliGRAM(s) Oral every other day  sertraline 50 milliGRAM(s) Oral daily  sodium chloride 0.9%. 1000 milliLiter(s) (50 mL/Hr) IV Continuous <Continuous>    MEDICATIONS  (PRN):  acetaminophen     Tablet .. 650 milliGRAM(s) Oral every 6 hours PRN Moderate Pain (4 - 6)  benzonatate 100 milliGRAM(s) Oral every 8 hours PRN Cough  fluticasone propionate 50 MICROgram(s)/spray Nasal Spray 1 Spray(s) Both Nostrils two times a day PRN allergic rhinitis  melatonin 3 milliGRAM(s) Oral at bedtime PRN Insomnia       Vitals:  Vital Signs Last 24 Hrs  T(C): 37 (26 Dec 2023 20:52), Max: 37.2 (26 Dec 2023 13:05)  T(F): 98.6 (26 Dec 2023 20:52), Max: 99 (26 Dec 2023 13:05)  HR: 65 (26 Dec 2023 20:52) (61 - 77)  BP: 122/81 (26 Dec 2023 20:52) (116/59 - 132/58)  BP(mean): --  RR: 18 (26 Dec 2023 20:52) (18 - 18)  SpO2: 97% (26 Dec 2023 20:52) (97% - 99%)    Parameters below as of 26 Dec 2023 20:52  Patient On (Oxygen Delivery Method): room air            General Exam:   General Appearance: Appropriately dressed and in no acute distress       Head: Normocephalic, atraumatic and no dysmorphic features  Ear, Nose, and Throat: Moist mucous membranes  CVS: S1S2+  Resp: No SOB, no wheeze or rhonchi  GI: soft NT/ND  Extremities: No edema or cyanosis  Skin: No bruises or rashes     Neurological Exam:  Mental Status: Awake, alert and oriented x 3.  Able to follow simple and complex verbal commands. Able to name and repeat. fluent speech. No obvious aphasia or dysarthria noted.   Cranial Nerves: PERRL, EOMI, VFFC, sensation V1-V3 intact,  no obvious facial asymmetry, equal elevation of palate, scm/trap 5/5, tongue is midline on protrusion. no obvious papilledema on fundoscopic exam. hearing is grossly intact.   Motor: Normal bulk, tone and strength throughout. Fine finger movements were intact and symmetric. no tremors or drift noted.    Sensation: Intact to light touch and pinprick throughout. no right/left confusion. no extinction to tactile on DSS. Romberg was negative.   Reflexes: 1+ throughout at biceps, brachioradialis, triceps, patellars and ankles bilaterally and equal. No clonus. R toe and L toe were both downgoing.  Coordination: No dysmetria on FNF or HKS  Gait: deferred     Data/Labs/Imaging which I personally reviewed.         LABS:                          11.6   6.82  )-----------( 199      ( 26 Dec 2023 07:51 )             36.7     12-26    142  |  105  |  18  ----------------------------<  88  4.0   |  24  |  0.66    Ca    8.9      26 Dec 2023 07:51  Mg     2.0     12-26    TPro  5.9<L>  /  Alb  3.6  /  TBili  0.3  /  DBili  0.1  /  AST  33  /  ALT  28  /  AlkPhos  45  12-26    LIVER FUNCTIONS - ( 26 Dec 2023 07:51 )  Alb: 3.6 g/dL / Pro: 5.9 g/dL / ALK PHOS: 45 U/L / ALT: 28 U/L / AST: 33 U/L / GGT: x             Urinalysis Basic - ( 26 Dec 2023 07:51 )    Color: x / Appearance: x / SG: x / pH: x  Gluc: 88 mg/dL / Ketone: x  / Bili: x / Urobili: x   Blood: x / Protein: x / Nitrite: x   Leuk Esterase: x / RBC: x / WBC x   Sq Epi: x / Non Sq Epi: x / Bacteria: x            < from: CT Head No Cont (12.20.23 @ 12:33) >    ACC: [id number] EXAM:  CT CERVICAL SPINE   ORDERED BY:  SAMIA MILLS     ACC: [id number] EXAM:  CT MAXILLOFACIAL   ORDERED BY:  SAMIA MILLS     ACC: [id number] EXAM:  CT BRAIN   ORDERED BY:  SAMIA MILLS     ACC: [id number] EXAM:  CT 3D RECONSTRUCT W TAYLOR   ORDERED BY:    SAMIA MILLS     PROCEDURE DATE:  12/20/2023          INTERPRETATION:  EXAM: CT HEAD, FACIAL BONES, AND CERVICAL SPINE WITHOUT   INTRAVENOUS CONTRAST    HISTORY: Trauma, syncopal episode, currently on anticoagulation    TECHNIQUE: Multiple axial images were obtained of the head, facial bones,   and cervical spine. Sagittal and coronal reformatted images were obtained   from the axial data set. The images were reviewed in brain and bone   windows.    COMPARISON: CT of the head May 13, 2017, MRI of the head May 31, 2021    FINDINGS:    CT HEAD:  No acute intracranial hemorrhage. Areas of decreased attenuation in the   deep and periventricular white matter, compatible with chronic small   vessel disease. Mild encephalomalacia/gliotic change in the high right   frontal lobe. Chronic appearing infarcts in the left cerebellar   hemisphere. Mild parenchymal volume loss. No hydrocephalus. The   extra-axial spaces and basal cisterns are within normal limits. No   midline shift or mass effect present.    The cranial cervical junction is within normal limits. The sella is not   expanded. No depressed calvarial fracture. Prior right frontal   craniotomy. Mucosal thickening throughout the ethmoid air cells. The   visualized mastoid air cells are well aerated. The visualized orbits are   status post cataract surgery. Right frontal scalp hematoma/contusion.    CT FACIAL BONES:  The nasal bones are intact.    The orbital rims, zygomatic arches, and pterygoid plates are intact   bilaterally.    The mandible is intact. Mild arthritic changes in the bilateral TMJs.    Mucosal thickening throughout the ethmoid air cells. Probable polyp in   the right maxillary sinus. The visualized mastoid air cells are clear   bilaterally.    No fracture of the skull base or lower calvarium.      CT CERVICAL SPINE:  The atlantodental interval is within normal limits. The lateral masses   are properly aligned. No facet subluxation or malalignment at the   craniovertebral or atlantoaxial articulations. No dens fracture. No   significant prevertebral soft tissue swelling.    Subtle reversal to the cervical lordosis. Normal alignment of the   cervical vertebrae. Vertebral body height is well-maintained. Reduced   intervertebral disc height throughout the cervical spine, most   significantly C3-C5 with chronic endplate change. No jumped or perched   facets. No acute osseous fracture.    C2-C3: Subtle disc bulge. The bilateral neuroforamina are patent. No   narrowing of the spinal canal.    C3-C4: Disc osteophyte complex with uncovertebral joint hypertrophy.   Severe narrowing of the bilateral neuroforamen. No significant narrowing   of the spinal canal. Mild facet arthropathy.    C4-C5: Disc osteophyte complex with uncovertebral joint hypertrophy.   Severe narrowing of the left neural foramen and moderate to severe   narrowing of the right neural foramen. Moderate to severe narrowing of   the spinal canal. Mild facet arthropathy.    C5-C6: Disc osteophyte complex with uncovertebral joint hypertrophy.   Severe narrowing of the bilateral neuroforamen, left greater than right.   Moderate to severe narrowing of the spinal canal. Facet arthropathy.    C6-C7: Disc osteophyte complex with uncovertebral joint hypertrophy.   Severe narrowing of the bilateral neuroforamen. Mild narrowing of the   spinal canal. Facet arthropathy.    C7-T1: No large disc bulge. The bilateral neuroforamina are patent. No   significant narrowing of the spinal canal.    The paraspinal muscles are unremarkable. Soft tissue fullness in the   right vallecula.    Visualized portions of the thyroid are unremarkable.    Pleural-based calcifications in the left lung apex. Subtle groundglass   opacities in the right lung apex. Small 0.2 cm calcification in the right   lung apex.      IMPRESSION:    CT HEAD:  1.  No evidence of acute intracranial hemorrhage or midline shift.  2.  Chronic ischemic changes as discussed above.    CT FACIAL BONES:  1.  No evidence of fracture of the facial bones.  2.  Mild paranasal sinus disease.    CT CERVICAL SPINE:  1.  No evidence of acute osseous fracture or ko dislocation.  2.  Multilevel degenerative change of the cervical spine as discussed   above, most significantly at the C4-C6 levels where there is moderate to   severe narrowing of the spinal canal. In the setting of myelopathy,   recommend MRI of the cervical spine for further evaluation.  3.  Soft tissue fullness in the right vallecula. Recommend direct visual   inspection.  4.  Pleural-based and nodular calcifications in the bilateral lung apices   as discussed above, correlate with patient history. Additionally, subtle   ground glass opacities in the right lung apex. This could indicate a   developing infectious/inflammatory process. Consider designated imaging   of the chest for further evaluation if clinically indicated.    --- End of Report ---   < from: MR Angio Head w/wo IV Cont (12.22.23 @ 09:55) >    ACC: [id number] EXAM:  MR ANGIO BRAIN WAW IC   ORDERED BY: LUCIANA VEGA     ACC: [id number] EXAM:  MR ANGIO NECK WAW IC   ORDERED BY: LUCIANA VEGA     ACC: [id number] EXAM:  MR BRAIN WAW IC   ORDERED BY: LUCIANA VEGA     PROCEDURE DATE:  12/22/2023         INTERPRETATION:  Exam Type: MRI BRAIN WITH AND WITHOUT CONTRAST , MRA COW   WITHOUT CONTRAST, MRA NECK WITH AND WITHOUT CONTRAST  Indication: Syncope, R/O Mets, H/O CNS vasculitis  Comparison:CT head 12/20/2023    Technique: Multiplanar MR imaging of the brain was obtained with and   without contrast. Time of flight MRA of the neck with and without   contrast and Mcgrath of [place] without contrast were obtained. 7.5  cc of   gadavist was administered. 0  cc was discarded.    Findings:    MRI brain: There is a punctate focus of restricted diffusion within the   left posterior temporal lobe, compatible with an acute infarct.   Encephalomalacia in the left cerebellum is present with extensive old   hemorrhagic blood products. Similarly, there is encephalomalacia with   chronic appearing hemorrhagic blood products within the medial right   cerebellum. 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 There are multiple small   foci of T2/FLAIR hyperintense signal in the periventricular and   subcortical white matter of the bilateral cerebri, suggestive of mild to   moderate chronic microvascular ischemic changes. There is no abnormal   enhancement.    Prior right frontal craniotomy with encephalomalacia/gliosis in the   underlying right anterolateral frontal lobe.    Cerebellar tonsils are in normal location. The intracranial flow voids   are normal in appearance. Visualized paranasal sinuses and mastoids are   normal in signal.    MRA of the Mcgrath of [place] demonstrates normal antegrade flow in the   major intracranial arteries. No flow gap is seen to suggest high grade   stenosis. No evidence of medium or large sized aneurysm.    MRA of the neck demonstrates normal antegrade flow in the bilateral   common carotid, cervical internal carotid and vertebral arteries. No flow   gap is seen to suggest high grade stenosis.        IMPRESSION:    MRI brain: Punctate acute infarct in the left posterior temporal lobe.    Encephalomalacia in the left cerebellum is present with extensive old   hemorrhagic blood products. Similarly, there is encephalomalacia with   chronic appearing hemorrhagic blood products within the medial right   cerebellum. There are multiplesmall foci of T2/FLAIR hyperintense signal   in the periventricular and subcortical white matter of the bilateral   cerebri, suggestive of mild to moderate chronic microvascular ischemic   changes.    Prior right frontal craniotomy with encephalomalacia/gliosis in the   underlying right anterolateral frontal lobe.      MRA brain: No hemodynamically significant stenosis    MRA neck: No hemodynamically significant stenosis    --- End of Report ---         < from: MR Cervical Spine w/wo IV Cont (12.22.23 @ 09:54) >    ACC: [id number] EXAM:  MR SPINE CERVICAL WAW IC   ORDERED BY: LUCIANA VEGA     PROCEDURE DATE:  12/22/2023          INTERPRETATION:  Exam Date: 12/22/2023 9:54 AM    MR cervical spine with and without gadolinium    CLINICAL INFORMATION:  Stenosisseen on CT syncope.    TECHNIQUE:   Sagittal and axial T1-weighted images, sagittal proton   density images, axial and sagittal T2-weighted images of the cervical   spine were obtained.   Following the intravenous administration of 7.5 ml   Gadavist fat saturated sagittal and axial T1-weighted images were   obtained.  0 mL were discarded.    FINDINGS: Comparison is made to CT cervical of 12/20/2023.    Cervical vertebral alignment is intact.  Cervical vertebral body heights   are maintained.  Marrow signal intensity within cervical vertebral bodies   and posterior elements is unremarkable.  No osseous expansion, epidural   disease or paraspinal abnormality is found.    At C2/C3, there is a very small posterior disc bulge without significant   foraminal or central spinal canal stenosis.    At C3/C4, there is a posterior disc bulge and bilateral uncovertebral   spurring resulting in moderate to severe narrowing of the bilateral   neural foramen without significant central spinal canal stenosis.    At C4/C5, there is a posterior disc bulge and bilateral uncovertebral   spurring resulting in moderate to severe stenosis of the bilateral neural   foramen and mild to moderate central spinal canal stenosis.    At C5/C6, there is a posterior disc bulge and bilateral uncovertebral   spurring resulting in moderate to severe stenosis of the bilateral neural   foramen and mild central spinal canal stenosis.    At C6/C7, there is a posterior disc bulge and bilateral uncovertebral   spurring resulting in moderate to severe stenosis of the bilateral neural   foramen without significant central spinal canal stenosis.    At C7/T1, there are no significant degenerative changes    The cervical cord maintains intact morphology  No cord signal intensity   abnormality or focal cord lesion is appreciated.   There is no abnormal   cord enhancement.  The cervical medullary junction remains intact.      IMPRESSION:   Multilevel degenerative changes as detailed above.    --- End of Report ---            PHILL CATHERINE MD; Attending Radiologist  This document has been electronically signed. Dec 22 2023 11:04AM    < end of copied text >  < from: TTE W or WO Ultrasound Enhancing Agent (08.17.23 @ 07:12) >  < from: TTE W or WO Ultrasound Enhancing Agent (08.17.23 @ 07:12) >  CONCLUSIONS:      1. Normal left ventricular cavity size. Left ventricular wall thickness is normal. Left ventricular systolic function is normal with an ejection fraction of 63 % by Jones's method of disks. There are no regional wall motion abnormalities seen.   2. There is moderate (grade 2) left ventricular diastolic dysfunction, with normal filling pressure.   3. Moderately enlarged right ventricular cavity size and reduced systolic right ventricular function. The tricuspid annular plane systolic excursion (TAPSE) is 1.8 cm (normal >=1.7 cm).   4. Moderate-to-severe aortic stenosis.   5. Trace aortic regurgitation.   6. No pericardial effusion seen.   7. Estimated pulmonary artery systolic pressure is 35 mmHg.   8. No prior echocardiogram is available for comparison.    < end of copied text >   Neurology     S: patient seen. MRI + AIS         Medications: MEDICATIONS  (STANDING):  anastrozole 1 milliGRAM(s) Oral daily  apixaban 2.5 milliGRAM(s) Oral every 12 hours  aspirin enteric coated 81 milliGRAM(s) Oral daily  atorvastatin 40 milliGRAM(s) Oral at bedtime  chlorhexidine 2% Cloths 1 Application(s) Topical daily  pantoprazole    Tablet 40 milliGRAM(s) Oral before breakfast  predniSONE   Tablet 5 milliGRAM(s) Oral every other day  sertraline 50 milliGRAM(s) Oral daily  sodium chloride 0.9%. 1000 milliLiter(s) (50 mL/Hr) IV Continuous <Continuous>    MEDICATIONS  (PRN):  acetaminophen     Tablet .. 650 milliGRAM(s) Oral every 6 hours PRN Moderate Pain (4 - 6)  benzonatate 100 milliGRAM(s) Oral every 8 hours PRN Cough  fluticasone propionate 50 MICROgram(s)/spray Nasal Spray 1 Spray(s) Both Nostrils two times a day PRN allergic rhinitis  melatonin 3 milliGRAM(s) Oral at bedtime PRN Insomnia       Vitals:  Vital Signs Last 24 Hrs  T(C): 37 (26 Dec 2023 20:52), Max: 37.2 (26 Dec 2023 13:05)  T(F): 98.6 (26 Dec 2023 20:52), Max: 99 (26 Dec 2023 13:05)  HR: 65 (26 Dec 2023 20:52) (61 - 77)  BP: 122/81 (26 Dec 2023 20:52) (116/59 - 132/58)  BP(mean): --  RR: 18 (26 Dec 2023 20:52) (18 - 18)  SpO2: 97% (26 Dec 2023 20:52) (97% - 99%)    Parameters below as of 26 Dec 2023 20:52  Patient On (Oxygen Delivery Method): room air            General Exam:   General Appearance: Appropriately dressed and in no acute distress       Head: Normocephalic, atraumatic and no dysmorphic features  Ear, Nose, and Throat: Moist mucous membranes  CVS: S1S2+  Resp: No SOB, no wheeze or rhonchi  GI: soft NT/ND  Extremities: No edema or cyanosis  Skin: No bruises or rashes     Neurological Exam:  Mental Status: Awake, alert and oriented x 3.  Able to follow simple and complex verbal commands. Able to name and repeat. fluent speech. No obvious aphasia or dysarthria noted.   Cranial Nerves: PERRL, EOMI, VFFC, sensation V1-V3 intact,  no obvious facial asymmetry, equal elevation of palate, scm/trap 5/5, tongue is midline on protrusion. no obvious papilledema on fundoscopic exam. hearing is grossly intact.   Motor: Normal bulk, tone and strength throughout. Fine finger movements were intact and symmetric. no tremors or drift noted.    Sensation: Intact to light touch and pinprick throughout. no right/left confusion. no extinction to tactile on DSS. Romberg was negative.   Reflexes: 1+ throughout at biceps, brachioradialis, triceps, patellars and ankles bilaterally and equal. No clonus. R toe and L toe were both downgoing.  Coordination: No dysmetria on FNF or HKS  Gait: deferred     Data/Labs/Imaging which I personally reviewed.         LABS:                          11.6   6.82  )-----------( 199      ( 26 Dec 2023 07:51 )             36.7     12-26    142  |  105  |  18  ----------------------------<  88  4.0   |  24  |  0.66    Ca    8.9      26 Dec 2023 07:51  Mg     2.0     12-26    TPro  5.9<L>  /  Alb  3.6  /  TBili  0.3  /  DBili  0.1  /  AST  33  /  ALT  28  /  AlkPhos  45  12-26    LIVER FUNCTIONS - ( 26 Dec 2023 07:51 )  Alb: 3.6 g/dL / Pro: 5.9 g/dL / ALK PHOS: 45 U/L / ALT: 28 U/L / AST: 33 U/L / GGT: x             Urinalysis Basic - ( 26 Dec 2023 07:51 )    Color: x / Appearance: x / SG: x / pH: x  Gluc: 88 mg/dL / Ketone: x  / Bili: x / Urobili: x   Blood: x / Protein: x / Nitrite: x   Leuk Esterase: x / RBC: x / WBC x   Sq Epi: x / Non Sq Epi: x / Bacteria: x            < from: CT Head No Cont (12.20.23 @ 12:33) >    ACC: [id number] EXAM:  CT CERVICAL SPINE   ORDERED BY:  SAMIA MILLS     ACC: [id number] EXAM:  CT MAXILLOFACIAL   ORDERED BY:  SAMIA MILLS     ACC: [id number] EXAM:  CT BRAIN   ORDERED BY:  SAMIA MILLS     ACC: [id number] EXAM:  CT 3D RECONSTRUCT W TAYLOR   ORDERED BY:    SAMIA MILLS     PROCEDURE DATE:  12/20/2023          INTERPRETATION:  EXAM: CT HEAD, FACIAL BONES, AND CERVICAL SPINE WITHOUT   INTRAVENOUS CONTRAST    HISTORY: Trauma, syncopal episode, currently on anticoagulation    TECHNIQUE: Multiple axial images were obtained of the head, facial bones,   and cervical spine. Sagittal and coronal reformatted images were obtained   from the axial data set. The images were reviewed in brain and bone   windows.    COMPARISON: CT of the head May 13, 2017, MRI of the head May 31, 2021    FINDINGS:    CT HEAD:  No acute intracranial hemorrhage. Areas of decreased attenuation in the   deep and periventricular white matter, compatible with chronic small   vessel disease. Mild encephalomalacia/gliotic change in the high right   frontal lobe. Chronic appearing infarcts in the left cerebellar   hemisphere. Mild parenchymal volume loss. No hydrocephalus. The   extra-axial spaces and basal cisterns are within normal limits. No   midline shift or mass effect present.    The cranial cervical junction is within normal limits. The sella is not   expanded. No depressed calvarial fracture. Prior right frontal   craniotomy. Mucosal thickening throughout the ethmoid air cells. The   visualized mastoid air cells are well aerated. The visualized orbits are   status post cataract surgery. Right frontal scalp hematoma/contusion.    CT FACIAL BONES:  The nasal bones are intact.    The orbital rims, zygomatic arches, and pterygoid plates are intact   bilaterally.    The mandible is intact. Mild arthritic changes in the bilateral TMJs.    Mucosal thickening throughout the ethmoid air cells. Probable polyp in   the right maxillary sinus. The visualized mastoid air cells are clear   bilaterally.    No fracture of the skull base or lower calvarium.      CT CERVICAL SPINE:  The atlantodental interval is within normal limits. The lateral masses   are properly aligned. No facet subluxation or malalignment at the   craniovertebral or atlantoaxial articulations. No dens fracture. No   significant prevertebral soft tissue swelling.    Subtle reversal to the cervical lordosis. Normal alignment of the   cervical vertebrae. Vertebral body height is well-maintained. Reduced   intervertebral disc height throughout the cervical spine, most   significantly C3-C5 with chronic endplate change. No jumped or perched   facets. No acute osseous fracture.    C2-C3: Subtle disc bulge. The bilateral neuroforamina are patent. No   narrowing of the spinal canal.    C3-C4: Disc osteophyte complex with uncovertebral joint hypertrophy.   Severe narrowing of the bilateral neuroforamen. No significant narrowing   of the spinal canal. Mild facet arthropathy.    C4-C5: Disc osteophyte complex with uncovertebral joint hypertrophy.   Severe narrowing of the left neural foramen and moderate to severe   narrowing of the right neural foramen. Moderate to severe narrowing of   the spinal canal. Mild facet arthropathy.    C5-C6: Disc osteophyte complex with uncovertebral joint hypertrophy.   Severe narrowing of the bilateral neuroforamen, left greater than right.   Moderate to severe narrowing of the spinal canal. Facet arthropathy.    C6-C7: Disc osteophyte complex with uncovertebral joint hypertrophy.   Severe narrowing of the bilateral neuroforamen. Mild narrowing of the   spinal canal. Facet arthropathy.    C7-T1: No large disc bulge. The bilateral neuroforamina are patent. No   significant narrowing of the spinal canal.    The paraspinal muscles are unremarkable. Soft tissue fullness in the   right vallecula.    Visualized portions of the thyroid are unremarkable.    Pleural-based calcifications in the left lung apex. Subtle groundglass   opacities in the right lung apex. Small 0.2 cm calcification in the right   lung apex.      IMPRESSION:    CT HEAD:  1.  No evidence of acute intracranial hemorrhage or midline shift.  2.  Chronic ischemic changes as discussed above.    CT FACIAL BONES:  1.  No evidence of fracture of the facial bones.  2.  Mild paranasal sinus disease.    CT CERVICAL SPINE:  1.  No evidence of acute osseous fracture or ko dislocation.  2.  Multilevel degenerative change of the cervical spine as discussed   above, most significantly at the C4-C6 levels where there is moderate to   severe narrowing of the spinal canal. In the setting of myelopathy,   recommend MRI of the cervical spine for further evaluation.  3.  Soft tissue fullness in the right vallecula. Recommend direct visual   inspection.  4.  Pleural-based and nodular calcifications in the bilateral lung apices   as discussed above, correlate with patient history. Additionally, subtle   ground glass opacities in the right lung apex. This could indicate a   developing infectious/inflammatory process. Consider designated imaging   of the chest for further evaluation if clinically indicated.    --- End of Report ---   < from: MR Angio Head w/wo IV Cont (12.22.23 @ 09:55) >    ACC: [id number] EXAM:  MR ANGIO BRAIN WAW IC   ORDERED BY: LUCIANA VEGA     ACC: [id number] EXAM:  MR ANGIO NECK WAW IC   ORDERED BY: LUCIANA VEGA     ACC: [id number] EXAM:  MR BRAIN WAW IC   ORDERED BY: LUCIANA VEGA     PROCEDURE DATE:  12/22/2023         INTERPRETATION:  Exam Type: MRI BRAIN WITH AND WITHOUT CONTRAST , MRA COW   WITHOUT CONTRAST, MRA NECK WITH AND WITHOUT CONTRAST  Indication: Syncope, R/O Mets, H/O CNS vasculitis  Comparison:CT head 12/20/2023    Technique: Multiplanar MR imaging of the brain was obtained with and   without contrast. Time of flight MRA of the neck with and without   contrast and [place] without contrast were obtained. 7.5  cc of   gadavist was administered. 0  cc was discarded.    Findings:    MRI brain: There is a punctate focus of restricted diffusion within the   left posterior temporal lobe, compatible with an acute infarct.   Encephalomalacia in the left cerebellum is present with extensive old   hemorrhagic blood products. Similarly, there is encephalomalacia with   chronic appearing hemorrhagic blood products within the medial right   cerebellum. 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 There are multiple small   foci of T2/FLAIR hyperintense signal in the periventricular and   subcortical white matter of the bilateral cerebri, suggestive of mild to   moderate chronic microvascular ischemic changes. There is no abnormal   enhancement.    Prior right frontal craniotomy with encephalomalacia/gliosis in the   underlying right anterolateral frontal lobe.    Cerebellar tonsils are in normal location. The intracranial flow voids   are normal in appearance. Visualized paranasal sinuses and mastoids are   normal in signal.    MRA of the [place] demonstrates normal antegrade flow in the   major intracranial arteries. No flow gap is seen to suggest high grade   stenosis. No evidence of medium or large sized aneurysm.    MRA of the neck demonstrates normal antegrade flow in the bilateral   common carotid, cervical internal carotid and vertebral arteries. No flow   gap is seen to suggest high grade stenosis.        IMPRESSION:    MRI brain: Punctate acute infarct in the left posterior temporal lobe.    Encephalomalacia in the left cerebellum is present with extensive old   hemorrhagic blood products. Similarly, there is encephalomalacia with   chronic appearing hemorrhagic blood products within the medial right   cerebellum. There are multiplesmall foci of T2/FLAIR hyperintense signal   in the periventricular and subcortical white matter of the bilateral   cerebri, suggestive of mild to moderate chronic microvascular ischemic   changes.    Prior right frontal craniotomy with encephalomalacia/gliosis in the   underlying right anterolateral frontal lobe.      MRA brain: No hemodynamically significant stenosis    MRA neck: No hemodynamically significant stenosis    --- End of Report ---         < from: MR Cervical Spine w/wo IV Cont (12.22.23 @ 09:54) >    ACC: [id number] EXAM:  MR SPINE CERVICAL WAW IC   ORDERED BY: LUCIANA VEGA     PROCEDURE DATE:  12/22/2023          INTERPRETATION:  Exam Date: 12/22/2023 9:54 AM    MR cervical spine with and without gadolinium    CLINICAL INFORMATION:  Stenosisseen on CT syncope.    TECHNIQUE:   Sagittal and axial T1-weighted images, sagittal proton   density images, axial and sagittal T2-weighted images of the cervical   spine were obtained.   Following the intravenous administration of 7.5 ml   Gadavist fat saturated sagittal and axial T1-weighted images were   obtained.  0 mL were discarded.    FINDINGS: Comparison is made to CT cervical of 12/20/2023.    Cervical vertebral alignment is intact.  Cervical vertebral body heights   are maintained.  [place] signal intensity within cervical vertebral bodies   and posterior elements is unremarkable.  No osseous expansion, epidural   disease or paraspinal abnormality is found.    At C2/C3, there is a very small posterior disc bulge without significant   foraminal or central spinal canal stenosis.    At C3/C4, there is a posterior disc bulge and bilateral uncovertebral   spurring resulting in moderate to severe narrowing of the bilateral   neural foramen without significant central spinal canal stenosis.    At C4/C5, there is a posterior disc bulge and bilateral uncovertebral   spurring resulting in moderate to severe stenosis of the bilateral neural   foramen and mild to moderate central spinal canal stenosis.    At C5/C6, there is a posterior disc bulge and bilateral uncovertebral   spurring resulting in moderate to severe stenosis of the bilateral neural   foramen and mild central spinal canal stenosis.    At C6/C7, there is a posterior disc bulge and bilateral uncovertebral   spurring resulting in moderate to severe stenosis of the bilateral neural   foramen without significant central spinal canal stenosis.    At C7/T1, there are no significant degenerative changes    The cervical cord maintains intact morphology  No cord signal intensity   abnormality or focal cord lesion is appreciated.   There is no abnormal   cord enhancement.  The cervical medullary junction remains intact.      IMPRESSION:   Multilevel degenerative changes as detailed above.    --- End of Report ---            PHILL CATHERINE MD; Attending Radiologist  This document has been electronically signed. Dec 22 2023 11:04AM    < end of copied text >  < from: TTE W or WO Ultrasound Enhancing Agent (08.17.23 @ 07:12) >  < from: TTE W or WO Ultrasound Enhancing Agent (08.17.23 @ 07:12) >  CONCLUSIONS:      1. Normal left ventricular cavity size. Left ventricular wall thickness is normal. Left ventricular systolic function is normal with an ejection fraction of 63 % by Jones's method of disks. There are no regional wall motion abnormalities seen.   2. There is moderate (grade 2) left ventricular diastolic dysfunction, with normal filling pressure.   3. Moderately enlarged right ventricular cavity size and reduced systolic right ventricular function. The tricuspid annular plane systolic excursion (TAPSE) is 1.8 cm (normal >=1.7 cm).   4. Moderate-to-severe aortic stenosis.   5. Trace aortic regurgitation.   6. No pericardial effusion seen.   7. Estimated pulmonary artery systolic pressure is 35 mmHg.   8. No prior echocardiogram is available for comparison.    < end of copied text >

## 2023-12-26 NOTE — PROGRESS NOTE ADULT - ASSESSMENT
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   NIHSS 1 premrs2  dimer 254  ESR 40   LDL 78   CRP 49   + covid   A1c 6  LDL 78 \    MRI brain with AIS in left post temporal lobe, punctate.  old L cerebellar and extensive old hemorrhagic products  mod MVID; prior R frontal crani   MRA H/N neg   MRI C spine with Degenertive chagnes   TTE as above. 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, stable   2) h/o seizure, 10 years ago in setting of stroke, none since   3) cervical radic   4) syncope in setting of covid, and  AS   5) New Acute punctate, emolic appearing L post temporal lobe infarct;  possible related to covid, but D dimer not > 2x upper limit normal but still elevated     - added asa 81mg daily and increased atorvastatin to 40mg PO QHS   - would pursue extended cardiac monitoring, possible outpatient ILR ; plan for mcot on discharge   - prednisone for CNS vascultiisi   - TTE with bubble now r/o PFO   - EEG pending  can change to routine ; can be outpatient if not done in house   - telemetry  - covid precuations.  was on Remdesivir. monitor O2 for steroids   - orthostatics   - PT/OT/SS/SLP, OOBC  - check FS, glucose control <180  - GI/DVT ppx   - Thank you for allowing me to participate in the care of this patient. Call with questions.   - sees Dr. 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   NIHSS 1 premrs2  dimer 254  ESR 40   LDL 78   CRP 49   + covid   A1c 6  LDL 78 \    MRI brain with AIS in left post temporal lobe, punctate.  old L cerebellar and extensive old hemorrhagic products  mod MVID; prior R frontal crani   MRA H/N neg   MRI C spine with Degenertive chagnes   TTE as above. 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, stable   2) h/o seizure, 10 years ago in setting of stroke, none since   3) cervical radic   4) syncope in setting of covid, and  AS   5) New Acute punctate, emolic appearing L post temporal lobe infarct;  possible related to covid, but D dimer not > 2x upper limit normal but still elevated     - added asa 81mg daily and increased atorvastatin to 40mg PO QHS   - would pursue extended cardiac monitoring, possible outpatient ILR ; plan for mcot on discharge   - prednisone for CNS vascultiisi   - TTE with bubble now r/o PFO   - EEG pending  can change to routine ; can be outpatient if not done in house   - telemetry  - covid precuations.  was on Remdesivir. monitor O2 for steroids   - orthostatics   - PT/OT/SS/SLP, OOBC  - check FS, glucose control <180  - GI/DVT ppx   - Thank you for allowing me to participate in the care of this patient. Call with questions.   - sees Dr. Naif Love neuro outpatient \  Sina Calles MD  Vascular Neurology  Office: [phone]

## 2023-12-26 NOTE — PROGRESS NOTE ADULT - SUBJECTIVE AND OBJECTIVE BOX
EP      HISTORY OF PRESENT ILLNESS: HPI:  Patient is a 78 year old Female with a PMHx of Left Breast CA S/P L Lumpectomy, on Anastrazole, Right breast ADH S/P resection and on Tamoxifen X 5 years, Lymphoma, history of DVT/PE, CVA 2/2 CNS vasculitis, HLD, GERD, overactive bladder, seizures 10 years ago, not on AEDs, osteoporosis complicated by right tib-fib fracture s/p ORIF, recent admission 11/18-11/21 for urosepsis 2/2 E coli infection, S/P 4 week course of Bactrim on 12/16, follows with uro-gynecologist, who presents to [facility] with a chief complaint of syncope this morning. Patient reports that she awoke in yas usual state of health this morning, was supposed to go to outpatient laboratory to obtain lab work. Patient states that she told her  to bring the car to her by the elevator and does not recall further. States that she told her  she "feels unwell" and feels "off." Per  at the bedside, he found patient face down on the floor.  reports that he stepped away from patient for under 2 minutes to bring the car over to her and awoke patient. Patient reports pain to her B/L knees. Patient reports that she has been ambulating since and was advised to come to [facility] by her [facility] team. Patient denies any bladder or bowel incontinence. Denies chest pain, palpitations, shortness of breath or dyspnea.  Denies nausea, vomiting, fever, chills, cough, abdominal pain, headache, dizziness, lightheadedness neck or back pain.  (20 Dec 2023 15:43)    Describes a generalized feeling of "something just feeling off" prior to fainting.  No describable headache, vertigo, nausea, skin pallor, sweating, hot/flushed feeling, etc.  Last fainting episode was >5 yrs ago, unrelated incident.  A 10 pt ROS is otherwise negative.  12/22- resting comfortably, no new complaints, being taken off floor for addl testing today.  12/24 no events overnight  12/25 - denies chest pain, SOB, palpitations, or dizziness. Review of systems otherwise negative  Date of service  12/26- resting in bed. had asymptomatic short runs of PAT (4-6sec).  no sustained AFib.    PAST MEDICAL & SURGICAL HISTORY:  CVA (cerebral vascular accident)  Lymphoma  Osteoporosis  Seizure  Fracture  lumbar spine,  healed with conservative treatment  Neuropathy  bilateral feet  HLD (hyperlipidemia)  Depression  GERD (gastroesophageal reflux disease)  Hodgkins disease  Pulmonary embolus  Fracture, ankle  Right, s/p metal plate  H/O sinus surgery  Cataract extraction status of eye, right     acetaminophen     Tablet .. 650 milliGRAM(s) Oral every 6 hours PRN  anastrozole 1 milliGRAM(s) Oral daily  apixaban 2.5 milliGRAM(s) Oral every 12 hours  aspirin enteric coated 81 milliGRAM(s) Oral daily  atorvastatin 40 milliGRAM(s) Oral at bedtime  benzonatate 100 milliGRAM(s) Oral every 8 hours PRN  chlorhexidine 2% Cloths 1 Application(s) Topical daily  fluticasone propionate 50 MICROgram(s)/spray Nasal Spray 1 Spray(s) Both Nostrils two times a day PRN  melatonin 3 milliGRAM(s) Oral at bedtime PRN  pantoprazole    Tablet 40 milliGRAM(s) Oral before breakfast  predniSONE   Tablet 5 milliGRAM(s) Oral every other day  sertraline 50 milliGRAM(s) Oral daily  sodium chloride 0.9%. 1000 milliLiter(s) IV Continuous <Continuous>                            11.6   6.82  )-----------( 199      ( 26 Dec 2023 07:51 )             36.7       12-26    142  |  105  |  18  ----------------------------<  88  4.0   |  24  |  0.66    Ca    8.9      26 Dec 2023 07:51  Mg     2.0     12-26    TPro  5.9<L>  /  Alb  3.6  /  TBili  0.3  /  DBili  0.1  /  AST  33  /  ALT  28  /  AlkPhos  45  12-26    T(C): 37.2 (12-26-23 @ 13:05), Max: 37.2 (12-26-23 @ 13:05)  HR: 64 (12-26-23 @ 13:05) (61 - 77)  BP: 132/58 (12-26-23 @ 13:05) (113/58 - 132/58)  RR: 18 (12-26-23 @ 13:05) (18 - 18)  SpO2: 97% (12-26-23 @ 13:05) (93% - 99%)  Wt(kg): --    I&O's Summary    25 Dec 2023 07:01  -  26 Dec 2023 07:00  --------------------------------------------------------  IN: 360 mL / OUT: 0 mL / NET: 360 mL    26 Dec 2023 07:01  -  26 Dec 2023 14:35  --------------------------------------------------------  IN: 240 mL / OUT: 0 mL / NET: 240 mL    Appearance: Elderly woman in no acute distress  HEENT:   Normal oral mucosa, PERRL, EOMI	  Lymphatic: No lymphadenopathy , no edema  Cardiovascular: Normal S1 S2, No JVD, No murmurs , Peripheral pulses palpable 2+ bilaterally  Respiratory: Lungs clear to auscultation, normal effort 	  Gastrointestinal:  Soft, Non-tender, + BS	  Skin: ecchymoses - periorbital, chin, cheekbones, etc.  Musculoskeletal: Normal range of motion, normal strength  Psychiatry:  Mood & affect appropriate    TELEMETRY: SB/SR , no events	    ECG: NSR, normal intervals  Echo:  Historically normal LVEF, moderate-to-severe Aortic valve stenosis.    ASSESSMENT/PLAN: Ms Yates is a very pleasant 78y Female here after semi-witnessed collapse in her apartment complex parking garage.  She has history of stroke, CNS vasculitis, breast cancer on maintenance anastrozole, and documented moderate-to-severe aortic valve stenosis.  On day of presentation, she was preparing for AM fasting labs .  She did not know she had COVID until diagnosed in the ED.    Her fainting episode is most likely vasovagal, spurred on by relative dehydration in fasted state, having had COVID, and about a 15 min walk through her housing complex down to the parking garage.  Her forwards fall and facial injury are entirely unfortunate, but is not by itself indicative of a more sinister cause.  Still pending an echocardiogram re: AS progression, although this is not likely the cause of her syncopal event.  OK to continue atorvastatin for remote hx of CVA.  Maintain telemetry re: arrhythmia surveillance. At this time I would not implant a loop recorder. Would recommend event monitoring and office followup.    Telemetry with short bursts of PAT - no clinical AFib.  Continue low-dose apixaban + aspirin, re: hx of CNS vasculitis.  At this point, does not have a strong indication to increase apixaban to full-dose.  Continue telemetry surveillance for AFib while in hospital.  Recommend clearance from COVID then rehab placement.  MCOT thru [facility] / will be monitored by team that reports to her [facility] heart team.  Optional ILR placement, electively as an outpatient.  Plan discussed w/ Dr Sparks and Dr Crockett.     Sunday Lino M.D.  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 Patient reports that she awoke in yas usual state of health this morning, was supposed to go to outpatient laboratory to obtain lab work. Patient states that she told her  to bring the car to her by the elevator and does not recall further. States that she told her  she "feels unwell" and feels "off." Per  at the bedside, he found patient face down on the floor.  reports that he stepped away from patient for under 2 minutes to bring the car over to her and awoke patient. Patient reports pain to her B/L knees. Patient reports that she has been ambulating since and was advised to come to [facility] by her [facility] team. Patient denies any bladder or bowel incontinence. Denies chest pain, palpitations, shortness of breath or dyspnea.  Denies nausea, vomiting, fever, chills, cough, abdominal pain, headache, dizziness, lightheadedness neck or back pain.  (20 Dec 2023 15:43)    Describes a generalized feeling of "something just feeling off" prior to fainting.  No describable headache, vertigo, nausea, skin pallor, sweating, hot/flushed feeling, etc.  Last fainting episode was >5 yrs ago, unrelated incident.  A 10 pt ROS is otherwise negative.  12/22- resting comfortably, no new complaints, being taken off floor for addl testing today.  12/24 no events overnight  12/25 - denies chest pain, SOB, palpitations, or dizziness. Review of systems otherwise negative  Date of service  12/26- resting in bed. had asymptomatic short runs of PAT (4-6sec).  no sustained AFib.    PAST MEDICAL & SURGICAL HISTORY:  CVA (cerebral vascular accident)  Lymphoma  Osteoporosis  Seizure  Fracture  lumbar spine,  healed with conservative treatment  Neuropathy  bilateral feet  HLD (hyperlipidemia)  Depression  GERD (gastroesophageal reflux disease)  Hodgkins disease  Pulmonary embolus  Fracture, ankle  Right, s/p metal plate  H/O sinus surgery  Cataract extraction status of eye, right     acetaminophen     Tablet .. 650 milliGRAM(s) Oral every 6 hours PRN  anastrozole 1 milliGRAM(s) Oral daily  apixaban 2.5 milliGRAM(s) Oral every 12 hours  aspirin enteric coated 81 milliGRAM(s) Oral daily  atorvastatin 40 milliGRAM(s) Oral at bedtime  benzonatate 100 milliGRAM(s) Oral every 8 hours PRN  chlorhexidine 2% Cloths 1 Application(s) Topical daily  fluticasone propionate 50 MICROgram(s)/spray Nasal Spray 1 Spray(s) Both Nostrils two times a day PRN  melatonin 3 milliGRAM(s) Oral at bedtime PRN  pantoprazole    Tablet 40 milliGRAM(s) Oral before breakfast  predniSONE   Tablet 5 milliGRAM(s) Oral every other day  sertraline 50 milliGRAM(s) Oral daily  sodium chloride 0.9%. 1000 milliLiter(s) IV Continuous <Continuous>                            11.6   6.82  )-----------( 199      ( 26 Dec 2023 07:51 )             36.7       12-26    142  |  105  |  18  ----------------------------<  88  4.0   |  24  |  0.66    Ca    8.9      26 Dec 2023 07:51  Mg     2.0     12-26    TPro  5.9<L>  /  Alb  3.6  /  TBili  0.3  /  DBili  0.1  /  AST  33  /  ALT  28  /  AlkPhos  45  12-26    T(C): 37.2 (12-26-23 @ 13:05), Max: 37.2 (12-26-23 @ 13:05)  HR: 64 (12-26-23 @ 13:05) (61 - 77)  BP: 132/58 (12-26-23 @ 13:05) (113/58 - 132/58)  RR: 18 (12-26-23 @ 13:05) (18 - 18)  SpO2: 97% (12-26-23 @ 13:05) (93% - 99%)  Wt(kg): --    I&O's Summary    25 Dec 2023 07:01  -  26 Dec 2023 07:00  --------------------------------------------------------  IN: 360 mL / OUT: 0 mL / NET: 360 mL    26 Dec 2023 07:01  -  26 Dec 2023 14:35  --------------------------------------------------------  IN: 240 mL / OUT: 0 mL / NET: 240 mL    Appearance: Elderly woman in no acute distress  HEENT:   Normal oral mucosa, PERRL, EOMI	  Lymphatic: No lymphadenopathy , no edema  Cardiovascular: Normal S1 S2, No JVD, No murmurs , Peripheral pulses palpable 2+ bilaterally  Respiratory: Lungs clear to auscultation, normal effort 	  Gastrointestinal:  Soft, Non-tender, + BS	  Skin: ecchymoses - periorbital, chin, cheekbones, etc.  Musculoskeletal: Normal range of motion, normal strength  Psychiatry:  Mood & affect appropriate    TELEMETRY: SB/SR , no events	    ECG: NSR, normal intervals  Echo:  Historically normal LVEF, moderate-to-severe Aortic valve stenosis.    ASSESSMENT/PLAN: Ms Yates is a very pleasant 78y Female here after semi-witnessed collapse in her apartment complex parking garage.  She has history of stroke, CNS vasculitis, breast cancer on maintenance anastrozole, and documented moderate-to-severe aortic valve stenosis.  On day of presentation, she was preparing for AM fasting labs .  She did not know she had COVID until diagnosed in the ED.    Her fainting episode is most likely vasovagal, spurred on by relative dehydration in fasted state, having had COVID, and about a 15 min walk through her housing complex down to the parking garage.  Her forwards fall and facial injury are entirely unfortunate, but is not by itself indicative of a more sinister cause.  Still pending an echocardiogram re: AS progression, although this is not likely the cause of her syncopal event.  OK to continue atorvastatin for remote hx of CVA.  Maintain telemetry re: arrhythmia surveillance. At this time I would not implant a loop recorder. Would recommend event monitoring and office followup.    Telemetry with short bursts of PAT - no clinical AFib.  Continue low-dose apixaban + aspirin, re: hx of CNS vasculitis.  At this point, does not have a strong indication to increase apixaban to full-dose.  Continue telemetry surveillance for AFib while in hospital.  Recommend clearance from COVID then rehab placement.  MCOT thru [facility] / will be monitored by team that reports to her [facility] heart team.  Optional ILR placement, electively as an outpatient.  Plan discussed w/ Dr Sparks and Dr Crockett.     Sunday Lino M.D.  Cardiac Electrophysiology  [phone]

## 2023-12-26 NOTE — PROGRESS NOTE ADULT - SUBJECTIVE AND OBJECTIVE BOX
Name of Patient : VINCENT ENGLAND  MRN: [id number]  Date of visit: 12-26-23 @ 16:00      Subjective: Patient seen and examined. No new events except as noted.   Patient seen earlier this AM. Sitting up in bed, doing ok.  Reports her cough is improving. TTE pending.   Denies chest pain, palpitations, shortness of breath or dyspnea.     REVIEW OF SYSTEMS:    CONSTITUTIONAL: Generalized weakness   EYES/ENT: No visual changes;  No vertigo or throat pain   NECK: No pain or stiffness  RESPIRATORY: + Cough is improving; No wheezing, hemoptysis; No shortness of breath  CARDIOVASCULAR: No chest pain or palpitations  GASTROINTESTINAL: No abdominal or epigastric pain. No nausea, vomiting, or hematemesis; No diarrhea or constipation. No melena or hematochezia.  GENITOURINARY: No dysuria, frequency or hematuria  NEUROLOGICAL: No numbness or weakness  SKIN:+  Facial ecchymosis; + Raccoon eyes R>L improving, No itching, burning, rashes, or lesions   All other review of systems is negative unless indicated above.    MEDICATIONS:  MEDICATIONS  (STANDING):  anastrozole 1 milliGRAM(s) Oral daily  apixaban 2.5 milliGRAM(s) Oral every 12 hours  aspirin enteric coated 81 milliGRAM(s) Oral daily  atorvastatin 40 milliGRAM(s) Oral at bedtime  chlorhexidine 2% Cloths 1 Application(s) Topical daily  pantoprazole    Tablet 40 milliGRAM(s) Oral before breakfast  predniSONE   Tablet 5 milliGRAM(s) Oral every other day  sertraline 50 milliGRAM(s) Oral daily  sodium chloride 0.9%. 1000 milliLiter(s) (50 mL/Hr) IV Continuous <Continuous>      PHYSICAL EXAM:  T(C): 37.2 (12-26-23 @ 13:05), Max: 37.2 (12-26-23 @ 13:05)  HR: 64 (12-26-23 @ 13:05) (61 - 77)  BP: 132/58 (12-26-23 @ 13:05) (113/58 - 132/58)  RR: 18 (12-26-23 @ 13:05) (18 - 18)  SpO2: 97% (12-26-23 @ 13:05) (93% - 99%)  Wt(kg): --  I&O's Summary    25 Dec 2023 07:01  -  26 Dec 2023 07:00  --------------------------------------------------------  IN: 360 mL / OUT: 0 mL / NET: 360 mL    26 Dec 2023 07:01  -  26 Dec 2023 16:00  --------------------------------------------------------  IN: 580 mL / OUT: 0 mL / NET: 580 mL          Appearance: Awake, Weak appearing female, sitting up in bed   HEENT:  Eyes are open; +Facial ecchymosis; + Raccoon eyes R >L with improvident   Lymphatic: No lymphadenopathy   Cardiovascular: Normal S1 S2, no JVD  Respiratory: normal effort , clear  Gastrointestinal:  Soft, Non-tender  Skin: + Facial ecchymosis; + R sided tashia-occular ecchymosis  Psychiatry:  Mood & affect appropriate  Musculoskeletal: No edema          12-25-23 @ 07:01  -  12-26-23 @ 07:00  --------------------------------------------------------  IN: 360 mL / OUT: 0 mL / NET: 360 mL    12-26-23 @ 07:01  -  12-26-23 @ 16:00  --------------------------------------------------------  IN: 580 mL / OUT: 0 mL / NET: 580 mL                                11.6   6.82  )-----------( 199      ( 26 Dec 2023 07:51 )             36.7               12-26    142  |  105  |  18  ----------------------------<  88  4.0   |  24  |  0.66    Ca    8.9      26 Dec 2023 07:51  Mg     2.0     12-26    TPro  5.9<L>  /  Alb  3.6  /  TBili  0.3  /  DBili  0.1  /  AST  33  /  ALT  28  /  AlkPhos  45  12-26                       Urinalysis Basic - ( 26 Dec 2023 07:51 )    Color: x / Appearance: x / SG: x / pH: x  Gluc: 88 mg/dL / Ketone: x  / Bili: x / Urobili: x   Blood: x / Protein: x / Nitrite: x   Leuk Esterase: x / RBC: x / WBC x   Sq Epi: x / Non Sq Epi: x / Bacteria: x      Culture - Blood in AM (12.21.23 @ 07:23)   Specimen Source: .Blood Blood  Culture Results:   No growth at 5 days    Culture - Blood in AM (12.21.23 @ 07:23)   Specimen Source: .Blood Blood  Culture Results:   No growth at 5 days   Name of Patient : VINCENT ENGLAND  MRN: [id number]  Date of visit: 12-26-23 @ 16:00      Subjective: Patient seen and examined. No new events except as noted.   Patient seen earlier this AM. Sitting up in bed, doing ok.  Reports her cough is improving. TTE pending.   Denies chest pain, palpitations, shortness of breath or dyspnea.     REVIEW OF SYSTEMS:    CONSTITUTIONAL: Generalized weakness   EYES/ENT: No visual changes;  No vertigo or throat pain   NECK: No pain or stiffness  RESPIRATORY: + Cough is improving; No wheezing, hemoptysis; No shortness of breath  CARDIOVASCULAR: No chest pain or palpitations  GASTROINTESTINAL: No abdominal or epigastric pain. No nausea, vomiting, or hematemesis; No diarrhea or constipation. No melena or hematochezia.  GENITOURINARY: No dysuria, frequency or hematuria  NEUROLOGICAL: No numbness or weakness  SKIN:+  Facial ecchymosis; + Raccoon eyes R>L improving, No itching, burning, rashes, or lesions   All other review of systems is negative unless indicated above.    MEDICATIONS:  MEDICATIONS  (STANDING):  anastrozole 1 milliGRAM(s) Oral daily  apixaban 2.5 milliGRAM(s) Oral every 12 hours  aspirin enteric coated 81 milliGRAM(s) Oral daily  atorvastatin 40 milliGRAM(s) Oral at bedtime  chlorhexidine 2% Cloths 1 Application(s) Topical daily  pantoprazole    Tablet 40 milliGRAM(s) Oral before breakfast  predniSONE   Tablet 5 milliGRAM(s) Oral every other day  sertraline 50 milliGRAM(s) Oral daily  sodium chloride 0.9%. 1000 milliLiter(s) (50 mL/Hr) IV Continuous <Continuous>      PHYSICAL EXAM:  T(C): 37.2 (12-26-23 @ 13:05), Max: 37.2 (12-26-23 @ 13:05)  HR: 64 (12-26-23 @ 13:05) (61 - 77)  BP: 132/58 (12-26-23 @ 13:05) (113/58 - 132/58)  RR: 18 (12-26-23 @ 13:05) (18 - 18)  SpO2: 97% (12-26-23 @ 13:05) (93% - 99%)  Wt(kg): --  I&O's Summary    25 Dec 2023 07:01  -  26 Dec 2023 07:00  --------------------------------------------------------  IN: 360 mL / OUT: 0 mL / NET: 360 mL    26 Dec 2023 07:01  -  26 Dec 2023 16:00  --------------------------------------------------------  IN: 580 mL / OUT: 0 mL / NET: 580 mL          Appearance: Awake, Weak appearing female, sitting up in bed   HEENT:  Eyes are open; +Facial ecchymosis; + Raccoon eyes R >L with improvident   Lymphatic: No lymphadenopathy   Cardiovascular: Normal S1 S2, no JVD  Respiratory: normal effort , clear  Gastrointestinal:  Soft, Non-tender  Skin: + Facial ecchymosis; + R sided tashia-occular ecchymosis  Psychiatry:  Mood & affect appropriate  Musculoskeletal: No edema          12-25-23 @ 07:01  -  12-26-23 @ 07:00  --------------------------------------------------------  IN: 360 mL / OUT: 0 mL / NET: 360 mL    12-26-23 @ 07:01  -  12-26-23 @ 16:00  --------------------------------------------------------  IN: 580 mL / OUT: 0 mL / NET: 580 mL                                11.6   6.82  )-----------( 199      ( 26 Dec 2023 07:51 )             36.7               12-26    142  |  105  |  18  ----------------------------<  88  4.0   |  24  |  0.66    Ca    8.9      26 Dec 2023 07:51  Mg     2.0     12-26    TPro  5.9<L>  /  Alb  3.6  /  TBili  0.3  /  DBili  0.1  /  AST  33  /  ALT  28  /  AlkPhos  45  12-26                       Urinalysis Basic - ( 26 Dec 2023 07:51 )    Color: x / Appearance: x / SG: x / pH: x  Gluc: 88 mg/dL / Ketone: x  / Bili: x / Urobili: x   Blood: x / Protein: x / Nitrite: x   Leuk Esterase: x / RBC: x / WBC x   Sq Epi: x / Non Sq Epi: x / Bacteria: x      Culture - Blood in AM (12.21.23 @ 07:23)   Specimen Source: .Blood Blood  Culture Results:   No growth at 5 days    Culture - Blood in AM (12.21.23 @ 07:23)   Specimen Source: .Blood Blood  Culture Results:   No growth at 5 days

## 2023-12-26 NOTE — PROGRESS NOTE ADULT - NS ATTEND OPT1 GEN_ALL_CORE
I independently performed the documented:
I attest my time as attending is greater than 50% of the total combined time spent on qualifying patient care activities by the PA/NP and attending.
I independently performed the documented:
I independently performed the documented:
I attest my time as attending is greater than 50% of the total combined time spent on qualifying patient care activities by the PA/NP and attending.
I attest my time as attending is greater than 50% of the total combined time spent on qualifying patient care activities by the PA/NP and attending.

## 2023-12-27 ENCOUNTER — TRANSCRIPTION ENCOUNTER (OUTPATIENT)
Age: 78
End: 2023-12-27

## 2023-12-27 VITALS
OXYGEN SATURATION: 95 % | HEART RATE: 67 BPM | RESPIRATION RATE: 18 BRPM | TEMPERATURE: 99 F | DIASTOLIC BLOOD PRESSURE: 62 MMHG | SYSTOLIC BLOOD PRESSURE: 121 MMHG

## 2023-12-27 LAB
ALBUMIN SERPL ELPH-MCNC: 3.8 G/DL — SIGNIFICANT CHANGE UP (ref 3.3–5)
ALBUMIN SERPL ELPH-MCNC: 3.8 G/DL — SIGNIFICANT CHANGE UP (ref 3.3–5)
ALP SERPL-CCNC: 47 U/L — SIGNIFICANT CHANGE UP (ref 40–120)
ALP SERPL-CCNC: 47 U/L — SIGNIFICANT CHANGE UP (ref 40–120)
ALT FLD-CCNC: 29 U/L — SIGNIFICANT CHANGE UP (ref 10–45)
ALT FLD-CCNC: 29 U/L — SIGNIFICANT CHANGE UP (ref 10–45)
AST SERPL-CCNC: 33 U/L — SIGNIFICANT CHANGE UP (ref 10–40)
AST SERPL-CCNC: 33 U/L — SIGNIFICANT CHANGE UP (ref 10–40)
BILIRUB DIRECT SERPL-MCNC: 0.1 MG/DL — SIGNIFICANT CHANGE UP (ref 0–0.3)
BILIRUB DIRECT SERPL-MCNC: 0.1 MG/DL — SIGNIFICANT CHANGE UP (ref 0–0.3)
BILIRUB INDIRECT FLD-MCNC: 0.1 MG/DL — LOW (ref 0.2–1)
BILIRUB INDIRECT FLD-MCNC: 0.1 MG/DL — LOW (ref 0.2–1)
BILIRUB SERPL-MCNC: 0.2 MG/DL — SIGNIFICANT CHANGE UP (ref 0.2–1.2)
BILIRUB SERPL-MCNC: 0.2 MG/DL — SIGNIFICANT CHANGE UP (ref 0.2–1.2)
CREAT SERPL-MCNC: 0.78 MG/DL — SIGNIFICANT CHANGE UP (ref 0.5–1.3)
CREAT SERPL-MCNC: 0.78 MG/DL — SIGNIFICANT CHANGE UP (ref 0.5–1.3)
EGFR: 78 ML/MIN/1.73M2 — SIGNIFICANT CHANGE UP
EGFR: 78 ML/MIN/1.73M2 — SIGNIFICANT CHANGE UP
PROT SERPL-MCNC: 6.1 G/DL — SIGNIFICANT CHANGE UP (ref 6–8.3)
PROT SERPL-MCNC: 6.1 G/DL — SIGNIFICANT CHANGE UP (ref 6–8.3)

## 2023-12-27 PROCEDURE — 87641 MR-STAPH DNA AMP PROBE: CPT

## 2023-12-27 PROCEDURE — 87637 SARSCOV2&INF A&B&RSV AMP PRB: CPT

## 2023-12-27 PROCEDURE — 80061 LIPID PANEL: CPT

## 2023-12-27 PROCEDURE — 84295 ASSAY OF SERUM SODIUM: CPT

## 2023-12-27 PROCEDURE — 82435 ASSAY OF BLOOD CHLORIDE: CPT

## 2023-12-27 PROCEDURE — 80076 HEPATIC FUNCTION PANEL: CPT

## 2023-12-27 PROCEDURE — 83605 ASSAY OF LACTIC ACID: CPT

## 2023-12-27 PROCEDURE — 93880 EXTRACRANIAL BILAT STUDY: CPT

## 2023-12-27 PROCEDURE — 96374 THER/PROPH/DIAG INJ IV PUSH: CPT

## 2023-12-27 PROCEDURE — 82330 ASSAY OF CALCIUM: CPT

## 2023-12-27 PROCEDURE — 73560 X-RAY EXAM OF KNEE 1 OR 2: CPT

## 2023-12-27 PROCEDURE — 87040 BLOOD CULTURE FOR BACTERIA: CPT

## 2023-12-27 PROCEDURE — 99285 EMERGENCY DEPT VISIT HI MDM: CPT

## 2023-12-27 PROCEDURE — 95816 EEG AWAKE AND DROWSY: CPT

## 2023-12-27 PROCEDURE — 85379 FIBRIN DEGRADATION QUANT: CPT

## 2023-12-27 PROCEDURE — 85730 THROMBOPLASTIN TIME PARTIAL: CPT

## 2023-12-27 PROCEDURE — 86850 RBC ANTIBODY SCREEN: CPT

## 2023-12-27 PROCEDURE — 84484 ASSAY OF TROPONIN QUANT: CPT

## 2023-12-27 PROCEDURE — 70450 CT HEAD/BRAIN W/O DYE: CPT | Mod: MA

## 2023-12-27 PROCEDURE — 85018 HEMOGLOBIN: CPT

## 2023-12-27 PROCEDURE — 83036 HEMOGLOBIN GLYCOSYLATED A1C: CPT

## 2023-12-27 PROCEDURE — 83735 ASSAY OF MAGNESIUM: CPT

## 2023-12-27 PROCEDURE — 36415 COLL VENOUS BLD VENIPUNCTURE: CPT

## 2023-12-27 PROCEDURE — 81001 URINALYSIS AUTO W/SCOPE: CPT

## 2023-12-27 PROCEDURE — 82962 GLUCOSE BLOOD TEST: CPT

## 2023-12-27 PROCEDURE — 85610 PROTHROMBIN TIME: CPT

## 2023-12-27 PROCEDURE — 85014 HEMATOCRIT: CPT

## 2023-12-27 PROCEDURE — 85652 RBC SED RATE AUTOMATED: CPT

## 2023-12-27 PROCEDURE — 84443 ASSAY THYROID STIM HORMONE: CPT

## 2023-12-27 PROCEDURE — 85027 COMPLETE CBC AUTOMATED: CPT

## 2023-12-27 PROCEDURE — 80048 BASIC METABOLIC PNL TOTAL CA: CPT

## 2023-12-27 PROCEDURE — 71045 X-RAY EXAM CHEST 1 VIEW: CPT

## 2023-12-27 PROCEDURE — 76377 3D RENDER W/INTRP POSTPROCES: CPT

## 2023-12-27 PROCEDURE — 87086 URINE CULTURE/COLONY COUNT: CPT

## 2023-12-27 PROCEDURE — 80053 COMPREHEN METABOLIC PANEL: CPT

## 2023-12-27 PROCEDURE — 86901 BLOOD TYPING SEROLOGIC RH(D): CPT

## 2023-12-27 PROCEDURE — 70553 MRI BRAIN STEM W/O & W/DYE: CPT

## 2023-12-27 PROCEDURE — 90471 IMMUNIZATION ADMIN: CPT

## 2023-12-27 PROCEDURE — 87640 STAPH A DNA AMP PROBE: CPT

## 2023-12-27 PROCEDURE — 93306 TTE W/DOPPLER COMPLETE: CPT | Mod: 26

## 2023-12-27 PROCEDURE — 70486 CT MAXILLOFACIAL W/O DYE: CPT | Mod: MA

## 2023-12-27 PROCEDURE — 84132 ASSAY OF SERUM POTASSIUM: CPT

## 2023-12-27 PROCEDURE — C8929: CPT

## 2023-12-27 PROCEDURE — 82947 ASSAY GLUCOSE BLOOD QUANT: CPT

## 2023-12-27 PROCEDURE — 82728 ASSAY OF FERRITIN: CPT

## 2023-12-27 PROCEDURE — 90715 TDAP VACCINE 7 YRS/> IM: CPT

## 2023-12-27 PROCEDURE — 97161 PT EVAL LOW COMPLEX 20 MIN: CPT

## 2023-12-27 PROCEDURE — 70546 MR ANGIOGRAPH HEAD W/O&W/DYE: CPT

## 2023-12-27 PROCEDURE — A9585: CPT

## 2023-12-27 PROCEDURE — 86900 BLOOD TYPING SEROLOGIC ABO: CPT

## 2023-12-27 PROCEDURE — 85025 COMPLETE CBC W/AUTO DIFF WBC: CPT

## 2023-12-27 PROCEDURE — 72125 CT NECK SPINE W/O DYE: CPT | Mod: MA

## 2023-12-27 PROCEDURE — 70549 MR ANGIOGRAPH NECK W/O&W/DYE: CPT

## 2023-12-27 PROCEDURE — 72156 MRI NECK SPINE W/O & W/DYE: CPT

## 2023-12-27 PROCEDURE — 84100 ASSAY OF PHOSPHORUS: CPT

## 2023-12-27 PROCEDURE — 82803 BLOOD GASES ANY COMBINATION: CPT

## 2023-12-27 PROCEDURE — 86140 C-REACTIVE PROTEIN: CPT

## 2023-12-27 PROCEDURE — 97165 OT EVAL LOW COMPLEX 30 MIN: CPT

## 2023-12-27 PROCEDURE — 71250 CT THORAX DX C-: CPT

## 2023-12-27 PROCEDURE — 84145 PROCALCITONIN (PCT): CPT

## 2023-12-27 PROCEDURE — 82565 ASSAY OF CREATININE: CPT

## 2023-12-27 PROCEDURE — 94640 AIRWAY INHALATION TREATMENT: CPT

## 2023-12-27 RX ORDER — ATORVASTATIN CALCIUM 80 MG/1
1 TABLET, FILM COATED ORAL
Qty: 30 | Refills: 0
Start: 2023-12-27 | End: 2024-01-25

## 2023-12-27 RX ORDER — ATORVASTATIN CALCIUM 80 MG/1
1 TABLET, FILM COATED ORAL
Qty: 0 | Refills: 0 | DISCHARGE

## 2023-12-27 RX ORDER — ATORVASTATIN CALCIUM 80 MG/1
1 TABLET, FILM COATED ORAL
Qty: 0 | Refills: 0 | DISCHARGE
Start: 2023-12-27

## 2023-12-27 RX ORDER — ASPIRIN/CALCIUM CARB/MAGNESIUM 324 MG
1 TABLET ORAL
Qty: 30 | Refills: 0
Start: 2023-12-27 | End: 2024-01-25

## 2023-12-27 RX ORDER — ASPIRIN/CALCIUM CARB/MAGNESIUM 324 MG
1 TABLET ORAL
Qty: 0 | Refills: 0 | DISCHARGE
Start: 2023-12-27

## 2023-12-27 RX ADMIN — SERTRALINE 50 MILLIGRAM(S): 25 TABLET, FILM COATED ORAL at 11:33

## 2023-12-27 RX ADMIN — ANASTROZOLE 1 MILLIGRAM(S): 1 TABLET ORAL at 11:33

## 2023-12-27 RX ADMIN — PANTOPRAZOLE SODIUM 40 MILLIGRAM(S): 20 TABLET, DELAYED RELEASE ORAL at 06:26

## 2023-12-27 RX ADMIN — CHLORHEXIDINE GLUCONATE 1 APPLICATION(S): 213 SOLUTION TOPICAL at 11:36

## 2023-12-27 RX ADMIN — APIXABAN 2.5 MILLIGRAM(S): 2.5 TABLET, FILM COATED ORAL at 06:26

## 2023-12-27 RX ADMIN — Medication 81 MILLIGRAM(S): at 11:33

## 2023-12-27 RX ADMIN — APIXABAN 2.5 MILLIGRAM(S): 2.5 TABLET, FILM COATED ORAL at 18:44

## 2023-12-27 RX ADMIN — Medication 5 MILLIGRAM(S): at 11:33

## 2023-12-27 NOTE — DISCHARGE NOTE PROVIDER - CARE PROVIDER_API CALL
Al Sparks  Vascular Medicine  [address] [place] [address], [address] [place] [id number]  Phone: [phone]  Fax: [phone]  Follow Up Time: 1 week    Ramon, Ubaldo Umanzor  Internal Medicine  [address] [place] [id number]  Phone: [phone]  Fax: [phone]  Follow Up Time: 1 week   Al Sparks  Vascular Medicine  [address] [place] [address], [address] [place] [id number]  Phone: [phone]  Fax: [phone]  Follow Up Time: 1 week    Ramon, Ubaldo Umanzor  Internal Medicine  [address] [place] [id number]  Phone: [phone]  Fax: [phone]  Follow Up Time: 1 week

## 2023-12-27 NOTE — DISCHARGE NOTE NURSING/CASE MANAGEMENT/SOCIAL WORK - NSDCVIVACCINE_GEN_ALL_CORE_FT
Tdap; 20-Dec-2023 14:05; Isi Flores (RN); [employer]; [id number] (Exp. Date: 20-Jul-2025); IntraMuscular; Deltoid Right.; 0.5 milliLiter(s); VIS (VIS Published: 09-May-2013, VIS Presented: 20-Dec-2023);    Tdap; 20-Dec-2023 14:05; Isi Flores (RN); [employer]; [id number] (Exp. Date: 20-Jul-2025); IntraMuscular; Deltoid Right.; 0.5 milliLiter(s); VIS (VIS Published: 09-May-2013, VIS Presented: 20-Dec-2023);

## 2023-12-27 NOTE — DISCHARGE NOTE PROVIDER - HOSPITAL COURSE
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 Patient reports that she awoke in yas usual state of health this morning, was supposed to go to outpatient laboratory to obtain lab work. Patient states that she told her  to bring the car to her by the elevator and does not recall further. States that she told her  she "feels unwell" and feels "off." Per  at the bedside, he found patient face down on the floor.  reports that he stepped away from patient for under 2 minutes to bring the car over to her and awoke patient. Patient reports pain to her B/L knees. Patient reports that she has been ambulating since and was advised to come to [facility] by her [facility] team. Patient denies any bladder or bowel incontinence. Denies chest pain, palpitations, shortness of breath or dyspnea.  Denies nausea, vomiting, fever, chills, cough, abdominal pain, headache, dizziness, lightheadedness neck or back pain.  (20 Dec 2023 15:43)    Hospital Course:  Syncope  - Pt with syncope --> Likely due to Mod-Severe AS versus infectious etiology, however rule out other etiologies   - 8/2023 TTE w/ Preserved EF, No WMA, Grade II Diastolic dysfunction, Mod-Severe AS  - CT Head / C-Spine / Maxillofacial and 3D as noted   - MR head, MR A H/N --> w/ Punctate Acute Infarct mild to moderate microvascular ischemic change m, prior OR changes, neg for significant stenosis -->C/w  ASA 81 Mg PO Qd and Eliquis 2.5 BID resumed as per Vascular cardiology/ Neuro   - MR C-spine w/ multi-level degenerative changes   - Orthostatics negative;  Carotid Duplex w/ mild calcified plaque, without significant stenosis   - Check EEG (Pt w/ history of Seizures 10 years ago, not on AED) --> no seizures seen  - TTE w/ BUBBLE study- mod/severe AS  - Trop X 3 negative   - Neuro, EP and Cardio evaluations appreciated; F/u recs --> EP to follow up for extended cardiac monitoring --> Plan for [facility]OT and then elective ILR placement as an outpatient   - C/w Eliquis and ASA     Acute CVA  - Seen on MR  - ASA 81 Mg PO Qd and Eliquis 2.5 BID resume per discussion w/ Neuro/Cardio  - EP to follow up for cardiac monitoring --> Pt with PAT, per discussion with EP, not consistent with Afib. Continue to monitor on tele   - Lipitor 40   - PT/ OT   - Neuro eval     COVID +  - Patient with cough, per pharmacy, patient does not meet requirement for RDV per policy at this time. Continue to monitor symptoms at present   - Procal neg, ESR, CRP, Ferritin, D-dimer noted --> On AC  - CT Chest w/ DYLAN GG 4mm nodule, additional nodules, trace pericardial effusion, L breast lesion, Neg for PNA    - Incentive spirometer and Tessalon Perle PRN   - Hold off on additional steroids (Pt on steroids at home) as patient is not hypoxic, on RA   - Monitor O2 saturation; Supplement PRN to maintain > 90%    Leukocytosis   - Pt recently completed prolonged course of ABX on 12/16  - UCx, BCx2 --> negative    - Trend CBC, temp curve, VS     Mod-Severe AS, Trace pericardial effusion   - Seen on 8/2023 TTE  - repeat TTE   - Cardio eval    Knee Pain   - Pt with chronic knee pain, Osteoporosis   - B/L Knees with Ecchymosis --> X-rays neg for fracture     Breast CA, Lymphoma   - Follows with [facility] and Uro-Gyn   - CT w/ breast lesion, F/u with oncology for further management   - C/w Anastrazole  - Heme/Onc eval appreciated; F/u recs -->outpatient follow up       H/O DVT PE  - Eliquis resumed.     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 Patient reports that she awoke in yas usual state of health this morning, was supposed to go to outpatient laboratory to obtain lab work. Patient states that she told her  to bring the car to her by the elevator and does not recall further. States that she told her  she "feels unwell" and feels "off." Per  at the bedside, he found patient face down on the floor.  reports that he stepped away from patient for under 2 minutes to bring the car over to her and awoke patient. Patient reports pain to her B/L knees. Patient reports that she has been ambulating since and was advised to come to [facility] by her [facility] team. Patient denies any bladder or bowel incontinence. Denies chest pain, palpitations, shortness of breath or dyspnea.  Denies nausea, vomiting, fever, chills, cough, abdominal pain, headache, dizziness, lightheadedness neck or back pain.  (20 Dec 2023 15:43)    Hospital Course:  Syncope  - Pt with syncope --> Likely due to Mod-Severe AS versus infectious etiology, however rule out other etiologies   - 8/2023 TTE w/ Preserved EF, No WMA, Grade II Diastolic dysfunction, Mod-Severe AS  - CT Head / C-Spine / Maxillofacial and 3D as noted   - MR head, MR A H/N --> w/ Punctate Acute Infarct mild to moderate microvascular ischemic change m, prior OR changes, neg for significant stenosis -->C/w  ASA 81 Mg PO Qd and Eliquis 2.5 BID resumed as per Vascular cardiology/ Neuro   - MR C-spine w/ multi-level degenerative changes   - Orthostatics negative;  Carotid Duplex w/ mild calcified plaque, without significant stenosis   - Check EEG (Pt w/ history of Seizures 10 years ago, not on AED) --> no seizures seen  - TTE w/ BUBBLE study- mod/severe AS  - Trop X 3 negative   - Neuro, EP and Cardio evaluations appreciated; F/u recs --> EP to follow up for extended cardiac monitoring --> Plan for MCOT and then elective ILR placement as an outpatient   - C/w Eliquis and ASA     Acute CVA  - Seen on MR  - ASA 81 Mg PO Qd and Eliquis 2.5 BID resume per discussion w/ Neuro/Cardio  - EP to follow up for cardiac monitoring --> Pt with PAT, per discussion with EP, not consistent with Afib. Continue to monitor on tele   - Lipitor 40   - PT/ OT   - Neuro eval     COVID +  - Patient with cough, per pharmacy, patient does not meet requirement for RDV per policy at this time. Continue to monitor symptoms at present   - Procal neg, ESR, CRP, Ferritin, D-dimer noted --> On AC  - CT Chest w/ DYLAN GG 4mm nodule, additional nodules, trace pericardial effusion, L breast lesion, Neg for PNA    - Incentive spirometer and Tessalon Perle PRN   - Hold off on additional steroids (Pt on steroids at home) as patient is not hypoxic, on RA   - Monitor O2 saturation; Supplement PRN to maintain > 90%    Leukocytosis   - Pt recently completed prolonged course of ABX on 12/16  - UCx, BCx2 --> negative    - Trend CBC, temp curve, VS     Mod-Severe AS, Trace pericardial effusion   - Seen on 8/2023 TTE  - repeat TTE   - Cardio eval    Knee Pain   - Pt with chronic knee pain, Osteoporosis   - B/L Knees with Ecchymosis --> X-rays neg for fracture     Breast CA, Lymphoma   - Follows with [facility] and [facility]   - CT w/ breast lesion, F/u with oncology for further management   - C/w Anastrazole  - Heme/Onc eval appreciated; F/u recs -->outpatient follow up       H/O DVT PE  - Eliquis resumed.     Important Medication Changes and Reason:    Active or Pending Issues Requiring Follow-up:    Advanced Directives:   [ x] Full code  [ ] DNR  [ ] Hospice    Discharge Diagnoses:  Syncope  Covid   Mod-severe AS  CVA

## 2023-12-27 NOTE — PROGRESS NOTE ADULT - SUBJECTIVE AND OBJECTIVE BOX
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 with a chief complaint of syncope this morning. Patient reports that she awoke in yas usual state of health this morning, was supposed to go to outpatient laboratory to obtain lab work. Patient states that she told her  to bring the car to her by the elevator and does not recall further. States that she told her  she "feels unwell" and feels "off." Per  at the bedside, he found patient face down on the floor.  reports that he stepped away from patient for under 2 minutes to bring the car over to her and awoke patient. Patient reports pain to her B/L knees. Patient reports that she has been ambulating since and was advised to come to [facility] by her [facility] team. Patient denies any bladder or bowel incontinence. Denies chest pain, palpitations, shortness of breath or dyspnea.  Denies nausea, vomiting, fever, chills, cough, abdominal pain, headache, dizziness, lightheadedness neck or back pain.  (20 Dec 2023 15:43)    Describes a generalized feeling of "something just feeling off" prior to fainting.  No describable headache, vertigo, nausea, skin pallor, sweating, hot/flushed feeling, etc.  Last fainting episode was >5 yrs ago, unrelated incident.  A 10 pt ROS is otherwise negative.  12/22- resting comfortably, no new complaints, being taken off floor for addl testing today.  12/24 no events overnight  12/25 - denies chest pain, SOB, palpitations, or dizziness. Review of systems otherwise negative  12/26- resting in bed. had asymptomatic short runs of PAT (4-6sec).  no sustained AFib.  Date of service  12/27- no angina or palpitations.    PAST MEDICAL & SURGICAL HISTORY:  CVA (cerebral vascular accident)  Lymphoma  Osteoporosis  Seizure  Fracture  lumbar spine,  healed with conservative treatment  Neuropathy  bilateral feet  HLD (hyperlipidemia)  Depression  GERD (gastroesophageal reflux disease)  Hodgkins disease  Pulmonary embolus  Fracture, ankle  Right, s/p metal plate  H/O sinus surgery  Cataract extraction status of eye, right     acetaminophen     Tablet .. 650 milliGRAM(s) Oral every 6 hours PRN  anastrozole 1 milliGRAM(s) Oral daily  apixaban 2.5 milliGRAM(s) Oral every 12 hours  aspirin enteric coated 81 milliGRAM(s) Oral daily  atorvastatin 40 milliGRAM(s) Oral at bedtime  benzonatate 100 milliGRAM(s) Oral every 8 hours PRN  chlorhexidine 2% Cloths 1 Application(s) Topical daily  fluticasone propionate 50 MICROgram(s)/spray Nasal Spray 1 Spray(s) Both Nostrils two times a day PRN  melatonin 3 milliGRAM(s) Oral at bedtime PRN  pantoprazole    Tablet 40 milliGRAM(s) Oral before breakfast  predniSONE   Tablet 5 milliGRAM(s) Oral every other day  sertraline 50 milliGRAM(s) Oral daily  sodium chloride 0.9%. 1000 milliLiter(s) IV Continuous <Continuous>                            11.6   6.82  )-----------( 199      ( 26 Dec 2023 07:51 )             36.7       12-27    x   |  x   |  x   ----------------------------<  x   x    |  x   |  0.78    Ca    8.9      26 Dec 2023 07:51  Mg     2.0     12-26    TPro  6.1  /  Alb  3.8  /  TBili  0.2  /  DBili  0.1  /  AST  33  /  ALT  29  /  AlkPhos  47  12-27      T(C): 36.5 (12-27-23 @ 06:28), Max: 37.2 (12-26-23 @ 13:05)  HR: 64 (12-27-23 @ 06:28) (64 - 77)  BP: 127/69 (12-27-23 @ 06:28) (118/69 - 132/58)  RR: 18 (12-27-23 @ 06:28) (18 - 18)  SpO2: 97% (12-27-23 @ 06:28) (97% - 97%)  Wt(kg): --    I&O's Summary    26 Dec 2023 07:01  -  27 Dec 2023 07:00  --------------------------------------------------------  IN: 580 mL / OUT: 0 mL / NET: 580 mL        Appearance: Elderly woman in no acute distress  HEENT:   Normal oral mucosa, PERRL, EOMI	  Lymphatic: No lymphadenopathy , no edema  Cardiovascular: Normal S1 S2, No JVD, No murmurs , Peripheral pulses palpable 2+ bilaterally  Respiratory: Lungs clear to auscultation, normal effort 	  Gastrointestinal:  Soft, Non-tender, + BS	  Skin: ecchymoses - periorbital, chin, cheekbones, etc.  Musculoskeletal: Normal range of motion, normal strength  Psychiatry:  Mood & affect appropriate    TELEMETRY: SB/SR , no events	    ECG: NSR, normal intervals  Echo:  Historically normal LVEF, moderate-to-severe Aortic valve stenosis.        ASSESSMENT/PLAN: Ms Yates is a very pleasant 78y Female here after semi-witnessed collapse in her apartment complex parking garage.  She has history of stroke, CNS vasculitis, breast cancer on maintenance anastrozole, and documented moderate-to-severe aortic valve stenosis.  On day of presentation, she was preparing for AM fasting labs .  She did not know she had COVID until diagnosed in the ED.    Her fainting episode is most likely vasovagal, spurred on by relative dehydration in fasted state, having had COVID, and about a 15 min walk through her housing complex down to the parking garage.  Her forwards fall and facial injury are entirely unfortunate, but is not by itself indicative of a more sinister cause.  Still pending an echocardiogram re: AS progression, although this is not likely the cause of her syncopal event.  OK to continue atorvastatin for remote hx of CVA.  Maintain telemetry re: arrhythmia surveillance. At this time I would not implant a loop recorder. Would recommend event monitoring and office followup.    Telemetry with short bursts of PAT - no clinical AFib.  Continue low-dose apixaban + aspirin, re: hx of CNS vasculitis.  At this point, does not have a strong indication to increase apixaban to full-dose.  Continue telemetry surveillance for AFib while in hospital.  Recommend clearance from COVID then rehab placement.  [facility] / will be monitored by team that reports to her Health system heart team.  Optional ILR placement, electively as an outpatient.  Plan discussed w/ Dr Sparks and Dr Crockett.     Sunday Lino M.D.  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 Patient reports that she awoke in yas usual state of health this morning, was supposed to go to outpatient laboratory to obtain lab work. Patient states that she told her  to bring the car to her by the elevator and does not recall further. States that she told her  she "feels unwell" and feels "off." Per  at the bedside, he found patient face down on the floor.  reports that he stepped away from patient for under 2 minutes to bring the car over to her and awoke patient. Patient reports pain to her B/L knees. Patient reports that she has been ambulating since and was advised to come to [facility] by her [facility] team. Patient denies any bladder or bowel incontinence. Denies chest pain, palpitations, shortness of breath or dyspnea.  Denies nausea, vomiting, fever, chills, cough, abdominal pain, headache, dizziness, lightheadedness neck or back pain.  (20 Dec 2023 15:43)    Describes a generalized feeling of "something just feeling off" prior to fainting.  No describable headache, vertigo, nausea, skin pallor, sweating, hot/flushed feeling, etc.  Last fainting episode was >5 yrs ago, unrelated incident.  A 10 pt ROS is otherwise negative.  12/22- resting comfortably, no new complaints, being taken off floor for addl testing today.  12/24 no events overnight  12/25 - denies chest pain, SOB, palpitations, or dizziness. Review of systems otherwise negative  12/26- resting in bed. had asymptomatic short runs of PAT (4-6sec).  no sustained AFib.  Date of service  12/27- no angina or palpitations.    PAST MEDICAL & SURGICAL HISTORY:  CVA (cerebral vascular accident)  Lymphoma  Osteoporosis  Seizure  Fracture  lumbar spine,  healed with conservative treatment  Neuropathy  bilateral feet  HLD (hyperlipidemia)  Depression  GERD (gastroesophageal reflux disease)  Hodgkins disease  Pulmonary embolus  Fracture, ankle  Right, s/p metal plate  H/O sinus surgery  Cataract extraction status of eye, right     acetaminophen     Tablet .. 650 milliGRAM(s) Oral every 6 hours PRN  anastrozole 1 milliGRAM(s) Oral daily  apixaban 2.5 milliGRAM(s) Oral every 12 hours  aspirin enteric coated 81 milliGRAM(s) Oral daily  atorvastatin 40 milliGRAM(s) Oral at bedtime  benzonatate 100 milliGRAM(s) Oral every 8 hours PRN  chlorhexidine 2% Cloths 1 Application(s) Topical daily  fluticasone propionate 50 MICROgram(s)/spray Nasal Spray 1 Spray(s) Both Nostrils two times a day PRN  melatonin 3 milliGRAM(s) Oral at bedtime PRN  pantoprazole    Tablet 40 milliGRAM(s) Oral before breakfast  predniSONE   Tablet 5 milliGRAM(s) Oral every other day  sertraline 50 milliGRAM(s) Oral daily  sodium chloride 0.9%. 1000 milliLiter(s) IV Continuous <Continuous>                            11.6   6.82  )-----------( 199      ( 26 Dec 2023 07:51 )             36.7       12-27    x   |  x   |  x   ----------------------------<  x   x    |  x   |  0.78    Ca    8.9      26 Dec 2023 07:51  Mg     2.0     12-26    TPro  6.1  /  Alb  3.8  /  TBili  0.2  /  DBili  0.1  /  AST  33  /  ALT  29  /  AlkPhos  47  12-27      T(C): 36.5 (12-27-23 @ 06:28), Max: 37.2 (12-26-23 @ 13:05)  HR: 64 (12-27-23 @ 06:28) (64 - 77)  BP: 127/69 (12-27-23 @ 06:28) (118/69 - 132/58)  RR: 18 (12-27-23 @ 06:28) (18 - 18)  SpO2: 97% (12-27-23 @ 06:28) (97% - 97%)  Wt(kg): --    I&O's Summary    26 Dec 2023 07:01  -  27 Dec 2023 07:00  --------------------------------------------------------  IN: 580 mL / OUT: 0 mL / NET: 580 mL        Appearance: Elderly woman in no acute distress  HEENT:   Normal oral mucosa, PERRL, EOMI	  Lymphatic: No lymphadenopathy , no edema  Cardiovascular: Normal S1 S2, No JVD, No murmurs , Peripheral pulses palpable 2+ bilaterally  Respiratory: Lungs clear to auscultation, normal effort 	  Gastrointestinal:  Soft, Non-tender, + BS	  Skin: ecchymoses - periorbital, chin, cheekbones, etc.  Musculoskeletal: Normal range of motion, normal strength  Psychiatry:  Mood & affect appropriate    TELEMETRY: SB/SR , no events	    ECG: NSR, normal intervals  Echo:  Historically normal LVEF, moderate-to-severe Aortic valve stenosis.        ASSESSMENT/PLAN: Ms Yates is a very pleasant 78y Female here after semi-witnessed collapse in her apartment complex parking garage.  She has history of stroke, CNS vasculitis, breast cancer on maintenance anastrozole, and documented moderate-to-severe aortic valve stenosis.  On day of presentation, she was preparing for AM fasting labs .  She did not know she had COVID until diagnosed in the ED.    Her fainting episode is most likely vasovagal, spurred on by relative dehydration in fasted state, having had COVID, and about a 15 min walk through her housing complex down to the parking garage.  Her forwards fall and facial injury are entirely unfortunate, but is not by itself indicative of a more sinister cause.  Still pending an echocardiogram re: AS progression, although this is not likely the cause of her syncopal event.  OK to continue atorvastatin for remote hx of CVA.  Maintain telemetry re: arrhythmia surveillance. At this time I would not implant a loop recorder. Would recommend event monitoring and office followup.    Telemetry with short bursts of PAT - no clinical AFib.  Continue low-dose apixaban + aspirin, re: hx of CNS vasculitis.  At this point, does not have a strong indication to increase apixaban to full-dose.  Continue telemetry surveillance for AFib while in hospital.  Recommend clearance from COVID then rehab placement.  [facility] thru [facility] / will be monitored by team that reports to her [facility] heart team.  Optional ILR placement, electively as an outpatient.  Plan discussed w/ Dr Sparks and Dr Crockett.     Sunday Lino M.D.  Cardiac Electrophysiology  [phone]

## 2023-12-27 NOTE — DISCHARGE NOTE PROVIDER - NSDCCPCAREPLAN_GEN_ALL_CORE_FT
PRINCIPAL DISCHARGE DIAGNOSIS  Diagnosis: Syncope  Assessment and Plan of Treatment: HOME CARE INSTRUCTIONS  Have someone stay with you until you feel stable.  Do not drive, operate machinery, or play sports until your caregiver says it is okay.  Keep all follow-up appointments as directed by your caregiver.   Lie down right away if you start feeling like you might faint. Breathe deeply and steadily. Wait until all the symptoms have passed.Drink enough fluids to keep your urine clear or pale yellow.  If you are taking blood pressure or heart medicine, get up slowly, taking several minutes to sit and then stand. This can reduce dizziness.        SECONDARY DISCHARGE DIAGNOSES  Diagnosis: COVID-19  Assessment and Plan of Treatment: You tested positive for COVID 19.  You no longer require hospitalization.  Please restrict activities outside of your home except for getting medical care.  Do not go to work, school, or public areas.  Avoid using public transportation, ride-sharing, or taxis.  Separate yourself from other people and animals in your home.  Call ahead before visiting your doctor.  Wear a facemask when you are around other people. Cover your cough and sneezes.  Clean your hands often.  Avoid sharing personal household items.  Clean all frequently touched surfaces daily.    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 Naif Love neuro

## 2023-12-27 NOTE — PROGRESS NOTE ADULT - PROVIDER SPECIALTY LIST ADULT
Electrophysiology
Heme/Onc
Heme/Onc
Internal Medicine
Electrophysiology
Internal Medicine
Electrophysiology
Internal Medicine
Neurology
Heme/Onc
Heme/Onc
Internal Medicine
Neurology
Heme/Onc
Heme/Onc
Internal Medicine

## 2023-12-27 NOTE — DISCHARGE NOTE NURSING/CASE MANAGEMENT/SOCIAL WORK - NSDCPEFALRISK_GEN_ALL_CORE
For information on Fall & Injury Prevention, visit: [email or web address]-[email or web address] OR  [email or web address]/[email or web address]-[email or web address]-injury OR  https://www.cdc.gov/steadi/patient.html For information on Fall & Injury Prevention, visit: [email or web address]/[email or web address]-[email or web address]-mobility OR  https://[email or web address].[email or web address]/[email or web address]-[email or web address]-tips-to-avoid-injury OR  https://www.cdc.gov/steadi/patient.html

## 2023-12-27 NOTE — EEG REPORT - NS EEG TEXT BOX
[facility]   COMPREHENSIVE EPILEPSY CENTER   REPORT OF ROUTINE EEG W/ Video     [facility]: [address] , [address], [place] [address], Ph#: [phone]  [facility]: [id number] [place] [address], Ph#: [phone]  [facility]: [address] [place] [address], Ph#: [phone]    Patient Name: VINCENT ENGLAND  Age and : 78y ([birth year])  MRN #: [id number]  Location: [facility] 4MON 409 W1  Referring Physician: Conrado Crockett    Study Date: 23    _____________________________________________________________  TECHNICAL INFORMATION    Placement and Labeling of Electrodes:  The EEG was performed utilizing 20 channels referential EEG connections (coronal over temporal over parasagittal montage) using all standard 10-20 electrode placements with EKG.  Recording was at a sampling rate of 256 samples per second per channel.  Time synchronized digital video recording was done simultaneously with EEG recording.  A low light infrared camera was used for low light recording.  Kamari and seizure detection algorithms were utilized.    _____________________________________________________________  HISTORY    Patient is a 78y old  Female who presents with a chief complaint of Syncope (26 Dec 2023 16:00)      PERTINENT MEDICATION:  MEDICATIONS  (STANDING):  anastrozole 1 milliGRAM(s) Oral daily  apixaban 2.5 milliGRAM(s) Oral every 12 hours  aspirin enteric coated 81 milliGRAM(s) Oral daily  atorvastatin 40 milliGRAM(s) Oral at bedtime  chlorhexidine 2% Cloths 1 Application(s) Topical daily  pantoprazole    Tablet 40 milliGRAM(s) Oral before breakfast  predniSONE   Tablet 5 milliGRAM(s) Oral every other day  sertraline 50 milliGRAM(s) Oral daily  sodium chloride 0.9%. 1000 milliLiter(s) (50 mL/Hr) IV Continuous <Continuous>    _____________________________________________________________  STUDY INTERPRETATION    Findings: The background was continuous, spontaneously variable and reactive. During wakefulness, the posterior dominant rhythm consisted of symmetric, 7Hz activity, with amplitude to 30 uV, that attenuated to eye opening.  Low amplitude frontal beta was noted in wakefulness.    Background Slowing:  Excess diffuse polymorphic delta slowing.    Focal Slowing:   None were present.    Sleep Background:  Drowsiness was characterized by fragmentation, attenuation, and slowing of the background activity.    Sleep was characterized by the presence of vertex waves, symmetric sleep spindles and K-complexes.    Other Non-Epileptiform Findings:  None were present.    Interictal Epileptiform Activity:   None were present.    Events:  Clinical events: None recorded.  Seizures: None recorded.    Activation Procedures:   Hyperventilation was not performed.    Photic stimulation was not performed.     Artifacts:  Intermittent myogenic and movement artifacts were noted.    _____________________________________________________________  EEG SUMMARY/CLASSIFICATION    Abnormal EEG    - Mild generalized slowing.    _____________________________________________________________  EEG IMPRESSION/CLINICAL CORRELATE    Abnormal EEG study.  Mild nonspecific diffuse or multifocal cerebral dysfunction.   No epileptiform pattern or seizure seen.    Hadley pSencer MD  EEG/Epilepsy Attending [facility]   COMPREHENSIVE EPILEPSY CENTER   REPORT OF ROUTINE EEG W/ Video     [facility]: [address] , [address], [place] [address], Ph#: [phone]  [facility]: [id number] [place] [address], Ph#: [phone]  [facility]: [address] [place] [address], Ph#: [phone]    Patient Name: VINCENT ENGLAND  Age and : 78y ([birth year])  MRN #: [id number]  Location: [facility] 4MON 409 W1  Referring Physician: Conrado Crockett    Study Date: 23    _____________________________________________________________  TECHNICAL INFORMATION    Placement and Labeling of Electrodes:  The EEG was performed utilizing 20 channels referential EEG connections (coronal over temporal over parasagittal montage) using all standard 10-20 electrode placements with EKG.  Recording was at a sampling rate of 256 samples per second per channel.  Time synchronized digital video recording was done simultaneously with EEG recording.  A low light infrared camera was used for low light recording.  Kamari and seizure detection algorithms were utilized.    _____________________________________________________________  HISTORY    Patient is a 78y old  Female who presents with a chief complaint of Syncope (26 Dec 2023 16:00)      PERTINENT MEDICATION:  MEDICATIONS  (STANDING):  anastrozole 1 milliGRAM(s) Oral daily  apixaban 2.5 milliGRAM(s) Oral every 12 hours  aspirin enteric coated 81 milliGRAM(s) Oral daily  atorvastatin 40 milliGRAM(s) Oral at bedtime  chlorhexidine 2% Cloths 1 Application(s) Topical daily  pantoprazole    Tablet 40 milliGRAM(s) Oral before breakfast  predniSONE   Tablet 5 milliGRAM(s) Oral every other day  sertraline 50 milliGRAM(s) Oral daily  sodium chloride 0.9%. 1000 milliLiter(s) (50 mL/Hr) IV Continuous <Continuous>    _____________________________________________________________  STUDY INTERPRETATION    Findings: The background was continuous, spontaneously variable and reactive. During wakefulness, the posterior dominant rhythm consisted of symmetric, 7Hz activity, with amplitude to 30 uV, that attenuated to eye opening.  Low amplitude frontal beta was noted in wakefulness.    Background Slowing:  Excess diffuse polymorphic delta slowing.    Focal Slowing:   None were present.    Sleep Background:  Drowsiness was characterized by fragmentation, attenuation, and slowing of the background activity.    Sleep was characterized by the presence of vertex waves, symmetric sleep spindles and K-complexes.    Other Non-Epileptiform Findings:  None were present.    Interictal Epileptiform Activity:   None were present.    Events:  Clinical events: None recorded.  Seizures: None recorded.    Activation Procedures:   Hyperventilation was not performed.    Photic stimulation was not performed.     Artifacts:  Intermittent myogenic and movement artifacts were noted.    _____________________________________________________________  EEG SUMMARY/CLASSIFICATION    Abnormal EEG    - Mild generalized slowing.    _____________________________________________________________  EEG IMPRESSION/CLINICAL CORRELATE    Abnormal EEG study.  Mild nonspecific diffuse or multifocal cerebral dysfunction.   No epileptiform pattern or seizure seen.    Hadley Spencer MD  EEG/Epilepsy Attending

## 2023-12-27 NOTE — DISCHARGE NOTE PROVIDER - PROVIDER TOKENS
PROVIDER:[TOKEN:[[id number]],FOLLOWUP:[1 week]],PROVIDER:[TOKEN:[[id number]],FOLLOWUP:[1 week]] PROVIDER:[TOKEN:[[id number]],FOLLOWUP:[1 week]],PROVIDER:[TOKEN:[[id number]],FOLLOWUP:[1 week]]

## 2023-12-27 NOTE — PROGRESS NOTE ADULT - ASSESSMENT
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   NIHSS 1 premrs2  dimer 254  ESR 40   LDL 78   CRP 49   + covid   A1c 6  LDL 78   MRI brain with AIS in left post temporal lobe, punctate.  old L cerebellar and extensive old hemorrhagic products  mod MVID; prior R frontal crani   MRA H/N neg   MRI C spine with Degenertive chagnes   TTE as above. EF 63%   EEG obtained. 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  was on Remdesivir. monitor O2 for steroids   - orthostatics   - PT/OT/SS/SLP, OOBC  - check FS, glucose control <180  - GI/DVT ppx   - Thank you for allowing me to participate in the care of this patient. Call with questions.   - sees Dr. 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   NIHSS 1 premrs2  dimer 254  ESR 40   LDL 78   CRP 49   + covid   A1c 6  LDL 78   MRI brain with AIS in left post temporal lobe, punctate.  old L cerebellar and extensive old hemorrhagic products  mod MVID; prior R frontal crani   MRA H/N neg   MRI C spine with Degenertive chagnes   TTE as above. EF 63%   EEG obtained. 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  was on Remdesivir. monitor O2 for steroids   - orthostatics   - PT/OT/SS/SLP, OOBC  - check FS, glucose control <180  - GI/DVT ppx   - Thank you for allowing me to participate in the care of this patient. Call with questions.   - sees Dr. Naif Love neuro outpatient \  dc planning   Sina Calles MD  Vascular Neurology  Office: [phone]

## 2023-12-27 NOTE — DISCHARGE NOTE PROVIDER - NSDCMRMEDTOKEN_GEN_ALL_CORE_FT
acetaminophen 325 mg oral tablet: 2 tab(s) orally every 6 hours, As needed, Mild Pain (1 - 3)  anastrozole 1 mg oral tablet: 1 tab(s) orally once a day  aspirin 81 mg oral delayed release tablet: 1 tab(s) orally once a day  atorvastatin 40 mg oral tablet: 1 tab(s) orally once a day (at bedtime)  Biotin 5000 mcg oral capsule: 1 cap(s) orally once a day  dexlansoprazole 30 mg oral delayed release capsule: 1 cap(s) orally once a day  Eliquis 2.5 mg oral tablet: 1 tab(s) orally 2 times a day  predniSONE 5 mg oral delayed release tablet: 1 tab(s) orally every other day  Prolia 60 mg/mL subcutaneous solution: 60 milligram(s) subcutaneously every 6 months  sertraline 50 mg oral tablet: 1 tab(s) orally once a day  VESIcare 10 mg oral tablet: 1 tab(s) orally once a day  Vitamin D3 5000 intl units oral capsule: 1 cap(s) orally once a day

## 2023-12-27 NOTE — PROGRESS NOTE ADULT - ASSESSMENT
77 y/o F on adjuvant anastrozole for L breast cancer, admitted for syncope.    Breast Cancer  --under the care of Dr. 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  --ongoing care after discharge    Syncope, COVID+, stroke  - Per EP, suspect syncope related to vasovagal due to dehydration  - CT chest w/ No pneumonia. New tiny indeterminate 4 mm left upper lobe groundglass pulmonary nodule. Redemonstrated partially cystic lesion of the left medial breast of uncertain etiology.  - CT head w/ no evidence of acute intracranial hemorrhage or midline shift.  - CT C spine w/ no evidence of acute osseous fracture or ko dislocation. Multilevel degenerative change of the cervical spine as discussed above, most significantly at the C4-C6 levels where there is moderate to severe narrowing of the spinal canal. In the setting of myelopathy, recommend MRI of the cervical spine for further evaluation.  - Now off remdesivir  - Per neurology, on ASA and eliquis based on MRI head (Punctate acute infarct in the left posterior temporal lobe)  - Neg MRA H/N   - Vascular following  - Pending TTE w/ bubble study was done awaiting results   - EEG can be done outpatient   - Per EP, optional ILR placement, electively as an outpatient.    History of DLBCL vs. NLPHL, CSIII  - S/p RCHOP x 6 5/2011 with CR    History of b/l PE and DVT  - On eliquis    Conrado Mariano MD  HematologyOncology   O:     79 y/o F on adjuvant anastrozole for L breast cancer, admitted for syncope.    Bre[place] Cancer  [place] 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  --ongoing care after discharge    Syncope, COVID+, stroke  - Per EP, suspect syncope related to vasovagal due to dehydration  - CT chest w/ No pneumonia. New tiny indeterminate 4 mm left upper lobe groundglass pulmonary nodule. Redemonstrated partially cystic lesion of the left medial breast of uncertain etiology.  - CT head w/ no evidence of acute intracranial hemorrhage or midline shift.  - CT C spine w/ no evidence of acute osseous fracture or ko dislocation. Multilevel degenerative change of the cervical spine as discussed above, most significantly at the C4-C6 levels where there is moderate to severe narrowing of the spinal canal. In the setting of myelopathy, recommend MRI of the cervical spine for further evaluation.  - Now off remdesivir  - Per neurology, on ASA and eliquis based on MRI head (Punctate acute infarct in the left posterior temporal lobe)  - Neg MRA H/N   - Vascular following  - Pending TTE w/ bubble study was done awaiting results   - EEG can be done outpatient   - Per EP, optional ILR placement, electively as an outpatient.    History of DLBCL vs. NLPHL, CSIII  - S/p RCHOP x 6 5/2011 with CR    History of b/l PE and DVT  - On eliquis    Conrado Mariano MD  HematologyOncology   O:

## 2023-12-27 NOTE — DISCHARGE NOTE PROVIDER - CARE PROVIDERS DIRECT ADDRESSES
,[email or web address],DirectAddress_Unknown ,[email or web address],DirectAddress_Unknown

## 2023-12-27 NOTE — DISCHARGE NOTE PROVIDER - NSDCFUSCHEDAPPT_GEN_ALL_CORE_FT
Al Sparks Physician [facility]  CARDIOLOGY [address]  Scheduled Appointment: 01/26/2024     Al Sparks Physician [facility]  CARDIOLOGY [address]  Scheduled Appointment: 01/26/2024

## 2023-12-27 NOTE — DISCHARGE NOTE NURSING/CASE MANAGEMENT/SOCIAL WORK - NSDCPEELIQUISFU_GEN_ALL_CORE
Subjective   Chief Complaint   Patient presents with   • Med Refill   • Daytime hypersomnia   • Depression   • Anxiety   • GI Problem     Carol Preciado is a 43 y.o. female.     Patient Care Team:  Marielle Beauchamp MD as PCP - General (Family Medicine)  Andrew Caceres MD as Consulting Physician (Obstetrics and Gynecology)     You have chosen to receive care through a telehealth visit.  Do you consent to use a video/audio connection for your medical care today? Yes      Patient is being evaluated today through telehealth medicine appointment via the video in view of COVID-19 pandemic.  She is following up on her medical problems and her medications and she needs refills on her medicines.  We are addressing her daytime idiopathic insomnia, anxiety and depression, chronic constipation, and acid reflux problems.  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  She denies any medication side effects. Patient denies any chest pain, shortness of breath, dizziness, nausea, vomiting, or diarrhea. No visual issues reported. No headaches, numbness or tingling. No urinary issues. Patient has good appetite and denies any weight changes. No swelling reported. No emotional issues.         The following portions of the patient's history were reviewed and updated as appropriate: allergies, current medications, past family history, past medical history, past social history, past surgical history and problem list.  Past Medical History:   Diagnosis Date   • ADHD    • Allergic    • Anxiety    • Depression    • GERD (gastroesophageal reflux disease)    • Headache      Past Surgical History:   Procedure Laterality Date   • UPPER GASTROINTESTINAL ENDOSCOPY  09/03/2019     The patient has a family history of  Family History   Problem Relation Age of Onset   • Breast cancer Maternal Aunt    • Cancer Maternal Aunt    • Anxiety disorder Mother    • Stroke Mother    •  Anxiety disorder Brother    • Hearing loss Maternal Grandmother      Social History     Socioeconomic History   • Marital status:      Spouse name: Not on file   • Number of children: Not on file   • Years of education: Not on file   • Highest education level: Not on file   Tobacco Use   • Smoking status: Current Every Day Smoker     Types: Cigarettes   • Smokeless tobacco: Never Used   Substance and Sexual Activity   • Alcohol use: Yes     Frequency: 4 or more times a week   • Drug use: No   • Sexual activity: Defer       Review of Systems   Constitutional: Negative for activity change, appetite change, fatigue and fever.   Respiratory: Negative for cough, shortness of breath and wheezing.    Cardiovascular: Negative for chest pain, palpitations and leg swelling.   Gastrointestinal: Negative for constipation, diarrhea, nausea, vomiting and indigestion.   Skin: Negative for color change, dry skin, rash and skin lesions.   Neurological: Negative for tremors and headache.   Psychiatric/Behavioral: Negative for agitation, behavioral problems, decreased concentration, dysphoric mood, hallucinations, self-injury, sleep disturbance, suicidal ideas, negative for hyperactivity, depressed mood and stress. The patient is not nervous/anxious.      There were no vitals taken for this visit.    Current Outpatient Medications:   •  amphetamine-dextroamphetamine (Adderall) 10 MG tablet, Take 1 tablet by mouth 3 (Three) Times a Day., Disp: 84 tablet, Rfl: 0  •  desvenlafaxine (PRISTIQ) 100 MG 24 hr tablet, Take  by mouth Daily., Disp: , Rfl:   •  famotidine (PEPCID) 20 MG tablet, Take 1 tablet by mouth At Night As Needed for Heartburn., Disp: 90 tablet, Rfl: 0  •  pantoprazole (PROTONIX) 40 MG EC tablet, TAKE 1 TABLET BY MOUTH DAILY, Disp: 90 tablet, Rfl: 0  •  polyethylene glycol (MIRALAX) packet, Take 17 g by mouth Daily., Disp: , Rfl:     Objective   Physical Exam   Constitutional: She is oriented to person, place, and  time. She appears well-developed and well-nourished. No distress.   HENT:   Head: Normocephalic and atraumatic.   Eyes: Pupils are equal, round, and reactive to light. Conjunctivae and EOM are normal.   Neck: Normal range of motion. Neck supple.   Pulmonary/Chest: Effort normal. No respiratory distress.   Musculoskeletal: Normal range of motion.   Neurological: She is alert and oriented to person, place, and time. No cranial nerve deficit.   Skin: She is not diaphoretic.   Psychiatric: She has a normal mood and affect. Judgment and thought content normal.             Assessment/Plan   Problems Addressed this Visit        Digestive    Gastroesophageal reflux disease without esophagitis    Relevant Medications    famotidine (PEPCID) 20 MG tablet    Constipation, chronic       Other    Anxiety disorder    Relevant Medications    desvenlafaxine (PRISTIQ) 100 MG 24 hr tablet    amphetamine-dextroamphetamine (Adderall) 10 MG tablet    Depression    Relevant Medications    desvenlafaxine (PRISTIQ) 100 MG 24 hr tablet    amphetamine-dextroamphetamine (Adderall) 10 MG tablet    Hypersomnia, idiopathic - Primary    Relevant Medications    amphetamine-dextroamphetamine (Adderall) 10 MG tablet        I reviewed her medical problems and her medications.  She may continue Adderall for her daytime hypersomnia.  She is not able to take Nuvigil or Provigil as an alternative medication due to insurance problems.  Her GI issues are also stable and well-controlled.  Her anxiety and depression symptoms are well controlled with Pristiq and she will continue the same.    Patient was evaluated today through telehealth medicine appointment via the video.  Total evaluation time was 25 minutes.           Requested Prescriptions     Signed Prescriptions Disp Refills   • amphetamine-dextroamphetamine (Adderall) 10 MG tablet 84 tablet 0     Sig: Take 1 tablet by mouth 3 (Three) Times a Day.   • famotidine (PEPCID) 20 MG tablet 90 tablet 0      Sig: Take 1 tablet by mouth At Night As Needed for Heartburn.      Go for blood tests as directed. Your doctor will do lab tests at regular visits to monitor the effects of this medicine. Please follow up with your doctor and keep your health care provider appointments.

## 2023-12-27 NOTE — PROGRESS NOTE ADULT - SUBJECTIVE AND OBJECTIVE BOX
Neurology     S: patient seen. MRI + AIS         Medications: MEDICATIONS  (STANDING):  anastrozole 1 milliGRAM(s) Oral daily  apixaban 2.5 milliGRAM(s) Oral every 12 hours  aspirin enteric coated 81 milliGRAM(s) Oral daily  atorvastatin 40 milliGRAM(s) Oral at bedtime  chlorhexidine 2% Cloths 1 Application(s) Topical daily  pantoprazole    Tablet 40 milliGRAM(s) Oral before breakfast  predniSONE   Tablet 5 milliGRAM(s) Oral every other day  sertraline 50 milliGRAM(s) Oral daily  sodium chloride 0.9%. 1000 milliLiter(s) (50 mL/Hr) IV Continuous <Continuous>    MEDICATIONS  (PRN):  acetaminophen     Tablet .. 650 milliGRAM(s) Oral every 6 hours PRN Moderate Pain (4 - 6)  benzonatate 100 milliGRAM(s) Oral every 8 hours PRN Cough  fluticasone propionate 50 MICROgram(s)/spray Nasal Spray 1 Spray(s) Both Nostrils two times a day PRN allergic rhinitis  melatonin 3 milliGRAM(s) Oral at bedtime PRN Insomnia       Vitals:  Vital Signs Last 24 Hrs  T(C): 36.5 (27 Dec 2023 06:28), Max: 37 (26 Dec 2023 20:52)  T(F): 97.7 (27 Dec 2023 06:28), Max: 98.6 (26 Dec 2023 20:52)  HR: 64 (27 Dec 2023 06:28) (64 - 65)  BP: 127/69 (27 Dec 2023 06:28) (122/81 - 127/69)  BP(mean): --  RR: 18 (27 Dec 2023 06:28) (18 - 18)  SpO2: 97% (27 Dec 2023 06:28) (97% - 97%)    Parameters below as of 27 Dec 2023 06:28  Patient On (Oxygen Delivery Method): room air              General Exam:   General Appearance: Appropriately dressed and in no acute distress       Head: Normocephalic, atraumatic and no dysmorphic features  Ear, Nose, and Throat: Moist mucous membranes  CVS: S1S2+  Resp: No SOB, no wheeze or rhonchi  GI: soft NT/ND  Extremities: No edema or cyanosis  Skin: No bruises or rashes     Neurological Exam:  Mental Status: Awake, alert and oriented x 3.  Able to follow simple and complex verbal commands. Able to name and repeat. fluent speech. No obvious aphasia or dysarthria noted.   Cranial Nerves: PERRL, EOMI, VFFC, sensation V1-V3 intact,  no obvious facial asymmetry, equal elevation of palate, scm/trap 5/5, tongue is midline on protrusion. no obvious papilledema on fundoscopic exam. hearing is grossly intact.   Motor: Normal bulk, tone and strength throughout. Fine finger movements were intact and symmetric. no tremors or drift noted.    Sensation: Intact to light touch and pinprick throughout. no right/left confusion. no extinction to tactile on DSS. Romberg was negative.   Reflexes: 1+ throughout at biceps, brachioradialis, triceps, patellars and ankles bilaterally and equal. No clonus. R toe and L toe were both downgoing.  Coordination: No dysmetria on FNF or HKS  Gait: deferred     Data/Labs/Imaging which I personally reviewed.         LABS:                          11.6   6.82  )-----------( 199      ( 26 Dec 2023 07:51 )             36.7     12-27    x   |  x   |  x   ----------------------------<  x   x    |  x   |  0.78    Ca    8.9      26 Dec 2023 07:51  Mg     2.0     12-26    TPro  6.1  /  Alb  3.8  /  TBili  0.2  /  DBili  0.1  /  AST  33  /  ALT  29  /  AlkPhos  47  12-27    LIVER FUNCTIONS - ( 27 Dec 2023 06:30 )  Alb: 3.8 g/dL / Pro: 6.1 g/dL / ALK PHOS: 47 U/L / ALT: 29 U/L / AST: 33 U/L / GGT: x             Urinalysis Basic - ( 26 Dec 2023 07:51 )    Color: x / Appearance: x / SG: x / pH: x  Gluc: 88 mg/dL / Ketone: x  / Bili: x / Urobili: x   Blood: x / Protein: x / Nitrite: x   Leuk Esterase: x / RBC: x / WBC x   Sq Epi: x / Non Sq Epi: x / Bacteria: x              < from: CT Head No Cont (12.20.23 @ 12:33) >    ACC: [id number] EXAM:  CT CERVICAL SPINE   ORDERED BY:  SAMIA MILLS     ACC: [id number] EXAM:  CT MAXILLOFACIAL   ORDERED BY:  SAMIA MILLS     ACC: [id number] EXAM:  CT BRAIN   ORDERED BY:  SAMIA MILLS     ACC: [id number] EXAM:  CT 3D RECONSTRUCT W TAYLOR   ORDERED BY:    SAMIA MILLS     PROCEDURE DATE:  12/20/2023          INTERPRETATION:  EXAM: CT HEAD, FACIAL BONES, AND CERVICAL SPINE WITHOUT   INTRAVENOUS CONTRAST    HISTORY: Trauma, syncopal episode, currently on anticoagulation    TECHNIQUE: Multiple axial images were obtained of the head, facial bones,   and cervical spine. Sagittal and coronal reformatted images were obtained   from the axial data set. The images were reviewed in brain and bone   windows.    COMPARISON: CT of the head May 13, 2017, MRI of the head May 31, 2021    FINDINGS:    CT HEAD:  No acute intracranial hemorrhage. Areas of decreased attenuation in the   deep and periventricular white matter, compatible with chronic small   vessel disease. Mild encephalomalacia/gliotic change in the high right   frontal lobe. Chronic appearing infarcts in the left cerebellar   hemisphere. Mild parenchymal volume loss. No hydrocephalus. The   extra-axial spaces and basal cisterns are within normal limits. No   midline shift or mass effect present.    The cranial cervical junction is within normal limits. The sella is not   expanded. No depressed calvarial fracture. Prior right frontal   craniotomy. Mucosal thickening throughout the ethmoid air cells. The   visualized mastoid air cells are well aerated. The visualized orbits are   status post cataract surgery. Right frontal scalp hematoma/contusion.    CT FACIAL BONES:  The nasal bones are intact.    The orbital rims, zygomatic arches, and pterygoid plates are intact   bilaterally.    The mandible is intact. Mild arthritic changes in the bilateral TMJs.    Mucosal thickening throughout the ethmoid air cells. Probable polyp in   the right maxillary sinus. The visualized mastoid air cells are clear   bilaterally.    No fracture of the skull base or lower calvarium.      CT CERVICAL SPINE:  The atlantodental interval is within normal limits. The lateral masses   are properly aligned. No facet subluxation or malalignment at the   craniovertebral or atlantoaxial articulations. No dens fracture. No   significant prevertebral soft tissue swelling.    Subtle reversal to the cervical lordosis. Normal alignment of the   cervical vertebrae. Vertebral body height is well-maintained. Reduced   intervertebral disc height throughout the cervical spine, most   significantly C3-C5 with chronic endplate change. No jumped or perched   facets. No acute osseous fracture.    C2-C3: Subtle disc bulge. The bilateral neuroforamina are patent. No   narrowing of the spinal canal.    C3-C4: Disc osteophyte complex with uncovertebral joint hypertrophy.   Severe narrowing of the bilateral neuroforamen. No significant narrowing   of the spinal canal. Mild facet arthropathy.    C4-C5: Disc osteophyte complex with uncovertebral joint hypertrophy.   Severe narrowing of the left neural foramen and moderate to severe   narrowing of the right neural foramen. Moderate to severe narrowing of   the spinal canal. Mild facet arthropathy.    C5-C6: Disc osteophyte complex with uncovertebral joint hypertrophy.   Severe narrowing of the bilateral neuroforamen, left greater than right.   Moderate to severe narrowing of the spinal canal. Facet arthropathy.    C6-C7: Disc osteophyte complex with uncovertebral joint hypertrophy.   Severe narrowing of the bilateral neuroforamen. Mild narrowing of the   spinal canal. Facet arthropathy.    C7-T1: No large disc bulge. The bilateral neuroforamina are patent. No   significant narrowing of the spinal canal.    The paraspinal muscles are unremarkable. Soft tissue fullness in the   right vallecula.    Visualized portions of the thyroid are unremarkable.    Pleural-based calcifications in the left lung apex. Subtle groundglass   opacities in the right lung apex. Small 0.2 cm calcification in the right   lung apex.      IMPRESSION:    CT HEAD:  1.  No evidence of acute intracranial hemorrhage or midline shift.  2.  Chronic ischemic changes as discussed above.    CT FACIAL BONES:  1.  No evidence of fracture of the facial bones.  2.  Mild paranasal sinus disease.    CT CERVICAL SPINE:  1.  No evidence of acute osseous fracture or ko dislocation.  2.  Multilevel degenerative change of the cervical spine as discussed   above, most significantly at the C4-C6 levels where there is moderate to   severe narrowing of the spinal canal. In the setting of myelopathy,   recommend MRI of the cervical spine for further evaluation.  3.  Soft tissue fullness in the right vallecula. Recommend direct visual   inspection.  4.  Pleural-based and nodular calcifications in the bilateral lung apices   as discussed above, correlate with patient history. Additionally, subtle   ground glass opacities in the right lung apex. This could indicate a   developing infectious/inflammatory process. Consider designated imaging   of the chest for further evaluation if clinically indicated.    --- End of Report ---   < from: MR Angio Head w/wo IV Cont (12.22.23 @ 09:55) >    ACC: [id number] EXAM:  MR ANGIO BRAIN WAW IC   ORDERED BY: LUCIANA VEGA     ACC: [id number] EXAM:  MR ANGIO NECK WAW IC   ORDERED BY: LUCIANA VEGA     ACC: [id number] EXAM:  MR BRAIN WAW IC   ORDERED BY: LUCIANA VEGA     PROCEDURE DATE:  12/22/2023         INTERPRETATION:  Exam Type: MRI BRAIN WITH AND WITHOUT CONTRAST , MRA COW   WITHOUT CONTRAST, MRA NECK WITH AND WITHOUT CONTRAST  Indication: Syncope, R/O Mets, H/O CNS vasculitis  Comparison:CT head 12/20/2023    Technique: Multiplanar MR imaging of the brain was obtained with and   without contrast. Time of flight MRA of the neck with and without   contrast and [place] without contrast were obtained. 7.5  cc of   gadavist was administered. 0  cc was discarded.    Findings:    MRI brain: There is a punctate focus of restricted diffusion within the   left posterior temporal lobe, compatible with an acute infarct.   Encephalomalacia in the left cerebellum is present with extensive old   hemorrhagic blood products. Similarly, there is encephalomalacia with   chronic appearing hemorrhagic blood products within the medial right   cerebellum. 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 There are multiple small   foci of T2/FLAIR hyperintense signal in the periventricular and   subcortical white matter of the bilateral cerebri, suggestive of mild to   moderate chronic microvascular ischemic changes. There is no abnormal   enhancement.    Prior right frontal craniotomy with encephalomalacia/gliosis in the   underlying right anterolateral frontal lobe.    Cerebellar tonsils are in normal location. The intracranial flow voids   are normal in appearance. Visualized paranasal sinuses and mastoids are   normal in signal.    MRA of the [place] demonstrates normal antegrade flow in the   major intracranial arteries. No flow gap is seen to suggest high grade   stenosis. No evidence of medium or large sized aneurysm.    MRA of the neck demonstrates normal antegrade flow in the bilateral   common carotid, cervical internal carotid and vertebral arteries. No flow   gap is seen to suggest high grade stenosis.        IMPRESSION:    MRI brain: Punctate acute infarct in the left posterior temporal lobe.    Encephalomalacia in the left cerebellum is present with extensive old   hemorrhagic blood products. Similarly, there is encephalomalacia with   chronic appearing hemorrhagic blood products within the medial right   cerebellum. There are multiplesmall foci of T2/FLAIR hyperintense signal   in the periventricular and subcortical white matter of the bilateral   cerebri, suggestive of mild to moderate chronic microvascular ischemic   changes.    Prior right frontal craniotomy with encephalomalacia/gliosis in the   underlying right anterolateral frontal lobe.      MRA brain: No hemodynamically significant stenosis    MRA neck: No hemodynamically significant stenosis    --- End of Report ---         < from: MR Cervical Spine w/wo IV Cont (12.22.23 @ 09:54) >    ACC: [id number] EXAM:  MR SPINE CERVICAL WAW IC   ORDERED BY: LUCIANA VEGA     PROCEDURE DATE:  12/22/2023          INTERPRETATION:  Exam Date: 12/22/2023 9:54 AM    MR cervical spine with and without gadolinium    CLINICAL INFORMATION:  Stenosisseen on CT syncope.    TECHNIQUE:   Sagittal and axial T1-weighted images, sagittal proton   density images, axial and sagittal T2-weighted images of the cervical   spine were obtained.   Following the intravenous administration of 7.5 ml   Gadavist fat saturated sagittal and axial T1-weighted images were   obtained.  0 mL were discarded.    FINDINGS: Comparison is made to CT cervical of 12/20/2023.    Cervical vertebral alignment is intact.  Cervical vertebral body heights   are maintained.  [place] signal intensity within cervical vertebral bodies   and posterior elements is unremarkable.  No osseous expansion, epidural   disease or paraspinal abnormality is found.    At C2/C3, there is a very small posterior disc bulge without significant   foraminal or central spinal canal stenosis.    At C3/C4, there is a posterior disc bulge and bilateral uncovertebral   spurring resulting in moderate to severe narrowing of the bilateral   neural foramen without significant central spinal canal stenosis.    At C4/C5, there is a posterior disc bulge and bilateral uncovertebral   spurring resulting in moderate to severe stenosis of the bilateral neural   foramen and mild to moderate central spinal canal stenosis.    At C5/C6, there is a posterior disc bulge and bilateral uncovertebral   spurring resulting in moderate to severe stenosis of the bilateral neural   foramen and mild central spinal canal stenosis.    At C6/C7, there is a posterior disc bulge and bilateral uncovertebral   spurring resulting in moderate to severe stenosis of the bilateral neural   foramen without significant central spinal canal stenosis.    At C7/T1, there are no significant degenerative changes    The cervical cord maintains intact morphology  No cord signal intensity   abnormality or focal cord lesion is appreciated.   There is no abnormal   cord enhancement.  The cervical medullary junction remains intact.      IMPRESSION:   Multilevel degenerative changes as detailed above.    --- End of Report ---            PHILL CATHERINE MD; Attending Radiologist  This document has been electronically signed. Dec 22 2023 11:04AM    < end of copied text >  < from: TTE W or WO Ultrasound Enhancing Agent (08.17.23 @ 07:12) >  < from: TTE W or WO Ultrasound Enhancing Agent (08.17.23 @ 07:12) >  CONCLUSIONS:      1. Normal left ventricular cavity size. Left ventricular wall thickness is normal. Left ventricular systolic function is normal with an ejection fraction of 63 % by Jones's method of disks. There are no regional wall motion abnormalities seen.   2. There is moderate (grade 2) left ventricular diastolic dysfunction, with normal filling pressure.   3. Moderately enlarged right ventricular cavity size and reduced systolic right ventricular function. The tricuspid annular plane systolic excursion (TAPSE) is 1.8 cm (normal >=1.7 cm).   4. Moderate-to-severe aortic stenosis.   5. Trace aortic regurgitation.   6. No pericardial effusion seen.   7. Estimated pulmonary artery systolic pressure is 35 mmHg.   8. No prior echocardiogram is available for comparison.    < end of copied text >   Neurology     S: patient seen. MRI + AIS         Medications: MEDICATIONS  (STANDING):  anastrozole 1 milliGRAM(s) Oral daily  apixaban 2.5 milliGRAM(s) Oral every 12 hours  aspirin enteric coated 81 milliGRAM(s) Oral daily  atorvastatin 40 milliGRAM(s) Oral at bedtime  chlorhexidine 2% Cloths 1 Application(s) Topical daily  pantoprazole    Tablet 40 milliGRAM(s) Oral before breakfast  predniSONE   Tablet 5 milliGRAM(s) Oral every other day  sertraline 50 milliGRAM(s) Oral daily  sodium chloride 0.9%. 1000 milliLiter(s) (50 mL/Hr) IV Continuous <Continuous>    MEDICATIONS  (PRN):  acetaminophen     Tablet .. 650 milliGRAM(s) Oral every 6 hours PRN Moderate Pain (4 - 6)  benzonatate 100 milliGRAM(s) Oral every 8 hours PRN Cough  fluticasone propionate 50 MICROgram(s)/spray Nasal Spray 1 Spray(s) Both Nostrils two times a day PRN allergic rhinitis  melatonin 3 milliGRAM(s) Oral at bedtime PRN Insomnia       Vitals:  Vital Signs Last 24 Hrs  T(C): 36.5 (27 Dec 2023 06:28), Max: 37 (26 Dec 2023 20:52)  T(F): 97.7 (27 Dec 2023 06:28), Max: 98.6 (26 Dec 2023 20:52)  HR: 64 (27 Dec 2023 06:28) (64 - 65)  BP: 127/69 (27 Dec 2023 06:28) (122/81 - 127/69)  BP(mean): --  RR: 18 (27 Dec 2023 06:28) (18 - 18)  SpO2: 97% (27 Dec 2023 06:28) (97% - 97%)    Parameters below as of 27 Dec 2023 06:28  Patient On (Oxygen Delivery Method): room air              General Exam:   General Appearance: Appropriately dressed and in no acute distress       Head: Normocephalic, atraumatic and no dysmorphic features  Ear, Nose, and Throat: Moist mucous membranes  CVS: S1S2+  Resp: No SOB, no wheeze or rhonchi  GI: soft NT/ND  Extremities: No edema or cyanosis  Skin: No bruises or rashes     Neurological Exam:  Mental Status: Awake, alert and oriented x 3.  Able to follow simple and complex verbal commands. Able to name and repeat. fluent speech. No obvious aphasia or dysarthria noted.   Cranial Nerves: PERRL, EOMI, VFFC, sensation V1-V3 intact,  no obvious facial asymmetry, equal elevation of palate, scm/trap 5/5, tongue is midline on protrusion. no obvious papilledema on fundoscopic exam. hearing is grossly intact.   Motor: Normal bulk, tone and strength throughout. Fine finger movements were intact and symmetric. no tremors or drift noted.    Sensation: Intact to light touch and pinprick throughout. no right/left confusion. no extinction to tactile on DSS. Romberg was negative.   Reflexes: 1+ throughout at biceps, brachioradialis, triceps, patellars and ankles bilaterally and equal. No clonus. R toe and L toe were both downgoing.  Coordination: No dysmetria on FNF or HKS  Gait: deferred     Data/Labs/Imaging which I personally reviewed.         LABS:                          11.6   6.82  )-----------( 199      ( 26 Dec 2023 07:51 )             36.7     12-27    x   |  x   |  x   ----------------------------<  x   x    |  x   |  0.78    Ca    8.9      26 Dec 2023 07:51  Mg     2.0     12-26    TPro  6.1  /  Alb  3.8  /  TBili  0.2  /  DBili  0.1  /  AST  33  /  ALT  29  /  AlkPhos  47  12-27    LIVER FUNCTIONS - ( 27 Dec 2023 06:30 )  Alb: 3.8 g/dL / Pro: 6.1 g/dL / ALK PHOS: 47 U/L / ALT: 29 U/L / AST: 33 U/L / GGT: x             Urinalysis Basic - ( 26 Dec 2023 07:51 )    Color: x / Appearance: x / SG: x / pH: x  Gluc: 88 mg/dL / Ketone: x  / Bili: x / Urobili: x   Blood: x / Protein: x / Nitrite: x   Leuk Esterase: x / RBC: x / WBC x   Sq Epi: x / Non Sq Epi: x / Bacteria: x              < from: CT Head No Cont (12.20.23 @ 12:33) >    ACC: [id number] EXAM:  CT CERVICAL SPINE   ORDERED BY:  SAMIA MILLS     ACC: [id number] EXAM:  CT MAXILLOFACIAL   ORDERED BY:  SAMIA MILLS     ACC: [id number] EXAM:  CT BRAIN   ORDERED BY:  SAMIA MILLS     ACC: [id number] EXAM:  CT 3D RECONSTRUCT W TAYLOR   ORDERED BY:    SAMIA MILLS     PROCEDURE DATE:  12/20/2023          INTERPRETATION:  EXAM: CT HEAD, FACIAL BONES, AND CERVICAL SPINE WITHOUT   INTRAVENOUS CONTRAST    HISTORY: Trauma, syncopal episode, currently on anticoagulation    TECHNIQUE: Multiple axial images were obtained of the head, facial bones,   and cervical spine. Sagittal and coronal reformatted images were obtained   from the axial data set. The images were reviewed in brain and bone   windows.    COMPARISON: CT of the head May 13, 2017, MRI of the head May 31, 2021    FINDINGS:    CT HEAD:  No acute intracranial hemorrhage. Areas of decreased attenuation in the   deep and periventricular white matter, compatible with chronic small   vessel disease. Mild encephalomalacia/gliotic change in the high right   frontal lobe. Chronic appearing infarcts in the left cerebellar   hemisphere. Mild parenchymal volume loss. No hydrocephalus. The   extra-axial spaces and basal cisterns are within normal limits. No   midline shift or mass effect present.    The cranial cervical junction is within normal limits. The sella is not   expanded. No depressed calvarial fracture. Prior right frontal   craniotomy. Mucosal thickening throughout the ethmoid air cells. The   visualized mastoid air cells are well aerated. The visualized orbits are   status post cataract surgery. Right frontal scalp hematoma/contusion.    CT FACIAL BONES:  The nasal bones are intact.    The orbital rims, zygomatic arches, and pterygoid plates are intact   bilaterally.    The mandible is intact. Mild arthritic changes in the bilateral TMJs.    Mucosal thickening throughout the ethmoid air cells. Probable polyp in   the right maxillary sinus. The visualized mastoid air cells are clear   bilaterally.    No fracture of the skull base or lower calvarium.      CT CERVICAL SPINE:  The atlantodental interval is within normal limits. The lateral masses   are properly aligned. No facet subluxation or malalignment at the   craniovertebral or atlantoaxial articulations. No dens fracture. No   significant prevertebral soft tissue swelling.    Subtle reversal to the cervical lordosis. Normal alignment of the   cervical vertebrae. Vertebral body height is well-maintained. Reduced   intervertebral disc height throughout the cervical spine, most   significantly C3-C5 with chronic endplate change. No jumped or perched   facets. No acute osseous fracture.    C2-C3: Subtle disc bulge. The bilateral neuroforamina are patent. No   narrowing of the spinal canal.    C3-C4: Disc osteophyte complex with uncovertebral joint hypertrophy.   Severe narrowing of the bilateral neuroforamen. No significant narrowing   of the spinal canal. Mild facet arthropathy.    C4-C5: Disc osteophyte complex with uncovertebral joint hypertrophy.   Severe narrowing of the left neural foramen and moderate to severe   narrowing of the right neural foramen. Moderate to severe narrowing of   the spinal canal. Mild facet arthropathy.    C5-C6: Disc osteophyte complex with uncovertebral joint hypertrophy.   Severe narrowing of the bilateral neuroforamen, left greater than right.   Moderate to severe narrowing of the spinal canal. Facet arthropathy.    C6-C7: Disc osteophyte complex with uncovertebral joint hypertrophy.   Severe narrowing of the bilateral neuroforamen. Mild narrowing of the   spinal canal. Facet arthropathy.    C7-T1: No large disc bulge. The bilateral neuroforamina are patent. No   significant narrowing of the spinal canal.    The paraspinal muscles are unremarkable. Soft tissue fullness in the   right vallecula.    Visualized portions of the thyroid are unremarkable.    Pleural-based calcifications in the left lung apex. Subtle groundglass   opacities in the right lung apex. Small 0.2 cm calcification in the right   lung apex.      IMPRESSION:    CT HEAD:  1.  No evidence of acute intracranial hemorrhage or midline shift.  2.  Chronic ischemic changes as discussed above.    CT FACIAL BONES:  1.  No evidence of fracture of the facial bones.  2.  Mild paranasal sinus disease.    CT CERVICAL SPINE:  1.  No evidence of acute osseous fracture or ko dislocation.  2.  Multilevel degenerative change of the cervical spine as discussed   above, most significantly at the C4-C6 levels where there is moderate to   severe narrowing of the spinal canal. In the setting of myelopathy,   recommend MRI of the cervical spine for further evaluation.  3.  Soft tissue fullness in the right vallecula. Recommend direct visual   inspection.  4.  Pleural-based and nodular calcifications in the bilateral lung apices   as discussed above, correlate with patient history. Additionally, subtle   ground glass opacities in the right lung apex. This could indicate a   developing infectious/inflammatory process. Consider designated imaging   of the chest for further evaluation if clinically indicated.    --- End of Report ---   < from: MR Angio Head w/wo IV Cont (12.22.23 @ 09:55) >    ACC: [id number] EXAM:  MR ANGIO BRAIN WAW IC   ORDERED BY: LUCIANA VEGA     ACC: [id number] EXAM:  MR ANGIO NECK WAW IC   ORDERED BY: LUCIANA VEGA     ACC: [id number] EXAM:  MR BRAIN WAW IC   ORDERED BY: LUCIANA VEGA     PROCEDURE DATE:  12/22/2023         INTERPRETATION:  Exam Type: MRI BRAIN WITH AND WITHOUT CONTRAST , MRA COW   WITHOUT CONTRAST, MRA NECK WITH AND WITHOUT CONTRAST  Indication: Syncope, R/O Mets, H/O CNS vasculitis  Comparison:CT head 12/20/2023    Technique: Multiplanar MR imaging of the brain was obtained with and   without contrast. Time of flight MRA of the neck with and without   contrast and [place] without contrast were obtained. 7.5  cc of   gadavist was administered. 0  cc was discarded.    Findings:    MRI brain: There is a punctate focus of restricted diffusion within the   left posterior temporal lobe, compatible with an acute infarct.   Encephalomalacia in the left cerebellum is present with extensive old   hemorrhagic blood products. Similarly, there is encephalomalacia with   chronic appearing hemorrhagic blood products within the medial right   cerebellum. 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 There are multiple small   foci of T2/FLAIR hyperintense signal in the periventricular and   subcortical white matter of the bilateral cerebri, suggestive of mild to   moderate chronic microvascular ischemic changes. There is no abnormal   enhancement.    Prior right frontal craniotomy with encephalomalacia/gliosis in the   underlying right anterolateral frontal lobe.    Cerebellar tonsils are in normal location. The intracranial flow voids   are normal in appearance. Visualized paranasal sinuses and mastoids are   normal in signal.    MRA of the [place] demonstrates normal antegrade flow in the   major intracranial arteries. No flow gap is seen to suggest high grade   stenosis. No evidence of medium or large sized aneurysm.    MRA of the neck demonstrates normal antegrade flow in the bilateral   common carotid, cervical internal carotid and vertebral arteries. No flow   gap is seen to suggest high grade stenosis.        IMPRESSION:    MRI brain: Punctate acute infarct in the left posterior temporal lobe.    Encephalomalacia in the left cerebellum is present with extensive old   hemorrhagic blood products. Similarly, there is encephalomalacia with   chronic appearing hemorrhagic blood products within the medial right   cerebellum. There are multiplesmall foci of T2/FLAIR hyperintense signal   in the periventricular and subcortical white matter of the bilateral   cerebri, suggestive of mild to moderate chronic microvascular ischemic   changes.    Prior right frontal craniotomy with encephalomalacia/gliosis in the   underlying right anterolateral frontal lobe.      MRA brain: No hemodynamically significant stenosis    MRA neck: No hemodynamically significant stenosis    --- End of Report ---         < from: MR Cervical Spine w/wo IV Cont (12.22.23 @ 09:54) >    ACC: [id number] EXAM:  MR SPINE CERVICAL WAW IC   ORDERED BY: LUCIANA VEGA     PROCEDURE DATE:  12/22/2023          INTERPRETATION:  Exam Date: 12/22/2023 9:54 AM    MR cervical spine with and without gadolinium    CLINICAL INFORMATION:  Stenosisseen on CT syncope.    TECHNIQUE:   Sagittal and axial T1-weighted images, sagittal proton   density images, axial and sagittal T2-weighted images of the cervical   spine were obtained.   Following the intravenous administration of 7.5 ml   Gadavist fat saturated sagittal and axial T1-weighted images were   obtained.  0 mL were discarded.    FINDINGS: Comparison is made to CT cervical of 12/20/2023.    Cervical vertebral alignment is intact.  Cervical vertebral body heights   are maintained.  [place] signal intensity within cervical vertebral bodies   and posterior elements is unremarkable.  No osseous expansion, epidural   disease or paraspinal abnormality is found.    At C2/C3, there is a very small posterior disc bulge without significant   foraminal or central spinal canal stenosis.    At C3/C4, there is a posterior disc bulge and bilateral uncovertebral   spurring resulting in moderate to severe narrowing of the bilateral   neural foramen without significant central spinal canal stenosis.    At C4/C5, there is a posterior disc bulge and bilateral uncovertebral   spurring resulting in moderate to severe stenosis of the bilateral neural   foramen and mild to moderate central spinal canal stenosis.    At C5/C6, there is a posterior disc bulge and bilateral uncovertebral   spurring resulting in moderate to severe stenosis of the bilateral neural   foramen and mild central spinal canal stenosis.    At C6/C7, there is a posterior disc bulge and bilateral uncovertebral   spurring resulting in moderate to severe stenosis of the bilateral neural   foramen without significant central spinal canal stenosis.    At C7/T1, there are no significant degenerative changes    The cervical cord maintains intact morphology  No cord signal intensity   abnormality or focal cord lesion is appreciated.   There is no abnormal   cord enhancement.  The cervical medullary junction remains intact.      IMPRESSION:   Multilevel degenerative changes as detailed above.    --- End of Report ---            PHILL CATHERINE MD; Attending Radiologist  This document has been electronically signed. Dec 22 2023 11:04AM    < end of copied text >  < from: TTE W or WO Ultrasound Enhancing Agent (08.17.23 @ 07:12) >  < from: TTE W or WO Ultrasound Enhancing Agent (08.17.23 @ 07:12) >  CONCLUSIONS:      1. Normal left ventricular cavity size. Left ventricular wall thickness is normal. Left ventricular systolic function is normal with an ejection fraction of 63 % by Jones's method of disks. There are no regional wall motion abnormalities seen.   2. There is moderate (grade 2) left ventricular diastolic dysfunction, with normal filling pressure.   3. Moderately enlarged right ventricular cavity size and reduced systolic right ventricular function. The tricuspid annular plane systolic excursion (TAPSE) is 1.8 cm (normal >=1.7 cm).   4. Moderate-to-severe aortic stenosis.   5. Trace aortic regurgitation.   6. No pericardial effusion seen.   7. Estimated pulmonary artery systolic pressure is 35 mmHg.   8. No prior echocardiogram is available for comparison.    < end of copied text >

## 2023-12-27 NOTE — PROGRESS NOTE ADULT - SUBJECTIVE AND OBJECTIVE BOX
Name of Patient : VINCENT ENGLAND  MRN: [id number]  Date of visit: 12-27-23        Subjective: Patient seen and examined. No new events except as noted.   Patient seen earlier this AM. Lying down in bed, HOB elevated.  S/P TTE this AM.     REVIEW OF SYSTEMS:    CONSTITUTIONAL: Generalized weakness   EYES/ENT: No visual changes;  No vertigo or throat pain   NECK: No pain or stiffness  RESPIRATORY: + Cough is improving; No wheezing, hemoptysis; No shortness of breath  CARDIOVASCULAR: No chest pain or palpitations  GASTROINTESTINAL: No abdominal or epigastric pain. No nausea, vomiting, or hematemesis; No diarrhea or constipation. No melena or hematochezia.  GENITOURINARY: No dysuria, frequency or hematuria  NEUROLOGICAL: No numbness or weakness  SKIN:+  Facial ecchymosis; + Raccoon eyes R>L improving, No itching, burning, rashes, or lesions   All other review of systems is negative unless indicated above.    MEDICATIONS:  MEDICATIONS  (STANDING):  anastrozole 1 milliGRAM(s) Oral daily  apixaban 2.5 milliGRAM(s) Oral every 12 hours  aspirin enteric coated 81 milliGRAM(s) Oral daily  atorvastatin 40 milliGRAM(s) Oral at bedtime  chlorhexidine 2% Cloths 1 Application(s) Topical daily  pantoprazole    Tablet 40 milliGRAM(s) Oral before breakfast  predniSONE   Tablet 5 milliGRAM(s) Oral every other day  sertraline 50 milliGRAM(s) Oral daily  sodium chloride 0.9%. 1000 milliLiter(s) (50 mL/Hr) IV Continuous <Continuous>      PHYSICAL EXAM:  T(C): 36.5 (12-27-23 @ 06:28), Max: 37 (12-26-23 @ 20:52)  HR: 64 (12-27-23 @ 06:28) (64 - 65)  BP: 127/69 (12-27-23 @ 06:28) (122/81 - 127/69)  RR: 18 (12-27-23 @ 06:28) (18 - 18)  SpO2: 97% (12-27-23 @ 06:28) (97% - 97%)  Wt(kg): --  I&O's Summary    26 Dec 2023 07:01  -  27 Dec 2023 07:00  --------------------------------------------------------  IN: 580 mL / OUT: 0 mL / NET: 580 mL        Appearance: Awake, Weak appearing female, sitting up in bed   HEENT:  Eyes are open; +Facial ecchymosis; + Raccoon eyes R >L with improvident   Lymphatic: No lymphadenopathy   Cardiovascular: Normal S1 S2, no JVD  Respiratory: normal effort , clear  Gastrointestinal:  Soft, Non-tender  Skin: + Facial ecchymosis; + R sided tashia-occular ecchymosis  Psychiatry:  Mood & affect appropriate  Musculoskeletal: No edema      12-26-23 @ 07:01  -  12-27-23 @ 07:00  --------------------------------------------------------  IN: 580 mL / OUT: 0 mL / NET: 580 mL                                11.6   6.82  )-----------( 199      ( 26 Dec 2023 07:51 )             36.7               12-27    x   |  x   |  x   ----------------------------<  x   x    |  x   |  0.78    Ca    8.9      26 Dec 2023 07:51  Mg     2.0     12-26    TPro  6.1  /  Alb  3.8  /  TBili  0.2  /  DBili  0.1  /  AST  33  /  ALT  29  /  AlkPhos  47  12-27            Urinalysis Basic - ( 26 Dec 2023 07:51 )    Color: x / Appearance: x / SG: x / pH: x  Gluc: 88 mg/dL / Ketone: x  / Bili: x / Urobili: x   Blood: x / Protein: x / Nitrite: x   Leuk Esterase: x / RBC: x / WBC x   Sq Epi: x / Non Sq Epi: x / Bacteria: x        < from: TTE W or WO Ultrasound Enhancing Agent (12.27.23 @ 08:17) >  CONCLUSIONS:      1. Left ventricular systolic function is normal with an ejection fraction of 56 % by Jones's method of disks.   2. Normal right ventricular cavity size, wall thickness, and systolic function.   3. There is moderate to severe aortic stenosis. The peak transaortic velocity is 2.21 m/s, peak transaortic gradient is 19.5 mmHg and mean transaortic gradient is 12.0 mmHg with an LVOT/aortic valve VTI ratio of 0.36. The aortic valve area is estimated at 1.13 cm² by the continuity equation. There is trace aortic regurgitation.   4. Mild to moderate tricuspid regurgitation.   5. Estimated pulmonary artery systolic pressure is 40 mmHg.   6. Agitated saline injection was negative for intracardiac shunt.   7. No pericardial effusion seen.    < end of copied text >   Name of Patient : VINCENT ENGLAND  MRN: [id number]  Date of visit: 12-27-23        Subjective: Patient seen and examined. No new events except as noted.   Patient seen earlier this AM. Lying down in bed, HOB elevated.  S/P TTE this AM.     REVIEW OF SYSTEMS:    CONSTITUTIONAL: Generalized weakness   EYES/ENT: No visual changes;  No vertigo or throat pain   NECK: No pain or stiffness  RESPIRATORY: + Cough is improving; No wheezing, hemoptysis; No shortness of breath  CARDIOVASCULAR: No chest pain or palpitations  GASTROINTESTINAL: No abdominal or epigastric pain. No nausea, vomiting, or hematemesis; No diarrhea or constipation. No melena or hematochezia.  GENITOURINARY: No dysuria, frequency or hematuria  NEUROLOGICAL: No numbness or weakness  SKIN:+  Facial ecchymosis; + Raccoon eyes R>L improving, No itching, burning, rashes, or lesions   All other review of systems is negative unless indicated above.    MEDICATIONS:  MEDICATIONS  (STANDING):  anastrozole 1 milliGRAM(s) Oral daily  apixaban 2.5 milliGRAM(s) Oral every 12 hours  aspirin enteric coated 81 milliGRAM(s) Oral daily  atorvastatin 40 milliGRAM(s) Oral at bedtime  chlorhexidine 2% Cloths 1 Application(s) Topical daily  pantoprazole    Tablet 40 milliGRAM(s) Oral before breakfast  predniSONE   Tablet 5 milliGRAM(s) Oral every other day  sertraline 50 milliGRAM(s) Oral daily  sodium chloride 0.9%. 1000 milliLiter(s) (50 mL/Hr) IV Continuous <Continuous>      PHYSICAL EXAM:  T(C): 36.5 (12-27-23 @ 06:28), Max: 37 (12-26-23 @ 20:52)  HR: 64 (12-27-23 @ 06:28) (64 - 65)  BP: 127/69 (12-27-23 @ 06:28) (122/81 - 127/69)  RR: 18 (12-27-23 @ 06:28) (18 - 18)  SpO2: 97% (12-27-23 @ 06:28) (97% - 97%)  Wt(kg): --  I&O's Summary    26 Dec 2023 07:01  -  27 Dec 2023 07:00  --------------------------------------------------------  IN: 580 mL / OUT: 0 mL / NET: 580 mL        Appearance: Awake, Weak appearing female, sitting up in bed   HEENT:  Eyes are open; +Facial ecchymosis; + Raccoon eyes R >L with improvident   Lymphatic: No lymphadenopathy   Cardiovascular: Normal S1 S2, no JVD  Respiratory: normal effort , clear  Gastrointestinal:  Soft, Non-tender  Skin: + Facial ecchymosis; + R sided tashia-occular ecchymosis  Psychiatry:  Mood & affect appropriate  Musculoskeletal: No edema      12-26-23 @ 07:01  -  12-27-23 @ 07:00  --------------------------------------------------------  IN: 580 mL / OUT: 0 mL / NET: 580 mL                                11.6   6.82  )-----------( 199      ( 26 Dec 2023 07:51 )             36.7               12-27    x   |  x   |  x   ----------------------------<  x   x    |  x   |  0.78    Ca    8.9      26 Dec 2023 07:51  Mg     2.0     12-26    TPro  6.1  /  Alb  3.8  /  TBili  0.2  /  DBili  0.1  /  AST  33  /  ALT  29  /  AlkPhos  47  12-27            Urinalysis Basic - ( 26 Dec 2023 07:51 )    Color: x / Appearance: x / SG: x / pH: x  Gluc: 88 mg/dL / Ketone: x  / Bili: x / Urobili: x   Blood: x / Protein: x / Nitrite: x   Leuk Esterase: x / RBC: x / WBC x   Sq Epi: x / Non Sq Epi: x / Bacteria: x        < from: TTE W or WO Ultrasound Enhancing Agent (12.27.23 @ 08:17) >  CONCLUSIONS:      1. Left ventricular systolic function is normal with an ejection fraction of 56 % by Jones's method of disks.   2. Normal right ventricular cavity size, wall thickness, and systolic function.   3. There is moderate to severe aortic stenosis. The peak transaortic velocity is 2.21 m/s, peak transaortic gradient is 19.5 mmHg and mean transaortic gradient is 12.0 mmHg with an LVOT/aortic valve VTI ratio of 0.36. The aortic valve area is estimated at 1.13 cm² by the continuity equation. There is trace aortic regurgitation.   4. Mild to moderate tricuspid regurgitation.   5. Estimated pulmonary artery systolic pressure is 40 mmHg.   6. Agitated saline injection was negative for intracardiac shunt.   7. No pericardial effusion seen.    < end of copied text >

## 2023-12-27 NOTE — PROGRESS NOTE ADULT - ASSESSMENT
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 Patient reports that she awoke in yas usual state of health this morning, was supposed to go to outpatient laboratory to obtain lab work. Patient states that she told her  to bring the car to her by the elevator and does not recall further. States that she told her  she "feels unwell" and feels "off." Per  at the bedside, he found patient face down on the floor.  reports that he stepped away from patient for under 2 minutes to bring the car over to her and awoke patient. Patient reports pain to her B/L knees. Patient reports that she has been ambulating since and was advised to come to [facility] by her [facility] team. Patient denies any bladder or bowel incontinence. Denies chest pain, palpitations, shortness of breath or dyspnea.  Denies nausea, vomiting, fever, chills, cough, abdominal pain, headache, dizziness, lightheadedness neck or back pain.       Syncope  - Pt with syncope --> Likely due to Mod-Severe AS versus infectious etiology, however rule out other etiologies   - 8/2023 TTE w/ Preserved EF, No WMA, Grade II Diastolic dysfunction, Mod-Severe AS  - CT Head / C-Spine / Maxillofacial and 3D as noted   - MR head, MR A H/N --> w/ Punctate Acute Infarct mild to moderate microvascular ischemic change m, prior OR changes, neg for significant stenosis -->C/w  ASA 81 Mg PO Qd and Eliquis 2.5 BID resumed as per Vascular cardiology/ Neuro   - MR C-spine w/ multi-level degenerative changes   - Orthostatics negative;  Carotid Duplex w/ mild calcified plaque, without significant stenosis   - Routine EEG (Pt w/ history of Seizures 10 years ago, not on AED) --> W/ generalized slowly, neg for seizures - outpatient neuro follow up   - TTE w/ BUBBLE --> W/ EF of of 56%, Mod-Severe AS, Mild-Mod TR, Neg for PFO   - Trop X 3 negative   - Monitor on telemetry   - Neuro checks per protocol; Fall precautions   - Neuro, EP and Cardio evaluations appreciated; F/u recs --> EP to follow up for extended cardiac monitoring --> As per EP, abhishek for [facility] outpatient and then elective ILR placement as an outpatient   - C/w Eliquis and ASA   - Outpatient EP, Cardio and Neuro follow ups upon discharge    Acute CVA  - Seen on MR  - ASA 81 Mg PO Qd and Eliquis 2.5 BID resume per discussion w/ Neuro/Cardio  - EP to follow up for cardiac monitoring --> Pt with PAT, per discussion with EP, not consistent with Afib. Continue to monitor on tele   - TTE neg for PFO   - Lipitor 40   - PT/ OT   - Neuro eval appreciated; F/u recs    COVID +  - Patient with cough, per pharmacy, patient does not meet requirement for RDV per policy at this time. Continue to monitor symptoms at present   - Procal neg, ESR, CRP, Ferritin, D-dimer noted --> On AC  - CT Chest w/ DYLAN GG 4mm nodule, additional nodules, trace pericardial effusion, L breast lesion, Neg for PNA    - Incentive spirometer and Tessalon Perle PRN   - Hold off on additional steroids (Pt on steroids at home) as patient is not hypoxic, on RA   - Monitor O2 saturation; Supplement PRN to maintain > 90%    Leukocytosis   - Pt recently completed prolonged course of ABX on 12/16  - UCx, BCx2 --> negative    - Trend CBC, temp curve, VS     Mod-Severe AS, Trace pericardial effusion   - Seen on 8/2023 TTE; Repeat TTE as above --> outpatient follow up with Dr. Sparks   - Monitor volume status   - Cardio eval consulted; F/u recs     Knee Pain   - Pt with chronic knee pain, Osteoporosis   - B/L Knees with Ecchymosis --> X-rays neg for fracture     Breast CA, Lymphoma   - Follows with [facility] and Uro-Gyn   - CT w/ breast lesion, F/u with oncology for further management   - C/w Anastrazole  - Heme/Onc eval appreciated; F/u recs -->outpatient follow up       H/O DVT PE  - Eliquis resumed.     H/O CVA, CNS Vasculitis   - C/w Prednisone  - Neuro eval appreciated; F/u recs     HLD  - Lipid panel; C/w Lipitor 40     GERD  - C/w Anti-Acid therapeutic interchange while admitted     PPX    Discussed with Attending and ACP. Discussed with TTE department.  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 with a chief complaint of syncope this morning. Patient reports that she awoke in yas usual state of health this morning, was supposed to go to outpatient laboratory to obtain lab work. Patient states that she told her  to bring the car to her by the elevator and does not recall further. States that she told her  she "feels unwell" and feels "off." Per  at the bedside, he found patient face down on the floor.  reports that he stepped away from patient for under 2 minutes to bring the car over to her and awoke patient. Patient reports pain to her B/L knees. Patient reports that she has been ambulating since and was advised to come to [facility] by her [facility] team. Patient denies any bladder or bowel incontinence. Denies chest pain, palpitations, shortness of breath or dyspnea.  Denies nausea, vomiting, fever, chills, cough, abdominal pain, headache, dizziness, lightheadedness neck or back pain.       Syncope  - Pt with syncope --> Likely due to Mod-Severe AS versus infectious etiology, however rule out other etiologies   - 8/2023 TTE w/ Preserved EF, No WMA, Grade II Diastolic dysfunction, Mod-Severe AS  - CT Head / C-Spine / Maxillofacial and 3D as noted   - MR head, MR A H/N --> w/ Punctate Acute Infarct mild to moderate microvascular ischemic change m, prior OR changes, neg for significant stenosis -->C/w  ASA 81 Mg PO Qd and Eliquis 2.5 BID resumed as per Vascular cardiology/ Neuro   - MR C-spine w/ multi-level degenerative changes   - Orthostatics negative;  Carotid Duplex w/ mild calcified plaque, without significant stenosis   - Routine EEG (Pt w/ history of Seizures 10 years ago, not on AED) --> W/ generalized slowly, neg for seizures - outpatient neuro follow up   - TTE w/ BUBBLE --> W/ EF of of 56%, Mod-Severe AS, Mild-Mod TR, Neg for PFO   - Trop X 3 negative   - Monitor on telemetry   - Neuro checks per protocol; Fall precautions   - Neuro, EP and Cardio evaluations appreciated; F/u recs --> EP to follow up for extended cardiac monitoring --> As per EP, abhishek for [facility] outpatient and then elective ILR placement as an outpatient   - C/w Eliquis and ASA   - Outpatient EP, Cardio and Neuro follow ups upon discharge    Acute CVA  - Seen on MR  - ASA 81 Mg PO Qd and Eliquis 2.5 BID resume per discussion w/ Neuro/Cardio  - EP to follow up for cardiac monitoring --> Pt with PAT, per discussion with EP, not consistent with Afib. Continue to monitor on tele   - TTE neg for PFO   - Lipitor 40   - PT/ OT   - Neuro eval appreciated; F/u recs    COVID +  - Patient with cough, per pharmacy, patient does not meet requirement for RDV per policy at this time. Continue to monitor symptoms at present   - Procal neg, ESR, CRP, Ferritin, D-dimer noted --> On AC  - CT Chest w/ DYLAN GG 4mm nodule, additional nodules, trace pericardial effusion, L breast lesion, Neg for PNA    - Incentive spirometer and Tessalon Perle PRN   - Hold off on additional steroids (Pt on steroids at home) as patient is not hypoxic, on RA   - Monitor O2 saturation; Supplement PRN to maintain > 90%    Leukocytosis   - Pt recently completed prolonged course of ABX on 12/16  - UCx, BCx2 --> negative    - Trend CBC, temp curve, VS     Mod-Severe AS, Trace pericardial effusion   - Seen on 8/2023 TTE; Repeat TTE as above --> outpatient follow up with Dr. Sparks   - Monitor volume status   - Cardio eval consulted; F/u recs     Knee Pain   - Pt with chronic knee pain, Osteoporosis   - B/L Knees with Ecchymosis --> X-rays neg for fracture     Breast CA, Lymphoma   - [place]s [place] [facility] [place]   - CT w/ breast lesion, F/u with oncology for further management   - C/w Anastrazole  - Heme/Onc eval appreciated; F/u recs -->outpatient follow up       H/O DVT PE  - Eliquis resumed.     H/O CVA, CNS Vasculitis   - C/w Prednisone  - Neuro eval appreciated; F/u recs     HLD  - Lipid panel; C/w Lipitor 40     GERD  - C/w Anti-Acid therapeutic interchange while admitted     PPX    Discussed with Attending and ACP. Discussed with TTE department.

## 2023-12-27 NOTE — DISCHARGE NOTE NURSING/CASE MANAGEMENT/SOCIAL WORK - NSDCPEPTCAREGIVEDUMATLIST _GEN_ALL_CORE
Influenza Vaccination/Coronavirus/COVID19 Stroke/Influenza Vaccination/Apixaban/Eliquis/Coronavirus/COVID19

## 2023-12-27 NOTE — DISCHARGE NOTE NURSING/CASE MANAGEMENT/SOCIAL WORK - PATIENT PORTAL LINK FT
You can access the FollowMyHealth Patient Portal offered by [facility] by registering at the following website: [email or web address]. By joining [employer]’s FollowMyHealth portal, you will also be able to view your health information using other applications (apps) compatible with our system. You can access the FollowMyHealth Patient Portal offered by [facility] by registering at the following website: [email or web address]. By joining [employer]’s FollowMyHealth portal, you will also be able to view your health information using other applications (apps) compatible with our system.

## 2023-12-27 NOTE — PROGRESS NOTE ADULT - SUBJECTIVE AND OBJECTIVE BOX
Patient seen and examined at bedside.   feels overall ok   - want her overactive bladder medications to be added     MEDICATIONS  (STANDING):  anastrozole 1 milliGRAM(s) Oral daily  apixaban 2.5 milliGRAM(s) Oral every 12 hours  aspirin enteric coated 81 milliGRAM(s) Oral daily  atorvastatin 40 milliGRAM(s) Oral at bedtime  chlorhexidine 2% Cloths 1 Application(s) Topical daily  pantoprazole    Tablet 40 milliGRAM(s) Oral before breakfast  predniSONE   Tablet 5 milliGRAM(s) Oral every other day  sertraline 50 milliGRAM(s) Oral daily  sodium chloride 0.9%. 1000 milliLiter(s) (50 mL/Hr) IV Continuous <Continuous>    MEDICATIONS  (PRN):  acetaminophen     Tablet .. 650 milliGRAM(s) Oral every 6 hours PRN Moderate Pain (4 - 6)  benzonatate 100 milliGRAM(s) Oral every 8 hours PRN Cough  fluticasone propionate 50 MICROgram(s)/spray Nasal Spray 1 Spray(s) Both Nostrils two times a day PRN allergic rhinitis  melatonin 3 milliGRAM(s) Oral at bedtime PRN Insomnia        Vital Signs Last 24 Hrs  T(C): 36.5 (27 Dec 2023 06:28), Max: 37.2 (26 Dec 2023 13:05)  T(F): 97.7 (27 Dec 2023 06:28), Max: 99 (26 Dec 2023 13:05)  HR: 64 (27 Dec 2023 06:28) (64 - 77)  BP: 127/69 (27 Dec 2023 06:28) (118/69 - 132/58)  BP(mean): --  RR: 18 (27 Dec 2023 06:28) (18 - 18)  SpO2: 97% (27 Dec 2023 06:28) (97% - 97%)    Parameters below as of 27 Dec 2023 06:28  Patient On (Oxygen Delivery Method): room air          PHYSICAL EXAM:     GENERAL:  Appears stated age, well-groomed  CHEST:  CTA b/l  HEART:  S1 s2+   ABDOMEN:  Soft, non-tender, non-distended  NEURO:  Alert, oriented, no asterixis                            11.6   6.82  )-----------( 199      ( 26 Dec 2023 07:51 )             36.7       12-27    x   |  x   |  x   ----------------------------<  x   x    |  x   |  0.78    Ca    8.9      26 Dec 2023 07:51  Mg     2.0     12-26    TPro  6.1  /  Alb  3.8  /  TBili  0.2  /  DBili  0.1  /  AST  33  /  ALT  29  /  AlkPhos  47  12-27

## 2024-01-11 DIAGNOSIS — Z86.79 PERSONAL HISTORY OF OTHER DISEASES OF THE CIRCULATORY SYSTEM: ICD-10-CM

## 2024-01-12 ENCOUNTER — TRANSCRIPTION ENCOUNTER (OUTPATIENT)
Age: 79
End: 2024-01-12

## 2024-01-26 ENCOUNTER — APPOINTMENT (OUTPATIENT)
Dept: CARDIOLOGY | Facility: CLINIC | Age: 79
End: 2024-01-26
Payer: MEDICARE

## 2024-01-26 ENCOUNTER — NON-APPOINTMENT (OUTPATIENT)
Age: 79
End: 2024-01-26

## 2024-01-26 VITALS
BODY MASS INDEX: 26.33 KG/M2 | SYSTOLIC BLOOD PRESSURE: 124 MMHG | DIASTOLIC BLOOD PRESSURE: 71 MMHG | OXYGEN SATURATION: 94 % | WEIGHT: 158 LBS | HEART RATE: 80 BPM | HEIGHT: 64.9 IN

## 2024-01-26 DIAGNOSIS — I35.0 NONRHEUMATIC AORTIC (VALVE) STENOSIS: ICD-10-CM

## 2024-01-26 DIAGNOSIS — E78.5 HYPERLIPIDEMIA, UNSPECIFIED: ICD-10-CM

## 2024-01-26 DIAGNOSIS — Z80.3 FAMILY HISTORY OF MALIGNANT NEOPLASM OF BREAST: ICD-10-CM

## 2024-01-26 DIAGNOSIS — Z78.9 OTHER SPECIFIED HEALTH STATUS: ICD-10-CM

## 2024-01-26 DIAGNOSIS — Z85.71 PERSONAL HISTORY OF HODGKIN LYMPHOMA: ICD-10-CM

## 2024-01-26 PROCEDURE — 99214 OFFICE O/P EST MOD 30 MIN: CPT

## 2024-01-26 NOTE — ASSESSMENT
[FreeTextEntry1] : Assessment: 1.  Unprovoked PE and LLE DVT - on Eliquis - prior Pelvic DVT while on chemo in 2011 treated with Lovenox - CT abdomen/pelvis with findings of thickened endometrium 2.  CVA 3.  Hodgkins Lymphoma s/p chemo 2011 in remission 4.  Carotid Plaque without stenosis 5.  History of Syncope in 12/2023 in setting of dehydration and COVID 6. Moderate to Severe AS  Plan 1. Continue Eliquis 2.5 mg BID.  2. Continue ASA 81 mg daily for neurology standpoint for history of CVA. 3. Continue Lipitor 40 mg daily. 4. Follow-up with EP for MCOT monitoring and results.  5. For moderate-severe AS, she is currently asymptomatic. Do not suspect the syncope was related to this.     Recommend repeat TTE 6 months from last TTE. 6. Continue neurology follow-up. 7. Oral hydration encouraged. 8. Follow-up in 6 months. Call with any questions. Office will call with test results.

## 2024-01-26 NOTE — HISTORY OF PRESENT ILLNESS
[FreeTextEntry1] : 1/26/2024  Admission in 11/2023 for urosepsis.   Recent admission in 12/2023 for syncope, noted to have dehydration and COVID.  MR head with Punctate Acute Infarct. Seeing neurology.  TTE: showed moderate to severe AS on TTE, preserved LVEF, normal wall motion. Bubble study showed no PFO.   Orthostatic vital signs are negative.  Carotid Duplex with mild calcified plaque, without significant stenosis.  Seen by EP and MCOT placed. History of PAT. . Patient was discharged on ASA 81 mg daily. and Eliquis 2.5 mg BID. Lipitor was increased to 40 mg daily.   Medications: Eliquis 2.5 mg BID ASA 81 mg daily Lipitor 40 mg daily Vesicare  Sertraline  anastrozole  Biotin  predniSONE 5 mg oral delayed release tablet: 1 tab(s) orally every other day dexlansoprazole  Prolia   1/4/2023  Had lumpectomy  she is on arimidex Eliquis 2.5mg BID No bleeding issues No procedures planned She is in a L leg brace for ankle instability.   Medicaitons: Eliquis 2.5mg BID Dexillent Vesicare Prednisone 5mg every other day  (for prior ICH) Lipitor 20mg daily  zoloft 50mg daily  Vitamin D 2000 Biotin Arimidex   9/9/2022  Doing ok She complains of L leg pains at times venous duplex was negative for DVT She is having LEFT lumpectomy on Sept 16th Dr. ALVES at [facility]  She does not have any chest pain or shortness of breath with exertion.   No swelling in the legs BP is well controlled today.  HR is excellent No blood in the urine or stool.  Last echo in Jan 2022:  mod phTN, moderate AS, mild AI, mild to moderate MR  Medicaitons: Eliquis 2.5mg BID Dexillent Vesicare Prednisone 5mg every other day  (for prior ICH) Lipitor 20mg daily  zoloft 50mg daily  Vitamin D 2000 Biotin

## 2024-02-26 ENCOUNTER — RESULT REVIEW (OUTPATIENT)
Age: 79
End: 2024-02-26

## 2024-02-26 ENCOUNTER — APPOINTMENT (OUTPATIENT)
Dept: ULTRASOUND IMAGING | Facility: CLINIC | Age: 79
End: 2024-02-26
Payer: MEDICARE

## 2024-02-26 PROCEDURE — 76700 US EXAM ABDOM COMPLETE: CPT

## 2024-02-28 ENCOUNTER — TRANSCRIPTION ENCOUNTER (OUTPATIENT)
Age: 79
End: 2024-02-28

## 2024-02-28 ENCOUNTER — NON-APPOINTMENT (OUTPATIENT)
Age: 79
End: 2024-02-28

## 2024-03-01 ENCOUNTER — TRANSCRIPTION ENCOUNTER (OUTPATIENT)
Age: 79
End: 2024-03-01

## 2024-03-15 ENCOUNTER — APPOINTMENT (OUTPATIENT)
Dept: ELECTROPHYSIOLOGY | Facility: CLINIC | Age: 79
End: 2024-03-15
Payer: MEDICARE

## 2024-03-15 ENCOUNTER — NON-APPOINTMENT (OUTPATIENT)
Age: 79
End: 2024-03-15

## 2024-03-15 VITALS
SYSTOLIC BLOOD PRESSURE: 144 MMHG | RESPIRATION RATE: 14 BRPM | OXYGEN SATURATION: 94 % | BODY MASS INDEX: 26.33 KG/M2 | WEIGHT: 158 LBS | HEART RATE: 82 BPM | HEIGHT: 65 IN | DIASTOLIC BLOOD PRESSURE: 75 MMHG

## 2024-03-15 DIAGNOSIS — N30.90 CYSTITIS, UNSPECIFIED W/OUT HEMATURIA: ICD-10-CM

## 2024-03-15 DIAGNOSIS — I61.9 NONTRAUMATIC INTRACEREBRAL HEMORRHAGE, UNSPECIFIED: ICD-10-CM

## 2024-03-15 PROCEDURE — 93000 ELECTROCARDIOGRAM COMPLETE: CPT

## 2024-03-15 PROCEDURE — 99204 OFFICE O/P NEW MOD 45 MIN: CPT

## 2024-03-15 RX ORDER — FLUTICASONE PROPIONATE 50 UG/1
50 SPRAY, METERED NASAL
Qty: 16 | Refills: 0 | Status: DISCONTINUED | COMMUNITY
Start: 2017-04-24 | End: 2024-03-15

## 2024-03-15 RX ORDER — METHENAMINE HIPPURATE 1 G/1
1 TABLET ORAL TWICE DAILY
Refills: 0 | Status: ACTIVE | COMMUNITY

## 2024-03-15 RX ORDER — ASPIRIN 81 MG/1
81 TABLET, COATED ORAL DAILY
Refills: 0 | Status: ACTIVE | COMMUNITY

## 2024-03-15 RX ORDER — PREDNISONE 5 MG/1
5 TABLET ORAL
Refills: 0 | Status: ACTIVE | COMMUNITY
Start: 2023-09-21

## 2024-03-15 RX ORDER — ATORVASTATIN CALCIUM 40 MG/1
40 TABLET, FILM COATED ORAL DAILY
Refills: 0 | Status: ACTIVE | COMMUNITY

## 2024-03-15 RX ORDER — ERGOCALCIFEROL (VITAMIN D2) 1250 MCG
50000 CAPSULE ORAL
Refills: 0 | Status: DISCONTINUED | COMMUNITY
End: 2024-03-15

## 2024-03-15 NOTE — CARDIOLOGY SUMMARY
[de-identified] : BioTel from 1/16/2024-2/13/2024: min HR: 52, max HR: 174, mean HR: 73, 4 episodes of NSVT - longest 15.4 seconds (at 168bpm), fastest 174bpm (4 beats) [de-identified] : ECG from 3/15/2024: Sinus rhythm at 85bpm [de-identified] : TTE from 12/27/2023: EF: 56%, normal RV size and wall thickness, mod-severe AS, trace AI, mild-mod TR, no intracardiac shunt, no pericardial effusion, normal LA and RA

## 2024-03-15 NOTE — HISTORY OF PRESENT ILLNESS
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 She presents today for an initial evaluation based on a prior episode of syncope.   Her episode of syncope occurred in December of 2023. She was waiting for her  and suddenly lost consciousness. She had not eaten anything that morning as she was fasting in preparation for lab work. Her  found her face down on the floor. She reported feeling that something was "off" prior to losing consciousness. She saw Dr. Sunday Lino as an inpatient at [facility]. He documents that a paroxysmal atrial tachycardia was noted on telemetry. Atrial fibrillation was not reportedly detected.   Since leaving the hospital she has been feeling well. She denies chest pain, palpitations, shortness of breath, dizziness, pre-syncope, syncope. She does endorse nausea and episodes of belching. She is preparing to undergo an endoscopy for an evaluation of these symptoms.

## 2024-03-15 NOTE — PHYSICAL EXAM
[Well Developed] : well developed [Well Nourished] : well nourished [No Acute Distress] : no acute distress [Normal Conjunctiva] : normal conjunctiva [Normal Venous Pressure] : normal venous pressure [No Carotid Bruit] : no carotid bruit [Normal S1, S2] : normal S1, S2 [No Rub] : no rub [No Gallop] : no gallop [Clear Lung Fields] : clear lung fields [Good Air Entry] : good air entry [No Respiratory Distress] : no respiratory distress  [Soft] : abdomen soft [Non Tender] : non-tender [No Masses/organomegaly] : no masses/organomegaly [Normal Bowel Sounds] : normal bowel sounds [Normal Gait] : normal gait [No Edema] : no edema [No Cyanosis] : no cyanosis [No Clubbing] : no clubbing [No Varicosities] : no varicosities [No Rash] : no rash [No Skin Lesions] : no skin lesions [Moves all extremities] : moves all extremities [No Focal Deficits] : no focal deficits [Normal Speech] : normal speech [Alert and Oriented] : alert and oriented [Normal memory] : normal memory [de-identified] : 3/6 systolic murmur LUSB

## 2024-03-15 NOTE — DISCUSSION/SUMMARY
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 She presents today for an initial evaluation based on a prior episode of syncope. My impression is that her syncope was orthostatic in nature and occurred in the setting of dehydration. Given her description of her syncopal event along with her normal left ventricular systolic function and unremarkable electrocardiogram, I did not recommend an implantable loop recorder.  Given the finding of non-sustained ventricular tachycardia on her monitor, I will discuss with Dr. Sparks about performing a stress test for further work up. The patient will continue to follow with Dr. Sparks and will see me again as needed.  [EKG obtained to assist in diagnosis and management of assessed problem(s)] : EKG obtained to assist in diagnosis and management of assessed problem(s)

## 2024-03-27 NOTE — PROGRESS NOTE ADULT - ASSESSMENT
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 5, [place] [facility] [place], [place]ors, generalized weakness, there blood cx was done which showed GNR  CT 8/15 s/o R pyleo  here febrile, WBC: 17, no focal symptoms    fever, leukocytosis, sepsis with E-coli bacteremia, CT 8/15 showed R pyelonephritis and ureteritis with perinephric stranding, also a R renal cyst, repeat blood cx also positive and u/s 8/21 with a 4.1 cm complex cyst with debris which could be hemorrhagic or infected, urine cx on antibiotics was negative  h/o lymphoma, breast ca and CNS vasculitis    * E-coli is R to amp, bactrim, genta, I to cefazolin  * f/u the repeat blood cx  * abd MRI to evaluate the cyst but if it appears to be infected will need IR eval for aspiration  * c/w zosyn for now but for discharge will switch to levo 500 qd   * monitor CBC/diff and renal function    The above assessment and plan was discussed with the primary team    Kelly Freedman MD  contact on teams  After 5pm and on weekends call [phone]         denies pain/discomfort (Rating = 0)

## 2024-04-01 ENCOUNTER — RESULT REVIEW (OUTPATIENT)
Age: 79
End: 2024-04-01

## 2024-04-01 ENCOUNTER — APPOINTMENT (OUTPATIENT)
Dept: RADIOLOGY | Facility: HOSPITAL | Age: 79
End: 2024-04-01

## 2024-04-01 ENCOUNTER — OUTPATIENT (OUTPATIENT)
Dept: OUTPATIENT SERVICES | Facility: HOSPITAL | Age: 79
LOS: 1 days | End: 2024-04-01
Payer: MEDICARE

## 2024-04-01 DIAGNOSIS — Z00.00 ENCOUNTER FOR GENERAL ADULT MEDICAL EXAMINATION WITHOUT ABNORMAL FINDINGS: ICD-10-CM

## 2024-04-01 DIAGNOSIS — S82.899A OTHER FRACTURE OF UNSPECIFIED LOWER LEG, INITIAL ENCOUNTER FOR CLOSED FRACTURE: Chronic | ICD-10-CM

## 2024-04-01 DIAGNOSIS — Z98.41 CATARACT EXTRACTION STATUS, RIGHT EYE: Chronic | ICD-10-CM

## 2024-04-01 DIAGNOSIS — Z98.890 OTHER SPECIFIED POSTPROCEDURAL STATES: Chronic | ICD-10-CM

## 2024-04-01 DIAGNOSIS — R13.10 DYSPHAGIA, UNSPECIFIED: ICD-10-CM

## 2024-04-01 PROCEDURE — 74221 X-RAY XM ESOPHAGUS 2CNTRST: CPT

## 2024-04-01 PROCEDURE — 74221 X-RAY XM ESOPHAGUS 2CNTRST: CPT | Mod: 26

## 2024-04-08 ENCOUNTER — TRANSCRIPTION ENCOUNTER (OUTPATIENT)
Age: 79
End: 2024-04-08

## 2024-04-18 NOTE — ED ADULT NURSE NOTE - DRUG PRE-SCREENING (DAST -1)
In an effort to ensure that our patients [employer]Well, a Team Member has reviewed your chart and identified an opportunity to provide the best care possible. An attempt was made to discuss or schedule overdue Preventive or Disease Management screening.     The Outcome was Contact was not made, no answer/busyIf you have any questions or need help with scheduling, contact our Health Outreach Team at [phone]. Care Gaps include cav visit.  Name: KRISHNA SIMMONS    ### Patient Details  YOB: 1965  MRN: [id number]    ### Encounter Details  Arrival Date: N/A  Discharge Date: N/A  Encounter ID: [id number]/28/2023 14:58    ### Related interaction  [facility]  [place] Comprehensive Annual Visit ([place] CAV Outreach) ([email or web address])   Statement Selected

## 2024-04-20 NOTE — PATIENT PROFILE ADULT - FALL HARM RISK - FACTORS NURSING JUDGEMENT
[facility] EMERGENCY DEPT  EMERGENCY DEPARTMENT ENCOUNTER      Pt Name: Scotty Hodge  MRN: [id number]  Birthdate 1963  Date of evaluation: 4/20/2024  Provider: Marlene Hobbs MD    CHIEF COMPLAINT       Chief Complaint   Patient presents with    Flank Pain    Loss of Consciousness         HISTORY OF PRESENT ILLNESS   (Location/Symptom, Timing/Onset, Context/Setting, Quality, Duration, Modifying Factors, Severity)  Note limiting factors.   Scotty Hodge is a 60 y.o. male who presents to the emergency department for right-sided flank pain that started earlier today.  He states the pain suddenly became excruciating.  He does feel it radiating around to his front.  Pain is worse with deep inspiration.  Denies any substernal chest pain.  Denies any trauma or injury.  Denies any dysuria, hematuria or increased urinary frequency.  He did have some nausea earlier today when the pain was bad but denies any actual vomiting.  Denies any change in his bowel habits.  He is concerned he might have a kidney stone but denies any history of it.  He does take Plavix and warfarin.  Has not noticed any bleeding recently.  Denies any history of anemia or need for blood transfusions.  Him and his family were driving down to [place], with when the symptoms got very severe.  Here in the lobby he did have a syncopal episode.  He is not sure if he truly lost consciousness but he states that he felt very dizzy and passed out.  He denies any actual trauma or injury from his fall.     Nursing Notes were reviewed.    REVIEW OF SYSTEMS    (2-9 systems for level 4, 10 or more for level 5)     Constitutional: No fever  HENT: No ear pain  Eyes: No change in vision  Respiratory: No SOB  Cardio: No chest pain  GI: No blood in stool  : No hematuria  MSK: Positive for right flank pain  Skin: No rashes  Neuro: No headache    Except as noted above the remainder of the review of systems was reviewed and negative.       PAST MEDICAL HISTORY  Yes

## 2024-04-22 NOTE — PROGRESS NOTE ADULT - REASON FOR ADMISSION
Head, normocephalic, atraumatic, Face, Face within normal limits, Ears, External ears within normal limits, Nose/Nasopharynx, External nose normal appearance, nares patent, no nasal discharge, Mouth and Throat, Oral cavity appearance normal, Lips, Appearance normal
Syncope

## 2024-04-22 NOTE — ED PROVIDER NOTE - PROGRESS NOTE DETAILS
Chief Complaint   Patient presents with    Hypertension    Cholesterol Problem    Discuss Labs    Cough     Pt reports persistent cough since she was seen in [facility] about 1 month ago for bronchitis. Patient denies any runny nose, fever, sore throat, N/V or diarrhea    Dizziness     Pt reports dizziness that occurs daily x 2 months.      Assessment & Plan:     1. Essential hypertension  Assessment & Plan:  BP above goal of <130/80  Medication adherence emphasized today, advised to restart losartan/HCTZ 50/12.5 mg   Understands that dizziness may be secondary to elevated readings  Orders:  -     Comprehensive Metabolic Panel; Future  -     Lipid Panel; Future  -     losartan-hydroCHLOROthiazide (HYZAAR) 50-12.5 MG per tablet; Take 1 tablet by mouth daily, Disp-90 tablet, R-0Normal  2. Hyperlipidemia LDL goal <70  Assessment & Plan:  LDL close to goal, medication adherence emphasized  Understands that statin is for secondary prevention and if she does not take this, it increases her risk for another event  Restart atorvastatin 10 mg qhs  Recheck lipids in 6 mos  Orders:  -     Comprehensive Metabolic Panel; Future  -     Lipid Panel; Future  3. History of TIA (transient ischemic attack)  Assessment & Plan:  Non-compliant with statin, ASA, medication adherence advised  Advised to call Dr. Delong's office for her routine follow-up  Orders:  -     Comprehensive Metabolic Panel; Future  -     Lipid Panel; Future  4. Normocytic normochromic anemia  Assessment & Plan:  Symptomatic with dizziness, recheck CBC this week, will call with results  Orders:  -     CBC with Auto Differential; Future  5. Dizziness  Comments:  Persists over the last 2 mos, recheck CBC this week  Orthostatics negative today  Has bilateral middle ear effusion, start Flonase, Zyrtec  Consult ENT  Orders:  -     CBC with Auto Differential; Future  -     Amb External Referral To ENT  6. Subacute cough  Comments:  Residual from recent bronchitis,  D-dimer 510, will order CTA chest Attending MD Manzo: CTA chest notable for b/l PEs, left main PE, suggestion of RV strain by CT, patient not tachy, normal vitals otherwise, not hypoxic, trop negative so overall doubt acute RV strain. Will start heparin, discuss case with PE response team given clot burden

## 2024-04-30 NOTE — DIETITIAN INITIAL EVALUATION ADULT - PROBLEM SELECTOR PLAN 4
TRANSTHORACIC ECHO(TTE) COMPLETE W/ W/O IMAGING AGENT  Order: [id number]  Status: Edited Result - FINAL    Result Care Coordination      Result Notes     Jovon Weber MD  4/30/2024  3:47 PM CDT       Please review findings from echo. LV function has improved marginally from 35-40 to 43% now. I want to start empagliflozin (jardiance) now and will send prescription. This is in addition to what he currently takes and I'm still hopeful to adjust other meds further but doing so one at a time is better than bulk changes. Please ask him to notify us if this ends up being cost prohibitive.      h/o dvt / bilat pe  deemed unprovoked  cont home ac w eliquis

## 2024-05-06 ENCOUNTER — APPOINTMENT (OUTPATIENT)
Dept: ENDOCRINOLOGY | Facility: CLINIC | Age: 79
End: 2024-05-06
Payer: MEDICARE

## 2024-05-06 DIAGNOSIS — M81.0 AGE-RELATED OSTEOPOROSIS W/OUT CURRENT PATHOLOGICAL FRACTURE: ICD-10-CM

## 2024-05-06 PROCEDURE — 96372 THER/PROPH/DIAG INJ SC/IM: CPT

## 2024-05-06 RX ORDER — DENOSUMAB 60 MG/ML
60 INJECTION SUBCUTANEOUS
Qty: 1 | Refills: 0 | Status: COMPLETED | OUTPATIENT
Start: 2024-05-03

## 2024-05-16 NOTE — ED ADULT NURSE NOTE - HISTORY OF COVID-19 VACCINATION
[facility] Medicine  Discharge Summary      Patient Name: Nahum Palacio  MRN: [id number]  [facility]: [id number]  Patient Class: IP- Inpatient  Admission Date: 5/1/2024  Hospital Length of Stay: 6 days  Discharge Date and Time: 5/8/2024 11:32 AM  Attending Physician: Erika att. providers found   Discharging Provider: Chan Jimenes MD  Primary Care Provider: Eric Foster MD    Primary Care Team: Networked reference to record PCT     HPI:   Nahum Palacio is a 76 y.o. male with a PMH  has a past medical history of CAD (coronary artery disease) of artery bypass graft, Diabetes mellitus, HLD (hyperlipidemia), Hypertension, and Statin intolerance.  Presented to the ER for evaluation of left-sided chest pain that gradually worsened at 6:00 p.m. last night.  Patient reports that he has been suffering from left-sided shoulder pain that radiates into his left out of pectoral muscle for the last several months.  Patient states that his pain/discomfort usually resolves with warm compresses in the hot shower.  Patient states he recently had a steroid injection in January from his orthopedic provider.  However, patient states that when over-the-counter medicine and hot shower did not relieve his symptoms he presented to the ER for further evaluation.  Patient reports he has a history of coronary bypass surgery roughly 17 years ago and states that he presented with atypical symptoms at that time.  Patient denies any substernal chest pain, palpitations, shortness of breath, but does report chronic bilateral lower extremity edema.  Denies any history of CHF.  Patient is followed by Dr. Frederick mancuso for Cardiology.  Recently evaluated by him on 04/10/2024 and had lisinopril changed to losartan due to side effect of cough.  Denies any other symptoms at this time.    ER workup revealed BUN/creatinine of 23/1.8 (baseline creatinine of 1.6) CBG of 285 mg/dL, BNP of 978, initial troponin 0.072 with delta  troponin of 1.759.  EKG revealed normal sinus rhythm with a ventricular rate of 83 beats per minute with a QT/QTC of 414/46.  Chest x-ray wet read shows pulmonary congestion.  Hospital Medicine consulted to admit patient for NSTEMI.  Patient initiated on heparin drip.  Patient and wife at bedside are in agreement with treatment plan.  Patient admitted under inpatient status.    PCP: Eric Foster    Procedure(s) (LRB):  CATHETERIZATION, HEART, BOTH LEFT AND RIGHT (N/A)  ANGIOGRAM, ABDOMINAL AORTA  INSERTION, INTRA-AORTIC BALLOON PUMP  PTCA, Single Vessel  INSERTION, STENT, CORONARY ARTERY (N/A)      Hospital Course:   Mr. Palacio was admitted to the ICU on May 2, 2024, following a catheterization that involved an SVG to PDA intervention, supported by an intra-aortic balloon pump (IABP). Upon admission, he was alert, conversant, and free from ischemic events in the lower extremities. Elevated lactic acid levels prompted the initiation of dobutamine. He experienced a drop in blood pressure due to the effects of nitrates and losartan, necessitating continued IABP support, which he tolerated well without any chest pain or shortness of breath.    On May 3, adjustments were made to the IABP setting to 1:3. Mr. Palacio remained stable, without chest pain or shortness of breath, and showed stable hematocrit and creatinine levels. By May 4, he reported feeling better, denying any symptoms of congestive heart failure (CHF) or angina. His creatinine levels improved to 1.5, with no signs of groin hematoma or bleeding from the central line, and the IABP was successfully removed without incident.    The following day, Mr. Palacio continued to feel well, though his creatinine levels evelyn slightly to 1.6, and his brain natriuretic peptide (BNP) was mildly elevated, indicating manageable cardiac stress. His central venous pressure (CVP) remained stable at 8. On May 6, his creatinine increased marginally to 1.7, leading to a  "temporary discontinuation of Aldactone. He showed no signs of congestive pathology, and a LifeVest was ordered by the cardiology team for sudden cardiac death prevention.    /62   Pulse 68   Temp 98 °F (36.7 °C)   Resp 17   Ht 5' 5" (1.651 m)   Wt 73.4 kg (161 lb 13.1 oz)   SpO2 (!) 92%   BMI 26.93 kg/m²        Goals of Care Treatment Preferences:  Code Status: Full Code      Consults:   Consults (From admission, onward)          Status Ordering Provider     Inpatient consult to Social Work/Case Management  Once        Provider:  (Not yet assigned)    Completed CHARLY STOUT     Inpatient consult to Critical Care Medicine  Once        Provider:  Eric Cameron MD    Completed SARA KUMAR     Inpatient consult to Registered Dietitian/Nutritionist  Once        Provider:  (Not yet assigned)    Completed SARA KUMAR     Inpatient consult to Social Work/Case Management  Once        Provider:  (Not yet assigned)    Completed JOSE LEDESMA     Inpatient consult to Cardiology  Once        Provider:  Sara Kumar MD    Completed JOSE LEDESMA            No new Assessment & Plan notes have been filed under this hospital service since the last note was generated.  Service: Hospital Medicine    Final Active Diagnoses:    Diagnosis Date Noted POA    PRINCIPAL PROBLEM:  NSTEMI (non-ST elevated myocardial infarction) [I21.4] 05/02/2024 Yes    Ischemic cardiomyopathy [I25.5] 05/03/2024 No    Acute combined systolic and diastolic congestive heart failure [I50.41] 05/03/2024 Yes    Acute exacerbation of CHF (congestive heart failure) [I50.9] 05/02/2024 Yes    CKD (chronic kidney disease) [N18.9] 05/02/2024 Yes    Coronary artery disease [I25.10]  Yes    Diabetes mellitus [E11.9]  Yes    Hypertension [I10]  Yes    Hypothyroid [E03.9]  Yes      Problems Resolved During this Admission:       Discharged Condition: good    Disposition: Home or Self Care    Follow Up:   Follow-up Information  "      Eric Foster MD. Schedule an appointment as soon as possible for a visit.    Specialty: Internal Medicine  Contact information:  [address] [facility] [address]  [phone]               Frederick Montana MD. Schedule an appointment as soon as possible for a visit.    Specialty: Cardiology  Contact information:  [address] [facility] [address] [facility]  [address]  [facility] [address]  [phone]                           Patient Instructions:      REASON FOR NOT PRESCRIBING ANTIPLATELET MEDICATION AT DISCHARGE     Order Specific Question Answer Comments   Reason for not Prescribing: Other (Comment)        Significant Diagnostic Studies: Labs: All labs within the past 24 hours have been reviewed    Pending Diagnostic Studies:       None           Medications:  Reconciled Home Medications:      Medication List        START taking these medications      BRILINTA 90 mg tablet  Generic drug: ticagrelor  Take 1 tablet (90 mg total) by mouth 2 (two) times daily.            CONTINUE taking these medications      * aspirin 81 MG EC tablet  Commonly known as: ECOTRIN  Take 81 mg by mouth once daily.     * aspirin 81 MG Chew  Take 1 tablet (81 mg total) by mouth 2 (two) times a day.     cholecalciferol (vitamin D3) 25 mcg (1,000 unit) capsule  Commonly known as: VITAMIN D3  Take 1 capsule by mouth every morning.     cyanocobalamin 500 MCG tablet  Take 500 mcg by mouth once daily.     ferrous sulfate 325 mg (65 mg iron) Tab tablet  Commonly known as: FEOSOL  Take 325 mg by mouth once daily.     glimepiride 1 MG tablet  Commonly known as: AMARYL  Take 0.5 mg by mouth daily as needed.     levothyroxine 50 MCG tablet  Commonly known as: SYNTHROID  Take 50 mcg by mouth before breakfast.     metoprolol succinate 50 MG 24 hr tablet  Commonly known as: TOPROL-XL  Take 50 mg by mouth every evening.     tamsulosin 0.4 mg Cap  Commonly known as: FLOMAX  Take 0.4 mg by mouth once daily.     TOUJEO SOLOSTAR U-300 INSULIN 300 unit/mL  (1.5 mL) Inpn pen  Generic drug: insulin glargine (TOUJEO)  Inject 22 Units into the skin once daily. 22 units           * This list has 2 medication(s) that are the same as other medications prescribed for you. Read the directions carefully, and ask your doctor or other care provider to review them with you.                STOP taking these medications      amLODIPine 10 MG tablet  Commonly known as: NORVASC     metFORMIN 500 MG tablet  Commonly known as: GLUCOPHAGE              Indwelling Lines/Drains at time of discharge:   Lines/Drains/Airways       None                   Time spent on the discharge of patient: 25 minutes  of time spent on discharge including examining patient, providing discharge instructions, arranging follow-up and documentation.           Chan Jimenes MD  Department of Hospital Medicine  [place] - Telemetry ([facility])   Yes

## 2024-06-06 ENCOUNTER — APPOINTMENT (OUTPATIENT)
Dept: CV DIAGNOSITCS | Facility: HOSPITAL | Age: 79
End: 2024-06-06

## 2024-06-06 ENCOUNTER — OUTPATIENT (OUTPATIENT)
Dept: OUTPATIENT SERVICES | Facility: HOSPITAL | Age: 79
LOS: 1 days | End: 2024-06-06
Payer: MEDICARE

## 2024-06-06 ENCOUNTER — RESULT REVIEW (OUTPATIENT)
Age: 79
End: 2024-06-06

## 2024-06-06 DIAGNOSIS — S82.899A OTHER FRACTURE OF UNSPECIFIED LOWER LEG, INITIAL ENCOUNTER FOR CLOSED FRACTURE: Chronic | ICD-10-CM

## 2024-06-06 DIAGNOSIS — I35.0 NONRHEUMATIC AORTIC (VALVE) STENOSIS: ICD-10-CM

## 2024-06-06 DIAGNOSIS — Z98.41 CATARACT EXTRACTION STATUS, RIGHT EYE: Chronic | ICD-10-CM

## 2024-06-06 DIAGNOSIS — Z98.890 OTHER SPECIFIED POSTPROCEDURAL STATES: Chronic | ICD-10-CM

## 2024-06-06 PROCEDURE — 93306 TTE W/DOPPLER COMPLETE: CPT

## 2024-06-06 PROCEDURE — 93306 TTE W/DOPPLER COMPLETE: CPT | Mod: 26

## 2024-06-17 NOTE — DISCHARGE NOTE NURSING/CASE MANAGEMENT/SOCIAL WORK - NSTRANSFERBELONGINGSRESP_GEN_A_NUR
Silvana Dickens (:  1968) is a 56 y.o. female,Established patient, here for evaluation of the following chief complaint(s):    Otalgia (Pt c/o L ear pain, tried flonase and ear drops otc w/o relief)      SUBJECTIVE/OBJECTIVE:  HPI  Silvana Dickens is a pleasant 56 y.o. female presenting to clinic today for left ear pain.  Patient reports onset of left ear pain about 6 days ago, was seen in clinic last week and given Flonase, reports she has been using Flonase for the past 2 days and taking Tylenol about 3 times a day; reports that Tylenol seems to help temporarily however overall symptoms have not improved.  Denies worsening.  Denies other URI or allergy symptoms.  Reports she has had similar issues previously.  Denies dizziness or change in hearing or otic discharge.  Reports pain radiates into left temporal/parietal region, denies any periauricular erythema or swelling or tenderness.       Allergies   Allergen Reactions    Sulfonylureas Itching       Current Outpatient Medications   Medication Sig Dispense Refill    methylPREDNISolone (MEDROL DOSEPACK) 4 MG tablet Take by mouth. 1 kit 0    folic acid (FOLVITE) 1 MG tablet       Multiple Vitamin (DAILY-MIKE MULTIVITAMIN) TABS       thiamine 100 MG tablet       fluticasone (FLONASE) 50 MCG/ACT nasal spray 1 spray by Each Nostril route daily 16 g 0    Sertraline HCl 200 MG CAPS Take 1 tablet by mouth daily      traZODone (DESYREL) 100 MG tablet TAKE 2 TABLETS BY MOUTH NIGHTLY AT BEDTIME.      sucralfate (CARAFATE) 1 GM/10ML suspension Take 10 mLs by mouth every 6 hours      prazosin (MINIPRESS) 1 MG capsule Take 1 capsule by mouth at bedtime      omeprazole (PRILOSEC) 20 MG delayed release capsule TAKE 1 CAPSULE BY MOUTH EVERY MORNING BEFORE BREAKFAST INDICATIONS: HEARTBURN      Multiple Vitamins-Iron (ONE-DAILY MULTI-VITAMIN/IRON) TABS Take 1 tablet by mouth daily      mirtazapine (REMERON) 15 MG tablet PLEASE SEE ATTACHED FOR DETAILED DIRECTIONS       yes

## 2024-06-19 NOTE — OCCUPATIONAL THERAPY INITIAL EVALUATION ADULT - PATIENT/FAMILY/SIGNIFICANT OTHER GOALS STATEMENT, OT EVAL
PROBLEM VISIT    Magui Berger is a 67 y.o.  presenting for a problem visit. Her main concerns today include     Chief Complaint   Patient presents with    Urinary Frequency    Hematuria   Patient states she has had blood in her urine for 1 month now. PCP advised her to see her OB.       From Dr. Turcios's last ov on 2024:  Assessment/Plan:  Magui Berger IS A 67 y.o.   1. Pelvic pain  -We discussed her pelvic pain in detail.  Pain is bilateral and crosses the midline in her lower abdomen as well as pelvic region.  She describes the pain as burning in nature.  The pain radiates down both of her thighs.  I reviewed her ultrasound in detail with her today.  I do not feel as though her pain is of GYN origin.  I asked her to follow-up with her primary care physician to discuss this pain in further detail with him.  -She does have a slightly thickened endometrial stripe.  However, she is not having any postmenopausal bleeding and had a recent D&C that was benign.     2. Pelvic organ prolapse quantification stage 2 cystocele  -We discussed her cystocele in detail.  Patient desires surgical intervention as she has previously tried conservative management with pelvic or physical therapy and does not feel that this is aiding with any of her pelvic pressure and discomfort which she attributes to her cystocele.  We have placed a referral to Dr. Chante Barclay.  Patient is aware.    Ob/Gyn Hx:  MARK P -    LMP-in her late 40s  Menses-postmenopausal  Contraception-postmenopausal  SA - No      Health Maintenance:  Last Pap: 2024 NILM/HPV neg      Patient declines chaperone.    Yoly Worthington RN  
Problem Visit    Magui Berger is a 67 y.o. presenting for problem visit. Her main concern today is hematuria that has been present for about a month. She called her PCP to make an appointment but they asked that she be seen here. She denies associated dysuria. She smoked cigarettes for about 15 years before quitting in 2009. She denies blood in her underwear. She denies PMB.       Ob/Gyn Hx:  G P - 2002 SV  LMP-in her late 40s  Menses-postmenopausal  Contraception-postmenopausal  SA- no        Past Medical History:   Diagnosis Date    Aneurysm (HCC) 2011    has coil in place    Arthritis     chronic back pain    Cervical spine pain     problems moving neck to left    Chronic diastolic heart failure (HCC)     Dr. Tomlin [facility]    Chronic obstructive pulmonary disease ([facility])     Dyspnea on exertion     as of 1/31/19 unchanged per pt for >1 year    GERD (gastroesophageal reflux disease)     Heart failure ([facility])     followed by Dr. Delgado    Hematochezia 08/21/2020    History of cerebral aneurysm repair 2011    Dr. Rueda    Hypercholesterolemia     Hypertension     Inflammatory bowel disease     Sleep apnea     DOES NOT USE CPAP    TIA (transient ischemic attack) 10/2003    no residuals       Past Surgical History:   Procedure Laterality Date    APPENDECTOMY      BRAIN ANEURYSM SURGERY  2011    @ [facility], coil in place    CARDIAC CATHETERIZATION      no stents    CARPAL TUNNEL RELEASE Bilateral 89, 90's    left x2, right x1    COLONOSCOPY N/A 06/13/2019    COLONOSCOPY performed by Chato Jamison MD at [facility] ENDOSCOPY    COLONOSCOPY N/A 08/21/2020    COLONOSCOPY performed by Chato Jamison MD at [facility] AMBULATORY OR    COLONOSCOPY N/A 06/27/2017    COLONOSCOPY performed by Chato Jamison MD at [facility] AMBULATORY OR    COLONOSCOPY N/A 09/26/2016    COLONOSCOPY performed by James Quesada MD at [facility] ENDOSCOPY    COLONOSCOPY N/A 5/10/2023    COLONOSCOPY performed by Truong Montero MD at [facility] ENDOSCOPY    COLONOSCOPY,BIOPSY  06/13/2019 
"I want to sit up"

## 2024-07-26 ENCOUNTER — APPOINTMENT (OUTPATIENT)
Dept: CARDIOLOGY | Facility: CLINIC | Age: 79
End: 2024-07-26
Payer: MEDICARE

## 2024-07-26 VITALS
OXYGEN SATURATION: 97 % | WEIGHT: 162 LBS | HEIGHT: 65 IN | BODY MASS INDEX: 26.99 KG/M2 | DIASTOLIC BLOOD PRESSURE: 78 MMHG | HEART RATE: 77 BPM | SYSTOLIC BLOOD PRESSURE: 135 MMHG

## 2024-07-26 DIAGNOSIS — I35.0 NONRHEUMATIC AORTIC (VALVE) STENOSIS: ICD-10-CM

## 2024-07-26 DIAGNOSIS — R06.09 OTHER FORMS OF DYSPNEA: ICD-10-CM

## 2024-07-26 DIAGNOSIS — I82.402 ACUTE EMBOLISM AND THROMBOSIS OF UNSPECIFIED DEEP VEINS OF LEFT LOWER EXTREMITY: ICD-10-CM

## 2024-07-26 DIAGNOSIS — Z86.718 PERSONAL HISTORY OF OTHER VENOUS THROMBOSIS AND EMBOLISM: ICD-10-CM

## 2024-07-26 PROCEDURE — 99214 OFFICE O/P EST MOD 30 MIN: CPT

## 2024-07-26 PROCEDURE — G2211 COMPLEX E/M VISIT ADD ON: CPT

## 2024-07-26 NOTE — ASSESSMENT
[FreeTextEntry1] : Assessment: 1.  Unprovoked PE and LLE DVT - on Eliquis - prior Pelvic DVT while on chemo in 2011 treated with Lovenox - CT abdomen/pelvis with findings of thickened endometrium 2.  CVA 3.  Hodgkins Lymphoma s/p chemo 2011 in remission 4.  Carotid Plaque without stenosis 5.  History of Syncope in 12/2023 in setting of dehydration and COVID 6. Moderate to Severe AS  Plan 1. Continue Eliquis 2.5 mg BID.  2. Continue ASA 81 mg daily for neurology standpoint for history of CVA. 3. Continue Lipitor 40 mg daily. 4.  For moderate AS, she is currently asymptomatic   Recommend repeat TTE 6 months from last TTE. 6. Continue neurology follow-up. 7. Oral hydration encouraged. 8. EP appreciated.   Will get coronary CTA given dyspnea on exertion and prior LAD plaque seen on CT from 2019 also for NSVT seen on Holter.

## 2024-07-26 NOTE — HISTORY OF PRESENT ILLNESS
[FreeTextEntry1] : 7/26/2024 Doing well no syncope some dyspnea with exertion no CP  no LOC, no passing out Echo normal LV function, moderate AS  Medications: Eliquis 2.5 mg BID ASA 81 mg daily Lipitor 40 mg daily Vesicare  Sertraline  anastrozole  Biotin  predniSONE 5 mg oral delayed release tablet: 1 tab(s) orally every other day dexlansoprazole  Prolia  Hiprex  d-mannose  1/26/2024  Admission in 11/2023 for urosepsis.   Recent admission in 12/2023 for syncope, noted to have dehydration and COVID.  MR head with Punctate Acute Infarct. Seeing neurology.  TTE: showed moderate to severe AS on TTE, preserved LVEF, normal wall motion. Bubble study showed no PFO.   Orthostatic vital signs are negative.  Carotid Duplex with mild calcified plaque, without significant stenosis.  Seen by EP and MCOT placed. History of PAT. . Patient was discharged on ASA 81 mg daily. and Eliquis 2.5 mg BID. Lipitor was increased to 40 mg daily.   Medications: Eliquis 2.5 mg BID ASA 81 mg daily Lipitor 40 mg daily Vesicare  Sertraline  anastrozole  Biotin  predniSONE 5 mg oral delayed release tablet: 1 tab(s) orally every other day dexlansoprazole  Prolia   1/4/2023  Had lumpectomy  she is on arimidex Eliquis 2.5mg BID No bleeding issues No procedures planned She is in a L leg brace for ankle instability.   Medicaitons: Eliquis 2.5mg BID Dexillent Vesicare Prednisone 5mg every other day  (for prior ICH) Lipitor 20mg daily  zoloft 50mg daily  Vitamin D 2000 Biotin Arimidex   9/9/2022  Doing ok She complains of L leg pains at times venous duplex was negative for DVT She is having LEFT lumpectomy on Sept 16th Dr. ALVES at [facility]  She does not have any chest pain or shortness of breath with exertion.   No swelling in the legs BP is well controlled today.  HR is excellent No blood in the urine or stool.  Last echo in Jan 2022:  mod phTN, moderate AS, mild AI, mild to moderate MR  Medicaitons: Eliquis 2.5mg BID Dexillent Vesicare Prednisone 5mg every other day  (for prior ICH) Lipitor 20mg daily  zoloft 50mg daily  Vitamin D 2000 Biotin

## 2024-08-05 NOTE — PRE-OP CHECKLIST - ASSESSMENT, HISTORY & PHYSICAL COMPLETED AND ON MEDICAL RECORD
[de-identified] : 40 year old male who presents to the office for a follow-up visit. He injured his ankle on 5/22/24 while walking in the lot working as a , spraining his ankle outside.thursday 6/20 he slipped on anti-freeze twisting his ankle. His ankle became swollen quickly thereafter. He went to [facility] where they took x-rays which were negative. He was back at work at light duty for 1.5 weeks prior to re-injuring.  He presents today for followup. He states that his ankle has been feeling good. He has been back at work for "a while", he cannot recall exactly when he went back. States that occasionally his ankle feels stiff after standing for long periods but otherwise not having any difficulty at work. He is still doing physical therapy 2x/week.
[de-identified] : 40 year old male who presents to the office for a follow-up visit. He injured his ankle on 5/22/24 while walking in the lot working as a , spraining his ankle outside.thursday 6/20 he slipped on anti-freeze twisting his ankle. His ankle became swollen quickly thereafter. He went to [facility] where they took x-rays which were negative. He was back at work at light duty for 1.5 weeks prior to re-injuring.  He presents today for followup. He states that his ankle has been feeling good. He has been back at work for "a while", he cannot recall exactly when he went back. States that occasionally his ankle feels stiff after standing for long periods but otherwise not having any difficulty at work. He is still doing physical therapy 2x/week.
[de-identified] : 40 year old male who presents to the office for a follow-up visit. He injured his ankle on 5/22/24 while walking in the lot working as a , spraining his ankle outside.thursday 6/20 he slipped on anti-freeze twisting his ankle. His ankle became swollen quickly thereafter. He went to [facility] where they took x-rays which were negative. He was back at work at light duty for 1.5 weeks prior to re-injuring.  He presents today for followup. He states that his ankle has been feeling good. He has been back at work for "a while", he cannot recall exactly when he went back. States that occasionally his ankle feels stiff after standing for long periods but otherwise not having any difficulty at work. He is still doing physical therapy 2x/week.
[de-identified] : 40 year old male who presents to the office for a follow-up visit. He injured his ankle on 5/22/24 while walking in the lot working as a , spraining his ankle outside.thursday 6/20 he slipped on anti-freeze twisting his ankle. His ankle became swollen quickly thereafter. He went to [facility] where they took x-rays which were negative. He was back at work at light duty for 1.5 weeks prior to re-injuring.  He presents today for followup. He states that his ankle has been feeling good. He has been back at work for "a while", he cannot recall exactly when he went back. States that occasionally his ankle feels stiff after standing for long periods but otherwise not having any difficulty at work. He is still doing physical therapy 2x/week.
done

## 2024-08-19 ENCOUNTER — APPOINTMENT (OUTPATIENT)
Dept: CT IMAGING | Facility: CLINIC | Age: 79
End: 2024-08-19
Payer: MEDICARE

## 2024-08-19 PROCEDURE — 75574 CT ANGIO HRT W/3D IMAGE: CPT

## 2024-08-20 ENCOUNTER — RESULT REVIEW (OUTPATIENT)
Age: 79
End: 2024-08-20

## 2024-08-20 PROCEDURE — 75580 N-INVAS EST C FFR SW ALY CTA: CPT

## 2024-09-17 NOTE — REVIEW OF SYSTEMS
Pre-op interview completed via phone. Patient instructed about remaining NPO after midnight, medications to take AM of surgery,  place/floor to check in on the day of surgery. Instructed to bathe with antibacterial soap night before and morning of surgery, no makeup, lotions, hair products, deodorant, etc.  
Offered and provided
[Negative] : Heme/Lymph

## 2024-11-16 ENCOUNTER — EMERGENCY (EMERGENCY)
Facility: HOSPITAL | Age: 79
LOS: 1 days | Discharge: HOME | End: 2024-11-16
Attending: EMERGENCY MEDICINE
Payer: MEDICARE

## 2024-11-16 VITALS
TEMPERATURE: 98 F | HEART RATE: 80 BPM | SYSTOLIC BLOOD PRESSURE: 141 MMHG | RESPIRATION RATE: 16 BRPM | DIASTOLIC BLOOD PRESSURE: 72 MMHG | OXYGEN SATURATION: 96 % | WEIGHT: 160.06 LBS | HEIGHT: 66 IN

## 2024-11-16 VITALS
DIASTOLIC BLOOD PRESSURE: 88 MMHG | HEART RATE: 62 BPM | RESPIRATION RATE: 17 BRPM | SYSTOLIC BLOOD PRESSURE: 135 MMHG | TEMPERATURE: 98 F | OXYGEN SATURATION: 95 %

## 2024-11-16 DIAGNOSIS — Z98.41 CATARACT EXTRACTION STATUS, RIGHT EYE: Chronic | ICD-10-CM

## 2024-11-16 DIAGNOSIS — S82.899A OTHER FRACTURE OF UNSPECIFIED LOWER LEG, INITIAL ENCOUNTER FOR CLOSED FRACTURE: Chronic | ICD-10-CM

## 2024-11-16 DIAGNOSIS — Z98.890 OTHER SPECIFIED POSTPROCEDURAL STATES: Chronic | ICD-10-CM

## 2024-11-16 LAB
ALBUMIN SERPL ELPH-MCNC: 4.5 G/DL — SIGNIFICANT CHANGE UP (ref 3.3–5)
ALP SERPL-CCNC: 64 U/L — SIGNIFICANT CHANGE UP (ref 40–120)
ALT FLD-CCNC: 30 U/L — SIGNIFICANT CHANGE UP (ref 10–45)
ANION GAP SERPL CALC-SCNC: 16 MMOL/L — SIGNIFICANT CHANGE UP (ref 5–17)
APTT BLD: 33 SEC — SIGNIFICANT CHANGE UP (ref 24.5–35.6)
AST SERPL-CCNC: 44 U/L — HIGH (ref 10–40)
BASOPHILS # BLD AUTO: 0.05 K/UL — SIGNIFICANT CHANGE UP (ref 0–0.2)
BASOPHILS NFR BLD AUTO: 0.5 % — SIGNIFICANT CHANGE UP (ref 0–2)
BILIRUB SERPL-MCNC: 0.6 MG/DL — SIGNIFICANT CHANGE UP (ref 0.2–1.2)
BUN SERPL-MCNC: 20 MG/DL — SIGNIFICANT CHANGE UP (ref 7–23)
CALCIUM SERPL-MCNC: 9.3 MG/DL — SIGNIFICANT CHANGE UP (ref 8.4–10.5)
CHLORIDE SERPL-SCNC: 103 MMOL/L — SIGNIFICANT CHANGE UP (ref 96–108)
CO2 SERPL-SCNC: 24 MMOL/L — SIGNIFICANT CHANGE UP (ref 22–31)
CREAT SERPL-MCNC: 0.79 MG/DL — SIGNIFICANT CHANGE UP (ref 0.5–1.3)
EGFR: 77 ML/MIN/1.73M2 — SIGNIFICANT CHANGE UP
EOSINOPHIL # BLD AUTO: 0.2 K/UL — SIGNIFICANT CHANGE UP (ref 0–0.5)
EOSINOPHIL NFR BLD AUTO: 2.1 % — SIGNIFICANT CHANGE UP (ref 0–6)
GLUCOSE SERPL-MCNC: 121 MG/DL — HIGH (ref 70–99)
HCT VFR BLD CALC: 39.8 % — SIGNIFICANT CHANGE UP (ref 34.5–45)
HGB BLD-MCNC: 12.8 G/DL — SIGNIFICANT CHANGE UP (ref 11.5–15.5)
IMM GRANULOCYTES NFR BLD AUTO: 0.4 % — SIGNIFICANT CHANGE UP (ref 0–0.9)
INR BLD: 1.39 RATIO — HIGH (ref 0.85–1.16)
LYMPHOCYTES # BLD AUTO: 1.47 K/UL — SIGNIFICANT CHANGE UP (ref 1–3.3)
LYMPHOCYTES # BLD AUTO: 15.1 % — SIGNIFICANT CHANGE UP (ref 13–44)
MCHC RBC-ENTMCNC: 27.9 PG — SIGNIFICANT CHANGE UP (ref 27–34)
MCHC RBC-ENTMCNC: 32.2 G/DL — SIGNIFICANT CHANGE UP (ref 32–36)
MCV RBC AUTO: 86.9 FL — SIGNIFICANT CHANGE UP (ref 80–100)
MONOCYTES # BLD AUTO: 0.82 K/UL — SIGNIFICANT CHANGE UP (ref 0–0.9)
MONOCYTES NFR BLD AUTO: 8.4 % — SIGNIFICANT CHANGE UP (ref 2–14)
NEUTROPHILS # BLD AUTO: 7.15 K/UL — SIGNIFICANT CHANGE UP (ref 1.8–7.4)
NEUTROPHILS NFR BLD AUTO: 73.5 % — SIGNIFICANT CHANGE UP (ref 43–77)
NRBC # BLD: 0 /100 WBCS — SIGNIFICANT CHANGE UP (ref 0–0)
PLATELET # BLD AUTO: 234 K/UL — SIGNIFICANT CHANGE UP (ref 150–400)
POTASSIUM SERPL-MCNC: 3.9 MMOL/L — SIGNIFICANT CHANGE UP (ref 3.5–5.3)
POTASSIUM SERPL-SCNC: 3.9 MMOL/L — SIGNIFICANT CHANGE UP (ref 3.5–5.3)
PROT SERPL-MCNC: 6.9 G/DL — SIGNIFICANT CHANGE UP (ref 6–8.3)
PROTHROM AB SERPL-ACNC: 15.8 SEC — HIGH (ref 9.9–13.4)
RBC # BLD: 4.58 M/UL — SIGNIFICANT CHANGE UP (ref 3.8–5.2)
RBC # FLD: 16 % — HIGH (ref 10.3–14.5)
SODIUM SERPL-SCNC: 143 MMOL/L — SIGNIFICANT CHANGE UP (ref 135–145)
TROPONIN T, HIGH SENSITIVITY RESULT: 14 NG/L — SIGNIFICANT CHANGE UP (ref 0–51)
WBC # BLD: 9.73 K/UL — SIGNIFICANT CHANGE UP (ref 3.8–10.5)
WBC # FLD AUTO: 9.73 K/UL — SIGNIFICANT CHANGE UP (ref 3.8–10.5)

## 2024-11-16 PROCEDURE — 70498 CT ANGIOGRAPHY NECK: CPT | Mod: 26,MC

## 2024-11-16 PROCEDURE — 82330 ASSAY OF CALCIUM: CPT

## 2024-11-16 PROCEDURE — 84295 ASSAY OF SERUM SODIUM: CPT

## 2024-11-16 PROCEDURE — 85025 COMPLETE CBC W/AUTO DIFF WBC: CPT

## 2024-11-16 PROCEDURE — 70496 CT ANGIOGRAPHY HEAD: CPT | Mod: MC

## 2024-11-16 PROCEDURE — 82947 ASSAY GLUCOSE BLOOD QUANT: CPT

## 2024-11-16 PROCEDURE — 99285 EMERGENCY DEPT VISIT HI MDM: CPT | Mod: GC

## 2024-11-16 PROCEDURE — 85610 PROTHROMBIN TIME: CPT

## 2024-11-16 PROCEDURE — 82803 BLOOD GASES ANY COMBINATION: CPT

## 2024-11-16 PROCEDURE — 83605 ASSAY OF LACTIC ACID: CPT

## 2024-11-16 PROCEDURE — 80053 COMPREHEN METABOLIC PANEL: CPT

## 2024-11-16 PROCEDURE — 93005 ELECTROCARDIOGRAM TRACING: CPT

## 2024-11-16 PROCEDURE — 70450 CT HEAD/BRAIN W/O DYE: CPT | Mod: MC

## 2024-11-16 PROCEDURE — 84484 ASSAY OF TROPONIN QUANT: CPT

## 2024-11-16 PROCEDURE — 70496 CT ANGIOGRAPHY HEAD: CPT | Mod: 26,MC

## 2024-11-16 PROCEDURE — 85730 THROMBOPLASTIN TIME PARTIAL: CPT

## 2024-11-16 PROCEDURE — 82435 ASSAY OF BLOOD CHLORIDE: CPT

## 2024-11-16 PROCEDURE — 99285 EMERGENCY DEPT VISIT HI MDM: CPT | Mod: 25

## 2024-11-16 PROCEDURE — 85014 HEMATOCRIT: CPT

## 2024-11-16 PROCEDURE — 85018 HEMOGLOBIN: CPT

## 2024-11-16 PROCEDURE — 70450 CT HEAD/BRAIN W/O DYE: CPT | Mod: 26,MC,XU

## 2024-11-16 PROCEDURE — 82962 GLUCOSE BLOOD TEST: CPT

## 2024-11-16 PROCEDURE — 70498 CT ANGIOGRAPHY NECK: CPT | Mod: MC

## 2024-11-16 PROCEDURE — 84132 ASSAY OF SERUM POTASSIUM: CPT

## 2024-11-16 NOTE — ED ADULT TRIAGE NOTE - CHIEF COMPLAINT QUOTE
noticed slurred speech with facial droop at 0830. woke up at 0800    still feels speech is slurred  hx of TIAs

## 2024-11-16 NOTE — ED PROVIDER NOTE - PROGRESS NOTE DETAILS
Jim Charlton PGY3:  Reevaluated Pt. Pt's  feels her symptoms have fully resolved. She has no symptoms at this time and is walking without difficulty. Discussed plan with Neuro who recommend the pt be dc home and have close fu with her Neurologist for outpt work up as there are no change in her meds indicated at this time. Discussed return precautions and Pt and  are agreeable with plan.

## 2024-11-16 NOTE — ED PROVIDER NOTE - OBJECTIVE STATEMENT
Attending note.  Patient was seen by CT scan as code stroke was called upon patient arrival.  History provided by patient and her .  Patient went to bed at approximate 1130 to midnight last night and woke this morning at 830 with dysarthria and left facial droop.  She denies any headache, dizziness, nausea, vomiting or other neurologic symptoms.   states that symptoms have improved since onset this morning.  Patient reports prior history of CVA, hyperlipidemia, pulmonary embolus, seizure disorder and history of Hodgkin's lymphoma.  Last seizure was approximately 2012 as she is not currently on medication.  He does take Eliquis for previous pulmonary embolus and DVT.  She has no allergies to medication she is a non-smoker.  She has no history of CAD.  She had no residual symptoms from CVA diagnosed in December 2023 on MRI.

## 2024-11-16 NOTE — ED PROVIDER NOTE - PHYSICAL EXAMINATION
Attn - alert, NAD, no pallor or jaundice, PERRL 3 mm, moist mm, skin - warm and dry, Lungs - clear, no w/r/r, good BS bilaterally, Cor - rr, no M, no rub, Abdo - ND, soft, NT, no HSM, no CVAT, no guarding or rebound. Extremities - no edema, no calf tenderness, distal pulses intact and symmetrical, Neuro - Cranial nerves examination, patient has slight facial droop on the left.  Otherwise motor, sensory, speech and cerebellar are intact and nonfocal.

## 2024-11-16 NOTE — CONSULT NOTE ADULT - ASSESSMENT
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 face drooping and slurring of speech.  at bedside reports that he noticed symptoms of drooping of face and slurring of speech when she woke up this morning. Daughter over phone noticed slurring of speech and patient was initially refused to come in. Symptoms had resolved by the time of ED arrival. Noted to have mild facial asymmetry on the L. face. Exam non focal, CTH unremarkable, CTA with severe M2 stenosis.     Initial VS in ED: 141/72  mRS: pre-MRS-2  LKN: 11/16/2024- 0000  NIHSS:1    Impression: Resolved L. facial droop and dysarthria localised to R. brain dysfunction. likely TIA secondary to hypoperfusion to WILFREDO- M2 stenosis     Recommendations:    Diagnostics:  [] MRI brain without contr  [] MRA brain without contrast; MRA neck with contrast   [] TTE w/ bubble   [] Implantable loop recorder  [] Lipid panel, HbA1c  [] CBC, CMP, coagulation panel, troponin    Medications:  [] ASA 81 mg PO (325 per rectum if fails dysphagia)   [] Atorvastatin 80mg (titrate to LDL < 70)   [] Lovenox SQ for DVT prophylaxis     Miscellaneous:  [] NPO unless passes dysphagia screen; swallow eval if fails  [] Keep BP permissive up to 220/110 for 48 hours followed by gradual normotension over 2-3 days   [] Telemonitoring  [] Neurological checks and vital signs per unit protocol  [] BGM goals 140-180  [] PT/OT; S/S evaluation    * Case and plan not final until Attending attestation * Assessment:     78 year old Female with  Left Breast CA S/P L Lumpectomy, on Anastrazole, Right breast ADH S/P resection and on Tamoxifen X 5 years, Lymphoma, history of DVT/PE, 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 face drooping and slurring of speech.  at bedside reports that he noticed symptoms of drooping of face and slurring of speech when she woke up this morning. Daughter over phone noticed slurring of speech and patient was initially refused to come in. Symptoms had resolved by the time of ED arrival. Noted to have mild facial asymmetry on the L. face. Exam non focal, CTH unremarkable, CTA with severe M2 stenosis.     Initial VS in ED: 141/72  mRS: pre-MRS-2  LKN: 11/16/2024- 0000  NIHSS:1    Impression: Resolved L. facial droop and dysarthria localised to R. brain dysfunction. likely TIA secondary to hypoperfusion to WILFREDO- M2 stenosis     Recommendations:    Diagnostics:  [] MRI brain without contrast could be considered outpatient  [] TTE outpatient  [] Implantable loop recorder can be considered outpatient  [] Lipid panel, HbA1c  [] CBC, CMP, coagulation panel, troponin    Medications:  [] Continue ASA 81 mg PO if recommended by outpatient neurologist  [] Continue Eliquis 2.5mg BID  [] Continue Atorvastatin 40mg (titrate to LDL < 70)     Miscellaneous:  [] Vascular risk factor control     No further inpatient neurological workup  Follow expeditiously with outpatient neurology provider.   Return precautions provided. Medical management indicated in patient's current condition,     Discussed with Dr. Barboza, stroke fellow under supervision of Dr. Libman, stroke attending

## 2024-11-16 NOTE — ED PROVIDER NOTE - NSFOLLOWUPINSTRUCTIONS_ED_ALL_ED_FT
You were seen in the ED for your difficulty with speech and the left sided facial droop. Your labs and imaging here in the ED did not show any acutely concerning findings.     It is VERY important that you follow up with your Neurologist for an outpatient MRI of the brain, a echocardiogram to assess your heart.     Please continue to take your aspirin 81mg daily, Elqiuis 2.5mg twice a day, and your Atorvastatin 40mg daily.       WHAT YOU NEED TO KNOW:    A transient ischemic attack (TIA), or mini-stroke, happens when blood cannot flow to part of the brain. A TIA only lasts minutes to hours and does not cause lasting damage. It is still important to get immediate medical care. A TIA may be a warning that you are about to have an ischemic stroke. An ischemic stroke happens when blood flow to the brain is suddenly blocked, usually by a blood clot.  Ischemic Stroke    If you have any new or worsening symptoms such as motor weakness, numbness, sensory changes, severe headache, difficulty speaking, change in vision, or other concerning symptoms please return to the ED.

## 2024-11-16 NOTE — ED PROVIDER NOTE - CLINICAL SUMMARY MEDICAL DECISION MAKING FREE TEXT BOX
Attending note.  Patient woke this morning at 830 with dysarthria and left facial droop and went to bed at approximately 1130-midnight last night.  Symptoms have improved according to the  and patient.  Code stroke was called upon patient arrival and patient was evaluated by neurology and taken directly to CT scan.  Labs, neurochecks.  Currently patient is taking Eliquis for history of previous PE and DVT.

## 2024-11-16 NOTE — ED PROVIDER NOTE - PATIENT PORTAL LINK FT
You can access the FollowMyHealth Patient Portal offered by [facility] by registering at the following website: [email or web address]. By joining [employer]’s FollowMyHealth portal, you will also be able to view your health information using other applications (apps) compatible with our system.

## 2024-11-16 NOTE — CONSULT NOTE ADULT - SUBJECTIVE AND OBJECTIVE BOX
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 face drooping and slurring of speech.  at bedside reports that he noticed symptoms of drooping of face and slurring of speech when she woke up this morning. Daughter over phone noticed slurring of speech and patient was initially refused to come in. Symptoms had resolved by the time of ED arrival. Noted to have mild facial asymmetry on the L.     Admitted in 2023, noted to have MRI brain with AIS in left post temporal lobe, punctate.  old L cerebellar and extensive old hemorrhagic products  mod MVID. Patient asymptomatic at that time. Was on eliquis 2.5mg BID and aspirin 81mg.       PAST MEDICAL & SURGICAL HISTORY:  CVA (cerebral vascular accident)      Lymphoma      Osteoporosis      Seizure      Fracture  lumbar spine,  healed with conservative treatment      Neuropathy  bilateral feet      HLD (hyperlipidemia)      Depression      GERD (gastroesophageal reflux disease)      Hodgkins disease      Pulmonary embolus      Fracture, ankle  Right, s/p metal plate      H/O sinus surgery      Cataract extraction status of eye, right          FAMILY HISTORY:  Family history of coronary artery disease in father (Father, Mother)  both parents s/p CABG        SOCIAL HISTORY:  Smoking Cessation: Discussed    ROS:  Constitutional: No fever, weight loss or fatigue  Eyes: No eye pain, visual disturbances, or discharge  ENMT:  No difficulty hearing, tinnitus, vertigo; No sinus or throat pain  Neck: No pain or stiffness  Respiratory: No cough, wheezing, chills or hemoptysis  Cardiovascular: No chest pain, palpitations, shortness of breath, dizziness or leg swelling  Gastrointestinal: No abdominal pain. No nausea, vomiting or hematemesis; No diarrhea or constipation. Nohematochezia.  Genitourinary: No dysuria, frequency, hematuria or incontinence  Neurological: As per HPI  Skin: No itching, burning, rashes or lesions   Endocrine: No heat or cold intolerance; No hair loss  Musculoskeletal: No joint pain or swelling; No muscle, back or extremity pain  Psychiatric: No depression, anxiety, mood swings or difficulty sleeping  Heme/Lymph: No easy bruising or bleeding gums    MEDICATIONS  (STANDING):    MEDICATIONS  (PRN):      Allergies    No Known Allergies    Intolerances        Vital Signs Last 24 Hrs  T(C): 36.7 (16 Nov 2024 14:30), Max: 36.7 (16 Nov 2024 14:10)  T(F): 98 (16 Nov 2024 14:30), Max: 98.1 (16 Nov 2024 14:10)  HR: 77 (16 Nov 2024 14:30) (77 - 80)  BP: 120/66 (16 Nov 2024 14:30) (120/66 - 141/72)  BP(mean): --  RR: 17 (16 Nov 2024 14:30) (16 - 17)  SpO2: 95% (16 Nov 2024 14:30) (95% - 96%)    Parameters below as of 16 Nov 2024 14:30  Patient On (Oxygen Delivery Method): room air        PHYSICAL EXAM:  Constitutional: WDWN; NAD  Cardiovascular: RRR, no appreciable murmurs; no carotid bruits  Neurologic:  Mental status: Awake, alert and oriented x3.  Recent and remote memory intact.  Naming, repetition and comprehension intact.  Attention/concentration intact.  No dysarthria, no aphasia.  Fund of knowledge appropriate.    Cranial nerves: Fundoscopic exam demonstrated no abnormalities, pupils equally round and reactive to light, visual fields full, no nystagmus, extraocular muscles intact, V1 through V3 intact bilaterally and symmetric, face symmetric, hearing intact to finger rub, palate elevation symmetric, tongue was midline, sternocleidomastoid/shoulder shrug strength bilaterally 5/5.    Motor:  Normal bulk and tone, strength 5/5 in bilateral upper and lower extremities.   strength 5/5.  Rapid alternating movements intact and symmetric.   Sensation: Intact to light touch, proprioception, and pinprick.  No neglect.   Coordination: No dysmetria on finger-to-nose and heel-to-shin.  No clumsiness.  Reflexes: 2+ in upper and lower extremities, downgoing toes bilaterally  Gait: Narrow and steady. No ataxia.  Romberg negative    NIHSS:    Fingerstick Blood Glucose: CAPILLARY BLOOD GLUCOSE      POCT Blood Glucose.: 115 mg/dL (16 Nov 2024 14:12)       LABS:                        12.8   9.73  )-----------( 234      ( 16 Nov 2024 14:22 )             39.8     11-16    143  |  103  |  20  ----------------------------<  121[H]  3.9   |  24  |  0.79    Ca    9.3      16 Nov 2024 14:22    TPro  6.9  /  Alb  4.5  /  TBili  0.6  /  DBili  x   /  AST  44[H]  /  ALT  30  /  AlkPhos  64  11-16    PT/INR - ( 16 Nov 2024 14:22 )   PT: 15.8 sec;   INR: 1.39 ratio         PTT - ( 16 Nov 2024 14:22 )  PTT:33.0 sec      Urinalysis Basic - ( 16 Nov 2024 14:22 )    Color: x / Appearance: x / SG: x / pH: x  Gluc: 121 mg/dL / Ketone: x  / Bili: x / Urobili: x   Blood: x / Protein: x / Nitrite: x   Leuk Esterase: x / RBC: x / WBC x   Sq Epi: x / Non Sq Epi: x / Bacteria: x        RADIOLOGY & ADDITIONAL STUDIES:    IV-tenecteplase(Y/N):                                   Bolus time:  Reason IV-tenecteplase not given:   **STROKE CODE CONSULT NOTE**    Last known well time/Time of onset of symptoms: 11/16/24- 0000  preMRS-2    HPI: 78 year old Female with  Left Breast CA S/P L Lumpectomy, on Anastrazole, Right breast ADH S/P resection and on Tamoxifen X 5 years, Lymphoma, history of DVT/PE, 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 face drooping and slurring of speech.  at bedside reports that he noticed symptoms of drooping of face and slurring of speech when she woke up this morning. Daughter over phone noticed slurring of speech and patient was initially refused to come in. Symptoms had resolved by the time of ED arrival. Noted to have mild facial asymmetry on the L. which as per  at bedside is normal. NIHSS-1 for mild facial asymmetry. CTA with severe M2 stenosis.     Admitted in 2023, noted to have MRI brain with AIS in left post temporal lobe, punctate mechanism likely ESUS.  Old L cerebellar and extensive old hemorrhagic products mod MVID. Patient asymptomatic at that time. Was on eliquis 2.5mg BID and aspirin 81mg. Patient was put on a ziopatch and was told that she didn't need an ILR as per her cardiologist.       PAST MEDICAL & SURGICAL HISTORY:  CVA (cerebral vascular accident)      Lymphoma      Osteoporosis      Seizure      Fracture  lumbar spine,  healed with conservative treatment      Neuropathy  bilateral feet      HLD (hyperlipidemia)      Depression      GERD (gastroesophageal reflux disease)      Hodgkins disease      Pulmonary embolus      Fracture, ankle  Right, s/p metal plate      H/O sinus surgery      Cataract extraction status of eye, right          FAMILY HISTORY:  Family history of coronary artery disease in father (Father, Mother)  both parents s/p CABG        SOCIAL HISTORY:  Smoking Cessation: Discussed    ROS:  Constitutional: No fever, weight loss or fatigue  Eyes: No eye pain, visual disturbances, or discharge  ENMT:  No difficulty hearing, tinnitus, vertigo; No sinus or throat pain  Neck: No pain or stiffness  Respiratory: No cough, wheezing, chills or hemoptysis  Cardiovascular: No chest pain, palpitations, shortness of breath, dizziness or leg swelling  Gastrointestinal: No abdominal pain. No nausea, vomiting or hematemesis; No diarrhea or constipation. Nohematochezia.  Genitourinary: No dysuria, frequency, hematuria or incontinence  Neurological: As per HPI  Skin: No itching, burning, rashes or lesions   Endocrine: No heat or cold intolerance; No hair loss  Musculoskeletal: No joint pain or swelling; No muscle, back or extremity pain  Psychiatric: No depression, anxiety, mood swings or difficulty sleeping  Heme/Lymph: No easy bruising or bleeding gums    MEDICATIONS  (STANDING):    MEDICATIONS  (PRN):      Allergies    No Known Allergies    Intolerances        Vital Signs Last 24 Hrs  T(C): 36.7 (16 Nov 2024 14:30), Max: 36.7 (16 Nov 2024 14:10)  T(F): 98 (16 Nov 2024 14:30), Max: 98.1 (16 Nov 2024 14:10)  HR: 77 (16 Nov 2024 14:30) (77 - 80)  BP: 120/66 (16 Nov 2024 14:30) (120/66 - 141/72)  BP(mean): --  RR: 17 (16 Nov 2024 14:30) (16 - 17)  SpO2: 95% (16 Nov 2024 14:30) (95% - 96%)    Parameters below as of 16 Nov 2024 14:30  Patient On (Oxygen Delivery Method): room air        PHYSICAL EXAM:  Constitutional: NAD  Neurologic:  Mental status: Awake, alert and oriented x3.   Naming, repetition and comprehension intact. No dysarthria, no aphasia.    Cranial nerves: Pupils equally round and reactive to light, visual fields full, no nystagmus, extraocular muscles intact, V1 through V3 intact bilaterally and symmetric, face symmetric, hearing intact to finger rub, palate elevation symmetric, tongue was midline, sternocleidomastoid/shoulder shrug strength bilaterally 5/5.    Motor:  Normal bulk and tone, strength 5/5 in bilateral upper and lower extremities.   strength 5/5.  Rapid alternating movements intact and symmetric.   Sensation: Intact to light touch.  No neglect.   Coordination: No dysmetria on finger-to-nose and heel-to-shin.  No clumsiness.  Reflexes: 2+ in upper and lower extremities, downgoing toes bilaterally  Gait: Narrow and steady. No ataxia.  Romberg negative    NIHSS: 1 (mild facial droop)    Fingerstick Blood Glucose: CAPILLARY BLOOD GLUCOSE      POCT Blood Glucose.: 115 mg/dL (16 Nov 2024 14:12)       LABS:                        12.8   9.73  )-----------( 234      ( 16 Nov 2024 14:22 )             39.8     11-16    143  |  103  |  20  ----------------------------<  121[H]  3.9   |  24  |  0.79    Ca    9.3      16 Nov 2024 14:22    TPro  6.9  /  Alb  4.5  /  TBili  0.6  /  DBili  x   /  AST  44[H]  /  ALT  30  /  AlkPhos  64  11-16    PT/INR - ( 16 Nov 2024 14:22 )   PT: 15.8 sec;   INR: 1.39 ratio         PTT - ( 16 Nov 2024 14:22 )  PTT:33.0 sec      Urinalysis Basic - ( 16 Nov 2024 14:22 )    Color: x / Appearance: x / SG: x / pH: x  Gluc: 121 mg/dL / Ketone: x  / Bili: x / Urobili: x   Blood: x / Protein: x / Nitrite: x   Leuk Esterase: x / RBC: x / WBC x   Sq Epi: x / Non Sq Epi: x / Bacteria: x       CT Angio Brain Stroke Protocol  w/ IV Cont (11.16.24 @ 14:49) >    IMPRESSION:    CTA NECK:  No evidence of significant stenosis or occlusion.    CTA HEAD:  Severe stenoses of proximal right M2 branches (3:355).      CT Brain Stroke Protocol (11.16.24 @ 14:49) >  IMPRESSION:  No acute intracranial hemorrhage, mass effect, or midline shift.     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 face drooping and slurring of speech.  at bedside reports that he noticed symptoms of drooping of face and slurring of speech when she woke up this morning. Daughter over phone noticed slurring of speech and patient was initially refused to come in. Symptoms had resolved by the time of ED arrival. Noted to have mild facial asymmetry on the L. which as per  at bedside is normal. NIHSS-1 for mild facial asymmetry. CTA with severe M2 stenosis.     Admitted in 2023, noted to have MRI brain with AIS in left post temporal lobe, punctate mechanism likely ESUS.  Old L cerebellar and extensive old hemorrhagic products mod MVID. Patient asymptomatic at that time. Was on eliquis 2.5mg BID and aspirin 81mg. Patient was put on a ziopatch and was told that she didn't need an ILR as per her cardiologist.       PAST MEDICAL & SURGICAL HISTORY:  CVA (cerebral vascular accident)      Lymphoma      Osteoporosis      Seizure      Fracture  lumbar spine,  healed with conservative treatment      Neuropathy  bilateral feet      HLD (hyperlipidemia)      Depression      GERD (gastroesophageal reflux disease)      Hodgkins disease      Pulmonary embolus      Fracture, ankle  Right, s/p metal plate      H/O sinus surgery      Cataract extraction status of eye, right          FAMILY HISTORY:  Family history of coronary artery disease in father (Father, Mother)  both parents s/p CABG        SOCIAL HISTORY:  Smoking Cessation: Discussed      MEDICATIONS  (STANDING):    MEDICATIONS  (PRN):      Allergies    No Known Allergies    Intolerances        Vital Signs Last 24 Hrs  T(C): 36.7 (16 Nov 2024 14:30), Max: 36.7 (16 Nov 2024 14:10)  T(F): 98 (16 Nov 2024 14:30), Max: 98.1 (16 Nov 2024 14:10)  HR: 77 (16 Nov 2024 14:30) (77 - 80)  BP: 120/66 (16 Nov 2024 14:30) (120/66 - 141/72)  BP(mean): --  RR: 17 (16 Nov 2024 14:30) (16 - 17)  SpO2: 95% (16 Nov 2024 14:30) (95% - 96%)    Parameters below as of 16 Nov 2024 14:30  Patient On (Oxygen Delivery Method): room air        PHYSICAL EXAM:  Constitutional: NAD  Neurologic:  Mental status: Awake, alert and oriented x3.   Naming, repetition and comprehension intact. No dysarthria, no aphasia.    Cranial nerves: Pupils equally round and reactive to light, visual fields full, no nystagmus, extraocular muscles intact, V1 through V3 intact bilaterally and symmetric, face symmetric, hearing intact to finger rub, palate elevation symmetric, tongue was midline, sternocleidomastoid/shoulder shrug strength bilaterally 5/5.    Motor:  Normal bulk and tone, strength 5/5 in bilateral upper and lower extremities.   strength 5/5.  Rapid alternating movements intact and symmetric.   Sensation: Intact to light touch.  No neglect.   Coordination: No dysmetria on finger-to-nose and heel-to-shin.  No clumsiness.  Reflexes: 2+ in upper and lower extremities, downgoing toes bilaterally  Gait: Narrow and steady. No ataxia.  Romberg negative    NIHSS: 1 (mild facial droop)    Fingerstick Blood Glucose: CAPILLARY BLOOD GLUCOSE      POCT Blood Glucose.: 115 mg/dL (16 Nov 2024 14:12)       LABS:                        12.8   9.73  )-----------( 234      ( 16 Nov 2024 14:22 )             39.8     11-16    143  |  103  |  20  ----------------------------<  121[H]  3.9   |  24  |  0.79    Ca    9.3      16 Nov 2024 14:22    TPro  6.9  /  Alb  4.5  /  TBili  0.6  /  DBili  x   /  AST  44[H]  /  ALT  30  /  AlkPhos  64  11-16    PT/INR - ( 16 Nov 2024 14:22 )   PT: 15.8 sec;   INR: 1.39 ratio         PTT - ( 16 Nov 2024 14:22 )  PTT:33.0 sec      Urinalysis Basic - ( 16 Nov 2024 14:22 )    Color: x / Appearance: x / SG: x / pH: x  Gluc: 121 mg/dL / Ketone: x  / Bili: x / Urobili: x   Blood: x / Protein: x / Nitrite: x   Leuk Esterase: x / RBC: x / WBC x   Sq Epi: x / Non Sq Epi: x / Bacteria: x       CT Angio Brain Stroke Protocol  w/ IV Cont (11.16.24 @ 14:49) >    IMPRESSION:    CTA NECK:  No evidence of significant stenosis or occlusion.    CTA HEAD:  Severe stenoses of proximal right M2 branches (3:355).      CT Brain Stroke Protocol (11.16.24 @ 14:49) >  IMPRESSION:  No acute intracranial hemorrhage, mass effect, or midline shift.

## 2024-11-16 NOTE — ED ADULT NURSE NOTE - OBJECTIVE STATEMENT
79y Female complaining of slurred speech. pt presenting to [facility] after an episode of L. face drooping and slurring of speech. as per   he noticed symptoms of drooping of face and slurring of speech when she woke up this morning. Daughter over phone noticed slurring of speech and patient  initially refused to come in. Symptoms had resolved by the time of ED arrival. Noted to have mild facial asymmetry on the L.

## 2024-11-19 ENCOUNTER — APPOINTMENT (OUTPATIENT)
Dept: ENDOCRINOLOGY | Facility: CLINIC | Age: 79
End: 2024-11-19
Payer: MEDICARE

## 2024-11-19 VITALS
BODY MASS INDEX: 26.66 KG/M2 | DIASTOLIC BLOOD PRESSURE: 74 MMHG | WEIGHT: 160 LBS | HEART RATE: 84 BPM | OXYGEN SATURATION: 98 % | HEIGHT: 64.9 IN | SYSTOLIC BLOOD PRESSURE: 130 MMHG

## 2024-11-19 DIAGNOSIS — M81.0 AGE-RELATED OSTEOPOROSIS W/OUT CURRENT PATHOLOGICAL FRACTURE: ICD-10-CM

## 2024-11-19 DIAGNOSIS — Z79.52 LONG TERM (CURRENT) USE OF SYSTEMIC STEROIDS: ICD-10-CM

## 2024-11-19 PROCEDURE — 96372 THER/PROPH/DIAG INJ SC/IM: CPT

## 2024-11-19 PROCEDURE — ZZZZZ: CPT

## 2024-11-19 PROCEDURE — 99214 OFFICE O/P EST MOD 30 MIN: CPT | Mod: 25

## 2024-11-19 PROCEDURE — 77080 DXA BONE DENSITY AXIAL: CPT | Mod: GA

## 2024-11-19 RX ORDER — DENOSUMAB 60 MG/ML
60 INJECTION SUBCUTANEOUS
Qty: 1 | Refills: 0 | Status: COMPLETED | OUTPATIENT
Start: 2024-11-19

## 2024-11-19 RX ADMIN — DENOSUMAB 60 MG/ML: 60 INJECTION SUBCUTANEOUS at 00:00

## 2024-12-03 ENCOUNTER — OUTPATIENT (OUTPATIENT)
Dept: OUTPATIENT SERVICES | Facility: HOSPITAL | Age: 79
LOS: 1 days | End: 2024-12-03
Payer: MEDICARE

## 2024-12-03 ENCOUNTER — RESULT REVIEW (OUTPATIENT)
Age: 79
End: 2024-12-03

## 2024-12-03 ENCOUNTER — APPOINTMENT (OUTPATIENT)
Dept: CV DIAGNOSITCS | Facility: HOSPITAL | Age: 79
End: 2024-12-03

## 2024-12-03 DIAGNOSIS — Z98.41 CATARACT EXTRACTION STATUS, RIGHT EYE: Chronic | ICD-10-CM

## 2024-12-03 DIAGNOSIS — S82.899A OTHER FRACTURE OF UNSPECIFIED LOWER LEG, INITIAL ENCOUNTER FOR CLOSED FRACTURE: Chronic | ICD-10-CM

## 2024-12-03 DIAGNOSIS — Z98.890 OTHER SPECIFIED POSTPROCEDURAL STATES: Chronic | ICD-10-CM

## 2024-12-03 DIAGNOSIS — R01.1 CARDIAC MURMUR, UNSPECIFIED: ICD-10-CM

## 2024-12-03 PROCEDURE — C8929: CPT

## 2024-12-03 PROCEDURE — 93306 TTE W/DOPPLER COMPLETE: CPT | Mod: 26

## 2025-01-17 ENCOUNTER — NON-APPOINTMENT (OUTPATIENT)
Age: 80
End: 2025-01-17

## 2025-01-17 ENCOUNTER — APPOINTMENT (OUTPATIENT)
Dept: CARDIOLOGY | Facility: CLINIC | Age: 80
End: 2025-01-17
Payer: MEDICARE

## 2025-01-17 VITALS
OXYGEN SATURATION: 95 % | HEIGHT: 64 IN | WEIGHT: 160 LBS | HEART RATE: 88 BPM | SYSTOLIC BLOOD PRESSURE: 148 MMHG | BODY MASS INDEX: 27.31 KG/M2 | DIASTOLIC BLOOD PRESSURE: 72 MMHG

## 2025-01-17 DIAGNOSIS — Z86.718 PERSONAL HISTORY OF OTHER VENOUS THROMBOSIS AND EMBOLISM: ICD-10-CM

## 2025-01-17 PROCEDURE — 99214 OFFICE O/P EST MOD 30 MIN: CPT

## 2025-01-17 PROCEDURE — G2211 COMPLEX E/M VISIT ADD ON: CPT

## 2025-05-20 ENCOUNTER — TRANSCRIPTION ENCOUNTER (OUTPATIENT)
Age: 80
End: 2025-05-20

## 2025-05-20 ENCOUNTER — APPOINTMENT (OUTPATIENT)
Dept: ENDOCRINOLOGY | Facility: CLINIC | Age: 80
End: 2025-05-20
Payer: MEDICARE

## 2025-05-20 PROCEDURE — 96372 THER/PROPH/DIAG INJ SC/IM: CPT

## 2025-05-20 RX ORDER — DENOSUMAB 60 MG/ML
60 INJECTION SUBCUTANEOUS
Qty: 1 | Refills: 0 | Status: COMPLETED | OUTPATIENT
Start: 2025-05-16

## 2025-06-02 ENCOUNTER — TRANSCRIPTION ENCOUNTER (OUTPATIENT)
Age: 80
End: 2025-06-02

## 2025-06-17 ENCOUNTER — APPOINTMENT (OUTPATIENT)
Dept: ORTHOPEDIC SURGERY | Facility: CLINIC | Age: 80
End: 2025-06-17
Payer: MEDICARE

## 2025-06-17 PROBLEM — R20.0 NUMBNESS AND TINGLING OF HAND: Status: ACTIVE | Noted: 2025-06-17

## 2025-06-17 PROCEDURE — 99203 OFFICE O/P NEW LOW 30 MIN: CPT

## 2025-06-19 ENCOUNTER — TRANSCRIPTION ENCOUNTER (OUTPATIENT)
Age: 80
End: 2025-06-19

## 2025-07-01 ENCOUNTER — TRANSCRIPTION ENCOUNTER (OUTPATIENT)
Age: 80
End: 2025-07-01

## 2025-07-08 ENCOUNTER — TRANSCRIPTION ENCOUNTER (OUTPATIENT)
Age: 80
End: 2025-07-08

## 2025-07-15 ENCOUNTER — TRANSCRIPTION ENCOUNTER (OUTPATIENT)
Age: 80
End: 2025-07-15

## 2025-07-22 ENCOUNTER — TRANSCRIPTION ENCOUNTER (OUTPATIENT)
Age: 80
End: 2025-07-22

## 2025-07-22 ENCOUNTER — EMERGENCY (EMERGENCY)
Facility: HOSPITAL | Age: 80
LOS: 1 days | End: 2025-07-22
Attending: STUDENT IN AN ORGANIZED HEALTH CARE EDUCATION/TRAINING PROGRAM
Payer: MEDICARE

## 2025-07-22 ENCOUNTER — APPOINTMENT (OUTPATIENT)
Dept: PSYCHIATRY | Facility: TELEHEALTH | Age: 80
End: 2025-07-22

## 2025-07-22 ENCOUNTER — NON-APPOINTMENT (OUTPATIENT)
Age: 80
End: 2025-07-22

## 2025-07-22 VITALS
TEMPERATURE: 97 F | RESPIRATION RATE: 17 BRPM | HEART RATE: 60 BPM | SYSTOLIC BLOOD PRESSURE: 139 MMHG | DIASTOLIC BLOOD PRESSURE: 78 MMHG | OXYGEN SATURATION: 98 %

## 2025-07-22 VITALS
DIASTOLIC BLOOD PRESSURE: 61 MMHG | TEMPERATURE: 98 F | WEIGHT: 160.06 LBS | HEART RATE: 63 BPM | SYSTOLIC BLOOD PRESSURE: 126 MMHG | HEIGHT: 65 IN | RESPIRATION RATE: 16 BRPM | OXYGEN SATURATION: 99 %

## 2025-07-22 DIAGNOSIS — Z98.890 OTHER SPECIFIED POSTPROCEDURAL STATES: Chronic | ICD-10-CM

## 2025-07-22 DIAGNOSIS — Z98.41 CATARACT EXTRACTION STATUS, RIGHT EYE: Chronic | ICD-10-CM

## 2025-07-22 DIAGNOSIS — S82.899A OTHER FRACTURE OF UNSPECIFIED LOWER LEG, INITIAL ENCOUNTER FOR CLOSED FRACTURE: Chronic | ICD-10-CM

## 2025-07-22 LAB
ALBUMIN SERPL ELPH-MCNC: 4.7 G/DL — SIGNIFICANT CHANGE UP (ref 3.3–5)
ALP SERPL-CCNC: 68 U/L — SIGNIFICANT CHANGE UP (ref 40–120)
ALT FLD-CCNC: 34 U/L — SIGNIFICANT CHANGE UP (ref 10–45)
ANION GAP SERPL CALC-SCNC: 14 MMOL/L — SIGNIFICANT CHANGE UP (ref 5–17)
APPEARANCE UR: CLEAR — SIGNIFICANT CHANGE UP
AST SERPL-CCNC: 45 U/L — HIGH (ref 10–40)
BACTERIA # UR AUTO: NEGATIVE /HPF — SIGNIFICANT CHANGE UP
BASOPHILS # BLD AUTO: 0.05 K/UL — SIGNIFICANT CHANGE UP (ref 0–0.2)
BASOPHILS NFR BLD AUTO: 0.5 % — SIGNIFICANT CHANGE UP (ref 0–2)
BILIRUB SERPL-MCNC: 0.6 MG/DL — SIGNIFICANT CHANGE UP (ref 0.2–1.2)
BILIRUB UR-MCNC: NEGATIVE — SIGNIFICANT CHANGE UP
BUN SERPL-MCNC: 19 MG/DL — SIGNIFICANT CHANGE UP (ref 7–23)
CALCIUM SERPL-MCNC: 9.7 MG/DL — SIGNIFICANT CHANGE UP (ref 8.4–10.5)
CAST: 0 /LPF — SIGNIFICANT CHANGE UP (ref 0–4)
CHLORIDE SERPL-SCNC: 103 MMOL/L — SIGNIFICANT CHANGE UP (ref 96–108)
CO2 SERPL-SCNC: 24 MMOL/L — SIGNIFICANT CHANGE UP (ref 22–31)
COLOR SPEC: YELLOW — SIGNIFICANT CHANGE UP
CREAT SERPL-MCNC: 0.75 MG/DL — SIGNIFICANT CHANGE UP (ref 0.5–1.3)
DIFF PNL FLD: ABNORMAL
EGFR: 81 ML/MIN/1.73M2 — SIGNIFICANT CHANGE UP
EGFR: 81 ML/MIN/1.73M2 — SIGNIFICANT CHANGE UP
EOSINOPHIL # BLD AUTO: 0.15 K/UL — SIGNIFICANT CHANGE UP (ref 0–0.5)
EOSINOPHIL NFR BLD AUTO: 1.4 % — SIGNIFICANT CHANGE UP (ref 0–6)
GAS PNL BLDV: SIGNIFICANT CHANGE UP
GLUCOSE SERPL-MCNC: 94 MG/DL — SIGNIFICANT CHANGE UP (ref 70–99)
GLUCOSE UR QL: NEGATIVE MG/DL — SIGNIFICANT CHANGE UP
HCT VFR BLD CALC: 42.6 % — SIGNIFICANT CHANGE UP (ref 34.5–45)
HGB BLD-MCNC: 13.1 G/DL — SIGNIFICANT CHANGE UP (ref 11.5–15.5)
IMM GRANULOCYTES # BLD AUTO: 0.09 K/UL — HIGH (ref 0–0.07)
IMM GRANULOCYTES NFR BLD AUTO: 0.8 % — SIGNIFICANT CHANGE UP (ref 0–0.9)
KETONES UR QL: NEGATIVE MG/DL — SIGNIFICANT CHANGE UP
LEUKOCYTE ESTERASE UR-ACNC: ABNORMAL
LIDOCAIN IGE QN: 31 U/L — SIGNIFICANT CHANGE UP (ref 7–60)
LYMPHOCYTES # BLD AUTO: 1.8 K/UL — SIGNIFICANT CHANGE UP (ref 1–3.3)
LYMPHOCYTES NFR BLD AUTO: 16.9 % — SIGNIFICANT CHANGE UP (ref 13–44)
MCHC RBC-ENTMCNC: 27.1 PG — SIGNIFICANT CHANGE UP (ref 27–34)
MCHC RBC-ENTMCNC: 30.8 G/DL — LOW (ref 32–36)
MCV RBC AUTO: 88.2 FL — SIGNIFICANT CHANGE UP (ref 80–100)
MONOCYTES # BLD AUTO: 0.92 K/UL — HIGH (ref 0–0.9)
MONOCYTES NFR BLD AUTO: 8.6 % — SIGNIFICANT CHANGE UP (ref 2–14)
NEUTROPHILS # BLD AUTO: 7.64 K/UL — HIGH (ref 1.8–7.4)
NEUTROPHILS NFR BLD AUTO: 71.8 % — SIGNIFICANT CHANGE UP (ref 43–77)
NITRITE UR-MCNC: NEGATIVE — SIGNIFICANT CHANGE UP
NRBC # BLD AUTO: 0 K/UL — SIGNIFICANT CHANGE UP (ref 0–0)
NRBC # FLD: 0 K/UL — SIGNIFICANT CHANGE UP (ref 0–0)
NRBC BLD AUTO-RTO: 0 /100 WBCS — SIGNIFICANT CHANGE UP (ref 0–0)
PH UR: 6 — SIGNIFICANT CHANGE UP (ref 5–8)
PLATELET # BLD AUTO: 250 K/UL — SIGNIFICANT CHANGE UP (ref 150–400)
PMV BLD: 10 FL — SIGNIFICANT CHANGE UP (ref 7–13)
POTASSIUM SERPL-MCNC: 3.9 MMOL/L — SIGNIFICANT CHANGE UP (ref 3.5–5.3)
POTASSIUM SERPL-SCNC: 3.9 MMOL/L — SIGNIFICANT CHANGE UP (ref 3.5–5.3)
PROT SERPL-MCNC: 7.3 G/DL — SIGNIFICANT CHANGE UP (ref 6–8.3)
PROT UR-MCNC: NEGATIVE MG/DL — SIGNIFICANT CHANGE UP
RBC # BLD: 4.83 M/UL — SIGNIFICANT CHANGE UP (ref 3.8–5.2)
RBC # FLD: 15.9 % — HIGH (ref 10.3–14.5)
RBC CASTS # UR COMP ASSIST: 1 /HPF — SIGNIFICANT CHANGE UP (ref 0–4)
REVIEW: SIGNIFICANT CHANGE UP
SODIUM SERPL-SCNC: 141 MMOL/L — SIGNIFICANT CHANGE UP (ref 135–145)
SP GR SPEC: >1.03 — HIGH (ref 1–1.03)
SQUAMOUS # UR AUTO: 2 /HPF — SIGNIFICANT CHANGE UP (ref 0–5)
UROBILINOGEN FLD QL: 0.2 MG/DL — SIGNIFICANT CHANGE UP (ref 0.2–1)
WBC # BLD: 10.65 K/UL — HIGH (ref 3.8–10.5)
WBC # FLD AUTO: 10.65 K/UL — HIGH (ref 3.8–10.5)
WBC UR QL: 11 /HPF — HIGH (ref 0–5)

## 2025-07-22 PROCEDURE — 82435 ASSAY OF BLOOD CHLORIDE: CPT

## 2025-07-22 PROCEDURE — 36000 PLACE NEEDLE IN VEIN: CPT | Mod: XU

## 2025-07-22 PROCEDURE — 74177 CT ABD & PELVIS W/CONTRAST: CPT

## 2025-07-22 PROCEDURE — 84295 ASSAY OF SERUM SODIUM: CPT

## 2025-07-22 PROCEDURE — 99285 EMERGENCY DEPT VISIT HI MDM: CPT | Mod: FS

## 2025-07-22 PROCEDURE — 83605 ASSAY OF LACTIC ACID: CPT

## 2025-07-22 PROCEDURE — 85018 HEMOGLOBIN: CPT

## 2025-07-22 PROCEDURE — 81001 URINALYSIS AUTO W/SCOPE: CPT

## 2025-07-22 PROCEDURE — 82330 ASSAY OF CALCIUM: CPT

## 2025-07-22 PROCEDURE — 83690 ASSAY OF LIPASE: CPT

## 2025-07-22 PROCEDURE — 87086 URINE CULTURE/COLONY COUNT: CPT

## 2025-07-22 PROCEDURE — 87077 CULTURE AEROBIC IDENTIFY: CPT

## 2025-07-22 PROCEDURE — 85014 HEMATOCRIT: CPT

## 2025-07-22 PROCEDURE — 85025 COMPLETE CBC W/AUTO DIFF WBC: CPT

## 2025-07-22 PROCEDURE — 84132 ASSAY OF SERUM POTASSIUM: CPT

## 2025-07-22 PROCEDURE — 82947 ASSAY GLUCOSE BLOOD QUANT: CPT

## 2025-07-22 PROCEDURE — 99284 EMERGENCY DEPT VISIT MOD MDM: CPT | Mod: 25

## 2025-07-22 PROCEDURE — 74177 CT ABD & PELVIS W/CONTRAST: CPT | Mod: 26

## 2025-07-22 PROCEDURE — 80053 COMPREHEN METABOLIC PANEL: CPT

## 2025-07-22 PROCEDURE — 82803 BLOOD GASES ANY COMBINATION: CPT

## 2025-07-22 RX ORDER — AMOXICILLIN AND CLAVULANATE POTASSIUM 500; 125 MG/1; MG/1
1 TABLET, FILM COATED ORAL
Qty: 20 | Refills: 0
Start: 2025-07-22 | End: 2025-07-31

## 2025-07-22 RX ORDER — AMOXICILLIN AND CLAVULANATE POTASSIUM 500; 125 MG/1; MG/1
1 TABLET, FILM COATED ORAL ONCE
Refills: 0 | Status: COMPLETED | OUTPATIENT
Start: 2025-07-22 | End: 2025-07-22

## 2025-07-22 RX ADMIN — AMOXICILLIN AND CLAVULANATE POTASSIUM 1 TABLET(S): 500; 125 TABLET, FILM COATED ORAL at 21:25

## 2025-07-22 NOTE — ED PROVIDER NOTE - PROGRESS NOTE DETAILS
Attending Whit: labs w/ no emergent findings. CT w/ findings of proctitis. informed pt. augmentin prescribed and recommended follow up w/ PCP and GI. pt agreeable w/ plan. Will DC.

## 2025-07-22 NOTE — ED PROVIDER NOTE - NSFOLLOWUPINSTRUCTIONS_ED_ALL_ED_FT
Tylenol as needed for pain do not take more than 4 g in a 24-hour period    Augmentin 875 mg twice a day for the next 10 days    Follow-up with your GI doctor or gastrointestinal clinic    Rest stay hydrated    Continue all medications as prescribed    If you have any worsening pain fevers chills or any new or worsening complaints please come back to the emergency department immediately

## 2025-07-22 NOTE — ED PROVIDER NOTE - CLINICAL SUMMARY MEDICAL DECISION MAKING FREE TEXT BOX
Attending Whit: 79 y/o F w/ PMH of CVA, DVT/PE on eliquis, HTN, GERD, prior lymphoma, prior breast cancer, seizure disorder, overactive bladder presenting w/ diarrhea. Seen w/ . Reports since last wednesday has been having loose stools. Occurring a few times a day. No blood. Took immodium which helped w/ symptoms. Has been having lower abdominal pain as well. No recent abx or travel. Went to urgent care and was told to come to the ED for further evaluation. Denies fevers, chills, headache, dizziness, blurred vision, chest pain, cough, shortness of breath, nausea, vomiting, urinary symptoms, MSK pain, rash. Pt here w/ loose stool and lower abdominal pain. Will eval for colitis. Eval for diverticulitis. Unlikely C. diff. Possible viral GI illness. Plan for labs, imaging, meds PRN. Will reassess the need for additional interventions as clinically warranted. Refer to any progress notes for updates on clinical course and as a continuation of this MDM.

## 2025-07-22 NOTE — ED PROVIDER NOTE - NSFOLLOWUPCLINICS_GEN_ALL_ED_FT
[facility]  Gastroenterology  [address] [place] [address] [address]  [place] [address]  Phone: [phone]  Fax:

## 2025-07-22 NOTE — ED ADULT NURSE NOTE - OBJECTIVE STATEMENT
79 y/o F with PMHx of breast CA, lymphoma, DVT PE, CVA on Eliquis, hypertension, GERD, overactive bladder, seizure disorder, urosepsis presents to the ED with complaints of diarrhea x 1 week. Patient states she took Imodium and diarrhea has since been improving. Patient was seen at urgent care and advised to present to the ED for further evaluation of sxs. Denies abdominal pain at present, states pain only with palpation. Denies nausea, vomiting, fever, chills. AOx4 and speaking coherently with  at bedside. Ambulatory with cane at baseline. Breathing is unlabored, spontaneous, and symmetrical. <2s capillary refill. Abdomen is soft, nondistended. Tenderness noted to RUQ.

## 2025-07-22 NOTE — ED PROVIDER NOTE - NS ED ATTENDING STATEMENT MOD
I have seen and examined this patient and fully participated in the care of this patient as the teaching attending.  The service was shared with the SIXTO.  I reviewed and verified the documentation.

## 2025-07-22 NOTE — ED ADULT NURSE NOTE - NSFALLHARMRISKINTERV_ED_ALL_ED

## 2025-07-22 NOTE — ED PROVIDER NOTE - PATIENT PORTAL LINK FT
Retention Suture Bite Size: 3 mm You can access the FollowMyHealth Patient Portal offered by [facility] by registering at the following website: [email or web address]. By joining [facility]’s FollowMyHealth portal, you will also be able to view your health information using other applications (apps) compatible with our system.

## 2025-07-22 NOTE — ED PROVIDER NOTE - RAPID ASSESSMENT
80-year-old female past medical history breast CA, lymphoma, DVT PE, CVA on Eliquis, hypertension, GERD, overactive bladder, seizure disorder, urosepsis, presents to the ED complaining of diarrhea x 1 week.  Diarrhea is not profuse.  Reports 1 daily episode.  This is unusual for her.  Does endorse some left-sided abdominal discomfort.  No fevers or chills.  No prior abdominal surgeries.  No recent antibiotic use.  No recent hospitalizations.  No urinary complaints.  [place] [facility] [place] for CT scan.  Patient well-appearing no acute distress.  On brief abdominal exam patient has very mild left lower quadrant TTP.    Patient was seen as a QPA patient. The patient will be seen and further worked up in the main emergency department and their care will be completed by the main emergency department team along with a thorough physical exam. Receiving team will follow up on labs, analgesia, any clinical imaging, reassess and disposition as clinically indicated, all decisions regarding the progression of care will be made at their discretion. - Sharron TURCIOS 80-year-old female past medical history breast CA, lymphoma, DVT PE, CVA on Eliquis, hypertension, GERD, overactive bladder, seizure disorder, urosepsis, presents to the ED complaining of diarrhea x 1 week.  Diarrhea is not profuse.  Reports 1 daily episode.  This is unusual for her.  Does endorse some left-sided abdominal discomfort.  No fevers or chills.  No nausea or vomiting,  No prior abdominal surgeries.  No recent antibiotic use.  No recent hospitalizations.  No urinary complaints.  Patient was seen at [facility] and sent to ED for CT scan.  Patient well-appearing no acute distress.  On brief abdominal exam patient has very mild left lower quadrant TTP.    Patient was seen as a QPA patient. The patient will be seen and further worked up in the main emergency department and their care will be completed by the main emergency department team along with a thorough physical exam. Receiving team will follow up on labs, analgesia, any clinical imaging, reassess and disposition as clinically indicated, all decisions regarding the progression of care will be made at their discretion. - Sharron TURCIOS

## 2025-07-22 NOTE — ED PROVIDER NOTE - OBJECTIVE STATEMENT
Attending Whit: 81 y/o F w/ PMH of CVA, DVT/PE on eliquis, HTN, GERD, prior lymphoma, prior breast cancer, seizure disorder, overactive bladder presenting w/ diarrhea. Seen w/ . Reports since last wednesday has been having loose stools. Occurring a few times a day. No blood. Took immodium which helped w/ symptoms. Has been having lower abdominal pain as well. No recent abx or travel. Went to urgent care and was told to come to the ED for further evaluation. Denies fevers, chills, headache, dizziness, blurred vision, chest pain, cough, shortness of breath, nausea, vomiting, urinary symptoms, MSK pain, rash.

## 2025-07-22 NOTE — ED PROVIDER NOTE - ATTENDING CONTRIBUTION TO CARE
Attending Whit: I performed a face to face evaluation of the patient and obtained a history as well as performed a physical exam. I have discussed their management with the SIXTO. I have reviewed the SIXTO note and agree with the documented findings and plan of care, except as noted. This was a shared visit with an SIXTO. I reviewed and verified the documentation and independently performed my own history/exam/medical decision making. My medical decision making and observations are found above. Please refer to any progress notes for updates on clinical course. My notes supersedes the above SIXTO note in case of discrepancy

## 2025-07-22 NOTE — ED PROVIDER NOTE - PHYSICAL EXAMINATION
Attending Nello:   Gen: NAD, AOx3, able to make needs known, non-toxic  Head: NCAT  HEENT: EOMI, oral mucosa moist, normal conjunctiva  Lung: speaking in full sentences, no respiratory distress, CTAB  CV: RRR  Abd: non distended, soft, mild lower abd tenderness, no guarding.  MSK: no visible deformities  Neuro: Appears non focal  Skin: Warm, well perfused  Psych: normal affect

## 2025-07-22 NOTE — ED PROVIDER NOTE - AVIAN FLU SYMPTOMS
No FAMILY HISTORY:  Mother  Still living? Yes, Estimated age: Age Unknown  Family history of osteoarthritis, Age at diagnosis: Age Unknown    Sibling  Still living? Unknown  Family history of disease of aorta, Age at diagnosis: Age Unknown

## 2025-07-24 LAB
CULTURE RESULTS: ABNORMAL
SPECIMEN SOURCE: SIGNIFICANT CHANGE UP

## 2025-07-28 ENCOUNTER — RX RENEWAL (OUTPATIENT)
Age: 80
End: 2025-07-28